# Patient Record
Sex: MALE | Race: WHITE | NOT HISPANIC OR LATINO | URBAN - METROPOLITAN AREA
[De-identification: names, ages, dates, MRNs, and addresses within clinical notes are randomized per-mention and may not be internally consistent; named-entity substitution may affect disease eponyms.]

---

## 2017-01-23 ENCOUNTER — APPOINTMENT (RX ONLY)
Dept: URBAN - METROPOLITAN AREA CLINIC 349 | Facility: CLINIC | Age: 67
Setting detail: DERMATOLOGY
End: 2017-01-23

## 2017-01-23 DIAGNOSIS — L30.4 ERYTHEMA INTERTRIGO: ICD-10-CM

## 2017-01-23 DIAGNOSIS — L30.9 DERMATITIS, UNSPECIFIED: ICD-10-CM | Status: IMPROVED

## 2017-01-23 PROCEDURE — ? COUNSELING

## 2017-01-23 PROCEDURE — 99213 OFFICE O/P EST LOW 20 MIN: CPT

## 2017-01-23 PROCEDURE — ? PRESCRIPTION

## 2017-01-23 RX ORDER — NYSTATIN 100000 [USP'U]/G
CREAM TOPICAL
Qty: 1 | Refills: 3 | Status: ERX

## 2017-01-23 RX ORDER — NITROGLYCERIN 20 MG/G
OINTMENT TOPICAL
Qty: 1 | Refills: 3 | Status: ERX

## 2017-01-23 RX ORDER — NYSTATIN 100000 [USP'U]/G
POWDER TOPICAL
Qty: 1 | Refills: 3 | Status: ERX

## 2017-01-23 ASSESSMENT — LOCATION SIMPLE DESCRIPTION DERM
LOCATION SIMPLE: RIGHT BUTTOCK
LOCATION SIMPLE: LEFT BUTTOCK
LOCATION SIMPLE: GROIN
LOCATION SIMPLE: LEFT THIGH
LOCATION SIMPLE: LEFT BUTTOCK

## 2017-01-23 ASSESSMENT — LOCATION DETAILED DESCRIPTION DERM
LOCATION DETAILED: SUPRAPUBIC SKIN
LOCATION DETAILED: LEFT ANTERIOR PROXIMAL THIGH
LOCATION DETAILED: RIGHT BUTTOCK
LOCATION DETAILED: LEFT BUTTOCK
LOCATION DETAILED: LEFT BUTTOCK

## 2017-01-23 ASSESSMENT — LOCATION ZONE DERM
LOCATION ZONE: TRUNK
LOCATION ZONE: LEG
LOCATION ZONE: TRUNK

## 2017-03-23 ENCOUNTER — HOSPITAL ENCOUNTER (OUTPATIENT)
Dept: PHYSICAL THERAPY | Age: 67
Discharge: HOME | End: 2017-03-23
Payer: MEDICARE

## 2017-03-23 PROCEDURE — G8978 MOBILITY CURRENT STATUS: HCPCS

## 2017-03-23 PROCEDURE — 97162 PT EVAL MOD COMPLEX 30 MIN: CPT

## 2017-03-23 PROCEDURE — G8979 MOBILITY GOAL STATUS: HCPCS

## 2017-03-23 NOTE — PROGRESS NOTES
Lakhwinder Toribio  : 1950 [facility],  [place].  [phone]   [phone]         OUTPATIENT PHYSICAL THERAPY:Initial Assessment 3/23/2017    ICD-10: Treatment Diagnosis: primary osteoarthiritix of R knee  (M11.117)  Precautions/Allergies:   Bactrim [sulfamethoxazole-trimethoprim]   Fall Risk Score: 2  MD Orders: knee exercises MEDICAL/REFERRING DIAGNOSIS:  Knee R knee pain  DATE OF ONSET: Oct 2016  REFERRING PHYSICIAN: Lord Dolan., *  RETURN PHYSICIAN APPOINTMENT: Surgery scheduled      INITIAL ASSESSMENT:  Mr. Heriberto Spencer presents with R knee pain and is readying for TKR in July. He is here for pre op ex program. He has multiple medical problems and is unable to tolerate several positions which will make his program more challenging. He will benefit from skilled PT to prepare for his upcoming surgery. PROBLEM LIST (Impacting functional limitations):  1. Decreased Strength  2. Increased Pain  3. Decreased Activity Tolerance  4. Decreased [place] with Home Exercise Program INTERVENTIONS PLANNED:  1. Home Exercise Program (HEP)  2. Therapeutic Exercise/Strengthening   TREATMENT PLAN:  Effective Dates: 3/23/17 TO 17. Frequency/Duration: 2 times a week for 4 weeks  GOALS: (Goals have been discussed and agreed upon with patient.)    Discharge Goals: Time Frame: 4 weeks  1. Pt independent with HEP to address deficits. 2.   Rehabilitation Potential For Stated Goals: Good  Regarding Marylu Pinzon therapy, I certify that the treatment plan above will be carried out by a therapist or under their direction. Thank you for this referral,  John Fernández, PT     Referring Physician Signature: Lord Dolan., *              Date                    The information in this section was collected on 3/23/17 (except where otherwise noted).   HISTORY:   History of Present Injury/Illness (Reason for Referral): Pt presents with R knee pain getting prepared for total knee replacement. He has multiple medical issues that impact his ability to perform exercises independently. He has chronic back problems. He sleeps in a chair and has for 5 years. He has side pain that prohibits him from lying supine. He has postural changes that impact his alignment and ability to perform ex. He has been evaluated for a surgery that would address his back but he cannot have it until he is at his ideal weight per pt. His activity limitations is making his weight loss program difficult. He asked me about a nutritionist and I suggested he get a referral from his MD and I gave him the number for [facility] out pt Nutrition counseling. Past Medical History/Comorbidities:   Mr. Klarissa Brody has medical history of asthma, COPD, obesity, hypertension, increased cholesterol, atrial fibrillation, type 2 diabetes, cervical stenosis, low back degenerative disc disease. He has chr  Social History/Living Environment:     Pt lives in home with wife with stairs. Pt sleeps in a recliner and has for the past 5 years due to R side pain which has not been definitively diagnosed. Prior Level of Function/Work/Activity:  Work full time in sales  He participates in an ex program at the [employer] where he walks a mile in the pool. Current Medications:  Advair, eliquis,aspirin,atorvastatin,chlorthalidone,benadryl,dapagliflozin,cardura,Jublia,metoprolol,montelukast,nystati      Date Last Reviewed:  3/23/2017   Number of Personal Factors/Comorbidities that affect the Plan of Care: 3+: HIGH COMPLEXITY   EXAMINATION:   Observation/Orthostatic Postural Assessment:          Pain at rest 2/10  4/10 worst  Pt states he avoids lots of activities that cause him pain in his knee. Palpation:          Not tested  ROM:          R knee-  8-115   L 5-120  Pt has tightness in his lateral hip. When he bends his knee freely in supine it is abducted significantly. Strength:        R quads- 4/5 with pain      R hamstrings - 4/5           Body Structures Involved:  1. Bones  2. Joints  3. Muscles Body Functions Affected:  1. Neuromusculoskeletal  2. Movement Related Activities and Participation Affected:  1. Mobility   Number of elements (examined above) that affect the Plan of Care: 3: MODERATE COMPLEXITY   CLINICAL PRESENTATION:   Presentation: Evolving clinical presentation with changing clinical characteristics: MODERATE COMPLEXITY   CLINICAL DECISION MAKING:   Outcome Measure: Tool Used: Lower Extremity Functional Scale (LEFS)  Score:  Initial: 40/80 Most Recent: X/80 (Date: -- )   Interpretation of Score: 20 questions each scored on a 5 point scale with 0 representing \"extreme difficulty or unable to perform\" and 4 representing \"no difficulty\". The lower the score, the greater the functional disability. 80/80 represents no disability. Minimal detectable change is 9 points. Score 80 79-63 62-48 47-32 31-16 15-1 0   Modifier CH CI CJ CK CL CM CN     ? Mobility - Walking and Moving Around:     - CURRENT STATUS: CK - 40%-59% impaired, limited or restricted    - GOAL STATUS: CI - 1%-19% impaired, limited or restricted    - D/C STATUS:  ---------------To be determined---------------    Medical Necessity:   · Patient is expected to demonstrate progress in knowledge of pre op ex program to ready him for upcoming total knee replacement. .  Reason for Services/Other Comments:  · Questionable prediction of progress as patient has difficulty positioning himself for ex and performing ex without assistance. Use of outcome tool(s) and clinical judgement create a POC that gives a: Questionable prediction of patient's progress: MODERATE COMPLEXITY            TREATMENT:   (In addition to Assessment/Re-Assessment sessions the following treatments were rendered)  Pre-treatment Symptoms/Complaints:  Pt reports he gets a clicking at times and it is painful.   He states it usually happens when he lifts his leg to prop it up. Pain: Initial:     2/10 at rest Post Session:  2/10     THERAPEUTIC EXERCISE: (15 minutes):  Exercises per grid below to improve mobility and strength. Required moderate verbal, manual and tactile cues to promote proper body alignment, promote proper body posture and promote proper body mechanics. Progressed repetitions as indicated. Pt required assistance and was positioned with head of table elevated and a wedge. Encouraged him to begin trying to decrease amount of elevation at home. Date:  3/23 Date:   Date:     Activity/Exercise Parameters Parameters Parameters   Heel slide 10     SLR 10     Quad set 10     Hip abd 5                           Treatment/Session Assessment:    · Response to Treatment:  Pt tolerated ex but had to sit up frequently to get out of painful position. He cannot tolerate sidelying for long as his shoulder hurts. He is unable to lie supine due to side/back pain. These limitations make doing the exercises challenging for him. He was able to do program with mod assistance. · Compliance with Program/Exercises: Will assess as treatment progresses. · Recommendations/Intent for next treatment session: \"Next visit will focus on reviewing HEP and modifying positions as needed to accomodate other joint issues. \".   Total Treatment Duration:       Ricky Eid, PT

## 2017-03-24 NOTE — PROGRESS NOTES
Ambulatory/Rehab Services H2 Model Falls Risk Assessment    Risk Factor Pts. ·   Confusion/Disorientation/Impulsivity  []    4 ·   Symptomatic Depression  []   2 ·   Altered Elimination  []   1 ·   Dizziness/Vertigo  []   1 ·   Gender (Male)  [x]   1 ·   Any administered antiepileptics (anticonvulsants):  []   2 ·   Any administered benzodiazepines:  []   1 ·   Visual Impairment (specify):  []   1 ·   Portable Oxygen Use  []   1 ·   Orthostatic ? BP  []   1 ·   History of Recent Falls (within 3 mos.)  []   5     Ability to Rise from Chair (choose one) Pts. ·   Ability to rise in a single movement  []   0 ·   Pushes up, successful in one attempt  [x]   1 ·   Multiple attempts, but successful  []   3 ·   Unable to rise without assistance  []   4   Total: (5 or greater = High Risk) 2     Falls Prevention Plan:   []                Physical Limitations to Exercise (specify):   []                Mobility Assistance Device (type):   []                Exercise/Equipment Adaptation (specify):    ©2010 [facility] [address] Patent #[id number].  Federal Law prohibits the replication, distribution or use without written permission from [facility] [employer]

## 2017-03-28 ENCOUNTER — HOSPITAL ENCOUNTER (OUTPATIENT)
Dept: PHYSICAL THERAPY | Age: 67
Discharge: HOME | End: 2017-03-28
Payer: MEDICARE

## 2017-03-28 ENCOUNTER — APPOINTMENT (OUTPATIENT)
Dept: PHYSICAL THERAPY | Age: 67
End: 2017-03-28

## 2017-03-28 PROCEDURE — 97162 PT EVAL MOD COMPLEX 30 MIN: CPT

## 2017-03-28 PROCEDURE — G8979 MOBILITY GOAL STATUS: HCPCS

## 2017-03-28 PROCEDURE — G8978 MOBILITY CURRENT STATUS: HCPCS

## 2017-03-28 NOTE — PROGRESS NOTES
Joby Vasquez  : 1950 [facility] at [facility] LUCIANA GAGE  [address] [place] [address],  Diana Mckenzie, [address] [place] [address]  [phone]   [phone]         OUTPATIENT PHYSICAL THERAPY:Initial Assessment 3/28/2017    ICD-10: Treatment Diagnosis: low back pain (M54.5)  Precautions/Allergies:   Bactrim [sulfamethoxazole-trimethoprim]   Fall Risk Score: 2 (? 5 = High Risk)  MD Orders: eval and treat no passive manipulation. Emphasis on conditioning and HEP MEDICAL/REFERRING DIAGNOSIS:  Low back pain [M54.5]   DATE OF ONSET: at least 5 years ago  REFERRING PHYSICIAN: Nickolas, Not On File, MD  RETURN PHYSICIAN APPOINTMENT: uncertain     INITIAL ASSESSMENT:  Mr. Tasia Membreno presents with R mid to lower thoracic pain. He has had this pain for 5-6 years. It interferes walking and with sleep as he cannot lie down. It is also interferrng with ability to exercise and ready himself for TKR. He would benefit from PT for problems listed below. PROBLEM LIST (Impacting functional limitations):  1. Decreased ADL/Functional Activities  2. Decreased Transfer Abilities  3. Decreased Ambulation Ability/Technique  4. Increased Pain  5. Decreased Activity Tolerance  6. Decreased Flexibility/Joint Mobility INTERVENTIONS PLANNED:  1. Bed Mobility  2. Home Exercise Program (HEP)  3. Therapeutic Exercise/Strengthening  4. Transfer Training   TREATMENT PLAN:  Effective Dates: 3/28/17 TO 17. Frequency/Duration: 2 times a week for 8 weeks  GOALS: (Goals have been discussed and agreed upon with patient.)  Short-Term Functional Goals: Time Frame: 4 weeks  1. Pt independent with HEP to address deficits. 2. PT to report ability to decrease his pain either with position or through exercises. 3. Pt able to tolerated supine elevated to 20 degrees. Discharge Goals: Time Frame: 8 weeks  1. Pt to sleep in bed at home with wedges for some portion of night. 2. Pt independent with HEP to maintain goals made in PT  3.  Improved Oswestry by 10 points to indicate improved function. 4. Pt to transfer supine to sit with 3/10 pain or less. 5. Pt to walk 10 minutes without increased pain. Rehabilitation Potential For Stated Goals: Good   Regarding Alona Carrera therapy, I certify that the treatment plan above will be carried out by a therapist or under their direction. Thank you for this referral,  Mili Kelley PT     Referring Physician Signature: Bsi, Not On File, MD              Date                    The information in this section was collected on 3/28/17 (except where otherwise noted). HISTORY:   History of Present Injury/Illness (Reason for Referral):  Pt has pain in his R side mid to lower thoracic area. Pt states it moves around somewhat. Pain radiates around his side but rarely to the front. He has had this pain 5 or 6 years. He reports it occurred after a coughing fit. Pain is awakening the pt so he has been sleeping in a recliner for the past 5 years. He cannot lie supine. He can tolerate supine if he is elevated 45 degrees or so. Past Medical History/Comorbidities:   Mr. Brandi Yin has a history of asthma, COPD, arthritis, obesity, hypertension, diabetes type 2, atrial fibrillation,cervical stenosis,   Social History/Living Environment:    lives with wife in home with stairs  Prior Level of Function/Work/Activity:  Works full time in sales  Previous Treatment Approaches:          Pt has been seen for back pain several times. He has had aquatic therapy, manual therapy, chiropractic. He continues with aquatic therapy and walks in the pool several times per week. He has been evaluated for surgery and is working to lose weight to become a candidate for back surgery. He is also scheduled to have his R knee replaced in July. Current Medications:  Advair, eliquis aspirin, atorvastatin, chlorthalidone, benadryl, dapagliflozin,cardura, jublia, metoprolol, montelukast, nystatin.       Date Last Reviewed:  3/28/2017 Number of Personal Factors/Comorbidities that affect the Plan of Care: 3+: HIGH COMPLEXITY   EXAMINATION:   Observation/Orthostatic Postural Assessment:  Pt presents with kyphotic posture, side bent to R. Pt sits with slouched posture and had some improvement with sitting with lumbar roll. Palpation:          Tender along the L rib cage some palpable muscle spasm  ROM:          Trunk flex- able to touch toes                 Ext- major limitation- cannot extend to neutral  Pain in rib cage   Repeated trunk ext decreased pain. Performed 3 sets of 5 in standing. Strength:          UE strength WNL  Functional Mobility:         Transfers:  Pain and difficulty transferring from lying down to sitting or rolling over from supine to side. He cannot tolerate supine unless elevated head of bed 45 degrees. He could tolerate prone tilted to the L but had to get up very quickly- could not tolerate the pain. Bed Mobility:  See above   Body Structures Involved:  1. Nerves  2. Thoracic Cage  3. Bones  4. Joints  5. Muscles Body Functions Affected:  1. Sensory/Pain  2. Neuromusculoskeletal  3. Movement Related Activities and Participation Affected:  1. Mobility  2. Self Care   Number of elements (examined above) that affect the Plan of Care: 3: MODERATE COMPLEXITY   CLINICAL PRESENTATION:   Presentation: Evolving clinical presentation with changing clinical characteristics: MODERATE COMPLEXITY   CLINICAL DECISION MAKING:   Outcome Measure: Tool Used: Modified Oswestry Low Back Pain Questionnaire  Score:  Initial: 19/50  Most Recent: X/50 (Date: -- )   Interpretation of Score: Each section is scored on a 0-5 scale, 5 representing the greatest disability. The scores of each section are added together for a total score of 50. Score 0 1-10 11-20 21-30 31-40 41-49 50   Modifier CH CI CJ CK CL CM CN     ?  Mobility - Walking and Moving Around:     - CURRENT STATUS: CJ - 20%-39% impaired, limited or restricted    - GOAL STATUS: CI - 1%-19% impaired, limited or restricted    - D/C STATUS:  ---------------To be determined---------------    Medical Necessity:   · Patient is expected to demonstrate progress in range of motion and posutre and postional tolerance to allow pt to sleep in a bed and allow him to be in posture to perform ex for TKR. .  Reason for Services/Other Comments:  · Pt has a difficult time doing his LE ex for knee replacement because he cannot lie supine. Use of outcome tool(s) and clinical judgement create a POC that gives a: Questionable prediction of patient's progress: MODERATE COMPLEXITY            TREATMENT:   (In addition to Assessment/Re-Assessment sessions the following treatments were rendered)  Pre-treatment Symptoms/Complaints:  Pt has intermittent R rib cage pain and cannot tolerate lying flat. He has been sleeping in a chair for 5 years. Pain: Initial:     2/10 Post Session:  2/10  Zero at times during session. THERAPEUTIC EXERCISE: (15 minutes):  Exercises per grid below to improve mobility. Required moderate visual, verbal and tactile cues to promote proper body alignment, promote proper body posture, promote proper body mechanics and promote proper body breathing techniques. Progressed range and repetitions as indicated. Date:  3/28 Date:   Date:     Activity/Exercise Parameters Parameters Parameters   Sitting posture with roll  5 min     Standing forward flex 10x     Standing trunk ext 3 x 5                                 Treatment/Session Assessment:    · Response to Treatment:  Pt had slightly decreased pain with extensions and range improved as he did repetitions. We will continue with posture ex and extension. .  · Compliance with Program/Exercises: compliant most of the time. · Recommendations/Intent for next treatment session: \"Next visit will focus on contiuing with trunk ext and conditioning ex. \". Perform 6 min walk test next visit.   Total Treatment Duration: 60 min       Latha Vaughn, PT

## 2017-03-30 NOTE — PROGRESS NOTES
Ambulatory/Rehab Services H2 Model Falls Risk Assessment    Risk Factor Pts. ·   Confusion/Disorientation/Impulsivity  []    4 ·   Symptomatic Depression  []   2 ·   Altered Elimination  []   1 ·   Dizziness/Vertigo  []   1 ·   Gender (Male)  [x]   1 ·   Any administered antiepileptics (anticonvulsants):  []   2 ·   Any administered benzodiazepines:  []   1 ·   Visual Impairment (specify):  []   1 ·   Portable Oxygen Use  []   1 ·   Orthostatic ? BP  []   1 ·   History of Recent Falls (within 3 mos.)  []   5     Ability to Rise from Chair (choose one) Pts. ·   Ability to rise in a single movement  []   0 ·   Pushes up, successful in one attempt  [x]   1 ·   Multiple attempts, but successful  []   3 ·   Unable to rise without assistance  []   4   Total: (5 or greater = High Risk) 2     Falls Prevention Plan:   []                Physical Limitations to Exercise (specify):   []                Mobility Assistance Device (type):   []                Exercise/Equipment Adaptation (specify):    ©2010 [facility] [address] Patent #[id number].  Federal Law prohibits the replication, distribution or use without written permission from [facility] [employer]

## 2017-04-06 ENCOUNTER — HOSPITAL ENCOUNTER (OUTPATIENT)
Dept: PHYSICAL THERAPY | Age: 67
Discharge: HOME | End: 2017-04-06
Payer: MEDICARE

## 2017-04-06 PROCEDURE — 97116 GAIT TRAINING THERAPY: CPT

## 2017-04-06 PROCEDURE — 97110 THERAPEUTIC EXERCISES: CPT

## 2017-04-06 NOTE — PROGRESS NOTES
Nick Tabares  : 1950 [facility] L[place] GAGE  [address] [facility], [address] [place] [address],[address], [address] [facility]  [phone]   [phone]         OUTPATIENT PHYSICAL THERAPY:Daily Note 2017    ICD-10: Treatment Diagnosis: primary osteoarthiritix of R knee  (M11.117)  Precautions/Allergies:   Bactrim [sulfamethoxazole-trimethoprim]   Fall Risk Score: 2  MD Orders: knee exercises MEDICAL/REFERRING DIAGNOSIS:  Low back pain [M54.5] R knee pain  DATE OF ONSET: Oct 2016  REFERRING PHYSICIAN: Rula Domingo., *  RETURN PHYSICIAN APPOINTMENT: Surgery scheduled      INITIAL ASSESSMENT:  Mr. Josepha Sever presents with R knee pain and is readying for TKR in July. He is here for pre op ex program. He has multiple medical problems and is unable to tolerate several positions which will make his program more challenging. He will benefit from skilled PT to prepare for his upcoming surgery. PROBLEM LIST (Impacting functional limitations):  1. Decreased Strength  2. Increased Pain  3. Decreased Activity Tolerance  4. Decreased [place] with Home Exercise Program INTERVENTIONS PLANNED:  1. Home Exercise Program (HEP)  2. Therapeutic Exercise/Strengthening   TREATMENT PLAN:  Effective Dates: 3/23/17 TO 17. Frequency/Duration: 2 times a week for 4 weeks  GOALS: (Goals have been discussed and agreed upon with patient.)    Discharge Goals: Time Frame: 4 weeks  1. Pt independent with HEP to address deficits. 2.   Rehabilitation Potential For Stated Goals: Good  Regarding Jaleesa Mercado therapy, I certify that the treatment plan above will be carried out by a therapist or under their direction. Thank you for this referral,  Carley Kemp PT     Referring Physician Signature: Rula Domingo., *              Date                    The information in this section was collected on 3/23/17 (except where otherwise noted).   HISTORY:   History of Present Injury/Illness (Reason for Referral): Pt presents with R knee pain getting prepared for total knee replacement. He has multiple medical issues that impact his ability to perform exercises independently. He has chronic back problems. He sleeps in a chair and has for 5 years. He has side pain that prohibits him from lying supine. He has postural changes that impact his alignment and ability to perform ex. He has been evaluated for a surgery that would address his back but he cannot have it until he is at his ideal weight per pt. His activity limitations is making his weight loss program difficult. He asked me about a nutritionist and I suggested he get a referral from his MD and I gave him the number for [facility] out pt Nutrition counseling. Past Medical History/Comorbidities:   Mr. Ashish Darby has medical history of asthma, COPD, obesity, hypertension, increased cholesterol, atrial fibrillation, type 2 diabetes, cervical stenosis, low back degenerative disc disease. He has chr  Social History/Living Environment:     Pt lives in home with wife with stairs. Pt sleeps in a recliner and has for the past 5 years due to R side pain which has not been definitively diagnosed. Prior Level of Function/Work/Activity:  Work full time in sales  He participates in an ex program at the [facility] where he walks a mile in the pool. Current Medications:  Advair, eliquis,aspirin,atorvastatin,chlorthalidone,benadryl,dapagliflozin,cardura,Jublia,metoprolol,montelukast,nystati      Date Last Reviewed:  4/6/2017   Number of Personal Factors/Comorbidities that affect the Plan of Care: 3+: HIGH COMPLEXITY   EXAMINATION:   Observation/Orthostatic Postural Assessment:          Pain at rest 2/10  4/10 worst  Pt states he avoids lots of activities that cause him pain in his knee. Palpation:          Not tested  ROM:          R knee-  8-115   L 5-120  Pt has tightness in his lateral hip.   When he bends his knee freely in supine it is abducted significantly. Strength:        R quads- 4/5 with pain      R hamstrings - 4/5           Body Structures Involved:  1. Bones  2. Joints  3. Muscles Body Functions Affected:  1. Neuromusculoskeletal  2. Movement Related Activities and Participation Affected:  1. Mobility   Number of elements (examined above) that affect the Plan of Care: 3: MODERATE COMPLEXITY   CLINICAL PRESENTATION:   Presentation: Evolving clinical presentation with changing clinical characteristics: MODERATE COMPLEXITY   CLINICAL DECISION MAKING:   Outcome Measure: Tool Used: Lower Extremity Functional Scale (LEFS)  Score:  Initial: 40/80 Most Recent: X/80 (Date: -- )   Interpretation of Score: 20 questions each scored on a 5 point scale with 0 representing \"extreme difficulty or unable to perform\" and 4 representing \"no difficulty\". The lower the score, the greater the functional disability. 80/80 represents no disability. Minimal detectable change is 9 points. Score 80 79-63 62-48 47-32 31-16 15-1 0   Modifier CH CI CJ CK CL CM CN     ? Mobility - Walking and Moving Around:     - CURRENT STATUS: CK - 40%-59% impaired, limited or restricted    - GOAL STATUS: CI - 1%-19% impaired, limited or restricted    - D/C STATUS:  ---------------To be determined---------------    Medical Necessity:   · Patient is expected to demonstrate progress in knowledge of pre op ex program to ready him for upcoming total knee replacement. .  Reason for Services/Other Comments:  · Questionable prediction of progress as patient has difficulty positioning himself for ex and performing ex without assistance.    Use of outcome tool(s) and clinical judgement create a POC that gives a: Questionable prediction of patient's progress: MODERATE COMPLEXITY            TREATMENT:   (In addition to Assessment/Re-Assessment sessions the following treatments were rendered)  Pre-treatment Symptoms/Complaints:  Pt reports he can do some of the exercises but he is limited by his inability to recline due to rib area pain. His knee is about the same. Pain: Initial:     2/10 at rest Post Session:  2/10     THERAPEUTIC EXERCISE: (45 minutes):  Exercises per grid below to improve mobility and strength. Required moderate verbal, manual and tactile cues to promote proper body alignment, promote proper body posture and promote proper body mechanics. Progressed range and repetitions as indicated. Pt positioned at about 60 degrees of elevation to accommodate thoracic/rib pain. Date:  3/23 Date:  4/6 Date:     Activity/Exercise Parameters Parameters Parameters   Heel slide 10 2 x 10    SLR 10 2 x 10    Quad set 10 2 x 10    Hip abd 5 2 x 5    Piriformis stretch   Figure 4 4 x 20 seconds  With assist    IT band stretch  4 x 20   Seconds required assist    Prone knee flex  Could not tolerate prone for long        Treatment/Session Assessment:    · Response to Treatment:  Pt continues to have problem with positioning. He can only tolerate positions for few minutes and then has to readjust.  This makes it difficult for him to do exercises independently. Much of time today spent trying different positions to see if he can perform ex at home effectively. · Compliance with Program/Exercises: Will assess as treatment progresses. · Recommendations/Intent for next treatment session: \"Next visit will focus on reviewing HEP and modifying positions as needed to accomodate other joint issues. \".   Total Treatment Duration:  50 min  PT Patient Time In/Time Out  Time In: 1100  Time Out: 4050 Keith Nice, PT

## 2017-04-11 ENCOUNTER — HOSPITAL ENCOUNTER (OUTPATIENT)
Dept: PHYSICAL THERAPY | Age: 67
Discharge: HOME | End: 2017-04-11
Payer: MEDICARE

## 2017-04-11 PROCEDURE — 97110 THERAPEUTIC EXERCISES: CPT

## 2017-04-12 NOTE — PROGRESS NOTES
Nick Rayshawn  : 1950 [facility] [place]AGE  [address] [facility], [address] [place] [address],[address], [place] [address].  [phone]   [phone]         OUTPATIENT PHYSICAL THERAPY:Daily Note 2017    ICD-10: Treatment Diagnosis: low back pain (M54.5)  Precautions/Allergies:   Bactrim [sulfamethoxazole-trimethoprim]   Fall Risk Score: 2 (? 5 = High Risk)  MD Orders: eval and treat no passive manipulation. Emphasis on conditioning and HEP MEDICAL/REFERRING DIAGNOSIS:  Low back pain [M54.5]   DATE OF ONSET: at least 5 years ago  REFERRING PHYSICIAN: Nickolas. Not on file, MD  RETURN PHYSICIAN APPOINTMENT: uncertain     INITIAL ASSESSMENT:  Mr. Josepha Sever presents with R mid to lower thoracic pain. He has had this pain for 5-6 years. It interferes walking and with sleep as he cannot lie down. It is also interferrng with ability to exercise and ready himself for TKR. He would benefit from PT for problems listed below. PROBLEM LIST (Impacting functional limitations):  1. Decreased ADL/Functional Activities  2. Decreased Transfer Abilities  3. Decreased Ambulation Ability/Technique  4. Increased Pain  5. Decreased Activity Tolerance  6. Decreased Flexibility/Joint Mobility INTERVENTIONS PLANNED:  1. Bed Mobility  2. Home Exercise Program (HEP)  3. Therapeutic Exercise/Strengthening  4. Transfer Training   TREATMENT PLAN:  Effective Dates: 3/28/17 TO 17. Frequency/Duration: 2 times a week for 8 weeks  GOALS: (Goals have been discussed and agreed upon with patient.)  Short-Term Functional Goals: Time Frame: 4 weeks  1. Pt independent with HEP to address deficits. 2. PT to report ability to decrease his pain either with position or through exercises. 3. Pt able to tolerated supine elevated to 20 degrees. Discharge Goals: Time Frame: 8 weeks  1. Pt to sleep in bed at home with wedges for some portion of night. 2. Pt independent with HEP to maintain goals made in PT  3.  Improved Oswestry by 10 points to indicate improved function. 4. Pt to transfer supine to sit with 3/10 pain or less. 5. Pt to walk 10 minutes without increased pain. Rehabilitation Potential For Stated Goals: Good   Regarding Liat Lozano therapy, I certify that the treatment plan above will be carried out by a therapist or under their direction. Thank you for this referral,  Berlin Landon, PT     Referring Physician Signature: Rangel Burton., *              Date                    The information in this section was collected on 3/28/17 (except where otherwise noted). HISTORY:   History of Present Injury/Illness (Reason for Referral):  Pt has pain in his R side mid to lower thoracic area. Pt states it moves around somewhat. Pain radiates around his side but rarely to the front. He has had this pain 5 or 6 years. He reports it occurred after a coughing fit. Pain is awakening the pt so he has been sleeping in a recliner for the past 5 years. He cannot lie supine. He can tolerate supine if he is elevated 45 degrees or so. Past Medical History/Comorbidities:   Mr. Maile Nunn has a history of asthma, COPD, arthritis, obesity, hypertension, diabetes type 2, atrial fibrillation,cervical stenosis,   Social History/Living Environment:    lives with wife in home with stairs  Prior Level of Function/Work/Activity:  Works full time in sales  Previous Treatment Approaches:          Pt has been seen for back pain several times. He has had aquatic therapy, manual therapy, chiropractic. He continues with aquatic therapy and walks in the pool several times per week. He has been evaluated for surgery and is working to lose weight to become a candidate for back surgery. He is also scheduled to have his R knee replaced in July. Current Medications:  Advair, eliquis aspirin, atorvastatin, chlorthalidone, benadryl, dapagliflozin,cardura, jublia, metoprolol, montelukast, nystatin.       Date Last Reviewed:  4/12/2017   Number of Personal Factors/Comorbidities that affect the Plan of Care: 3+: HIGH COMPLEXITY   EXAMINATION:   Observation/Orthostatic Postural Assessment:  Pt presents with kyphotic posture, side bent to R. Pt sits with slouched posture and had some improvement with sitting with lumbar roll. Palpation:          Tender along the L rib cage some palpable muscle spasm  ROM:          Trunk flex- able to touch toes                 Ext- major limitation- cannot extend to neutral  Pain in rib cage   Repeated trunk ext decreased pain. Performed 3 sets of 5 in standing. Strength:          UE strength WNL  Functional Mobility:         Transfers:  Pain and difficulty transferring from lying down to sitting or rolling over from supine to side. He cannot tolerate supine unless elevated head of bed 45 degrees. He could tolerate prone tilted to the L but had to get up very quickly- could not tolerate the pain. Bed Mobility:  See above   Body Structures Involved:  1. Nerves  2. Thoracic Cage  3. Bones  4. Joints  5. Muscles Body Functions Affected:  1. Sensory/Pain  2. Neuromusculoskeletal  3. Movement Related Activities and Participation Affected:  1. Mobility  2. Self Care   Number of elements (examined above) that affect the Plan of Care: 3: MODERATE COMPLEXITY   CLINICAL PRESENTATION:   Presentation: Evolving clinical presentation with changing clinical characteristics: MODERATE COMPLEXITY   CLINICAL DECISION MAKING:   Outcome Measure: Tool Used: Modified Oswestry Low Back Pain Questionnaire  Score:  Initial: 19/50  Most Recent: X/50 (Date: -- )   Interpretation of Score: Each section is scored on a 0-5 scale, 5 representing the greatest disability. The scores of each section are added together for a total score of 50. Score 0 1-10 11-20 21-30 31-40 41-49 50   Modifier CH CI CJ CK CL CM CN     ?  Mobility - Walking and Moving Around:     - CURRENT STATUS: CJ - 20%-39% impaired, limited or restricted    - GOAL STATUS: CI - 1%-19% impaired, limited or restricted    - D/C STATUS:  ---------------To be determined---------------    Medical Necessity:   · Patient is expected to demonstrate progress in range of motion and posutre and postional tolerance to allow pt to sleep in a bed and allow him to be in posture to perform ex for TKR. .  Reason for Services/Other Comments:  · Pt has a difficult time doing his LE ex for knee replacement because he cannot lie supine. Use of outcome tool(s) and clinical judgement create a POC that gives a: Questionable prediction of patient's progress: MODERATE COMPLEXITY            TREATMENT:   (In addition to Assessment/Re-Assessment sessions the following treatments were rendered)  Pre-treatment Symptoms/Complaints:  Pt states his rib pain has been worse - hurting more frequently since he has been doing more activity. Pain: Initial: PT does not hurt at rest unless he has done a lot during the day    2/10 Post Session:  0/10 at rest     THERAPEUTIC EXERCISE: (15 minutes):  Exercises per grid below to improve mobility. Required moderate visual, verbal and tactile cues to promote proper body alignment, promote proper body posture, promote proper body mechanics and promote proper body breathing techniques. Progressed range and repetitions as indicated. Date:  3/28 Date:  4/11 Date:     Activity/Exercise Parameters Parameters Parameters   Sitting posture with roll  5 min     Standing forward flex 10x     Standing trunk ext 3 x 5 2 x 10    Seated pelvic tilt  10    Standing pelvic tilt  10    Wall slides shoulder with lift off  10    Thoracic extension over chair  2 x 5    Cat / camel in quadraped  10                                Treatment/Session Assessment:    · Response to Treatment:  Pt was able to tolerate all exercise. They were challenging as was even getting into position for ex. He required rest breaks and was sweating.   He was surprised how much he was sweating and how difficult the activities were. He continues to work in the pool independently. We will meet in the pool next visit to work on new exercises for him in the pool as he currently is just walking in the water. Mr Gatica had to cut his session short today as he had an appt. · Compliance with Program/Exercises: compliant most of the time. · Recommendations/Intent for next treatment session: \"Next visit will focus on contiuing with trunk ext and conditioning ex. \". Perform 6 min walk test next visit.   Total Treatment Duration: 35 min  PT Patient Time In/Time Out  Time In: 1110  Time Out: 9797 Wilmer Partida, PT

## 2017-04-17 ENCOUNTER — HOSPITAL ENCOUNTER (OUTPATIENT)
Dept: PHYSICAL THERAPY | Age: 67
Discharge: HOME | End: 2017-04-17
Payer: MEDICARE

## 2017-04-17 PROCEDURE — 97113 AQUATIC THERAPY/EXERCISES: CPT

## 2017-04-18 ENCOUNTER — APPOINTMENT (OUTPATIENT)
Dept: PHYSICAL THERAPY | Age: 67
End: 2017-04-18
Payer: MEDICARE

## 2017-04-20 ENCOUNTER — APPOINTMENT (OUTPATIENT)
Dept: PHYSICAL THERAPY | Age: 67
End: 2017-04-20
Payer: MEDICARE

## 2017-04-24 ENCOUNTER — RX ONLY (OUTPATIENT)
Age: 67
Setting detail: RX ONLY
End: 2017-04-24

## 2017-04-24 ENCOUNTER — APPOINTMENT (RX ONLY)
Dept: URBAN - METROPOLITAN AREA CLINIC 349 | Facility: CLINIC | Age: 67
Setting detail: DERMATOLOGY
End: 2017-04-24

## 2017-04-24 DIAGNOSIS — L30.4 ERYTHEMA INTERTRIGO: ICD-10-CM | Status: INADEQUATELY CONTROLLED

## 2017-04-24 PROCEDURE — ? TREATMENT REGIMEN

## 2017-04-24 PROCEDURE — ? COUNSELING

## 2017-04-24 PROCEDURE — 99213 OFFICE O/P EST LOW 20 MIN: CPT

## 2017-04-24 PROCEDURE — ? PRESCRIPTION

## 2017-04-24 RX ORDER — NYSTATIN 100000 [USP'U]/G
POWDER TOPICAL
Qty: 1 | Refills: 3 | Status: CANCELLED
Stop reason: CLARIF

## 2017-04-24 RX ORDER — IODOQUINOL, HYDROCORTISONE ACETATE AND ALOE VERA LEAF 10; 20; 10 MG/G; MG/G; MG/G
GEL TOPICAL
Qty: 1 | Refills: 1 | Status: ERX | COMMUNITY
Start: 2017-04-24

## 2017-04-24 RX ORDER — NYSTATIN 100000 [USP'U]/G
CREAM TOPICAL
Qty: 1 | Refills: 3 | Status: ERX

## 2017-04-24 RX ORDER — MICONAZOLE NITRATE 20.6 MG/G
POWDER TOPICAL
Qty: 1 | Refills: 11 | Status: ERX | COMMUNITY
Start: 2017-04-24

## 2017-04-24 RX ADMIN — IODOQUINOL, HYDROCORTISONE ACETATE AND ALOE VERA LEAF: 10; 20; 10 GEL TOPICAL at 00:00

## 2017-04-24 ASSESSMENT — LOCATION SIMPLE DESCRIPTION DERM
LOCATION SIMPLE: ABDOMEN
LOCATION SIMPLE: GROIN

## 2017-04-24 ASSESSMENT — LOCATION ZONE DERM: LOCATION ZONE: TRUNK

## 2017-04-24 ASSESSMENT — LOCATION DETAILED DESCRIPTION DERM
LOCATION DETAILED: PERIUMBILICAL SKIN
LOCATION DETAILED: RIGHT INGUINAL CREASE

## 2017-04-25 ENCOUNTER — HOSPITAL ENCOUNTER (OUTPATIENT)
Dept: PHYSICAL THERAPY | Age: 67
Discharge: HOME | End: 2017-04-25
Payer: MEDICARE

## 2017-04-25 ENCOUNTER — APPOINTMENT (OUTPATIENT)
Dept: PHYSICAL THERAPY | Age: 67
End: 2017-04-25
Payer: MEDICARE

## 2017-04-25 PROCEDURE — 97110 THERAPEUTIC EXERCISES: CPT

## 2017-04-25 NOTE — PROGRESS NOTES
Opal Hoffman  : 1950 [facility] LUCIANA GAGE  [address] [place] [address],  Chet Vázquez.  [phone]   [phone]         OUTPATIENT PHYSICAL THERAPY:Daily Note 2017    ICD-10: Treatment Diagnosis: low back pain (M54.5)  Precautions/Allergies:   Bactrim [sulfamethoxazole-trimethoprim]   Fall Risk Score: 2 (? 5 = High Risk)  MD Orders: eval and treat no passive manipulation. Emphasis on conditioning and HEP MEDICAL/REFERRING DIAGNOSIS:  low back pain   DATE OF ONSET: at least 5 years ago  REFERRING PHYSICIAN: Nickolas. Not on file, MD  RETURN PHYSICIAN APPOINTMENT: uncertain     INITIAL ASSESSMENT:  Mr. Archie Casas presents with R mid to lower thoracic pain. He has had this pain for 5-6 years. It interferes walking and with sleep as he cannot lie down. It is also interferrng with ability to exercise and ready himself for TKR. He would benefit from PT for problems listed below. PROBLEM LIST (Impacting functional limitations):  1. Decreased ADL/Functional Activities  2. Decreased Transfer Abilities  3. Decreased Ambulation Ability/Technique  4. Increased Pain  5. Decreased Activity Tolerance  6. Decreased Flexibility/Joint Mobility INTERVENTIONS PLANNED:  1. Bed Mobility  2. Home Exercise Program (HEP)  3. Therapeutic Exercise/Strengthening  4. Transfer Training   TREATMENT PLAN:  Effective Dates: 3/28/17 TO 17. Frequency/Duration: 2 times a week for 8 weeks  GOALS: (Goals have been discussed and agreed upon with patient.)  Short-Term Functional Goals: Time Frame: 4 weeks  1. Pt independent with HEP to address deficits. 2. PT to report ability to decrease his pain either with position or through exercises. 3. Pt able to tolerated supine elevated to 20 degrees. Discharge Goals: Time Frame: 8 weeks  1. Pt to sleep in bed at home with wedges for some portion of night. 2. Pt independent with HEP to maintain goals made in PT  3.  Improved Oswestry by 10 points to indicate improved function. 4. Pt to transfer supine to sit with 3/10 pain or less. 5. Pt to walk 10 minutes without increased pain. Rehabilitation Potential For Stated Goals: Good   Regarding Mike Marcos therapy, I certify that the treatment plan above will be carried out by a therapist or under their direction. Thank you for this referral,  Carlos Dumont PT     Referring Physician Signature: Sandra Pratt MD              Date                    The information in this section was collected on 3/28/17 (except where otherwise noted). HISTORY:   History of Present Injury/Illness (Reason for Referral):  Pt has pain in his R side mid to lower thoracic area. Pt states it moves around somewhat. Pain radiates around his side but rarely to the front. He has had this pain 5 or 6 years. He reports it occurred after a coughing fit. Pain is awakening the pt so he has been sleeping in a recliner for the past 5 years. He cannot lie supine. He can tolerate supine if he is elevated 45 degrees or so. Past Medical History/Comorbidities:   Mr. West Cross has a history of asthma, COPD, arthritis, obesity, hypertension, diabetes type 2, atrial fibrillation,cervical stenosis,   Social History/Living Environment:    lives with wife in home with stairs  Prior Level of Function/Work/Activity:  Works full time in sales  Previous Treatment Approaches:          Pt has been seen for back pain several times. He has had aquatic therapy, manual therapy, chiropractic. He continues with aquatic therapy and walks in the pool several times per week. He has been evaluated for surgery and is working to lose weight to become a candidate for back surgery. He is also scheduled to have his R knee replaced in July. Current Medications:  Advair, eliquis aspirin, atorvastatin, chlorthalidone, benadryl, dapagliflozin,cardura, jublia, metoprolol, montelukast, nystatin.       Date Last Reviewed:  4/25/2017   Number of Personal Factors/Comorbidities that affect the Plan of Care: 3+: HIGH COMPLEXITY   EXAMINATION:   Observation/Orthostatic Postural Assessment:  Pt presents with kyphotic posture, side bent to R. Pt sits with slouched posture and had some improvement with sitting with lumbar roll. Palpation:          Tender along the L rib cage some palpable muscle spasm  ROM:          Trunk flex- able to touch toes                 Ext- major limitation- cannot extend to neutral  Pain in rib cage   Repeated trunk ext decreased pain. Performed 3 sets of 5 in standing. Strength:          UE strength WNL  Functional Mobility:         Transfers:  Pain and difficulty transferring from lying down to sitting or rolling over from supine to side. He cannot tolerate supine unless elevated head of bed 45 degrees. He could tolerate prone tilted to the L but had to get up very quickly- could not tolerate the pain. Bed Mobility:  See above   Body Structures Involved:  1. Nerves  2. Thoracic Cage  3. Bones  4. Joints  5. Muscles Body Functions Affected:  1. Sensory/Pain  2. Neuromusculoskeletal  3. Movement Related Activities and Participation Affected:  1. Mobility  2. Self Care   Number of elements (examined above) that affect the Plan of Care: 3: MODERATE COMPLEXITY   CLINICAL PRESENTATION:   Presentation: Evolving clinical presentation with changing clinical characteristics: MODERATE COMPLEXITY   CLINICAL DECISION MAKING:   Outcome Measure: Tool Used: Modified Oswestry Low Back Pain Questionnaire  Score:  Initial: 19/50  Most Recent: X/50 (Date: -- )   Interpretation of Score: Each section is scored on a 0-5 scale, 5 representing the greatest disability. The scores of each section are added together for a total score of 50. Score 0 1-10 11-20 21-30 31-40 41-49 50   Modifier CH CI CJ CK CL CM CN     ?  Mobility - Walking and Moving Around:     - CURRENT STATUS: CJ - 20%-39% impaired, limited or restricted    - GOAL STATUS: CI - 1%-19% impaired, limited or restricted    - D/C STATUS:  ---------------To be determined---------------    Medical Necessity:   · Patient is expected to demonstrate progress in range of motion and posutre and postional tolerance to allow pt to sleep in a bed and allow him to be in posture to perform ex for TKR. .  Reason for Services/Other Comments:  · Pt has a difficult time doing his LE ex for knee replacement because he cannot lie supine. Use of outcome tool(s) and clinical judgement create a POC that gives a: Questionable prediction of patient's progress: MODERATE COMPLEXITY            TREATMENT:   (In addition to Assessment/Re-Assessment sessions the following treatments were rendered)  Pre-treatment Symptoms/Complaints:  Pt states he has not had a lot of time to do his ex. He states he cannot tell if he is any better. Pain: Initial: 0/10 unless he moves into a position that causes his pain then it is 9/10     Post Session:  0/10 at rest     THERAPEUTIC EXERCISE: (15 minutes):  Exercises per grid below to improve mobility. Required moderate visual, verbal and tactile cues to promote proper body alignment, promote proper body posture, promote proper body mechanics and promote proper body breathing techniques. Progressed range and repetitions as indicated. Date:  3/28 Date:  4/11 Date:  4/25   Activity/Exercise Parameters Parameters Parameters   Sitting posture with roll  5 min     Standing forward flex 10x     Standing trunk ext 3 x 5 2 x 10    Seated pelvic tilt  10    Standing pelvic tilt  10    Wall slides shoulder with lift off  10    Thoracic extension over chair  2 x 5 2 x 10   Cat / camel in quadraped  10    Standing trunk ext   2 x 10   Lying prone over pillows   Pt unable to stay in position over 60 sec. Treatment/Session Assessment:    · Response to Treatment:  Pt had improved ROM with standing trunk ext with repetitions.   He did not have increased pain after performing ex so he will do 10 every 2-3 hours at home. He could not stay in prone position for long at all. He continues to sleep in a recliner and cannot recline any further toward supine. · Recommendations/Intent for next treatment session: \"Next visit will focus on contiuing with trunk ext and conditioning ex. \". Perform 6 min walk test next visit.   Total Treatment Duration: 30 min  PT Patient Time In/Time Out  Time In: 1115  Time Out: 1333 [facility],

## 2017-04-25 NOTE — PROGRESS NOTES
Barby Jackson  : 1950 [facility] LUCIANA GAGE  [address] [place] [address],  Chet Vázquez.  [phone]   [phone]         OUTPATIENT PHYSICAL THERAPY:Daily Note 2017    ICD-10: Treatment Diagnosis: low back pain (M54.5)  Precautions/Allergies:   Bactrim [sulfamethoxazole-trimethoprim]   Fall Risk Score: 2 (? 5 = High Risk)  MD Orders: eval and treat no passive manipulation. Emphasis on conditioning and HEP MEDICAL/REFERRING DIAGNOSIS:  low back pain   DATE OF ONSET: at least 5 years ago  REFERRING PHYSICIAN: Nickolas. Not on file, MD  RETURN PHYSICIAN APPOINTMENT: uncertain     INITIAL ASSESSMENT:  Mr. Jane Frazier presents with R mid to lower thoracic pain. He has had this pain for 5-6 years. It interferes walking and with sleep as he cannot lie down. It is also interferrng with ability to exercise and ready himself for TKR. He would benefit from PT for problems listed below. PROBLEM LIST (Impacting functional limitations):  1. Decreased ADL/Functional Activities  2. Decreased Transfer Abilities  3. Decreased Ambulation Ability/Technique  4. Increased Pain  5. Decreased Activity Tolerance  6. Decreased Flexibility/Joint Mobility INTERVENTIONS PLANNED:  1. Bed Mobility  2. Home Exercise Program (HEP)  3. Therapeutic Exercise/Strengthening  4. Transfer Training   TREATMENT PLAN:  Effective Dates: 3/28/17 TO 17. Frequency/Duration: 2 times a week for 8 weeks  GOALS: (Goals have been discussed and agreed upon with patient.)  Short-Term Functional Goals: Time Frame: 4 weeks  1. Pt independent with HEP to address deficits. 2. PT to report ability to decrease his pain either with position or through exercises. 3. Pt able to tolerated supine elevated to 20 degrees. Discharge Goals: Time Frame: 8 weeks  1. Pt to sleep in bed at home with wedges for some portion of night. 2. Pt independent with HEP to maintain goals made in PT  3.  Improved Oswestry by 10 points to indicate improved function. 4. Pt to transfer supine to sit with 3/10 pain or less. 5. Pt to walk 10 minutes without increased pain. Rehabilitation Potential For Stated Goals: Good   Regarding Macey Points therapy, I certify that the treatment plan above will be carried out by a therapist or under their direction. Thank you for this referral,  Kim Head, PT     Referring Physician Signature: Litzy Martin MD              Date                    The information in this section was collected on 3/28/17 (except where otherwise noted). HISTORY:   History of Present Injury/Illness (Reason for Referral):  Pt has pain in his R side mid to lower thoracic area. Pt states it moves around somewhat. Pain radiates around his side but rarely to the front. He has had this pain 5 or 6 years. He reports it occurred after a coughing fit. Pain is awakening the pt so he has been sleeping in a recliner for the past 5 years. He cannot lie supine. He can tolerate supine if he is elevated 45 degrees or so. Past Medical History/Comorbidities:   Mr. Veronica Varela has a history of asthma, COPD, arthritis, obesity, hypertension, diabetes type 2, atrial fibrillation,cervical stenosis,   Social History/Living Environment:    lives with wife in home with stairs  Prior Level of Function/Work/Activity:  Works full time in sales  Previous Treatment Approaches:          Pt has been seen for back pain several times. He has had aquatic therapy, manual therapy, chiropractic. He continues with aquatic therapy and walks in the pool several times per week. He has been evaluated for surgery and is working to lose weight to become a candidate for back surgery. He is also scheduled to have his R knee replaced in July. Current Medications:  Advair, eliquis aspirin, atorvastatin, chlorthalidone, benadryl, dapagliflozin,cardura, jublia, metoprolol, montelukast, nystatin.       Date Last Reviewed:  4/25/2017   Number of Personal Factors/Comorbidities that affect the Plan of Care: 3+: HIGH COMPLEXITY   EXAMINATION:   Observation/Orthostatic Postural Assessment:  Pt presents with kyphotic posture, side bent to R. Pt sits with slouched posture and had some improvement with sitting with lumbar roll. Palpation:          Tender along the L rib cage some palpable muscle spasm  ROM:          Trunk flex- able to touch toes                 Ext- major limitation- cannot extend to neutral  Pain in rib cage   Repeated trunk ext decreased pain. Performed 3 sets of 5 in standing. Strength:          UE strength WNL  Functional Mobility:         Transfers:  Pain and difficulty transferring from lying down to sitting or rolling over from supine to side. He cannot tolerate supine unless elevated head of bed 45 degrees. He could tolerate prone tilted to the L but had to get up very quickly- could not tolerate the pain. Bed Mobility:  See above   Body Structures Involved:  1. Nerves  2. Thoracic Cage  3. Bones  4. Joints  5. Muscles Body Functions Affected:  1. Sensory/Pain  2. Neuromusculoskeletal  3. Movement Related Activities and Participation Affected:  1. Mobility  2. Self Care   Number of elements (examined above) that affect the Plan of Care: 3: MODERATE COMPLEXITY   CLINICAL PRESENTATION:   Presentation: Evolving clinical presentation with changing clinical characteristics: MODERATE COMPLEXITY   CLINICAL DECISION MAKING:   Outcome Measure: Tool Used: Modified Oswestry Low Back Pain Questionnaire  Score:  Initial: 19/50  Most Recent: X/50 (Date: -- )   Interpretation of Score: Each section is scored on a 0-5 scale, 5 representing the greatest disability. The scores of each section are added together for a total score of 50. Score 0 1-10 11-20 21-30 31-40 41-49 50   Modifier CH CI CJ CK CL CM CN     ?  Mobility - Walking and Moving Around:     - CURRENT STATUS: CJ - 20%-39% impaired, limited or restricted    - GOAL STATUS: CI - 1%-19% impaired, limited or restricted    - D/C STATUS:  ---------------To be determined---------------    Medical Necessity:   · Patient is expected to demonstrate progress in range of motion and posutre and postional tolerance to allow pt to sleep in a bed and allow him to be in posture to perform ex for TKR. .  Reason for Services/Other Comments:  · Pt has a difficult time doing his LE ex for knee replacement because he cannot lie supine. Use of outcome tool(s) and clinical judgement create a POC that gives a: Questionable prediction of patient's progress: MODERATE COMPLEXITY            TREATMENT:   (In addition to Assessment/Re-Assessment sessions the following treatments were rendered)  Pre-treatment Symptoms/Complaints:  Pt states he can move a bit more but his pain is still the same. Pain: Initial: PT does not hurt at rest unless he has done a lot during the day    2/10 Post Session:  0/10 at rest   None today  THERAPEUTIC EXERCISE: (0  minutes):  Exercises per grid below to improve mobility. Required moderate visual, verbal and tactile cues to promote proper body alignment, promote proper body posture, promote proper body mechanics and promote proper body breathing techniques. Progressed range and repetitions as indicated. Date:  3/28 Date:  4/11 Date:     Activity/Exercise Parameters Parameters Parameters   Sitting posture with roll  5 min     Standing forward flex 10x     Standing trunk ext 3 x 5 2 x 10    Seated pelvic tilt  10    Standing pelvic tilt  10    Wall slides shoulder with lift off  10    Thoracic extension over chair  2 x 5    Cat / camel in quadraped  10                            Aquatic Therapy (45 minutes): Aquatic treatment performed per flow grid for Decompression, Ease of movement and Low impact and reduced weight bearing activity. Cues provided for posture and ex. Assistance by therapist provided for floating supine.        Aquatic Exercise Log       Date  4/25 Date   Date   Date   Date     Activity/ Exercise Parameters Parameters Parameters Parameters Parameters   Walking forward 6       Walking backward 6       Walking sideways 6         Marching          Goose Step          Tip toes          Heels          Lunges        Side step squats        LE Exercises          Hip Flex/Ext 15         Hip Abd/Add 15         Hip IR/ER          Calf raises          Knee Flex          Squats          Leg Circles          Step Ups        UE Exercises          Squeeze In          Push Down          Pull Down          Bicep/Tricep        Rows/Press outs         Chi Positions          Trunk Rotation        Deep H2O/ Noodles 7' with noodle         Stabilization Supine floating UE motions working on trunk position         Prone kicking 2 laps           Legs only        Cross   [place] 3 min         Scissors 3 min         jog 3 min       Lower abdominal   work  Knee to chest  2 min         Cardio          Jogging        Lap   Swimming          Stretches          Hamstrings          Heelcords          Piriformis          Neck            Treatment/Session Assessment:    · Response to Treatment:  Pt did well in the water. We focused on extension types of positions in the water. He was unable to swim prone with legs elevated. He did well with the other activities. · Compliance with Program/Exercises: compliant most of the time. · Recommendations/Intent for next treatment session: \"Next visit will focus on contiuing with trunk ext and conditioning ex. \". Perform 6 min walk test next visit.   Total Treatment Duration: 45 min       Aftab Baltazar PT

## 2017-04-27 ENCOUNTER — APPOINTMENT (OUTPATIENT)
Dept: PHYSICAL THERAPY | Age: 67
End: 2017-04-27
Payer: MEDICARE

## 2017-05-10 ENCOUNTER — HOSPITAL ENCOUNTER (OUTPATIENT)
Dept: PHYSICAL THERAPY | Age: 67
Discharge: HOME | End: 2017-05-10
Payer: MEDICARE

## 2017-05-10 PROCEDURE — 97110 THERAPEUTIC EXERCISES: CPT

## 2017-05-15 PROBLEM — M17.9 OA (OSTEOARTHRITIS) OF KNEE: Status: ACTIVE | Noted: 2017-05-15

## 2017-05-16 ENCOUNTER — HOSPITAL ENCOUNTER (OUTPATIENT)
Dept: PHYSICAL THERAPY | Age: 67
Discharge: HOME | End: 2017-05-16
Payer: MEDICARE

## 2017-05-16 NOTE — PROGRESS NOTES
Janice Rocha  : 1950 [facility] LUCIANA GAGE  [address] [place] [address],  Chet Vázquez.  [phone]   [phone]         OUTPATIENT PHYSICAL THERAPY:Daily Note 2017    ICD-10: Treatment Diagnosis: low back pain (M54.5)  Precautions/Allergies:   Bactrim [sulfamethoxazole-trimethoprim]   Fall Risk Score: 2 (? 5 = High Risk)  MD Orders: eval and treat no passive manipulation. Emphasis on conditioning and HEP MEDICAL/REFERRING DIAGNOSIS:  Low back pain [M54.5]   DATE OF ONSET: at least 5 years ago  REFERRING PHYSICIAN: Nickolas. Not on file, MD  RETURN PHYSICIAN APPOINTMENT: uncertain     INITIAL ASSESSMENT:  Mr. Brandi Yin presents with R mid to lower thoracic pain. He has had this pain for 5-6 years. It interferes walking and with sleep as he cannot lie down. It is also interferrng with ability to exercise and ready himself for TKR. He would benefit from PT for problems listed below. PROBLEM LIST (Impacting functional limitations):  1. Decreased ADL/Functional Activities  2. Decreased Transfer Abilities  3. Decreased Ambulation Ability/Technique  4. Increased Pain  5. Decreased Activity Tolerance  6. Decreased Flexibility/Joint Mobility INTERVENTIONS PLANNED:  1. Bed Mobility  2. Home Exercise Program (HEP)  3. Therapeutic Exercise/Strengthening  4. Transfer Training   TREATMENT PLAN:  Effective Dates: 3/28/17 TO 17. Frequency/Duration: 2 times a week for 8 weeks  GOALS: (Goals have been discussed and agreed upon with patient.)  Short-Term Functional Goals: Time Frame: 4 weeks  1. Pt independent with HEP to address deficits. 2. PT to report ability to decrease his pain either with position or through exercises. 3. Pt able to tolerated supine elevated to 20 degrees. Discharge Goals: Time Frame: 8 weeks  1. Pt to sleep in bed at home with wedges for some portion of night. 2. Pt independent with HEP to maintain goals made in PT  3.  Improved Oswestry by 10 points to indicate improved function. 4. Pt to transfer supine to sit with 3/10 pain or less. 5. Pt to walk 10 minutes without increased pain. Rehabilitation Potential For Stated Goals: Good   Regarding Сергей Magaña therapy, I certify that the treatment plan above will be carried out by a therapist or under their direction. Thank you for this referral,  Ángel Brewster, PT     Referring Physician Signature: Alexsander Carrasco MD              Date                    The information in this section was collected on 3/28/17 (except where otherwise noted). HISTORY:   History of Present Injury/Illness (Reason for Referral):  Pt has pain in his R side mid to lower thoracic area. Pt states it moves around somewhat. Pain radiates around his side but rarely to the front. He has had this pain 5 or 6 years. He reports it occurred after a coughing fit. Pain is awakening the pt so he has been sleeping in a recliner for the past 5 years. He cannot lie supine. He can tolerate supine if he is elevated 45 degrees or so. Past Medical History/Comorbidities:   Mr. Kye Hoffman has a history of asthma, COPD, arthritis, obesity, hypertension, diabetes type 2, atrial fibrillation,cervical stenosis,   Social History/Living Environment:    lives with wife in home with stairs  Prior Level of Function/Work/Activity:  Works full time in sales  Previous Treatment Approaches:          Pt has been seen for back pain several times. He has had aquatic therapy, manual therapy, chiropractic. He continues with aquatic therapy and walks in the pool several times per week. He has been evaluated for surgery and is working to lose weight to become a candidate for back surgery. He is also scheduled to have his R knee replaced in July. Current Medications:  Advair, eliquis aspirin, atorvastatin, chlorthalidone, benadryl, dapagliflozin,cardura, jublia, metoprolol, montelukast, nystatin.       Date Last Reviewed:  5/16/2017   Number of Personal Factors/Comorbidities that affect the Plan of Care: 3+: HIGH COMPLEXITY   EXAMINATION:   Observation/Orthostatic Postural Assessment:  Pt presents with kyphotic posture, side bent to R. Pt sits with slouched posture and had some improvement with sitting with lumbar roll. Palpation:          Tender along the L rib cage some palpable muscle spasm  ROM:          Trunk flex- able to touch toes                 Ext- major limitation- cannot extend to neutral  Pain in rib cage   Repeated trunk ext decreased pain. Performed 3 sets of 5 in standing. Strength:          UE strength WNL  Functional Mobility:         Transfers:  Pain and difficulty transferring from lying down to sitting or rolling over from supine to side. He cannot tolerate supine unless elevated head of bed 45 degrees. He could tolerate prone tilted to the L but had to get up very quickly- could not tolerate the pain. Bed Mobility:  See above   Body Structures Involved:  1. Nerves  2. Thoracic Cage  3. Bones  4. Joints  5. Muscles Body Functions Affected:  1. Sensory/Pain  2. Neuromusculoskeletal  3. Movement Related Activities and Participation Affected:  1. Mobility  2. Self Care   Number of elements (examined above) that affect the Plan of Care: 3: MODERATE COMPLEXITY   CLINICAL PRESENTATION:   Presentation: Evolving clinical presentation with changing clinical characteristics: MODERATE COMPLEXITY   CLINICAL DECISION MAKING:   Outcome Measure: Tool Used: Modified Oswestry Low Back Pain Questionnaire  Score:  Initial: 19/50  Most Recent: X/50 (Date: -- )   Interpretation of Score: Each section is scored on a 0-5 scale, 5 representing the greatest disability. The scores of each section are added together for a total score of 50. Score 0 1-10 11-20 21-30 31-40 41-49 50   Modifier CH CI CJ CK CL CM CN     ?  Mobility - Walking and Moving Around:     - CURRENT STATUS: CJ - 20%-39% impaired, limited or restricted    - GOAL STATUS: CI - 1%-19% impaired, limited or restricted    - D/C STATUS:  ---------------To be determined---------------    Medical Necessity:   · Patient is expected to demonstrate progress in range of motion and posutre and postional tolerance to allow pt to sleep in a bed and allow him to be in posture to perform ex for TKR. .  Reason for Services/Other Comments:  · Pt has a difficult time doing his LE ex for knee replacement because he cannot lie supine. Use of outcome tool(s) and clinical judgement create a POC that gives a: Questionable prediction of patient's progress: MODERATE COMPLEXITY            TREATMENT:   (In addition to Assessment/Re-Assessment sessions the following treatments were rendered)  Pre-treatment Symptoms/Complaints:  Pt states his pain is the same. He has requested HEP for strengthening and then a recheck in a few weeks. Pain: Initial: 0/10 unless he moves into a position that causes his pain then it is 9/10     Post Session:  0/10 at rest     THERAPEUTIC EXERCISE: (45 minutes):  Exercises per grid below to improve mobility. Required moderate visual, verbal and tactile cues to promote proper body alignment, promote proper body posture, promote proper body mechanics and promote proper body breathing techniques. Progressed range and repetitions as indicated. Date:  5/17 Date:   Date:     Activity/Exercise Parameters Parameters Parameters   Standing flex 10 each LE     Standing ext 10 each LE     Standing abd 10 each LE     Standing add 10 each LE     rows 10     Trunk rotation 10 each direction     Hip abd in sitting 10x     Treadmill 1.1 mph x 4 minutes       Treatment/Session Assessment:    · Response to Treatment:  Pt did well with all ex. They were challenging to him and he required verbal reminder to look up and maintain posture. · Recommendations/Intent for next treatment session: \"Next visit will focus on contiuing with trunk ext and conditioning ex. \".   He will return in 2 weeks for a recheck.   Total Treatment Duration: 45 min       Cornelia Saunders, PT

## 2017-05-16 NOTE — PROGRESS NOTES
Gabriella Armijo  : 1950 [facility] LUCIANA GAGE  [address] [place] [address],  Chet Vázquez.  [phone]   [phone]         OUTPATIENT PHYSICAL THERAPY:Daily Note 2017    ICD-10: Treatment Diagnosis: low back pain (M54.5)  Precautions/Allergies:   Bactrim [sulfamethoxazole-trimethoprim]   Fall Risk Score: 2 (? 5 = High Risk)  MD Orders: eval and treat no passive manipulation. Emphasis on conditioning and HEP MEDICAL/REFERRING DIAGNOSIS:  Low back pain [M54.5]   DATE OF ONSET: at least 5 years ago  REFERRING PHYSICIAN: Nickolas. Not on file, MD  RETURN PHYSICIAN APPOINTMENT: uncertain     INITIAL ASSESSMENT:  Mr. Dakota Shaffer presents with R mid to lower thoracic pain. He has had this pain for 5-6 years. It interferes walking and with sleep as he cannot lie down. It is also interferrng with ability to exercise and ready himself for TKR. He would benefit from PT for problems listed below. PROBLEM LIST (Impacting functional limitations):  1. Decreased ADL/Functional Activities  2. Decreased Transfer Abilities  3. Decreased Ambulation Ability/Technique  4. Increased Pain  5. Decreased Activity Tolerance  6. Decreased Flexibility/Joint Mobility INTERVENTIONS PLANNED:  1. Bed Mobility  2. Home Exercise Program (HEP)  3. Therapeutic Exercise/Strengthening  4. Transfer Training   TREATMENT PLAN:  Effective Dates: 3/28/17 TO 17. Frequency/Duration: 2 times a week for 8 weeks  GOALS: (Goals have been discussed and agreed upon with patient.)  Short-Term Functional Goals: Time Frame: 4 weeks  1. Pt independent with HEP to address deficits. 2. PT to report ability to decrease his pain either with position or through exercises. 3. Pt able to tolerated supine elevated to 20 degrees. Discharge Goals: Time Frame: 8 weeks  1. Pt to sleep in bed at home with wedges for some portion of night. 2. Pt independent with HEP to maintain goals made in PT  3.  Improved Oswestry by 10 points to indicate improved function. 4. Pt to transfer supine to sit with 3/10 pain or less. 5. Pt to walk 10 minutes without increased pain. Rehabilitation Potential For Stated Goals: Good   Regarding Liat Lozano therapy, I certify that the treatment plan above will be carried out by a therapist or under their direction. Thank you for this referral,  Berlin Landon, PT     Referring Physician Signature: Chandler Castro MD              Date                    The information in this section was collected on 3/28/17 (except where otherwise noted). HISTORY:   History of Present Injury/Illness (Reason for Referral):  Pt has pain in his R side mid to lower thoracic area. Pt states it moves around somewhat. Pain radiates around his side but rarely to the front. He has had this pain 5 or 6 years. He reports it occurred after a coughing fit. Pain is awakening the pt so he has been sleeping in a recliner for the past 5 years. He cannot lie supine. He can tolerate supine if he is elevated 45 degrees or so. Past Medical History/Comorbidities:   Mr. Maile Nunn has a history of asthma, COPD, arthritis, obesity, hypertension, diabetes type 2, atrial fibrillation,cervical stenosis,   Social History/Living Environment:    lives with wife in home with stairs  Prior Level of Function/Work/Activity:  Works full time in sales  Previous Treatment Approaches:          Pt has been seen for back pain several times. He has had aquatic therapy, manual therapy, chiropractic. He continues with aquatic therapy and walks in the pool several times per week. He has been evaluated for surgery and is working to lose weight to become a candidate for back surgery. He is also scheduled to have his R knee replaced in July. Current Medications:  Advair, eliquis aspirin, atorvastatin, chlorthalidone, benadryl, dapagliflozin,cardura, jublia, metoprolol, montelukast, nystatin.       Date Last Reviewed:  5/16/2017   Number of Personal Factors/Comorbidities that affect the Plan of Care: 3+: HIGH COMPLEXITY   EXAMINATION:   Observation/Orthostatic Postural Assessment:  Pt presents with kyphotic posture, side bent to R. Pt sits with slouched posture and had some improvement with sitting with lumbar roll. Palpation:          Tender along the L rib cage some palpable muscle spasm  ROM:          Trunk flex- able to touch toes                 Ext- major limitation- cannot extend to neutral  Pain in rib cage   Repeated trunk ext decreased pain. Performed 3 sets of 5 in standing. Strength:          UE strength WNL  Functional Mobility:         Transfers:  Pain and difficulty transferring from lying down to sitting or rolling over from supine to side. He cannot tolerate supine unless elevated head of bed 45 degrees. He could tolerate prone tilted to the L but had to get up very quickly- could not tolerate the pain. Bed Mobility:  See above   Body Structures Involved:  1. Nerves  2. Thoracic Cage  3. Bones  4. Joints  5. Muscles Body Functions Affected:  1. Sensory/Pain  2. Neuromusculoskeletal  3. Movement Related Activities and Participation Affected:  1. Mobility  2. Self Care   Number of elements (examined above) that affect the Plan of Care: 3: MODERATE COMPLEXITY   CLINICAL PRESENTATION:   Presentation: Evolving clinical presentation with changing clinical characteristics: MODERATE COMPLEXITY   CLINICAL DECISION MAKING:   Outcome Measure: Tool Used: Modified Oswestry Low Back Pain Questionnaire  Score:  Initial: 19/50  Most Recent: X/50 (Date: -- )   Interpretation of Score: Each section is scored on a 0-5 scale, 5 representing the greatest disability. The scores of each section are added together for a total score of 50. Score 0 1-10 11-20 21-30 31-40 41-49 50   Modifier CH CI CJ CK CL CM CN     ?  Mobility - Walking and Moving Around:     - CURRENT STATUS: CJ - 20%-39% impaired, limited or restricted    - GOAL STATUS: CI - 1%-19% impaired, limited or restricted    - D/C STATUS:  ---------------To be determined---------------    Medical Necessity:   · Patient is expected to demonstrate progress in range of motion and posutre and postional tolerance to allow pt to sleep in a bed and allow him to be in posture to perform ex for TKR. .  Reason for Services/Other Comments:  · Pt has a difficult time doing his LE ex for knee replacement because he cannot lie supine. Use of outcome tool(s) and clinical judgement create a POC that gives a: Questionable prediction of patient's progress: MODERATE COMPLEXITY            TREATMENT:   (In addition to Assessment/Re-Assessment sessions the following treatments were rendered)  Pre-treatment Symptoms/Complaints:  Pt states he is having more pain since he began doing the exercises more often. He is interested in getting some strengthening exercises to do on his own and have recheck in a few weeks. Pain: Initial: 0/10 unless he moves into a position that causes his pain then it is 9/10     Post Session:  0/10 at rest     THERAPEUTIC EXERCISE: (15 minutes):  Exercises per grid below to improve mobility. Required moderate visual, verbal and tactile cues to promote proper body alignment, promote proper body posture, promote proper body mechanics and promote proper body breathing techniques. Progressed range and repetitions as indicated. Date:  3/28 Date:  4/11 Date:  4/25 Date  5/10   Activity/Exercise Parameters Parameters Parameters    Sitting posture with roll  5 min      Standing forward flex 10x      Standing trunk ext 3 x 5 2 x 10     Seated pelvic tilt  10  10   Standing pelvic tilt  10  10   Wall slides shoulder with lift off  10  10   Thoracic extension over chair  2 x 5 2 x 10    Cat / camel in quadraped  10     Standing trunk ext   2 x 10    Lying prone over pillows   Pt unable to stay in position over 60 sec.  Attempted prone using table that elevates but was unable to tolerate position. Attempted reclining position    Tried to decrease recline to get to supine but was unable to tolerate it. Treatment/Session Assessment:    · Response to Treatment:  Assessed side glide with pt but did not have any change in his symptoms. He continues with symptoms. He asks that we give him strengthening ex to do at home and recheck his ex in a few weeks. Took extra time to attempt different positions. · Recommendations/Intent for next treatment session: \"Next visit will focus on contiuing with trunk ext and conditioning ex. \".    Total Treatment Duration: 45 min  PT Patient Time In/Time Out  Time In: 0300  Time Out: 0345    Cornelia Saunders, PT

## 2017-06-27 ENCOUNTER — HOSPITAL ENCOUNTER (OUTPATIENT)
Dept: SURGERY | Age: 67
Discharge: HOME | End: 2017-06-27
Payer: MEDICARE

## 2017-06-27 ENCOUNTER — HOSPITAL ENCOUNTER (OUTPATIENT)
Dept: PHYSICAL THERAPY | Age: 67
Discharge: HOME | End: 2017-06-27

## 2017-06-27 VITALS
HEIGHT: 78 IN | HEART RATE: 77 BPM | SYSTOLIC BLOOD PRESSURE: 143 MMHG | OXYGEN SATURATION: 93 % | BODY MASS INDEX: 36.45 KG/M2 | DIASTOLIC BLOOD PRESSURE: 69 MMHG | TEMPERATURE: 96.4 F | WEIGHT: 315 LBS

## 2017-06-27 LAB
ANION GAP BLD CALC-SCNC: 10 MMOL/L (ref 7–16)
APPEARANCE UR: CLEAR
APTT PPP: 24.7 SEC (ref 23.5–31.7)
BACTERIA SPEC CULT: NORMAL
BASOPHILS # BLD AUTO: 0.1 K/UL (ref 0–0.2)
BASOPHILS # BLD: 1 % (ref 0–2)
BILIRUB UR QL: NEGATIVE
BUN SERPL-MCNC: 30 MG/DL (ref 8–23)
CALCIUM SERPL-MCNC: 8.9 MG/DL (ref 8.3–10.4)
CHLORIDE SERPL-SCNC: 105 MMOL/L (ref 98–107)
CO2 SERPL-SCNC: 28 MMOL/L (ref 21–32)
COLOR UR: YELLOW
CREAT SERPL-MCNC: 1.31 MG/DL (ref 0.8–1.5)
DIFFERENTIAL METHOD BLD: ABNORMAL
EOSINOPHIL # BLD: 0.4 K/UL (ref 0–0.8)
EOSINOPHIL NFR BLD: 6 % (ref 0.5–7.8)
ERYTHROCYTE [DISTWIDTH] IN BLOOD BY AUTOMATED COUNT: 15.5 % (ref 11.9–14.6)
GLUCOSE SERPL-MCNC: 311 MG/DL (ref 65–100)
GLUCOSE UR STRIP.AUTO-MCNC: >1000 MG/DL
HCT VFR BLD AUTO: 43.9 % (ref 41.1–50.3)
HGB BLD-MCNC: 14.5 G/DL (ref 13.6–17.2)
HGB UR QL STRIP: NEGATIVE
IMM GRANULOCYTES # BLD: 0 K/UL (ref 0–0.5)
IMM GRANULOCYTES NFR BLD AUTO: 0.1 % (ref 0–5)
INR PPP: 1.1 (ref 0.9–1.2)
KETONES UR QL STRIP.AUTO: NEGATIVE MG/DL
LEUKOCYTE ESTERASE UR QL STRIP.AUTO: NEGATIVE
LYMPHOCYTES # BLD AUTO: 26 % (ref 13–44)
LYMPHOCYTES # BLD: 1.9 K/UL (ref 0.5–4.6)
MCH RBC QN AUTO: 30.2 PG (ref 26.1–32.9)
MCHC RBC AUTO-ENTMCNC: 33 G/DL (ref 31.4–35)
MCV RBC AUTO: 91.5 FL (ref 79.6–97.8)
MONOCYTES # BLD: 0.6 K/UL (ref 0.1–1.3)
MONOCYTES NFR BLD AUTO: 8 % (ref 4–12)
NEUTS SEG # BLD: 4.3 K/UL (ref 1.7–8.2)
NEUTS SEG NFR BLD AUTO: 59 % (ref 43–78)
NITRITE UR QL STRIP.AUTO: NEGATIVE
PH UR STRIP: 5.5 [PH] (ref 5–9)
PLATELET # BLD AUTO: 178 K/UL (ref 150–450)
PMV BLD AUTO: 10.9 FL (ref 10.8–14.1)
POTASSIUM SERPL-SCNC: 4.1 MMOL/L (ref 3.5–5.1)
PROT UR STRIP-MCNC: NEGATIVE MG/DL
PROTHROMBIN TIME: 11.4 SEC (ref 9.6–12)
RBC # BLD AUTO: 4.8 M/UL (ref 4.23–5.67)
SERVICE CMNT-IMP: NORMAL
SODIUM SERPL-SCNC: 143 MMOL/L (ref 136–145)
SP GR UR REFRACTOMETRY: 1.03 (ref 1–1.02)
UROBILINOGEN UR QL STRIP.AUTO: 1 EU/DL (ref 0.2–1)
WBC # BLD AUTO: 7.3 K/UL (ref 4.3–11.1)

## 2017-06-27 PROCEDURE — 85610 PROTHROMBIN TIME: CPT | Performed by: PHYSICIAN ASSISTANT

## 2017-06-27 PROCEDURE — 80048 BASIC METABOLIC PNL TOTAL CA: CPT | Performed by: PHYSICIAN ASSISTANT

## 2017-06-27 PROCEDURE — 36415 COLL VENOUS BLD VENIPUNCTURE: CPT | Performed by: PHYSICIAN ASSISTANT

## 2017-06-27 PROCEDURE — 85730 THROMBOPLASTIN TIME PARTIAL: CPT | Performed by: PHYSICIAN ASSISTANT

## 2017-06-27 PROCEDURE — 87641 MR-STAPH DNA AMP PROBE: CPT | Performed by: PHYSICIAN ASSISTANT

## 2017-06-27 PROCEDURE — 85025 COMPLETE CBC W/AUTO DIFF WBC: CPT | Performed by: PHYSICIAN ASSISTANT

## 2017-06-27 PROCEDURE — 81003 URINALYSIS AUTO W/O SCOPE: CPT | Performed by: PHYSICIAN ASSISTANT

## 2017-06-27 PROCEDURE — 77030027138 HC INCENT SPIROMETER -A

## 2017-06-27 RX ORDER — ASPIRIN 81 MG/1
162 TABLET ORAL
COMMUNITY
End: 2020-07-01

## 2017-06-27 NOTE — PERIOP NOTES
Patient verified name, , and surgery as listed in [facility]. Type 3 surgery, walk in assessment complete. Labs per surgeon: CBC, BMP, U/A, PT/PTT; results within [facility] protocols except Glucose of 311. MRSA nasal swab pending. Labs per anesthesia protocol: included in surgeons orders. EKG: last done 17; [facility] to review. Glucose of 311 called to Dr. Nelli Davis with anesthesia. Did not request to see patient today. Instructed patient on taking meds as prescribed, balanced diet, and if glucose is greater that 300 day of surgery that surgery most likely would be canceled. Patient verbalized understanding. Patient has appointment with [facility] on 17 for clearance. Printed from EMR: ekg's dated 17 & 10/11/16, cath report dated 12, echo dated 9/15/16, [facility] cardiology note dated 17, stress test dated 7/10/12, sleep center office notes dated 17 & 17. All records on chart for reference. Hibiclens and instructions given per hospital policy. Nasal Swab collected per MD order and instructions for Mupirocin nasal ointment if required. Patient provided with handouts including Guide to Surgery, Pain Management, Hand Hygiene, Blood Transfusion Education, and [facility] Brochure. Patient answered medical/surgical history questions at their best of ability. All prior to admission medications documented in [facility]. Original medication prescription bottles not visualized during patient appointment. Patient instructed to hold all vitamins 7 days prior to surgery and NSAIDS 5 days prior to surgery. Medications to be held prior to surgery: vitamins, eliquis, decrease aspirin to one 81 mg tab 5 days prior to surgery.      Patient instructed to continue previous medications as prescribed prior to surgery and to take the following medications the day of surgery according to anesthesia guidelines with a small sip of water: albuterol inhaler/neb if needed, metoprolol, pantoprazole, doxazosin. Patient instructed to bring inhalers, c-pap, metformin er, and [employer]. Patient taught back and verbalized understanding.

## 2017-06-27 NOTE — PERIOP NOTES
CARDIAC CLEARANCE      Surgical, Procedural, or Medication Clearance    Physician or Practice Requesting Clearance: Dr. Jess Reese   : Reagan Farley RN  Contact Phone Number: [phone]  Contact Fax Number: [phone]  Date of Surgery/Procedure: 7/17/17  Type of Surgery or Procedure: knee replacement   Type of Anesthesia: spinal  Medication to hold: eliquis  Requested # of days to hold: 3 full days prior to surgery      Please do not respond via e-mail to this message.

## 2017-06-27 NOTE — ADVANCED PRACTICE NURSE
Total Joint Surgery Preoperative Chart Review      Patient ID:  Kinjal Bustillo  [id number]  76 y.o.  1950  Surgeon: Dr. Ariadne Fernandez  Date of Surgery: 7/17/2017  Procedure: Total Right Knee Arthroplasty  Primary Care Physician: Deborah Luther [phone]  Specialty Physician(s):      Subjective:   Kinjal Bustillo is a 73 y.o. WHITE OR  male who presents for preoperative evaluation for Total Right Knee arthroplasty. This is a preoperative chart review note based on data collected by the nurse at the surgical Pre-Assessment visit. Past Medical History:   Diagnosis Date    Asthma dx childhood    last attack during high school; advair at hs; albuterol prn; neb prn     Atrial fibrillation ([address]     2 epiosode of A-fib last about 6-8 months ago (noted on 6/27/17); on Eliquis     Cervical spondylosis 6/20/2013    Chronic back pain 2013    RIGHT SIDE WITH UNCLEAR ETIOLOGY    Chronic pain     Colon polyps     COPD (chronic obstructive pulmonary disease) ([address] 8/17/2016    Coronary atherosclerosis of native coronary vessel 8/17/2016 08/2012: Mild non-obstructive CAD    Diabetes ([address] dx 5/14    type 2; oral meds; no bs checks at home    Diverticulosis     Edema 8/17/2016    Former smoker     GERD (gastroesophageal reflux disease)      controlled with med    High cholesterol     History of kidney stones     Hypertension     controlled with med    Obesity (BMI 30-39. 9)     BMI 39    Unspecified sleep apnea     wears cpap at hs      Past Surgical History:   Procedure Laterality Date    CYSTOSCOPY,INSERT URETERAL STENT      kidney stone removed cystoscopy     HX CATARACT REMOVAL      bilateral with lens implants; x3    HX COLECTOMY  2015    HX COLONOSCOPY  10/6/14    HX COLONOSCOPY  11/20/14    HX HEART CATHETERIZATION  2012    no stents     HX LUMBAR LAMINECTOMY  2009    no hardware     HX OTHER SURGICAL  20 yrs ago    colo-rectal sx for fissure    HX RETINAL DETACHMENT REPAIR Bilateral      Family History   Problem Relation Age of Onset    Heart Disease Father      BOWEL OBSTRUCTION    Heart Attack Father 80     MI    Heart Disease Brother 72     STENT HEART    Suicide Brother     Parkinsonism Mother     Inflammatory Bowel Dz Son     Malignant Hyperthermia Neg Hx     Pseudocholinesterase Deficiency Neg Hx     Delayed Awakening Neg Hx     Post-op Nausea/Vomiting Neg Hx     Emergence Delirium Neg Hx     Post-op Cognitive Dysfunction Neg Hx       Social History   Substance Use Topics    Smoking status: Former Smoker     Packs/day: 1.50     Years: 25.00     Types: Cigarettes     Quit date: 4/2/2012    Smokeless tobacco: Never Used      Comment: QUIT 2011    Alcohol use 1.8 oz/week     3 Cans of beer per week       Prior to Admission medications    Medication Sig Start Date End Date Taking? Authorizing Provider   aspirin delayed-release 81 mg tablet Take 162 mg by mouth nightly. Decrease to one 81 mg tab on 7/12/17   Yes Historical Provider   chlorthalidone (HYGROTEN) 25 mg tablet TAKE 1 TABLET BY MOUTH DAILY 6/13/17  Yes Fanta Alvarez MD   metoprolol tartrate (LOPRESSOR) 25 mg tablet TAKE 2 TABLETS BY MOUTH TWICE A DAY 5/25/17  Yes Alexia Burch MD   atorvastatin (LIPITOR) 40 mg tablet TAKE 1 TABLET BY MOUTH EVERY DAY 5/15/17  Yes Fanta Alvarez MD   Omega-3 Fatty Acids 300 mg cap Take 1 Cap by mouth daily. Yes Historical Provider   montelukast (SINGULAIR) 10 mg tablet TAKE 1 TABLET BY MOUTH DAILY 5/8/17  Yes Fanta Alvarez MD   ANTI-FUNGAL 2 % topical powder APPLY TO ABDOMEN 2 TIMES PER DAY 4/24/17  Yes Historical Provider   celecoxib (CELEBREX) 200 mg capsule Take 200 mg by mouth daily.    Yes Historical Provider   pantoprazole (PROTONIX) 40 mg tablet TAKE 1 TABLET BY MOUTH EVERY DAY 3/1/17  Yes Fanta Alvarez MD   Blood-Glucose Meter monitoring kit Check fs every day; DM2, E11/9, Contour glucometer 1/26/17  Yes Tyler Parekh MD   glucose blood VI test strips (ASCENSIA CONTOUR) strip Check fs every day, DM2, E11.9 1/26/17  Yes Isabella John MD   lancets 28 gauge misc Check fs every day, DM2, E11.9 1/26/17  Yes Isabella John MD   ezetimibe (ZETIA) 10 mg tablet Take 1 Tab by mouth daily. Patient taking differently: Take 10 mg by mouth nightly. 1/5/17  Yes Shayla Montanez MD   metFORMIN ER (GLUCOPHAGE XR) 500 mg tablet TAKE 2 TABLETS BY MOUTH EVERY DAY 10/31/16  Yes Shayla Montanez MD   ADVAIR DISKUS 500-50 mcg/dose diskus inhaler INHALE 1 PUFF BY INHALATION EVERY TWELVE HOURS. 10/24/16  Yes Shayla Montanez MD   apixaban (ELIQUIS) 5 mg tablet Take 1 Tab by mouth two (2) times a day. 8/19/16  Yes Rajani Shaffer MD   lisinopril (PRINIVIL, ZESTRIL) 10 mg tablet TAKE 1 TABLET BY MOUTH DAILY 8/1/16  Yes Shayla Montanez MD   dapagliflozin (FARXIGA) 10 mg tab Take 1 Tab by mouth daily. 8/1/16  Yes Shayla Montanez MD   albuterol (PROVENTIL VENTOLIN) 2.5 mg /3 mL (0.083 %) nebulizer solution 3 mL by Nebulization route once for 1 dose. Patient taking differently: 2.5 mg by Nebulization route daily as needed. 8/1/16  Yes Shayla Montanez MD   doxazosin (CARDURA) 2 mg tablet TAKE 1 TABLET BY MOUTH 2 TIMES A DAY  Patient taking differently: Take 2 mg by mouth daily. TAKE 1 TABLET BY MOUTH 2 TIMES A DAY 2/17/16  Yes Shayla Montanez MD   albuterol (PROAIR HFA) 90 mcg/actuation inhaler Take 2 Puffs by inhalation every four (4) hours as needed for Wheezing. Take / use AM day of surgery  per anesthesia protocols if needed. Yes Historical Provider   cholecalciferol, vitamin D3, (VITAMIN D3) 2,000 unit tab Take 1 tablet by mouth daily. Yes Historical Provider   diphenhydrAMINE (BENADRYL ALLERGY) 25 mg tablet Take 25 mg by mouth nightly as needed for Sleep. Yes Historical Provider   b complex vitamins (B COMPLEX 1) tablet Take 1 tablet by mouth every morning. Stop seven days prior to surgery per anesthesia protocol.    Yes Historical Provider   green tea leaf extract (GREEN TEA) Cap Take 630 mg by mouth every morning. Stop seven days prior to surgery per anesthesia protocol. Yes Historical Provider   vitamin E (AQUA GEMS) 400 unit capsule Take 400 Units by mouth every morning. Stop seven days prior to surgery per anesthesia protocol. Yes Historical Provider   FOLIC ACID PO Take 212 mcg by mouth every morning. Stop seven days prior to surgery per anesthesia protocol. Yes Historical Provider   DOCUSATE CALCIUM (STOOL SOFTENER PO) Take 1 capsule by mouth every morning. Yes Historical Provider   mometasone (NASONEX) 50 mcg/Actuation nasal spray 2 Sprays by Both Nostrils route nightly as needed. Yes Phys MD Verna     Allergies   Allergen Reactions    Bactrim [Sulfamethoxazole-Trimethoprim] Itching          Objective:     Physical Exam:   Patient Vitals for the past 24 hrs:   Temp Pulse BP SpO2   06/27/17 0900 96.4 °F (35.8 °C) 77 143/69 93 %       ECG:    EKG Results     None          Data Review:   Labs:   Recent Results (from the past 24 hour(s))   CBC WITH AUTOMATED DIFF    Collection Time: 06/27/17  7:50 AM   Result Value Ref Range    WBC 7.3 4.3 - 11.1 K/uL    RBC 4.80 4.23 - 5.67 M/uL    HGB 14.5 13.6 - 17.2 g/dL    HCT 43.9 41.1 - 50.3 %    MCV 91.5 79.6 - 97.8 FL    MCH 30.2 26.1 - 32.9 PG    MCHC 33.0 31.4 - 35.0 g/dL    RDW 15.5 (H) 11.9 - 14.6 %    PLATELET 487 302 - 882 K/uL    MPV 10.9 10.8 - 14.1 FL    DF AUTOMATED      NEUTROPHILS 59 43 - 78 %    LYMPHOCYTES 26 13 - 44 %    MONOCYTES 8 4.0 - 12.0 %    EOSINOPHILS 6 0.5 - 7.8 %    BASOPHILS 1 0.0 - 2.0 %    IMMATURE GRANULOCYTES 0.1 0.0 - 5.0 %    ABS. NEUTROPHILS 4.3 1.7 - 8.2 K/UL    ABS. LYMPHOCYTES 1.9 0.5 - 4.6 K/UL    ABS. MONOCYTES 0.6 0.1 - 1.3 K/UL    ABS. EOSINOPHILS 0.4 0.0 - 0.8 K/UL    ABS. BASOPHILS 0.1 0.0 - 0.2 K/UL    ABS. IMM.  GRANS. 0.0 0.0 - 0.5 K/UL   PROTHROMBIN TIME + INR    Collection Time: 06/27/17  7:50 AM   Result Value Ref Range    Prothrombin time 11.4 9.6 - 12.0 sec    INR 1.1 0.9 - 1.2     PTT    Collection Time: 06/27/17  7:50 AM   Result Value Ref Range    aPTT 24.7 23.5 - 23.7 SEC   METABOLIC PANEL, BASIC    Collection Time: 06/27/17  7:50 AM   Result Value Ref Range    Sodium 143 136 - 145 mmol/L    Potassium 4.1 3.5 - 5.1 mmol/L    Chloride 105 98 - 107 mmol/L    CO2 28 21 - 32 mmol/L    Anion gap 10 7 - 16 mmol/L    Glucose 311 (H) 65 - 100 mg/dL    BUN 30 (H) 8 - 23 MG/DL    Creatinine 1.31 0.8 - 1.5 MG/DL    GFR est AA >60 >60 ml/min/1.73m2    GFR est non-AA 58 (L) >60 ml/min/1.73m2    Calcium 8.9 8.3 - 10.4 MG/DL   URINALYSIS W/ RFLX MICROSCOPIC    Collection Time: 06/27/17  7:50 AM   Result Value Ref Range    Color YELLOW      Appearance CLEAR      Specific gravity 1.033 (H) 1.001 - 1.023      pH (UA) 5.5 5.0 - 9.0      Protein NEGATIVE  NEG mg/dL    Glucose >1000 mg/dL    Ketone NEGATIVE  NEG mg/dL    Bilirubin NEGATIVE  NEG      Blood NEGATIVE  NEG      Urobilinogen 1.0 0.2 - 1.0 EU/dL    Nitrites NEGATIVE  NEG      Leukocyte Esterase NEGATIVE  NEG           Problem List:  )  Patient Active Problem List   Diagnosis Code    Hyperlipemia E78.5    Obstructive sleep apnea (adult) (pediatric) G47.33    Essential hypertension I10    Extrinsic asthma J45.909    Colon polyps K63.5    Diverticulosis K57.90    Cervical spondylosis M47.812    BPH (benign prostatic hypertrophy) N40.0    Chronic back pain M54.9, G89.29    Esophageal reflux K21.9    Diabetes mellitus type 2 in obese ([facility]) E11.9, E66.9    CKD (chronic kidney disease), stage II N18.2    Andropause N50.89    Diverticular stricture ([facility]) K56.60    Coronary atherosclerosis of native coronary vessel I25.10    COPD (chronic obstructive pulmonary disease) ([facility]) J44.9    Edema R60.9    Paroxysmal atrial fibrillation ([facility]) I48.0    OA (osteoarthritis) of knee M17.10       Total Joint Surgery Pre-Assessment Recommendations: The patient is to wear home CPAP during hospitalization.      Renal protocol initiated. The patient's chart will be flagged renal risk. Renal RisK Alerts:  1. Caution with use of muscle relaxants and sedatives with reduced renal function  2. Caution with total amount of narcotics used   3. Avoid morphine if patient has reduced renal function due to accumulations of the highly active metabolite, morphine-6-glucuronide, which can lead to sedation and respiratory depression  4. Avoid nephrotoxic drugs such as ANGULO inhibitors  5. Consider volume status monitoring in addition to [place]  6.  Ensure hand-off to hospitalist for appropriate perioperative IV fluid management        Signed By: Orlando Nunez NP-C    June 27, 2017

## 2017-06-27 NOTE — PERIOP NOTES
Daquan Nelson MD    Your patient recently had labs drawn during a hospital appointment due to an upcoming surgery. The results are attached. If you have any questions or concerns please reach out to your patient for a follow-up in your office. Please do not respond to this message as my mailbox is not monitored. You may call [phone] with questions or concerns. Recent Results (from the past 12 hour(s))   CBC WITH AUTOMATED DIFF    Collection Time: 06/27/17  7:50 AM   Result Value Ref Range    WBC 7.3 4.3 - 11.1 K/uL    RBC 4.80 4.23 - 5.67 M/uL    HGB 14.5 13.6 - 17.2 g/dL    HCT 43.9 41.1 - 50.3 %    MCV 91.5 79.6 - 97.8 FL    MCH 30.2 26.1 - 32.9 PG    MCHC 33.0 31.4 - 35.0 g/dL    RDW 15.5 (H) 11.9 - 14.6 %    PLATELET 745 400 - 443 K/uL    MPV 10.9 10.8 - 14.1 FL    DF AUTOMATED      NEUTROPHILS 59 43 - 78 %    LYMPHOCYTES 26 13 - 44 %    MONOCYTES 8 4.0 - 12.0 %    EOSINOPHILS 6 0.5 - 7.8 %    BASOPHILS 1 0.0 - 2.0 %    IMMATURE GRANULOCYTES 0.1 0.0 - 5.0 %    ABS. NEUTROPHILS 4.3 1.7 - 8.2 K/UL    ABS. LYMPHOCYTES 1.9 0.5 - 4.6 K/UL    ABS. MONOCYTES 0.6 0.1 - 1.3 K/UL    ABS. EOSINOPHILS 0.4 0.0 - 0.8 K/UL    ABS. BASOPHILS 0.1 0.0 - 0.2 K/UL    ABS. IMM.  GRANS. 0.0 0.0 - 0.5 K/UL   PROTHROMBIN TIME + INR    Collection Time: 06/27/17  7:50 AM   Result Value Ref Range    Prothrombin time 11.4 9.6 - 12.0 sec    INR 1.1 0.9 - 1.2     PTT    Collection Time: 06/27/17  7:50 AM   Result Value Ref Range    aPTT 24.7 23.5 - 08.8 SEC   METABOLIC PANEL, BASIC    Collection Time: 06/27/17  7:50 AM   Result Value Ref Range    Sodium 143 136 - 145 mmol/L    Potassium 4.1 3.5 - 5.1 mmol/L    Chloride 105 98 - 107 mmol/L    CO2 28 21 - 32 mmol/L    Anion gap 10 7 - 16 mmol/L    Glucose 311 (H) 65 - 100 mg/dL    BUN 30 (H) 8 - 23 MG/DL    Creatinine 1.31 0.8 - 1.5 MG/DL    GFR est AA >60 >60 ml/min/1.73m2    GFR est non-AA 58 (L) >60 ml/min/1.73m2    Calcium 8.9 8.3 - 10.4 MG/DL   URINALYSIS W/ RFLX MICROSCOPIC Collection Time: 06/27/17  7:50 AM   Result Value Ref Range    Color YELLOW      Appearance CLEAR      Specific gravity 1.033 (H) 1.001 - 1.023      pH (UA) 5.5 5.0 - 9.0      Protein NEGATIVE  NEG mg/dL    Glucose >1000 mg/dL    Ketone NEGATIVE  NEG mg/dL    Bilirubin NEGATIVE  NEG      Blood NEGATIVE  NEG      Urobilinogen 1.0 0.2 - 1.0 EU/dL    Nitrites NEGATIVE  NEG      Leukocyte Esterase NEGATIVE  NEG

## 2017-06-27 NOTE — PROGRESS NOTES
06/27/17 0730   Oxygen Therapy   O2 Sat (%) 94 %   Pulse via Oximetry 85 beats per minute   O2 Device Room air   Pre-Treatment   Breath Sounds Bilateral Clear;Diminished   Pre FEV1 (liters) 2.5 liters   % Predicted 55  (COPD/ASTHMA)   Incentive Spirometry Treatment   Actual Volume (ml) 3500 ml     Initial respiratory Assessment completed with pt. Pt was interviewed and evaluated in Joint camp prior to surgery. Patient ID:  Kassandra Nails  [id number]  54 y.o.  1950  Surgeon: Dr. Nay Rojas  Date of Surgery: 7/17/2017  Procedure: Total Right Knee Arthroplasty  Primary Care Physician: Shayla Montanez -[phone]  Specialists: Ming Johnson                                 Pt instructed in the use of Incentive Spirometry. Pt instructed to bring Incentive Spirometer back on date of surgery & to start using Is upon return to pt room. Pt taught proper cough technique      History of smoking:   FORMER 2 PPD FOR 35 YEARS     Quit date:    Secondhand smoke:      Past procedures with Oxygen desaturation:NONE    Past Medical History:   Diagnosis Date    Asthma dx childhood    last attack during high school; advair at hs; albuterol prn; neb prn     Atrial fibrillation (HCC)     2 epiosode of A-fib last about 6-8 months ago (noted on 6/27/17); on Eliquis     Cervical spondylosis 6/20/2013    Chronic back pain 2013    RIGHT SIDE WITH UNCLEAR ETIOLOGY    Chronic pain     Colon polyps     COPD (chronic obstructive pulmonary disease) ([address] 8/17/2016    Coronary atherosclerosis of native coronary vessel 8/17/2016 08/2012: Mild non-obstructive CAD    Diabetes ([address] dx 5/14    type 2; oral meds; no bs checks at home    Diverticulosis     Edema 8/17/2016    Former smoker     GERD (gastroesophageal reflux disease)      controlled with med    High cholesterol     History of kidney stones     Hypertension     controlled with med    Obesity (BMI 30-39. 9)     BMI 39    Unspecified sleep apnea     wears cpap at hs                                                                                                                                                      Respiratory history:COPD, ASTHMA, JO-ANN                                 SOB  ON EXERTION                                   Respiratory meds:  ADVAIR BID, ALB MDI PRN NEB PRN                                                       FAMILY PRESENT:                      NO                                        PAST SLEEP STUDY:        YES          HX OF JO-ANN:                        YES                                        JO-ANN assessment:  VERY   SEVERE OBSTRUCTIVE JO-ANN                                                                                         PHYSICAL EXAM   Body mass index is 39.02 kg/(m^2).    Visit Vitals    /69 (BP 1 Location: Right arm, BP Patient Position: At rest;Sitting)    Pulse 77    Temp 96.4 °F (35.8 °C)    Ht 6' 6.5\" (1.994 m)    Wt 155.1 kg (342 lb)    SpO2 93%    BMI 39.02 kg/m2     Neck circumference:  47.5    cm    Loud snoring:YES    Witnessed apnea or wakening gasping or choking: , APNEA    Awakens with headaches:DENIES    Morning or daytime tiredness/ sleepiness:   TIRED     Dry mouth or sore throat in morning:YES    Freidman stage:4    SACS score:80    STOP/BAN                              CPAP:HOME CPAP        ALBUTEROL NEB Q6 PRN          SPACER         Referrals:    Pt. Phone Number:

## 2017-06-27 NOTE — PROGRESS NOTES
Physical Therapy at [facility] [place] [facility], [address]  [phone]  Joint Camp Prehab Interview       ICD-10: Treatment Diagnosis:   · Pain in Right Knee (M25.561)  · Stiffness of Right Knee, Not elsewhere classified (M25.661)    SUBJECTIVE:  Subjective: \"I want to walk better and reduce pain\"      OBJECTIVE:  Patient attends [facility]. Patient has attended a conservative trial of Physical Therapy at [facility] OP clinic with Vandana Posada PT. Patient has a PT HEP that they are currently performing. We reviewed the Prehab Questionnaire:  Home Situation:    · Home Environment: Private residence  · # Steps to Enter: 0  · One/Two Story Residence: Two story, live on 1st floor  · # of Interior Steps: 15  · Interior Rails: Both  · Living Alone: No  · Support Systems: Spouse/Significant Other/Partner  · Patient Expects to be Discharged to[de-identified] Private residence  · Current DME Used/Available at Home: None  · Tub or Shower Type: Tub/Shower combination     Patient self reported Lower Extremity Functional Scale (LEFS) score for today as 38/80. Patient attends the Prehab Education class and all questions are answered. ASSESSMENT:  COMMENTS: He has a pain in his R side which is exacerbated when he lies flat. He is s\concernd about this in surgery. I instructed him to let the pre op and OR staff know this on day of surgery. He [place] going home at [place] with spouse's assist.    PLAN OF CARE:  right TKA is scheduled with Dr. Agnes Kelley on 07/17/2017.     Thank you for this referral,  Leydi Hubbard, PT

## 2017-06-27 NOTE — PERIOP NOTES
Dr. Heather Caballero:      Patient: Wilner Terrell, DOS 7/17/17    During a recent visit to the surgical preadmission testing center, the above mentioned patient was found to have a non-fasting blood glucose level of 311 mg/dL. This may indicate inadequate diabetic management and raises concerns that the patient is not medically optimized for surgery. It is our standard practice to postpone elective surgery for patients who present fasting blood glucose level >300 mg/dL on the day of their procedure. The patient has been advised of this policy and counseled on the importance of glucose control. We feel that this patient is at increased risk of cancellation; however, their blood glucose may be in acceptable range when they are NPO. Therefore, we will leave the decision to you whether to delay the surgery and refer the patient to their primary care provider or keep them as currently scheduled. Our goal is to prevent as many delays and cancellations as possible while ensuring patient safety.     Sincerely,    [facility] Anesthesia [facility]

## 2017-06-28 ENCOUNTER — HOSPITAL ENCOUNTER (EMERGENCY)
Age: 67
Discharge: HOME | End: 2017-06-28
Attending: EMERGENCY MEDICINE
Payer: MEDICARE

## 2017-06-28 ENCOUNTER — APPOINTMENT (OUTPATIENT)
Dept: CT IMAGING | Age: 67
End: 2017-06-28
Attending: EMERGENCY MEDICINE
Payer: MEDICARE

## 2017-06-28 VITALS
SYSTOLIC BLOOD PRESSURE: 122 MMHG | WEIGHT: 315 LBS | BODY MASS INDEX: 36.45 KG/M2 | OXYGEN SATURATION: 95 % | HEIGHT: 78 IN | DIASTOLIC BLOOD PRESSURE: 68 MMHG | HEART RATE: 68 BPM | TEMPERATURE: 98.9 F | RESPIRATION RATE: 16 BRPM

## 2017-06-28 DIAGNOSIS — R42 VERTIGO: Primary | ICD-10-CM

## 2017-06-28 DIAGNOSIS — K52.9 GASTROENTERITIS, ACUTE: ICD-10-CM

## 2017-06-28 LAB
ALBUMIN SERPL BCP-MCNC: 3.9 G/DL (ref 3.2–4.6)
ALBUMIN/GLOB SERPL: 1 {RATIO} (ref 1.2–3.5)
ALP SERPL-CCNC: 149 U/L (ref 50–136)
ALT SERPL-CCNC: 25 U/L (ref 12–65)
ANION GAP BLD CALC-SCNC: 13 MMOL/L (ref 7–16)
AST SERPL W P-5'-P-CCNC: 21 U/L (ref 15–37)
BASOPHILS # BLD AUTO: 0.1 K/UL (ref 0–0.2)
BASOPHILS # BLD: 1 % (ref 0–2)
BILIRUB SERPL-MCNC: 0.5 MG/DL (ref 0.2–1.1)
BUN SERPL-MCNC: 24 MG/DL (ref 8–23)
CALCIUM SERPL-MCNC: 9.5 MG/DL (ref 8.3–10.4)
CHLORIDE SERPL-SCNC: 103 MMOL/L (ref 98–107)
CO2 SERPL-SCNC: 26 MMOL/L (ref 21–32)
CREAT SERPL-MCNC: 1.04 MG/DL (ref 0.8–1.5)
DIFFERENTIAL METHOD BLD: ABNORMAL
EOSINOPHIL # BLD: 0.5 K/UL (ref 0–0.8)
EOSINOPHIL NFR BLD: 6 % (ref 0.5–7.8)
ERYTHROCYTE [DISTWIDTH] IN BLOOD BY AUTOMATED COUNT: 15.2 % (ref 11.9–14.6)
GLOBULIN SER CALC-MCNC: 4 G/DL (ref 2.3–3.5)
GLUCOSE SERPL-MCNC: 181 MG/DL (ref 65–100)
HCT VFR BLD AUTO: 44.7 % (ref 41.1–50.3)
HGB BLD-MCNC: 14.9 G/DL (ref 13.6–17.2)
IMM GRANULOCYTES # BLD: 0 K/UL (ref 0–0.5)
IMM GRANULOCYTES NFR BLD AUTO: 0.3 % (ref 0–5)
LIPASE SERPL-CCNC: 168 U/L (ref 73–393)
LYMPHOCYTES # BLD AUTO: 32 % (ref 13–44)
LYMPHOCYTES # BLD: 2.6 K/UL (ref 0.5–4.6)
MCH RBC QN AUTO: 30 PG (ref 26.1–32.9)
MCHC RBC AUTO-ENTMCNC: 33.3 G/DL (ref 31.4–35)
MCV RBC AUTO: 90.1 FL (ref 79.6–97.8)
MONOCYTES # BLD: 0.6 K/UL (ref 0.1–1.3)
MONOCYTES NFR BLD AUTO: 7 % (ref 4–12)
NEUTS SEG # BLD: 4.3 K/UL (ref 1.7–8.2)
NEUTS SEG NFR BLD AUTO: 54 % (ref 43–78)
PLATELET # BLD AUTO: 169 K/UL (ref 150–450)
PMV BLD AUTO: 10.7 FL (ref 10.8–14.1)
POTASSIUM SERPL-SCNC: 3.9 MMOL/L (ref 3.5–5.1)
PROT SERPL-MCNC: 7.9 G/DL (ref 6.3–8.2)
RBC # BLD AUTO: 4.96 M/UL (ref 4.23–5.67)
SODIUM SERPL-SCNC: 142 MMOL/L (ref 136–145)
TROPONIN I BLD-MCNC: 0.01 NG/ML (ref 0–0.08)
WBC # BLD AUTO: 8 K/UL (ref 4.3–11.1)

## 2017-06-28 PROCEDURE — 99284 EMERGENCY DEPT VISIT MOD MDM: CPT | Performed by: EMERGENCY MEDICINE

## 2017-06-28 PROCEDURE — 84484 ASSAY OF TROPONIN QUANT: CPT

## 2017-06-28 PROCEDURE — 80053 COMPREHEN METABOLIC PANEL: CPT | Performed by: STUDENT IN AN ORGANIZED HEALTH CARE EDUCATION/TRAINING PROGRAM

## 2017-06-28 PROCEDURE — 83690 ASSAY OF LIPASE: CPT | Performed by: EMERGENCY MEDICINE

## 2017-06-28 PROCEDURE — 70450 CT HEAD/BRAIN W/O DYE: CPT

## 2017-06-28 PROCEDURE — 96361 HYDRATE IV INFUSION ADD-ON: CPT | Performed by: EMERGENCY MEDICINE

## 2017-06-28 PROCEDURE — 74011250636 HC RX REV CODE- 250/636: Performed by: EMERGENCY MEDICINE

## 2017-06-28 PROCEDURE — 93005 ELECTROCARDIOGRAM TRACING: CPT | Performed by: STUDENT IN AN ORGANIZED HEALTH CARE EDUCATION/TRAINING PROGRAM

## 2017-06-28 PROCEDURE — 96374 THER/PROPH/DIAG INJ IV PUSH: CPT | Performed by: EMERGENCY MEDICINE

## 2017-06-28 PROCEDURE — 85025 COMPLETE CBC W/AUTO DIFF WBC: CPT | Performed by: STUDENT IN AN ORGANIZED HEALTH CARE EDUCATION/TRAINING PROGRAM

## 2017-06-28 RX ORDER — MECLIZINE HYDROCHLORIDE 25 MG/1
25 TABLET ORAL
Qty: 19 TAB | Refills: 0 | Status: SHIPPED | OUTPATIENT
Start: 2017-06-28 | End: 2017-09-27

## 2017-06-28 RX ORDER — ONDANSETRON 8 MG/1
8 TABLET, ORALLY DISINTEGRATING ORAL
Qty: 20 TAB | Refills: 0 | Status: SHIPPED | OUTPATIENT
Start: 2017-06-28 | End: 2017-06-29

## 2017-06-28 RX ORDER — ONDANSETRON 2 MG/ML
8 INJECTION INTRAMUSCULAR; INTRAVENOUS
Status: COMPLETED | OUTPATIENT
Start: 2017-06-28 | End: 2017-06-28

## 2017-06-28 RX ORDER — DIAZEPAM 5 MG/1
5 TABLET ORAL
Qty: 20 TAB | Refills: 0 | Status: SHIPPED | OUTPATIENT
Start: 2017-06-28 | End: 2017-09-27

## 2017-06-28 RX ADMIN — ONDANSETRON 8 MG: 2 INJECTION INTRAMUSCULAR; INTRAVENOUS at 16:32

## 2017-06-28 RX ADMIN — SODIUM CHLORIDE 1000 ML: 900 INJECTION, SOLUTION INTRAVENOUS at 16:32

## 2017-06-28 NOTE — DISCHARGE INSTRUCTIONS
Vertigo: Care Instructions  Your Care Instructions  Vertigo is the feeling that you or your surroundings are moving when there is no actual movement. It is often described as a feeling of spinning, whirling, falling, or tilting. Vertigo may make you vomit or feel nauseated. You may have trouble standing or walking and may lose your balance. Vertigo is often related to an inner ear problem, but it can have other more serious causes. If vertigo continues, you may need more tests to find its cause. Follow-up care is a key part of your treatment and safety. Be sure to make and go to all appointments, and call your doctor if you are having problems. Its also a good idea to know your test results and keep a list of the medicines you take. How can you care for yourself at home? · Do not lie flat on your back. Prop yourself up slightly. This may reduce the spinning feeling. Keep your eyes open. · Move slowly so that you do not fall. · If your doctor recommends medicine, take it exactly as directed. · Do not drive while you are having vertigo. Certain exercises, called Edge-Daroff exercises, can help decrease vertigo. To do Edge-Daroff exercises:  · Sit on the edge of a bed or sofa and quickly lie down on the side that causes the worst vertigo. Lie on your side with your ear down. · Stay in this position for at least 30 seconds or until the vertigo goes away. · Sit up. If this causes vertigo, wait for it to stop. · Repeat the procedure on the other side. · Repeat this 10 times. Do these exercises 2 times a day until the vertigo is gone. When should you call for help? Call 911 anytime you think you may need emergency care. For example, call if:  · You passed out (lost consciousness). · You have symptoms of a stroke. These may include:  ¨ Sudden numbness, tingling, weakness, or loss of movement in your face, arm, or leg, especially on only one side of your body. ¨ Sudden vision changes.   ¨ Sudden trouble speaking. ¨ Sudden confusion or trouble understanding simple statements. ¨ Sudden problems with walking or balance. ¨ A sudden, severe headache that is different from past headaches. Call your doctor now or seek immediate medical care if:  · Vertigo occurs with a fever, a headache, or ringing in your ears. · You have new or increased nausea and vomiting. Watch closely for changes in your health, and be sure to contact your doctor if:  · Vertigo gets worse or happens more often. · Vertigo has not gotten better after 2 weeks. Where can you learn more? Go to http://chiquiConnectedruthy.info/. Enter R694 in the search box to learn more about \"Vertigo: Care Instructions. \"  Current as of: July 29, 2016  Content Version: 11.3  © 4951-5960 [employer]. Care instructions adapted under license by [employer] (which disclaims liability or warranty for this information). If you have questions about a medical condition or this instruction, always ask your healthcare professional. [place] [address] any warranty or liability for your use of this information. Gastroenteritis: Care Instructions  Your Care Instructions  Gastroenteritis is an illness that may cause nausea, vomiting, and diarrhea. It is sometimes called \"stomach flu. \" It can be caused by bacteria or a virus. You will probably begin to feel better in 1 to 2 days. In the meantime, get plenty of rest and make sure you do not become dehydrated. Dehydration occurs when your body loses too much fluid. Follow-up care is a key part of your treatment and safety. Be sure to make and go to all appointments, and call your doctor if you are having problems. Its also a good idea to know your test results and keep a list of the medicines you take. How can you care for yourself at home? · If your doctor prescribed antibiotics, take them as directed. Do not stop taking them just because you feel better. You need to take the full course of antibiotics. · Drink plenty of fluids to prevent dehydration, enough so that your urine is light yellow or clear like water. Choose water and other caffeine-free clear liquids until you feel better. If you have kidney, heart, or liver disease and have to limit fluids, talk with your doctor before you increase your fluid intake. · Drink fluids slowly, in frequent, small amounts, because drinking too much too fast can cause vomiting. · Begin eating mild foods, such as dry toast, yogurt, applesauce, bananas, and rice. Avoid spicy, hot, or high-fat foods, and do not drink alcohol or caffeine for a day or two. Do not drink milk or eat ice cream until you are feeling better. How to prevent gastroenteritis  · Keep hot foods hot and cold foods cold. · Do not eat meats, dressings, salads, or other foods that have been kept at room temperature for more than 2 hours. · Use a thermometer to check your refrigerator. It should be between 34°F and 40°F.  · Defrost meats in the refrigerator or microwave, not on the kitchen counter. · Keep your hands and your kitchen clean. Wash your hands, cutting boards, and countertops with hot soapy water frequently. · Cook meat until it is well done. · Do not eat raw eggs or uncooked sauces made with raw eggs. · Do not take chances. If food looks or tastes spoiled, throw it out. When should you call for help? Call 911 anytime you think you may need emergency care. For example, call if:  · You vomit blood or what looks like coffee grounds. · You passed out (lost consciousness). · You pass maroon or very bloody stools. Call your doctor now or seek immediate medical care if:  · You have severe belly pain. · You have signs of needing more fluids. You have sunken eyes, a dry mouth, and pass only a little dark urine. · You feel like you are going to faint. · You have increased belly pain that does not go away in 1 to 2 days.   · You have new or increased nausea, or you are vomiting. · You have a new or higher fever. · Your stools are black and tarlike or have streaks of blood. Watch closely for changes in your health, and be sure to contact your doctor if:  · You are dizzy or lightheaded. · You urinate less than usual, or your urine is dark yellow or brown. · You do not feel better with each day that goes by. Where can you learn more? Go to http://chiqui-ruthy.info/. Enter N142 in the search box to learn more about \"Gastroenteritis: Care Instructions. \"  Current as of: March 3, 2017  Content Version: 11.3  © 8391-6864 [email or web address]. Care instructions adapted under license by [employer] (which disclaims liability or warranty for this information). If you have questions about a medical condition or this instruction, always ask your healthcare professional. [address] any warranty or liability for your use of this information.

## 2017-06-28 NOTE — PERIOP NOTES
6/27/2017  1:00 PM - Matt, [facility]   Component Results   Component Value Flag Ref Range Units Status   Special Requests: NASAL     Final   Culture result:      Final   SA target not detected.                                 A MRSA NEGATIVE, SA NEGATIVE test result does not preclude MRSA or SA nasal colonization.

## 2017-06-28 NOTE — ED PROVIDER NOTES
Patient is a 73 y.o. male presenting with dizziness. The history is provided by the patient and the spouse. Dizziness   This is a new problem. The current episode started 12 to 24 hours ago. The problem has been gradually improving. There was no focality noted. Primary symptoms include loss of balance. Pertinent negatives include no focal weakness, no slurred speech, no speech difficulty, no memory loss, no visual change, no mental status change and no disorientation. There has been no fever. Associated symptoms include vomiting and nausea. Pertinent negatives include no shortness of breath, no chest pain, no confusion and no headaches. There were no medications administered prior to arrival. Associated medical issues do not include trauma or dementia. Past Medical History:   Diagnosis Date    Asthma dx childhood    last attack during high school; advair at hs; albuterol prn; neb prn     Atrial fibrillation ([address]     2 epiosode of A-fib last about 6-8 months ago (noted on 6/27/17); on Eliquis     Cervical spondylosis 6/20/2013    Chronic back pain 2013    RIGHT SIDE WITH UNCLEAR ETIOLOGY    Chronic pain     Colon polyps     COPD (chronic obstructive pulmonary disease) ([address] 8/17/2016    Coronary atherosclerosis of native coronary vessel 8/17/2016 08/2012: Mild non-obstructive CAD    Diabetes ([address] dx 5/14    type 2; oral meds; no bs checks at home    Diverticulosis     Edema 8/17/2016    Former smoker     GERD (gastroesophageal reflux disease)      controlled with med    High cholesterol     History of kidney stones     Hypertension     controlled with med    Obesity (BMI 30-39. 9)     BMI 39    Unspecified sleep apnea     wears cpap at hs       Past Surgical History:   Procedure Laterality Date    CYSTOSCOPY,INSERT URETERAL STENT      kidney stone removed cystoscopy     HX CATARACT REMOVAL      bilateral with lens implants; x3    HX COLECTOMY  2015    HX COLONOSCOPY  10/6/14    HX COLONOSCOPY  11/20/14    HX HEART CATHETERIZATION  2012    no stents     HX LUMBAR LAMINECTOMY  2009    no hardware     HX OTHER SURGICAL  20 yrs ago    colo-rectal sx for fissure    HX RETINAL DETACHMENT REPAIR Bilateral          Family History:   Problem Relation Age of Onset    Heart Disease Father      BOWEL OBSTRUCTION    Heart Attack Father 80     MI    Heart Disease Brother 72     STENT HEART    Suicide Brother     Parkinsonism Mother     Inflammatory Bowel Dz Son     Malignant Hyperthermia Neg Hx     Pseudocholinesterase Deficiency Neg Hx     Delayed Awakening Neg Hx     Post-op Nausea/Vomiting Neg Hx     Emergence Delirium Neg Hx     Post-op Cognitive Dysfunction Neg Hx        Social History     Social History    Marital status:      Spouse name: N/A    Number of children: N/A    Years of education: N/A     Occupational History    Not on file. Social History Main Topics    Smoking status: Former Smoker     Packs/day: 1.50     Years: 25.00     Types: Cigarettes     Quit date: 4/2/2012    Smokeless tobacco: Never Used      Comment: QUIT 2011    Alcohol use 1.8 oz/week     3 Cans of beer per week    Drug use: No    Sexual activity: Not on file     Other Topics Concern    Seat Belt Yes     Social History Narrative    1/28/15:  PATIENT IS  TO [facility]. THEY HAVE TWO GROWN CHILDREN. 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 ALLERGIES: Bactrim [sulfamethoxazole-trimethoprim]    Review of Systems   Constitutional: Negative for activity change, chills, diaphoresis and fever. HENT: Negative for dental problem, hearing loss, nosebleeds, rhinorrhea and sore throat. Eyes: Negative for pain, discharge, redness and visual disturbance. Respiratory: Negative for cough, chest tightness and shortness of breath. Cardiovascular: Negative for chest pain, palpitations and leg swelling.    Gastrointestinal: Positive for diarrhea, nausea and vomiting. Negative for abdominal pain and constipation. Endocrine: Negative for cold intolerance, heat intolerance, polydipsia and polyuria. Genitourinary: Negative for dysuria and flank pain. Musculoskeletal: Negative for arthralgias, back pain, joint swelling, myalgias and neck pain. Skin: Negative for pallor and rash. Allergic/Immunologic: Negative for environmental allergies and food allergies. Neurological: Positive for dizziness and loss of balance. Negative for tremors, focal weakness, speech difficulty, light-headedness, numbness and headaches. Hematological: Negative for adenopathy. Does not bruise/bleed easily. Psychiatric/Behavioral: Negative for confusion, dysphoric mood and memory loss. The patient is not nervous/anxious and is not hyperactive. All other systems reviewed and are negative. Vitals:    06/28/17 1424   BP: 174/74   Pulse: 67   Resp: 16   Temp: 98 °F (36.7 °C)   SpO2: 95%   Weight: 149.7 kg (330 lb)   Height: 6' 7\" (2.007 m)            Physical Exam   Constitutional: He is oriented to person, place, and time. He appears well-developed and well-nourished. He appears distressed. HENT:   Head: Normocephalic and atraumatic. Mouth/Throat: Oropharynx is clear and moist.   Eyes: Conjunctivae and EOM are normal. Pupils are equal, round, and reactive to light. Right eye exhibits no discharge. Left eye exhibits no discharge. No scleral icterus. Neck: Normal range of motion. Neck supple. No JVD present. Cardiovascular: Normal rate, regular rhythm, normal heart sounds and intact distal pulses. Exam reveals no gallop and no friction rub. No murmur heard. Pulmonary/Chest: Effort normal and breath sounds normal. No respiratory distress. He has no wheezes. Abdominal: Soft. Bowel sounds are normal. He exhibits no distension. There is no hepatosplenomegaly. There is no tenderness. There is no rebound and no guarding.    Musculoskeletal: Normal range of motion. He exhibits no edema or tenderness. Lymphadenopathy:     He has no cervical adenopathy. Neurological: He is alert and oriented to person, place, and time. He has normal strength. No cranial nerve deficit or sensory deficit. He exhibits normal muscle tone. GCS eye subscore is 4. GCS verbal subscore is 5. GCS motor subscore is 6. Skin: Skin is warm and dry. No rash noted. He is not diaphoretic. No erythema. Psychiatric: He has a normal mood and affect. His speech is normal and behavior is normal. Judgment and thought content normal. Cognition and memory are normal.   Nursing note and vitals reviewed. MDM  Number of Diagnoses or Management Options  Gastroenteritis, acute: new and requires workup  Vertigo: new and requires workup  Diagnosis management comments: EKG reviewed  Sinus rhythm; rate, normal interval, left axis, no ectopy, no acute ischemic changes    CAT scan of the head is unremarkable. Likely episode of vertigo. He should also had ill contacts with some of his grandchildren likely a viral gastroenteritis pattern    We'll go ahead and treat the patient symptomatically. Have encouraged him to follow-up with his primary care doctor         Amount and/or Complexity of Data Reviewed  Clinical lab tests: ordered and reviewed  Tests in the radiology section of CPT®: ordered and reviewed  Tests in the medicine section of CPT®: ordered and reviewed  Review and summarize past medical records: yes    Risk of Complications, Morbidity, and/or Mortality  Presenting problems: high  Diagnostic procedures: high  Management options: moderate  General comments: Elements of this note have been dictated via voice recognition software. Text and phrases may be limited by the accuracy of the software. The chart has been reviewed, but errors may still be present.       Patient Progress  Patient progress: improved    ED Course       Procedures

## 2017-06-28 NOTE — ED NOTES
Pt c/o room spinning and feeling dizzy, pt does not have any other complaints of chest pain, short of breath or vomiting. States has had intermittent nausea.

## 2017-06-28 NOTE — ED TRIAGE NOTES
Pt states he has felt very light headed after stretching neck this afternoon. Pt denies chest pain, headache, or SOB since feeling this way. Pt states BGL yesterday was over 300. Pt was dizzy when getting onto stretcher and lost balance but RNs were able to assist him with sitting on the stretcher.

## 2017-06-29 LAB
ATRIAL RATE: 62 BPM
CALCULATED P AXIS, ECG09: 91 DEGREES
CALCULATED R AXIS, ECG10: -44 DEGREES
CALCULATED T AXIS, ECG11: 50 DEGREES
DIAGNOSIS, 93000: NORMAL
P-R INTERVAL, ECG05: 214 MS
Q-T INTERVAL, ECG07: 412 MS
QRS DURATION, ECG06: 112 MS
QTC CALCULATION (BEZET), ECG08: 418 MS
VENTRICULAR RATE, ECG03: 62 BPM

## 2017-06-30 NOTE — PERIOP NOTES
EKG done (6/28/17) and results within anesthesia guidelines- result in EMR for anesthesia reference. Pt had cardiac clearance appt with [facility] (6/29/17). Office note states:    Paroxysmal atrial fibrillation ([facility] [address] - This has been recurrent and on metoprolol and Eliquis. Metoprolol was increased and no recurrence. He was seen by Dr Clark Collazo and is comfortable with medical therapy. Preop - Patient is low risk to proceed with surgery. Hold Eliquis x 3 days prior. He is to stay on metoprolol. Chart filed.

## 2017-07-10 ENCOUNTER — APPOINTMENT (RX ONLY)
Dept: URBAN - METROPOLITAN AREA CLINIC 349 | Facility: CLINIC | Age: 67
Setting detail: DERMATOLOGY
End: 2017-07-10

## 2017-07-10 ENCOUNTER — RX ONLY (OUTPATIENT)
Age: 67
Setting detail: RX ONLY
End: 2017-07-10

## 2017-07-10 DIAGNOSIS — L82.0 INFLAMED SEBORRHEIC KERATOSIS: ICD-10-CM

## 2017-07-10 DIAGNOSIS — L82.1 OTHER SEBORRHEIC KERATOSIS: ICD-10-CM

## 2017-07-10 DIAGNOSIS — L30.4 ERYTHEMA INTERTRIGO: ICD-10-CM | Status: STABLE

## 2017-07-10 PROCEDURE — 17110 DESTRUCTION B9 LES UP TO 14: CPT

## 2017-07-10 PROCEDURE — ? COUNSELING

## 2017-07-10 PROCEDURE — ? TREATMENT REGIMEN

## 2017-07-10 PROCEDURE — 99213 OFFICE O/P EST LOW 20 MIN: CPT | Mod: 25

## 2017-07-10 PROCEDURE — ? PRESCRIPTION

## 2017-07-10 PROCEDURE — ? LIQUID NITROGEN

## 2017-07-10 RX ORDER — NITROGLYCERIN 20 MG/G
OINTMENT TOPICAL
Qty: 1 | Refills: 3 | Status: ERX

## 2017-07-10 RX ORDER — NYSTATIN 100000 [USP'U]/G
CREAM TOPICAL
Qty: 1 | Refills: 3 | Status: CANCELLED
Stop reason: CLARIF

## 2017-07-10 RX ORDER — IODOQUINOL, HYDROCORTISONE ACETATE AND ALOE VERA LEAF 10; 20; 10 MG/G; MG/G; MG/G
GEL TOPICAL
Qty: 1 | Refills: 5 | Status: ERX

## 2017-07-10 ASSESSMENT — LOCATION SIMPLE DESCRIPTION DERM
LOCATION SIMPLE: CHEST
LOCATION SIMPLE: RIGHT ANTERIOR NECK
LOCATION SIMPLE: GROIN
LOCATION SIMPLE: LEFT KNEE
LOCATION SIMPLE: NECK
LOCATION SIMPLE: ABDOMEN
LOCATION SIMPLE: LEFT SHOULDER
LOCATION SIMPLE: LEFT ANTERIOR NECK
LOCATION SIMPLE: RIGHT CLAVICULAR SKIN
LOCATION SIMPLE: UPPER BACK
LOCATION SIMPLE: LEFT CLAVICULAR SKIN

## 2017-07-10 ASSESSMENT — LOCATION DETAILED DESCRIPTION DERM
LOCATION DETAILED: LEFT KNEE
LOCATION DETAILED: LEFT INFERIOR ANTERIOR NECK
LOCATION DETAILED: RIGHT CLAVICULAR NECK
LOCATION DETAILED: RIGHT INGUINAL CREASE
LOCATION DETAILED: LEFT CLAVICULAR NECK
LOCATION DETAILED: STERNUM
LOCATION DETAILED: PERIUMBILICAL SKIN
LOCATION DETAILED: LEFT POSTERIOR SHOULDER
LOCATION DETAILED: LEFT CLAVICULAR SKIN
LOCATION DETAILED: LEFT MEDIAL INFERIOR CHEST
LOCATION DETAILED: INFERIOR THORACIC SPINE
LOCATION DETAILED: RIGHT CLAVICULAR SKIN
LOCATION DETAILED: LEFT SUPERIOR LATERAL NECK
LOCATION DETAILED: LEFT CENTRAL POSTERIOR NECK

## 2017-07-10 ASSESSMENT — LOCATION ZONE DERM
LOCATION ZONE: TRUNK
LOCATION ZONE: ARM
LOCATION ZONE: LEG
LOCATION ZONE: NECK

## 2017-07-10 NOTE — PROCEDURE: LIQUID NITROGEN
Number Of Freeze-Thaw Cycles: 1 freeze-thaw cycle
Consent: The patient's consent was obtained including but not limited to risks of crusting, scabbing, blistering, scarring, darker or lighter pigmentary change, recurrence, incomplete removal and infection.
Add 52 Modifier (Optional): no
Medical Necessity Clause: This procedure was medically necessary because the lesions that were treated were:
Medical Necessity Information: It is in your best interest to select a reason for this procedure from the list below. All of these items fulfill various CMS LCD requirements except the new and changing color options.
Detail Level: Detailed
Post-Care Instructions: I reviewed with the patient in detail post-care instructions. Patient is to wear sunprotection, and avoid picking at any of the treated lesions. Pt may apply Vaseline to crusted or scabbing areas.
Include Z78.9 (Other Specified Conditions Influencing Health Status) As An Associated Diagnosis?: Yes

## 2017-07-13 NOTE — H&P
[id number] [facility]  Pre Operative History and Physical Exam    Patient ID:  Jayne Cruz  [id number]  38 y.o.  1950    Today: July 13, 2017       Assessment:   1. Arthritis of the right knee  2. Cleared by Cardiologist   3. Uncontrolled DM: PCP retired. Will check A1C and ask hospitalist to adress        Plan:    1. Proceed with scheduled Procedure(s) (LRB):  RIGHT KNEE ARTHROPLASTY TOTAL / FNB / JENELLE (Right)            CC:  Right knee pain    HPI:   The patient has end stage arthritis of the right knee. The patient was evaluated and examined during a consultation prior to this office visit. There have been no changes to the patient's orthopedic condition since the initial consultation. The patient has failed previous conservative treatment for this condition including antiinflammatories , and lifestyle modifications. The necessity for joint replacement is present. The patient will be admitted the day of surgery for Procedure(s) (LRB):  RIGHT KNEE ARTHROPLASTY TOTAL / FNB / JENELLE (Right)      Past Medical/Surgical History:  Past Medical History:   Diagnosis Date    Asthma dx childhood    last attack during high school; advair at hs; albuterol prn; neb prn     Atrial fibrillation ([address]     2 epiosode of A-fib last about 6-8 months ago (noted on 6/27/17); on Eliquis     Cervical spondylosis 6/20/2013    Chronic back pain 2013    RIGHT SIDE WITH UNCLEAR ETIOLOGY    Chronic pain     Colon polyps     COPD (chronic obstructive pulmonary disease) ([address] 8/17/2016    Coronary atherosclerosis of native coronary vessel 8/17/2016 08/2012: Mild non-obstructive CAD    Diabetes ([address] dx 5/14    type 2; oral meds; no bs checks at home    Diverticulosis     Edema 8/17/2016    Former smoker     GERD (gastroesophageal reflux disease)      controlled with med    High cholesterol     History of kidney stones     Hypertension     controlled with med    Obesity (BMI 30-39. 9)     BMI 39    Unspecified sleep apnea     wears cpap at hs     Past Surgical History:   Procedure Laterality Date    CYSTOSCOPY,INSERT URETERAL STENT      kidney stone removed cystoscopy     HX CATARACT REMOVAL      bilateral with lens implants; x3    HX COLECTOMY  2015    HX COLONOSCOPY  10/6/14    HX COLONOSCOPY  11/20/14    HX HEART CATHETERIZATION  2012    no stents     HX LUMBAR LAMINECTOMY  2009    no hardware     HX OTHER SURGICAL  20 yrs ago    colo-rectal sx for fissure    HX RETINAL DETACHMENT REPAIR Bilateral         Allergies: Allergies   Allergen Reactions    Bactrim [Sulfamethoxazole-Trimethoprim] Itching        Objective:                    HEENT: NC/AT                   Lungs:  Unlabored respirations, clear breath sounds                    Heart:   RRR                    Abdomen: soft                   Extremities:  Pain with ROM of the right knee    Meds:   No current facility-administered medications for this encounter. Current Outpatient Prescriptions   Medication Sig    meclizine (ANTIVERT) 25 mg tablet Take 1 Tab by mouth three (3) times daily as needed for Dizziness.  diazePAM (VALIUM) 5 mg tablet Take 1 Tab by mouth every eight (8) hours as needed for Anxiety (severe vertigo spells). Max Daily Amount: 15 mg.    aspirin delayed-release 81 mg tablet Take 162 mg by mouth nightly. Decrease to one 81 mg tab on 7/12/17    chlorthalidone (HYGROTEN) 25 mg tablet TAKE 1 TABLET BY MOUTH DAILY    metoprolol tartrate (LOPRESSOR) 25 mg tablet TAKE 2 TABLETS BY MOUTH TWICE A DAY    atorvastatin (LIPITOR) 40 mg tablet TAKE 1 TABLET BY MOUTH EVERY DAY    Omega-3 Fatty Acids 300 mg cap Take 1 Cap by mouth daily.  montelukast (SINGULAIR) 10 mg tablet TAKE 1 TABLET BY MOUTH DAILY    ANTI-FUNGAL 2 % topical powder APPLY TO ABDOMEN 2 TIMES PER DAY    celecoxib (CELEBREX) 200 mg capsule Take 200 mg by mouth daily.     pantoprazole (PROTONIX) 40 mg tablet TAKE 1 TABLET BY MOUTH EVERY DAY    Blood-Glucose Meter monitoring kit Check fs every day; DM2, E11/9, Contour glucometer    glucose blood VI test strips (ASCENSIA CONTOUR) strip Check fs every day, DM2, E11.9    lancets 28 gauge misc Check fs every day, DM2, E11.9    ezetimibe (ZETIA) 10 mg tablet Take 1 Tab by mouth daily. (Patient taking differently: Take 10 mg by mouth nightly.)    metFORMIN ER (GLUCOPHAGE XR) 500 mg tablet TAKE 2 TABLETS BY MOUTH EVERY DAY    ADVAIR DISKUS 500-50 mcg/dose diskus inhaler INHALE 1 PUFF BY INHALATION EVERY TWELVE HOURS.  apixaban (ELIQUIS) 5 mg tablet Take 1 Tab by mouth two (2) times a day.  lisinopril (PRINIVIL, ZESTRIL) 10 mg tablet TAKE 1 TABLET BY MOUTH DAILY    dapagliflozin (FARXIGA) 10 mg tab Take 1 Tab by mouth daily.  albuterol (PROVENTIL VENTOLIN) 2.5 mg /3 mL (0.083 %) nebulizer solution 3 mL by Nebulization route once for 1 dose. (Patient taking differently: 2.5 mg by Nebulization route daily as needed.)    doxazosin (CARDURA) 2 mg tablet TAKE 1 TABLET BY MOUTH 2 TIMES A DAY (Patient taking differently: Take 2 mg by mouth daily. TAKE 1 TABLET BY MOUTH 2 TIMES A DAY)    albuterol (PROAIR HFA) 90 mcg/actuation inhaler Take 2 Puffs by inhalation every four (4) hours as needed for Wheezing. Take / use AM day of surgery  per anesthesia protocols if needed.  cholecalciferol, vitamin D3, (VITAMIN D3) 2,000 unit tab Take 1 tablet by mouth daily.  diphenhydrAMINE (BENADRYL ALLERGY) 25 mg tablet Take 25 mg by mouth nightly as needed for Sleep.  b complex vitamins (B COMPLEX 1) tablet Take 1 tablet by mouth every morning. Stop seven days prior to surgery per anesthesia protocol.  green tea leaf extract (GREEN TEA) Cap Take 630 mg by mouth every morning. Stop seven days prior to surgery per anesthesia protocol.  vitamin E (AQUA GEMS) 400 unit capsule Take 400 Units by mouth every morning. Stop seven days prior to surgery per anesthesia protocol.     FOLIC ACID PO Take 558 mcg by mouth every morning. Stop seven days prior to surgery per anesthesia protocol.  DOCUSATE CALCIUM (STOOL SOFTENER PO) Take 1 capsule by mouth every morning.  mometasone (NASONEX) 50 mcg/Actuation nasal spray 2 Sprays by Both Nostrils route nightly as needed. Labs:  Admission on 06/28/2017, Discharged on 06/28/2017   Component Date Value Ref Range Status    Ventricular Rate 06/28/2017 62  BPM Final    Atrial Rate 06/28/2017 62  BPM Final    P-R Interval 06/28/2017 214  ms Final    QRS Duration 06/28/2017 112  ms Final    Q-T Interval 06/28/2017 412  ms Final    QTC Calculation (Bezet) 06/28/2017 418  ms Final    Calculated P Axis 06/28/2017 91  degrees Final    Calculated R Axis 06/28/2017 -44  degrees Final    Calculated T Axis 06/28/2017 50  degrees Final    Diagnosis 06/28/2017    Final                    Value:Sinus rhythm with 1st degree A-V block  Left anterior fascicular block  Pulmonary disease pattern  Abnormal ECG  When compared with ECG of 02-SEP-2016 00:30,  Sinus rhythm has replaced Atrial fibrillation  Vent.  rate has decreased BY  69 BPM  ST no longer depressed in Inferior leads  Confirmed by EMILY CROW (), NOEMÍ CANTU ([id number]) on 6/29/2017 12:42:01 PM      WBC 06/28/2017 8.0  4.3 - 11.1 K/uL Final    RBC 06/28/2017 4.96  4.23 - 5.67 M/uL Final    HGB 06/28/2017 14.9  13.6 - 17.2 g/dL Final    HCT 06/28/2017 44.7  41.1 - 50.3 % Final    MCV 06/28/2017 90.1  79.6 - 97.8 FL Final    MCH 06/28/2017 30.0  26.1 - 32.9 PG Final    MCHC 06/28/2017 33.3  31.4 - 35.0 g/dL Final    RDW 06/28/2017 15.2* 11.9 - 14.6 % Final    PLATELET 21/31/7516 507  150 - 450 K/uL Final    MPV 06/28/2017 10.7* 10.8 - 14.1 FL Final    DF 06/28/2017 AUTOMATED    Final    NEUTROPHILS 06/28/2017 54  43 - 78 % Final    LYMPHOCYTES 06/28/2017 32  13 - 44 % Final    MONOCYTES 06/28/2017 7  4.0 - 12.0 % Final    EOSINOPHILS 06/28/2017 6  0.5 - 7.8 % Final    BASOPHILS 06/28/2017 1  0.0 - 2.0 % Final    IMMATURE GRANULOCYTES 06/28/2017 0.3  0.0 - 5.0 % Final    ABS. NEUTROPHILS 06/28/2017 4.3  1.7 - 8.2 K/UL Final    ABS. LYMPHOCYTES 06/28/2017 2.6  0.5 - 4.6 K/UL Final    ABS. MONOCYTES 06/28/2017 0.6  0.1 - 1.3 K/UL Final    ABS. EOSINOPHILS 06/28/2017 0.5  0.0 - 0.8 K/UL Final    ABS. BASOPHILS 06/28/2017 0.1  0.0 - 0.2 K/UL Final    ABS. IMM. GRANS. 06/28/2017 0.0  0.0 - 0.5 K/UL Final    Sodium 06/28/2017 142  136 - 145 mmol/L Final    Potassium 06/28/2017 3.9  3.5 - 5.1 mmol/L Final    Chloride 06/28/2017 103  98 - 107 mmol/L Final    CO2 06/28/2017 26  21 - 32 mmol/L Final    Anion gap 06/28/2017 13  7 - 16 mmol/L Final    Glucose 06/28/2017 181* 65 - 100 mg/dL Final    Comment: 47 - 60 mg/dl Consistent with, but not fully diagnostic of hypoglycemia. 101 - 125 mg/dl Impaired fasting glucose/consistent with pre-diabetes mellitus  > 126 mg/dl Fasting glucose consistent with overt diabetes mellitus      BUN 06/28/2017 24* 8 - 23 MG/DL Final    Creatinine 06/28/2017 1.04  0.8 - 1.5 MG/DL Final    GFR est AA 06/28/2017 >60  >60 ml/min/1.73m2 Final    GFR est non-AA 06/28/2017 >60  >60 ml/min/1.73m2 Final    Calcium 06/28/2017 9.5  8.3 - 10.4 MG/DL Final    Bilirubin, total 06/28/2017 0.5  0.2 - 1.1 MG/DL Final    ALT (SGPT) 06/28/2017 25  12 - 65 U/L Final    AST (SGOT) 06/28/2017 21  15 - 37 U/L Final    Alk. phosphatase 06/28/2017 149* 50 - 136 U/L Final    Protein, total 06/28/2017 7.9  6.3 - 8.2 g/dL Final    Albumin 06/28/2017 3.9  3.2 - 4.6 g/dL Final    Globulin 06/28/2017 4.0* 2.3 - 3.5 g/dL Final    A-G Ratio 06/28/2017 1.0* 1.2 - 3.5   Final    Troponin-I (POC) 06/28/2017 0.01  0.0 - 0.08 ng/ml Final    Comment: (NOTE)  Values ranging from 0.00 to 0.08 ng/ml represent a 99 percentile   ranking of individuals from a test population that demonstrates a   healthy clinical picture.            The ACC recommends that the term myocardial infarction   should be used when there is evidence of myocardial necrosis   in a clinical setting consistent with myocardial ischemia. Under these conditions, any of the following meet the   diagnosis for myocardial infarction:         Detection of rise and/or fall of cardiac biomarkers   (preferably Troponin-I) with at least one value above the   99th percentile of the upper reference limit (URL) together   with evidence of myocardial ischemia with one of the   following: symptoms of ischemia, ECG changes indicative of   new ischemia, development of pathological Q waves, imaging   evidence of new loss of viable myocardium or new regional   wall motion abnormality. Sequential testing is recommended. Cardiac Troponin-I has a   relatively long half-life and may b                           e present well after the CK MB has   returned to baseline.  Lipase 06/28/2017 168  73 - 393 U/L Final   Hospital Outpatient Visit on 06/27/2017   Component Date Value Ref Range Status    WBC 06/27/2017 7.3  4.3 - 11.1 K/uL Final    RBC 06/27/2017 4.80  4.23 - 5.67 M/uL Final    HGB 06/27/2017 14.5  13.6 - 17.2 g/dL Final    HCT 06/27/2017 43.9  41.1 - 50.3 % Final    MCV 06/27/2017 91.5  79.6 - 97.8 FL Final    MCH 06/27/2017 30.2  26.1 - 32.9 PG Final    MCHC 06/27/2017 33.0  31.4 - 35.0 g/dL Final    RDW 06/27/2017 15.5* 11.9 - 14.6 % Final    PLATELET 07/92/9207 209  150 - 450 K/uL Final    MPV 06/27/2017 10.9  10.8 - 14.1 FL Final    DF 06/27/2017 AUTOMATED    Final    NEUTROPHILS 06/27/2017 59  43 - 78 % Final    LYMPHOCYTES 06/27/2017 26  13 - 44 % Final    MONOCYTES 06/27/2017 8  4.0 - 12.0 % Final    EOSINOPHILS 06/27/2017 6  0.5 - 7.8 % Final    BASOPHILS 06/27/2017 1  0.0 - 2.0 % Final    IMMATURE GRANULOCYTES 06/27/2017 0.1  0.0 - 5.0 % Final    ABS. NEUTROPHILS 06/27/2017 4.3  1.7 - 8.2 K/UL Final    ABS. LYMPHOCYTES 06/27/2017 1.9  0.5 - 4.6 K/UL Final    ABS. MONOCYTES 06/27/2017 0.6  0.1 - 1.3 K/UL Final    ABS.  EOSINOPHILS 06/27/2017 0.4  0.0 - 0.8 K/UL Final    ABS. BASOPHILS 06/27/2017 0.1  0.0 - 0.2 K/UL Final    ABS. IMM. GRANS. 06/27/2017 0.0  0.0 - 0.5 K/UL Final    Prothrombin time 06/27/2017 11.4  9.6 - 12.0 sec Final    INR 06/27/2017 1.1  0.9 - 1.2   Final    Comment: Suggested therapeutic INR range:  Venous thrombosis and embolus  2.0-3.0  Prosthetic heart valve         2.5-3.5      aPTT 06/27/2017 24.7  23.5 - 31.7 SEC Final    Heparin Therapeutic Range = 56.6-81.7 secs    Sodium 06/27/2017 143  136 - 145 mmol/L Final    Potassium 06/27/2017 4.1  3.5 - 5.1 mmol/L Final    Chloride 06/27/2017 105  98 - 107 mmol/L Final    CO2 06/27/2017 28  21 - 32 mmol/L Final    Anion gap 06/27/2017 10  7 - 16 mmol/L Final    Glucose 06/27/2017 311* 65 - 100 mg/dL Final    Comment: 47 - 60 mg/dl Consistent with, but not fully diagnostic of hypoglycemia. 101 - 125 mg/dl Impaired fasting glucose/consistent with pre-diabetes mellitus  > 126 mg/dl Fasting glucose consistent with overt diabetes mellitus      BUN 06/27/2017 30* 8 - 23 MG/DL Final    Creatinine 06/27/2017 1.31  0.8 - 1.5 MG/DL Final    GFR est AA 06/27/2017 >60  >60 ml/min/1.73m2 Final    GFR est non-AA 06/27/2017 58* >60 ml/min/1.73m2 Final    Comment: (NOTE)  Estimated GFR is calculated using the Modification of Diet in Renal   Disease (MDRD) Study equation, reported for both  Americans   (GFRAA) and non- Americans (GFRNA), and normalized to 1.73m2   body surface area. The physician must decide which value applies to   the patient. The MDRD study equation should only be used in   individuals age 25 or older. It has not been validated for the   following: pregnant women, patients with serious comorbid conditions,   or on certain medications, or persons with extremes of body size,   muscle mass, or nutritional status.       Calcium 06/27/2017 8.9  8.3 - 10.4 MG/DL Final    Color 06/27/2017 YELLOW    Final    Appearance 06/27/2017 CLEAR    Final  Specific gravity 06/27/2017 1.033* 1.001 - 1.023   Final    pH (UA) 06/27/2017 5.5  5.0 - 9.0   Final    Protein 06/27/2017 NEGATIVE   NEG mg/dL Final    Glucose 06/27/2017 >1000  mg/dL Final    Ketone 06/27/2017 NEGATIVE   NEG mg/dL Final    Bilirubin 06/27/2017 NEGATIVE   NEG   Final    Blood 06/27/2017 NEGATIVE   NEG   Final    Urobilinogen 06/27/2017 1.0  0.2 - 1.0 EU/dL Final    Nitrites 06/27/2017 NEGATIVE   NEG   Final    Leukocyte Esterase 06/27/2017 NEGATIVE   NEG   Final    Special Requests: 06/27/2017 NASAL    Final    Culture result: 06/27/2017 SA target not detected. A MRSA NEGATIVE, SA NEGATIVE test result does not preclude MRSA or SA nasal colonization.     Final                 Patient Active Problem List   Diagnosis Code    Hyperlipemia E78.5    Obstructive sleep apnea (adult) (pediatric) G47.33    Essential hypertension I10    Extrinsic asthma J45.909    Colon polyps K63.5    Diverticulosis K57.90    Cervical spondylosis M47.812    BPH (benign prostatic hypertrophy) N40.0    Chronic back pain M54.9, G89.29    Esophageal reflux K21.9    Diabetes mellitus type 2 in obese (HCC) E11.9, E66.9    CKD (chronic kidney disease), stage II N18.2    Andropause N50.89    Diverticular stricture ([facility] [address] K56.60    Coronary atherosclerosis of native coronary vessel I25.10    COPD (chronic obstructive pulmonary disease) ([facility] [address] J44.9    Edema R60.9    Paroxysmal atrial fibrillation ([facility]) I48.0    OA (osteoarthritis) of knee M17.10         Signed By: LYNDON Liz  July 13, 2017

## 2017-07-16 ENCOUNTER — ANESTHESIA EVENT (OUTPATIENT)
Dept: SURGERY | Age: 67
DRG: 470 | End: 2017-07-16
Payer: MEDICARE

## 2017-07-17 ENCOUNTER — HOSPITAL ENCOUNTER (INPATIENT)
Age: 67
LOS: 3 days | Discharge: NURSING, REHAB OR LONG-TERM CARE | DRG: 470 | End: 2017-07-20
Attending: ORTHOPAEDIC SURGERY | Admitting: ORTHOPAEDIC SURGERY
Payer: MEDICARE

## 2017-07-17 ENCOUNTER — ANESTHESIA (OUTPATIENT)
Dept: SURGERY | Age: 67
DRG: 470 | End: 2017-07-17
Payer: MEDICARE

## 2017-07-17 PROBLEM — Z96.651 STATUS POST TOTAL RIGHT KNEE REPLACEMENT: Status: ACTIVE | Noted: 2017-07-17

## 2017-07-17 LAB
ABO + RH BLD: NORMAL
BLOOD GROUP ANTIBODIES SERPL: NORMAL
EST. AVERAGE GLUCOSE BLD GHB EST-MCNC: 192 MG/DL
GLUCOSE BLD STRIP.AUTO-MCNC: 218 MG/DL (ref 65–100)
GLUCOSE BLD STRIP.AUTO-MCNC: 239 MG/DL (ref 65–100)
GLUCOSE BLD STRIP.AUTO-MCNC: 253 MG/DL (ref 65–100)
GLUCOSE BLD STRIP.AUTO-MCNC: 269 MG/DL (ref 65–100)
GLUCOSE BLD STRIP.AUTO-MCNC: 304 MG/DL (ref 65–100)
GLUCOSE BLD STRIP.AUTO-MCNC: 315 MG/DL (ref 65–100)
HBA1C MFR BLD: 8.3 % (ref 4.8–6)
HGB BLD-MCNC: 12.4 G/DL (ref 13.6–17.2)
SPECIMEN EXP DATE BLD: NORMAL

## 2017-07-17 PROCEDURE — 74011636637 HC RX REV CODE- 636/637: Performed by: INTERNAL MEDICINE

## 2017-07-17 PROCEDURE — 77030012890

## 2017-07-17 PROCEDURE — 74011000302 HC RX REV CODE- 302: Performed by: ORTHOPAEDIC SURGERY

## 2017-07-17 PROCEDURE — 74011000250 HC RX REV CODE- 250

## 2017-07-17 PROCEDURE — 77030034849: Performed by: ORTHOPAEDIC SURGERY

## 2017-07-17 PROCEDURE — 82962 GLUCOSE BLOOD TEST: CPT

## 2017-07-17 PROCEDURE — 97161 PT EVAL LOW COMPLEX 20 MIN: CPT

## 2017-07-17 PROCEDURE — 74011250636 HC RX REV CODE- 250/636: Performed by: ANESTHESIOLOGY

## 2017-07-17 PROCEDURE — 77030002912 HC SUT ETHBND J&J -A: Performed by: ORTHOPAEDIC SURGERY

## 2017-07-17 PROCEDURE — 77030018836 HC SOL IRR NACL ICUM -A: Performed by: ORTHOPAEDIC SURGERY

## 2017-07-17 PROCEDURE — 85018 HEMOGLOBIN: CPT | Performed by: PHYSICIAN ASSISTANT

## 2017-07-17 PROCEDURE — 74011000258 HC RX REV CODE- 258: Performed by: ORTHOPAEDIC SURGERY

## 2017-07-17 PROCEDURE — 74011250636 HC RX REV CODE- 250/636: Performed by: ORTHOPAEDIC SURGERY

## 2017-07-17 PROCEDURE — 77030011208: Performed by: ORTHOPAEDIC SURGERY

## 2017-07-17 PROCEDURE — 74011636637 HC RX REV CODE- 636/637: Performed by: ANESTHESIOLOGY

## 2017-07-17 PROCEDURE — 77030012935 HC DRSG AQUACEL BMS -B: Performed by: ORTHOPAEDIC SURGERY

## 2017-07-17 PROCEDURE — 94760 N-INVAS EAR/PLS OXIMETRY 1: CPT

## 2017-07-17 PROCEDURE — 77010033678 HC OXYGEN DAILY

## 2017-07-17 PROCEDURE — 83036 HEMOGLOBIN GLYCOSYLATED A1C: CPT | Performed by: PHYSICIAN ASSISTANT

## 2017-07-17 PROCEDURE — 77030032490 HC SLV COMPR SCD KNE COVD -B

## 2017-07-17 PROCEDURE — 77030035236 HC SUT PDS STRATFX BARB J&J -B: Performed by: ORTHOPAEDIC SURGERY

## 2017-07-17 PROCEDURE — 77030037364 HC TIB INST CR  DISP STRY -C: Performed by: ORTHOPAEDIC SURGERY

## 2017-07-17 PROCEDURE — 74011250636 HC RX REV CODE- 250/636

## 2017-07-17 PROCEDURE — 77030003602 HC NDL NRV BLK BBMI -B: Performed by: ANESTHESIOLOGY

## 2017-07-17 PROCEDURE — 76060000035 HC ANESTHESIA 2 TO 2.5 HR: Performed by: ORTHOPAEDIC SURGERY

## 2017-07-17 PROCEDURE — 77030013727 HC IRR FAN PULSVC ZIMM -B: Performed by: ORTHOPAEDIC SURGERY

## 2017-07-17 PROCEDURE — 77030007880 HC KT SPN EPDRL BBMI -B: Performed by: ANESTHESIOLOGY

## 2017-07-17 PROCEDURE — 77030006720 HC BLD PAT RMR ZIMM -B: Performed by: ORTHOPAEDIC SURGERY

## 2017-07-17 PROCEDURE — 74011250636 HC RX REV CODE- 250/636: Performed by: PHYSICIAN ASSISTANT

## 2017-07-17 PROCEDURE — 77030008703 HC TU ET UNCUF COVD -A: Performed by: ANESTHESIOLOGY

## 2017-07-17 PROCEDURE — 65270000029 HC RM PRIVATE

## 2017-07-17 PROCEDURE — 86580 TB INTRADERMAL TEST: CPT | Performed by: ORTHOPAEDIC SURGERY

## 2017-07-17 PROCEDURE — 97165 OT EVAL LOW COMPLEX 30 MIN: CPT

## 2017-07-17 PROCEDURE — 77030025452 HC KT TIB SZR TRTH DSP STRY -B: Performed by: ORTHOPAEDIC SURGERY

## 2017-07-17 PROCEDURE — 74011250637 HC RX REV CODE- 250/637: Performed by: PHYSICIAN ASSISTANT

## 2017-07-17 PROCEDURE — 86850 RBC ANTIBODY SCREEN: CPT | Performed by: PHYSICIAN ASSISTANT

## 2017-07-17 PROCEDURE — 0SRC0JZ REPLACEMENT OF RIGHT KNEE JOINT WITH SYNTHETIC SUBSTITUTE, OPEN APPROACH: ICD-10-PCS | Performed by: ORTHOPAEDIC SURGERY

## 2017-07-17 PROCEDURE — 77030037363 HC FEM INST CR  DISP STRY -C: Performed by: ORTHOPAEDIC SURGERY

## 2017-07-17 PROCEDURE — 77030019557 HC ELECTRD VES SEAL MEDT -F: Performed by: ORTHOPAEDIC SURGERY

## 2017-07-17 PROCEDURE — 77030031139 HC SUT VCRL2 J&J -A: Performed by: ORTHOPAEDIC SURGERY

## 2017-07-17 PROCEDURE — 77030006789 HC BLD SAW OSC STRY -C: Performed by: ORTHOPAEDIC SURGERY

## 2017-07-17 PROCEDURE — 77030003665 HC NDL SPN BBMI -A: Performed by: ANESTHESIOLOGY

## 2017-07-17 PROCEDURE — C1776 JOINT DEVICE (IMPLANTABLE): HCPCS | Performed by: ORTHOPAEDIC SURGERY

## 2017-07-17 PROCEDURE — 76010010054 HC POST OP PAIN BLOCK: Performed by: ORTHOPAEDIC SURGERY

## 2017-07-17 PROCEDURE — 76942 ECHO GUIDE FOR BIOPSY: CPT | Performed by: ORTHOPAEDIC SURGERY

## 2017-07-17 PROCEDURE — 74011250637 HC RX REV CODE- 250/637: Performed by: ANESTHESIOLOGY

## 2017-07-17 PROCEDURE — 76010000162 HC OR TIME 1.5 TO 2 HR INTENSV-TIER 1: Performed by: ORTHOPAEDIC SURGERY

## 2017-07-17 PROCEDURE — 74011000250 HC RX REV CODE- 250: Performed by: ANESTHESIOLOGY

## 2017-07-17 PROCEDURE — 74011000250 HC RX REV CODE- 250: Performed by: ORTHOPAEDIC SURGERY

## 2017-07-17 PROCEDURE — 77030011640 HC PAD GRND REM COVD -A: Performed by: ORTHOPAEDIC SURGERY

## 2017-07-17 PROCEDURE — 77030036688 HC BLNKT CLD THER S2SG -B

## 2017-07-17 PROCEDURE — 77030019940 HC BLNKT HYPOTHRM STRY -B: Performed by: ANESTHESIOLOGY

## 2017-07-17 PROCEDURE — 77030008467 HC STPLR SKN COVD -B: Performed by: ORTHOPAEDIC SURGERY

## 2017-07-17 PROCEDURE — 76210000016 HC OR PH I REC 1 TO 1.5 HR: Performed by: ORTHOPAEDIC SURGERY

## 2017-07-17 PROCEDURE — 77030002966 HC SUT PDS J&J -A: Performed by: ORTHOPAEDIC SURGERY

## 2017-07-17 PROCEDURE — 77030008477 HC STYL SATN SLP COVD -A: Performed by: ANESTHESIOLOGY

## 2017-07-17 DEVICE — INSERT TIB SZ 8 THK13MM UNIV POLYETH KNEE POST STBL PRI: Type: IMPLANTABLE DEVICE | Site: KNEE | Status: FUNCTIONAL

## 2017-07-17 DEVICE — COMPONENT PAT SZ A40 W44MM DIA40MM THK11MM MED LAT KNEE: Type: IMPLANTABLE DEVICE | Site: KNEE | Status: FUNCTIONAL

## 2017-07-17 DEVICE — BASEPLATE TIB SZ 8 AP60MM ML85MM KEEL W58MM D28MM PEG L12MM: Type: IMPLANTABLE DEVICE | Site: KNEE | Status: FUNCTIONAL

## 2017-07-17 DEVICE — IMPLANTABLE DEVICE: Type: IMPLANTABLE DEVICE | Site: KNEE | Status: FUNCTIONAL

## 2017-07-17 RX ORDER — ALBUTEROL SULFATE 90 UG/1
2 AEROSOL, METERED RESPIRATORY (INHALATION)
Status: DISCONTINUED | OUTPATIENT
Start: 2017-07-17 | End: 2017-07-20 | Stop reason: HOSPADM

## 2017-07-17 RX ORDER — DIAZEPAM 5 MG/1
5 TABLET ORAL
Status: DISCONTINUED | OUTPATIENT
Start: 2017-07-17 | End: 2017-07-20 | Stop reason: HOSPADM

## 2017-07-17 RX ORDER — CEFAZOLIN SODIUM IN 0.9 % NACL 2 G/50 ML
2 INTRAVENOUS SOLUTION, PIGGYBACK (ML) INTRAVENOUS EVERY 8 HOURS
Status: COMPLETED | OUTPATIENT
Start: 2017-07-17 | End: 2017-07-18

## 2017-07-17 RX ORDER — DEXAMETHASONE SODIUM PHOSPHATE 4 MG/ML
INJECTION, SOLUTION INTRA-ARTICULAR; INTRALESIONAL; INTRAMUSCULAR; INTRAVENOUS; SOFT TISSUE AS NEEDED
Status: DISCONTINUED | OUTPATIENT
Start: 2017-07-17 | End: 2017-07-17 | Stop reason: HOSPADM

## 2017-07-17 RX ORDER — VECURONIUM BROMIDE FOR INJECTION 1 MG/ML
INJECTION, POWDER, LYOPHILIZED, FOR SOLUTION INTRAVENOUS AS NEEDED
Status: DISCONTINUED | OUTPATIENT
Start: 2017-07-17 | End: 2017-07-17 | Stop reason: HOSPADM

## 2017-07-17 RX ORDER — AMOXICILLIN 250 MG
2 CAPSULE ORAL DAILY
Status: DISCONTINUED | OUTPATIENT
Start: 2017-07-18 | End: 2017-07-20 | Stop reason: HOSPADM

## 2017-07-17 RX ORDER — TRANEXAMIC ACID 100 MG/ML
INJECTION, SOLUTION INTRAVENOUS AS NEEDED
Status: DISCONTINUED | OUTPATIENT
Start: 2017-07-17 | End: 2017-07-17 | Stop reason: HOSPADM

## 2017-07-17 RX ORDER — ACETAMINOPHEN 500 MG
1000 TABLET ORAL ONCE
Status: DISCONTINUED | OUTPATIENT
Start: 2017-07-18 | End: 2017-07-17 | Stop reason: CLARIF

## 2017-07-17 RX ORDER — HYDROMORPHONE HYDROCHLORIDE 2 MG/1
2 TABLET ORAL
Status: DISCONTINUED | OUTPATIENT
Start: 2017-07-17 | End: 2017-07-20 | Stop reason: HOSPADM

## 2017-07-17 RX ORDER — SUCCINYLCHOLINE CHLORIDE 20 MG/ML
INJECTION INTRAMUSCULAR; INTRAVENOUS AS NEEDED
Status: DISCONTINUED | OUTPATIENT
Start: 2017-07-17 | End: 2017-07-17 | Stop reason: HOSPADM

## 2017-07-17 RX ORDER — LIDOCAINE HYDROCHLORIDE 10 MG/ML
0.1 INJECTION INFILTRATION; PERINEURAL AS NEEDED
Status: DISCONTINUED | OUTPATIENT
Start: 2017-07-17 | End: 2017-07-17 | Stop reason: HOSPADM

## 2017-07-17 RX ORDER — METFORMIN HYDROCHLORIDE 500 MG/1
500 TABLET ORAL 2 TIMES DAILY
Status: DISCONTINUED | OUTPATIENT
Start: 2017-07-17 | End: 2017-07-18

## 2017-07-17 RX ORDER — KETOROLAC TROMETHAMINE 30 MG/ML
INJECTION, SOLUTION INTRAMUSCULAR; INTRAVENOUS AS NEEDED
Status: DISCONTINUED | OUTPATIENT
Start: 2017-07-17 | End: 2017-07-17 | Stop reason: HOSPADM

## 2017-07-17 RX ORDER — MIDAZOLAM HYDROCHLORIDE 1 MG/ML
2 INJECTION, SOLUTION INTRAMUSCULAR; INTRAVENOUS
Status: COMPLETED | OUTPATIENT
Start: 2017-07-17 | End: 2017-07-17

## 2017-07-17 RX ORDER — MECLIZINE HYDROCHLORIDE 25 MG/1
25 TABLET ORAL
Status: DISCONTINUED | OUTPATIENT
Start: 2017-07-17 | End: 2017-07-20 | Stop reason: HOSPADM

## 2017-07-17 RX ORDER — SODIUM CHLORIDE, SODIUM LACTATE, POTASSIUM CHLORIDE, CALCIUM CHLORIDE 600; 310; 30; 20 MG/100ML; MG/100ML; MG/100ML; MG/100ML
1000 INJECTION, SOLUTION INTRAVENOUS CONTINUOUS
Status: DISCONTINUED | OUTPATIENT
Start: 2017-07-17 | End: 2017-07-17 | Stop reason: HOSPADM

## 2017-07-17 RX ORDER — MORPHINE SULFATE 10 MG/ML
INJECTION, SOLUTION INTRAMUSCULAR; INTRAVENOUS AS NEEDED
Status: DISCONTINUED | OUTPATIENT
Start: 2017-07-17 | End: 2017-07-17 | Stop reason: HOSPADM

## 2017-07-17 RX ORDER — DOXAZOSIN 1 MG/1
2 TABLET ORAL DAILY
Status: DISCONTINUED | OUTPATIENT
Start: 2017-07-18 | End: 2017-07-20 | Stop reason: HOSPADM

## 2017-07-17 RX ORDER — SODIUM CHLORIDE 0.9 % (FLUSH) 0.9 %
5-10 SYRINGE (ML) INJECTION EVERY 8 HOURS
Status: DISCONTINUED | OUTPATIENT
Start: 2017-07-17 | End: 2017-07-17 | Stop reason: HOSPADM

## 2017-07-17 RX ORDER — ALBUTEROL SULFATE 0.83 MG/ML
2.5 SOLUTION RESPIRATORY (INHALATION)
Status: DISCONTINUED | OUTPATIENT
Start: 2017-07-17 | End: 2017-07-20 | Stop reason: HOSPADM

## 2017-07-17 RX ORDER — LIDOCAINE HYDROCHLORIDE 20 MG/ML
INJECTION, SOLUTION EPIDURAL; INFILTRATION; INTRACAUDAL; PERINEURAL AS NEEDED
Status: DISCONTINUED | OUTPATIENT
Start: 2017-07-17 | End: 2017-07-17 | Stop reason: HOSPADM

## 2017-07-17 RX ORDER — METOPROLOL TARTRATE 25 MG/1
25 TABLET, FILM COATED ORAL 2 TIMES DAILY
Status: DISCONTINUED | OUTPATIENT
Start: 2017-07-17 | End: 2017-07-20 | Stop reason: HOSPADM

## 2017-07-17 RX ORDER — SODIUM CHLORIDE 0.9 % (FLUSH) 0.9 %
5-10 SYRINGE (ML) INJECTION AS NEEDED
Status: DISCONTINUED | OUTPATIENT
Start: 2017-07-17 | End: 2017-07-17 | Stop reason: HOSPADM

## 2017-07-17 RX ORDER — BUDESONIDE 0.5 MG/2ML
2 INHALANT ORAL
Status: DISCONTINUED | OUTPATIENT
Start: 2017-07-17 | End: 2017-07-18

## 2017-07-17 RX ORDER — PANTOPRAZOLE SODIUM 40 MG/1
40 TABLET, DELAYED RELEASE ORAL
Status: DISCONTINUED | OUTPATIENT
Start: 2017-07-18 | End: 2017-07-20 | Stop reason: HOSPADM

## 2017-07-17 RX ORDER — PROPOFOL 10 MG/ML
INJECTION, EMULSION INTRAVENOUS AS NEEDED
Status: DISCONTINUED | OUTPATIENT
Start: 2017-07-17 | End: 2017-07-17 | Stop reason: HOSPADM

## 2017-07-17 RX ORDER — FENTANYL CITRATE 50 UG/ML
INJECTION, SOLUTION INTRAMUSCULAR; INTRAVENOUS AS NEEDED
Status: DISCONTINUED | OUTPATIENT
Start: 2017-07-17 | End: 2017-07-17 | Stop reason: HOSPADM

## 2017-07-17 RX ORDER — DIPHENHYDRAMINE HCL 25 MG
25 CAPSULE ORAL
Status: DISCONTINUED | OUTPATIENT
Start: 2017-07-17 | End: 2017-07-20 | Stop reason: HOSPADM

## 2017-07-17 RX ORDER — CELECOXIB 200 MG/1
200 CAPSULE ORAL EVERY 12 HOURS
Status: DISCONTINUED | OUTPATIENT
Start: 2017-07-17 | End: 2017-07-20 | Stop reason: HOSPADM

## 2017-07-17 RX ORDER — ONDANSETRON 2 MG/ML
4 INJECTION INTRAMUSCULAR; INTRAVENOUS
Status: DISCONTINUED | OUTPATIENT
Start: 2017-07-17 | End: 2017-07-17 | Stop reason: HOSPADM

## 2017-07-17 RX ORDER — SODIUM CHLORIDE 9 MG/ML
100 INJECTION, SOLUTION INTRAVENOUS CONTINUOUS
Status: DISPENSED | OUTPATIENT
Start: 2017-07-17 | End: 2017-07-19

## 2017-07-17 RX ORDER — HYDROMORPHONE HYDROCHLORIDE 2 MG/ML
0.5 INJECTION, SOLUTION INTRAMUSCULAR; INTRAVENOUS; SUBCUTANEOUS
Status: DISCONTINUED | OUTPATIENT
Start: 2017-07-17 | End: 2017-07-17 | Stop reason: HOSPADM

## 2017-07-17 RX ORDER — ONDANSETRON 2 MG/ML
INJECTION INTRAMUSCULAR; INTRAVENOUS AS NEEDED
Status: DISCONTINUED | OUTPATIENT
Start: 2017-07-17 | End: 2017-07-17 | Stop reason: HOSPADM

## 2017-07-17 RX ORDER — MONTELUKAST SODIUM 10 MG/1
10 TABLET ORAL
Status: DISCONTINUED | OUTPATIENT
Start: 2017-07-17 | End: 2017-07-20 | Stop reason: HOSPADM

## 2017-07-17 RX ORDER — ALBUTEROL SULFATE 0.83 MG/ML
2.5 SOLUTION RESPIRATORY (INHALATION)
Status: DISCONTINUED | OUTPATIENT
Start: 2017-07-17 | End: 2017-07-18

## 2017-07-17 RX ORDER — ACETAMINOPHEN 500 MG
1000 TABLET ORAL ONCE
Status: COMPLETED | OUTPATIENT
Start: 2017-07-17 | End: 2017-07-17

## 2017-07-17 RX ORDER — SODIUM CHLORIDE 0.9 % (FLUSH) 0.9 %
5-10 SYRINGE (ML) INJECTION AS NEEDED
Status: DISCONTINUED | OUTPATIENT
Start: 2017-07-17 | End: 2017-07-20 | Stop reason: HOSPADM

## 2017-07-17 RX ORDER — SODIUM CHLORIDE 0.9 % (FLUSH) 0.9 %
5-10 SYRINGE (ML) INJECTION EVERY 8 HOURS
Status: DISCONTINUED | OUTPATIENT
Start: 2017-07-17 | End: 2017-07-20 | Stop reason: HOSPADM

## 2017-07-17 RX ORDER — PROPOFOL 10 MG/ML
INJECTION, EMULSION INTRAVENOUS
Status: DISCONTINUED | OUTPATIENT
Start: 2017-07-17 | End: 2017-07-17

## 2017-07-17 RX ORDER — ASPIRIN 325 MG
325 TABLET, DELAYED RELEASE (ENTERIC COATED) ORAL 2 TIMES DAILY
Status: COMPLETED | OUTPATIENT
Start: 2017-07-17 | End: 2017-07-18

## 2017-07-17 RX ORDER — ACETAMINOPHEN 10 MG/ML
1000 INJECTION, SOLUTION INTRAVENOUS ONCE
Status: COMPLETED | OUTPATIENT
Start: 2017-07-17 | End: 2017-07-17

## 2017-07-17 RX ORDER — NALOXONE HYDROCHLORIDE 0.4 MG/ML
.2-.4 INJECTION, SOLUTION INTRAMUSCULAR; INTRAVENOUS; SUBCUTANEOUS
Status: DISCONTINUED | OUTPATIENT
Start: 2017-07-17 | End: 2017-07-20 | Stop reason: HOSPADM

## 2017-07-17 RX ORDER — DEXAMETHASONE SODIUM PHOSPHATE 100 MG/10ML
10 INJECTION INTRAMUSCULAR; INTRAVENOUS ONCE
Status: ACTIVE | OUTPATIENT
Start: 2017-07-18 | End: 2017-07-19

## 2017-07-17 RX ORDER — INSULIN LISPRO 100 [IU]/ML
INJECTION, SOLUTION INTRAVENOUS; SUBCUTANEOUS
Status: DISCONTINUED | OUTPATIENT
Start: 2017-07-17 | End: 2017-07-20 | Stop reason: HOSPADM

## 2017-07-17 RX ORDER — ALBUTEROL SULFATE 0.83 MG/ML
2.5 SOLUTION RESPIRATORY (INHALATION) AS NEEDED
Status: DISCONTINUED | OUTPATIENT
Start: 2017-07-17 | End: 2017-07-17 | Stop reason: HOSPADM

## 2017-07-17 RX ORDER — HYDROMORPHONE HYDROCHLORIDE 1 MG/ML
1 INJECTION, SOLUTION INTRAMUSCULAR; INTRAVENOUS; SUBCUTANEOUS
Status: DISCONTINUED | OUTPATIENT
Start: 2017-07-17 | End: 2017-07-20 | Stop reason: HOSPADM

## 2017-07-17 RX ORDER — ONDANSETRON 2 MG/ML
4 INJECTION INTRAMUSCULAR; INTRAVENOUS
Status: DISCONTINUED | OUTPATIENT
Start: 2017-07-17 | End: 2017-07-20 | Stop reason: HOSPADM

## 2017-07-17 RX ORDER — ACETAMINOPHEN 500 MG
1000 TABLET ORAL EVERY 6 HOURS
Status: DISCONTINUED | OUTPATIENT
Start: 2017-07-18 | End: 2017-07-20 | Stop reason: HOSPADM

## 2017-07-17 RX ORDER — ROPIVACAINE HYDROCHLORIDE 2 MG/ML
INJECTION, SOLUTION EPIDURAL; INFILTRATION; PERINEURAL AS NEEDED
Status: DISCONTINUED | OUTPATIENT
Start: 2017-07-17 | End: 2017-07-17 | Stop reason: HOSPADM

## 2017-07-17 RX ORDER — MIDAZOLAM HYDROCHLORIDE 1 MG/ML
INJECTION, SOLUTION INTRAMUSCULAR; INTRAVENOUS AS NEEDED
Status: DISCONTINUED | OUTPATIENT
Start: 2017-07-17 | End: 2017-07-17 | Stop reason: HOSPADM

## 2017-07-17 RX ORDER — LISINOPRIL 5 MG/1
10 TABLET ORAL DAILY
Status: DISCONTINUED | OUTPATIENT
Start: 2017-07-18 | End: 2017-07-20 | Stop reason: HOSPADM

## 2017-07-17 RX ORDER — CHLORTHALIDONE 25 MG/1
25 TABLET ORAL DAILY
Status: DISCONTINUED | OUTPATIENT
Start: 2017-07-18 | End: 2017-07-20 | Stop reason: HOSPADM

## 2017-07-17 RX ORDER — FOLIC ACID 1 MG/1
1 TABLET ORAL DAILY
Status: DISCONTINUED | OUTPATIENT
Start: 2017-07-18 | End: 2017-07-20 | Stop reason: HOSPADM

## 2017-07-17 RX ADMIN — TRANEXAMIC ACID 1000 MG: 100 INJECTION, SOLUTION INTRAVENOUS at 09:17

## 2017-07-17 RX ADMIN — INSULIN LISPRO 8 UNITS: 100 INJECTION, SOLUTION INTRAVENOUS; SUBCUTANEOUS at 17:46

## 2017-07-17 RX ADMIN — METOPROLOL TARTRATE 25 MG: 25 TABLET, FILM COATED ORAL at 17:48

## 2017-07-17 RX ADMIN — CELECOXIB 200 MG: 200 CAPSULE ORAL at 21:48

## 2017-07-17 RX ADMIN — LIDOCAINE HYDROCHLORIDE 0.1 ML: 10 INJECTION, SOLUTION INFILTRATION; PERINEURAL at 07:08

## 2017-07-17 RX ADMIN — VECURONIUM BROMIDE FOR INJECTION 2 MG: 1 INJECTION, POWDER, LYOPHILIZED, FOR SOLUTION INTRAVENOUS at 09:05

## 2017-07-17 RX ADMIN — FENTANYL CITRATE 100 MCG: 50 INJECTION, SOLUTION INTRAMUSCULAR; INTRAVENOUS at 09:33

## 2017-07-17 RX ADMIN — FENTANYL CITRATE 50 MCG: 50 INJECTION, SOLUTION INTRAMUSCULAR; INTRAVENOUS at 09:03

## 2017-07-17 RX ADMIN — ONDANSETRON 4 MG: 2 INJECTION INTRAMUSCULAR; INTRAVENOUS at 09:31

## 2017-07-17 RX ADMIN — HYDROMORPHONE HYDROCHLORIDE 2 MG: 2 TABLET ORAL at 22:06

## 2017-07-17 RX ADMIN — SODIUM CHLORIDE 100 ML/HR: 900 INJECTION, SOLUTION INTRAVENOUS at 16:14

## 2017-07-17 RX ADMIN — SODIUM CHLORIDE 100 ML/HR: 900 INJECTION, SOLUTION INTRAVENOUS at 23:02

## 2017-07-17 RX ADMIN — ASPIRIN 325 MG: 325 TABLET, DELAYED RELEASE ORAL at 21:49

## 2017-07-17 RX ADMIN — ACETAMINOPHEN 1000 MG: 10 INJECTION, SOLUTION INTRAVENOUS at 17:46

## 2017-07-17 RX ADMIN — CEFAZOLIN 2 G: 1 INJECTION, POWDER, FOR SOLUTION INTRAMUSCULAR; INTRAVENOUS; PARENTERAL at 17:46

## 2017-07-17 RX ADMIN — SODIUM CHLORIDE, SODIUM LACTATE, POTASSIUM CHLORIDE, AND CALCIUM CHLORIDE: 600; 310; 30; 20 INJECTION, SOLUTION INTRAVENOUS at 08:43

## 2017-07-17 RX ADMIN — SODIUM CHLORIDE, SODIUM LACTATE, POTASSIUM CHLORIDE, AND CALCIUM CHLORIDE 1000 ML: 600; 310; 30; 20 INJECTION, SOLUTION INTRAVENOUS at 07:07

## 2017-07-17 RX ADMIN — INSULIN LISPRO 8 UNITS: 100 INJECTION, SOLUTION INTRAVENOUS; SUBCUTANEOUS at 22:08

## 2017-07-17 RX ADMIN — INSULIN DETEMIR 10 UNITS: 100 INJECTION, SOLUTION SUBCUTANEOUS at 22:19

## 2017-07-17 RX ADMIN — HUMAN INSULIN 5 UNITS: 100 INJECTION, SOLUTION SUBCUTANEOUS at 09:25

## 2017-07-17 RX ADMIN — LIDOCAINE HYDROCHLORIDE 80 MG: 20 INJECTION, SOLUTION EPIDURAL; INFILTRATION; INTRACAUDAL; PERINEURAL at 09:05

## 2017-07-17 RX ADMIN — ACETAMINOPHEN 1000 MG: 500 TABLET, FILM COATED ORAL at 07:06

## 2017-07-17 RX ADMIN — TUBERCULIN PURIFIED PROTEIN DERIVATIVE 5 UNITS: 5 INJECTION, SOLUTION INTRADERMAL at 07:08

## 2017-07-17 RX ADMIN — DEXAMETHASONE SODIUM PHOSPHATE 10 MG: 4 INJECTION, SOLUTION INTRA-ARTICULAR; INTRALESIONAL; INTRAMUSCULAR; INTRAVENOUS; SOFT TISSUE at 09:31

## 2017-07-17 RX ADMIN — FENTANYL CITRATE 50 MCG: 50 INJECTION, SOLUTION INTRAMUSCULAR; INTRAVENOUS at 08:50

## 2017-07-17 RX ADMIN — PROPOFOL 260 MG: 10 INJECTION, EMULSION INTRAVENOUS at 09:05

## 2017-07-17 RX ADMIN — METFORMIN HYDROCHLORIDE 500 MG: 500 TABLET, FILM COATED ORAL at 17:48

## 2017-07-17 RX ADMIN — SODIUM CHLORIDE, SODIUM LACTATE, POTASSIUM CHLORIDE, AND CALCIUM CHLORIDE: 600; 310; 30; 20 INJECTION, SOLUTION INTRAVENOUS at 09:31

## 2017-07-17 RX ADMIN — SUCCINYLCHOLINE CHLORIDE 150 MG: 20 INJECTION INTRAMUSCULAR; INTRAVENOUS at 09:05

## 2017-07-17 RX ADMIN — MIDAZOLAM HYDROCHLORIDE 2 MG: 1 INJECTION, SOLUTION INTRAMUSCULAR; INTRAVENOUS at 08:34

## 2017-07-17 RX ADMIN — CEFAZOLIN 3 G: 1 INJECTION, POWDER, FOR SOLUTION INTRAMUSCULAR; INTRAVENOUS; PARENTERAL at 09:00

## 2017-07-17 RX ADMIN — MIDAZOLAM HYDROCHLORIDE 2 MG: 1 INJECTION, SOLUTION INTRAMUSCULAR; INTRAVENOUS at 08:47

## 2017-07-17 RX ADMIN — INSULIN HUMAN 10 UNITS: 100 INJECTION, SOLUTION PARENTERAL at 10:56

## 2017-07-17 RX ADMIN — HYDROMORPHONE HYDROCHLORIDE 2 MG: 2 TABLET ORAL at 18:04

## 2017-07-17 RX ADMIN — MONTELUKAST SODIUM 10 MG: 10 TABLET, FILM COATED ORAL at 21:48

## 2017-07-17 NOTE — PERIOP NOTES
TRANSFER - OUT REPORT:    Verbal report given to Timmy Elizabeth RN(name) on Mert Frost  being transferred to [facility](orthopedic unit) for routine post - op       Report consisted of patients Situation, Background, Assessment and   Recommendations(SBAR). Information from the following report(s) SBAR, OR Summary, Procedure Summary, Intake/Output and MAR was reviewed with the receiving nurse. Opportunity for questions and clarification was provided.       Patient transported with:   O2 @ 3 liters

## 2017-07-17 NOTE — OP NOTES
[facility]  Total Knee Arthroplasty  Patient:David Shanon Cooks   : 1950  Medical Record Number:[id number]  Pre-operative Diagnosis:  Primary osteoarthritis of right knee [M17.11]  Post-operative Diagnosis: Status post total right knee replacement  Location: [facility]  Surgeon: Jess Reese MD  Assistant: Elise Nuno PA-C    Anesthesia: General and FNB    Procedure: Procedure(s) (LRB):  RIGHT KNEE ARTHROPLASTY TOTAL  (Right)    The complexity of the total joint surgery requires the use of a first assistant for positioning, retraction and expertise in closure. Tourniquet Time: 0 minutes  EBL: 250cc  Findings: severe degenerative arthritis, patellar osteophytes, posterior femoral osteophytes   BMI: Body mass index is 39.02 kg/(m^2). Mert Frost was brought to the operating room and positioned on the operating table. He was anethestized with anesthesia. A meier catheter was placed preoperatively and IV antibiotics was administered. Prior to the incision being made a timeout was called identifying the patient, procedure ,operative side and surgeon. .The operative leg was prepped and draped in the usual sterile manner. An anterior longitudinal incision was accomplished just medial to the tibial tubercle and extending approximal 6 centimeters proximal to the superior pole of the patella. A medial parapatellar capsular incision was performed. The medial capsular flap was elevated around to the insertion of the semimembranous tendon. The patella was everted and the knee flexed and externally rotated. The medial and external menisci were excised. The lateral half of the fat pad excised and the patella femoral ligament was released. The anterior cruciate ligament was resected and the posterior cruciate ligament was substituted. Using extramedullary instrumentation, the tibial cut was accomplished with appropriate posterior slope.   Approxiamately 9mm of bone was removed from the high side of the tibia. The distal femur was next addressed. A drill hole was made above the intracondolar notch. Using appropriate intramedullary instrumentation,a five degree valgus distal cut was accomplished. The femur was sized. The anterior and posterior cuts were then made about the distal femur. The osteophytes were removed from the tibial and femoral surfaces. The flexion and extension gaps were assessed with the appropriate spacer blocks. Additional surgical procedures included: none. The flexion and extension gaps were deemed appropriately balanced. The appropriate cutting blocks were then utilized to perform the anterior chamfer, posterior chamfer and notch cuts, with appropriate lateral tranlation accomplished for the patellofemoral groove. The tibia baseplate was sized. The tibial base plate was pinned into place with the appropriate external rotation and stem site prepared. A preliminary range of motion was accomplished with  trial components. The patient was able to obtain full extension as well as appropriate flexion. The patient's ligaments were stable in flexion and extension to medial and lateral stressing and the alignment was through the appropriate mechanical axis. The patella was then everted. The bone was resected to accomodate the three peg  patella button. A trial reduction revealed appropriate tracking through the patellofemoral groove with no lateral retinacular release being accomplished. All trial components were removed. The knee was irrigated. There were no femoral deficiencies. There were no tibial deficiencies. No augmentation was utilized. The permanent Tibial and Femoral components were impacted into place. The patella component was then pressed in place. Davey Many knee was placed through range of motion and noted to be stable as mentioned above with the trail components.   The wound was dry, therefore no drain was used. The operative knee was injected with 60cc of Naropin, 10 cc's of morphine and 1 cc of 30mg of Toradol. The knee was then soaked with a diluted betadine solution for approximately 3 min. This was then thoroughly irrigated. The capsular layer was closed using a #1 PDS suture. The knee joint was then injected with transexamic acid. The subcutaneous layers were closed using 2-0 Stratafix suture. Finally the skin was closed using 3-0 Vicryl and skin staples, which were applied in occlusive fashion and sterile bandage applied. An Iceman cryo pad was applied on the operative leg. Sponge count and needle counts were correct. Graciela Stein left the operating room     Implants:   Implant Name Type Inv.  Item Serial No.  Lot No. LRB No. Used   COMPNT FEM PS TRIATHLN 8 R PA --  - [id number]  COMPNT FEM PS TRIATHLN 8 R PA --  [id number] [employer] HOW [id number] Right 1   BASEPLT TIB PC TRITNM SZ 8 -- TRIATHLON - [id number]  BASEPLT TIB PC TRITNM SZ 8 -- TRIATHLON [id number] [employer] HOW [id number] Right 1   PAT ASYM MTL-BK 11MM SZ A40 -- TRIATHLON - [id number]  PAT ASYM MTL-BK 11MM SZ A40 -- TRIATHLON AYPY [employer] HOW AYPY Right 1   INSERT TIB PS TRIATHLN 8 13MM --  - [id number]   INSERT TIB PS TRIATHLN 8 13MM --  [id number] [employer] HOW [id number] Right 1           Signed By: Rose Grijalva MD  7/17/2017,  10:58 AM

## 2017-07-17 NOTE — PROGRESS NOTES
TRANSFER - IN REPORT:    Verbal report received from Leia Vasquez(name) on Nalini Erwin  being received from [facility]) for routine progression of care      Report consisted of patients Situation, Background, Assessment and   Recommendations(SBAR). Information from the following report(s) SBAR, Kardex and Recent Results was reviewed with the receiving nurse. Opportunity for questions and clarification was provided. Assessment completed upon patients arrival to unit and care assumed.

## 2017-07-17 NOTE — CONSULTS
History and Physical    Subjective:     Jayne Cruz is a 73 y. o.white male, with a pmh of type 2 diabetes and atrial fibrillation, who is seen post operatively from a right TKA. He takes metformin and farxiga at home. Never has been on insulin. Has never checked blood sugars consistently at home. HgA1C is 8.3% indicating poor control. Denies nausea, uncontrolled pain, dysuria, polyuria. Wife and daughter at bedside. Past Medical History:   Diagnosis Date    Asthma dx childhood    last attack during high school; advair at hs; albuterol prn; neb prn     Atrial fibrillation ([address]     2 epiosode of A-fib last about 6-8 months ago (noted on 6/27/17); on Eliquis     Cervical spondylosis 6/20/2013    Chronic back pain 2013    RIGHT SIDE WITH UNCLEAR ETIOLOGY    Chronic pain     Colon polyps     COPD (chronic obstructive pulmonary disease) ([address] 8/17/2016    Coronary atherosclerosis of native coronary vessel 8/17/2016 08/2012: Mild non-obstructive CAD    Diabetes ([address] dx 5/14    type 2; oral meds; no bs checks at home    Diverticulosis     Edema 8/17/2016    Former smoker     GERD (gastroesophageal reflux disease)      controlled with med    High cholesterol     History of kidney stones     Hypertension     controlled with med    Obesity (BMI 30-39. 9)     BMI 39    Status post total right knee replacement 7/17/2017    Unspecified sleep apnea     wears cpap at hs      Past Surgical History:   Procedure Laterality Date    CYSTOSCOPY,INSERT URETERAL STENT      kidney stone removed cystoscopy     HX CATARACT REMOVAL      bilateral with lens implants; x3    HX COLECTOMY  2015    HX COLONOSCOPY  10/6/14    HX COLONOSCOPY  11/20/14    HX HEART CATHETERIZATION  2012    no stents     HX LUMBAR LAMINECTOMY  2009    no hardware     HX OTHER SURGICAL  20 yrs ago    colo-rectal sx for fissure    HX RETINAL DETACHMENT REPAIR Bilateral      Family History   Problem Relation Age of Onset    Heart Disease Father      BOWEL OBSTRUCTION    Heart Attack Father 80     MI    Heart Disease Brother 72     STENT HEART    Suicide Brother     Parkinsonism Mother     Inflammatory Bowel Dz Son     Malignant Hyperthermia Neg Hx     Pseudocholinesterase Deficiency Neg Hx     Delayed Awakening Neg Hx     Post-op Nausea/Vomiting Neg Hx     Emergence Delirium Neg Hx     Post-op Cognitive Dysfunction Neg Hx       Social History   Substance Use Topics    Smoking status: Former Smoker     Packs/day: 1.50     Years: 25.00     Types: Cigarettes     Quit date: 4/2/2012    Smokeless tobacco: Never Used      Comment: QUIT 2011    Alcohol use 1.8 oz/week     3 Cans of beer per week       Prior to Admission medications    Medication Sig Start Date End Date Taking? Authorizing Provider   aspirin delayed-release 81 mg tablet Take 162 mg by mouth nightly. Decrease to one 81 mg tab on 7/12/17   Yes Historical Provider   chlorthalidone (HYGROTEN) 25 mg tablet TAKE 1 TABLET BY MOUTH DAILY 6/13/17  Yes Antonio Hernandez MD   metoprolol tartrate (LOPRESSOR) 25 mg tablet TAKE 2 TABLETS BY MOUTH TWICE A DAY 5/25/17  Yes Rehana Sommers MD   celecoxib (CELEBREX) 200 mg capsule Take 200 mg by mouth daily. Yes Historical Provider   pantoprazole (PROTONIX) 40 mg tablet TAKE 1 TABLET BY MOUTH EVERY DAY 3/1/17  Yes Antonio Hernandez MD   dapagliflozin (FARXIGA) 10 mg tab Take 1 Tab by mouth daily. 8/1/16  Yes Antonio Hernandez MD   doxazosin (CARDURA) 2 mg tablet TAKE 1 TABLET BY MOUTH 2 TIMES A DAY  Patient taking differently: Take 2 mg by mouth daily. TAKE 1 TABLET BY MOUTH 2 TIMES A DAY 2/17/16  Yes Antonio Hernandez MD   meclizine (ANTIVERT) 25 mg tablet Take 1 Tab by mouth three (3) times daily as needed for Dizziness. 6/28/17   Long Johnson MD   diazePAM (VALIUM) 5 mg tablet Take 1 Tab by mouth every eight (8) hours as needed for Anxiety (severe vertigo spells).  Max Daily Amount: 15 mg. 6/28/17   Long Johnson MD atorvastatin (LIPITOR) 40 mg tablet TAKE 1 TABLET BY MOUTH EVERY DAY 5/15/17   Rogelio Banda MD   Omega-3 Fatty Acids 300 mg cap Take 1 Cap by mouth daily. Historical Provider   montelukast (SINGULAIR) 10 mg tablet TAKE 1 TABLET BY MOUTH DAILY 5/8/17   Rogelio Banda MD   ANTI-FUNGAL 2 % topical powder APPLY TO ABDOMEN 2 TIMES PER DAY 4/24/17   Historical Provider   Blood-Glucose Meter monitoring kit Check fs every day; DM2, E11/9, Contour glucometer 1/26/17   Jose Shelton MD   glucose blood VI test strips (ASCENSIA CONTOUR) strip Check fs every day, DM2, E11.9 1/26/17   Jose Shelton MD   lancets 28 gauge misc Check fs every day, DM2, E11.9 1/26/17   Jose Shelton MD   ezetimibe (ZETIA) 10 mg tablet Take 1 Tab by mouth daily. Patient taking differently: Take 10 mg by mouth nightly. 1/5/17   Rogelio Banda MD   metFORMIN ER (GLUCOPHAGE XR) 500 mg tablet TAKE 2 TABLETS BY MOUTH EVERY DAY 10/31/16   Rogelio Banda MD   ADVAIR DISKUS 500-50 mcg/dose diskus inhaler INHALE 1 PUFF BY INHALATION EVERY TWELVE HOURS. 10/24/16   Rogelio Banda MD   apixaban (ELIQUIS) 5 mg tablet Take 1 Tab by mouth two (2) times a day. 8/19/16   Don Washington MD   lisinopril (PRINIVIL, ZESTRIL) 10 mg tablet TAKE 1 TABLET BY MOUTH DAILY 8/1/16   Rogelio Banda MD   albuterol (PROVENTIL VENTOLIN) 2.5 mg /3 mL (0.083 %) nebulizer solution 3 mL by Nebulization route once for 1 dose. Patient taking differently: 2.5 mg by Nebulization route daily as needed. 8/1/16   Rogelio Banda MD   albuterol (PROAIR HFA) 90 mcg/actuation inhaler Take 2 Puffs by inhalation every four (4) hours as needed for Wheezing. Take / use AM day of surgery  per anesthesia protocols if needed. Historical Provider   cholecalciferol, vitamin D3, (VITAMIN D3) 2,000 unit tab Take 1 tablet by mouth daily. Historical Provider   diphenhydrAMINE (BENADRYL ALLERGY) 25 mg tablet Take 25 mg by mouth nightly as needed for Sleep.     Historical Provider   b complex vitamins (B COMPLEX 1) tablet Take 1 tablet by mouth every morning. Stop seven days prior to surgery per anesthesia protocol. Historical Provider   green tea leaf extract (GREEN TEA) Cap Take 630 mg by mouth every morning. Stop seven days prior to surgery per anesthesia protocol. Historical Provider   vitamin E (AQUA GEMS) 400 unit capsule Take 400 Units by mouth every morning. Stop seven days prior to surgery per anesthesia protocol. Historical Provider   FOLIC ACID PO Take 140 mcg by mouth every morning. Stop seven days prior to surgery per anesthesia protocol. Historical Provider   DOCUSATE CALCIUM (STOOL SOFTENER PO) Take 1 capsule by mouth every morning. Historical Provider   mometasone (NASONEX) 50 mcg/Actuation nasal spray 2 Sprays by Both Nostrils route nightly as needed. Phys Other, MD     Allergies   Allergen Reactions    Bactrim [Sulfamethoxazole-Trimethoprim] Itching        Review of Systems:  A comprehensive review of systems was negative except for that written in the History of Present Illness. Objective: Intake and Output:    07/17 0701 - 07/17 1900  In: 1900 [P.O.:100;  I.V.:1800]  Out: 500 [Urine:400]       Physical Exam:   General: awake, alert, oriented, overweight, no acute distress  Eyes; non icteric, EOMI  Neck; supple  CV: IRIR, normal rate  Pulm; non labored, normal effort, CTAB  Abd; soft, non tender, active BS    Data Review:   Recent Results (from the past 24 hour(s))   TYPE & SCREEN    Collection Time: 07/17/17  6:40 AM   Result Value Ref Range    Crossmatch Expiration 07/20/2017     ABO/Rh(D) Jennifer Rad POSITIVE     Antibody screen NEG    HEMOGLOBIN A1C WITH EAG    Collection Time: 07/17/17  6:40 AM   Result Value Ref Range    Hemoglobin A1c 8.3 (H) 4.8 - 6.0 %    Est. average glucose 192 mg/dL   GLUCOSE, POC    Collection Time: 07/17/17  7:16 AM   Result Value Ref Range    Glucose (POC) 218 (H) 65 - 100 mg/dL   GLUCOSE, POC    Collection Time: 07/17/17  7:19 AM   Result Value Ref Range    Glucose (POC) 239 (H) 65 - 100 mg/dL   GLUCOSE, POC    Collection Time: 07/17/17 10:47 AM   Result Value Ref Range    Glucose (POC) 253 (H) 65 - 100 mg/dL   GLUCOSE, POC    Collection Time: 07/17/17 11:41 AM   Result Value Ref Range    Glucose (POC) 269 (H) 65 - 100 mg/dL           Assessment:     Principal Problem:    Status post total right knee replacement (7/17/2017)        Plan:     Type 2 diabetes, uncontrolled:  Is likely going to require long acting insulin as A1C is 8.3%. Consult diabetic education for teaching on how to administer insulin and check blood sugars. Start SSI as sugars likely to spike with steroids. Start lantus 10 units qhs. Will probably discharge patient on lantus and his oral agents.      Signed By: Vignesh Warren MD     July 17, 2017

## 2017-07-17 NOTE — ANESTHESIA PREPROCEDURE EVALUATION
Anesthetic History   No history of anesthetic complications            Review of Systems / Medical History  Patient summary reviewed    Pulmonary    COPD    Sleep apnea: CPAP    Asthma (Mild)        Neuro/Psych   Within defined limits           Cardiovascular    Hypertension: well controlled          CAD (Non-obstructive)    Exercise tolerance: >4 METS  Comments: Heart cath from 2012 showed non obstructive CAD and EF of 50-55%. GI/Hepatic/Renal     GERD: well controlled           Endo/Other    Diabetes: well controlled, type 2    Obesity, morbid obesity and arthritis     Other Findings   Comments: Last took elequis Friday the 14th  Around 9am (72 hours prior). Physical Exam    Airway  Mallampati: II  TM Distance: 4 - 6 cm  Neck ROM: normal range of motion   Mouth opening: Normal     Cardiovascular    Rhythm: regular  Rate: normal         Dental  No notable dental hx       Pulmonary  Breath sounds clear to auscultation               Abdominal  GI exam deferred       Other Findings            Anesthetic Plan    ASA: 3  Anesthesia type: total IV anesthesia and spinal      Post-op pain plan if not by surgeon: peripheral nerve block single    Induction: Intravenous  Anesthetic plan and risks discussed with: Patient          Anesthetic History   No history of anesthetic complications           Review of Systems / Medical History  Patient summary reviewed and pertinent labs reviewed    Pulmonary        Sleep apnea: CPAP  Asthma (Mild)       Neuro/Psych   Within defined limits           Cardiovascular    Hypertension: well controlled        CAD (Non-obstructive)    Exercise tolerance: >4 METS  Comments: Heart cath from 2012 showed non obstructive CAD and EF of 50-55%.    GI/Hepatic/Renal     GERD: well controlled             Endo/Other    Diabetes: well controlled, type 2    Obesity and arthritis     Other Findings            Physical Exam    Airway  Mallampati: II  TM Distance: 4 - 6 cm  Neck ROM: normal range of motion   Mouth opening: Normal     Cardiovascular    Rhythm: regular  Rate: normal         Dental         Pulmonary  Breath sounds clear to auscultation            Pertinent negatives: No wheezes and rales   Abdominal  GI exam deferred       Other Findings            Anesthetic Plan    ASA: 3  Anesthesia type: general      Post-op pain plan if not by surgeon: indwelling epidural catheter    Induction: Intravenous  Anesthetic plan and risks discussed with: Patient and Spouse      Patient seen by myself at request of Dr. Jarrett Bellamy. Patient is obese and recently had a URI not treated with Abx (told it was viral with a clear CXR) but has been on steroids for two weeks with appropriate taper. I do not believe he needs stress-dosing. Dr. Jarrett Bellamy states that this will be an OPEN procedure with only the mobilization of the splenic flexure being laproscopic; also, he states the incision will extend above the umbilicus. I would strongly consider an epidural for post-op pain control. Patient has had an epidural in the past and is amenable.

## 2017-07-17 NOTE — PROGRESS NOTES
Problem: Mobility Impaired (Adult and Pediatric)  Goal: *Acute Goals and Plan of Care (Insert Text)  GOALS (1-4 days):  (1.)Mr. Brit Velasquez will move from supine to sit and sit to supine in bed with SUPERVISION. (2.)Mr. Brit Velasquez will transfer from bed to chair and chair to bed with STAND BY ASSIST using the least restrictive device. (3.)Mr. Brit Velasquez will ambulate with STAND BY ASSIST for 200 feet with the least restrictive device. (4.)Mr. Brit Velasquez will ambulate up/down 1 steps with No railing with CONTACT GUARD ASSIST with walker. (5.)Mr. Brit Velasquez will increase right knee ROM to 5°-80°.  ________________________________________________________________________________________________      PHYSICAL THERAPY JOINT CAMP TKA: INITIAL ASSESSMENT, TREATMENT DAY: DAY OF ASSESSMENT, PM 7/17/2017  INPATIENT: Hospital Day: 1  [place] / Product Type: Medicare /      NAME/AGE/GENDER: Mert Frost is a 73 y.o. male       PRIMARY DIAGNOSIS:  Primary osteoarthritis of right knee [M17.11]              Procedure(s) and Anesthesia Type:     * RIGHT KNEE ARTHROPLASTY TOTAL  - General (Right)  ICD-10: Treatment Diagnosis:        · Pain in Right Knee (M25.561)  · Stiffness of Right Knee, Not elsewhere classified (M25.661)  · Difficulty in walking, Not elsewhere classified (R26.2)       ASSESSMENT:      Mr. Brit Velasquez presents with impaired strength & mobility s/p right TKA. Pt also had decreased stability during out of bed activity. Pt will benefit from follow up therapy to help restore safe function prior to returning home with caregiver. This section established at most recent assessment   PROBLEM LIST (Impairments causing functional limitations):  1. Decreased Strength  2. Decreased ADL/Functional Activities  3. Decreased Transfer Abilities  4. Decreased Ambulation Ability/Technique  5. Decreased Balance  6. Increased Pain  7. Decreased Activity Tolerance  8.  Decreased Flexibility/Joint Mobility  9. Decreased [place] with Home Exercise Program    INTERVENTIONS PLANNED: (Benefits and precautions of physical therapy have been discussed with the patient.)  1. Bed Mobility  2. Gait Training  3. Home Exercise Program (HEP)  4. Therapeutic Exercise/Strengthening  5. Transfer Training  6. Range of Motion: active/assisted/passive  7. Therapeutic Activities  8. Group Therapy      TREATMENT PLAN: Frequency/Duration: Follow patient BID   to address above goals. Rehabilitation Potential For Stated Goals: GOOD      RECOMMENDED REHABILITATION/EQUIPMENT: (at time of discharge pending progress): Continue Skilled Therapy and Home Health: Physical Therapy. ( pt could return home with HHPT & 24/7 assist from spouse for safety )                   HISTORY:   History of Present Injury/Illness (Reason for Referral): The patient has end stage arthritis of the right knee. The patient was evaluated and examined during a consultation prior to this office visit. There have been no changes to the patient's orthopedic condition since the initial consultation. The patient has failed previous conservative treatment for this condition including antiinflammatories , and lifestyle modifications. The necessity for joint replacement is present. The patient will be admitted the day of surgery for Procedure(s) (LRB):  RIGHT KNEE ARTHROPLASTY TOTAL / FNB / JENELLE (Right)       Past Medical History/Comorbidities:   Mr. Devang Loyd  has a past medical history of Asthma (dx childhood); Atrial fibrillation ([address] Cervical spondylosis (6/20/2013); Chronic back pain (2013); Chronic pain; Colon polyps; COPD (chronic obstructive pulmonary disease) ([address] (8/17/2016); Coronary atherosclerosis of native coronary vessel (8/17/2016); Diabetes ([address] (dx 5/14); Diverticulosis; Edema (8/17/2016); Former smoker; GERD (gastroesophageal reflux disease); High cholesterol; History of kidney stones; Hypertension; Obesity (BMI 30-39.9);  Status post total right knee replacement (7/17/2017); and Unspecified sleep apnea. He also has no past medical history of Adverse effect of anesthesia; Aneurysm ([facility] Autoimmune disease ([facility] Cancer ([facility] Chronic kidney disease; Coagulation defects; Coagulation disorder ([facility] DEMENTIA; Difficult intubation; Endocarditis; Heart failure ([facility] Ill-defined condition; Infectious disease; Liver disease; Malignant hyperthermia due to anesthesia; Nausea & vomiting; Neurological disorder; Nicotine vapor product user; Non-nicotine vapor product user; Other ill-defined conditions; Pseudocholinesterase deficiency; Psychiatric disorder; PUD (peptic ulcer disease); Rheumatic fever; Seizures ([facility] Stroke [facility]); Thromboembolus ([facility] Thyroid disease; or Unspecified adverse effect of anesthesia. Mr. Deloris Allison  has a past surgical history that includes cystoscopy,insert ureteral stent; colonoscopy (10/6/14); colonoscopy (11/20/14); lumbar laminectomy (2009); retinal detachment repair (Bilateral); cataract removal; other surgical (20 yrs ago); colectomy (2015); and heart catheterization (2012).   Social History/Living Environment:   Home Environment: Private residence  # Steps to Enter: 0  One/Two Story Residence: Two story, live on 1st floor  # of Interior Steps: 15  Interior Rails: Both  Living Alone: No  Support Systems: Spouse/Significant Other/Partner  Patient Expects to be Discharged to[de-identified] Private residence  Current DME Used/Available at Home: None  Tub or Shower Type: Tub/Shower combination  Prior Level of Function/Work/Activity:  Pt was independent without an assistive device prior to this admission   Number of Personal Factors/Comorbidities that affect the Plan of Care: 3+: HIGH COMPLEXITY   EXAMINATION:   Most Recent Physical Functioning:   Gross Assessment: Yes             RLE PROM  R Knee Flexion: 50 (~post op)  R Knee Extension: -10 (~post op)             Bed Mobility  Supine to Sit: Minimum assistance  Sit to Supine: Minimum assistance  Scooting: Contact guard assistance     Transfers  Sit to Stand: Moderate assistance;Assist x2  Stand to Sit: Moderate assistance;Assist x2  Bed to Chair:  (NT)     Balance  Sitting: Intact; Without support  Standing: Impaired; With support (walker)                Weight Bearing Status  Right Side Weight Bearing: As tolerated  Distance (ft): 4 Feet (ft)  Ambulation - Level of Assistance: Moderate assistance;Assist x2  Assistive Device: Walker, rolling  Speed/Charlette: Shuffled; Slow  Step Length: Left shortened;Right shortened  Stance: Right decreased  Gait Abnormalities: Antalgic;Decreased step clearance;Trunk sway increased         Braces/Orthotics: none     Right Knee Cold  Type: Cryocuff       Body Structures Involved:  1. Joints  2. Muscles Body Functions Affected:  1. Sensory/Pain  2. Movement Related Activities and Participation Affected:  1. General Tasks and Demands  2. Mobility   Number of elements that affect the Plan of Care: 4+: HIGH COMPLEXITY   CLINICAL PRESENTATION:   Presentation: Stable and uncomplicated: LOW COMPLEXITY   CLINICAL DECISION MAKIN [facility] [id number] AM-PAC 6 Clicks   Basic Mobility Inpatient Short Form  How much difficulty does the patient currently have. .. Unable A Lot A Little None   1. Turning over in bed (including adjusting bedclothes, sheets and blankets)? [ ] 1   [ ] 2   [X] 3   [ ] 4   2. Sitting down on and standing up from a chair with arms ( e.g., wheelchair, bedside commode, etc.)   [ ] 1   [x ] 2   [] 3   [ ] 4   3. Moving from lying on back to sitting on the side of the bed? [ ] 1   [ ] 2   [X] 3   [ ] 4   How much help from another person does the patient currently need. .. Total A Lot A Little None   4. Moving to and from a bed to a chair (including a wheelchair)? [ ] 1   [x ] 2   [] 3   [ ] 4   5. Need to walk in hospital room? [ ] 1   [ x] 2   [] 3   [ ] 4   6. Climbing 3-5 steps with a railing?    [X] 1   [ ] 2   [ ] 3   [ ] 4   © 2007, Trustees of [address] [place] [address] [address] [id number], under license to [employer]. All rights reserved       Score:  Initial: 13 Most Recent: X (Date: -- )     Interpretation of Tool:  Represents activities that are increasingly more difficult (i.e. Bed mobility, Transfers, Gait). Score 24 23 22-20 19-15 14-10 9-7 6       Modifier CH CI CJ CK CL CM CN         · Mobility - Walking and Moving Around:               - CURRENT STATUS:    CL - 60%-79% impaired, limited or restricted               - GOAL STATUS:           CK - 40%-59% impaired, limited or restricted               - D/C STATUS:                       ---------------To be determined---------------  [place] / Product Type: Medicare /       Medical Necessity:     · Patient is expected to demonstrate progress in strength, range of motion, balance, coordination and functional technique to decrease assistance required with bed mobility, transfers & gait. Reason for Services/Other Comments:  · Patient continues to require skilled intervention due to pt not independent with functional mobility.    Use of outcome tool(s) and clinical judgement create a POC that gives a: Clear prediction of patient's progress: LOW COMPLEXITY                 TREATMENT:   (In addition to Assessment/Re-Assessment sessions the following treatments were rendered)      Pre-treatment Symptoms/Complaints:  none  Pain: Initial: visual scale  Pain Intensity 1: 0  Post Session:  0/10      Assessment/Reassessment only, no treatment provided today        Date:    Date:    Date:      ACTIVITY/EXERCISE AM PM AM PM AM PM   GROUP THERAPY  [ ]  [ ]  [ ]  [ ]  [ ]  [ ]   Ankle Pumps               Quad Sets               Gluteal Sets               Hip ABd/ADduction               Straight Leg Raises               Knee Slides               Short Arc Quads               Long Arc Quads               Chair Slides                               B = bilateral; AA = active assistive; A = active; P = passive       Treatment/Session Assessment:         Response to Treatment:  Tolerated well. Education:  [ ] Home Exercises  [X] Fall Precautions  [ ] Hip Precautions [ ] D/C Instruction Review  [ ] Knee/Hip Prosthesis Review  [X] Walker Management/Safety [ ] Adaptive Equipment as Needed         Interdisciplinary Collaboration:   · Occupational Therapist  · Registered Nurse     After treatment position/precautions:   · Supine in bed  · Bed/Chair-wheels locked  · Bed in low position  · Caregiver at bedside  · Call light within reach  · RN notified  · Family at bedside     Compliance with Program/Exercises: Will assess as treatment progresses. Recommendations/Intent for next treatment session:  Treatment next visit will focus on increasing Mr. Srinivasa Armando independence with bed mobility, transfers, gait training, strength/ROM exercises, modalities for pain, and patient education.        Total Treatment Duration:  PT Patient Time In/Time Out  Time In: 1300  Time Out: Gloria Sanchez 99 Tamela Ayala, PT

## 2017-07-17 NOTE — H&P
The patient has end stage arthritis of the right knee. The patient was see and examined and there are no changes to the patient's orthopedic condition. They have tried conservative treatment for this condition; including antiinflammatories and lifestyle modifications and have failed. The necessity for the joint replacement is still present, and the H&P from the office is still current.  The patient will be admitted today for Procedure(s) (LRB):  RIGHT KNEE ARTHROPLASTY TOTAL / SUSIE / Marcie Vasquez (Right)

## 2017-07-17 NOTE — PROGRESS NOTES
Problem: Patient Education: Go to Patient Education Activity  Goal: Patient/Family Education  Nutrition:  Acknowledge consult for Diabetic Nutrition Education per Dr. Ariadne Fernandez. Pt s/p TKA today; plans for diabetic diet education on 7/18.   April Lopez, [address], [address] [place] [address], MPH  [phone]

## 2017-07-17 NOTE — PERIOP NOTES
Betadine lavage: 17.5cc of betadine lot # [id number] , exp. Date [id number]  ,  in 1cc of . 9NS Lot #[id number]  , exp.  Date : [id number]

## 2017-07-17 NOTE — DIABETES MGMT
Pt s/p right TKA earlier today is seen by RN DANIELLAE for diabetes education. Pt is alert and oriented with wife at bedside. Type 2 diabetic taking Metformin and Farxiga at home. A1C 8.3. EaG = 192. Reviewed with pt current orders to continue metformin and Farxiga (starting tomorrow) with Levemir 10 units q hs to start tonight as well as Humalog per sliding scale for correction. Educated pt and his wife re: current basal/bolus regimen of Lantus and Humalog including type of insulin, timing with meals, onset, duration of effect, and peak of insulin dose. Pt and his wife verbalize understanding. Pt received intraoperative steroid today with blood glucose ranging 239-269 so far today. Pt says he lost over 100 pounds on the Atkins diet several years ago. Says he has h/o retinal problems, which limited his activity and he has been overeating. Pt also did not realize that fruit contains carbohydrate, and he has been eating much fruit. Nutrition consult has been ordered for further diet education. Says he was in the ED a few weeks ago with dehydration after \"diarrhea from a virus\". Discussed with pt and his wife possible side effects of Farxiga include dehydration and UTI. Pt given educational material, \"Diabetes Self-Management: A Patient Teaching Guide\", which was reviewed only briefly with pt and his wife at this time due to pt fatigue. Described the effects of poor glycemic control on the development of long-term complications such as renal, eye, nerve, and cardiovascular disease. Educated re: effects of carbohydrates on blood glucose and carbohydrate containing foods. Also explained the relationship between hyperglycemia and infection, steroids and hyperglycemia, and importance of good glycemic control for wound healing. Discussed target goals for blood glucose and A1C. Pt and his wife verbalize understanding of teaching.   Pt checks his blood glucose at home fasting, and says \"it usually runs around 180, but has been higher lately\". Would recommend checking blood glucose qid ac and hs and recording in log book to take to PCP appointments. Pt says he needs a new PCP, as his has retired. Plan is to start insulin instruction tomorrow. Pt would likely benefit from continued outpatient diabetes education. [facility] was discussed with pt and his wife, who are agreeable to attending diabetes education classes after recovery from surgery. Case management working on plans for rehab after discharge. Pt and his wife have no further questions. Nursing will continue to monitor blood glucose.

## 2017-07-17 NOTE — PROGRESS NOTES
07/17/17 1728   Oxygen Therapy   O2 Sat (%) 96 %   Pulse via Oximetry 71 beats per minute   O2 Device Nasal cannula   O2 Flow Rate (L/min) 2 l/min   FIO2 (%) 28 %   Pt instructed in the use of Incentive Spirometry. Joint camp notes reviewed; Home cpap at bedside. Patient also states that he has his Advair inhaler with him as well.  No distress noted

## 2017-07-17 NOTE — PROGRESS NOTES
Care Management Interventions  Mode of Transport at Discharge: Self  Transition of Care Consult (CM Consult): SNF  Partner SNF: Yes  Physical Therapy Consult: Yes  Occupational Therapy Consult: Yes  Current Support Network: Lives with Spouse  Confirm Follow Up Transport: Family  Plan discussed with Pt/Family/Caregiver: Yes  Freedom of Choice Offered: Yes  Discharge Location  Discharge Placement: Skilled nursing facility    Patient is a 73y.o. year old male admitted for Right TKA . Patient lives with his wife in [place]. Patient reports he has made arrangements to go to [facility] for rehab due to his size and now that insulin has been added, he appreciates the rehab plan even more. Referral sent to [facility] and approval obtained for admission on Thursday after his three night Inpt stay. Will follow until discharge.   [facility]

## 2017-07-17 NOTE — PERIOP NOTES
Dr. Stacey Angela notified of SC=711, 239 on re-check- no orders obtained- stated he would \" deal with BS downstairs\"  Patient and wife states they would like to speak with dietition concerning diet related to diabetes

## 2017-07-17 NOTE — PERIOP NOTES
Teach back method used with patient concerning hibiclens wash, TB screening , incentive spirometer and pain management goals

## 2017-07-17 NOTE — PERIOP NOTES
TRANSFER - OUT REPORT:    Verbal report given to Talita(name) on Jovani Leak  being transferred to pacu(unit) for routine progression of care       Report consisted of patients Situation, Background, Assessment and   Recommendations(SBAR). Information from the following report(s) SBAR and MAR was reviewed with the receiving nurse. Lines:   Peripheral IV 59/50/55 Left Cephalic (Active)   Site Assessment Clean, dry, & intact 7/17/2017  6:54 AM   Phlebitis Assessment 0 7/17/2017  6:54 AM   Infiltration Assessment 0 7/17/2017  6:54 AM   Dressing Status Clean, dry, & intact 7/17/2017  6:54 AM   Hub Color/Line Status Green; Infusing 7/17/2017  6:54 AM        Opportunity for questions and clarification was provided.       Patient transported with:   Tech   YK=[id number]  Recheck-EP=831 Dr. Ruthie Howard notified and no orders received

## 2017-07-17 NOTE — PROGRESS NOTES
Assessment completed. Pt alert and oriented X4. Moving BLE slightly. Pedal pulses are palpable. No signs of distress noted. Oriented pt to room, unit, dining on call, IS, and pain management. Pt on 3 liter oxygen via nasal cannula. Lungs clear to auscultation. Moody to drainage with no loops in tubing, and stat lock in place. Yellow/straw color urine noted. IV intact with no redness, or swelling noted infusing. Incision to right knee is covered with Aquacel dressing and is clean, dry, and intact. Ice applied and plexi-pulses on. Pt denies pain. Call light within reach and bed in low position and locked. Pt informed to call out for needs verbalizes understanding. Family at bedside. Ordering lunch at this time.

## 2017-07-17 NOTE — PERIOP NOTES
TRANSFER - IN REPORT:    Verbal report received from IBIS HERRERA on [facility]  being received from ortho for routine progression of care      Report consisted of patients Situation, Background, Assessment and   Recommendations(SBAR). Information from the following report(s) SBAR was reviewed with the receiving nurse. Opportunity for questions and clarification was provided. Assessment completed upon patients arrival to unit and care assumed.

## 2017-07-17 NOTE — ANESTHESIA POSTPROCEDURE EVALUATION
Post-Anesthesia Evaluation and Assessment    Patient: Nalini Erwin MRN: [id number]  SSN: [id number]    YOB: 1950  Age: 73 y.o. Sex: male       Cardiovascular Function/Vital Signs  Visit Vitals    /56    Pulse 81    Temp 36.9 °C (98.5 °F)    Resp 16    Ht 6' 6.5\" (1.994 m)    Wt 155.1 kg (342 lb)    SpO2 92%    BMI 39.02 kg/m2       Patient is status post general anesthesia for Procedure(s):  RIGHT KNEE ARTHROPLASTY TOTAL . Nausea/Vomiting: None    Postoperative hydration reviewed and adequate. Pain:  Pain Scale 1: Visual (07/17/17 4899)  Pain Intensity 1: 0 (07/17/17 2956)   Managed    Neurological Status:   Neuro (WDL): Within Defined Limits (07/17/17 0650)   Minimal weakness in legs, discussed with PACU RN post of monitoring for weakness. Mental Status and Level of Consciousness: Arousable    Pulmonary Status:   O2 Device: Nasal cannula (07/17/17 1045)   Adequate oxygenation and airway patent    Complications related to anesthesia: None    Post-anesthesia assessment completed.  No concerns    Signed By: Magdy Ramirez MD     July 17, 2017

## 2017-07-17 NOTE — PROGRESS NOTES
*Acute Goals and Plan of Care (Insert Text)  GOALS (time frame is 3 days):  1. Patient will perform lower body dressing with min assist with DME as needed. 2. Patient will perform toilet transfer with contact guard assist with adaptive equipment. 3. Patient will perform shower transfer with contact guard assist with adaptive equipment. OCCUPATIONAL THERAPY: Initial Assessment 7/17/2017  INPATIENT: Hospital Day: 1  [place] / Product Type: Medicare /      NAME/AGE/GENDER: Lauryn Ruiz is a 73 y.o. male   PRIMARY DIAGNOSIS:  Primary osteoarthritis of right knee [M17.11] Status post total right knee replacement Status post total right knee replacement  Procedure(s) (LRB):  RIGHT KNEE ARTHROPLASTY TOTAL  (Right)  Day of Surgery  ICD-10: Treatment Diagnosis:    · Pain in Right Knee (M25.561)  · Stiffness of Right Knee, Not elsewhere classified (M25.661)   Precautions/Allergies:     Bactrim [sulfamethoxazole-trimethoprim]      ASSESSMENT:      Mr. Rubalcava Led is s/p right TKA and presents with decreased weight bearing on right LE and decreased independence with functional mobility and activities of daily living. Patient would benefit from skilled Occupational Therapy to maximize independence and safety with self-care task and functional mobility. Pt would also benefit from education on lower body adaptive equipment. Patient plans for further rehab at [facility] although patient may be able to go home with needing rehab. Patient stated that is \"what the boss (wife) wants. \"  OT reviewed therapy schedule with patient for tomorrow to include showering and dressing with verbal understanding from patient      This section established at most recent assessment   PROBLEM LIST (Impairments causing functional limitations):  1. Decreased Strength  2. Decreased ADL/Functional Activities  3. Decreased Transfer Abilities  4. Increased Pain  5. Decreased Activity Tolerance  6.  Decreased Skin Integrity/Hygeine  7. Decreased [place] with Home Exercise Program   INTERVENTIONS PLANNED: (Benefits and precautions of occupational therapy have been discussed with the patient.)  1. Activities of daily living training  2. Adaptive equipment training  3. Balance training  4. Clothing management  5. Hygiene training  6. Therapeutic activity  7. Therapeutic exercise     TREATMENT PLAN: Frequency/Duration: Follow patient length of hospital stay to address above goals. Rehabilitation Potential For Stated Goals: Good     RECOMMENDED REHABILITATION/EQUIPMENT: (at time of discharge pending progress): Continue Skilled Therapy and Rehab. OCCUPATIONAL PROFILE AND HISTORY:   History of Present Injury/Illness (Reason for Referral):  S/P right TKA  Past Medical History/Comorbidities:   Mr. Marv Richter  has a past medical history of Asthma (dx childhood); Atrial fibrillation ([address] Cervical spondylosis (6/20/2013); Chronic back pain (2013); Chronic pain; Colon polyps; COPD (chronic obstructive pulmonary disease) ([address] (8/17/2016); Coronary atherosclerosis of native coronary vessel (8/17/2016); Diabetes ([address] (dx 5/14); Diverticulosis; Edema (8/17/2016); Former smoker; GERD (gastroesophageal reflux disease); High cholesterol; History of kidney stones; Hypertension; Obesity (BMI 30-39.9); Status post total right knee replacement (7/17/2017); and Unspecified sleep apnea. He also has no past medical history of Adverse effect of anesthesia; Aneurysm ([address] Autoimmune disease ([address] Cancer ([address] Chronic kidney disease; Coagulation defects; Coagulation disorder ([address] DEMENTIA; Difficult intubation; Endocarditis; Heart failure ([address] Ill-defined condition; Infectious disease; Liver disease; Malignant hyperthermia due to anesthesia; Nausea & vomiting; Neurological disorder; Nicotine vapor product user; Non-nicotine vapor product user;  Other ill-defined conditions; Pseudocholinesterase deficiency; Psychiatric disorder; PUD (peptic ulcer disease); Rheumatic fever; Seizures ([facility] Stroke [facility]); Thromboembolus ([facility] [address] Thyroid disease; or Unspecified adverse effect of anesthesia. Mr. Khadar Hickman  has a past surgical history that includes cystoscopy,insert ureteral stent; colonoscopy (10/6/14); colonoscopy (11/20/14); lumbar laminectomy (2009); retinal detachment repair (Bilateral); cataract removal; other surgical (20 yrs ago); colectomy (2015); and heart catheterization (2012). Social History/Living Environment:   Home Environment: Private residence  # Steps to Enter: 0  One/Two Story Residence: Two story, live on 1st floor  Support Systems: Spouse/Significant Other/Partner  Patient Expects to be Discharged to[de-identified] Unknown (home or [facility]-rehab)  Prior Level of Function/Work/Activity:  Independent with no use of DME     Number of Personal Factors/Comorbidities that affect the Plan of Care: Brief history (0):  LOW COMPLEXITY   ASSESSMENT OF OCCUPATIONAL PERFORMANCE[de-identified]   Activities of Daily Living:           Basic ADLs (From Assessment) Complex ADLs (From Assessment)   Basic ADL  Feeding: Setup  Oral Facial Hygiene/Grooming: Minimum assistance  Bathing: Moderate assistance  Upper Body Dressing: Setup  Lower Body Dressing: Maximum assistance  Toileting: Maximum assistance Instrumental ADL  Meal Preparation: Total assistance  Homemaking: Total assistance  Medication Management: Supervision  Financial Management: Supervision   Grooming/Bathing/Dressing Activities of Daily Living     Cognitive Retraining  Safety/Judgement: Awareness of environment                 Functional Transfers  Toilet Transfer : Moderate assistance;Assist x2  Shower Transfer:  Moderate assistance;Assist x2             Most Recent Physical Functioning:   Gross Assessment:                  Posture:     Balance:    Bed Mobility:     Wheelchair Mobility:     Transfers:               Patient Vitals for the past 6 hrs:   BP BP Patient Position SpO2 O2 Flow Rate (L/min) Pulse 17 1045 116/56 Head of bed elevated (Comment degrees) 92 % 4 l/min 81   17 1050 117/66 - 92 % 8 l/min 80   17 1055 124/57 - 94 % 8 l/min 78   17 1100 125/58 - 93 % 3 l/min 68   17 1115 123/56 - 95 % 3 l/min 70   17 1130 125/58 - 95 % 3 l/min 67   17 1145 125/59 - 95 % 3 l/min 67   17 1230 131/67 At rest 95 % - 62       Mental Status  Neurologic State: Alert  Orientation Level: Appropriate for age  Cognition: Appropriate decision making  Perception: Appears intact  Perseveration: No perseveration noted  Safety/Judgement: Awareness of environment                          Physical Skills Involved:  1. Range of Motion  2. Balance  3. Strength  4. Activity Tolerance  5. Pain (acute) Cognitive Skills Affected (resulting in the inability to perform in a timely and safe manner): 1. none Psychosocial Skills Affected:  1. none   Number of elements that affect the Plan of Care: 1-3:  LOW COMPLEXITY   CLINICAL DECISION MAKIN[address] [place] [id number] AM-PAC 6 Clicks   Daily Activity Inpatient Short Form  How much help from another person does the patient currently need. .. Total A Lot A Little None   1. Putting on and taking off regular lower body clothing? [] 1   [x] 2   [] 3   [] 4   2. Bathing (including washing, rinsing, drying)? [] 1   [x] 2   [] 3   [] 4   3. Toileting, which includes using toilet, bedpan or urinal?   [] 1   [x] 2   [] 3   [] 4   4. Putting on and taking off regular upper body clothing? [] 1   [] 2   [x] 3   [] 4   5. Taking care of personal grooming such as brushing teeth? [] 1   [] 2   [x] 3   [] 4   6. Eating meals? [] 1   [] 2   [] 3   [x] 4   © , Trustees of [address] [place] [address] [address] [address], under license to [employer]. All rights reserved      Score:  Initial: 16 Most Recent: X (Date: -- )    Interpretation of Tool:  Represents activities that are increasingly more difficult (i.e. Bed mobility, Transfers, Gait).    Score 24 23 22-20 19-15 14-10 9-7 6     Modifier CH CI CJ CK CL CM CN      ? Self Care:     - CURRENT STATUS: CK - 40%-59% impaired, limited or restricted    - GOAL STATUS: CI - 1%-19% impaired, limited or restricted    - D/C STATUS:  ---------------To be determined---------------  [place] / Product Type: Medicare /      Medical Necessity:     · Patient demonstrates good rehab potential due to higher previous functional level. Reason for Services/Other Comments:  · Patient continues to require skilled intervention due to decreased independence with self cares and functional mobility. .   Use of outcome tool(s) and clinical judgement create a POC that gives a: LOW COMPLEXITY         TREATMENT:   (In addition to Assessment/Re-Assessment sessions the following treatments were rendered)     Pre-treatment Symptoms/Complaints:    Pain: Initial:     2/10 Post Session:  3/10     Assessment/Reassessment only, no treatment provided today    Braces/Orthotics/Lines/Etc:   · IV  · meier catheter  · O2 Device: Nasal cannula  Treatment/Session Assessment:    · Response to Treatment:  Tolerated well  · Interdisciplinary Collaboration:   o Physical Therapist  o Occupational Therapist  o Registered Nurse  o Rehabilitation Attendant  · After treatment position/precautions:   o Supine in bed  o Bed alarm/tab alert on  o Bed/Chair-wheels locked  o Call light within reach  o RN notified  o Side rails x 3   · Compliance with Program/Exercises: Will assess as treatment progresses. · Recommendations/Intent for next treatment session: \"Next visit will focus on advancements to more challenging activities and reduction in assistance provided\".   Total Treatment Duration:  OT Patient Time In/Time Out  Time In: 1310  Time Out: [address] [place], OTR/L

## 2017-07-17 NOTE — ANESTHESIA PROCEDURE NOTES
Spinal Block    Start time: 7/17/2017 8:45 AM  End time: 7/17/2017 8:50 AM  Performed by: Johan Copeland  Authorized by: Johan Copeland     Pre-procedure: Indications: primary anesthetic  Preanesthetic Checklist: patient identified, risks and benefits discussed, anesthesia consent, patient being monitored and timeout performed    Timeout Time: 08:45          Spinal Block:   Patient Position:  Seated  Prep Region:  Lumbar  Prep: chlorhexidine and patient draped      Location:  L3-4  Technique:  Single shot  Local:  Lidocaine 1%  Local Dose (mL):  3    Needle:   Needle Type:  Quincke  Needle Gauge:  22 G  Attempts:  1      Events: CSF confirmed, no blood with aspiration and no paresthesia        Assessment:  Insertion:  Uncomplicated  Patient tolerance:  Patient tolerated the procedure well with no immediate complications  Patient was appraised of risks and benefits, Given his pulmonary history and habitus a spinal was the preferred anesthetic despite previous attempt at failed epidural.  Plan was to attempt spinal but given his history of back surgery in that area and h/o of elequis (last dose ~73 hours prior to spinal placement) we agreed that we would not attempt multiple times or sites to avoid a general.    CSF was obtained on the 3rd pass with a 22g quincke needle and clear csf was aspirated and 2cc of bupiv was injected. Patient failed to have an adequate block after 8 minutes and patient was induced and proceeded under general anesthesia. Handoff performed to PACU RN included instruction on how to monitor for neurologic weakness, and that he may or may not have some residual blockade. In the next 3-4 hours, but that any weakness after that time should be followed closely and to contact me if that should occur.

## 2017-07-17 NOTE — CONSULTS
MD Sandi   Medical Director  [address] [place] [address], [address] [facility]  Tel: [phone]     Physical Medicine & Rehabilitation Note-consult    Patient: Kandace Kumar MRN: [id number]  SSN: [id number]    YOB: 1950  Age: 73 y.o. Sex: male      Admit Date: 7/17/2017  Admitting Physician: Jeremy Garcia MD    Medical Decision Making/Plan/Recommend: Gait impairment and functional deficits s/p right total knee arthroplasty. Discussed rehab options and goals with patient. PT progressing fairly well. He has no major functional barriers. Recommend sub acute rehab at [facility]. Continue PT, OT for active/assisted/passive right TKA ROM, strengthening, mobility, transfers, gait training. Will follow progress. Chief Complaint : Gait dysfunction secondary to below. Admit Diagnosis: Primary osteoarthritis of right knee [M17.11]  right total knee arthroplasty 7/7/2017  Pain  DVT risk  Post op hemorrhagic anemia  Obesity (BMI 30-39. 9)  Atrial fibrillation  Acute Rehab Dx:  Gait impairment  Mobility and ambulation deficits  Self Care/ADL deficits    Medical Dx:  Past Medical History:   Diagnosis Date    Asthma dx childhood    last attack during high school; advair at hs; albuterol prn; neb prn     Atrial fibrillation (HCC)     2 epiosode of A-fib last about 6-8 months ago (noted on 6/27/17); on Eliquis     Cervical spondylosis 6/20/2013    Chronic back pain 2013    RIGHT SIDE WITH UNCLEAR ETIOLOGY    Chronic pain     Colon polyps     COPD (chronic obstructive pulmonary disease) ([facility] [address] 8/17/2016    Coronary atherosclerosis of native coronary vessel 8/17/2016 08/2012: Mild non-obstructive CAD    Diabetes ([facility] [address] dx 5/14    type 2; oral meds; no bs checks at home    Diverticulosis     Edema 8/17/2016    Former smoker     GERD (gastroesophageal reflux disease)      controlled with med    High cholesterol     History of kidney stones     Hypertension     controlled with med    Obesity (BMI 30-39. 9)     BMI 39    Status post total right knee replacement 7/17/2017    Unspecified sleep apnea     wears cpap at hs     Subjective:     Date of Evaluation:  July 18, 2017    HPI: Jovani Hernandez is a 73 y.o. male patient at [address] [place] [address] who was admitted on 7/17/2017  by Carmelina Blanca MD with below mentioned medical history, is being seen for Physical Medicine and Rehabilitation consult. Jovani Hernandez had right knee pain and discomfort with walking and activity due to end stage DJD. The symptoms were not well controlled with conservative management and has limited the patient's function. He underwent a right total knee arthroplasty per Dr. Carmelina Blanca MD on 7/17/2017. We are consulted to assist with rehab needs and placement. Patient's weight bearing status is to be WBAT RLE. Patient is starting to stand, taking steps with acute PT and OT. No major barriers are noted. Pt ambulating short distance with minimum assist. Patient still shows significant functional deficits due to right knee pain, decreased ROM and strength. Jovani Hernandez is seen and examined today. Medical Records reviewed. Pt denies any other pre morbid functional deficits. He has been independent with ambulation, prior to admission, limited by right knee pain. Previous Functional Level-   Independent with all activities at baseline     Current Level of Function:  bed mobility - min A, transfers - min A, decreased balance , ambulation -  3 < 10' with RW and min A.        Family History   Problem Relation Age of Onset    Heart Disease Father      BOWEL OBSTRUCTION    Heart Attack Father 80     MI    Heart Disease Brother 72     STENT HEART    Suicide Brother     Parkinsonism Mother     Inflammatory Bowel Dz Son     Malignant Hyperthermia Neg Hx     Pseudocholinesterase Deficiency Neg Hx     Delayed Awakening Neg Hx     Post-op Nausea/Vomiting Neg Hx     Emergence Delirium Neg Hx     Post-op Cognitive Dysfunction Neg Hx       Social History   Substance Use Topics    Smoking status: Former Smoker     Packs/day: 1.50     Years: 25.00     Types: Cigarettes     Quit date: 4/2/2012    Smokeless tobacco: Never Used      Comment: QUIT 2011    Alcohol use 1.8 oz/week     3 Cans of beer per week     Past Surgical History:   Procedure Laterality Date    CYSTOSCOPY,INSERT URETERAL STENT      kidney stone removed cystoscopy     HX CATARACT REMOVAL      bilateral with lens implants; x3    HX COLECTOMY  2015    HX COLONOSCOPY  10/6/14    HX COLONOSCOPY  11/20/14    HX HEART CATHETERIZATION  2012    no stents     HX LUMBAR LAMINECTOMY  2009    no hardware     HX OTHER SURGICAL  20 yrs ago    colo-rectal sx for fissure    HX RETINAL DETACHMENT REPAIR Bilateral       Prior to Admission medications    Medication Sig Start Date End Date Taking? Authorizing Provider   aspirin delayed-release 81 mg tablet Take 162 mg by mouth nightly. Decrease to one 81 mg tab on 7/12/17   Yes Historical Provider   chlorthalidone (HYGROTEN) 25 mg tablet TAKE 1 TABLET BY MOUTH DAILY 6/13/17  Yes Rakel Contreras MD   metoprolol tartrate (LOPRESSOR) 25 mg tablet TAKE 2 TABLETS BY MOUTH TWICE A DAY 5/25/17  Yes Elliott Conde MD   celecoxib (CELEBREX) 200 mg capsule Take 200 mg by mouth daily. Yes Historical Provider   pantoprazole (PROTONIX) 40 mg tablet TAKE 1 TABLET BY MOUTH EVERY DAY 3/1/17  Yes Rakel Contreras MD   dapagliflozin (FARXIGA) 10 mg tab Take 1 Tab by mouth daily. 8/1/16  Yes Rakel Contreras MD   doxazosin (CARDURA) 2 mg tablet TAKE 1 TABLET BY MOUTH 2 TIMES A DAY  Patient taking differently: Take 2 mg by mouth daily. TAKE 1 TABLET BY MOUTH 2 TIMES A DAY 2/17/16  Yes Rakel Contreras MD   meclizine (ANTIVERT) 25 mg tablet Take 1 Tab by mouth three (3) times daily as needed for Dizziness.  6/28/17   Jeff Leach MD   diazePAM (VALIUM) 5 mg tablet Take 1 Tab by mouth every eight (8) hours as needed for Anxiety (severe vertigo spells). Max Daily Amount: 15 mg. 6/28/17   Danilo Padilla MD   atorvastatin (LIPITOR) 40 mg tablet TAKE 1 TABLET BY MOUTH EVERY DAY 5/15/17   Nicole Corea MD   Omega-3 Fatty Acids 300 mg cap Take 1 Cap by mouth daily. Historical Provider   montelukast (SINGULAIR) 10 mg tablet TAKE 1 TABLET BY MOUTH DAILY 5/8/17   Nicole Corea MD   ANTI-FUNGAL 2 % topical powder APPLY TO ABDOMEN 2 TIMES PER DAY 4/24/17   Historical Provider   Blood-Glucose Meter monitoring kit Check fs every day; DM2, E11/9, Contour glucometer 1/26/17   Will Calhoun MD   glucose blood VI test strips (ASCENSIA CONTOUR) strip Check fs every day, DM2, E11.9 1/26/17   Will Calhoun MD   lancets 28 gauge misc Check fs every day, DM2, E11.9 1/26/17   Will Calhoun MD   ezetimibe (ZETIA) 10 mg tablet Take 1 Tab by mouth daily. Patient taking differently: Take 10 mg by mouth nightly. 1/5/17   Nicole Corea MD   metFORMIN ER (GLUCOPHAGE XR) 500 mg tablet TAKE 2 TABLETS BY MOUTH EVERY DAY 10/31/16   Nicole Corea MD   ADVAIR DISKUS 500-50 mcg/dose diskus inhaler INHALE 1 PUFF BY INHALATION EVERY TWELVE HOURS. 10/24/16   Nicole Corea MD   apixaban (ELIQUIS) 5 mg tablet Take 1 Tab by mouth two (2) times a day. 8/19/16   Jeni Alan MD   lisinopril (PRINIVIL, ZESTRIL) 10 mg tablet TAKE 1 TABLET BY MOUTH DAILY 8/1/16   Nicole Corea MD   albuterol (PROVENTIL VENTOLIN) 2.5 mg /3 mL (0.083 %) nebulizer solution 3 mL by Nebulization route once for 1 dose. Patient taking differently: 2.5 mg by Nebulization route daily as needed. 8/1/16   Nicole Corea MD   albuterol (PROAIR HFA) 90 mcg/actuation inhaler Take 2 Puffs by inhalation every four (4) hours as needed for Wheezing. Take / use AM day of surgery  per anesthesia protocols if needed. Historical Provider   cholecalciferol, vitamin D3, (VITAMIN D3) 2,000 unit tab Take 1 tablet by mouth daily. Historical Provider   diphenhydrAMINE (BENADRYL ALLERGY) 25 mg tablet Take 25 mg by mouth nightly as needed for Sleep. Historical Provider   b complex vitamins (B COMPLEX 1) tablet Take 1 tablet by mouth every morning. Stop seven days prior to surgery per anesthesia protocol. Historical Provider   green tea leaf extract (GREEN TEA) Cap Take 630 mg by mouth every morning. Stop seven days prior to surgery per anesthesia protocol. Historical Provider   vitamin E (AQUA GEMS) 400 unit capsule Take 400 Units by mouth every morning. Stop seven days prior to surgery per anesthesia protocol. Historical Provider   FOLIC ACID PO Take 126 mcg by mouth every morning. Stop seven days prior to surgery per anesthesia protocol. Historical Provider   DOCUSATE CALCIUM (STOOL SOFTENER PO) Take 1 capsule by mouth every morning. Historical Provider   mometasone (NASONEX) 50 mcg/Actuation nasal spray 2 Sprays by Both Nostrils route nightly as needed. Phys Other, MD     Allergies   Allergen Reactions    Bactrim [Sulfamethoxazole-Trimethoprim] Itching        Review of Systems: +right knee pain, +antalgic gait. Denies chest pain, shortness of breath, cough, headache, visual problems, abdominal pain, dysurea, calf pain. Pertinent positives are as noted in the medical records and unremarkable otherwise. Objective:     Vitals:  Blood pressure 98/52, pulse 64, temperature 98.7 °F (37.1 °C), resp. rate 18, height 6' 6.5\" (1.994 m), weight 342 lb (155.1 kg), SpO2 92 %. Temp (24hrs), Av.8 °F (36.6 °C), Min:96.2 °F (35.7 °C), Max:98.7 °F (37.1 °C)    BMI (calculated): 39 (17 0645)   Intake and Output:   1901 -  0700  In: 6918 [P.O.:1060; I.V.:3479]  Out: 3685 [Urine:3125]    Physical Exam:   General: Alert and age appropriately oriented. No acute cardio respiratory distress.    HEENT: Normocephalic, no conjunctival pallor, no scleral icterus  Oral mucosa moist without cyanosis, no JVD   Lungs: Clear to auscultation bilaterally. Respiration even and unlabored   Heart: Regular rate and rhythm, S1, S2   No  murmurs, clicks, rub or gallops   Abdomen: Soft, non-tender, non-distended. Genitourinary: defered   Neuromuscular:      Grossly no focal motor deficits. Right knee extension strong  Right ankle dorsiflexion 5/5  Right ankle plantarflexion 5/5  No sensory deficits distally BLE to soft touch. Skin/extremity: No calf tenderness BLE. No rashes, no marginal erythema.                                                                                          Labs/Studies:  Recent Results (from the past 72 hour(s))   TYPE & SCREEN    Collection Time: 07/17/17  6:40 AM   Result Value Ref Range    Crossmatch Expiration 07/20/2017     ABO/Rh(D) Issa Arlen POSITIVE     Antibody screen NEG    HEMOGLOBIN A1C WITH EAG    Collection Time: 07/17/17  6:40 AM   Result Value Ref Range    Hemoglobin A1c 8.3 (H) 4.8 - 6.0 %    Est. average glucose 192 mg/dL   GLUCOSE, POC    Collection Time: 07/17/17  7:16 AM   Result Value Ref Range    Glucose (POC) 218 (H) 65 - 100 mg/dL   GLUCOSE, POC    Collection Time: 07/17/17  7:19 AM   Result Value Ref Range    Glucose (POC) 239 (H) 65 - 100 mg/dL   GLUCOSE, POC    Collection Time: 07/17/17 10:47 AM   Result Value Ref Range    Glucose (POC) 253 (H) 65 - 100 mg/dL   GLUCOSE, POC    Collection Time: 07/17/17 11:41 AM   Result Value Ref Range    Glucose (POC) 269 (H) 65 - 100 mg/dL   GLUCOSE, POC    Collection Time: 07/17/17  5:24 PM   Result Value Ref Range    Glucose (POC) 304 (H) 65 - 100 mg/dL   HEMOGLOBIN    Collection Time: 07/17/17  8:29 PM   Result Value Ref Range    HGB 12.4 (L) 13.6 - 17.2 g/dL   GLUCOSE, POC    Collection Time: 07/17/17  8:53 PM   Result Value Ref Range    Glucose (POC) 315 (H) 65 - 100 mg/dL   HEMOGLOBIN    Collection Time: 07/18/17  4:20 AM   Result Value Ref Range    HGB 12.1 (L) 13.6 - 63.9 g/dL   METABOLIC PANEL, BASIC    Collection Time: 07/18/17  4:20 AM   Result Value Ref Range    Sodium 139 136 - 145 mmol/L    Potassium 4.1 3.5 - 5.1 mmol/L    Chloride 103 98 - 107 mmol/L    CO2 27 21 - 32 mmol/L    Anion gap 9 7 - 16 mmol/L    Glucose 205 (H) 65 - 100 mg/dL    BUN 23 8 - 23 MG/DL    Creatinine 0.97 0.8 - 1.5 MG/DL    GFR est AA >60 >60 ml/min/1.73m2    GFR est non-AA >60 >60 ml/min/1.73m2    Calcium 8.4 8.3 - 10.4 MG/DL   PLEASE READ & DOCUMENT PPD TEST IN 24 HRS    Collection Time: 07/18/17  8:00 AM   Result Value Ref Range    PPD  Negative    mm Induration 0 mm   GLUCOSE, POC    Collection Time: 07/18/17 11:46 AM   Result Value Ref Range    Glucose (POC) 192 (H) 65 - 100 mg/dL       Functional Assessment:  Reviewed participation and progress in therapies      Bed Mobility  Supine to Sit: Contact guard assistance (07/18/17 0900)  Sit to Supine:  (left up in chair) (07/18/17 0900)  Scooting: Contact guard assistance (07/18/17 0900)   Balance  Sitting: Intact; Without support (07/17/17 1500)  Standing: Impaired; With support (walker) (07/17/17 1500)   Grooming  Grooming Assistance: Supervision/set up (07/18/17 1040)  Washing Face: Supervision/set-up (07/18/17 1040)  Washing Hands: Supervision/set-up (07/18/17 1040)  Brushing/Combing Hair: Supervision/set-up (07/18/17 1040)           Bed/Mat Mobility  Supine to Sit: Contact guard assistance (07/18/17 0900)  Sit to Supine:  (left up in chair) (07/18/17 0900)  Sit to Stand: Minimum assistance (07/18/17 0900)  Bed to Chair:  (NT) (07/17/17 1500)  Scooting: Contact guard assistance (07/18/17 0900)     Ambulation:  Gait  Speed/Charlette: Slow (07/18/17 0900)  Step Length: Left shortened;Right shortened (07/18/17 0900)  Stance: Right decreased (07/18/17 0900)  Gait Abnormalities: Antalgic (07/18/17 0900)  Ambulation - Level of Assistance: Minimal assistance (07/18/17 0900)  Distance (ft): 16 Feet (ft) (07/18/17 0900)  Assistive Device: Walker, rolling (07/18/17 0900)  Duration: 15 Minutes (07/18/17 0900)    Impression/Plan: Principal Problem:    Status post total right knee replacement (7/17/2017)        Current Facility-Administered Medications   Medication Dose Route Frequency Provider Last Rate Last Dose    metFORMIN (GLUCOPHAGE) tablet 1,000 mg  1,000 mg Oral BID Filippo Driscoll MD        albuterol (PROVENTIL HFA, VENTOLIN HFA, PROAIR HFA) inhaler 2 Puff  2 Puff Inhalation Q4H PRN Haven Spillers, PA        budesonide (PULMICORT) 500 mcg/2 ml nebulizer suspension  2 mL Inhalation BID RT Haven Spillers, PA        albuterol (PROVENTIL VENTOLIN) nebulizer solution 2.5 mg  2.5 mg Nebulization DAILY PRN Haven Spillers, PA        chlorthalidone (HYGROTEN) tablet 25 mg  25 mg Oral DAILY Haven Spillers, PA   25 mg at 07/18/17 0801    diazePAM (VALIUM) tablet 5 mg  5 mg Oral Q8H PRN Haven Spillers, [place]        dapagliflozin tab 10 mg  10 mg Oral DAILY Haven Spillers, PA   10 mg at 07/18/17 0900    doxazosin (CARDURA) tablet 2 mg  2 mg Oral DAILY Haven Spillers, PA   2 mg at 70/23/53 3311    folic acid (FOLVITE) tablet 1 mg  1 mg Oral DAILY Haven Spillers, PA   1 mg at 07/18/17 0801    lisinopril (PRINIVIL, ZESTRIL) tablet 10 mg  10 mg Oral DAILY Haven Spillers, PA   10 mg at 07/18/17 0801    meclizine (ANTIVERT) tablet 25 mg  25 mg Oral TID PRN Haven Spillers, PA        metoprolol tartrate (LOPRESSOR) tablet 25 mg  25 mg Oral BID Haven Spillers, PA   25 mg at 07/18/17 0800    montelukast (SINGULAIR) tablet 10 mg  10 mg Oral QHS Haven Spillers, PA   10 mg at 07/17/17 2148    pantoprazole (PROTONIX) tablet 40 mg  40 mg Oral ACB Haven Spillers, [place]   40 mg at 07/18/17 0604    0.9% sodium chloride infusion  100 mL/hr IntraVENous CONTINUOUS Haven Spillers,  mL/hr at 07/17/17 2302 100 mL/hr at 07/17/17 2302    sodium chloride (NS) flush 5-10 mL  5-10 mL IntraVENous Q8H Halle López        sodium chloride (NS) flush 5-10 mL  5-10 mL IntraVENous PRN LYNDON López        acetaminophen (TYLENOL) tablet 1,000 mg  1,000 mg Oral Q6H Sharon Martino, PA   1,000 mg at 07/18/17 1246    celecoxib (CELEBREX) capsule 200 mg  200 mg Oral Q12H LYNDON Niño   200 mg at 07/18/17 0801    HYDROmorphone (DILAUDID) tablet 2 mg  2 mg Oral Q4H PRN LYNDON Niño   2 mg at 07/18/17 0956    HYDROmorphone (PF) (DILAUDID) injection 1 mg  1 mg IntraVENous Q3H PRN LYNDON Niño        naloxone [facility]) injection 0.2-0.4 mg  0.2-0.4 mg IntraVENous Q10MIN PRN LYNDON Niño        dexamethasone (DECADRON) injection 10 mg  10 mg IntraVENous ONCE Sharon Martino, [address] [facility] [facility] [address]        ondansetron [facility]) injection 4 mg  4 mg IntraVENous Q4H PRN LYNDON Niño        diphenhydrAMINE (BENADRYL) capsule 25 mg  25 mg Oral Q4H PRN LYNDON Niño        senna-docusate (PERICOLACE) 8.6-50 mg per tablet 2 Tab  2 Tab Oral DAILY LYNDON Niño   2 Tab at 07/18/17 0801    apixaban (ELIQUIS) tablet 5 mg  5 mg Oral BID LYNDON Niño   5 mg at 07/18/17 0801    albuterol (PROVENTIL VENTOLIN) nebulizer solution 2.5 mg  2.5 mg Nebulization Q6H RT Yareli Steiner MD        insulin lispro (HUMALOG) injection   SubCUTAneous AC&HS Dexter Chung MD   8 Units at 07/18/17 1246    insulin detemir (LEVEMIR) injection 10 Units  10 Units SubCUTAneous QHS Dexter Chung MD   10 Units at 07/17/17 2219    Check PPD Skin Test Reminder Note x 3  Days  1 Each Other Q24H Yareli Steiner MD   1 Each at 07/18/17 0800        Recommendations: Recommend sub acute rehab at [facility]. Plan for home discharge with [facility] PT. Continue Acute Rehab Program.  Coordination of rehab/medical care. Counseling of Physical Medicine & Rehab care issues management. Monitoring and management of rehab conditions per the plan of care/orders. Will follow along with you for PM&R issues and provide you follow up.   Will follow with SW/ /Admissions Coordinators regarding insurance approvals and acceptance. Rehabilitation Management/ Medical Management: 1. Devices:Walkers, Type: Rolling Walker  2. Consult:Rehab team including PT, OT,  and . 3. Disposition Rehab-discussed with patient. 4. Thigh-high or knee-high MICHAEL's when out of bed. 5. DVT Prophylaxis - aspirin 325mg bid x 30days. 6. Incentive spirometer Q1H while awake  7. Post op hemorrhagic anemia-  monitor. 8. Activity: WBAT RLE  9. Planned Labs: CBC,BMP  10. Pain Control: Adequately controlled with scheduled tylenol, celebrex and  PRN meds. Continue current management. 11. Wound Care: Keep right TKA wound clean and dry and reinforce dressing PRN. May remove Aquacel 1 week post op ad replace with new one. Remove staples 12-14 post surgery, when incision appears appropriately closed and apply benzoin and 1/2\" steristrips. Follow up with Dr Lucian Luna  2 weeks after discharge from rehab. Follow up with [facility]THO per instructions. Thank you for the opportunity to participate in the care of this patient.     Signed By: Manny Fox MD     July 18, 2017

## 2017-07-18 LAB
ANION GAP BLD CALC-SCNC: 9 MMOL/L (ref 7–16)
BUN SERPL-MCNC: 23 MG/DL (ref 8–23)
CALCIUM SERPL-MCNC: 8.4 MG/DL (ref 8.3–10.4)
CHLORIDE SERPL-SCNC: 103 MMOL/L (ref 98–107)
CO2 SERPL-SCNC: 27 MMOL/L (ref 21–32)
CREAT SERPL-MCNC: 0.97 MG/DL (ref 0.8–1.5)
GLUCOSE BLD STRIP.AUTO-MCNC: 192 MG/DL (ref 65–100)
GLUCOSE BLD STRIP.AUTO-MCNC: 214 MG/DL (ref 65–100)
GLUCOSE BLD STRIP.AUTO-MCNC: 215 MG/DL (ref 65–100)
GLUCOSE SERPL-MCNC: 205 MG/DL (ref 65–100)
HGB BLD-MCNC: 12.1 G/DL (ref 13.6–17.2)
MM INDURATION POC: 0 MM (ref 0–5)
POTASSIUM SERPL-SCNC: 4.1 MMOL/L (ref 3.5–5.1)
PPD POC: NORMAL NEGATIVE
SODIUM SERPL-SCNC: 139 MMOL/L (ref 136–145)

## 2017-07-18 PROCEDURE — 97110 THERAPEUTIC EXERCISES: CPT

## 2017-07-18 PROCEDURE — 65270000029 HC RM PRIVATE

## 2017-07-18 PROCEDURE — 74011250637 HC RX REV CODE- 250/637: Performed by: PHYSICIAN ASSISTANT

## 2017-07-18 PROCEDURE — 97150 GROUP THERAPEUTIC PROCEDURES: CPT

## 2017-07-18 PROCEDURE — 97535 SELF CARE MNGMENT TRAINING: CPT

## 2017-07-18 PROCEDURE — 36415 COLL VENOUS BLD VENIPUNCTURE: CPT | Performed by: PHYSICIAN ASSISTANT

## 2017-07-18 PROCEDURE — 97116 GAIT TRAINING THERAPY: CPT

## 2017-07-18 PROCEDURE — 80048 BASIC METABOLIC PNL TOTAL CA: CPT | Performed by: PHYSICIAN ASSISTANT

## 2017-07-18 PROCEDURE — 74011250637 HC RX REV CODE- 250/637: Performed by: INTERNAL MEDICINE

## 2017-07-18 PROCEDURE — 82962 GLUCOSE BLOOD TEST: CPT

## 2017-07-18 PROCEDURE — 94760 N-INVAS EAR/PLS OXIMETRY 1: CPT

## 2017-07-18 PROCEDURE — 74011636637 HC RX REV CODE- 636/637: Performed by: INTERNAL MEDICINE

## 2017-07-18 PROCEDURE — 74011250636 HC RX REV CODE- 250/636: Performed by: PHYSICIAN ASSISTANT

## 2017-07-18 PROCEDURE — 85018 HEMOGLOBIN: CPT | Performed by: PHYSICIAN ASSISTANT

## 2017-07-18 RX ORDER — METFORMIN HYDROCHLORIDE 500 MG/1
1000 TABLET ORAL 2 TIMES DAILY
Status: DISCONTINUED | OUTPATIENT
Start: 2017-07-18 | End: 2017-07-20 | Stop reason: HOSPADM

## 2017-07-18 RX ADMIN — INSULIN LISPRO 4 UNITS: 100 INJECTION, SOLUTION INTRAVENOUS; SUBCUTANEOUS at 21:40

## 2017-07-18 RX ADMIN — FOLIC ACID 1 MG: 1 TABLET ORAL at 08:01

## 2017-07-18 RX ADMIN — CEFAZOLIN 2 G: 1 INJECTION, POWDER, FOR SOLUTION INTRAMUSCULAR; INTRAVENOUS; PARENTERAL at 00:31

## 2017-07-18 RX ADMIN — ACETAMINOPHEN 1000 MG: 500 TABLET, FILM COATED ORAL at 00:31

## 2017-07-18 RX ADMIN — CELECOXIB 200 MG: 200 CAPSULE ORAL at 08:01

## 2017-07-18 RX ADMIN — HYDROMORPHONE HYDROCHLORIDE 2 MG: 2 TABLET ORAL at 17:44

## 2017-07-18 RX ADMIN — PANTOPRAZOLE SODIUM 40 MG: 40 TABLET, DELAYED RELEASE ORAL at 06:04

## 2017-07-18 RX ADMIN — HYDROMORPHONE HYDROCHLORIDE 2 MG: 2 TABLET ORAL at 09:56

## 2017-07-18 RX ADMIN — ACETAMINOPHEN 1000 MG: 500 TABLET, FILM COATED ORAL at 06:03

## 2017-07-18 RX ADMIN — METOPROLOL TARTRATE 25 MG: 25 TABLET, FILM COATED ORAL at 22:31

## 2017-07-18 RX ADMIN — DOXAZOSIN MESYLATE 2 MG: 1 TABLET ORAL at 08:01

## 2017-07-18 RX ADMIN — HYDROMORPHONE HYDROCHLORIDE 2 MG: 2 TABLET ORAL at 21:35

## 2017-07-18 RX ADMIN — METFORMIN HYDROCHLORIDE 1000 MG: 500 TABLET, FILM COATED ORAL at 17:14

## 2017-07-18 RX ADMIN — Medication 10 ML: at 21:40

## 2017-07-18 RX ADMIN — ASPIRIN 325 MG: 325 TABLET, DELAYED RELEASE ORAL at 08:01

## 2017-07-18 RX ADMIN — CHLORTHALIDONE 25 MG: 25 TABLET ORAL at 08:01

## 2017-07-18 RX ADMIN — INSULIN LISPRO 8 UNITS: 100 INJECTION, SOLUTION INTRAVENOUS; SUBCUTANEOUS at 12:46

## 2017-07-18 RX ADMIN — METFORMIN HYDROCHLORIDE 500 MG: 500 TABLET, FILM COATED ORAL at 08:01

## 2017-07-18 RX ADMIN — LISINOPRIL 10 MG: 5 TABLET ORAL at 08:01

## 2017-07-18 RX ADMIN — HYDROMORPHONE HYDROCHLORIDE 2 MG: 2 TABLET ORAL at 02:00

## 2017-07-18 RX ADMIN — APIXABAN 5 MG: 5 TABLET, FILM COATED ORAL at 08:01

## 2017-07-18 RX ADMIN — APIXABAN 5 MG: 5 TABLET, FILM COATED ORAL at 21:35

## 2017-07-18 RX ADMIN — HYDROMORPHONE HYDROCHLORIDE 2 MG: 2 TABLET ORAL at 14:04

## 2017-07-18 RX ADMIN — CELECOXIB 200 MG: 200 CAPSULE ORAL at 21:35

## 2017-07-18 RX ADMIN — ACETAMINOPHEN 1000 MG: 500 TABLET, FILM COATED ORAL at 17:16

## 2017-07-18 RX ADMIN — ACETAMINOPHEN 1000 MG: 500 TABLET, FILM COATED ORAL at 12:46

## 2017-07-18 RX ADMIN — INSULIN DETEMIR 10 UNITS: 100 INJECTION, SOLUTION SUBCUTANEOUS at 21:39

## 2017-07-18 RX ADMIN — INSULIN LISPRO 4 UNITS: 100 INJECTION, SOLUTION INTRAVENOUS; SUBCUTANEOUS at 17:19

## 2017-07-18 RX ADMIN — MONTELUKAST SODIUM 10 MG: 10 TABLET, FILM COATED ORAL at 21:35

## 2017-07-18 RX ADMIN — SENNOSIDES AND DOCUSATE SODIUM 2 TABLET: 8.6; 5 TABLET ORAL at 08:01

## 2017-07-18 RX ADMIN — INSULIN LISPRO 4 UNITS: 100 INJECTION, SOLUTION INTRAVENOUS; SUBCUTANEOUS at 07:52

## 2017-07-18 RX ADMIN — INSULIN DETEMIR 5 UNITS: 100 INJECTION, SOLUTION SUBCUTANEOUS at 09:00

## 2017-07-18 RX ADMIN — HYDROMORPHONE HYDROCHLORIDE 2 MG: 2 TABLET ORAL at 06:05

## 2017-07-18 RX ADMIN — Medication 5 ML: at 14:00

## 2017-07-18 RX ADMIN — METOPROLOL TARTRATE 25 MG: 25 TABLET, FILM COATED ORAL at 08:00

## 2017-07-18 NOTE — PROGRESS NOTES
Patient resting quietly, alert and oriented, no distress noted. Right knee dressing c/d/i, meier with clear, yellow urine draining. Patient encouraged to use incentive spirometer. IV patent infusing fluids as ordered. Fresh ice placed in iceman. Neurovascular and peripheral vascular checks WNL. Bed low and locked position. Call light within reach. Patient instructed to call for assistance, verbalizes understanding. Nursing assessment complete.

## 2017-07-18 NOTE — DIABETES MGMT
Pt s/p right TKA earlier yesterday is seen by RN DANIELLAE for diabetes education. Pt is alert and oriented with wife at bedside. Type 2 diabetic taking Metformin and Farxiga at home. A1C 8.3. EaG = 192. Reviewed with pt current orders for metformin and Farxiga (starting tomorrow) with Levemir 10 units q hs with additional 5 units given this morning and Humalog per sliding scale for correction. Educated pt and his wife re: current basal/bolus regimen of Levemir and Humalog including type of insulin, timing with meals, onset, duration of effect, and peak of insulin dose. Pt and his wife verbalize understanding. Pt received intraoperative steroid yesterday with blood glucose ranging 239-315 yesterday with total of 41 units insulin given and 205-192 so far today. Pt says he lost over 100 pounds on the Atkins diet several years ago. Says he has h/o retinal problems, which limited his activity and he has been overeating. Pt was seen by dietitian earlier today for diet education and menu plan. Says he was in the ED a few weeks ago with dehydration after \"diarrhea from a virus\". Discussed with pt and his wife possible side effects of Farxiga include dehydration and UTI. Pt given educational material, \"Diabetes Self-Management: A Patient Teaching Guide\", which was reviewed  with pt and his wife at this time due to pt fatigue. Described the effects of poor glycemic control on the development of long-term complications such as renal, eye, nerve, and cardiovascular disease. Educated re: effects of carbohydrates on blood glucose and carbohydrate containing foods. Also explained the relationship between hyperglycemia and infection, steroids and hyperglycemia, and importance of good glycemic control for wound healing. Discussed target goals for blood glucose and A1C. Pt and his wife verbalize understanding of teaching.   Pt checks his blood glucose at home fasting, and says \"it usually runs around 180, but has been higher lately\". Would recommend checking blood glucose qid ac and hs and recording in log book to take to PCP appointments. Pt says he needs a new PCP, as his has retired.  has given pt a list of PCP practices. Pt practiced insulin self-instruction today and did well using insulin syringe. Pt was also instructed in proper use of insulin pens today. Pt and his wife were both able to return demonstrate proper use of insulin pen using injection model. Pt would likely benefit from continued outpatient diabetes education. Referral has been initiated to [facility], and  Pt is agreeable to attending diabetes education classes after recovery from surgery. Case management working on plans for rehab after discharge. Pt and his wife have no further questions.  Nursing will continue to monitor blood glucose.

## 2017-07-18 NOTE — PROGRESS NOTES
Problem: Mobility Impaired (Adult and Pediatric)  Goal: *Acute Goals and Plan of Care (Insert Text)  GOALS (1-4 days):  (1.)Mr. Kinsey will move from supine to sit and sit to supine in bed with SUPERVISION. (2.)Mr. Kinsey will transfer from bed to chair and chair to bed with STAND BY ASSIST using the least restrictive device. (3.)Mr. Kinsey will ambulate with STAND BY ASSIST for 200 feet with the least restrictive device. (4.)Mr. Kinsey will ambulate up/down 1 steps with No railing with CONTACT GUARD ASSIST with walker. (5.)Mr. Kinsey will increase right knee ROM to 5°-80°.  ________________________________________________________________________________________________      PHYSICAL THERAPY JOINT CAMP TKA: Daily Note and AM 7/18/2017  INPATIENT: Hospital Day: 2  [place] / Product Type: Medicare /      NAME/AGE/GENDER: Brianna Elmore is a 73 y.o. male       PRIMARY DIAGNOSIS:  Primary osteoarthritis of right knee [M17.11]              Procedure(s) and Anesthesia Type:     * RIGHT KNEE ARTHROPLASTY TOTAL  - General (Right)  ICD-10: Treatment Diagnosis:        · Pain in Right Knee (M25.561)  · Stiffness of Right Knee, Not elsewhere classified (M25.661)  · Difficulty in walking, Not elsewhere classified (R26.2)       ASSESSMENT:      Mr. Kinsey presents with impaired strength & mobility s/p right TKA. Pt also had decreased stability during out of bed activity. Pt will benefit from follow up therapy to help restore safe function prior to returning home with caregiver. He is sitting in the bed upon arrival.  He performs exercises in the chair without any problems. He ambulates 16 ft using RW with Min A. He will sit up for awhile and then come go group this afternoon. This section established at most recent assessment   PROBLEM LIST (Impairments causing functional limitations):  1. Decreased Strength  2. Decreased ADL/Functional Activities  3.  Decreased Transfer Abilities  4. Decreased Ambulation Ability/Technique  5. Decreased Balance  6. Increased Pain  7. Decreased Activity Tolerance  8. Decreased Flexibility/Joint Mobility  9. Decreased Frio with Home Exercise Program    INTERVENTIONS PLANNED: (Benefits and precautions of physical therapy have been discussed with the patient.)  1. Bed Mobility  2. Gait Training  3. Home Exercise Program (HEP)  4. Therapeutic Exercise/Strengthening  5. Transfer Training  6. Range of Motion: active/assisted/passive  7. Therapeutic Activities  8. Group Therapy      TREATMENT PLAN: Frequency/Duration: Follow patient BID   to address above goals. Rehabilitation Potential For Stated Goals: GOOD      RECOMMENDED REHABILITATION/EQUIPMENT: (at time of discharge pending progress): Home Health: Physical Therapy. ( pt could return home with HHPT & 24/7 assist from spouse for safety )                   HISTORY:   History of Present Injury/Illness (Reason for Referral): The patient has end stage arthritis of the right knee. The patient was evaluated and examined during a consultation prior to this office visit. There have been no changes to the patient's orthopedic condition since the initial consultation. The patient has failed previous conservative treatment for this condition including antiinflammatories , and lifestyle modifications. The necessity for joint replacement is present. The patient will be admitted the day of surgery for Procedure(s) (LRB):  RIGHT KNEE ARTHROPLASTY TOTAL / FNB / JENELLE (Right)       Past Medical History/Comorbidities:   Mr. Deloris Allison  has a past medical history of Asthma (dx childhood); Atrial fibrillation ([address] Cervical spondylosis (6/20/2013); Chronic back pain (2013); Chronic pain; Colon polyps; COPD (chronic obstructive pulmonary disease) ([address] (8/17/2016); Coronary atherosclerosis of native coronary vessel (8/17/2016); Diabetes ([address] (dx 5/14); Diverticulosis; Edema (8/17/2016);  Former smoker; GERD (gastroesophageal reflux disease); High cholesterol; History of kidney stones; Hypertension; Obesity (BMI 30-39.9); Status post total right knee replacement (7/17/2017); and Unspecified sleep apnea. He also has no past medical history of Adverse effect of anesthesia; Aneurysm ([facility] Autoimmune disease ([facility] Cancer ([facility] Chronic kidney disease; Coagulation defects; Coagulation disorder ([facility] DEMENTIA; Difficult intubation; Endocarditis; Heart failure ([facility] Ill-defined condition; Infectious disease; Liver disease; Malignant hyperthermia due to anesthesia; Nausea & vomiting; Neurological disorder; Nicotine vapor product user; Non-nicotine vapor product user; Other ill-defined conditions; Pseudocholinesterase deficiency; Psychiatric disorder; PUD (peptic ulcer disease); Rheumatic fever; Seizures ([facility] Stroke [facility]); Thromboembolus ([facility] Thyroid disease; or Unspecified adverse effect of anesthesia. Mr. Piyush Unger  has a past surgical history that includes cystoscopy,insert ureteral stent; colonoscopy (10/6/14); colonoscopy (11/20/14); lumbar laminectomy (2009); retinal detachment repair (Bilateral); cataract removal; other surgical (20 yrs ago); colectomy (2015); and heart catheterization (2012).   Social History/Living Environment:   Home Environment: Private residence  # Steps to Enter: 0  One/Two Story Residence: Two story, live on 1st floor  # of Interior Steps: 15  Interior Rails: Both  Living Alone: No  Support Systems: Spouse/Significant Other/Partner  Patient Expects to be Discharged to[de-identified] Private residence  Current DME Used/Available at Home: None  Tub or Shower Type: Tub/Shower combination  Prior Level of Function/Work/Activity:  Pt was independent without an assistive device prior to this admission   Number of Personal Factors/Comorbidities that affect the Plan of Care: 3+: HIGH COMPLEXITY   EXAMINATION:   Most Recent Physical Functioning:                               Bed Mobility  Supine to Sit: Contact guard assistance  Sit to Supine:  (left up in chair)  Scooting: Contact guard assistance     Transfers  Sit to Stand: Minimum assistance  Stand to Sit: Minimum assistance                      Weight Bearing Status  Right Side Weight Bearing: As tolerated  Distance (ft): 16 Feet (ft)  Ambulation - Level of Assistance: Minimal assistance  Assistive Device: Walker, rolling  Speed/Charlette: Slow  Step Length: Left shortened;Right shortened  Stance: Right decreased  Gait Abnormalities: Antalgic         Braces/Orthotics: none     Right Knee Cold  Type: Cryocuff       Body Structures Involved:  1. Joints  2. Muscles Body Functions Affected:  1. Sensory/Pain  2. Movement Related Activities and Participation Affected:  1. General Tasks and Demands  2. Mobility   Number of elements that affect the Plan of Care: 4+: HIGH COMPLEXITY   CLINICAL PRESENTATION:   Presentation: Stable and uncomplicated: LOW COMPLEXITY   CLINICAL DECISION MAKIN[address] [place] [id number] AM-PAC 6 Clicks   Basic Mobility Inpatient Short Form  How much difficulty does the patient currently have. .. Unable A Lot A Little None   1. Turning over in bed (including adjusting bedclothes, sheets and blankets)? [ ] 1   [ ] 2   [X] 3   [ ] 4   2. Sitting down on and standing up from a chair with arms ( e.g., wheelchair, bedside commode, etc.)   [ ] 1   [x ] 2   [] 3   [ ] 4   3. Moving from lying on back to sitting on the side of the bed? [ ] 1   [ ] 2   [X] 3   [ ] 4   How much help from another person does the patient currently need. .. Total A Lot A Little None   4. Moving to and from a bed to a chair (including a wheelchair)? [ ] 1   [x ] 2   [] 3   [ ] 4   5. Need to walk in hospital room? [ ] 1   [ x] 2   [] 3   [ ] 4   6. Climbing 3-5 steps with a railing? [X] 1   [ ] 2   [ ] 3   [ ] 4   © , Trustees of [address] [place] [address], under license to [email or web address].  All rights reserved       Score:  Initial: 13 Most Recent: X (Date: -- ) Interpretation of Tool:  Represents activities that are increasingly more difficult (i.e. Bed mobility, Transfers, Gait). Score 24 23 22-20 19-15 14-10 9-7 6       Modifier CH CI CJ CK CL CM CN         · Mobility - Walking and Moving Around:               - CURRENT STATUS:    CL - 60%-79% impaired, limited or restricted               - GOAL STATUS:           CK - 40%-59% impaired, limited or restricted               - D/C STATUS:                       ---------------To be determined---------------  [place] / Product Type: Medicare /       Medical Necessity:     · Patient is expected to demonstrate progress in strength, range of motion, balance, coordination and functional technique to decrease assistance required with bed mobility, transfers & gait. Reason for Services/Other Comments:  · Patient continues to require skilled intervention due to pt not independent with functional mobility. Use of outcome tool(s) and clinical judgement create a POC that gives a: Clear prediction of patient's progress: LOW COMPLEXITY                 TREATMENT:   (In addition to Assessment/Re-Assessment sessions the following treatments were rendered)      Pre-treatment Symptoms/Complaints:  none  Pain: Initial: visual scale  Pain Intensity 1: 0  Post Session:  0/10      Gait Training (15 Minutes):  Gait training to improve and/or restore physical functioning as related to mobility. Ambulated 16 Feet (ft) with Minimal assistance using a Walker, rolling and minimal   related to their knee position and motion to promote proper body alignment. Therapeutic Exercise: (15 Minutes):  Exercises per grid below to improve strength. Required minimal verbal cues to promote proper body alignment. Progressed range as indicated.                 Date:  7/18    Date:    Date:      ACTIVITY/EXERCISE AM PM AM PM AM PM   GROUP THERAPY  [ ]  [ ]  [ ]  [ ]  [ ]  [ ]   Ankle Pumps 15 Quad Sets  15             Gluteal Sets  15             Hip ABd/ADduction  15             Straight Leg Raises  15             Knee Slides  15             Short Arc Quads  15             Long Arc Quads               Chair Slides                               B = bilateral; AA = active assistive; A = active; P = passive       Treatment/Session Assessment:         Response to Treatment:  Tolerated exercises and gait well. Education:  [ ] Home Exercises  [X] Fall Precautions  [ ] Hip Precautions [ ] D/C Instruction Review  [ ] Knee/Hip Prosthesis Review  [X] Walker Management/Safety [ ] Adaptive Equipment as Needed         Interdisciplinary Collaboration:   · Registered Nurse     After treatment position/precautions:   · Up in chair, Bed/Chair-wheels locked, Bed in low position, Caregiver at bedside, Call light within reach, RN notified and Family at bedside     Compliance with Program/Exercises: Will assess as treatment progresses. Recommendations/Intent for next treatment session:  Treatment next visit will focus on increasing Mr. Ankush Santana independence with bed mobility, transfers, gait training, strength/ROM exercises, modalities for pain, and patient education.        Total Treatment Duration:  PT Patient Time In/Time Out  Time In: 0845  Time Out: 2403     Amelie Jo, PTA

## 2017-07-18 NOTE — PROGRESS NOTES
2017         Post Op day: 1 Day Post-OpProcedure(s) (LRB):  RIGHT KNEE ARTHROPLASTY TOTAL  (Right)      Admit Date: 2017  Admit Diagnosis: Primary osteoarthritis of right knee [M17.11]    LAB:    Recent Results (from the past 24 hour(s))   GLUCOSE, POC    Collection Time: 17 10:47 AM   Result Value Ref Range    Glucose (POC) 253 (H) 65 - 100 mg/dL   GLUCOSE, POC    Collection Time: 17 11:41 AM   Result Value Ref Range    Glucose (POC) 269 (H) 65 - 100 mg/dL   GLUCOSE, POC    Collection Time: 17  5:24 PM   Result Value Ref Range    Glucose (POC) 304 (H) 65 - 100 mg/dL   HEMOGLOBIN    Collection Time: 17  8:29 PM   Result Value Ref Range    HGB 12.4 (L) 13.6 - 17.2 g/dL   GLUCOSE, POC    Collection Time: 17  8:53 PM   Result Value Ref Range    Glucose (POC) 315 (H) 65 - 100 mg/dL   HEMOGLOBIN    Collection Time: 17  4:20 AM   Result Value Ref Range    HGB 12.1 (L) 13.6 - 98.1 g/dL   METABOLIC PANEL, BASIC    Collection Time: 17  4:20 AM   Result Value Ref Range    Sodium 139 136 - 145 mmol/L    Potassium 4.1 3.5 - 5.1 mmol/L    Chloride 103 98 - 107 mmol/L    CO2 27 21 - 32 mmol/L    Anion gap 9 7 - 16 mmol/L    Glucose 205 (H) 65 - 100 mg/dL    BUN 23 8 - 23 MG/DL    Creatinine 0.97 0.8 - 1.5 MG/DL    GFR est AA >60 >60 ml/min/1.73m2    GFR est non-AA >60 >60 ml/min/1.73m2    Calcium 8.4 8.3 - 10.4 MG/DL     Vital Signs:    Patient Vitals for the past 8 hrs:   BP Temp Pulse Resp SpO2   17 0351 113/63 97.4 °F (36.3 °C) 64 18 94 %     Temp (24hrs), Av.7 °F (36.5 °C), Min:96.2 °F (35.7 °C), Max:98.7 °F (37.1 °C)    Body mass index is 39.02 kg/(m^2).   Pain Control:   Pain Assessment  Pain Scale 1: Numeric (0 - 10)  Pain Intensity 1: 0  Pain Onset 1: sev yrs  Pain Location 1: Knee  Pain Orientation 1: Right  Pain Description 1: Aching  Pain Intervention(s) 1: Medication (see MAR)    Subjective: Doing well, No complaints, No SOB, No Chest Pain, No Nausea or Vomitting     Objective: Vital Signs are Stable, No Acute Distress, Alert and Oriented, Dressing is Dry,  Neurovascular exam is normal.       PT/OT:            Assistive Device: Walker (comment)     RLE PROM  R Knee Flexion: 50 (~post op)  R Knee Extension: -10 (~post op)          Weight Bearing Status: WBAT    Meds:  [unfilled]  [unfilled]  [unfilled]    Assessment:   Patient Active Problem List   Diagnosis Code    Hyperlipemia E78.5    Obstructive sleep apnea (adult) (pediatric) G47.33    Essential hypertension I10    Extrinsic asthma J45.909    Colon polyps K63.5    Diverticulosis K57.90    Cervical spondylosis M47.812    BPH (benign prostatic hypertrophy) N40.0    Chronic back pain M54.9, G89.29    Esophageal reflux K21.9    Diabetes mellitus type 2 in obese (HCC) E11.9, E66.9    CKD (chronic kidney disease), stage II N18.2    Andropause N50.89    Diverticular stricture ([facility]) K56.60    Coronary atherosclerosis of native coronary vessel I25.10    COPD (chronic obstructive pulmonary disease) ([facility]) J44.9    Edema R60.9    Paroxysmal atrial fibrillation ([facility]) I48.0    OA (osteoarthritis) of knee M17.10    Status post total right knee replacement Z96.651             Plan: Continue Physical Therapy, Monitor  Hbg, home tomorrow.         Signed By: Yareli Steiner MD

## 2017-07-18 NOTE — PROGRESS NOTES
Problem: Patient Education: Go to Patient Education Activity  Goal: Patient/Family Education  Nutrition  Received MD consult for Diabetic Diet Education per Dr. Anastasia Little  Assessment:   Diet order(s): Consistent CHO  Food and Nutrition History:  Pt s/p  Day 1 TKA. Pt known diabetic; has been taking Metformin and Farxiga at home. A1C 8.3; EaG 192 mg/dl. Pt states he lost 100# following an [employer] but since has gained all the weight back. Typical po intake per patient:  Cereal at breakfast, lunch out and evening meal at home with meat, and vegetables. Pt states he has been trying to cut back on CHO and has been eating lots of watermelon; wife states his portion sizes are too large; request double entrees and snacks between meals-per patient \"when watching what I eats, will choose a meat/cheese roll up for snack); enjoys sweets on [employer] and during holidays. Pt has done water exercises in the past; states walking on land is hard on his back. Anthropometrics: Height: 6' 6.5\" (199.4 cm),  Weight: 155.1 kg (342 lb), Weight source not specified, Body mass index is 39.02 kg/(m^2). BMI class of Obesity. Pt currently ~ 157% IBW. Macronutrient Needs:  · EER:  7599-8846 kcal /day (12-15 kcal/kg BW)  · EPR:   grams protein/day (1-1.2 grams/kg IBW)  · CHO needs:  233-291 grams CHO/meal (50 % of kcal)  Intake/Comparative Standards: 1 recorded intake at 100%. Based on limited data, patient potentially meets 95% estimated kcal and ~ 80% estimated protein needs. Nutrition Diagnosis: Food and nutrition related knowledge deficit r/t lack of prior exposure to information as evidenced by no prior education provided on how to apply food and nutrition related information. Intervention:  Meals and snacks: Continue current diet; add bedtime snack of 1 CHO and 2 oz protein.   Education: Provided patient and wife with written and verbal instruction on a Consistent CHO diet with 4 CHO servings at breakfast and 5 CHO servings at lunch and supper; bedtime snack of 1 CHO serving + protein. Discussed sources of CHO, portion size and timing of meals; suggested using lemon juice or vinegar for salad dressings; pt currently using high fat salad dressings. Once cleared by MD, encouraged patient to start back on a water exercise regime. Pt interested in attending outpatient education-referral has been sent to [facility]. Pt verbalizes fair/good understanding of diet. Expect good compliance.    Liss Marley, 67 [address], [address] [place] [address], MPH  [phone]

## 2017-07-18 NOTE — PROGRESS NOTES
Problem: Mobility Impaired (Adult and Pediatric)  Goal: *Acute Goals and Plan of Care (Insert Text)  GOALS (1-4 days):  (1.)Mr. Rona Jha will move from supine to sit and sit to supine in bed with SUPERVISION. (2.)Mr. Rona Jha will transfer from bed to chair and chair to bed with STAND BY ASSIST using the least restrictive device. (3.)Mr. Rona Jha will ambulate with STAND BY ASSIST for 200 feet with the least restrictive device. (4.)Mr. Rona Jha will ambulate up/down 1 steps with No railing with CONTACT GUARD ASSIST with walker. (5.)Mr. Rona Jha will increase right knee ROM to 5°-80°. Flex 7/18  ________________________________________________________________________________________________      PHYSICAL THERAPY JOINT CAMP TKA: Daily Note and PM 7/18/2017  INPATIENT: Hospital Day: 2  [place] / Product Type: Medicare /      NAME/AGE/GENDER: Nathalie Tariq is a 73 y.o. male       PRIMARY DIAGNOSIS:  Primary osteoarthritis of right knee [M17.11]              Procedure(s) and Anesthesia Type:     * RIGHT KNEE ARTHROPLASTY TOTAL  - General (Right)  ICD-10: Treatment Diagnosis:        · Pain in Right Knee (M25.561)  · Stiffness of Right Knee, Not elsewhere classified (M25.661)  · Difficulty in walking, Not elsewhere classified (R26.2)       ASSESSMENT:      Mr. Rona Jha presents with impaired strength & mobility s/p right TKA. Pt also had decreased stability during out of bed activity. Pt will benefit from follow up therapy to help restore safe function prior to returning home with caregiver. He is sitting in the chair upon arrival.   He walks 100 ft toward the gym and performs exercises without increase in pain. He remains in the chair with call light near. This section established at most recent assessment   PROBLEM LIST (Impairments causing functional limitations):  1. Decreased Strength  2. Decreased ADL/Functional Activities  3. Decreased Transfer Abilities  4.  Decreased Ambulation Ability/Technique  5. Decreased Balance  6. Increased Pain  7. Decreased Activity Tolerance  8. Decreased Flexibility/Joint Mobility  9. Decreased [place] with Home Exercise Program    INTERVENTIONS PLANNED: (Benefits and precautions of physical therapy have been discussed with the patient.)  1. Bed Mobility  2. Gait Training  3. Home Exercise Program (HEP)  4. Therapeutic Exercise/Strengthening  5. Transfer Training  6. Range of Motion: active/assisted/passive  7. Therapeutic Activities  8. Group Therapy      TREATMENT PLAN: Frequency/Duration: Follow patient BID   to address above goals. Rehabilitation Potential For Stated Goals: GOOD      RECOMMENDED REHABILITATION/EQUIPMENT: (at time of discharge pending progress): Home Health: Physical Therapy. ( pt could return home with HHPT & 24/7 assist from spouse for safety )                   HISTORY:   History of Present Injury/Illness (Reason for Referral): The patient has end stage arthritis of the right knee. The patient was evaluated and examined during a consultation prior to this office visit. There have been no changes to the patient's orthopedic condition since the initial consultation. The patient has failed previous conservative treatment for this condition including antiinflammatories , and lifestyle modifications. The necessity for joint replacement is present. The patient will be admitted the day of surgery for Procedure(s) (LRB):  RIGHT KNEE ARTHROPLASTY TOTAL / FNB / JENELLE (Right)       Past Medical History/Comorbidities:   Mr. Anthony De Oliveira  has a past medical history of Asthma (dx childhood); Atrial fibrillation ([address] Cervical spondylosis (6/20/2013); Chronic back pain (2013); Chronic pain; Colon polyps; COPD (chronic obstructive pulmonary disease) ([address] (8/17/2016); Coronary atherosclerosis of native coronary vessel (8/17/2016); Diabetes ([address] (dx 5/14); Diverticulosis; Edema (8/17/2016);  Former smoker; GERD (gastroesophageal reflux disease); High cholesterol; History of kidney stones; Hypertension; Obesity (BMI 30-39.9); Status post total right knee replacement (7/17/2017); and Unspecified sleep apnea. He also has no past medical history of Adverse effect of anesthesia; Aneurysm ([facility] Autoimmune disease ([facility] Cancer ([facility] Chronic kidney disease; Coagulation defects; Coagulation disorder ([facility] DEMENTIA; Difficult intubation; Endocarditis; Heart failure ([facility] Ill-defined condition; Infectious disease; Liver disease; Malignant hyperthermia due to anesthesia; Nausea & vomiting; Neurological disorder; Nicotine vapor product user; Non-nicotine vapor product user; Other ill-defined conditions; Pseudocholinesterase deficiency; Psychiatric disorder; PUD (peptic ulcer disease); Rheumatic fever; Seizures ([facility] Stroke [facility]); Thromboembolus ([facility] [address] [facility] Thyroid disease; or Unspecified adverse effect of anesthesia. Mr. Rona Jha  has a past surgical history that includes cystoscopy,insert ureteral stent; colonoscopy (10/6/14); colonoscopy (11/20/14); lumbar laminectomy (2009); retinal detachment repair (Bilateral); cataract removal; other surgical (20 yrs ago); colectomy (2015); and heart catheterization (2012).   Social History/Living Environment:   Home Environment: Private residence  # Steps to Enter: 0  One/Two Story Residence: Two story, live on 1st floor  # of Interior Steps: 15  Interior Rails: Both  Living Alone: No  Support Systems: Spouse/Significant Other/Partner  Patient Expects to be Discharged to[de-identified] Private residence  Current DME Used/Available at Home: None  Tub or Shower Type: Tub/Shower combination  Prior Level of Function/Work/Activity:  Pt was independent without an assistive device prior to this admission   Number of Personal Factors/Comorbidities that affect the Plan of Care: 3+: HIGH COMPLEXITY   EXAMINATION:   Most Recent Physical Functioning:                 RLE PROM  R Knee Flexion: 86  R Knee Extension: 15             Bed Mobility  Supine to Sit: Contact guard assistance  Sit to Supine:  (left up in chair)  Scooting: Contact guard assistance     Transfers  Sit to Stand: Contact guard assistance  Stand to Sit: Contact guard assistance                      Weight Bearing Status  Right Side Weight Bearing: As tolerated  Distance (ft): 100 Feet (ft)  Ambulation - Level of Assistance: Contact guard assistance  Assistive Device: Walker, rolling  Speed/Charlette: Slow  Step Length: Left shortened;Right shortened  Stance: Right decreased  Gait Abnormalities: Antalgic         Braces/Orthotics: none     Right Knee Cold  Type: Cryocuff       Body Structures Involved:  1. Joints  2. Muscles Body Functions Affected:  1. Sensory/Pain  2. Movement Related Activities and Participation Affected:  1. General Tasks and Demands  2. Mobility   Number of elements that affect the Plan of Care: 4+: HIGH COMPLEXITY   CLINICAL PRESENTATION:   Presentation: Stable and uncomplicated: LOW COMPLEXITY   CLINICAL DECISION MAKIN[address] [place] [address] AM-PAC 6 Clicks   Basic Mobility Inpatient Short Form  How much difficulty does the patient currently have. .. Unable A Lot A Little None   1. Turning over in bed (including adjusting bedclothes, sheets and blankets)? [ ] 1   [ ] 2   [X] 3   [ ] 4   2. Sitting down on and standing up from a chair with arms ( e.g., wheelchair, bedside commode, etc.)   [ ] 1   [x ] 2   [] 3   [ ] 4   3. Moving from lying on back to sitting on the side of the bed? [ ] 1   [ ] 2   [X] 3   [ ] 4   How much help from another person does the patient currently need. .. Total A Lot A Little None   4. Moving to and from a bed to a chair (including a wheelchair)? [ ] 1   [x ] 2   [] 3   [ ] 4   5. Need to walk in hospital room? [ ] 1   [ x] 2   [] 3   [ ] 4   6. Climbing 3-5 steps with a railing? [X] 1   [ ] 2   [ ] 3   [ ] 4   © , Trustees of [address] [place], [place] [address], under license to [employer].  All rights reserved       Score: Initial: 13 Most Recent: X (Date: -- )     Interpretation of Tool:  Represents activities that are increasingly more difficult (i.e. Bed mobility, Transfers, Gait). Score 24 23 22-20 19-15 14-10 9-7 6       Modifier CH CI CJ CK CL CM CN         · Mobility - Walking and Moving Around:               - CURRENT STATUS:    CL - 60%-79% impaired, limited or restricted               - GOAL STATUS:           CK - 40%-59% impaired, limited or restricted               - D/C STATUS:                       ---------------To be determined---------------  [place] / Product Type: Medicare /       Medical Necessity:     · Patient is expected to demonstrate progress in strength, range of motion, balance, coordination and functional technique to decrease assistance required with bed mobility, transfers & gait. Reason for Services/Other Comments:  · Patient continues to require skilled intervention due to pt not independent with functional mobility. Use of outcome tool(s) and clinical judgement create a POC that gives a: Clear prediction of patient's progress: LOW COMPLEXITY                 TREATMENT:   (In addition to Assessment/Re-Assessment sessions the following treatments were rendered)      Pre-treatment Symptoms/Complaints:  none  Pain: Initial: visual scale  Pain Intensity 1: 0 (3/10 after therapy)  Post Session:        Gait Training (15 Minutes):  Gait training to improve and/or restore physical functioning as related to mobility. Ambulated 100 Feet (ft) with Contact guard assistance using a Walker, rolling and minimal   related to their knee position and motion to promote proper body alignment. Therapeutic Exercise: (45 Minutes (group)):  Exercises per grid below to improve strength. Required minimal verbal cues to promote proper body alignment. Progressed range as indicated.                 Date:  7/18    Date:    Date:      ACTIVITY/EXERCISE AM PM AM PM AM PM   GROUP THERAPY  [ ]  [x ]  [ ]  [ ]  [ ]  [ ]   Ankle Pumps 15   15           Quad Sets  15  15           Gluteal Sets  15  15           Hip ABd/ADduction  15  15           Straight Leg Raises  15  15           Knee Slides  15  15           Short Arc Quads  15  15           Long Arc Quads               Chair Slides    15                           B = bilateral; AA = active assistive; A = active; P = passive       Treatment/Session Assessment:         Response to Treatment:  Tolerated exercises and gait well. Education:  [ ] Home Exercises  [X] Fall Precautions  [ ] Hip Precautions [ ] D/C Instruction Review  [ ] Knee/Hip Prosthesis Review  [X] Walker Management/Safety [ ] Adaptive Equipment as Needed         Interdisciplinary Collaboration:   · Registered Nurse     After treatment position/precautions:   · Up in chair, Bed/Chair-wheels locked, Bed in low position, Caregiver at bedside, Call light within reach, RN notified and Family at bedside     Compliance with Program/Exercises: Will assess as treatment progresses. Recommendations/Intent for next treatment session:  Treatment next visit will focus on increasing Mr. Jj Hawkins independence with bed mobility, transfers, gait training, strength/ROM exercises, modalities for pain, and patient education.        Total Treatment Duration:  PT Patient Time In/Time Out  Time In: 1300  Time Out: 59[address] [facility]

## 2017-07-18 NOTE — PROGRESS NOTES
*Acute Goals and Plan of Care (Insert Text)  GOALS (time frame is 3 days):   1. Patient will perform lower body dressing with min assist with DME as needed. progressing  2. Patient will perform toilet transfer with contact guard assist with adaptive equipment. progressing  3. Patient will perform shower transfer with contact guard assist with adaptive equipment. progressing      OCCUPATIONAL THERAPY: Daily Note and AM 7/18/2017  INPATIENT: Hospital Day: 2  [place] / Product Type: Medicare /      NAME/AGE/GENDER: Carli Duckworth is a 73 y.o. male   PRIMARY DIAGNOSIS:  Primary osteoarthritis of right knee [M17.11] Status post total right knee replacement Status post total right knee replacement  Procedure(s) (LRB):  RIGHT KNEE ARTHROPLASTY TOTAL  (Right)  1 Day Post-Op  ICD-10: Treatment Diagnosis:    · Pain in Right Knee (M25.561)  · Stiffness of Right Knee, Not elsewhere classified (M25.661)   Precautions/Allergies:     Bactrim [sulfamethoxazole-trimethoprim]      ASSESSMENT:       Mr. Micah Larson is s/p right TKA and presents with decreased weight bearing on right LE and decreased independence with functional mobility and activities of daily living. Patient completed shower and dressing as charter below in ADL grid and is ambulating with rolling walker and minimal assist.  Patient has not met his goals but is making progress towards them. He plans to return home with good family support. Family able to provide patient with appropriate level of assistance at this time. OT reviewed safety precautions throughout session and therapy schedule for the remainder of today. Patient instructed to call for assistance when needing to get up from recliner and all needs in reach. Patient verbalized understanding of call light. This section established at most recent assessment   PROBLEM LIST (Impairments causing functional limitations):  1. Decreased Strength  2.  Decreased ADL/Functional Activities  3. Decreased Transfer Abilities  4. Increased Pain  5. Decreased Activity Tolerance  6. Decreased Skin Integrity/Hygeine  7. Decreased [place] with Home Exercise Program   INTERVENTIONS PLANNED: (Benefits and precautions of occupational therapy have been discussed with the patient.)  1. Activities of daily living training  2. Adaptive equipment training  3. Balance training  4. Clothing management  5. Hygiene training  6. Therapeutic activity  7. Therapeutic exercise     TREATMENT PLAN: Frequency/Duration: Follow patient length of hospital stay to address above goals. Rehabilitation Potential For Stated Goals: Good     RECOMMENDED REHABILITATION/EQUIPMENT: (at time of discharge pending progress): Continue Skilled Therapy and Rehab. OCCUPATIONAL PROFILE AND HISTORY:   History of Present Injury/Illness (Reason for Referral):  S/P right TKA  Past Medical History/Comorbidities:   Mr. Devang Loyd  has a past medical history of Asthma (dx childhood); Atrial fibrillation ([address] Cervical spondylosis (6/20/2013); Chronic back pain (2013); Chronic pain; Colon polyps; COPD (chronic obstructive pulmonary disease) ([address] (8/17/2016); Coronary atherosclerosis of native coronary vessel (8/17/2016); Diabetes ([address] (dx 5/14); Diverticulosis; Edema (8/17/2016); Former smoker; GERD (gastroesophageal reflux disease); High cholesterol; History of kidney stones; Hypertension; Obesity (BMI 30-39.9); Status post total right knee replacement (7/17/2017); and Unspecified sleep apnea. He also has no past medical history of Adverse effect of anesthesia; Aneurysm ([address] Autoimmune disease ([address] Cancer ([address] Chronic kidney disease; Coagulation defects; Coagulation disorder ([address] DEMENTIA; Difficult intubation; Endocarditis; Heart failure ([address] Ill-defined condition; Infectious disease; Liver disease; Malignant hyperthermia due to anesthesia;  Nausea & vomiting; Neurological disorder; Nicotine vapor product user; Non-nicotine vapor product user; Other ill-defined conditions; Pseudocholinesterase deficiency; Psychiatric disorder; PUD (peptic ulcer disease); Rheumatic fever; Seizures ([facility] Stroke [facility]); Thromboembolus ([facility] Thyroid disease; or Unspecified adverse effect of anesthesia. Mr. Kinsey  has a past surgical history that includes cystoscopy,insert ureteral stent; colonoscopy (10/6/14); colonoscopy (11/20/14); lumbar laminectomy (2009); retinal detachment repair (Bilateral); cataract removal; other surgical (20 yrs ago); colectomy (2015); and heart catheterization (2012). Social History/Living Environment:   Home Environment: Private residence  # Steps to Enter: 0  One/Two Story Residence: Two story, live on 1st floor  Support Systems: Spouse/Significant Other/Partner  Patient Expects to be Discharged to[de-identified] Unknown (home or [facility]-rehab)  Prior Level of Function/Work/Activity:  Independent with no use of DME     Number of Personal Factors/Comorbidities that affect the Plan of Care: Brief history (0):  LOW COMPLEXITY   ASSESSMENT OF OCCUPATIONAL PERFORMANCE[de-identified]   Activities of Daily Living:           Basic ADLs (From Assessment) Complex ADLs (From Assessment)   Basic ADL  Feeding: Setup  Oral Facial Hygiene/Grooming: Minimum assistance  Bathing: Moderate assistance  Upper Body Dressing: Setup  Lower Body Dressing: Maximum assistance  Toileting: Maximum assistance Instrumental ADL  Meal Preparation: Total assistance  Homemaking: Total assistance  Medication Management: Supervision  Financial Management: Supervision   Grooming/Bathing/Dressing Activities of Daily Living   Grooming  Grooming Assistance: Supervision/set up  Washing Face: Supervision/set-up  Washing Hands: Supervision/set-up  Brushing/Combing Hair: Supervision/set-up Cognitive Retraining  Safety/Judgement: Awareness of environment; Fall prevention   Upper Body Bathing  Bathing Assistance: Supervision/set-up  Position Performed: Seated in chair  Adaptive Equipment: Grab bar; Shower chair     Lower Body Bathing  Bathing Assistance: Supervision/set-up  Perineal  : Supervision/set-up  Position Performed: Seated in chair  Lower Body : Stand-by assistance  Position Performed: Seated in chair     Upper Body Dressing Assistance  Dressing Assistance: Minimum assistance  Pullover Shirt: Minimum assistance Functional Transfers  Toilet Transfer : Minimum assistance  Shower Transfer: Minimum assistance  Adaptive Equipment: Bedside commode;Grab bars; Shower chair with back   Lower Body Dressing Assistance  Dressing Assistance: Minimum assistance  Underpants: Contact guard assistance;Minimum assistance  Pants With Elastic Waist: Contact guard assistance;Minimum assistance  Socks: Moderate assistance  Antiembolitic Stockings: Maximum assistance Bed/Mat Mobility  Supine to Sit: Contact guard assistance  Sit to Supine:  (left up in chair)  Sit to Stand: Minimum assistance  Scooting: Contact guard assistance       Most Recent Physical Functioning:   Gross Assessment:                  Posture:     Balance:    Bed Mobility:  Supine to Sit: Contact guard assistance  Sit to Supine:  (left up in chair)  Scooting: Contact guard assistance  Wheelchair Mobility:     Transfers:  Sit to Stand: Minimum assistance  Stand to Sit: Minimum assistance            Patient Vitals for the past 6 hrs:   BP BP Patient Position SpO2 Pulse   07/18/17 0743 116/69 At rest 93 % 68   07/18/17 0933 - - 96 % -   07/18/17 1140 98/52 Sitting 92 % 64       Mental Status  Neurologic State: Alert, Appropriate for age  Orientation Level: Appropriate for age  Cognition: Appropriate decision making, Appropriate for age attention/concentration, Appropriate safety awareness, Follows commands  Perception: Appears intact  Perseveration: No perseveration noted  Safety/Judgement: Awareness of environment, Fall prevention                          Physical Skills Involved:  1.  Range of Motion  2. Balance  3. Strength  4. Activity Tolerance  5. Pain (acute) Cognitive Skills Affected (resulting in the inability to perform in a timely and safe manner): 1. none Psychosocial Skills Affected:  1. none   Number of elements that affect the Plan of Care: 1-3:  LOW COMPLEXITY   CLINICAL DECISION MAKIN[address] [place] [address] AM-PAC 6 Clicks   Daily Activity Inpatient Short Form  How much help from another person does the patient currently need. .. Total A Lot A Little None   1. Putting on and taking off regular lower body clothing? [] 1   [x] 2   [] 3   [] 4   2. Bathing (including washing, rinsing, drying)? [] 1   [x] 2   [] 3   [] 4   3. Toileting, which includes using toilet, bedpan or urinal?   [] 1   [x] 2   [] 3   [] 4   4. Putting on and taking off regular upper body clothing? [] 1   [] 2   [x] 3   [] 4   5. Taking care of personal grooming such as brushing teeth? [] 1   [] 2   [x] 3   [] 4   6. Eating meals? [] 1   [] 2   [] 3   [x] 4   © , Trustees of [address] [place] [address], under license to [employer]. All rights reserved      Score:  Initial: 16 Most Recent: X (Date: -- )    Interpretation of Tool:  Represents activities that are increasingly more difficult (i.e. Bed mobility, Transfers, Gait). Score 24 23 22-20 19-15 14-10 9-7 6     Modifier CH CI CJ CK CL CM CN      ? Self Care:     - CURRENT STATUS: CK - 40%-59% impaired, limited or restricted    - GOAL STATUS: CI - 1%-19% impaired, limited or restricted    - D/C STATUS:  ---------------To be determined---------------  [place] / Product Type: Medicare /      Medical Necessity:     · Patient demonstrates good rehab potential due to higher previous functional level. Reason for Services/Other Comments:  · Patient continues to require skilled intervention due to decreased independence with self cares and functional mobility. .   Use of outcome tool(s) and clinical judgement create a POC that gives a: LOW COMPLEXITY         TREATMENT:   (In addition to Assessment/Re-Assessment sessions the following treatments were rendered)     Pre-treatment Symptoms/Complaints:    Pain: Initial:   Pain Intensity 1: 3  Pain Location 1: Knee  Pain Orientation 1: Right  Pain Intervention(s) 1: Shower 2/10 Post Session:  3/10 iceman in place     Self Care: (48): Procedure(s) (per grid) utilized to improve and/or restore self-care/home management as related to dressing, bathing, toileting and grooming. Required moderate visual, verbal and tactile cueing to facilitate activities of daily living skills and compensatory activities. ·    Treatment/Session Assessment:    · Response to Treatment:  Tolerated well  · Interdisciplinary Collaboration:   o Occupational Therapist  o Registered Nurse  o Certified Nursing Assistant/Patient Care Technician  · After treatment position/precautions:   o Up in chair  o Bed/Chair-wheels locked  o Bed in low position  o Caregiver at bedside  o Call light within reach  o RN notified  o Family at bedside   · Compliance with Program/Exercises: compliant all the time  · Recommendations/Intent for next treatment session: \"Next visit will focus on advancements to more challenging activities and reduction in assistance provided\".   Total Treatment Duration:48 minutes  OT Patient Time In/Time Out  Time In: 7451  Time Out: 9[facility], OTR/L

## 2017-07-18 NOTE — PROGRESS NOTES
Neurovascular and peripheral vascular checks are WDL to BLE. Instructed not to ambulate without assistance from staff. Pt verbalized understanding. Call light in reach. Teaching performed regarding administration of insulin injection. Pt instructed on picking site, cleanse skin, and inject insulin. Pt performed without difficulty.

## 2017-07-18 NOTE — PROGRESS NOTES
Interdisciplinary team rounds were held 7/18/2017 with the following team members:Care Management, Nursing and Physical Therapy and the patient. Plan of Care options were discussed with the team and the patient. The patient would benefit from a screening and/or visit from Care Management.     [facility] [place] [facility]

## 2017-07-18 NOTE — PROGRESS NOTES
Hospitalist Progress Note    Subjective:   Daily Progress Note: 7/18/2017 10:14 AM    POD 1  Hg stable  Blood sugars elevated: 315/304/269/253/239  Patient received decadron yesterday  No sob, chest pain, polyuria.    22 unit SSI given since yesterday afternoon    Current Facility-Administered Medications   Medication Dose Route Frequency    metFORMIN (GLUCOPHAGE) tablet 1,000 mg  1,000 mg Oral BID    insulin detemir (LEVEMIR) injection 5 Units  5 Units SubCUTAneous ONCE    albuterol (PROVENTIL HFA, VENTOLIN HFA, PROAIR HFA) inhaler 2 Puff  2 Puff Inhalation Q4H PRN    budesonide (PULMICORT) 500 mcg/2 ml nebulizer suspension  2 mL Inhalation BID RT    albuterol (PROVENTIL VENTOLIN) nebulizer solution 2.5 mg  2.5 mg Nebulization DAILY PRN    chlorthalidone (HYGROTEN) tablet 25 mg  25 mg Oral DAILY    diazePAM (VALIUM) tablet 5 mg  5 mg Oral Q8H PRN    dapagliflozin tab 10 mg  10 mg Oral DAILY    doxazosin (CARDURA) tablet 2 mg  2 mg Oral DAILY    folic acid (FOLVITE) tablet 1 mg  1 mg Oral DAILY    lisinopril (PRINIVIL, ZESTRIL) tablet 10 mg  10 mg Oral DAILY    meclizine (ANTIVERT) tablet 25 mg  25 mg Oral TID PRN    metoprolol tartrate (LOPRESSOR) tablet 25 mg  25 mg Oral BID    montelukast (SINGULAIR) tablet 10 mg  10 mg Oral QHS    pantoprazole (PROTONIX) tablet 40 mg  40 mg Oral ACB    0.9% sodium chloride infusion  100 mL/hr IntraVENous CONTINUOUS    sodium chloride (NS) flush 5-10 mL  5-10 mL IntraVENous Q8H    sodium chloride (NS) flush 5-10 mL  5-10 mL IntraVENous PRN    acetaminophen (TYLENOL) tablet 1,000 mg  1,000 mg Oral Q6H    celecoxib (CELEBREX) capsule 200 mg  200 mg Oral Q12H    HYDROmorphone (DILAUDID) tablet 2 mg  2 mg Oral Q4H PRN    HYDROmorphone (PF) (DILAUDID) injection 1 mg  1 mg IntraVENous Q3H PRN    naloxone (NARCAN) injection 0.2-0.4 mg  0.2-0.4 mg IntraVENous Q10MIN PRN    dexamethasone (DECADRON) injection 10 mg  10 mg IntraVENous ONCE    ondansetron (ZOFRAN) injection 4 mg  4 mg IntraVENous Q4H PRN    diphenhydrAMINE (BENADRYL) capsule 25 mg  25 mg Oral Q4H PRN    senna-docusate (PERICOLACE) 8.6-50 mg per tablet 2 Tab  2 Tab Oral DAILY    apixaban (ELIQUIS) tablet 5 mg  5 mg Oral BID    albuterol (PROVENTIL VENTOLIN) nebulizer solution 2.5 mg  2.5 mg Nebulization Q6H RT    insulin lispro (HUMALOG) injection   SubCUTAneous AC&HS    insulin detemir (LEVEMIR) injection 10 Units  10 Units SubCUTAneous QHS    Check PPD Skin Test Reminder Note x 3  Days  1 Each Other Q24H        Review of Systems  A comprehensive review of systems was negative except for that written in the HPI.     Objective:     Visit Vitals    /69 (BP 1 Location: Left arm, BP Patient Position: At rest)    Pulse 68    Temp 97.8 °F (36.6 °C)    Resp 18    Ht 6' 6.5\" (1.994 m)    Wt 155.1 kg (342 lb)    SpO2 96%    BMI 39.02 kg/m2    O2 Flow Rate (L/min): 2 l/min (Decreased to room air ) O2 Device: Room air    Temp (24hrs), Av.7 °F (36.5 °C), Min:96.2 °F (35.7 °C), Max:98.7 °F (37.1 °C)          1901 -  0700  In: 4539 [P.O.:1060; I.V.:3479]  Out: 3225 [Urine:3125]    General: awake, alert, oriented, no acute distress, overweight  Eyes; non icteric, EOMI  Neck; supple  CV; RRR  Abd; soft, non tender    Additional comments:I reviewed the patient's new clinical lab test results. j    Data Review    Recent Results (from the past 24 hour(s))   GLUCOSE, POC    Collection Time: 17 10:47 AM   Result Value Ref Range    Glucose (POC) 253 (H) 65 - 100 mg/dL   GLUCOSE, POC    Collection Time: 17 11:41 AM   Result Value Ref Range    Glucose (POC) 269 (H) 65 - 100 mg/dL   GLUCOSE, POC    Collection Time: 17  5:24 PM   Result Value Ref Range    Glucose (POC) 304 (H) 65 - 100 mg/dL   HEMOGLOBIN    Collection Time: 17  8:29 PM   Result Value Ref Range    HGB 12.4 (L) 13.6 - 17.2 g/dL   GLUCOSE, POC    Collection Time: 17  8:53 PM   Result Value Ref Range Glucose (POC) 315 (H) 65 - 100 mg/dL   HEMOGLOBIN    Collection Time: 07/18/17  4:20 AM   Result Value Ref Range    HGB 12.1 (L) 13.6 - 79.9 g/dL   METABOLIC PANEL, BASIC    Collection Time: 07/18/17  4:20 AM   Result Value Ref Range    Sodium 139 136 - 145 mmol/L    Potassium 4.1 3.5 - 5.1 mmol/L    Chloride 103 98 - 107 mmol/L    CO2 27 21 - 32 mmol/L    Anion gap 9 7 - 16 mmol/L    Glucose 205 (H) 65 - 100 mg/dL    BUN 23 8 - 23 MG/DL    Creatinine 0.97 0.8 - 1.5 MG/DL    GFR est AA >60 >60 ml/min/1.73m2    GFR est non-AA >60 >60 ml/min/1.73m2    Calcium 8.4 8.3 - 10.4 MG/DL         Assessment/Plan:     Principal Problem:    Status post total right knee replacement (7/17/2017)    uncontrolled type 2 diabetes:  Needs insulin. Visited by diabetic educator who will instruct patient on proper insulin administratio. Will increase metformin from 500 bid to 1000 mg BID. Continue lantus and farxiga.      Care Plan discussed with: Patient/Family and Nurse      Signed By: Kathie Johnson MD     July 18, 2017

## 2017-07-18 NOTE — PROGRESS NOTES
07/18/17 0933   Oxygen Therapy   O2 Sat (%) 96 %   Pulse via Oximetry 72 beats per minute   O2 Device Room air   Good npc. Pt working on IS. Pt encouraged to do 10 breaths per hour while awake on IS. B/S clear. No respiratory distress noted at this time.   Pt wore home CPAP last night

## 2017-07-19 LAB
GLUCOSE BLD STRIP.AUTO-MCNC: 179 MG/DL (ref 65–100)
GLUCOSE BLD STRIP.AUTO-MCNC: 182 MG/DL (ref 65–100)
GLUCOSE BLD STRIP.AUTO-MCNC: 182 MG/DL (ref 65–100)
GLUCOSE BLD STRIP.AUTO-MCNC: 212 MG/DL (ref 65–100)
HGB BLD-MCNC: 12.2 G/DL (ref 13.6–17.2)
MM INDURATION POC: 0 MM (ref 0–5)
PPD POC: NORMAL NEGATIVE

## 2017-07-19 PROCEDURE — 94760 N-INVAS EAR/PLS OXIMETRY 1: CPT

## 2017-07-19 PROCEDURE — 65270000029 HC RM PRIVATE

## 2017-07-19 PROCEDURE — 74011250637 HC RX REV CODE- 250/637: Performed by: PHYSICIAN ASSISTANT

## 2017-07-19 PROCEDURE — 74011250636 HC RX REV CODE- 250/636: Performed by: PHYSICIAN ASSISTANT

## 2017-07-19 PROCEDURE — 97150 GROUP THERAPEUTIC PROCEDURES: CPT

## 2017-07-19 PROCEDURE — 97116 GAIT TRAINING THERAPY: CPT

## 2017-07-19 PROCEDURE — 85018 HEMOGLOBIN: CPT | Performed by: PHYSICIAN ASSISTANT

## 2017-07-19 PROCEDURE — 97110 THERAPEUTIC EXERCISES: CPT

## 2017-07-19 PROCEDURE — 36415 COLL VENOUS BLD VENIPUNCTURE: CPT | Performed by: PHYSICIAN ASSISTANT

## 2017-07-19 PROCEDURE — 82962 GLUCOSE BLOOD TEST: CPT

## 2017-07-19 PROCEDURE — 74011250637 HC RX REV CODE- 250/637: Performed by: INTERNAL MEDICINE

## 2017-07-19 PROCEDURE — 74011636637 HC RX REV CODE- 636/637: Performed by: INTERNAL MEDICINE

## 2017-07-19 RX ORDER — FACIAL-BODY WIPES
10 EACH TOPICAL DAILY PRN
Status: DISCONTINUED | OUTPATIENT
Start: 2017-07-19 | End: 2017-07-20 | Stop reason: HOSPADM

## 2017-07-19 RX ORDER — POLYETHYLENE GLYCOL 3350 17 G/17G
17 POWDER, FOR SOLUTION ORAL DAILY
Status: DISCONTINUED | OUTPATIENT
Start: 2017-07-19 | End: 2017-07-20 | Stop reason: HOSPADM

## 2017-07-19 RX ADMIN — ACETAMINOPHEN 1000 MG: 500 TABLET, FILM COATED ORAL at 17:56

## 2017-07-19 RX ADMIN — CELECOXIB 200 MG: 200 CAPSULE ORAL at 22:04

## 2017-07-19 RX ADMIN — INSULIN LISPRO 4 UNITS: 100 INJECTION, SOLUTION INTRAVENOUS; SUBCUTANEOUS at 17:57

## 2017-07-19 RX ADMIN — ACETAMINOPHEN 1000 MG: 500 TABLET, FILM COATED ORAL at 06:33

## 2017-07-19 RX ADMIN — METOPROLOL TARTRATE 25 MG: 25 TABLET, FILM COATED ORAL at 17:56

## 2017-07-19 RX ADMIN — LISINOPRIL 10 MG: 5 TABLET ORAL at 09:18

## 2017-07-19 RX ADMIN — METFORMIN HYDROCHLORIDE 1000 MG: 500 TABLET, FILM COATED ORAL at 17:56

## 2017-07-19 RX ADMIN — Medication 10 ML: at 22:00

## 2017-07-19 RX ADMIN — Medication 10 ML: at 22:05

## 2017-07-19 RX ADMIN — HYDROMORPHONE HYDROCHLORIDE 2 MG: 2 TABLET ORAL at 06:33

## 2017-07-19 RX ADMIN — SENNOSIDES AND DOCUSATE SODIUM 2 TABLET: 8.6; 5 TABLET ORAL at 09:17

## 2017-07-19 RX ADMIN — HYDROMORPHONE HYDROCHLORIDE 2 MG: 2 TABLET ORAL at 13:53

## 2017-07-19 RX ADMIN — INSULIN LISPRO 2 UNITS: 100 INJECTION, SOLUTION INTRAVENOUS; SUBCUTANEOUS at 07:35

## 2017-07-19 RX ADMIN — HYDROMORPHONE HYDROCHLORIDE 2 MG: 2 TABLET ORAL at 10:07

## 2017-07-19 RX ADMIN — HYDROMORPHONE HYDROCHLORIDE 1 MG: 1 INJECTION, SOLUTION INTRAMUSCULAR; INTRAVENOUS; SUBCUTANEOUS at 00:39

## 2017-07-19 RX ADMIN — METFORMIN HYDROCHLORIDE 1000 MG: 500 TABLET, FILM COATED ORAL at 09:18

## 2017-07-19 RX ADMIN — HYDROMORPHONE HYDROCHLORIDE 1 MG: 1 INJECTION, SOLUTION INTRAMUSCULAR; INTRAVENOUS; SUBCUTANEOUS at 22:04

## 2017-07-19 RX ADMIN — ONDANSETRON 4 MG: 2 INJECTION INTRAMUSCULAR; INTRAVENOUS at 00:38

## 2017-07-19 RX ADMIN — INSULIN LISPRO 2 UNITS: 100 INJECTION, SOLUTION INTRAVENOUS; SUBCUTANEOUS at 22:02

## 2017-07-19 RX ADMIN — INSULIN HUMAN 5 UNITS: 100 INJECTION, SUSPENSION SUBCUTANEOUS at 09:16

## 2017-07-19 RX ADMIN — ACETAMINOPHEN 1000 MG: 500 TABLET, FILM COATED ORAL at 00:39

## 2017-07-19 RX ADMIN — MONTELUKAST SODIUM 10 MG: 10 TABLET, FILM COATED ORAL at 22:04

## 2017-07-19 RX ADMIN — INSULIN LISPRO 2 UNITS: 100 INJECTION, SOLUTION INTRAVENOUS; SUBCUTANEOUS at 11:45

## 2017-07-19 RX ADMIN — FOLIC ACID 1 MG: 1 TABLET ORAL at 09:20

## 2017-07-19 RX ADMIN — Medication 5 ML: at 14:00

## 2017-07-19 RX ADMIN — METOPROLOL TARTRATE 25 MG: 25 TABLET, FILM COATED ORAL at 09:18

## 2017-07-19 RX ADMIN — DOXAZOSIN MESYLATE 2 MG: 1 TABLET ORAL at 09:19

## 2017-07-19 RX ADMIN — CHLORTHALIDONE 25 MG: 25 TABLET ORAL at 09:20

## 2017-07-19 RX ADMIN — HYDROMORPHONE HYDROCHLORIDE 2 MG: 2 TABLET ORAL at 17:56

## 2017-07-19 RX ADMIN — CELECOXIB 200 MG: 200 CAPSULE ORAL at 09:18

## 2017-07-19 RX ADMIN — PANTOPRAZOLE SODIUM 40 MG: 40 TABLET, DELAYED RELEASE ORAL at 06:34

## 2017-07-19 RX ADMIN — APIXABAN 5 MG: 5 TABLET, FILM COATED ORAL at 09:18

## 2017-07-19 RX ADMIN — INSULIN DETEMIR 15 UNITS: 100 INJECTION, SOLUTION SUBCUTANEOUS at 22:01

## 2017-07-19 NOTE — PROGRESS NOTES
Dilaudid 1 mg iv for c/o pain at 7. Very upset because he still does not feel comfortable in big bed and he wants to sleep. Wife anxious also. Talked to them awhile. He was asleep shortly after pain med. Wife said she was going to sleep too. Dressing dry and intact except with few spots on rt. Knee dressing. neuro vas. cks good to both feet. Moves slowly when up.

## 2017-07-19 NOTE — PROGRESS NOTES
Problem: Mobility Impaired (Adult and Pediatric)  Goal: *Acute Goals and Plan of Care (Insert Text)  GOALS (1-4 days):  (1.)Mr. Ryann Zarate will move from supine to sit and sit to supine in bed with SUPERVISION. (2.)Mr. Ryann Zarate will transfer from bed to chair and chair to bed with STAND BY ASSIST using the least restrictive device. (3.)Mr. Ryann Zarate will ambulate with STAND BY ASSIST for 200 feet with the least restrictive device. (4.)Mr. Ryann Zarate will ambulate up/down 1 steps with No railing with CONTACT GUARD ASSIST with walker. D/C - going to rehab and to be addressed there  (5.)Mr. Ryann Zarate will increase right knee ROM to 5°-80°. Flex 7/18  ________________________________________________________________________________________________      PHYSICAL THERAPY JOINT CAMP TKA: Daily Note and PM 7/19/2017  INPATIENT: Hospital Day: 3  [place] / Product Type: Medicare /      NAME/AGE/GENDER: Graciela Stein is a 73 y.o. male       PRIMARY DIAGNOSIS:  Primary osteoarthritis of right knee [M17.11]              Procedure(s) and Anesthesia Type:     * RIGHT KNEE ARTHROPLASTY TOTAL  - General (Right)  ICD-10: Treatment Diagnosis:        · Pain in Right Knee (M25.561)  · Stiffness of Right Knee, Not elsewhere classified (M25.661)  · Difficulty in walking, Not elsewhere classified (R26.2)       ASSESSMENT:      Mr. Ryann Zarate presents with impaired strength & mobility s/p right TKA. Pt also had decreased stability during out of bed activity. Pt will benefit from follow up therapy to help restore safe function prior to returning home with caregiver. Patient tolerated afternoon therapy session fairly well. Patient had just had a shower and tired. Patient needing lots of encouragement to increase ambulation distance this afternoon. Using a step to pattern and continuously looking down at his feet.   Cued patient several times to decrease his step length on the R to avoid step to pattern and to look straight ahead - unable to sustain either. Reminded patient of therapy plan for tomorrow several times - 9:00 therapy and then discharge to rehab. This section established at most recent assessment   PROBLEM LIST (Impairments causing functional limitations):  1. Decreased Strength  2. Decreased ADL/Functional Activities  3. Decreased Transfer Abilities  4. Decreased Ambulation Ability/Technique  5. Decreased Balance  6. Increased Pain  7. Decreased Activity Tolerance  8. Decreased Flexibility/Joint Mobility  9. Decreased [place] with Home Exercise Program    INTERVENTIONS PLANNED: (Benefits and precautions of physical therapy have been discussed with the patient.)  1. Bed Mobility  2. Gait Training  3. Home Exercise Program (HEP)  4. Therapeutic Exercise/Strengthening  5. Transfer Training  6. Range of Motion: active/assisted/passive  7. Therapeutic Activities  8. Group Therapy      TREATMENT PLAN: Frequency/Duration: Follow patient BID   to address above goals. Rehabilitation Potential For Stated Goals: GOOD      RECOMMENDED REHABILITATION/EQUIPMENT: (at time of discharge pending progress): Home Health: Physical Therapy. ( pt could return home with HHPT & 24/7 assist from spouse for safety )                   HISTORY:   History of Present Injury/Illness (Reason for Referral): The patient has end stage arthritis of the right knee. The patient was evaluated and examined during a consultation prior to this office visit. There have been no changes to the patient's orthopedic condition since the initial consultation. The patient has failed previous conservative treatment for this condition including antiinflammatories , and lifestyle modifications. The necessity for joint replacement is present.  The patient will be admitted the day of surgery for Procedure(s) (LRB):  RIGHT KNEE ARTHROPLASTY TOTAL / FNB / JENELLE (Right)       Past Medical History/Comorbidities:   Gamaliel Deann Knapp  has a past medical history of Asthma (dx childhood); Atrial fibrillation ([address] Cervical spondylosis (6/20/2013); Chronic back pain (2013); Chronic pain; Colon polyps; COPD (chronic obstructive pulmonary disease) ([address] (8/17/2016); Coronary atherosclerosis of native coronary vessel (8/17/2016); Diabetes ([address] (dx 5/14); Diverticulosis; Edema (8/17/2016); Former smoker; GERD (gastroesophageal reflux disease); High cholesterol; History of kidney stones; Hypertension; Obesity (BMI 30-39.9); Status post total right knee replacement (7/17/2017); and Unspecified sleep apnea. He also has no past medical history of Adverse effect of anesthesia; Aneurysm ([address] Autoimmune disease ([facility] Cancer ([facility] [facility] Chronic kidney disease; Coagulation defects; Coagulation disorder ([address] DEMENTIA; Difficult intubation; Endocarditis; Heart failure ([address] Ill-defined condition; Infectious disease; Liver disease; Malignant hyperthermia due to anesthesia; Nausea & vomiting; Neurological disorder; Nicotine vapor product user; Non-nicotine vapor product user; Other ill-defined conditions; Pseudocholinesterase deficiency; Psychiatric disorder; PUD (peptic ulcer disease); Rheumatic fever; Seizures ([facility] [address] Stroke [facility]); Thromboembolus ([address] Thyroid disease; or Unspecified adverse effect of anesthesia. Mr. Deann Knapp  has a past surgical history that includes cystoscopy,insert ureteral stent; colonoscopy (10/6/14); colonoscopy (11/20/14); lumbar laminectomy (2009); retinal detachment repair (Bilateral); cataract removal; other surgical (20 yrs ago); colectomy (2015); and heart catheterization (2012).   Social History/Living Environment:   Home Environment: Private residence  # Steps to Enter: 0  One/Two Story Residence: Two story, live on 1st floor  # of Interior Steps: 15  Interior Rails: Both  Living Alone: No  Support Systems: Spouse/Significant Other/Partner  Patient Expects to be Discharged to[de-identified] Private residence  Current DME Used/Available at Home: None  Tub or Shower Type: Tub/Shower combination  Prior Level of Function/Work/Activity:  Pt was independent without an assistive device prior to this admission   Number of Personal Factors/Comorbidities that affect the Plan of Care: 3+: HIGH COMPLEXITY   EXAMINATION:   Most Recent Physical Functioning:   Gross Assessment: Yes  Gross Assessment  AROM: Generally decreased, functional (R TKA)  Strength: Generally decreased, functional (R TKA)  Coordination: Generally decreased, functional         RLE AROM  R Knee Flexion: 87  R Knee Extension: 10        RLE Strength  R Hip Flexion: 3-  R Knee Extension: 3-           Transfers  Sit to Stand: Stand-by asssistance  Stand to Sit: Stand-by asssistance     Balance  Sitting: Intact  Standing: Pull to stand; With support                Weight Bearing Status  Right Side Weight Bearing: As tolerated  Distance (ft): 132 Feet (ft)  Ambulation - Level of Assistance: Contact guard assistance  Assistive Device: Walker, rolling  Speed/Charlette: Pace decreased (<100 feet/min)  Step Length: Left shortened  Stance: Right decreased  Gait Abnormalities: Antalgic; Step to gait  Interventions: Safety awareness training;Verbal cues; Visual/Demos      Braces/Orthotics: none     Right Knee Cold  Type: Cryocuff       Body Structures Involved:  1. Joints  2. Muscles Body Functions Affected:  1. Sensory/Pain  2. Movement Related Activities and Participation Affected:  1. General Tasks and Demands  2. Mobility   Number of elements that affect the Plan of Care: 4+: HIGH COMPLEXITY   CLINICAL PRESENTATION:   Presentation: Stable and uncomplicated: LOW COMPLEXITY   CLINICAL DECISION MAKIN [facility] [address] AM-PAC 6 Clicks   Basic Mobility Inpatient Short Form  How much difficulty does the patient currently have. .. Unable A Lot A Little None   1. Turning over in bed (including adjusting bedclothes, sheets and blankets)? [ ] 1   [ ] 2   [X] 3   [ ] 4   2.   Sitting down on and standing up from a chair with arms ( e.g., wheelchair, bedside commode, etc.)   [ ] 1   [x ] 2   [] 3   [ ] 4   3. Moving from lying on back to sitting on the side of the bed? [ ] 1   [ ] 2   [X] 3   [ ] 4   How much help from another person does the patient currently need. .. Total A Lot A Little None   4. Moving to and from a bed to a chair (including a wheelchair)? [ ] 1   [x ] 2   [] 3   [ ] 4   5. Need to walk in hospital room? [ ] 1   [ x] 2   [] 3   [ ] 4   6. Climbing 3-5 steps with a railing? [X] 1   [ ] 2   [ ] 3   [ ] 4   © 2007, Trustees of [address] [place] [address] [address] [id number], under license to [employer]. All rights reserved       Score:  Initial: 13 Most Recent: X (Date: -- )     Interpretation of Tool:  Represents activities that are increasingly more difficult (i.e. Bed mobility, Transfers, Gait). Score 24 23 22-20 19-15 14-10 9-7 6       Modifier CH CI CJ CK CL CM CN         · Mobility - Walking and Moving Around:               - CURRENT STATUS:    CL - 60%-79% impaired, limited or restricted               - GOAL STATUS:           CK - 40%-59% impaired, limited or restricted               - D/C STATUS:                       ---------------To be determined---------------  [place] / Product Type: Medicare /       Medical Necessity:     · Patient is expected to demonstrate progress in strength, range of motion, balance, coordination and functional technique to decrease assistance required with bed mobility, transfers & gait. Reason for Services/Other Comments:  · Patient continues to require skilled intervention due to pt not independent with functional mobility.    Use of outcome tool(s) and clinical judgement create a POC that gives a: Clear prediction of patient's progress: LOW COMPLEXITY                 TREATMENT:   (In addition to Assessment/Re-Assessment sessions the following treatments were rendered)      Pre-treatment Symptoms/Complaints:  Patient just finished getting a shower. Pain: Initial:   Pain Intensity 1: 2  Pain Location 1: Knee  Pain Orientation 1: Right  Pain Intervention(s) 1: Ambulation/Increased Activity, Cold pack, Exercise  Post Session:  2/10      Gait Training (15 Minutes):  Gait training to improve and/or restore physical functioning as related to mobility, balance and coordination. Ambulated 132 Feet (ft) with Contact guard assistance using a Walker, rolling and minimal Safety awareness training;Verbal cues; Visual/Demos related to their knee position and motion to promote proper body alignment. Therapeutic Exercise: (14 Minutes):  Exercises per grid below to improve mobility, strength and coordination. Required minimal verbal cues to promote proper body alignment. Progressed range and repetitions as indicated. Date:  7/18    Date:   7/19/17 Date:      ACTIVITY/EXERCISE AM PM AM PM AM PM   GROUP THERAPY  [ ]  [x ]  [X]  [ ]  [ ]  [ ]   Ankle Pumps 15   15 20   20       Quad Sets  15  15  20  20       Gluteal Sets  15  15  20  20       Hip ABd/ADduction  15  15  20   20       Straight Leg Raises  15  15  20 AA  20 AA       Knee Slides  15  15  20 AA  20        Short Arc Quads  15  15  20 AA  20 AA       Long Arc Quads               Chair Slides    15  20  20                       B = bilateral; AA = active assistive; A = active; P = passive       Treatment/Session Assessment:         Response to Treatment:  Patient tolerated fairly well. Needing to be pushed to increase ambulation distance. Using a step to pattern with limited weight bearing through the R LE and increased weight bearing through his UEs. Unable to sustain gait with increased upright posture and increased step through. Will continue to address.      Education:  [ ] Home Exercises  [X] Fall Precautions  [ ] Hip Precautions [X] D/C Instruction Review  [ ] Knee/Hip Prosthesis Review  [X] Walker Management/Safety [ ] Adaptive Equipment as Needed         Interdisciplinary Collaboration:   · Physical Therapist and Registered Nurse     After treatment position/precautions:   · Up in chair, Bed/Chair-wheels locked, Caregiver at bedside and Call light within reach     Compliance with Program/Exercises:  Compliant all the time. Recommendations/Intent for next treatment session:  Treatment next visit will focus on increasing Mr. Kasie Fowler independence with bed mobility, transfers, gait training, strength/ROM exercises, modalities for pain, and patient education.        Total Treatment Duration:  PT Patient Time In/Time Out  Time In: 6534  Time Out: 27 [facility]

## 2017-07-19 NOTE — PROGRESS NOTES
Shift assessment completed. Pt is alert & oriented x4. Able to verbalize needs. Resting quietly in recliner with no distress noted. Dressing to right knee is clean, dry and intact. Iceman on knee. Neurovascular and peripheral vascular checks WNL. Patient voiding clear yellow urine without difficulty. Bed locked and lowered. Call light within reach. Patient instructed to call for assistance. Pt verbalizes understanding. Will monitor.

## 2017-07-19 NOTE — PROGRESS NOTES
Pt requests [place] bed DT his height. States he is too tall for regular hospital bed. Bed exchanged for [place] bed.

## 2017-07-19 NOTE — PROGRESS NOTES
Problem: Mobility Impaired (Adult and Pediatric)  Goal: *Acute Goals and Plan of Care (Insert Text)  GOALS (1-4 days):  (1.)Mr. Tree Corey will move from supine to sit and sit to supine in bed with SUPERVISION. (2.)Mr. Tree Corey will transfer from bed to chair and chair to bed with STAND BY ASSIST using the least restrictive device. (3.)Mr. Tree Corey will ambulate with STAND BY ASSIST for 200 feet with the least restrictive device. (4.)Mr. Tree Corey will ambulate up/down 1 steps with No railing with CONTACT GUARD ASSIST with walker. D/C - going to rehab and to be addressed there  (5.)Mr. Tree Corey will increase right knee ROM to 5°-80°. Flex 7/18  ________________________________________________________________________________________________      PHYSICAL THERAPY JOINT CAMP TKA: Daily Note and AM 7/19/2017  INPATIENT: Hospital Day: 3  [place] / Product Type: Medicare /      NAME/AGE/GENDER: Cristian Murray is a 73 y.o. male       PRIMARY DIAGNOSIS:  Primary osteoarthritis of right knee [M17.11]              Procedure(s) and Anesthesia Type:     * RIGHT KNEE ARTHROPLASTY TOTAL  - General (Right)  ICD-10: Treatment Diagnosis:        · Pain in Right Knee (M25.561)  · Stiffness of Right Knee, Not elsewhere classified (M25.661)  · Difficulty in walking, Not elsewhere classified (R26.2)       ASSESSMENT:      Mr. Tree Corey presents with impaired strength & mobility s/p right TKA. Pt also had decreased stability during out of bed activity. Pt will benefit from follow up therapy to help restore safe function prior to returning home with caregiver. Patient tolerated group session well. Needing some assistance with short arc quad and straight leg raise. Patient did not ambulate as far this morning as yesterday - will encourage patient to progress this afternoon. This section established at most recent assessment   PROBLEM LIST (Impairments causing functional limitations):  1.  Decreased Strength  2. Decreased ADL/Functional Activities  3. Decreased Transfer Abilities  4. Decreased Ambulation Ability/Technique  5. Decreased Balance  6. Increased Pain  7. Decreased Activity Tolerance  8. Decreased Flexibility/Joint Mobility  9. Decreased [place] with Home Exercise Program    INTERVENTIONS PLANNED: (Benefits and precautions of physical therapy have been discussed with the patient.)  1. Bed Mobility  2. Gait Training  3. Home Exercise Program (HEP)  4. Therapeutic Exercise/Strengthening  5. Transfer Training  6. Range of Motion: active/assisted/passive  7. Therapeutic Activities  8. Group Therapy      TREATMENT PLAN: Frequency/Duration: Follow patient BID   to address above goals. Rehabilitation Potential For Stated Goals: GOOD      RECOMMENDED REHABILITATION/EQUIPMENT: (at time of discharge pending progress): Home Health: Physical Therapy. ( pt could return home with HHPT & 24/7 assist from spouse for safety )                   HISTORY:   History of Present Injury/Illness (Reason for Referral): The patient has end stage arthritis of the right knee. The patient was evaluated and examined during a consultation prior to this office visit. There have been no changes to the patient's orthopedic condition since the initial consultation. The patient has failed previous conservative treatment for this condition including antiinflammatories , and lifestyle modifications. The necessity for joint replacement is present. The patient will be admitted the day of surgery for Procedure(s) (LRB):  RIGHT KNEE ARTHROPLASTY TOTAL / FNB / JENELLE (Right)       Past Medical History/Comorbidities:   Mr. Tree Corey  has a past medical history of Asthma (dx childhood); Atrial fibrillation ([facility] [address] Cervical spondylosis (6/20/2013); Chronic back pain (2013); Chronic pain; Colon polyps; COPD (chronic obstructive pulmonary disease) ([facility] [address] (8/17/2016); Coronary atherosclerosis of native coronary vessel (8/17/2016);  Diabetes ([facility] [address] (dx 5/14); Diverticulosis; Edema (8/17/2016); Former smoker; GERD (gastroesophageal reflux disease); High cholesterol; History of kidney stones; Hypertension; Obesity (BMI 30-39.9); Status post total right knee replacement (7/17/2017); and Unspecified sleep apnea. He also has no past medical history of Adverse effect of anesthesia; Aneurysm ([facility] [address] Autoimmune disease ([facility] [address] Cancer ([facility] [address] Chronic kidney disease; Coagulation defects; Coagulation disorder ([facility] [address] DEMENTIA; Difficult intubation; Endocarditis; Heart failure ([facility] [address] Ill-defined condition; Infectious disease; Liver disease; Malignant hyperthermia due to anesthesia; Nausea & vomiting; Neurological disorder; Nicotine vapor product user; Non-nicotine vapor product user; Other ill-defined conditions; Pseudocholinesterase deficiency; Psychiatric disorder; PUD (peptic ulcer disease); Rheumatic fever; Seizures ([facility] [address] Stroke [facility]); Thromboembolus ([facility] [address] Thyroid disease; or Unspecified adverse effect of anesthesia. Mr. Keila Mancilla  has a past surgical history that includes cystoscopy,insert ureteral stent; colonoscopy (10/6/14); colonoscopy (11/20/14); lumbar laminectomy (2009); retinal detachment repair (Bilateral); cataract removal; other surgical (20 yrs ago); colectomy (2015); and heart catheterization (2012).   Social History/Living Environment:   Home Environment: Private residence  # Steps to Enter: 0  One/Two Story Residence: Two story, live on 1st floor  # of Interior Steps: 13  Interior Rails: Both  Living Alone: No  Support Systems: Spouse/Significant Other/Partner  Patient Expects to be Discharged to[de-identified] Private residence  Current DME Used/Available at Home: None  Tub or Shower Type: Tub/Shower combination  Prior Level of Function/Work/Activity:  Pt was independent without an assistive device prior to this admission   Number of Personal Factors/Comorbidities that affect the Plan of Care: 3+: HIGH COMPLEXITY   EXAMINATION:   Most Recent Physical Functioning: Gross Assessment: Yes  Gross Assessment  AROM: Generally decreased, functional (R TKA)  Strength: Generally decreased, functional (R TKA)  Coordination: Generally decreased, functional         RLE AROM  R Knee Flexion: 87  R Knee Extension: 10        RLE Strength  R Hip Flexion: 3-  R Knee Extension: 3-           Transfers  Sit to Stand: Stand-by asssistance  Stand to Sit: Stand-by asssistance     Balance  Sitting: Intact  Standing: Pull to stand; With support                Weight Bearing Status  Right Side Weight Bearing: As tolerated  Distance (ft): 68 Feet (ft)  Ambulation - Level of Assistance: Stand-by asssistance  Assistive Device: Walker, rolling  Speed/Charlette: Slow  Step Length: Left shortened;Right shortened  Stance: Right decreased  Gait Abnormalities: Antalgic  Interventions: Safety awareness training;Verbal cues      Braces/Orthotics: none     Right Knee Cold  Type: Cryocuff       Body Structures Involved:  1. Joints  2. Muscles Body Functions Affected:  1. Sensory/Pain  2. Movement Related Activities and Participation Affected:  1. General Tasks and Demands  2. Mobility   Number of elements that affect the Plan of Care: 4+: HIGH COMPLEXITY   CLINICAL PRESENTATION:   Presentation: Stable and uncomplicated: LOW COMPLEXITY   CLINICAL DECISION MAKIN [place] [id number] AM-PAC 6 Clicks   Basic Mobility Inpatient Short Form  How much difficulty does the patient currently have. .. Unable A Lot A Little None   1. Turning over in bed (including adjusting bedclothes, sheets and blankets)? [ ] 1   [ ] 2   [X] 3   [ ] 4   2. Sitting down on and standing up from a chair with arms ( e.g., wheelchair, bedside commode, etc.)   [ ] 1   [x ] 2   [] 3   [ ] 4   3. Moving from lying on back to sitting on the side of the bed? [ ] 1   [ ] 2   [X] 3   [ ] 4   How much help from another person does the patient currently need. .. Total A Lot A Little None   4.   Moving to and from a bed to a chair (including a wheelchair)? [ ] 1   [x ] 2   [] 3   [ ] 4   5. Need to walk in hospital room? [ ] 1   [ x] 2   [] 3   [ ] 4   6. Climbing 3-5 steps with a railing? [X] 1   [ ] 2   [ ] 3   [ ] 4   © 2007, Trustees of [address] [place] [address] [address] [id number], under license to [employer]. All rights reserved       Score:  Initial: 13 Most Recent: X (Date: -- )     Interpretation of Tool:  Represents activities that are increasingly more difficult (i.e. Bed mobility, Transfers, Gait). Score 24 23 22-20 19-15 14-10 9-7 6       Modifier CH CI CJ CK CL CM CN         · Mobility - Walking and Moving Around:               - CURRENT STATUS:    CL - 60%-79% impaired, limited or restricted               - GOAL STATUS:           CK - 40%-59% impaired, limited or restricted               - D/C STATUS:                       ---------------To be determined---------------  [place] / Product Type: Medicare /       Medical Necessity:     · Patient is expected to demonstrate progress in strength, range of motion, balance, coordination and functional technique to decrease assistance required with bed mobility, transfers & gait. Reason for Services/Other Comments:  · Patient continues to require skilled intervention due to pt not independent with functional mobility. Use of outcome tool(s) and clinical judgement create a POC that gives a: Clear prediction of patient's progress: LOW COMPLEXITY                 TREATMENT:   (In addition to Assessment/Re-Assessment sessions the following treatments were rendered)      Pre-treatment Symptoms/Complaints:  Patient with no new complaints. Pain: Initial: visual scale  Pain Intensity 1: 2  Pain Location 1: Knee  Pain Orientation 1: Right  Post Session:  2/10      Gait Training (15 Minutes):  Gait training to improve and/or restore physical functioning as related to mobility, balance and coordination.   Ambulated 76 Feet (ft) with Stand-by asssistance using a Walker, rolling and minimal Safety awareness training;Verbal cues related to their knee position and motion to promote proper body alignment. Therapeutic Exercise: (30 Minutes (group)):  Exercises per grid below to improve mobility, strength and coordination. Required minimal verbal cues to promote proper body alignment. Progressed range and repetitions as indicated. Date:  7/18    Date:   7/19/17 Date:      ACTIVITY/EXERCISE AM PM AM PM AM PM   GROUP THERAPY  [ ]  [x ]  [X]  [ ]  [ ]  [ ]   Ankle Pumps 15   15 20          Quad Sets  15  15  20         Gluteal Sets  15  15  20         Hip ABd/ADduction  15  15  20          Straight Leg Raises  15  15  20 AA         Knee Slides  15  15  20 AA         Short Arc Quads  15  15  20 AA         Long Arc Quads               Chair Slides    15  20                         B = bilateral; AA = active assistive; A = active; P = passive       Treatment/Session Assessment:         Response to Treatment:  Patient tolerated well. Need encouragement to increase gait distance. Education:  [ ] Home Exercises  [X] Fall Precautions  [ ] Hip Precautions [X] D/C Instruction Review  [ ] Knee/Hip Prosthesis Review  [X] Walker Management/Safety [ ] Adaptive Equipment as Needed         Interdisciplinary Collaboration:   · Physical Therapist, Registered Nurse and Rehabilitation Attendant     After treatment position/precautions:   · Up in chair, Bed/Chair-wheels locked, Caregiver at bedside and Call light within reach     Compliance with Program/Exercises:  Compliant all the time. Recommendations/Intent for next treatment session:  Treatment next visit will focus on increasing Mr. Mike Wilhelm independence with bed mobility, transfers, gait training, strength/ROM exercises, modalities for pain, and patient education.        Total Treatment Duration:  PT Patient Time In/Time Out  Time In: 1030  Time Out: Shraddha Galloway 66. LASHELL Warren

## 2017-07-19 NOTE — DISCHARGE INSTRUCTIONS
[facility]   Patient Discharge Instructions    Elena Linda / [id number] : 1950    Admitted 2017 Discharged: 2017     IF YOU HAVE ANY PROBLEMS ONCE YOU ARE AT HOME CALL THE FOLLOWING NUMBERS:   Main office number: [phone]    Discharge Instructions if going to rehab     · It is important that you take the medication exactly as they are prescribed. · Keep your medication in the bottles provided by the pharmacist and keep a list of the medication names, dosages, and times to be taken in your wallet. · Do not take other medications without consulting your doctor. · Resume your prehospital diet. · While at rehab, the staples are to be taken out 10 days post op and steri strips placed. · A follow up appointment has already been arranged. If you do not know when it is call the office number above. Do NOT smoke as this will greatly increase your risk of infection! .    Information obtained by :  I understand that if any problems occur once I am at home I am to contact my physician. I understand and acknowledge receipt of the instructions indicated above.                                                                                                                                            Physician's or R.N.'s Signature                                                                  Date/Time                                                                                                                                              Patient or Representative Signature                                                          Date/Time

## 2017-07-19 NOTE — PROGRESS NOTES
Dilaudid 2 mg po for c/o pain at 2. Says he slept best he has in several nights. No neuro vas changes.

## 2017-07-19 NOTE — PROGRESS NOTES
Interdisciplinary team rounds were held 7/19/2017 with the following team members:Care Management, Nursing, PT,  and the patient. Plan of Care options were discussed with the team and the patient. The patient would benefit from a screening and/or visit from Care Management.

## 2017-07-19 NOTE — PROGRESS NOTES
2017         Post Op day: 2 Days Post-OpProcedure(s) (LRB):  RIGHT KNEE ARTHROPLASTY TOTAL  (Right)      Admit Date: 2017  Admit Diagnosis: Primary osteoarthritis of right knee [M17.11]    LAB:    Recent Results (from the past 24 hour(s))   PLEASE READ & DOCUMENT PPD TEST IN 24 HRS    Collection Time: 17  8:00 AM   Result Value Ref Range    PPD  Negative    mm Induration 0 mm   GLUCOSE, POC    Collection Time: 17 11:46 AM   Result Value Ref Range    Glucose (POC) 192 (H) 65 - 100 mg/dL   GLUCOSE, POC    Collection Time: 17  4:32 PM   Result Value Ref Range    Glucose (POC) 215 (H) 65 - 100 mg/dL   GLUCOSE, POC    Collection Time: 17  8:06 PM   Result Value Ref Range    Glucose (POC) 214 (H) 65 - 100 mg/dL   HEMOGLOBIN    Collection Time: 17  4:40 AM   Result Value Ref Range    HGB 12.2 (L) 13.6 - 17.2 g/dL   GLUCOSE, POC    Collection Time: 17  6:32 AM   Result Value Ref Range    Glucose (POC) 182 (H) 65 - 100 mg/dL     Vital Signs:    No data found. Temp (24hrs), Av.4 °F (36.9 °C), Min:97.8 °F (36.6 °C), Max:98.7 °F (37.1 °C)    Body mass index is 39.02 kg/(m^2).   Pain Control:   Pain Assessment  Pain Scale 1: Numeric (0 - 10)  Pain Intensity 1: 5  Pain Onset 1: at rest  Pain Location 1: Knee  Pain Orientation 1: Right  Pain Description 1: Aching  Pain Intervention(s) 1: Medication (see MAR)    Subjective: Doing well, No complaints, No SOB, No Chest Pain, No Nausea or Vomitting     Objective: Vital Signs are Stable, No Acute Distress, Alert and Oriented, Dressing is Dry,  Neurovascular exam is normal.       PT/OT:            Assistive Device: Walker (comment)     RLE PROM  R Knee Flexion: 86  R Knee Extension: 15          Weight Bearing Status: WBAT    Meds:  [unfilled]  [unfilled]  [unfilled]    Assessment:   Patient Active Problem List   Diagnosis Code    Hyperlipemia E78.5    Obstructive sleep apnea (adult) (pediatric) G47.33    Essential hypertension I10    Extrinsic asthma J45.909    Colon polyps K63.5    Diverticulosis K57.90    Cervical spondylosis M47.812    BPH (benign prostatic hypertrophy) N40.0    Chronic back pain M54.9, G89.29    Esophageal reflux K21.9    Diabetes mellitus type 2 in obese ([facility]) E11.9, E66.9    CKD (chronic kidney disease), stage II N18.2    Andropause N50.89    Diverticular stricture ([facility]) K56.60    Coronary atherosclerosis of native coronary vessel I25.10    COPD (chronic obstructive pulmonary disease) ([facility]) J44.9    Edema R60.9    Paroxysmal atrial fibrillation ([facility]) I48.0    OA (osteoarthritis) of knee M17.10    Status post total right knee replacement Z96.651             Plan: Continue Physical Therapy, Monitor  Hbg, rehab tomorrow.          Signed By: Judith Sanchez MD

## 2017-07-19 NOTE — PROGRESS NOTES
Hospitalist Progress Note    Subjective:   Daily Progress Note: 7/19/2017 9:09 AM    POD 2  Blood sugars 182/214/285/192  No sob, chest pain, polyuria. Is having difficulty with controlling stream of urine. No dysuria. Is constipated. 20 unit SSI given since yesterday afternoon  Wife at bedside, all questions answered.      Current Facility-Administered Medications   Medication Dose Route Frequency    insulin detemir (LEVEMIR) injection 15 Units  15 Units SubCUTAneous QHS    insulin NPH (NOVOLIN N, HUMULIN N) injection 5 Units  5 Units SubCUTAneous ONCE    polyethylene glycol (MIRALAX) packet 17 g  17 g Oral DAILY    bisacodyl (DULCOLAX) suppository 10 mg  10 mg Rectal DAILY PRN    metFORMIN (GLUCOPHAGE) tablet 1,000 mg  1,000 mg Oral BID    albuterol (PROVENTIL HFA, VENTOLIN HFA, PROAIR HFA) inhaler 2 Puff  2 Puff Inhalation Q4H PRN    albuterol (PROVENTIL VENTOLIN) nebulizer solution 2.5 mg  2.5 mg Nebulization DAILY PRN    chlorthalidone (HYGROTEN) tablet 25 mg  25 mg Oral DAILY    diazePAM (VALIUM) tablet 5 mg  5 mg Oral Q8H PRN    dapagliflozin tab 10 mg  10 mg Oral DAILY    doxazosin (CARDURA) tablet 2 mg  2 mg Oral DAILY    folic acid (FOLVITE) tablet 1 mg  1 mg Oral DAILY    lisinopril (PRINIVIL, ZESTRIL) tablet 10 mg  10 mg Oral DAILY    meclizine (ANTIVERT) tablet 25 mg  25 mg Oral TID PRN    metoprolol tartrate (LOPRESSOR) tablet 25 mg  25 mg Oral BID    montelukast (SINGULAIR) tablet 10 mg  10 mg Oral QHS    pantoprazole (PROTONIX) tablet 40 mg  40 mg Oral ACB    sodium chloride (NS) flush 5-10 mL  5-10 mL IntraVENous Q8H    sodium chloride (NS) flush 5-10 mL  5-10 mL IntraVENous PRN    acetaminophen (TYLENOL) tablet 1,000 mg  1,000 mg Oral Q6H    celecoxib (CELEBREX) capsule 200 mg  200 mg Oral Q12H    HYDROmorphone (DILAUDID) tablet 2 mg  2 mg Oral Q4H PRN    HYDROmorphone (PF) (DILAUDID) injection 1 mg  1 mg IntraVENous Q3H PRN    naloxone (NARCAN) injection 0.2-0.4 mg 0.2-0.4 mg IntraVENous Q10MIN PRN    ondansetron (ZOFRAN) injection 4 mg  4 mg IntraVENous Q4H PRN    diphenhydrAMINE (BENADRYL) capsule 25 mg  25 mg Oral Q4H PRN    senna-docusate (PERICOLACE) 8.6-50 mg per tablet 2 Tab  2 Tab Oral DAILY    apixaban (ELIQUIS) tablet 5 mg  5 mg Oral BID    insulin lispro (HUMALOG) injection   SubCUTAneous AC&HS    Check PPD Skin Test Reminder Note x 3  Days  1 Each Other Q24H        Review of Systems  A comprehensive review of systems was negative except for that written in the HPI.     Objective:     Visit Vitals    /66 (BP 1 Location: Left arm, BP Patient Position: Sitting)    Pulse 79    Temp 97.3 °F (36.3 °C)    Resp 18    Ht 6' 6.5\" (1.994 m)    Wt 155.1 kg (342 lb)    SpO2 93%    BMI 39.02 kg/m2    O2 Flow Rate (L/min): 2 l/min (Decreased to room air ) O2 Device: Room air    Temp (24hrs), Av °F (36.7 °C), Min:96.8 °F (36 °C), Max:98.7 °F (37.1 °C)          1901 -  0700  In: 3437 [P.O.:2760; I.V.:1679]  Out: 4575 [Urine:4575]    General: awake, alert, oriented, no acute distress  Eyes; non icteric, EOMI  Neck; supple  Abd; soft, non tender  Pulm; non labored, no accessory muscle use    Additional comments:I reviewed the patient's new clinical lab test results. j    Data Review    Recent Results (from the past 24 hour(s))   GLUCOSE, POC    Collection Time: 17 11:46 AM   Result Value Ref Range    Glucose (POC) 192 (H) 65 - 100 mg/dL   GLUCOSE, POC    Collection Time: 17  4:32 PM   Result Value Ref Range    Glucose (POC) 215 (H) 65 - 100 mg/dL   GLUCOSE, POC    Collection Time: 17  8:06 PM   Result Value Ref Range    Glucose (POC) 214 (H) 65 - 100 mg/dL   HEMOGLOBIN    Collection Time: 17  4:40 AM   Result Value Ref Range    HGB 12.2 (L) 13.6 - 17.2 g/dL   GLUCOSE, POC    Collection Time: 17  6:32 AM   Result Value Ref Range    Glucose (POC) 182 (H) 65 - 100 mg/dL   PLEASE READ & DOCUMENT PPD TEST IN 48 HRS Collection Time: 07/19/17  8:00 AM   Result Value Ref Range    PPD  Negative    mm Induration 0 mm         Assessment/Plan:     Principal Problem:    Status post total right knee replacement (7/17/2017)      Uncontrolled type 2 diabetes: increase levemir to 15 units qhs, nph 5 units x one now. Continue metformin, farxiga. Being seen by diabetic educator. Continue pericolace. Add daily miralax and prn suppository. If no BM, will need enema.      Care Plan discussed with: Patient/Family    Signed By: Vignesh Warren MD     July 19, 2017

## 2017-07-19 NOTE — PROGRESS NOTES
Neurovascular and peripheral vascular checks are WDL to BLE. Instructed not to ambulate without assistance from staff. Pt verbalized understanding. Call light in reach. Bed lowered and locked.

## 2017-07-20 VITALS
HEART RATE: 82 BPM | OXYGEN SATURATION: 94 % | TEMPERATURE: 97.2 F | WEIGHT: 315 LBS | SYSTOLIC BLOOD PRESSURE: 109 MMHG | HEIGHT: 78 IN | BODY MASS INDEX: 36.45 KG/M2 | RESPIRATION RATE: 16 BRPM | DIASTOLIC BLOOD PRESSURE: 57 MMHG

## 2017-07-20 LAB
HGB BLD-MCNC: 12 G/DL (ref 13.6–17.2)
MM INDURATION POC: NORMAL MM (ref 0–5)
PPD POC: NORMAL NEGATIVE

## 2017-07-20 PROCEDURE — 77030012935 HC DRSG AQUACEL BMS -B

## 2017-07-20 PROCEDURE — 74011250637 HC RX REV CODE- 250/637: Performed by: INTERNAL MEDICINE

## 2017-07-20 PROCEDURE — 85018 HEMOGLOBIN: CPT | Performed by: PHYSICIAN ASSISTANT

## 2017-07-20 PROCEDURE — 97150 GROUP THERAPEUTIC PROCEDURES: CPT

## 2017-07-20 PROCEDURE — 36415 COLL VENOUS BLD VENIPUNCTURE: CPT | Performed by: PHYSICIAN ASSISTANT

## 2017-07-20 PROCEDURE — 74011250637 HC RX REV CODE- 250/637: Performed by: PHYSICIAN ASSISTANT

## 2017-07-20 PROCEDURE — 97116 GAIT TRAINING THERAPY: CPT

## 2017-07-20 PROCEDURE — 74011636637 HC RX REV CODE- 636/637: Performed by: INTERNAL MEDICINE

## 2017-07-20 RX ADMIN — ACETAMINOPHEN 1000 MG: 500 TABLET, FILM COATED ORAL at 00:27

## 2017-07-20 RX ADMIN — HYDROMORPHONE HYDROCHLORIDE 2 MG: 2 TABLET ORAL at 02:18

## 2017-07-20 RX ADMIN — APIXABAN 5 MG: 5 TABLET, FILM COATED ORAL at 10:21

## 2017-07-20 RX ADMIN — METFORMIN HYDROCHLORIDE 1000 MG: 500 TABLET, FILM COATED ORAL at 10:22

## 2017-07-20 RX ADMIN — LISINOPRIL 10 MG: 5 TABLET ORAL at 10:21

## 2017-07-20 RX ADMIN — CHLORTHALIDONE 25 MG: 25 TABLET ORAL at 10:21

## 2017-07-20 RX ADMIN — FOLIC ACID 1 MG: 1 TABLET ORAL at 10:22

## 2017-07-20 RX ADMIN — DOXAZOSIN MESYLATE 2 MG: 1 TABLET ORAL at 10:21

## 2017-07-20 RX ADMIN — ACETAMINOPHEN 1000 MG: 500 TABLET, FILM COATED ORAL at 06:00

## 2017-07-20 RX ADMIN — POLYETHYLENE GLYCOL 3350 17 G: 17 POWDER, FOR SOLUTION ORAL at 10:21

## 2017-07-20 RX ADMIN — METOPROLOL TARTRATE 25 MG: 25 TABLET, FILM COATED ORAL at 10:21

## 2017-07-20 RX ADMIN — HYDROMORPHONE HYDROCHLORIDE 2 MG: 2 TABLET ORAL at 10:20

## 2017-07-20 RX ADMIN — CELECOXIB 200 MG: 200 CAPSULE ORAL at 10:21

## 2017-07-20 RX ADMIN — PANTOPRAZOLE SODIUM 40 MG: 40 TABLET, DELAYED RELEASE ORAL at 06:00

## 2017-07-20 RX ADMIN — SENNOSIDES AND DOCUSATE SODIUM 2 TABLET: 8.6; 5 TABLET ORAL at 10:21

## 2017-07-20 RX ADMIN — HYDROMORPHONE HYDROCHLORIDE 2 MG: 2 TABLET ORAL at 06:01

## 2017-07-20 RX ADMIN — INSULIN HUMAN 5 UNITS: 100 INJECTION, SUSPENSION SUBCUTANEOUS at 10:27

## 2017-07-20 NOTE — PROGRESS NOTES
Problem: Mobility Impaired (Adult and Pediatric)  Goal: *Acute Goals and Plan of Care (Insert Text)  GOALS (1-4 days):  (1.)Mr. Edward Sheppard will move from supine to sit and sit to supine in bed with SUPERVISION. (2.)Mr. Edward Sheppard will transfer from bed to chair and chair to bed with STAND BY ASSIST using the least restrictive device. (3.)Mr. Edward Sheppard will ambulate with STAND BY ASSIST for 200 feet with the least restrictive device. (4.)Mr. Edward Sheppard will ambulate up/down 1 steps with No railing with CONTACT GUARD ASSIST with walker. D/C - going to rehab and to be addressed there  (5.)Mr. Edward Sheppard will increase right knee ROM to 5°-80°. Flex 7/18  ________________________________________________________________________________________________      PHYSICAL THERAPY JOINT CAMP TKA: Daily Note and PM 7/20/2017  INPATIENT: Hospital Day: 4  [place] / Product Type: Medicare /      NAME/AGE/GENDER: Kinjal Bustillo is a 73 y.o. male       PRIMARY DIAGNOSIS:  Primary osteoarthritis of right knee [M17.11]              Procedure(s) and Anesthesia Type:     * RIGHT KNEE ARTHROPLASTY TOTAL  - General (Right)  ICD-10: Treatment Diagnosis:        · Pain in Right Knee (M25.561)  · Stiffness of Right Knee, Not elsewhere classified (M25.661)  · Difficulty in walking, Not elsewhere classified (R26.2)       ASSESSMENT:      Mr. Edward Sheppard presents with impaired strength & mobility s/p right TKA. Pt also had decreased stability during out of bed activity. Pt will benefit from follow up therapy to help restore safe function prior to returning home with caregiver. Patient tolerated morning therapy session well. He walk to group using RW with CGA. He performs exercises in the gym without any problems. He will go to rehab today. This section established at most recent assessment   PROBLEM LIST (Impairments causing functional limitations):  1. Decreased Strength  2.  Decreased ADL/Functional Activities  3. Decreased Transfer Abilities  4. Decreased Ambulation Ability/Technique  5. Decreased Balance  6. Increased Pain  7. Decreased Activity Tolerance  8. Decreased Flexibility/Joint Mobility  9. Decreased [place] with Home Exercise Program    INTERVENTIONS PLANNED: (Benefits and precautions of physical therapy have been discussed with the patient.)  1. Bed Mobility  2. Gait Training  3. Home Exercise Program (HEP)  4. Therapeutic Exercise/Strengthening  5. Transfer Training  6. Range of Motion: active/assisted/passive  7. Therapeutic Activities  8. Group Therapy      TREATMENT PLAN: Frequency/Duration: Follow patient BID   to address above goals. Rehabilitation Potential For Stated Goals: GOOD      RECOMMENDED REHABILITATION/EQUIPMENT: (at time of discharge pending progress): Rehab. HISTORY:   History of Present Injury/Illness (Reason for Referral): The patient has end stage arthritis of the right knee. The patient was evaluated and examined during a consultation prior to this office visit. There have been no changes to the patient's orthopedic condition since the initial consultation. The patient has failed previous conservative treatment for this condition including antiinflammatories , and lifestyle modifications. The necessity for joint replacement is present. The patient will be admitted the day of surgery for Procedure(s) (LRB):  RIGHT KNEE ARTHROPLASTY TOTAL / FNB / JENELLE (Right)       Past Medical History/Comorbidities:   Mr. Justine Juarez  has a past medical history of Asthma (dx childhood); Atrial fibrillation ([address] Cervical spondylosis (6/20/2013); Chronic back pain (2013); Chronic pain; Colon polyps; COPD (chronic obstructive pulmonary disease) ([address] (8/17/2016); Coronary atherosclerosis of native coronary vessel (8/17/2016); Diabetes ([address] (dx 5/14); Diverticulosis; Edema (8/17/2016); Former smoker; GERD (gastroesophageal reflux disease); High cholesterol;  History of kidney stones; Hypertension; Obesity (BMI 30-39.9); Status post total right knee replacement (7/17/2017); and Unspecified sleep apnea. He also has no past medical history of Adverse effect of anesthesia; Aneurysm ([facility] Autoimmune disease ([facility] Cancer ([facility] [address] [facility] Chronic kidney disease; Coagulation defects; Coagulation disorder ([facility] DEMENTIA; Difficult intubation; Endocarditis; Heart failure ([facility] [address] Ill-defined condition; Infectious disease; Liver disease; Malignant hyperthermia due to anesthesia; Nausea & vomiting; Neurological disorder; Nicotine vapor product user; Non-nicotine vapor product user; Other ill-defined conditions; Pseudocholinesterase deficiency; Psychiatric disorder; PUD (peptic ulcer disease); Rheumatic fever; Seizures ([facility] [address] [facility] Stroke [facility]); Thromboembolus ([facility] [facility] Thyroid disease; or Unspecified adverse effect of anesthesia. Mr. Trinity Garcia  has a past surgical history that includes cystoscopy,insert ureteral stent; colonoscopy (10/6/14); colonoscopy (11/20/14); lumbar laminectomy (2009); retinal detachment repair (Bilateral); cataract removal; other surgical (20 yrs ago); colectomy (2015); and heart catheterization (2012).   Social History/Living Environment:   Home Environment: Private residence  # Steps to Enter: 0  One/Two Story Residence: Two story, live on 1st floor  # of Interior Steps: 13  Interior Rails: Both  Living Alone: No  Support Systems: Spouse/Significant Other/Partner  Patient Expects to be Discharged to[de-identified] Private residence  Current DME Used/Available at Home: None  Tub or Shower Type: Tub/Shower combination  Prior Level of Function/Work/Activity:  Pt was independent without an assistive device prior to this admission   Number of Personal Factors/Comorbidities that affect the Plan of Care: 3+: HIGH COMPLEXITY   EXAMINATION:   Most Recent Physical Functioning:                RLE AROM  R Knee Flexion: 95  R Knee Extension: 8                    Transfers  Sit to Stand: Stand-by asssistance  Stand to Sit: Stand-by asssistance                      Weight Bearing Status  Right Side Weight Bearing: As tolerated  Distance (ft): 136 Feet (ft) (x 2)  Ambulation - Level of Assistance: Contact guard assistance  Assistive Device: Walker, rolling  Speed/Charlette: Pace decreased (<100 feet/min)  Step Length: Left shortened  Stance: Right decreased  Gait Abnormalities: Antalgic  Interventions: Safety awareness training      Braces/Orthotics: none     Right Knee Cold  Type: Cryocuff       Body Structures Involved:  1. Joints  2. Muscles Body Functions Affected:  1. Sensory/Pain  2. Movement Related Activities and Participation Affected:  1. General Tasks and Demands  2. Mobility   Number of elements that affect the Plan of Care: 4+: HIGH COMPLEXITY   CLINICAL PRESENTATION:   Presentation: Stable and uncomplicated: LOW COMPLEXITY   CLINICAL DECISION MAKIN[address] [place] [id number] AM-PAC 6 Clicks   Basic Mobility Inpatient Short Form  How much difficulty does the patient currently have. .. Unable A Lot A Little None   1. Turning over in bed (including adjusting bedclothes, sheets and blankets)? [ ] 1   [ ] 2   [X] 3   [ ] 4   2. Sitting down on and standing up from a chair with arms ( e.g., wheelchair, bedside commode, etc.)   [ ] 1   [x ] 2   [] 3   [ ] 4   3. Moving from lying on back to sitting on the side of the bed? [ ] 1   [ ] 2   [X] 3   [ ] 4   How much help from another person does the patient currently need. .. Total A Lot A Little None   4. Moving to and from a bed to a chair (including a wheelchair)? [ ] 1   [x ] 2   [] 3   [ ] 4   5. Need to walk in hospital room? [ ] 1   [ x] 2   [] 3   [ ] 4   6. Climbing 3-5 steps with a railing? [X] 1   [ ] 2   [ ] 3   [ ] 4   © , Trustees of [address] [place] [id number], under license to [employer].  All rights reserved       Score:  Initial: 13 Most Recent: X (Date: -- )     Interpretation of Tool:  Represents activities that are increasingly more difficult (i.e. Bed mobility, Transfers, Gait). Score 24 23 22-20 19-15 14-10 9-7 6       Modifier CH CI CJ CK CL CM CN         · Mobility - Walking and Moving Around:               - CURRENT STATUS:    CL - 60%-79% impaired, limited or restricted               - GOAL STATUS:           CK - 40%-59% impaired, limited or restricted               - D/C STATUS:                       ---------------To be determined---------------  [place] / Product Type: Medicare /       Medical Necessity:     · Patient is expected to demonstrate progress in strength, range of motion, balance, coordination and functional technique to decrease assistance required with bed mobility, transfers & gait. Reason for Services/Other Comments:  · Patient continues to require skilled intervention due to pt not independent with functional mobility. Use of outcome tool(s) and clinical judgement create a POC that gives a: Clear prediction of patient's progress: LOW COMPLEXITY                 TREATMENT:   (In addition to Assessment/Re-Assessment sessions the following treatments were rendered)      Pre-treatment Symptoms/Complaints:  Patient just finished getting a shower. Pain: Initial:   Pain Intensity 1: 0 (0/10 after therapy)  Post Session:        Gait Training (15 Minutes):  Gait training to improve and/or restore physical functioning as related to mobility, balance and coordination. Ambulated 136 Feet (ft) (x 2) with Contact guard assistance using a Walker, rolling and minimal Safety awareness training related to their knee position and motion to promote proper body alignment. Therapeutic Exercise: (45 Minutes (group)):  Exercises per grid below to improve mobility, strength and coordination. Required minimal verbal cues to promote proper body alignment. Progressed range and repetitions as indicated.            Date:  7/18    Date:   7/19/17 Date:  7/20 ACTIVITY/EXERCISE AM PM AM PM AM PM   GROUP THERAPY  [ ]  [x ]  [X]  [ ]  [x ]  [ ]   Ankle Pumps 15   15 20   20  25     Quad Sets  15  15  20  20  25     Gluteal Sets  15  15  20  20  25     Hip ABd/ADduction  15  15  20   20  25     Straight Leg Raises  15  15  20 AA  20 AA  25 aa     Knee Slides  15  15  20 AA  20   25      Short Arc Quads  15  15  20 AA  20 AA  25 aa     Long Arc Quads               Chair Slides    15  20  20 25                      B = bilateral; AA = active assistive; A = active; P = passive       Treatment/Session Assessment:         He tolerated group therapy this morning well. He is going to rehab today     Education:  [ ] Home Exercises  [X] Fall Precautions  [ ] Hip Precautions [X] D/C Instruction Review  [ ] Knee/Hip Prosthesis Review  [X] Walker Management/Safety [ ] Adaptive Equipment as Needed         Interdisciplinary Collaboration:   · Registered Nurse     After treatment position/precautions:   · Up in chair, Bed/Chair-wheels locked, Caregiver at bedside and Call light within reach     Compliance with Program/Exercises:  Compliant all the time. Recommendations/Intent for next treatment session:  Treatment next visit will focus on increasing Mr. Mari Mckeon independence with bed mobility, transfers, gait training, strength/ROM exercises, modalities for pain, and patient education.        Total Treatment Duration:  PT Patient Time In/Time Out  Time In: 0900  Time Out: 240 [address]

## 2017-07-20 NOTE — PROGRESS NOTES
Hospitalist Progress Note    Subjective:   Daily Progress Note: 7/20/2017 9:06 AM    POD 3  Blood sugars 179/212/182/182  A1C 8.3%  No sob, chest pain, polyuria.  still without bowel movement  Wife at bedside, all questions answered.      Current Facility-Administered Medications   Medication Dose Route Frequency    insulin detemir (LEVEMIR) injection 20 Units  20 Units SubCUTAneous QHS    insulin NPH (NOVOLIN N, HUMULIN N) injection 5 Units  5 Units SubCUTAneous ONCE    polyethylene glycol (MIRALAX) packet 17 g  17 g Oral DAILY    bisacodyl (DULCOLAX) suppository 10 mg  10 mg Rectal DAILY PRN    metFORMIN (GLUCOPHAGE) tablet 1,000 mg  1,000 mg Oral BID    albuterol (PROVENTIL HFA, VENTOLIN HFA, PROAIR HFA) inhaler 2 Puff  2 Puff Inhalation Q4H PRN    albuterol (PROVENTIL VENTOLIN) nebulizer solution 2.5 mg  2.5 mg Nebulization DAILY PRN    chlorthalidone (HYGROTEN) tablet 25 mg  25 mg Oral DAILY    diazePAM (VALIUM) tablet 5 mg  5 mg Oral Q8H PRN    dapagliflozin tab 10 mg  10 mg Oral DAILY    doxazosin (CARDURA) tablet 2 mg  2 mg Oral DAILY    folic acid (FOLVITE) tablet 1 mg  1 mg Oral DAILY    lisinopril (PRINIVIL, ZESTRIL) tablet 10 mg  10 mg Oral DAILY    meclizine (ANTIVERT) tablet 25 mg  25 mg Oral TID PRN    metoprolol tartrate (LOPRESSOR) tablet 25 mg  25 mg Oral BID    montelukast (SINGULAIR) tablet 10 mg  10 mg Oral QHS    pantoprazole (PROTONIX) tablet 40 mg  40 mg Oral ACB    sodium chloride (NS) flush 5-10 mL  5-10 mL IntraVENous Q8H    sodium chloride (NS) flush 5-10 mL  5-10 mL IntraVENous PRN    acetaminophen (TYLENOL) tablet 1,000 mg  1,000 mg Oral Q6H    celecoxib (CELEBREX) capsule 200 mg  200 mg Oral Q12H    HYDROmorphone (DILAUDID) tablet 2 mg  2 mg Oral Q4H PRN    HYDROmorphone (PF) (DILAUDID) injection 1 mg  1 mg IntraVENous Q3H PRN    naloxone (NARCAN) injection 0.2-0.4 mg  0.2-0.4 mg IntraVENous Q10MIN PRN    ondansetron (ZOFRAN) injection 4 mg  4 mg IntraVENous Q4H PRN    diphenhydrAMINE (BENADRYL) capsule 25 mg  25 mg Oral Q4H PRN    senna-docusate (PERICOLACE) 8.6-50 mg per tablet 2 Tab  2 Tab Oral DAILY    apixaban (ELIQUIS) tablet 5 mg  5 mg Oral BID    insulin lispro (HUMALOG) injection   SubCUTAneous AC&HS    Check PPD Skin Test Reminder Note x 3  Days  1 Each Other Q24H        Review of Systems  A comprehensive review of systems was negative except for that written in the HPI. Objective:     Visit Vitals    /57 (BP 1 Location: Left arm, BP Patient Position: At rest)    Pulse 82    Temp 97.2 °F (36.2 °C)    Resp 16    Ht 6' 6.5\" (1.994 m)    Wt 155.1 kg (342 lb)    SpO2 94%    BMI 39.02 kg/m2    O2 Flow Rate (L/min): 0 l/min O2 Device: Room air (cpap at bedside)    Temp (24hrs), Av.2 °F (36.8 °C), Min:96.1 °F (35.6 °C), Max:99.5 °F (37.5 °C)          1901 -  0700  In: 2160 [P.O.:2160]  Out: 1500 [Urine:1500]    General: awake, alert, oriented, no acute distress  Eyes; non icteric, EOMI  Neck; supple  Pulm; non labored, normal effort    Additional comments:I reviewed the patient's new clinical lab test results. j    Data Review    Recent Results (from the past 24 hour(s))   GLUCOSE, POC    Collection Time: 17 10:57 AM   Result Value Ref Range    Glucose (POC) 182 (H) 65 - 100 mg/dL   GLUCOSE, POC    Collection Time: 17  4:30 PM   Result Value Ref Range    Glucose (POC) 212 (H) 65 - 100 mg/dL   GLUCOSE, POC    Collection Time: 17  9:04 PM   Result Value Ref Range    Glucose (POC) 179 (H) 65 - 100 mg/dL   HEMOGLOBIN    Collection Time: 17  4:42 AM   Result Value Ref Range    HGB 12.0 (L) 13.6 - 17.2 g/dL         Assessment/Plan:     Principal Problem:    Status post total right knee replacement (2017)    tap water enema x one now. Continue daily pericolace and miralax. Increase levemir to 20 units qhs.  Give nph 5 units x one now.      Should be discharged to [facility] on:  levemir 20 units qhs, home doses of metformin/farxiga. Please call with any questions or concerns.      Care Plan discussed with: Patient/Family      Signed By: Rede Wagner MD     July 20, 2017

## 2017-07-20 NOTE — PROGRESS NOTES
Shift assessment completed. Pt is alert & oriented x4. Able to verbalize needs. Family in room. Resting quietly in recliner with no distress noted. Dressing to right knee is clean, dry and intact. Iceman on knee. Neurovascular and peripheral vascular checks WNL. Patient voiding clear yellow urine without difficulty. Bed locked and lowered. Call light within reach. Patient instructed to call for assistance. Pt verbalizes understanding. Will monitor.

## 2017-07-20 NOTE — PROGRESS NOTES
Pt report called to [facility] and spoke with Domonique Linda. Pt assessment unchanged. VSS. Pt voids well. Pain to knee controlled. Pt left hospital via w/c with staff member and wife.

## 2017-07-20 NOTE — DISCHARGE SUMMARY
Yolande Cervantes MD  Medical Director  [address] [place] [address], [address] [facility]  Tel: [phone]       REHABILITATION DISCHARGE SUMMARY     Patient: Brianna Elmore MRN: [id number]  SSN: [id number]    YOB: 1950  Age: 73 y.o. Sex: male      Date: 7/20/2017  Admit Date: 7/17/2017  Discharge Date: 7/20/2017    Admitting Physician: López Prieto MD   Primary Care Physician: Daquan Nelson MD     Admission Condition: good    Chief Complaint : Gait dysfunction secondary to below. Admit Diagnosis: Primary osteoarthritis of right knee [M17.11]  right total knee arthroplasty 7/7/2017  Pain  DVT risk  Post op hemorrhagic anemia  Obesity (BMI 30-39. 9)  Atrial fibrillation  Acute Rehab Dx:  Gait impairment  Mobility and ambulation deficits  Self Care/ADL deficits    HPI: Brianna Elmore is a 73 y.o. male patient at [address] [place] [address] who was admitted on 7/17/2017  by López Prieto MD with below mentioned medical history, is being seen for Physical Medicine and Rehabilitation. Brianna Elmore had right knee pain and discomfort with walking and activity due to end stage DJD. The symptoms were not well controlled with conservative management and has limited the patient's function. He underwent a right total knee arthroplasty per Dr. López Prieto MD on 7/17/2017. Patient's weight bearing status is to be WBAT RLE. Patient is starting to stand, taking steps with acute PT and OT. No major barriers are noted. Pt ambulating short distance with minimum assist. Patient still shows significant functional deficits due to right knee pain, decreased ROM and strength. Brianna Elmore is seen and examined today. Medical Records reviewed. Pt denies any other pre morbid functional deficits.   He has been independent with ambulation, prior to admission, limited by right knee pain.       Rehabiitation Course:   Functional  Level On Admission: Walk: Moderate [place]  Functional Level At Discharge: Walk: Contact Guard Assist  Home Architecture: Home Situation  Home Environment: Private residence (07/18/17 1400)  # Steps to Enter: 0 (07/18/17 1400)  One/Two Story Residence: Two story, live on 1st floor (07/18/17 1400)  # of Interior Steps: 15 (07/17/17 1500)  Interior Rails: Both (07/17/17 1500)  Living Alone: No (07/18/17 1400)  Support Systems: Spouse/Significant Other/Partner (07/18/17 1400)  Patient Expects to be Discharged to[de-identified] Private residence (07/18/17 1400)  Current DME Used/Available at Home: None (07/18/17 1400)  Tub or Shower Type: Tub/Shower combination (07/17/17 1500)     Past Medical History:   Diagnosis Date    Asthma dx childhood    last attack during high school; advair at hs; albuterol prn; neb prn     Atrial fibrillation (HCC)     2 epiosode of A-fib last about 6-8 months ago (noted on 6/27/17); on Eliquis     Cervical spondylosis 6/20/2013    Chronic back pain 2013    RIGHT SIDE WITH UNCLEAR ETIOLOGY    Chronic pain     Colon polyps     COPD (chronic obstructive pulmonary disease) ([facility] 8/17/2016    Coronary atherosclerosis of native coronary vessel 8/17/2016 08/2012: Mild non-obstructive CAD    Diabetes ([facility] dx 5/14    type 2; oral meds; no bs checks at home    Diverticulosis     Edema 8/17/2016    Former smoker     GERD (gastroesophageal reflux disease)      controlled with med    High cholesterol     History of kidney stones     Hypertension     controlled with med    Obesity (BMI 30-39. 9)     BMI 39    Status post total right knee replacement 7/17/2017    Unspecified sleep apnea     wears cpap at hs      Past Surgical History:   Procedure Laterality Date    CYSTOSCOPY,INSERT URETERAL STENT      kidney stone removed cystoscopy     HX CATARACT REMOVAL      bilateral with lens implants; x3    HX COLECTOMY  2015    HX COLONOSCOPY  10/6/14    HX COLONOSCOPY  11/20/14    HX HEART CATHETERIZATION  2012    no stents     HX LUMBAR LAMINECTOMY  2009    no hardware     HX OTHER SURGICAL  20 yrs ago    colo-rectal sx for fissure    HX RETINAL DETACHMENT REPAIR Bilateral       Family History   Problem Relation Age of Onset    Heart Disease Father      BOWEL OBSTRUCTION    Heart Attack Father 80     MI    Heart Disease Brother 72     STENT HEART    Suicide Brother     Parkinsonism Mother     Inflammatory Bowel Dz Son     Malignant Hyperthermia Neg Hx     Pseudocholinesterase Deficiency Neg Hx     Delayed Awakening Neg Hx     Post-op Nausea/Vomiting Neg Hx     Emergence Delirium Neg Hx     Post-op Cognitive Dysfunction Neg Hx       Social History   Substance Use Topics    Smoking status: Former Smoker     Packs/day: 1.50     Years: 25.00     Types: Cigarettes     Quit date: 4/2/2012    Smokeless tobacco: Never Used      Comment: QUIT 2011    Alcohol use 1.8 oz/week     3 Cans of beer per week       Allergies   Allergen Reactions    Bactrim [Sulfamethoxazole-Trimethoprim] Itching       Prior to Admission medications    Medication Sig Start Date End Date Taking? Authorizing Provider   aspirin delayed-release 81 mg tablet Take 162 mg by mouth nightly. Decrease to one 81 mg tab on 7/12/17   Yes Historical Provider   chlorthalidone (HYGROTEN) 25 mg tablet TAKE 1 TABLET BY MOUTH DAILY 6/13/17  Yes Rossana Rubio MD   metoprolol tartrate (LOPRESSOR) 25 mg tablet TAKE 2 TABLETS BY MOUTH TWICE A DAY 5/25/17  Yes Mario Workman MD   celecoxib (CELEBREX) 200 mg capsule Take 200 mg by mouth daily. Yes Historical Provider   pantoprazole (PROTONIX) 40 mg tablet TAKE 1 TABLET BY MOUTH EVERY DAY 3/1/17  Yes Rossana Rubio MD   dapagliflozin (FARXIGA) 10 mg tab Take 1 Tab by mouth daily. 8/1/16  Yes Rossana Rubio MD   doxazosin (CARDURA) 2 mg tablet TAKE 1 TABLET BY MOUTH 2 TIMES A DAY  Patient taking differently: Take 2 mg by mouth daily.  TAKE 1 TABLET BY MOUTH 2 TIMES A DAY 2/17/16  Yes Rossana Rubio MD   meclizine (ANTIVERT) 25 mg tablet Take 1 Tab by mouth three (3) times daily as needed for Dizziness. 6/28/17   Alvarez Domingo MD   diazePAM (VALIUM) 5 mg tablet Take 1 Tab by mouth every eight (8) hours as needed for Anxiety (severe vertigo spells). Max Daily Amount: 15 mg. 6/28/17   Alvarez Domingo MD   atorvastatin (LIPITOR) 40 mg tablet TAKE 1 TABLET BY MOUTH EVERY DAY 5/15/17   Rogelio Banda MD   Omega-3 Fatty Acids 300 mg cap Take 1 Cap by mouth daily. Historical Provider   montelukast (SINGULAIR) 10 mg tablet TAKE 1 TABLET BY MOUTH DAILY 5/8/17   Rogelio Banda MD   ANTI-FUNGAL 2 % topical powder APPLY TO ABDOMEN 2 TIMES PER DAY 4/24/17   Historical Provider   Blood-Glucose Meter monitoring kit Check fs every day; DM2, E11/9, Contour glucometer 1/26/17   Jose Shelton MD   glucose blood VI test strips (ASCENSIA CONTOUR) strip Check fs every day, DM2, E11.9 1/26/17   Jose Shelton MD   lancets 28 gauge misc Check fs every day, DM2, E11.9 1/26/17   Jose Shelton MD   ezetimibe (ZETIA) 10 mg tablet Take 1 Tab by mouth daily. Patient taking differently: Take 10 mg by mouth nightly. 1/5/17   Rogelio Banda MD   metFORMIN ER (GLUCOPHAGE XR) 500 mg tablet TAKE 2 TABLETS BY MOUTH EVERY DAY 10/31/16   Rogelio Banda MD   ADVAIR DISKUS 500-50 mcg/dose diskus inhaler INHALE 1 PUFF BY INHALATION EVERY TWELVE HOURS. 10/24/16   Rogelio Banda MD   apixaban (ELIQUIS) 5 mg tablet Take 1 Tab by mouth two (2) times a day. 8/19/16   Don Washington MD   lisinopril (PRINIVIL, ZESTRIL) 10 mg tablet TAKE 1 TABLET BY MOUTH DAILY 8/1/16   oRgelio Banda MD   albuterol (PROVENTIL VENTOLIN) 2.5 mg /3 mL (0.083 %) nebulizer solution 3 mL by Nebulization route once for 1 dose. Patient taking differently: 2.5 mg by Nebulization route daily as needed. 8/1/16   Rogelio Banda MD   albuterol (PROAIR HFA) 90 mcg/actuation inhaler Take 2 Puffs by inhalation every four (4) hours as needed for Wheezing.  Take / use AM day of surgery  per anesthesia protocols if needed. Historical Provider   cholecalciferol, vitamin D3, (VITAMIN D3) 2,000 unit tab Take 1 tablet by mouth daily. Historical Provider   diphenhydrAMINE (BENADRYL ALLERGY) 25 mg tablet Take 25 mg by mouth nightly as needed for Sleep. Historical Provider   b complex vitamins (B COMPLEX 1) tablet Take 1 tablet by mouth every morning. Stop seven days prior to surgery per anesthesia protocol. Historical Provider   green tea leaf extract (GREEN TEA) Cap Take 630 mg by mouth every morning. Stop seven days prior to surgery per anesthesia protocol. Historical Provider   vitamin E (AQUA GEMS) 400 unit capsule Take 400 Units by mouth every morning. Stop seven days prior to surgery per anesthesia protocol. Historical Provider   FOLIC ACID PO Take 799 mcg by mouth every morning. Stop seven days prior to surgery per anesthesia protocol. Historical Provider   DOCUSATE CALCIUM (STOOL SOFTENER PO) Take 1 capsule by mouth every morning. Historical Provider   mometasone (NASONEX) 50 mcg/Actuation nasal spray 2 Sprays by Both Nostrils route nightly as needed.     Bakari Gillespie MD       Current Medications:  Current Facility-Administered Medications   Medication Dose Route Frequency Provider Last Rate Last Dose    insulin detemir (LEVEMIR) injection 20 Units  20 Units SubCUTAneous QHS Kan Garibay MD        polyethylene glycol [facility]) packet 17 g  17 g Oral DAILY Kan Garibay MD   17 g at 07/20/17 1021    bisacodyl (DULCOLAX) suppository 10 mg  10 mg Rectal DAILY PRN Kan Garibay MD        metFORMIN (GLUCOPHAGE) tablet 1,000 mg  1,000 mg Oral BID Kan Garibay MD   1,000 mg at 07/20/17 1022    albuterol (PROVENTIL HFA, VENTOLIN HFA, PROAIR HFA) inhaler 2 Puff  2 Puff Inhalation Q4H PRN LYNDON iSngh        albuterol (PROVENTIL VENTOLIN) nebulizer solution 2.5 mg  2.5 mg Nebulization DAILY PRN LYNDON Singh chlorthalidone (HYGROTEN) tablet 25 mg  25 mg Oral DAILY Alexus Mall, PA   25 mg at 07/20/17 1021    diazePAM (VALIUM) tablet 5 mg  5 mg Oral Q8H PRN [facility] [place]        dapagliflozin tab 10 mg  10 mg Oral DAILY [facility], [place]   10 mg at 07/20/17 0900    doxazosin (CARDURA) tablet 2 mg  2 mg Oral DAILY Alexus Mall, PA   2 mg at 26/42/15 8752    folic acid (FOLVITE) tablet 1 mg  1 mg Oral DAILY Alexus Mall, PA   1 mg at 07/20/17 1022    lisinopril (PRINIVIL, ZESTRIL) tablet 10 mg  10 mg Oral DAILY Alexus Mall, PA   10 mg at 07/20/17 1021    meclizine (ANTIVERT) tablet 25 mg  25 mg Oral TID PRN Alexus Mall, PA        metoprolol tartrate (LOPRESSOR) tablet 25 mg  25 mg Oral BID Alexus Mall, PA   25 mg at 07/20/17 1021    montelukast (SINGULAIR) tablet 10 mg  10 mg Oral QHS Alexus Mall, PA   10 mg at 07/19/17 2204    pantoprazole (PROTONIX) tablet 40 mg  40 mg Oral ACB Alexus Mall, PA   40 mg at 07/20/17 0600    sodium chloride (NS) flush 5-10 mL  5-10 mL IntraVENous Q8H Alexus Mall, PA   10 mL at 07/19/17 2200    sodium chloride (NS) flush 5-10 mL  5-10 mL IntraVENous PRN Alexus Mall, PA   10 mL at 07/19/17 2205    acetaminophen (TYLENOL) tablet 1,000 mg  1,000 mg Oral Q6H Alexus Mall, PA   1,000 mg at 07/20/17 0600    celecoxib (CELEBREX) capsule 200 mg  200 mg Oral Q12H Alexus Mall, PA   200 mg at 07/20/17 1021    HYDROmorphone (DILAUDID) tablet 2 mg  2 mg Oral Q4H PRN Alexus Mall, PA   2 mg at 07/20/17 1020    HYDROmorphone (PF) (DILAUDID) injection 1 mg  1 mg IntraVENous Q3H PRN Alexus Mall, PA   1 mg at 07/19/17 2204    naloxone (NARCAN) injection 0.2-0.4 mg  0.2-0.4 mg IntraVENous Q10MIN PRN LYNDON Posada        ondansetron [facility]) injection 4 mg  4 mg IntraVENous Q4H PRN LYNDON Posada   4 mg at 07/19/17 0038    diphenhydrAMINE (BENADRYL) capsule 25 mg  25 mg Oral Q4H PRN LYNDON Posada        senna-docusate (PERICOLACE) 8.6-50 mg per tablet 2 Tab  2 Tab Oral DAILY [place] Slain, PA   2 Tab at 07/20/17 1021    apixaban (ELIQUIS) tablet 5 mg  5 mg Oral BID [place] Slain, PA   5 mg at 07/20/17 1021    insulin lispro (HUMALOG) injection   SubCUTAneous AC&HS Sherry Pereira MD   2 Units at 07/19/17 2202        Review of Systems: Denies chest pain, shortness of breath, cough, headache, visual problems, abdominal pain, dysurea, calf pain. Pertinent positives are as noted in the medical records and unremarkable otherwise. Vital Signs:   Patient Vitals for the past 8 hrs:   BP Temp Pulse Resp SpO2   07/20/17 0705 109/57 97.2 °F (36.2 °C) 82 16 94 %   07/20/17 0420 91/52 96.1 °F (35.6 °C) 78 16 93 %        Physical Exam:   General: Alert and age appropriately oriented. No acute cardio respiratory distress. HEENT: Normocephalic. Oral mucosa moist without cyanosis. Lungs: Clear to auscultation  bilaterally. Respiration even and unlabored   Heart: Regular rate and rhythm, S1, S2   No  murmurs, clicks, rub or gallops   Abdomen: Soft, non-tender, nondistended. Bowel sounds present   Genitourinary: defered   Neuromuscular:      Grossly no focal neurological deficits noted. L Ankle dorsiflexion 5/5   L Ankle plantarflexion 5/5  R Ankle dorsiflexion 5/5   R Ankle plantarflexion 5/5  No sensory deficits. Skin/extremity: No rashes, no erythema. Calves soft. Wound covered.      Skin Incision(s)/Wound(s):        Lab Review:   Recent Results (from the past 72 hour(s))   GLUCOSE, POC    Collection Time: 07/17/17 11:41 AM   Result Value Ref Range    Glucose (POC) 269 (H) 65 - 100 mg/dL   GLUCOSE, POC    Collection Time: 07/17/17  5:24 PM   Result Value Ref Range    Glucose (POC) 304 (H) 65 - 100 mg/dL   HEMOGLOBIN    Collection Time: 07/17/17  8:29 PM   Result Value Ref Range    HGB 12.4 (L) 13.6 - 17.2 g/dL   GLUCOSE, POC    Collection Time: 07/17/17  8:53 PM   Result Value Ref Range    Glucose (POC) 315 (H) 65 - 100 mg/dL   HEMOGLOBIN    Collection Time: 07/18/17  4:20 AM   Result Value Ref Range    HGB 12.1 (L) 13.6 - 83.1 g/dL   METABOLIC PANEL, BASIC    Collection Time: 07/18/17  4:20 AM   Result Value Ref Range    Sodium 139 136 - 145 mmol/L    Potassium 4.1 3.5 - 5.1 mmol/L    Chloride 103 98 - 107 mmol/L    CO2 27 21 - 32 mmol/L    Anion gap 9 7 - 16 mmol/L    Glucose 205 (H) 65 - 100 mg/dL    BUN 23 8 - 23 MG/DL    Creatinine 0.97 0.8 - 1.5 MG/DL    GFR est AA >60 >60 ml/min/1.73m2    GFR est non-AA >60 >60 ml/min/1.73m2    Calcium 8.4 8.3 - 10.4 MG/DL   PLEASE READ & DOCUMENT PPD TEST IN 24 HRS    Collection Time: 07/18/17  8:00 AM   Result Value Ref Range    PPD  Negative    mm Induration 0 mm   GLUCOSE, POC    Collection Time: 07/18/17 11:46 AM   Result Value Ref Range    Glucose (POC) 192 (H) 65 - 100 mg/dL   GLUCOSE, POC    Collection Time: 07/18/17  4:32 PM   Result Value Ref Range    Glucose (POC) 215 (H) 65 - 100 mg/dL   GLUCOSE, POC    Collection Time: 07/18/17  8:06 PM   Result Value Ref Range    Glucose (POC) 214 (H) 65 - 100 mg/dL   HEMOGLOBIN    Collection Time: 07/19/17  4:40 AM   Result Value Ref Range    HGB 12.2 (L) 13.6 - 17.2 g/dL   GLUCOSE, POC    Collection Time: 07/19/17  6:32 AM   Result Value Ref Range    Glucose (POC) 182 (H) 65 - 100 mg/dL   PLEASE READ & DOCUMENT PPD TEST IN 48 HRS    Collection Time: 07/19/17  8:00 AM   Result Value Ref Range    PPD  Negative    mm Induration 0 mm   GLUCOSE, POC    Collection Time: 07/19/17 10:57 AM   Result Value Ref Range    Glucose (POC) 182 (H) 65 - 100 mg/dL   GLUCOSE, POC    Collection Time: 07/19/17  4:30 PM   Result Value Ref Range    Glucose (POC) 212 (H) 65 - 100 mg/dL   GLUCOSE, POC    Collection Time: 07/19/17  9:04 PM   Result Value Ref Range    Glucose (POC) 179 (H) 65 - 100 mg/dL   HEMOGLOBIN    Collection Time: 07/20/17  4:42 AM   Result Value Ref Range    HGB 12.0 (L) 13.6 - 17.2 g/dL   PLEASE READ & DOCUMENT PPD TEST IN 72 HRS    Collection Time: 07/20/17  9:00 AM   Result Value Ref Range    PPD  Negative    mm Induration  mm       PT Initial  PT Most Recent   AROM: Generally decreased, functional (R TKA) (07/19/17 1100) Generally decreased, functional (R TKA) (07/19/17 1100)         Strength: Generally decreased, functional (R TKA) (07/19/17 1100) Generally decreased, functional (R TKA) (07/19/17 1100)   Coordination: Generally decreased, functional (07/19/17 1100) Generally decreased, functional (07/19/17 1100)                     Distance (ft): 4 Feet (ft) (07/17/17 1500) 136 Feet (ft) (x 2) (07/20/17 1000)   Assistive Device: Walker, rolling (07/17/17 1500) Walker, rolling (07/20/17 1000)       OT Initial OT Most Recent                                               ST Initial ST Most Recent                        Principal Problem:    Status post total right knee replacement (7/17/2017)          Condition on discharge from [facility] to SNF:  good  Rehabilitation  potential : Good   Goals in Rehab : Patient to reach maximal rehabilitation potential in functional mobility,ambulation and ADL/ self care skills ; to improve strength and endurance  Plan / Disposition :STR-Rehab  as discussed with patient/ family and the Case management/ Social service team  Expected Length Of Stay : Depending on pace of progress in mobility and self care in PT and OT ,therapies    Discharge Instructions:  Rehabilitation Management/ Medical Management: 1. Devices:Walkers, Type: Rolling Walker  2. Consult:Rehab team including PT, OT,  and . 3. Disposition Rehab-discussed with patient. 4. Thigh-high or knee-high MICHAEL's when out of bed. 5. DVT Prophylaxis - resume home Eliquis. . Please notify surgeon at [facility] if DVT diagnosed. 6. Incentive spirometer Q1H while awake  7. Post op hemorrhagic anemia- monitor. 8. Activity: WBAT RLE  9. Planned Labs: CBC,BMP  10. Pain Control: fair control. Continue scheduled tylenol, celebrex and  PRN meds. Continue current management. 11. Wound Care: May remove Aquacel 1 week post op and replace with Tegaderm and sterile gauze dressing. Keep wound clean and dry and reinforce dressing PRN. Remove staples 12-14 post surgery, when incision appears appropriately closed and apply benzoin and 1/2\" steristrips. 12. In case of complications: Please notify surgeon at [address] if suspect infection, cellulitis, wound dehiscence or instrument failure, and if considering starting antibiotic. Follow up with ORTHO per instructions. [address] [place] [address] [phone]  Follow up with Dr Álvaro López  2 weeks after discharge from rehab. [phone]. Transfer Medications:            HYDROmorphone (DILAUDID) tablet 2 mg 1 Tab  Ordered Dose: 2mg Route: Oral Frequency: EVERY 3 HOURS  as needed for pain      acetaminophen (TYLENOL) tablet 1,000 mg Ordered Dose: 1,000 mg Route: Oral Frequency: EVERY 8 HOURS x 1 week then change to prn.                senna-docusate (PERICOLACE) 8.6-50 mg per tablet 2 Tab Ordered Dose: 2 Tab Route: Oral Frequency: DAILY       Current Discharge Medication List      CONTINUE these medications which have NOT CHANGED    Details   aspirin delayed-release 81 mg tablet Take 162 mg by mouth nightly. chlorthalidone (HYGROTEN) 25 mg tablet TAKE 1 TABLET BY MOUTH DAILY         metoprolol tartrate (LOPRESSOR) 25 mg tablet TAKE 2 TABLETS BY MOUTH TWICE A DAY         celecoxib (CELEBREX) 200 mg capsule Take 200 mg by mouth daily. pantoprazole (PROTONIX) 40 mg tablet TAKE 1 TABLET BY MOUTH EVERY DAY  Qty: 90 Tab, Refills: 3      dapagliflozin (FARXIGA) 10 mg tab Take 1 Tab by mouth daily.        Associated Diagnoses: Diabetes mellitus type 2 in obese (HCC)      doxazosin (CARDURA) 2 mg tablet TAKE 1 TABLET BY MOUTH 2 TIMES A DAY       Associated Diagnoses: Nocturia associated with benign prostatic hypertrophy      meclizine (ANTIVERT) 25 mg tablet Take 1 Tab by mouth three (3) times daily as needed for Dizziness. Qty: 19 Tab, Refills: 0      diazePAM (VALIUM) 5 mg tablet Take 1 Tab by mouth every eight (8) hours as needed for Anxiety (severe vertigo spells). Max Daily Amount: 15 mg.         atorvastatin (LIPITOR) 40 mg tablet TAKE 1 TABLET BY MOUTH EVERY DAY. HOLD. Omega-3 Fatty Acids 300 mg cap Take 1 Cap by mouth daily. HOLD.       montelukast (SINGULAIR) 10 mg tablet TAKE 1 TABLET BY MOUTH DAILY         ANTI-FUNGAL 2 % topical powder APPLY TO ABDOMEN 2 TIMES PER DAY  Refills: 11           Associated Diagnoses: Diabetes mellitus type 2 in obese (HCC)           Associated Diagnoses: Diabetes mellitus type 2 in obese [facility])           Associated Diagnoses: Diabetes mellitus type 2 in obese ([facility])      ezetimibe (ZETIA) 10 mg tablet Take 1 Tab by mouth daily. metFORMIN ER (GLUCOPHAGE XR) 500 mg tablet TAKE 2 TABLETS BY MOUTH EVERY DAY         ADVAIR DISKUS 500-50 mcg/dose diskus inhaler INHALE 1 PUFF BY INHALATION EVERY TWELVE HOURS. Associated Diagnoses: Mild intermittent asthma without complication; Mild persistent asthma without complication      apixaban (ELIQUIS) 5 mg tablet Take 1 Tab by mouth two (2) times a day. lisinopril (PRINIVIL, ZESTRIL) 10 mg tablet TAKE 1 TABLET BY MOUTH DAILY         albuterol (PROVENTIL VENTOLIN) 2.5 mg /3 mL (0.083 %) nebulizer solution 3 mL by Nebulization route once for 1 dose. Qty: 24 Each, Refills: 11    Associated Diagnoses: Extrinsic asthma, mild intermittent, with acute exacerbation      albuterol (PROAIR HFA) 90 mcg/actuation inhaler Take 2 Puffs by inhalation every four (4) hours as needed for Wheezing. cholecalciferol, vitamin D3, (VITAMIN D3) 2,000 unit tab Take 1 tablet by mouth daily. diphenhydrAMINE (BENADRYL ALLERGY) 25 mg tablet Take 25 mg by mouth nightly as needed for Sleep. b complex vitamins (B COMPLEX 1) tablet Take 1 tablet by mouth every morning.        green tea leaf extract (GREEN TEA) Cap Take 630 mg by mouth every morning. vitamin E (AQUA GEMS) 400 unit capsule Take 400 Units by mouth every morning. FOLIC ACID PO Take 571 mcg by mouth every morning. DOCUSATE CALCIUM (STOOL SOFTENER PO) Take 1 capsule by mouth every morning. mometasone (NASONEX) 50 mcg/Actuation nasal spray 2 Sprays by Both Nostrils route nightly as needed. O.K. Admit to sub acute rehab today. Discharge time: 35 minutes.     Signed By: Kaitlynn Burk MD     July 20, 2017

## 2017-07-20 NOTE — PROGRESS NOTES
2017         Post Op day: 3 Days Post-Op Procedure(s) (LRB):  RIGHT KNEE ARTHROPLASTY TOTAL  (Right)      Admit Date: 2017  Admit Diagnosis: Primary osteoarthritis of right knee [M17.11]       Principle Problem: Status post total right knee replacement. Subjective: Doing well, No complaints, No SOB, No Chest Pain, No Nausea or Vomiting     Objective:   Vital Signs are Stable, No Acute Distress, Alert and Oriented, Dressing is Dry,  Neurovascular exam is normal.     Assessment / Plan :  Patient Active Problem List   Diagnosis Code    Hyperlipemia E78.5    Obstructive sleep apnea (adult) (pediatric) G47.33    Essential hypertension I10    Extrinsic asthma J45.909    Colon polyps K63.5    Diverticulosis K57.90    Cervical spondylosis M47.812    BPH (benign prostatic hypertrophy) N40.0    Chronic back pain M54.9, G89.29    Esophageal reflux K21.9    Diabetes mellitus type 2 in obese ([facility]) E11.9, E66.9    CKD (chronic kidney disease), stage II N18.2    Andropause N50.89    Diverticular stricture ([facility]) K56.60    Coronary atherosclerosis of native coronary vessel I25.10    COPD (chronic obstructive pulmonary disease) ([facility]) J44.9    Edema R60.9    Paroxysmal atrial fibrillation ([facility]) I48.0    OA (osteoarthritis) of knee M17.10    Status post total right knee replacement Z96.651    Patient Vitals for the past 8 hrs:   BP Temp Pulse Resp SpO2   17 0705 109/57 97.2 °F (36.2 °C) 82 16 94 %   17 0420 91/52 96.1 °F (35.6 °C) 78 16 93 %   17 0043 112/54 98.4 °F (36.9 °C) 63 18 94 %    Temp (24hrs), Av.2 °F (36.8 °C), Min:96.1 °F (35.6 °C), Max:99.5 °F (37.5 °C)    Body mass index is 39.02 kg/(m^2).     Lab Results   Component Value Date/Time    HGB 12.0 2017 04:42 AM      Pt seen by and discussed with Lidia Partida placement     Signed By: LYNDON Eastman  2017,  7:36 AM

## 2017-07-21 ENCOUNTER — PATIENT OUTREACH (OUTPATIENT)
Dept: CASE MANAGEMENT | Age: 67
End: 2017-07-21

## 2017-07-21 NOTE — Clinical Note
[facility]). Patient discharged to a SNF [facility] facility. TY!

## 2017-07-21 NOTE — PROGRESS NOTES
Patient discharged to a [facility] facility. Patient will be included in weekly care coordination calls. Notified [facility] RN Care Manager Anthony Ocampo of ED visit. This note will not be viewable in [address]

## 2017-08-02 NOTE — ANESTHESIA PROCEDURE NOTES
Peripheral Block    Start time: 7/17/2017 8:20 AM  End time: 7/17/2017 8:25 AM  Performed by: Cydney Murphy  Authorized by: Cydney Murphy       Pre-procedure: Indications: at surgeon's request and post-op pain management    Preanesthetic Checklist: patient identified, risks and benefits discussed, site marked, timeout performed, anesthesia consent given and patient being monitored    Timeout Time: 08:20          Block Type:   Block Type:   Adductor canal  Laterality:  Right  Monitoring:  Continuous pulse ox, frequent vital sign checks, heart rate, oxygen and responsive to questions  Injection Technique:  Single shot  Procedures: ultrasound guided    Patient Position: supine  Prep: chlorhexidine    Location:  Mid thigh  Needle Gauge:  20 G  Needle Localization:  Ultrasound guidance  Medication Injected:  0.2%  ropivacaine  Volume (mL):  20    Assessment:  Number of attempts:  1  Injection Assessment:  Incremental injection every 5 mL, local visualized surrounding nerve on ultrasound, negative aspiration for blood, no intravascular symptoms, no paresthesia and ultrasound image on chart  Patient tolerance:  Patient tolerated the procedure well with no immediate complications

## 2017-08-02 NOTE — ADDENDUM NOTE
Addendum  created 08/02/17 1100 by Lyric Oswald MD    Anesthesia Intra Blocks edited, Child order released for a procedure order, Sign clinical note

## 2017-08-03 ENCOUNTER — HOSPITAL ENCOUNTER (OUTPATIENT)
Dept: PHYSICAL THERAPY | Age: 67
Discharge: HOME | End: 2017-08-03
Payer: MEDICARE

## 2017-08-03 PROCEDURE — G8978 MOBILITY CURRENT STATUS: HCPCS

## 2017-08-03 PROCEDURE — 97110 THERAPEUTIC EXERCISES: CPT

## 2017-08-03 PROCEDURE — G8979 MOBILITY GOAL STATUS: HCPCS

## 2017-08-03 PROCEDURE — 97161 PT EVAL LOW COMPLEX 20 MIN: CPT

## 2017-08-03 NOTE — PROGRESS NOTES
[facility]   [address], [address]  [phone]   [phone]    Kinjal Elders  : 1950       OUTPATIENT PHYSICAL THERAPY:Initial Assessment 8/3/2017    ICD-10: Treatment Diagnosis:   R26.2 Difficulty in walking, not elsewhere classified      M25.661 Stiffness of right knee, not elsewhere classified      M25.561 Pain in right knee     Precautions/Allergies:   Bactrim [sulfamethoxazole-trimethoprim]   Fall Risk Score: 2 (? 5 = High Risk)  MD Orders: Eval and treat MEDICAL/REFERRING DIAGNOSIS:  Presence of right artificial knee joint [Z96.651]   DATE OF ONSET:   REFERRING PHYSICIAN: Nila Toure., *  RETURN PHYSICIAN APPOINTMENT: Aug 31, 2017     INITIAL ASSESSMENT:  Mr. Edward Sheppard presents with decreased ROM and strength of R knee and LE post R TKA on 17. PT with fair gait with rolling walker. Work to increase ROM and strength to progress ambulation to cane and then no assistive device. Progress strength to enable return to prior activity level. PROBLEM LIST (Impacting functional limitations):  1. Decreased Strength  2. Decreased ADL/Functional Activities  3. Decreased Ambulation Ability/Technique  4. Increased Pain  5. Decreased Flexibility/Joint Mobility  6. Edema/Girth INTERVENTIONS PLANNED:  1. Home Exercise Program (HEP)  2. Manual Therapy  3. Range of Motion (ROM)  4. Therapeutic Activites  5. Therapeutic Exercise/Strengthening   TREATMENT PLAN:  Effective Dates: 8/3/17 TO 10/31/17. Frequency/Duration: 2 times a week for 12 weeks and upon reassessment,  will adjust frequency and duration as progress indicates. GOALS: (Goals have been discussed and agreed upon with patient.)  Short-Term Functional Goals: Time Frame: 4 weeks  1. Establish independent HEP with no cueing to increase ROM and strength   2. Increase R knee ROM to 0-120 to increase ease of sitting and ambulation.   3. Independent gait with straight cane in home and community    4. Increase LE strength so able to initiate reciprocal pattern on stairs. Discharge Goals: Time Frame: 12 weeks   1. Improve score on LEFS by 9 points to enable prolonged sitting, standing and ambulation  2. Increase R knee ROM to 0-125 to  Normalize gait. 3. Independent gait without assistive device in home and community  4. Increase LE strength so able to perform reciprocal pattern on stairs with railing. 5. 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 Thank you for this referral,  Héctor Hernandez, PT     Referring Physician Signature: Michelle Arango, *              Date                    The information in this section was collected on 8/3/17 (except where otherwise noted). HISTORY:   History of Present Injury/Illness (Reason for Referral):  Pt is post R TKA on 7/17/17. He was at [facility] until 8/2 for rehab of R knee. He is now referred to outpatient PT for aggressive ROM and progressive strengthening of R knee and LE's. PT to PT with rolling walker. Progress to cane and then no assistive device. Pt does report that he has a chronic back problem and is unable to lie down. Past Medical History/Comorbidities:   Mr. Don Jacobs  has a past medical history of Asthma (dx childhood); Atrial fibrillation ([address] Cervical spondylosis (6/20/2013); Chronic back pain (2013); Chronic pain; Colon polyps; COPD (chronic obstructive pulmonary disease) ([address] (8/17/2016); Coronary atherosclerosis of native coronary vessel (8/17/2016); Diabetes ([address] (dx 5/14); Diverticulosis; Edema (8/17/2016); Former smoker; GERD (gastroesophageal reflux disease); High cholesterol; History of kidney stones; Hypertension; Obesity (BMI 30-39.9);  Status post total right knee replacement (7/17/2017); and Unspecified sleep apnea. He also has no past medical history of Adverse effect of anesthesia; Aneurysm ([facility] Autoimmune disease ([facility] Cancer ([facility] Chronic kidney disease; Coagulation defects; Coagulation disorder ([facility] DEMENTIA; Difficult intubation; Endocarditis; Heart failure ([facility] Ill-defined condition; Infectious disease; Liver disease; Malignant hyperthermia due to anesthesia; Nausea & vomiting; Neurological disorder; Nicotine vapor product user; Non-nicotine vapor product user; Other ill-defined conditions; Pseudocholinesterase deficiency; Psychiatric disorder; PUD (peptic ulcer disease); Rheumatic fever; Seizures ([facility] Stroke [facility]); Thromboembolus ([facility] Thyroid disease; or Unspecified adverse effect of anesthesia. Mr. Trinity Garcia  has a past surgical history that includes cystoscopy,insert ureteral stent; colonoscopy (10/6/14); colonoscopy (11/20/14); lumbar laminectomy (2009); retinal detachment repair (Bilateral); cataract removal; other surgical (20 yrs ago); colectomy (2015); and heart catheterization (2012). Social History/Living Environment:     Lives in 2 story home with spouse  Prior Level of Function/Work/Activity:  Retired from [employer]. Now sales and business owner  Dominant Side:         RIGHT    Current Medications:       Current Outpatient Prescriptions:     meclizine (ANTIVERT) 25 mg tablet, Take 1 Tab by mouth three (3) times daily as needed for Dizziness. , Disp: 19 Tab, Rfl: 0    diazePAM (VALIUM) 5 mg tablet, Take 1 Tab by mouth every eight (8) hours as needed for Anxiety (severe vertigo spells). Max Daily Amount: 15 mg., Disp: 20 Tab, Rfl: 0    aspirin delayed-release 81 mg tablet, Take 162 mg by mouth nightly.  Decrease to one 81 mg tab on 7/12/17, Disp: , Rfl:     chlorthalidone (HYGROTEN) 25 mg tablet, TAKE 1 TABLET BY MOUTH DAILY, Disp: 90 Tab, Rfl: 2    metoprolol tartrate (LOPRESSOR) 25 mg tablet, TAKE 2 TABLETS BY MOUTH TWICE A DAY, Disp: 120 Tab, Rfl: 5   atorvastatin (LIPITOR) 40 mg tablet, TAKE 1 TABLET BY MOUTH EVERY DAY, Disp: 90 Tab, Rfl: 3    Omega-3 Fatty Acids 300 mg cap, Take 1 Cap by mouth daily. , Disp: , Rfl:     montelukast (SINGULAIR) 10 mg tablet, TAKE 1 TABLET BY MOUTH DAILY, Disp: 30 Tab, Rfl: 5    ANTI-FUNGAL 2 % topical powder, APPLY TO ABDOMEN 2 TIMES PER DAY, Disp: , Rfl: 11    celecoxib (CELEBREX) 200 mg capsule, Take 200 mg by mouth daily. , Disp: , Rfl:     pantoprazole (PROTONIX) 40 mg tablet, TAKE 1 TABLET BY MOUTH EVERY DAY, Disp: 90 Tab, Rfl: 3    Blood-Glucose Meter monitoring kit, Check fs every day; DM2, E11/9, Contour glucometer, Disp: 1 Kit, Rfl: 0    glucose blood VI test strips (ASCENSIA CONTOUR) strip, Check fs every day, DM2, E11.9, Disp: 100 Strip, Rfl: 1    lancets 28 gauge misc, Check fs every day, DM2, E11.9, Disp: 100 Lancet, Rfl: 1    ezetimibe (ZETIA) 10 mg tablet, Take 1 Tab by mouth daily. (Patient taking differently: Take 10 mg by mouth nightly.), Disp: 200 Tab, Rfl: 1    metFORMIN ER (GLUCOPHAGE XR) 500 mg tablet, TAKE 2 TABLETS BY MOUTH EVERY DAY, Disp: 60 Tab, Rfl: 11    ADVAIR DISKUS 500-50 mcg/dose diskus inhaler, INHALE 1 PUFF BY INHALATION EVERY TWELVE HOURS., Disp: 1 Inhaler, Rfl: 11    apixaban (ELIQUIS) 5 mg tablet, Take 1 Tab by mouth two (2) times a day., Disp: 180 Tab, Rfl: 3    lisinopril (PRINIVIL, ZESTRIL) 10 mg tablet, TAKE 1 TABLET BY MOUTH DAILY, Disp: 90 Tab, Rfl: 3    dapagliflozin (FARXIGA) 10 mg tab, Take 1 Tab by mouth daily. , Disp: 90 Tab, Rfl: 3    albuterol (PROVENTIL VENTOLIN) 2.5 mg /3 mL (0.083 %) nebulizer solution, 3 mL by Nebulization route once for 1 dose. (Patient taking differently: 2.5 mg by Nebulization route daily as needed.), Disp: 24 Each, Rfl: 11    doxazosin (CARDURA) 2 mg tablet, TAKE 1 TABLET BY MOUTH 2 TIMES A DAY (Patient taking differently: Take 2 mg by mouth daily.  TAKE 1 TABLET BY MOUTH 2 TIMES A DAY), Disp: 180 Tab, Rfl: 3    albuterol (PROAIR HFA) 90 mcg/actuation inhaler, Take 2 Puffs by inhalation every four (4) hours as needed for Wheezing. Take / use AM day of surgery  per anesthesia protocols if needed. , Disp: , Rfl:     cholecalciferol, vitamin D3, (VITAMIN D3) 2,000 unit tab, Take 1 tablet by mouth daily. , Disp: , Rfl:     diphenhydrAMINE (BENADRYL ALLERGY) 25 mg tablet, Take 25 mg by mouth nightly as needed for Sleep., Disp: , Rfl:     b complex vitamins (B COMPLEX 1) tablet, Take 1 tablet by mouth every morning. Stop seven days prior to surgery per anesthesia protocol., Disp: , Rfl:     green tea leaf extract (GREEN TEA) Cap, Take 630 mg by mouth every morning. Stop seven days prior to surgery per anesthesia protocol., Disp: , Rfl:     vitamin E (AQUA GEMS) 400 unit capsule, Take 400 Units by mouth every morning. Stop seven days prior to surgery per anesthesia protocol., Disp: , Rfl:     FOLIC ACID PO, Take 555 mcg by mouth every morning. Stop seven days prior to surgery per anesthesia protocol., Disp: , Rfl:     DOCUSATE CALCIUM (STOOL SOFTENER PO), Take 1 capsule by mouth every morning., Disp: , Rfl:     mometasone (NASONEX) 50 mcg/Actuation nasal spray, 2 Sprays by Both Nostrils route nightly as needed. , Disp: , Rfl:    Date Last Reviewed:  8/3/2017     Number of Personal Factors/Comorbidities that affect the Plan of Care: 1-2: MODERATE COMPLEXITY   EXAMINATION:   Observation/Orthostatic Postural Assessment: To PT with rolling walker. Flat foot gait with increased knee flexion at stance. Decreased heel strike and stance. Pt with flexed posture due to chronic back problems. He reports sleeping in a recliner. Reports he has had many different kinds of treatment but with little success. Tight hip flexors due to flexed posture. Tight HS and HC. Difficult to assess HS tightness due to inability to ie flat however with stretching in long sitting, he is unable to sit to upright.    Palpation:          Pain medial knee and back of knee Functional Mobility:         Gait/Ambulation:  Independent with rolling walker        Stairs:  Difficulty with stairs         ROM:     R knee -7 to 110   L knee -5 to 130                                    Strength:     Knee ext  R 3+/5,  L 4/5  Knee flexion  R 4-/5,  L 4/5         Hip flexion R 4-/5,  L 4/5     Hip abd   R 3+/5,  L 4-/5              Neurological Screen: Intact to light touch  Balance:  Fair,  Good with walker   Body Structures Involved:  1. Bones  2. Joints  3. Muscles  4. Ligaments Body Functions Affected:  1. Sensory/Pain  2. Neuromusculoskeletal  3. Movement Related Activities and Participation Affected:  1. Learning and Applying Knowledge  2. General Tasks and Demands  3. Mobility  4. Self Care  5. Domestic Life  6. Community, Social and [place]   Number of elements (examined above) that affect the Plan of Care: 3: MODERATE COMPLEXITY   CLINICAL PRESENTATION:   Presentation: Evolving clinical presentation with changing clinical characteristics: MODERATE COMPLEXITY   CLINICAL DECISION MAKING:   Outcome Measure: Tool Used: Lower Extremity Functional Scale (LEFS)  Score:  Initial: 15/80 Most Recent: X/80 (Date: -- )   Interpretation of Score: 20 questions each scored on a 5 point scale with 0 representing \"extreme difficulty or unable to perform\" and 4 representing \"no difficulty\". The lower the score, the greater the functional disability. 80/80 represents no disability. Minimal detectable change is 9 points. Score 80 79-63 62-48 47-32 31-16 15-1 0   Modifier CH CI CJ CK CL CM CN     ?  Mobility - Walking and Moving Around:     - CURRENT STATUS: CM - 80%-99% impaired, limited or restricted    - GOAL STATUS: CK - 40%-59% impaired, limited or restricted    - D/C STATUS:  ---------------To be determined---------------      Medical Necessity:   · Patient is expected to demonstrate progress in strength, range of motion and gait to increase independence with home and community activities. Reason for Services/Other Comments:  · Patient continues to require skilled intervention due to decreased ROM and strength and fair gait . Use of outcome tool(s) and clinical judgement create a POC that gives a: Clear prediction of patient's progress: LOW COMPLEXITY            TREATMENT:   (In addition to Assessment/Re-Assessment sessions the following treatments were rendered)  Pre-treatment Symptoms/Complaints:  Pain and stiffness of R knee. Weakness of LE's. Fair gait   Pain: Initial: Pain Intensity 1: 8  Post Session:  6     THERAPEUTIC EXERCISE: (15 minutes):  Exercises per grid below to improve mobility, strength and gait. Required moderate verbal and manual cues to promote proper body alignment, promote proper body posture and promote proper body mechanics. Progressed resistance, range and repetitions as indicated. Pt is unable to lie down due to back issues. Worked on knee in supine in sitting with back to wall. Instructed in exercise program of QS (quadriceps sets) in sitting supine and standing, SAQ's (short arc quadriceps), LAQ's (long arc quadriceps), and standing hip flexion, abd and ext. Also instructed in 6 in step up and rolling ball in sitting for PROM. Instructed in HS and HC stretch. Worked on giat with rolling walker to increase knee ext in heel strike and stance. Worked to increased toe off. Worked to increase quad contraction and stability in stance. Progressed to use of straight cane with mod assist to min assist to CGA. Told he may try cane at home when someone is home. Evaluation (  xx   ):    Manual Therapy (      ): Patella and patella tendon mobs. Soft tissue work to [employer] and HS. PROM for knee flex and extn. Therapeutic Modalities:    HEP: As above; handouts given to patient for all exercises. Treatment/Session Assessment:  Pt is post R TKA on 7/17/17. He has been at [facility] for [facility]hab until 8/2.   Presents with decreased ROM and strength of R knee and LE. Work to increase strength and ROM to improve gait. Work to increase strength to enable return to prior activity level. Pt to PT with rolling walker. Work to progress to cane and then to no assistive device. Back issues may slow progress but very motivated. · Response to Treatment:  Understood exercises. Increased knee flexion . · Compliance with Program/Exercises: Will assess as treatment progresses. · Recommendations/Intent for next treatment session: \"Next visit will focus on aggressive ROM and strength of R knee and LE. Gait training\".   Total Treatment Duration:  PT Patient Time In/Time Out  Time In: 0300  Time Out: 0400  Treatment number  1  Zeny Chu, PT

## 2017-08-03 NOTE — PROGRESS NOTES
Ambulatory/Rehab Services H2 Model Falls Risk Assessment    Risk Factor Pts. ·   Confusion/Disorientation/Impulsivity  []    4 ·   Symptomatic Depression  []   2 ·   Altered Elimination  []   1 ·   Dizziness/Vertigo  []   1 ·   Gender (Male)  [x]   1 ·   Any administered antiepileptics (anticonvulsants):  []   2 ·   Any administered benzodiazepines:  []   1 ·   Visual Impairment (specify):  []   1 ·   Portable Oxygen Use  []   1 ·   Orthostatic ? BP  []   1 ·   History of Recent Falls (within 3 mos.)  []   5     Ability to Rise from Chair (choose one) Pts. ·   Ability to rise in a single movement  []   0 ·   Pushes up, successful in one attempt  [x]   1 ·   Multiple attempts, but successful  []   3 ·   Unable to rise without assistance  []   4   Total: (5 or greater = High Risk) 2     Falls Prevention Plan:   []                Physical Limitations to Exercise (specify):   []                Mobility Assistance Device (type):   []                Exercise/Equipment Adaptation (specify):    ©2010 [facility] [address] Patent #[id number].  Federal Law prohibits the replication, distribution or use without written permission from [facility] [employer]

## 2017-08-07 ENCOUNTER — HOSPITAL ENCOUNTER (OUTPATIENT)
Dept: PHYSICAL THERAPY | Age: 67
Discharge: HOME | End: 2017-08-07
Payer: MEDICARE

## 2017-08-07 PROBLEM — Z00.00 MEDICARE ANNUAL WELLNESS VISIT, SUBSEQUENT: Status: ACTIVE | Noted: 2017-08-07

## 2017-08-07 PROCEDURE — 97140 MANUAL THERAPY 1/> REGIONS: CPT

## 2017-08-07 PROCEDURE — 97110 THERAPEUTIC EXERCISES: CPT

## 2017-08-07 NOTE — PROGRESS NOTES
[facility]   [address], [address]  [phone]   [phone]    Irma Castro  : 1950       OUTPATIENT PHYSICAL THERAPY:Daily Note 2017    ICD-10: Treatment Diagnosis:   R26.2 Difficulty in walking, not elsewhere classified      M25.661 Stiffness of right knee, not elsewhere classified      M25.561 Pain in right knee     Precautions/Allergies:   Bactrim [sulfamethoxazole-trimethoprim]   Fall Risk Score: 2 (? 5 = High Risk)  MD Orders: Eval and treat MEDICAL/REFERRING DIAGNOSIS:  Presence of right artificial knee joint [Z96.651]   DATE OF ONSET:   REFERRING PHYSICIAN: Marcelo Butt., *  RETURN PHYSICIAN APPOINTMENT: Aug 31, 2017     INITIAL ASSESSMENT:  Mr. Devang Loyd presents with decreased ROM and strength of R knee and LE post R TKA on 17. PT with fair gait with rolling walker. Work to increase ROM and strength to progress ambulation to cane and then no assistive device. Progress strength to enable return to prior activity level. PROBLEM LIST (Impacting functional limitations):  1. Decreased Strength  2. Decreased ADL/Functional Activities  3. Decreased Ambulation Ability/Technique  4. Increased Pain  5. Decreased Flexibility/Joint Mobility  6. Edema/Girth INTERVENTIONS PLANNED:  1. Home Exercise Program (HEP)  2. Manual Therapy  3. Range of Motion (ROM)  4. Therapeutic Activites  5. Therapeutic Exercise/Strengthening   TREATMENT PLAN:  Effective Dates: 8/3/17 TO 10/31/17. Frequency/Duration: 2 times a week for 12 weeks and upon reassessment,  will adjust frequency and duration as progress indicates. GOALS: (Goals have been discussed and agreed upon with patient.)  Short-Term Functional Goals: Time Frame: 4 weeks  1. Establish independent HEP with no cueing to increase ROM and strength   2. Increase R knee ROM to 0-120 to increase ease of sitting and ambulation.   3. Independent gait with straight cane in home and community    4. Increase LE strength so able to initiate reciprocal pattern on stairs. Discharge Goals: Time Frame: 12 weeks   1. Improve score on LEFS by 9 points to enable prolonged sitting, standing and ambulation  2. Increase R knee ROM to 0-125 to  Normalize gait. 3. Independent gait without assistive device in home and community  4. Increase LE strength so able to perform reciprocal pattern on stairs with railing. 5. Increase LE strength so able to return to work in his business   Rehabilitation Potential For Stated Goals: [facility], I certify that the treatment plan above will be carried out by a therapist or under their direction. Thank you for this referral,  Bonifacio Randall, PT     Referring Physician Signature: Mimi Perry., *              Date                    The information in this section was collected on 8/3/17 (except where otherwise noted). HISTORY:   History of Present Injury/Illness (Reason for Referral):  Pt is post R TKA on 7/17/17. He was at [facility] until 8/2 for rehab of R knee. He is now referred to outpatient PT for aggressive ROM and progressive strengthening of R knee and LE's. PT to PT with rolling walker. Progress to cane and then no assistive device. Pt does report that he has a chronic back problem and is unable to lie down. Past Medical History/Comorbidities:   Mr. Noelle Walsh  has a past medical history of Asthma (dx childhood); Atrial fibrillation ([address] Cervical spondylosis (6/20/2013); Chronic back pain (2013); Chronic pain; Colon polyps; COPD (chronic obstructive pulmonary disease) ([address] (8/17/2016); Coronary atherosclerosis of native coronary vessel (8/17/2016); Diabetes ([address] (dx 5/14); Diverticulosis; Edema (8/17/2016); Former smoker; GERD (gastroesophageal reflux disease); High cholesterol; History of kidney stones; Hypertension; Obesity (BMI 30-39.9);  Status post total right knee replacement (7/17/2017); and Unspecified sleep apnea. He also has no past medical history of Adverse effect of anesthesia; Aneurysm ([facility] Autoimmune disease ([facility] Cancer ([facility] Chronic kidney disease; Coagulation defects; Coagulation disorder ([facility] DEMENTIA; Difficult intubation; Endocarditis; Heart failure ([facility] Ill-defined condition; Infectious disease; Liver disease; Malignant hyperthermia due to anesthesia; Nausea & vomiting; Neurological disorder; Nicotine vapor product user; Non-nicotine vapor product user; Other ill-defined conditions; Pseudocholinesterase deficiency; Psychiatric disorder; PUD (peptic ulcer disease); Rheumatic fever; Seizures ([facility] Stroke [facility]); Thromboembolus ([facility] Thyroid disease; or Unspecified adverse effect of anesthesia. Mr. Anthony De Oliveira  has a past surgical history that includes cystoscopy,insert ureteral stent; colonoscopy (10/6/14); colonoscopy (11/20/14); lumbar laminectomy (2009); retinal detachment repair (Bilateral); cataract removal; other surgical (20 yrs ago); colectomy (2015); and heart catheterization (2012). Social History/Living Environment:     Lives in 2 story home with spouse  Prior Level of Function/Work/Activity:  Retired from [employer]. Now sales and business owner  Dominant Side:         RIGHT    Current Medications:       Current Outpatient Prescriptions:     meclizine (ANTIVERT) 25 mg tablet, Take 1 Tab by mouth three (3) times daily as needed for Dizziness. , Disp: 19 Tab, Rfl: 0    diazePAM (VALIUM) 5 mg tablet, Take 1 Tab by mouth every eight (8) hours as needed for Anxiety (severe vertigo spells). Max Daily Amount: 15 mg., Disp: 20 Tab, Rfl: 0    aspirin delayed-release 81 mg tablet, Take 162 mg by mouth nightly.  Decrease to one 81 mg tab on 7/12/17, Disp: , Rfl:     chlorthalidone (HYGROTEN) 25 mg tablet, TAKE 1 TABLET BY MOUTH DAILY, Disp: 90 Tab, Rfl: 2    metoprolol tartrate (LOPRESSOR) 25 mg tablet, TAKE 2 TABLETS BY MOUTH TWICE A DAY, Disp: 120 Tab, Rfl: 5   atorvastatin (LIPITOR) 40 mg tablet, TAKE 1 TABLET BY MOUTH EVERY DAY, Disp: 90 Tab, Rfl: 3    Omega-3 Fatty Acids 300 mg cap, Take 1 Cap by mouth daily. , Disp: , Rfl:     montelukast (SINGULAIR) 10 mg tablet, TAKE 1 TABLET BY MOUTH DAILY, Disp: 30 Tab, Rfl: 5    ANTI-FUNGAL 2 % topical powder, APPLY TO ABDOMEN 2 TIMES PER DAY, Disp: , Rfl: 11    celecoxib (CELEBREX) 200 mg capsule, Take 200 mg by mouth daily. , Disp: , Rfl:     pantoprazole (PROTONIX) 40 mg tablet, TAKE 1 TABLET BY MOUTH EVERY DAY, Disp: 90 Tab, Rfl: 3    Blood-Glucose Meter monitoring kit, Check fs every day; DM2, E11/9, Contour glucometer, Disp: 1 Kit, Rfl: 0    glucose blood VI test strips (ASCENSIA CONTOUR) strip, Check fs every day, DM2, E11.9, Disp: 100 Strip, Rfl: 1    lancets 28 gauge misc, Check fs every day, DM2, E11.9, Disp: 100 Lancet, Rfl: 1    ezetimibe (ZETIA) 10 mg tablet, Take 1 Tab by mouth daily. (Patient taking differently: Take 10 mg by mouth nightly.), Disp: 200 Tab, Rfl: 1    metFORMIN ER (GLUCOPHAGE XR) 500 mg tablet, TAKE 2 TABLETS BY MOUTH EVERY DAY, Disp: 60 Tab, Rfl: 11    ADVAIR DISKUS 500-50 mcg/dose diskus inhaler, INHALE 1 PUFF BY INHALATION EVERY TWELVE HOURS., Disp: 1 Inhaler, Rfl: 11    apixaban (ELIQUIS) 5 mg tablet, Take 1 Tab by mouth two (2) times a day., Disp: 180 Tab, Rfl: 3    lisinopril (PRINIVIL, ZESTRIL) 10 mg tablet, TAKE 1 TABLET BY MOUTH DAILY, Disp: 90 Tab, Rfl: 3    dapagliflozin (FARXIGA) 10 mg tab, Take 1 Tab by mouth daily. , Disp: 90 Tab, Rfl: 3    albuterol (PROVENTIL VENTOLIN) 2.5 mg /3 mL (0.083 %) nebulizer solution, 3 mL by Nebulization route once for 1 dose. (Patient taking differently: 2.5 mg by Nebulization route daily as needed.), Disp: 24 Each, Rfl: 11    doxazosin (CARDURA) 2 mg tablet, TAKE 1 TABLET BY MOUTH 2 TIMES A DAY (Patient taking differently: Take 2 mg by mouth daily.  TAKE 1 TABLET BY MOUTH 2 TIMES A DAY), Disp: 180 Tab, Rfl: 3    albuterol (PROAIR HFA) 90 mcg/actuation inhaler, Take 2 Puffs by inhalation every four (4) hours as needed for Wheezing. Take / use AM day of surgery  per anesthesia protocols if needed. , Disp: , Rfl:     cholecalciferol, vitamin D3, (VITAMIN D3) 2,000 unit tab, Take 1 tablet by mouth daily. , Disp: , Rfl:     diphenhydrAMINE (BENADRYL ALLERGY) 25 mg tablet, Take 25 mg by mouth nightly as needed for Sleep., Disp: , Rfl:     b complex vitamins (B COMPLEX 1) tablet, Take 1 tablet by mouth every morning. Stop seven days prior to surgery per anesthesia protocol., Disp: , Rfl:     green tea leaf extract (GREEN TEA) Cap, Take 630 mg by mouth every morning. Stop seven days prior to surgery per anesthesia protocol., Disp: , Rfl:     vitamin E (AQUA GEMS) 400 unit capsule, Take 400 Units by mouth every morning. Stop seven days prior to surgery per anesthesia protocol., Disp: , Rfl:     FOLIC ACID PO, Take 438 mcg by mouth every morning. Stop seven days prior to surgery per anesthesia protocol., Disp: , Rfl:     DOCUSATE CALCIUM (STOOL SOFTENER PO), Take 1 capsule by mouth every morning., Disp: , Rfl:     mometasone (NASONEX) 50 mcg/Actuation nasal spray, 2 Sprays by Both Nostrils route nightly as needed. , Disp: , Rfl:    Date Last Reviewed:  8/7/2017     Number of Personal Factors/Comorbidities that affect the Plan of Care: 1-2: MODERATE COMPLEXITY   EXAMINATION:   Observation/Orthostatic Postural Assessment: To PT with rolling walker. Flat foot gait with increased knee flexion at stance. Decreased heel strike and stance. Pt with flexed posture due to chronic back problems. He reports sleeping in a recliner. Reports he has had many different kinds of treatment but with little success. Tight hip flexors due to flexed posture. Tight HS and HC. Difficult to assess HS tightness due to inability to ie flat however with stretching in long sitting, he is unable to sit to upright.    Palpation:          Pain medial knee and back of knee Functional Mobility:         Gait/Ambulation:  Independent with rolling walker        Stairs:  Difficulty with stairs         ROM:     R knee -7 to 110   L knee -5 to 130                                    Strength:     Knee ext  R 3+/5,  L 4/5  Knee flexion  R 4-/5,  L 4/5         Hip flexion R 4-/5,  L 4/5     Hip abd   R 3+/5,  L 4-/5              Neurological Screen: Intact to light touch  Balance:  Fair,  Good with walker   Body Structures Involved:  1. Bones  2. Joints  3. Muscles  4. Ligaments Body Functions Affected:  1. Sensory/Pain  2. Neuromusculoskeletal  3. Movement Related Activities and Participation Affected:  1. Learning and Applying Knowledge  2. General Tasks and Demands  3. Mobility  4. Self Care  5. Domestic Life  6. Community, Social and [place]   Number of elements (examined above) that affect the Plan of Care: 3: MODERATE COMPLEXITY   CLINICAL PRESENTATION:   Presentation: Evolving clinical presentation with changing clinical characteristics: MODERATE COMPLEXITY   CLINICAL DECISION MAKING:   Outcome Measure: Tool Used: Lower Extremity Functional Scale (LEFS)  Score:  Initial: 15/80 Most Recent: X/80 (Date: -- )   Interpretation of Score: 20 questions each scored on a 5 point scale with 0 representing \"extreme difficulty or unable to perform\" and 4 representing \"no difficulty\". The lower the score, the greater the functional disability. 80/80 represents no disability. Minimal detectable change is 9 points. Score 80 79-63 62-48 47-32 31-16 15-1 0   Modifier CH CI CJ CK CL CM CN     ?  Mobility - Walking and Moving Around:     - CURRENT STATUS: CM - 80%-99% impaired, limited or restricted    - GOAL STATUS: CK - 40%-59% impaired, limited or restricted    - D/C STATUS:  ---------------To be determined---------------      Medical Necessity:   · Patient is expected to demonstrate progress in strength, range of motion and gait to increase independence with home and community activities. Reason for Services/Other Comments:  · Patient continues to require skilled intervention due to decreased ROM and strength and fair gait . Use of outcome tool(s) and clinical judgement create a POC that gives a: Clear prediction of patient's progress: LOW COMPLEXITY            TREATMENT:   (In addition to Assessment/Re-Assessment sessions the following treatments were rendered)  Pre-treatment Symptoms/Complaints:  Pain and stiffness of R knee. Couldn't find a cane that was my height. Did exercises. Sore but did all right athome. Pain: Initial: Pain Intensity 1: 6  Post Session:  6      THERAPEUTIC EXERCISE: (30 minutes):  Exercises per grid below to improve mobility, strength and gait. Required moderate verbal and manual cues to promote proper body alignment, promote proper body posture and promote proper body mechanics. Progressed resistance, range and repetitions as indicated. Pt is unable to lie down due to back issues. Worked on knee in supine in sitting with back to wall. Reviewed exercise program of QS (quadriceps sets) in sitting supine and standing, SAQ's (short arc quadriceps), LAQ's (long arc quadriceps), and standing hip flexion, abd and ext. Performed 6 in step up x 30. Rolling ball in sitting for PROM. I HS and HC stretch. Sitting [facility] at 20# x 4 sets of 10. Worked on standing cable for hip flex and abd at 17# x 2 sets of 15. Worked on gait with straight cane. Also worked without assistive device but had increased limp. Worked to increased toe off. Worked to increase quad contraction and stability in stance. Use of cane with CGA progressing to SBA. Told to use cane at home when he got one. NuStep at level 5 x 10 min . Manual Therapy ( 30 min  ): Patella and patella tendon mobs. Soft tissue work to [employer] and HS. PROM for knee flex and extn. Sitting knee flex to 118. Passive knee ext to -4.           Therapeutic Modalities:    HEP: As above; handouts given to patient for all exercises. Treatment/Session Assessment:  Pt is post R TKA on 7/17/17. He had been at [facility] for rehab until 8/2. Presents with decreased ROM and strength of R knee and LE. Good increase in ROM. Improved gait. Able to use straight cane when he gets one for his height. Working to increase strength and ROM to improve gait. Work to increase strength to increase ease of gait. Work to increase strength to enable return to prior activity level. Pt to PT with rolling walker but able to progress to cane when he gets one. .  Work to progress to cane and then to no assistive device. Back issues may slow progress but very motivated. · Response to Treatment:  Understood exercises. Increased knee flexion . · Compliance with Program/Exercises: Will assess as treatment progresses. · Recommendations/Intent for next treatment session: \"Next visit will focus on aggressive ROM and strength of R knee and LE. Gait training\".   Total Treatment Duration:  PT Patient Time In/Time Out  Time In: 0300  Time Out: 0400  Treatment number  2  Cathie Ed Castleman, PT

## 2017-08-08 PROBLEM — Z00.00 MEDICARE ANNUAL WELLNESS VISIT, SUBSEQUENT: Chronic | Status: ACTIVE | Noted: 2017-08-07

## 2017-08-08 PROBLEM — Z87.891 HISTORY OF SMOKING 30 OR MORE PACK YEARS: Status: ACTIVE | Noted: 2017-08-08

## 2017-08-09 ENCOUNTER — HOSPITAL ENCOUNTER (OUTPATIENT)
Dept: PHYSICAL THERAPY | Age: 67
Discharge: HOME | End: 2017-08-09
Payer: MEDICARE

## 2017-08-09 PROCEDURE — 97110 THERAPEUTIC EXERCISES: CPT

## 2017-08-09 PROCEDURE — 97140 MANUAL THERAPY 1/> REGIONS: CPT

## 2017-08-09 NOTE — PROGRESS NOTES
[facility]   [address], [address]  [phone]   [phone]    Noe Vasquez  : 1950       OUTPATIENT PHYSICAL THERAPY:Daily Note 2017    ICD-10: Treatment Diagnosis:   R26.2 Difficulty in walking, not elsewhere classified      M25.661 Stiffness of right knee, not elsewhere classified      M25.561 Pain in right knee     Precautions/Allergies:   Bactrim [sulfamethoxazole-trimethoprim]   Fall Risk Score: 2 (? 5 = High Risk)  MD Orders: Eval and treat MEDICAL/REFERRING DIAGNOSIS:  Presence of right artificial knee joint [Z96.651]   DATE OF ONSET:   REFERRING PHYSICIAN: Marisel Soler., *  RETURN PHYSICIAN APPOINTMENT: Aug 31, 2017     INITIAL ASSESSMENT:  Mr. Nikita Fraga presents with decreased ROM and strength of R knee and LE post R TKA on 17. PT with fair gait with rolling walker. Work to increase ROM and strength to progress ambulation to cane and then no assistive device. Progress strength to enable return to prior activity level. PROBLEM LIST (Impacting functional limitations):  1. Decreased Strength  2. Decreased ADL/Functional Activities  3. Decreased Ambulation Ability/Technique  4. Increased Pain  5. Decreased Flexibility/Joint Mobility  6. Edema/Girth INTERVENTIONS PLANNED:  1. Home Exercise Program (HEP)  2. Manual Therapy  3. Range of Motion (ROM)  4. Therapeutic Activites  5. Therapeutic Exercise/Strengthening   TREATMENT PLAN:  Effective Dates: 8/3/17 TO 10/31/17. Frequency/Duration: 2 times a week for 12 weeks and upon reassessment,  will adjust frequency and duration as progress indicates. GOALS: (Goals have been discussed and agreed upon with patient.)  Short-Term Functional Goals: Time Frame: 4 weeks  1. Establish independent HEP with no cueing to increase ROM and strength   2. Increase R knee ROM to 0-120 to increase ease of sitting and ambulation.   3. Independent gait with straight cane in home and community    4. Increase LE strength so able to initiate reciprocal pattern on stairs. Discharge Goals: Time Frame: 12 weeks   1. Improve score on LEFS by 9 points to enable prolonged sitting, standing and ambulation  2. Increase R knee ROM to 0-125 to  Normalize gait. 3. Independent gait without assistive device in home and community  4. Increase LE strength so able to perform reciprocal pattern on stairs with railing. 5. Increase LE strength so able to return to work in his business   Rehabilitation Potential For Stated Goals: [facility], I certify that the treatment plan above will be carried out by a therapist or under their direction. Thank you for this referral,  Neha Schroeder, PT     Referring Physician Signature: Clark Guerrero., *              Date                    The information in this section was collected on 8/3/17 (except where otherwise noted). HISTORY:   History of Present Injury/Illness (Reason for Referral):  Pt is post R TKA on 7/17/17. He was at [facility] until 8/2 for rehab of R knee. He is now referred to outpatient PT for aggressive ROM and progressive strengthening of R knee and LE's. PT to PT with rolling walker. Progress to cane and then no assistive device. Pt does report that he has a chronic back problem and is unable to lie down. Past Medical History/Comorbidities:   Mr. Hugo Dowling  has a past medical history of Asthma (dx childhood); Atrial fibrillation ([address] Cervical spondylosis (6/20/2013); Chronic back pain (2013); Chronic pain; Colon polyps; COPD (chronic obstructive pulmonary disease) ([address] (8/17/2016); Coronary atherosclerosis of native coronary vessel (8/17/2016); Diabetes ([address] (dx 5/14); Diverticulosis; Edema (8/17/2016); Former smoker; GERD (gastroesophageal reflux disease); High cholesterol; History of kidney stones; Hypertension; Obesity (BMI 30-39.9);  Status post total right knee replacement (7/17/2017); and Unspecified sleep apnea. He also has no past medical history of Adverse effect of anesthesia; Aneurysm ([facility] Autoimmune disease ([facility] Cancer ([facility] Chronic kidney disease; Coagulation defects; Coagulation disorder ([facility] DEMENTIA; Difficult intubation; Endocarditis; Heart failure ([facility] Ill-defined condition; Infectious disease; Liver disease; Malignant hyperthermia due to anesthesia; Nausea & vomiting; Neurological disorder; Nicotine vapor product user; Non-nicotine vapor product user; Other ill-defined conditions; Pseudocholinesterase deficiency; Psychiatric disorder; PUD (peptic ulcer disease); Rheumatic fever; Seizures ([facility] Stroke [facility]); Thromboembolus ([facility] Thyroid disease; or Unspecified adverse effect of anesthesia. Mr. Deann Knapp  has a past surgical history that includes cystoscopy,insert ureteral stent; colonoscopy (10/6/14); colonoscopy (11/20/14); lumbar laminectomy (2009); retinal detachment repair (Bilateral); cataract removal; other surgical (20 yrs ago); colectomy (2015); and heart catheterization (2012). Social History/Living Environment:     Lives in 2 story home with spouse  Prior Level of Function/Work/Activity:  Retired from [employer]. Now sales and business owner  Dominant Side:         RIGHT    Current Medications:       Current Outpatient Prescriptions:     chlorthalidone (HYGROTEN) 25 mg tablet, Take 1 Tab by mouth daily. , Disp: 90 Tab, Rfl: 0    metoprolol tartrate (LOPRESSOR) 25 mg tablet, Take 2 tablets by mouth twice daily, Disp: 180 Tab, Rfl: 0    atorvastatin (LIPITOR) 40 mg tablet, Take 0.5 Tabs by mouth daily. , Disp: 90 Tab, Rfl: 0    montelukast (SINGULAIR) 10 mg tablet, Take 1 Tab by mouth daily. , Disp: 90 Tab, Rfl: 0    pantoprazole (PROTONIX) 40 mg tablet, Take 1 Tab by mouth daily. , Disp: 90 Tab, Rfl: 0    ezetimibe (ZETIA) 10 mg tablet, Take 1 Tab by mouth nightly., Disp: 90 Tab, Rfl: 0    metFORMIN ER (GLUCOPHAGE XR) 500 mg tablet, Take 2 tablets every morning, Disp: 180 Tab, Rfl: 0    fluticasone-salmeterol (ADVAIR DISKUS) 500-50 mcg/dose diskus inhaler, Take 1 Puff by inhalation two (2) times a day., Disp: 3 Inhaler, Rfl: 0    apixaban (ELIQUIS) 5 mg tablet, Take 1 Tab by mouth two (2) times a day., Disp: 180 Tab, Rfl: 0    lisinopril (PRINIVIL, ZESTRIL) 10 mg tablet, Take 1 Tab by mouth daily. , Disp: 90 Tab, Rfl: 0    dapagliflozin (FARXIGA) 10 mg tab, Take 1 Tab by mouth daily. , Disp: 90 Tab, Rfl: 0    doxazosin (CARDURA) 4 mg tablet, Take 1 Tab by mouth daily. , Disp: 90 Tab, Rfl: 0    celecoxib (CELEBREX) 200 mg capsule, Take 1 Cap by mouth daily. , Disp: 90 Cap, Rfl: 0    dulaglutide (TRULICITY) 1.5 UU/9.0 mL sub-q pen, 0.5 mL by SubCUTAneous route every seven (7) days. , Disp: 12 Pen, Rfl: 0    meclizine (ANTIVERT) 25 mg tablet, Take 1 Tab by mouth three (3) times daily as needed for Dizziness. , Disp: 19 Tab, Rfl: 0    diazePAM (VALIUM) 5 mg tablet, Take 1 Tab by mouth every eight (8) hours as needed for Anxiety (severe vertigo spells). Max Daily Amount: 15 mg., Disp: 20 Tab, Rfl: 0    aspirin delayed-release 81 mg tablet, Take 162 mg by mouth nightly. Decrease to one 81 mg tab on 7/12/17, Disp: , Rfl:     Omega-3 Fatty Acids 300 mg cap, Take 1 Cap by mouth daily. , Disp: , Rfl:     ANTI-FUNGAL 2 % topical powder, APPLY TO ABDOMEN 2 TIMES PER DAY, Disp: , Rfl: 11    Blood-Glucose Meter monitoring kit, Check fs every day; DM2, E11/9, Contour glucometer, Disp: 1 Kit, Rfl: 0    glucose blood VI test strips (ASCENSIA CONTOUR) strip, Check fs every day, DM2, E11.9, Disp: 100 Strip, Rfl: 1    lancets 28 gauge misc, Check fs every day, DM2, E11.9, Disp: 100 Lancet, Rfl: 1    albuterol (PROVENTIL VENTOLIN) 2.5 mg /3 mL (0.083 %) nebulizer solution, 3 mL by Nebulization route once for 1 dose.  (Patient taking differently: 2.5 mg by Nebulization route daily as needed.), Disp: 24 Each, Rfl: 11    albuterol (PROAIR HFA) 90 mcg/actuation inhaler, Take 2 Puffs by inhalation every four (4) hours as needed for Wheezing. Take / use AM day of surgery  per anesthesia protocols if needed. , Disp: , Rfl:     cholecalciferol, vitamin D3, (VITAMIN D3) 2,000 unit tab, Take 1 tablet by mouth daily. , Disp: , Rfl:     diphenhydrAMINE (BENADRYL ALLERGY) 25 mg tablet, Take 25 mg by mouth nightly as needed for Sleep., Disp: , Rfl:     b complex vitamins (B COMPLEX 1) tablet, Take 1 tablet by mouth every morning. Stop seven days prior to surgery per anesthesia protocol., Disp: , Rfl:     green tea leaf extract (GREEN TEA) Cap, Take 630 mg by mouth every morning. Stop seven days prior to surgery per anesthesia protocol., Disp: , Rfl:     vitamin E (AQUA GEMS) 400 unit capsule, Take 400 Units by mouth every morning. Stop seven days prior to surgery per anesthesia protocol., Disp: , Rfl:     FOLIC ACID PO, Take 821 mcg by mouth every morning. Stop seven days prior to surgery per anesthesia protocol., Disp: , Rfl:     DOCUSATE CALCIUM (STOOL SOFTENER PO), Take 1 capsule by mouth every morning., Disp: , Rfl:     mometasone (NASONEX) 50 mcg/Actuation nasal spray, 2 Sprays by Both Nostrils route nightly as needed. , Disp: , Rfl:    Date Last Reviewed:  8/9/2017     Number of Personal Factors/Comorbidities that affect the Plan of Care: 1-2: MODERATE COMPLEXITY   EXAMINATION:   Observation/Orthostatic Postural Assessment: To PT with rolling walker. Flat foot gait with increased knee flexion at stance. Decreased heel strike and stance. Pt with flexed posture due to chronic back problems. He reports sleeping in a recliner. Reports he has had many different kinds of treatment but with little success. Tight hip flexors due to flexed posture. Tight HS and HC. Difficult to assess HS tightness due to inability to ie flat however with stretching in long sitting, he is unable to sit to upright.    Palpation:          Pain medial knee and back of knee   Functional Mobility: Gait/Ambulation:  Independent with rolling walker        Stairs:  Difficulty with stairs         ROM:     R knee -7 to 110   L knee -5 to 130                                    Strength:     Knee ext  R 3+/5,  L 4/5  Knee flexion  R 4-/5,  L 4/5         Hip flexion R 4-/5,  L 4/5     Hip abd   R 3+/5,  L 4-/5              Neurological Screen: Intact to light touch  Balance:  Fair,  Good with walker   Body Structures Involved:  1. Bones  2. Joints  3. Muscles  4. Ligaments Body Functions Affected:  1. Sensory/Pain  2. Neuromusculoskeletal  3. Movement Related Activities and Participation Affected:  1. Learning and Applying Knowledge  2. General Tasks and Demands  3. Mobility  4. Self Care  5. Domestic Life  6. Community, Social and [place]   Number of elements (examined above) that affect the Plan of Care: 3: MODERATE COMPLEXITY   CLINICAL PRESENTATION:   Presentation: Evolving clinical presentation with changing clinical characteristics: MODERATE COMPLEXITY   CLINICAL DECISION MAKING:   Outcome Measure: Tool Used: Lower Extremity Functional Scale (LEFS)  Score:  Initial: 15/80 Most Recent: X/80 (Date: -- )   Interpretation of Score: 20 questions each scored on a 5 point scale with 0 representing \"extreme difficulty or unable to perform\" and 4 representing \"no difficulty\". The lower the score, the greater the functional disability. 80/80 represents no disability. Minimal detectable change is 9 points. Score 80 79-63 62-48 47-32 31-16 15-1 0   Modifier CH CI CJ CK CL CM CN     ? Mobility - Walking and Moving Around:     - CURRENT STATUS: CM - 80%-99% impaired, limited or restricted    - GOAL STATUS: CK - 40%-59% impaired, limited or restricted    - D/C STATUS:  ---------------To be determined---------------      Medical Necessity:   · Patient is expected to demonstrate progress in strength, range of motion and gait to increase independence with home and community activities.   Reason for Services/Other Comments:  · Patient continues to require skilled intervention due to decreased ROM and strength and fair gait . Use of outcome tool(s) and clinical judgement create a POC that gives a: Clear prediction of patient's progress: LOW COMPLEXITY            TREATMENT:   (In addition to Assessment/Re-Assessment sessions the following treatments were rendered)  Pre-treatment Symptoms/Complaints:  Pt to PT with straight cane. It is as stable as my walker. Pt did have better gait. Reports some soreness with exercises but knee feels looser. Pain: Initial: Pain Intensity 1: 5  Post Session:  4      THERAPEUTIC EXERCISE: (30 minutes):  Exercises per grid below to improve mobility, strength and gait. Required moderate verbal and manual cues to promote proper body alignment, promote proper body posture and promote proper body mechanics. Progressed resistance, range and repetitions as indicated. Pt is unable to lie down due to back issues. Worked on knee in supine in sitting with back to wall. Performed  exercise program of QS (quadriceps sets)  SAQ's (short arc quadriceps), and  LAQ's (long arc quadriceps). .  Performed 6 in step up x 10 then step up and over x 15. .  Rolling ball in sitting for PROM. I HS and HC stretch. Sitting [address] at 20# x 4 sets of 10. Worked on standing cable for hip flex and abd at 17# and hip ext at 25#  x 2 sets of 15. Worked on gait with straight cane. Also worked without assistive device but had increased limp. Worked to increased toe off. Worked to increase quad contraction and stability in stance. .  NuStep at level 5 x 10 min . Manual Therapy ( 30 min  ): Patella and patella tendon mobs. Soft tissue work to [employer] and HS. PROM for knee flex and extn. Sitting knee flex to 122. Passive knee ext to -3        Therapeutic Modalities:    HEP: As above; handouts given to patient for all exercises. Treatment/Session Assessment:  Pt is post R TKA on 7/17/17.   He had been at [facility] for [facility] until 8/2. Presents with decreased ROM and strength of R knee and LE. Good increase in ROM. Improved gait and now independent with straight cane. Able to walk short distance in home and PT without assistive device. Continue working to increase strength and ROM to improve gait and to enable pt to increase activity level. . Work to increase strength to increase ease of gait. Work to increase strength to enable return to prior activity level. to cane when he gets one. .  Work to progress to no assistive device. Back issues may slow progress but very motivated. · Response to Treatment:  Understood exercises. Increased knee flexion . · Compliance with Program/Exercises: Will assess as treatment progresses. · Recommendations/Intent for next treatment session: \"Next visit will focus on aggressive ROM and strength of R knee and LE. Gait training\".   Total Treatment Duration:  PT Patient Time In/Time Out  Time In: 0300  Time Out: 0400  Treatment number  3  Zeny Murrieta, PT

## 2017-08-11 ENCOUNTER — HOSPITAL ENCOUNTER (OUTPATIENT)
Dept: PHYSICAL THERAPY | Age: 67
Discharge: HOME | End: 2017-08-11
Payer: MEDICARE

## 2017-08-11 PROCEDURE — 97140 MANUAL THERAPY 1/> REGIONS: CPT

## 2017-08-11 PROCEDURE — 97110 THERAPEUTIC EXERCISES: CPT

## 2017-08-11 NOTE — PROGRESS NOTES
[facility]rapy [facility] at [facility]   [address], [address]  [phone]   [phone]    Johana Galan  : 1950       OUTPATIENT PHYSICAL THERAPY:Daily Note 2017    ICD-10: Treatment Diagnosis:   R26.2 Difficulty in walking, not elsewhere classified      M25.661 Stiffness of right knee, not elsewhere classified      M25.561 Pain in right knee     Precautions/Allergies:   Bactrim [sulfamethoxazole-trimethoprim]   Fall Risk Score: 2 (? 5 = High Risk)  MD Orders: Eval and treat MEDICAL/REFERRING DIAGNOSIS:  Presence of right artificial knee joint [Z96.651]   DATE OF ONSET:   REFERRING PHYSICIAN: Mimi Perry., *  RETURN PHYSICIAN APPOINTMENT: Aug 31, 2017     INITIAL ASSESSMENT:  Mr. Noelle Walsh presents with decreased ROM and strength of R knee and LE post R TKA on 17. PT with fair gait with rolling walker. Work to increase ROM and strength to progress ambulation to cane and then no assistive device. Progress strength to enable return to prior activity level. PROBLEM LIST (Impacting functional limitations):  1. Decreased Strength  2. Decreased ADL/Functional Activities  3. Decreased Ambulation Ability/Technique  4. Increased Pain  5. Decreased Flexibility/Joint Mobility  6. Edema/Girth INTERVENTIONS PLANNED:  1. Home Exercise Program (HEP)  2. Manual Therapy  3. Range of Motion (ROM)  4. Therapeutic Activites  5. Therapeutic Exercise/Strengthening   TREATMENT PLAN:  Effective Dates: 8/3/17 TO 10/31/17. Frequency/Duration: 2 times a week for 12 weeks and upon reassessment,  will adjust frequency and duration as progress indicates. GOALS: (Goals have been discussed and agreed upon with patient.)  Short-Term Functional Goals: Time Frame: 4 weeks  1. Establish independent HEP with no cueing to increase ROM and strength   2. Increase R knee ROM to 0-120 to increase ease of sitting and ambulation.   3. Independent gait with straight cane in home and community    4. Increase LE strength so able to initiate reciprocal pattern on stairs. Discharge Goals: Time Frame: 12 weeks   1. Improve score on LEFS by 9 points to enable prolonged sitting, standing and ambulation  2. Increase R knee ROM to 0-125 to  Normalize gait. 3. Independent gait without assistive device in home and community  4. Increase LE strength so able to perform reciprocal pattern on stairs with railing. 5. Increase LE strength so able to return to work in his business   Rehabilitation Potential For Stated Goals: [facility], I certify that the treatment plan above will be carried out by a therapist or under their direction. Thank you for this referral,  Albaro Chino, PT     Referring Physician Signature: Fox Sutherland, *              Date                    The information in this section was collected on 8/3/17 (except where otherwise noted). HISTORY:   History of Present Injury/Illness (Reason for Referral):  Pt is post R TKA on 7/17/17. He was at [facility] until 8/2 for rehab of R knee. He is now referred to outpatient PT for aggressive ROM and progressive strengthening of R knee and LE's. PT to PT with rolling walker. Progress to cane and then no assistive device. Pt does report that he has a chronic back problem and is unable to lie down. Past Medical History/Comorbidities:   Mr. Deann Knapp  has a past medical history of Asthma (dx childhood); Atrial fibrillation ([address] Cervical spondylosis (6/20/2013); Chronic back pain (2013); Chronic pain; Colon polyps; COPD (chronic obstructive pulmonary disease) ([address] (8/17/2016); Coronary atherosclerosis of native coronary vessel (8/17/2016); Diabetes ([address] (dx 5/14); Diverticulosis; Edema (8/17/2016); Former smoker; GERD (gastroesophageal reflux disease); High cholesterol; History of kidney stones; Hypertension; Obesity (BMI 30-39.9);  Status post total right knee replacement (7/17/2017); and Unspecified sleep apnea. He also has no past medical history of Adverse effect of anesthesia; Aneurysm ([facility] Autoimmune disease ([facility] Cancer ([facility] Chronic kidney disease; Coagulation defects; Coagulation disorder ([facility] DEMENTIA; Difficult intubation; Endocarditis; Heart failure ([facility] Ill-defined condition; Infectious disease; Liver disease; Malignant hyperthermia due to anesthesia; Nausea & vomiting; Neurological disorder; Nicotine vapor product user; Non-nicotine vapor product user; Other ill-defined conditions; Pseudocholinesterase deficiency; Psychiatric disorder; PUD (peptic ulcer disease); Rheumatic fever; Seizures ([facility] Stroke [facility]); Thromboembolus ([facility] Thyroid disease; or Unspecified adverse effect of anesthesia. Mr. Hugo Dowling  has a past surgical history that includes cystoscopy,insert ureteral stent; colonoscopy (10/6/14); colonoscopy (11/20/14); lumbar laminectomy (2009); retinal detachment repair (Bilateral); cataract removal; other surgical (20 yrs ago); colectomy (2015); and heart catheterization (2012). Social History/Living Environment:     Lives in 2 story home with spouse  Prior Level of Function/Work/Activity:  Retired from [employer]. Now sales and business owner  Dominant Side:         RIGHT    Current Medications:       Current Outpatient Prescriptions:     amoxicillin (AMOXIL) 875 mg tablet, Take 1 Tab by mouth two (2) times a day., Disp: 10 Tab, Rfl: 0    chlorthalidone (HYGROTEN) 25 mg tablet, Take 1 Tab by mouth daily. , Disp: 90 Tab, Rfl: 0    metoprolol tartrate (LOPRESSOR) 25 mg tablet, Take 2 tablets by mouth twice daily, Disp: 180 Tab, Rfl: 0    atorvastatin (LIPITOR) 40 mg tablet, Take 0.5 Tabs by mouth daily. , Disp: 90 Tab, Rfl: 0    montelukast (SINGULAIR) 10 mg tablet, Take 1 Tab by mouth daily. , Disp: 90 Tab, Rfl: 0    pantoprazole (PROTONIX) 40 mg tablet, Take 1 Tab by mouth daily. , Disp: 90 Tab, Rfl: 0    ezetimibe (ZETIA) 10 mg tablet, Take 1 Tab by mouth nightly., Disp: 90 Tab, Rfl: 0    metFORMIN ER (GLUCOPHAGE XR) 500 mg tablet, Take 2 tablets every morning, Disp: 180 Tab, Rfl: 0    fluticasone-salmeterol (ADVAIR DISKUS) 500-50 mcg/dose diskus inhaler, Take 1 Puff by inhalation two (2) times a day., Disp: 3 Inhaler, Rfl: 0    apixaban (ELIQUIS) 5 mg tablet, Take 1 Tab by mouth two (2) times a day., Disp: 180 Tab, Rfl: 0    lisinopril (PRINIVIL, ZESTRIL) 10 mg tablet, Take 1 Tab by mouth daily. , Disp: 90 Tab, Rfl: 0    dapagliflozin (FARXIGA) 10 mg tab, Take 1 Tab by mouth daily. , Disp: 90 Tab, Rfl: 0    doxazosin (CARDURA) 4 mg tablet, Take 1 Tab by mouth daily. , Disp: 90 Tab, Rfl: 0    celecoxib (CELEBREX) 200 mg capsule, Take 1 Cap by mouth daily. , Disp: 90 Cap, Rfl: 0    dulaglutide (TRULICITY) 1.5 BU/2.8 mL sub-q pen, 0.5 mL by SubCUTAneous route every seven (7) days. , Disp: 12 Pen, Rfl: 0    meclizine (ANTIVERT) 25 mg tablet, Take 1 Tab by mouth three (3) times daily as needed for Dizziness. , Disp: 19 Tab, Rfl: 0    diazePAM (VALIUM) 5 mg tablet, Take 1 Tab by mouth every eight (8) hours as needed for Anxiety (severe vertigo spells). Max Daily Amount: 15 mg., Disp: 20 Tab, Rfl: 0    aspirin delayed-release 81 mg tablet, Take 162 mg by mouth nightly. Decrease to one 81 mg tab on 7/12/17, Disp: , Rfl:     Omega-3 Fatty Acids 300 mg cap, Take 1 Cap by mouth daily. , Disp: , Rfl:     ANTI-FUNGAL 2 % topical powder, APPLY TO ABDOMEN 2 TIMES PER DAY, Disp: , Rfl: 11    Blood-Glucose Meter monitoring kit, Check fs every day; DM2, E11/9, Contour glucometer, Disp: 1 Kit, Rfl: 0    glucose blood VI test strips (ASCENSIA CONTOUR) strip, Check fs every day, DM2, E11.9, Disp: 100 Strip, Rfl: 1    lancets 28 gauge misc, Check fs every day, DM2, E11.9, Disp: 100 Lancet, Rfl: 1    albuterol (PROVENTIL VENTOLIN) 2.5 mg /3 mL (0.083 %) nebulizer solution, 3 mL by Nebulization route once for 1 dose.  (Patient taking differently: 2.5 mg by Nebulization route daily as needed.), Disp: 24 Each, Rfl: 11    albuterol (PROAIR HFA) 90 mcg/actuation inhaler, Take 2 Puffs by inhalation every four (4) hours as needed for Wheezing. Take / use AM day of surgery  per anesthesia protocols if needed. , Disp: , Rfl:     cholecalciferol, vitamin D3, (VITAMIN D3) 2,000 unit tab, Take 1 tablet by mouth daily. , Disp: , Rfl:     diphenhydrAMINE (BENADRYL ALLERGY) 25 mg tablet, Take 25 mg by mouth nightly as needed for Sleep., Disp: , Rfl:     b complex vitamins (B COMPLEX 1) tablet, Take 1 tablet by mouth every morning. Stop seven days prior to surgery per anesthesia protocol., Disp: , Rfl:     green tea leaf extract (GREEN TEA) Cap, Take 630 mg by mouth every morning. Stop seven days prior to surgery per anesthesia protocol., Disp: , Rfl:     vitamin E (AQUA GEMS) 400 unit capsule, Take 400 Units by mouth every morning. Stop seven days prior to surgery per anesthesia protocol., Disp: , Rfl:     FOLIC ACID PO, Take 944 mcg by mouth every morning. Stop seven days prior to surgery per anesthesia protocol., Disp: , Rfl:     DOCUSATE CALCIUM (STOOL SOFTENER PO), Take 1 capsule by mouth every morning., Disp: , Rfl:     mometasone (NASONEX) 50 mcg/Actuation nasal spray, 2 Sprays by Both Nostrils route nightly as needed. , Disp: , Rfl:    Date Last Reviewed:  8/11/2017     Number of Personal Factors/Comorbidities that affect the Plan of Care: 1-2: MODERATE COMPLEXITY   EXAMINATION:   Observation/Orthostatic Postural Assessment: To PT with rolling walker. Flat foot gait with increased knee flexion at stance. Decreased heel strike and stance. Pt with flexed posture due to chronic back problems. He reports sleeping in a recliner. Reports he has had many different kinds of treatment but with little success. Tight hip flexors due to flexed posture. Tight HS and HC.   Difficult to assess HS tightness due to inability to ie flat however with stretching in long sitting, he is unable to sit to upright. Palpation:          Pain medial knee and back of knee   Functional Mobility:         Gait/Ambulation:  Independent with rolling walker        Stairs:  Difficulty with stairs         ROM:     R knee -7 to 110   L knee -5 to 130                                    Strength:     Knee ext  R 3+/5,  L 4/5  Knee flexion  R 4-/5,  L 4/5         Hip flexion R 4-/5,  L 4/5     Hip abd   R 3+/5,  L 4-/5              Neurological Screen: Intact to light touch  Balance:  Fair,  Good with walker   Body Structures Involved:  1. Bones  2. Joints  3. Muscles  4. Ligaments Body Functions Affected:  1. Sensory/Pain  2. Neuromusculoskeletal  3. Movement Related Activities and Participation Affected:  1. Learning and Applying Knowledge  2. General Tasks and Demands  3. Mobility  4. Self Care  5. Domestic Life  6. Community, Social and [place]   Number of elements (examined above) that affect the Plan of Care: 3: MODERATE COMPLEXITY   CLINICAL PRESENTATION:   Presentation: Evolving clinical presentation with changing clinical characteristics: MODERATE COMPLEXITY   CLINICAL DECISION MAKING:   Outcome Measure: Tool Used: Lower Extremity Functional Scale (LEFS)  Score:  Initial: 15/80 Most Recent: X/80 (Date: -- )   Interpretation of Score: 20 questions each scored on a 5 point scale with 0 representing \"extreme difficulty or unable to perform\" and 4 representing \"no difficulty\". The lower the score, the greater the functional disability. 80/80 represents no disability. Minimal detectable change is 9 points. Score 80 79-63 62-48 47-32 31-16 15-1 0   Modifier CH CI CJ CK CL CM CN     ?  Mobility - Walking and Moving Around:     - CURRENT STATUS: CM - 80%-99% impaired, limited or restricted    - GOAL STATUS: CK - 40%-59% impaired, limited or restricted    - D/C STATUS:  ---------------To be determined---------------      Medical Necessity:   · Patient is expected to demonstrate progress in strength, range of motion and gait to increase independence with home and community activities. Reason for Services/Other Comments:  · Patient continues to require skilled intervention due to decreased ROM and strength and fair gait . Use of outcome tool(s) and clinical judgement create a POC that gives a: Clear prediction of patient's progress: LOW COMPLEXITY            TREATMENT:   (In addition to Assessment/Re-Assessment sessions the following treatments were rendered)  Pre-treatment Symptoms/Complaints:  Pt to PT with straight cane. Posture more upright. Pt does have better gait. Reports some soreness with exercises but knee feels looser. Pain: Initial: Pain Intensity 1: 5  Post Session:  4      THERAPEUTIC EXERCISE: (30 minutes):  Exercises per grid below to improve mobility, strength and gait. Required moderate verbal and manual cues to promote proper body alignment, promote proper body posture and promote proper body mechanics. Progressed resistance, range and repetitions as indicated. Pt is unable to lie down due to back issues. Worked on knee in supine in sitting with back to wall. Performed  exercise program of QS (quadriceps sets)  SAQ's (short arc quadriceps), and  LAQ's (long arc quadriceps). .  Performed 6 in step up x 10 then step up and over x 15. .  Rolling ball in sitting for PROM. I HS and HC stretch. Sitting [address] at 20# x 4 sets of 10. Worked on standing cable for hip flex and abd at 17# and hip ext at 25#  x 2 sets of 15. Worked on gait with straight cane. Also worked without assistive device but had increased limp. Worked to increased toe off. Worked to increase quad contraction and stability in stance. .  NuStep at level 5 x 10 min . Manual Therapy ( 30 min  ): Patella and patella tendon mobs. Soft tissue work to [employer] and HS. PROM for knee flex and extn. Sitting knee flex to 123.   Passive knee ext to -3        Therapeutic Modalities:    HEP: As above; handouts given to patient for all exercises. Treatment/Session Assessment:  Pt is post R TKA on 7/17/17. He had been at [facility] for rehab until 8/2. Presents with decreased ROM and strength of R knee and LE. Good increase in ROM. Improved gait and now independent with straight cane. Able to walk short distance in home and PT without assistive device. Needs to work to increase LE strength to improve balance and endurance. .  Continue working to increase strength and ROM to improve gait and to enable pt to increase activity level and to enable return to work in his business. . . Work to increase strength to increase ease of gait and ambulation. Work to increase strength to enable return to prior activity level. Work to progress to no assistive device. Back issues may slow progress but very motivated. · Response to Treatment:  Understood exercises. Increased knee flexion . · Compliance with Program/Exercises: Will assess as treatment progresses. · Recommendations/Intent for next treatment session: \"Next visit will focus on aggressive ROM and strength of R knee and LE. Gait training\".   Total Treatment Duration:  PT Patient Time In/Time Out  Time In: 0330  Time Out: 0430  Treatment number  [address], PT

## 2017-08-15 ENCOUNTER — HOSPITAL ENCOUNTER (OUTPATIENT)
Dept: PHYSICAL THERAPY | Age: 67
Discharge: HOME | End: 2017-08-15
Payer: MEDICARE

## 2017-08-15 PROCEDURE — 97110 THERAPEUTIC EXERCISES: CPT

## 2017-08-15 PROCEDURE — 97140 MANUAL THERAPY 1/> REGIONS: CPT

## 2017-08-15 NOTE — PROGRESS NOTES
[facility]   [address], [address]  [phone]   [phone]    Lamin Current  : 1950       OUTPATIENT PHYSICAL THERAPY:Daily Note 8/15/2017    ICD-10: Treatment Diagnosis:   R26.2 Difficulty in walking, not elsewhere classified      M25.661 Stiffness of right knee, not elsewhere classified      M25.561 Pain in right knee     Precautions/Allergies:   Bactrim [sulfamethoxazole-trimethoprim]   Fall Risk Score: 2 (? 5 = High Risk)  MD Orders: Eval and treat MEDICAL/REFERRING DIAGNOSIS:  Presence of right artificial knee joint [Z96.651]   DATE OF ONSET:   REFERRING PHYSICIAN: Tyra Boles., *  RETURN PHYSICIAN APPOINTMENT: Aug 31, 2017     INITIAL ASSESSMENT:  Mr. Anthony De Oliveira presents with decreased ROM and strength of R knee and LE post R TKA on 17. PT with fair gait with rolling walker. Work to increase ROM and strength to progress ambulation to cane and then no assistive device. Progress strength to enable return to prior activity level. PROBLEM LIST (Impacting functional limitations):  1. Decreased Strength  2. Decreased ADL/Functional Activities  3. Decreased Ambulation Ability/Technique  4. Increased Pain  5. Decreased Flexibility/Joint Mobility  6. Edema/Girth INTERVENTIONS PLANNED:  1. Home Exercise Program (HEP)  2. Manual Therapy  3. Range of Motion (ROM)  4. Therapeutic Activites  5. Therapeutic Exercise/Strengthening   TREATMENT PLAN:  Effective Dates: 8/3/17 TO 10/31/17. Frequency/Duration: 2 times a week for 12 weeks and upon reassessment,  will adjust frequency and duration as progress indicates. GOALS: (Goals have been discussed and agreed upon with patient.)  Short-Term Functional Goals: Time Frame: 4 weeks  1. Establish independent HEP with no cueing to increase ROM and strength   2. Increase R knee ROM to 0-120 to increase ease of sitting and ambulation.   3. Independent gait with straight cane in home and community    4. Increase LE strength so able to initiate reciprocal pattern on stairs. Discharge Goals: Time Frame: 12 weeks   1. Improve score on LEFS by 9 points to enable prolonged sitting, standing and ambulation  2. Increase R knee ROM to 0-125 to  Normalize gait. 3. Independent gait without assistive device in home and community  4. Increase LE strength so able to perform reciprocal pattern on stairs with railing. 5. Increase LE strength so able to return to work in his business   Rehabilitation Potential For Stated Goals: [facility], I certify that the treatment plan above will be carried out by a therapist or under their direction. Thank you for this referral,  Luz Jimenez, PT     Referring Physician Signature: Marisel Soler., *              Date                    The information in this section was collected on 8/3/17 (except where otherwise noted). HISTORY:   History of Present Injury/Illness (Reason for Referral):  Pt is post R TKA on 7/17/17. He was at [facility] until 8/2 for rehab of R knee. He is now referred to outpatient PT for aggressive ROM and progressive strengthening of R knee and LE's. PT to PT with rolling walker. Progress to cane and then no assistive device. Pt does report that he has a chronic back problem and is unable to lie down. Past Medical History/Comorbidities:   Mr. Nikita Fraga  has a past medical history of Asthma (dx childhood); Atrial fibrillation ([address] Cervical spondylosis (6/20/2013); Chronic back pain (2013); Chronic pain; Colon polyps; COPD (chronic obstructive pulmonary disease) ([address] (8/17/2016); Coronary atherosclerosis of native coronary vessel (8/17/2016); Diabetes ([address] (dx 5/14); Diverticulosis; Edema (8/17/2016); Former smoker; GERD (gastroesophageal reflux disease); High cholesterol; History of kidney stones; Hypertension; Obesity (BMI 30-39.9);  Status post total right knee replacement (7/17/2017); and Unspecified sleep apnea. He also has no past medical history of Adverse effect of anesthesia; Aneurysm ([facility] Autoimmune disease ([facility] Cancer ([facility] Chronic kidney disease; Coagulation defects; Coagulation disorder ([facility] DEMENTIA; Difficult intubation; Endocarditis; Heart failure ([facility] Ill-defined condition; Infectious disease; Liver disease; Malignant hyperthermia due to anesthesia; Nausea & vomiting; Neurological disorder; Nicotine vapor product user; Non-nicotine vapor product user; Other ill-defined conditions; Pseudocholinesterase deficiency; Psychiatric disorder; PUD (peptic ulcer disease); Rheumatic fever; Seizures ([facility] Stroke [facility]); Thromboembolus ([facility] Thyroid disease; or Unspecified adverse effect of anesthesia. Mr. Ulysses Richards  has a past surgical history that includes cystoscopy,insert ureteral stent; colonoscopy (10/6/14); colonoscopy (11/20/14); lumbar laminectomy (2009); retinal detachment repair (Bilateral); cataract removal; other surgical (20 yrs ago); colectomy (2015); and heart catheterization (2012). Social History/Living Environment:     Lives in 2 story home with spouse  Prior Level of Function/Work/Activity:  Retired from [employer]. Now sales and business owner  Dominant Side:         RIGHT    Current Medications:       Current Outpatient Prescriptions:     amoxicillin (AMOXIL) 875 mg tablet, Take 1 Tab by mouth two (2) times a day., Disp: 10 Tab, Rfl: 0    chlorthalidone (HYGROTEN) 25 mg tablet, Take 1 Tab by mouth daily. , Disp: 90 Tab, Rfl: 0    metoprolol tartrate (LOPRESSOR) 25 mg tablet, Take 2 tablets by mouth twice daily, Disp: 180 Tab, Rfl: 0    atorvastatin (LIPITOR) 40 mg tablet, Take 0.5 Tabs by mouth daily. , Disp: 90 Tab, Rfl: 0    montelukast (SINGULAIR) 10 mg tablet, Take 1 Tab by mouth daily. , Disp: 90 Tab, Rfl: 0    pantoprazole (PROTONIX) 40 mg tablet, Take 1 Tab by mouth daily. , Disp: 90 Tab, Rfl: 0    ezetimibe (ZETIA) 10 mg tablet, Take 1 Tab by mouth nightly., Disp: 90 Tab, Rfl: 0    metFORMIN ER (GLUCOPHAGE XR) 500 mg tablet, Take 2 tablets every morning, Disp: 180 Tab, Rfl: 0    fluticasone-salmeterol (ADVAIR DISKUS) 500-50 mcg/dose diskus inhaler, Take 1 Puff by inhalation two (2) times a day., Disp: 3 Inhaler, Rfl: 0    apixaban (ELIQUIS) 5 mg tablet, Take 1 Tab by mouth two (2) times a day., Disp: 180 Tab, Rfl: 0    lisinopril (PRINIVIL, ZESTRIL) 10 mg tablet, Take 1 Tab by mouth daily. , Disp: 90 Tab, Rfl: 0    dapagliflozin (FARXIGA) 10 mg tab, Take 1 Tab by mouth daily. , Disp: 90 Tab, Rfl: 0    doxazosin (CARDURA) 4 mg tablet, Take 1 Tab by mouth daily. , Disp: 90 Tab, Rfl: 0    celecoxib (CELEBREX) 200 mg capsule, Take 1 Cap by mouth daily. , Disp: 90 Cap, Rfl: 0    dulaglutide (TRULICITY) 1.5 IY/8.8 mL sub-q pen, 0.5 mL by SubCUTAneous route every seven (7) days. , Disp: 12 Pen, Rfl: 0    meclizine (ANTIVERT) 25 mg tablet, Take 1 Tab by mouth three (3) times daily as needed for Dizziness. , Disp: 19 Tab, Rfl: 0    diazePAM (VALIUM) 5 mg tablet, Take 1 Tab by mouth every eight (8) hours as needed for Anxiety (severe vertigo spells). Max Daily Amount: 15 mg., Disp: 20 Tab, Rfl: 0    aspirin delayed-release 81 mg tablet, Take 162 mg by mouth nightly. Decrease to one 81 mg tab on 7/12/17, Disp: , Rfl:     Omega-3 Fatty Acids 300 mg cap, Take 1 Cap by mouth daily. , Disp: , Rfl:     ANTI-FUNGAL 2 % topical powder, APPLY TO ABDOMEN 2 TIMES PER DAY, Disp: , Rfl: 11    Blood-Glucose Meter monitoring kit, Check fs every day; DM2, E11/9, Contour glucometer, Disp: 1 Kit, Rfl: 0    glucose blood VI test strips (ASCENSIA CONTOUR) strip, Check fs every day, DM2, E11.9, Disp: 100 Strip, Rfl: 1    lancets 28 gauge misc, Check fs every day, DM2, E11.9, Disp: 100 Lancet, Rfl: 1    albuterol (PROVENTIL VENTOLIN) 2.5 mg /3 mL (0.083 %) nebulizer solution, 3 mL by Nebulization route once for 1 dose.  (Patient taking differently: 2.5 mg by Nebulization route daily as needed.), Disp: 24 Each, Rfl: 11    albuterol (PROAIR HFA) 90 mcg/actuation inhaler, Take 2 Puffs by inhalation every four (4) hours as needed for Wheezing. Take / use AM day of surgery  per anesthesia protocols if needed. , Disp: , Rfl:     cholecalciferol, vitamin D3, (VITAMIN D3) 2,000 unit tab, Take 1 tablet by mouth daily. , Disp: , Rfl:     diphenhydrAMINE (BENADRYL ALLERGY) 25 mg tablet, Take 25 mg by mouth nightly as needed for Sleep., Disp: , Rfl:     b complex vitamins (B COMPLEX 1) tablet, Take 1 tablet by mouth every morning. Stop seven days prior to surgery per anesthesia protocol., Disp: , Rfl:     green tea leaf extract (GREEN TEA) Cap, Take 630 mg by mouth every morning. Stop seven days prior to surgery per anesthesia protocol., Disp: , Rfl:     vitamin E (AQUA GEMS) 400 unit capsule, Take 400 Units by mouth every morning. Stop seven days prior to surgery per anesthesia protocol., Disp: , Rfl:     FOLIC ACID PO, Take 068 mcg by mouth every morning. Stop seven days prior to surgery per anesthesia protocol., Disp: , Rfl:     DOCUSATE CALCIUM (STOOL SOFTENER PO), Take 1 capsule by mouth every morning., Disp: , Rfl:     mometasone (NASONEX) 50 mcg/Actuation nasal spray, 2 Sprays by Both Nostrils route nightly as needed. , Disp: , Rfl:    Date Last Reviewed:  8/15/2017     Number of Personal Factors/Comorbidities that affect the Plan of Care: 1-2: MODERATE COMPLEXITY   EXAMINATION:   Observation/Orthostatic Postural Assessment: To PT with rolling walker. Flat foot gait with increased knee flexion at stance. Decreased heel strike and stance. Pt with flexed posture due to chronic back problems. He reports sleeping in a recliner. Reports he has had many different kinds of treatment but with little success. Tight hip flexors due to flexed posture. Tight HS and HC.   Difficult to assess HS tightness due to inability to ie flat however with stretching in long sitting, he is unable to sit to upright. Palpation:          Pain medial knee and back of knee   Functional Mobility:         Gait/Ambulation:  Independent with rolling walker        Stairs:  Difficulty with stairs         ROM:     R knee -7 to 110   L knee -5 to 130                                    Strength:     Knee ext  R 3+/5,  L 4/5  Knee flexion  R 4-/5,  L 4/5         Hip flexion R 4-/5,  L 4/5     Hip abd   R 3+/5,  L 4-/5              Neurological Screen: Intact to light touch  Balance:  Fair,  Good with walker   Body Structures Involved:  1. Bones  2. Joints  3. Muscles  4. Ligaments Body Functions Affected:  1. Sensory/Pain  2. Neuromusculoskeletal  3. Movement Related Activities and Participation Affected:  1. Learning and Applying Knowledge  2. General Tasks and Demands  3. Mobility  4. Self Care  5. Domestic Life  6. Community, Social and [place]   Number of elements (examined above) that affect the Plan of Care: 3: MODERATE COMPLEXITY   CLINICAL PRESENTATION:   Presentation: Evolving clinical presentation with changing clinical characteristics: MODERATE COMPLEXITY   CLINICAL DECISION MAKING:   Outcome Measure: Tool Used: Lower Extremity Functional Scale (LEFS)  Score:  Initial: 15/80 Most Recent: X/80 (Date: -- )   Interpretation of Score: 20 questions each scored on a 5 point scale with 0 representing \"extreme difficulty or unable to perform\" and 4 representing \"no difficulty\". The lower the score, the greater the functional disability. 80/80 represents no disability. Minimal detectable change is 9 points. Score 80 79-63 62-48 47-32 31-16 15-1 0   Modifier CH CI CJ CK CL CM CN     ?  Mobility - Walking and Moving Around:     - CURRENT STATUS: CM - 80%-99% impaired, limited or restricted    - GOAL STATUS: CK - 40%-59% impaired, limited or restricted    - D/C STATUS:  ---------------To be determined---------------      Medical Necessity:   · Patient is expected to demonstrate progress in strength, range of motion and gait to increase independence with home and community activities. Reason for Services/Other Comments:  · Patient continues to require skilled intervention due to decreased ROM and strength and fair gait . Use of outcome tool(s) and clinical judgement create a POC that gives a: Clear prediction of patient's progress: LOW COMPLEXITY            TREATMENT:   (In addition to Assessment/Re-Assessment sessions the following treatments were rendered)  Pre-treatment Symptoms/Complaints:  Pt to PT without assistive device. Reports some tenderness at R ankle-not sure why. . Posture more upright. Pt does have better gait however he does limp with fatigue. Told to continue to use cane in crowds. . Doing more at home. Pain: Initial: Pain Intensity 1: 4  Post Session:  3      THERAPEUTIC EXERCISE: (30 minutes):  Exercises per grid below to improve mobility, strength and gait. Required moderate verbal and manual cues to promote proper body alignment, promote proper body posture and promote proper body mechanics. Progressed resistance, range and repetitions as indicated. Pt is unable to lie down due to back issues. Worked on knee in supine in sitting with back to wall. Performed  exercise program of QS (quadriceps sets)  SAQ's (short arc quadriceps), and  LAQ's (long arc quadriceps). .  Performed 6 in step up x 10 then step up and over x 15. .  Rolling ball in sitting for PROM. I HS and HC stretch. Sitting [address] at 25# x 4 sets of 10. Worked on standing cable for hip flex and abd at 25# and hip ext at 42#  x 2 sets of 20. Worked on gait without assistive device. Worked to increase quad contraction and quad control at heel strike and stance. Increased limp with fatigue when  Not using cane. Worked to increased toe off. .  NuStep at level 5 x 12 min . Manual Therapy ( 30 min  ): Patella and patella tendon mobs. Soft tissue work to [employer] and HS. PROM for knee flex and extn.  Sitting knee flex to 130. Passive knee ext to neutral        Therapeutic Modalities:    HEP: As above; handouts given to patient for all exercises. Treatment/Session Assessment:  Pt is post R TKA on 7/17/17. He had been at [facility] for rehab until 8/2. Presents with decreased ROM and strength of R knee and LE. Good increase in ROM. Improved gait and now independent with straight cane. Independent without cane in home and short community distances. Good increase in ROM with excellent ROM for this stage. Needs to work to increase LE strength to improve balance and endurance. Posture is improving as LE strength increases. Fatigues easily. Continue working to increase strength and ROM to improve gait and to enable pt to increase activity level and to enable return to work in his business. . . Work to increase strength to increase ease of gait and ambulation. Work to progress to no assistive device in all environments. . Back issues may slow progress but very motivated. · Response to Treatment:  Understood exercises. Increased knee flexion . · Compliance with Program/Exercises: Doing exercises at home   · Recommendations/Intent for next treatment session: \"Next visit will focus on aggressive ROM and strength of R knee and LE. Gait training\".   Total Treatment Duration:  PT Patient Time In/Time Out  Time In: 0400  Time Out: 0500  Treatment number  [address], PT

## 2017-08-17 ENCOUNTER — HOSPITAL ENCOUNTER (OUTPATIENT)
Dept: PHYSICAL THERAPY | Age: 67
Discharge: HOME | End: 2017-08-17
Payer: MEDICARE

## 2017-08-17 PROCEDURE — 97140 MANUAL THERAPY 1/> REGIONS: CPT

## 2017-08-17 PROCEDURE — 97110 THERAPEUTIC EXERCISES: CPT

## 2017-08-17 NOTE — PROGRESS NOTES
[facility]   [address], [address]  [phone]   [phone]    Martina Martell  : 1950       OUTPATIENT PHYSICAL THERAPY:Daily Note 2017    ICD-10: Treatment Diagnosis:   R26.2 Difficulty in walking, not elsewhere classified      M25.661 Stiffness of right knee, not elsewhere classified      M25.561 Pain in right knee     Precautions/Allergies:   Bactrim [sulfamethoxazole-trimethoprim]   Fall Risk Score: 2 (? 5 = High Risk)  MD Orders: Eval and treat MEDICAL/REFERRING DIAGNOSIS:  Presence of right artificial knee joint [Z96.651]   DATE OF ONSET:   REFERRING PHYSICIAN: Funmi Arroyo., *  RETURN PHYSICIAN APPOINTMENT: Aug 31, 2017     INITIAL ASSESSMENT:  Mr. Marv Richter presents with decreased ROM and strength of R knee and LE post R TKA on 17. PT with fair gait with rolling walker. Work to increase ROM and strength to progress ambulation to cane and then no assistive device. Progress strength to enable return to prior activity level. PROBLEM LIST (Impacting functional limitations):  1. Decreased Strength  2. Decreased ADL/Functional Activities  3. Decreased Ambulation Ability/Technique  4. Increased Pain  5. Decreased Flexibility/Joint Mobility  6. Edema/Girth INTERVENTIONS PLANNED:  1. Home Exercise Program (HEP)  2. Manual Therapy  3. Range of Motion (ROM)  4. Therapeutic Activites  5. Therapeutic Exercise/Strengthening   TREATMENT PLAN:  Effective Dates: 8/3/17 TO 10/31/17. Frequency/Duration: 2 times a week for 12 weeks and upon reassessment,  will adjust frequency and duration as progress indicates. GOALS: (Goals have been discussed and agreed upon with patient.)  Short-Term Functional Goals: Time Frame: 4 weeks  1. Establish independent HEP with no cueing to increase ROM and strength   2. Increase R knee ROM to 0-120 to increase ease of sitting and ambulation.   3. Independent gait with straight cane in home and community    4. Increase LE strength so able to initiate reciprocal pattern on stairs. Discharge Goals: Time Frame: 12 weeks   1. Improve score on LEFS by 9 points to enable prolonged sitting, standing and ambulation  2. Increase R knee ROM to 0-125 to  Normalize gait. 3. Independent gait without assistive device in home and community  4. Increase LE strength so able to perform reciprocal pattern on stairs with railing. 5. Increase LE strength so able to return to work in his business   Rehabilitation Potential For Stated Goals: [facility], I certify that the treatment plan above will be carried out by a therapist or under their direction. Thank you for this referral,  Victor Hugo Ramos, PT     Referring Physician Signature: Festus Fuentes, *              Date                    The information in this section was collected on 8/3/17 (except where otherwise noted). HISTORY:   History of Present Injury/Illness (Reason for Referral):  Pt is post R TKA on 7/17/17. He was at [facility] until 8/2 for rehab of R knee. He is now referred to outpatient PT for aggressive ROM and progressive strengthening of R knee and LE's. PT to PT with rolling walker. Progress to cane and then no assistive device. Pt does report that he has a chronic back problem and is unable to lie down. Past Medical History/Comorbidities:   Mr. Ryann Zarate  has a past medical history of Asthma (dx childhood); Atrial fibrillation ([address] Cervical spondylosis (6/20/2013); Chronic back pain (2013); Chronic pain; Colon polyps; COPD (chronic obstructive pulmonary disease) ([address] (8/17/2016); Coronary atherosclerosis of native coronary vessel (8/17/2016); Diabetes ([address] (dx 5/14); Diverticulosis; Edema (8/17/2016); Former smoker; GERD (gastroesophageal reflux disease); High cholesterol; History of kidney stones; Hypertension; Obesity (BMI 30-39.9);  Status post total right knee replacement (7/17/2017); and Unspecified sleep apnea. He also has no past medical history of Adverse effect of anesthesia; Aneurysm ([facility] Autoimmune disease ([facility] Cancer ([facility] Chronic kidney disease; Coagulation defects; Coagulation disorder ([facility] DEMENTIA; Difficult intubation; Endocarditis; Heart failure ([facility] Ill-defined condition; Infectious disease; Liver disease; Malignant hyperthermia due to anesthesia; Nausea & vomiting; Neurological disorder; Nicotine vapor product user; Non-nicotine vapor product user; Other ill-defined conditions; Pseudocholinesterase deficiency; Psychiatric disorder; PUD (peptic ulcer disease); Rheumatic fever; Seizures ([facility] Stroke [facility]); Thromboembolus ([facility] Thyroid disease; or Unspecified adverse effect of anesthesia. Mr. Edward Sheppard  has a past surgical history that includes cystoscopy,insert ureteral stent; colonoscopy (10/6/14); colonoscopy (11/20/14); lumbar laminectomy (2009); retinal detachment repair (Bilateral); cataract removal; other surgical (20 yrs ago); colectomy (2015); and heart catheterization (2012). Social History/Living Environment:     Lives in 2 story home with spouse  Prior Level of Function/Work/Activity:  Retired from [employer]. Now sales and business owner  Dominant Side:         RIGHT    Current Medications:       Current Outpatient Prescriptions:     amoxicillin (AMOXIL) 875 mg tablet, Take 1 Tab by mouth two (2) times a day., Disp: 10 Tab, Rfl: 0    chlorthalidone (HYGROTEN) 25 mg tablet, Take 1 Tab by mouth daily. , Disp: 90 Tab, Rfl: 0    metoprolol tartrate (LOPRESSOR) 25 mg tablet, Take 2 tablets by mouth twice daily, Disp: 180 Tab, Rfl: 0    atorvastatin (LIPITOR) 40 mg tablet, Take 0.5 Tabs by mouth daily. , Disp: 90 Tab, Rfl: 0    montelukast (SINGULAIR) 10 mg tablet, Take 1 Tab by mouth daily. , Disp: 90 Tab, Rfl: 0    pantoprazole (PROTONIX) 40 mg tablet, Take 1 Tab by mouth daily. , Disp: 90 Tab, Rfl: 0    ezetimibe (ZETIA) 10 mg tablet, Take 1 Tab by mouth nightly., Disp: 90 Tab, Rfl: 0    metFORMIN ER (GLUCOPHAGE XR) 500 mg tablet, Take 2 tablets every morning, Disp: 180 Tab, Rfl: 0    fluticasone-salmeterol (ADVAIR DISKUS) 500-50 mcg/dose diskus inhaler, Take 1 Puff by inhalation two (2) times a day., Disp: 3 Inhaler, Rfl: 0    apixaban (ELIQUIS) 5 mg tablet, Take 1 Tab by mouth two (2) times a day., Disp: 180 Tab, Rfl: 0    lisinopril (PRINIVIL, ZESTRIL) 10 mg tablet, Take 1 Tab by mouth daily. , Disp: 90 Tab, Rfl: 0    dapagliflozin (FARXIGA) 10 mg tab, Take 1 Tab by mouth daily. , Disp: 90 Tab, Rfl: 0    doxazosin (CARDURA) 4 mg tablet, Take 1 Tab by mouth daily. , Disp: 90 Tab, Rfl: 0    celecoxib (CELEBREX) 200 mg capsule, Take 1 Cap by mouth daily. , Disp: 90 Cap, Rfl: 0    dulaglutide (TRULICITY) 1.5 PP/0.2 mL sub-q pen, 0.5 mL by SubCUTAneous route every seven (7) days. , Disp: 12 Pen, Rfl: 0    meclizine (ANTIVERT) 25 mg tablet, Take 1 Tab by mouth three (3) times daily as needed for Dizziness. , Disp: 19 Tab, Rfl: 0    diazePAM (VALIUM) 5 mg tablet, Take 1 Tab by mouth every eight (8) hours as needed for Anxiety (severe vertigo spells). Max Daily Amount: 15 mg., Disp: 20 Tab, Rfl: 0    aspirin delayed-release 81 mg tablet, Take 162 mg by mouth nightly. Decrease to one 81 mg tab on 7/12/17, Disp: , Rfl:     Omega-3 Fatty Acids 300 mg cap, Take 1 Cap by mouth daily. , Disp: , Rfl:     ANTI-FUNGAL 2 % topical powder, APPLY TO ABDOMEN 2 TIMES PER DAY, Disp: , Rfl: 11    Blood-Glucose Meter monitoring kit, Check fs every day; DM2, E11/9, Contour glucometer, Disp: 1 Kit, Rfl: 0    glucose blood VI test strips (ASCENSIA CONTOUR) strip, Check fs every day, DM2, E11.9, Disp: 100 Strip, Rfl: 1    lancets 28 gauge misc, Check fs every day, DM2, E11.9, Disp: 100 Lancet, Rfl: 1    albuterol (PROVENTIL VENTOLIN) 2.5 mg /3 mL (0.083 %) nebulizer solution, 3 mL by Nebulization route once for 1 dose.  (Patient taking differently: 2.5 mg by Nebulization route daily as needed.), Disp: 24 Each, Rfl: 11    albuterol (PROAIR HFA) 90 mcg/actuation inhaler, Take 2 Puffs by inhalation every four (4) hours as needed for Wheezing. Take / use AM day of surgery  per anesthesia protocols if needed. , Disp: , Rfl:     cholecalciferol, vitamin D3, (VITAMIN D3) 2,000 unit tab, Take 1 tablet by mouth daily. , Disp: , Rfl:     diphenhydrAMINE (BENADRYL ALLERGY) 25 mg tablet, Take 25 mg by mouth nightly as needed for Sleep., Disp: , Rfl:     b complex vitamins (B COMPLEX 1) tablet, Take 1 tablet by mouth every morning. Stop seven days prior to surgery per anesthesia protocol., Disp: , Rfl:     green tea leaf extract (GREEN TEA) Cap, Take 630 mg by mouth every morning. Stop seven days prior to surgery per anesthesia protocol., Disp: , Rfl:     vitamin E (AQUA GEMS) 400 unit capsule, Take 400 Units by mouth every morning. Stop seven days prior to surgery per anesthesia protocol., Disp: , Rfl:     FOLIC ACID PO, Take 107 mcg by mouth every morning. Stop seven days prior to surgery per anesthesia protocol., Disp: , Rfl:     DOCUSATE CALCIUM (STOOL SOFTENER PO), Take 1 capsule by mouth every morning., Disp: , Rfl:     mometasone (NASONEX) 50 mcg/Actuation nasal spray, 2 Sprays by Both Nostrils route nightly as needed. , Disp: , Rfl:    Date Last Reviewed:  8/17/2017     Number of Personal Factors/Comorbidities that affect the Plan of Care: 1-2: MODERATE COMPLEXITY   EXAMINATION:   Observation/Orthostatic Postural Assessment: To PT with rolling walker. Flat foot gait with increased knee flexion at stance. Decreased heel strike and stance. Pt with flexed posture due to chronic back problems. He reports sleeping in a recliner. Reports he has had many different kinds of treatment but with little success. Tight hip flexors due to flexed posture. Tight HS and HC.   Difficult to assess HS tightness due to inability to ie flat however with stretching in long sitting, he is unable to sit to upright. Palpation:          Pain medial knee and back of knee   Functional Mobility:         Gait/Ambulation:  Independent with rolling walker        Stairs:  Difficulty with stairs         ROM:     R knee -7 to 110   L knee -5 to 130                                    Strength:     Knee ext  R 3+/5,  L 4/5  Knee flexion  R 4-/5,  L 4/5         Hip flexion R 4-/5,  L 4/5     Hip abd   R 3+/5,  L 4-/5              Neurological Screen: Intact to light touch  Balance:  Fair,  Good with walker   Body Structures Involved:  1. Bones  2. Joints  3. Muscles  4. Ligaments Body Functions Affected:  1. Sensory/Pain  2. Neuromusculoskeletal  3. Movement Related Activities and Participation Affected:  1. Learning and Applying Knowledge  2. General Tasks and Demands  3. Mobility  4. Self Care  5. Domestic Life  6. Community, Social and [place]   Number of elements (examined above) that affect the Plan of Care: 3: MODERATE COMPLEXITY   CLINICAL PRESENTATION:   Presentation: Evolving clinical presentation with changing clinical characteristics: MODERATE COMPLEXITY   CLINICAL DECISION MAKING:   Outcome Measure: Tool Used: Lower Extremity Functional Scale (LEFS)  Score:  Initial: 15/80 Most Recent: X/80 (Date: -- )   Interpretation of Score: 20 questions each scored on a 5 point scale with 0 representing \"extreme difficulty or unable to perform\" and 4 representing \"no difficulty\". The lower the score, the greater the functional disability. 80/80 represents no disability. Minimal detectable change is 9 points. Score 80 79-63 62-48 47-32 31-16 15-1 0   Modifier CH CI CJ CK CL CM CN     ?  Mobility - Walking and Moving Around:     - CURRENT STATUS: CM - 80%-99% impaired, limited or restricted    - GOAL STATUS: CK - 40%-59% impaired, limited or restricted    - D/C STATUS:  ---------------To be determined---------------      Medical Necessity:   · Patient is expected to demonstrate progress in strength, range of motion and gait to increase independence with home and community activities. Reason for Services/Other Comments:  · Patient continues to require skilled intervention due to decreased ROM and strength and fair gait . Use of outcome tool(s) and clinical judgement create a POC that gives a: Clear prediction of patient's progress: LOW COMPLEXITY            TREATMENT:   (In addition to Assessment/Re-Assessment sessions the following treatments were rendered)  Pre-treatment Symptoms/Complaints:  Pt to PT without assistive device. Reports that his ankles have been sore. Posture more upright. Pt does have better gait however he does limp with fatigue. Tires easily. Told to continue to use cane in crowds. .    Pain: Initial: Pain Intensity 1: 4  Post Session:  3      THERAPEUTIC EXERCISE: (30 minutes):  Exercises per grid below to improve mobility, strength and gait. Required moderate verbal and manual cues to promote proper body alignment, promote proper body posture and promote proper body mechanics. Progressed resistance, range and repetitions as indicated. Pt is unable to lie down due to back issues. Worked on knee in supine in sitting with back to wall. Performed  exercise program of QS (quadriceps sets)  SAQ's (short arc quadriceps), and  LAQ's (long arc quadriceps). .  Performed 6 in step up x 10 then step up and over x 15. .  Rolling ball in sitting for PROM. I HS and HC stretch. Will defer HC stretch as this seems to be increasing ankle pain. Sitting [address] at 30# x 4 sets of 10. Worked on standing cable for hip flex and abd at 25# and hip ext at 42#  x 2 sets of 20. Worked on gait without assistive device. Work on 6 in step up and over x 30. Worked to increase quad contraction and quad control at heel strike and stance. Increased limp with fatigue when    Worked to increased toe off. .  NuStep at level 5 x 12 min . Manual Therapy ( 30 min  ): Patella and patella tendon mobs.   Soft tissue work to [employer] and [employer]. PROM for knee flex and extn. Sitting knee flex to 130. Passive knee ext to neutral        Therapeutic Modalities:    HEP: As above; handouts given to patient for all exercises. Treatment/Session Assessment:  Pt is post R TKA on 7/17/17. He had been at [facility] for rehab until 8/2. Presents with decreased ROM and strength of R knee and LE. Good increase in ROM. Improved gait and now independent with straight cane. Independent without cane in home and short community distances. Good increase in ROM with excellent ROM for this stage. Needs to work to increase LE strength to improve balance and endurance. Needs to increase LE strength to compensate for chronic back pain. Posture is improving as LE strength increases. Fatigues easily. Continue working to increase strength and ROM to improve gait and to enable pt to increase activity level and to enable return to work in his business. Work to progress to no assistive device in all environments as LE strength increases. . Back issues may slow progress but overall progressing well. Very motivated. · Response to Treatment:  Understood exercises. Increased knee flexion . · Compliance with Program/Exercises: Doing exercises at home   · Recommendations/Intent for next treatment session: \"Next visit will focus on aggressive ROM and strength of R knee and LE. Gait training\".   Total Treatment Duration:  PT Patient Time In/Time Out  Time In: 3912  Time Out: 0515  Treatment number  [id number]

## 2017-08-22 ENCOUNTER — HOSPITAL ENCOUNTER (OUTPATIENT)
Dept: PHYSICAL THERAPY | Age: 67
Discharge: HOME | End: 2017-08-22
Payer: MEDICARE

## 2017-08-22 PROCEDURE — 97110 THERAPEUTIC EXERCISES: CPT

## 2017-08-22 PROCEDURE — 97140 MANUAL THERAPY 1/> REGIONS: CPT

## 2017-08-22 NOTE — PROGRESS NOTES
[facility] at [facility]   [address], [address]  [phone]   [phone]    Noe Vasquez  : 1950       OUTPATIENT PHYSICAL THERAPY:Daily Note 2017    ICD-10: Treatment Diagnosis:   R26.2 Difficulty in walking, not elsewhere classified      M25.661 Stiffness of right knee, not elsewhere classified      M25.561 Pain in right knee     Precautions/Allergies:   Bactrim [sulfamethoxazole-trimethoprim]   Fall Risk Score: 2 (? 5 = High Risk)  MD Orders: Eval and treat MEDICAL/REFERRING DIAGNOSIS:  Presence of right artificial knee joint [Z96.651]   DATE OF ONSET:   REFERRING PHYSICIAN: Marisel Soler., *  RETURN PHYSICIAN APPOINTMENT: Aug 31, 2017     INITIAL ASSESSMENT:  Mr. Nikita Fraga presents with decreased ROM and strength of R knee and LE post R TKA on 17. PT with fair gait with rolling walker. Work to increase ROM and strength to progress ambulation to cane and then no assistive device. Progress strength to enable return to prior activity level. PROBLEM LIST (Impacting functional limitations):  1. Decreased Strength  2. Decreased ADL/Functional Activities  3. Decreased Ambulation Ability/Technique  4. Increased Pain  5. Decreased Flexibility/Joint Mobility  6. Edema/Girth INTERVENTIONS PLANNED:  1. Home Exercise Program (HEP)  2. Manual Therapy  3. Range of Motion (ROM)  4. Therapeutic Activites  5. Therapeutic Exercise/Strengthening   TREATMENT PLAN:  Effective Dates: 8/3/17 TO 10/31/17. Frequency/Duration: 2 times a week for 12 weeks and upon reassessment,  will adjust frequency and duration as progress indicates. GOALS: (Goals have been discussed and agreed upon with patient.)  Short-Term Functional Goals: Time Frame: 4 weeks  1. Establish independent HEP with no cueing to increase ROM and strength   2. Increase R knee ROM to 0-120 to increase ease of sitting and ambulation.   3. Independent gait with straight cane in home and community    4. Increase LE strength so able to initiate reciprocal pattern on stairs. Discharge Goals: Time Frame: 12 weeks   1. Improve score on LEFS by 9 points to enable prolonged sitting, standing and ambulation  2. Increase R knee ROM to 0-125 to  Normalize gait. 3. Independent gait without assistive device in home and community  4. Increase LE strength so able to perform reciprocal pattern on stairs with railing. 5. Increase LE strength so able to return to work in his business   Rehabilitation Potential For Stated Goals: [facility] therapy, I certify that the treatment plan above will be carried out by a therapist or under their direction. Thank you for this referral,  Iain rGeenwood, PT     Referring Physician Signature: Evelin Rasheed, *              Date                    The information in this section was collected on 8/3/17 (except where otherwise noted). HISTORY:   History of Present Injury/Illness (Reason for Referral):  Pt is post R TKA on 7/17/17. He was at [facility] until 8/2 for rehab of R knee. He is now referred to outpatient PT for aggressive ROM and progressive strengthening of R knee and LE's. PT to PT with rolling walker. Progress to cane and then no assistive device. Pt does report that he has a chronic back problem and is unable to lie down. Past Medical History/Comorbidities:   Mr. Trinity Garcia  has a past medical history of Asthma (dx childhood); Atrial fibrillation ([address] Cervical spondylosis (6/20/2013); Chronic back pain (2013); Chronic pain; Colon polyps; COPD (chronic obstructive pulmonary disease) ([address] (8/17/2016); Coronary atherosclerosis of native coronary vessel (8/17/2016); Diabetes ([address] (dx 5/14); Diverticulosis; Edema (8/17/2016); Former smoker; GERD (gastroesophageal reflux disease); High cholesterol; History of kidney stones; Hypertension; Obesity (BMI 30-39.9);  Status post total right knee replacement (7/17/2017); and Unspecified sleep apnea. He also has no past medical history of Adverse effect of anesthesia; Aneurysm ([facility] Autoimmune disease ([facility] Cancer ([facility] Chronic kidney disease; Coagulation defects; Coagulation disorder ([facility] DEMENTIA; Difficult intubation; Endocarditis; Heart failure ([facility] Ill-defined condition; Infectious disease; Liver disease; Malignant hyperthermia due to anesthesia; Nausea & vomiting; Neurological disorder; Nicotine vapor product user; Non-nicotine vapor product user; Other ill-defined conditions; Pseudocholinesterase deficiency; Psychiatric disorder; PUD (peptic ulcer disease); Rheumatic fever; Seizures ([facility] Stroke [facility]); Thromboembolus ([facility] Thyroid disease; or Unspecified adverse effect of anesthesia. Mr. Keila Mancilla  has a past surgical history that includes cystoscopy,insert ureteral stent; colonoscopy (10/6/14); colonoscopy (11/20/14); lumbar laminectomy (2009); retinal detachment repair (Bilateral); cataract removal; other surgical (20 yrs ago); colectomy (2015); and heart catheterization (2012). Social History/Living Environment:     Lives in 2 story home with spouse  Prior Level of Function/Work/Activity:  Retired from [employer]. Now sales and business owner  Dominant Side:         RIGHT    Current Medications:       Current Outpatient Prescriptions:     amoxicillin (AMOXIL) 875 mg tablet, Take 1 Tab by mouth two (2) times a day., Disp: 10 Tab, Rfl: 0    chlorthalidone (HYGROTEN) 25 mg tablet, Take 1 Tab by mouth daily. , Disp: 90 Tab, Rfl: 0    metoprolol tartrate (LOPRESSOR) 25 mg tablet, Take 2 tablets by mouth twice daily, Disp: 180 Tab, Rfl: 0    atorvastatin (LIPITOR) 40 mg tablet, Take 0.5 Tabs by mouth daily. , Disp: 90 Tab, Rfl: 0    montelukast (SINGULAIR) 10 mg tablet, Take 1 Tab by mouth daily. , Disp: 90 Tab, Rfl: 0    pantoprazole (PROTONIX) 40 mg tablet, Take 1 Tab by mouth daily. , Disp: 90 Tab, Rfl: 0    ezetimibe (ZETIA) 10 mg tablet, Take 1 Tab by mouth nightly., Disp: 90 Tab, Rfl: 0    metFORMIN ER (GLUCOPHAGE XR) 500 mg tablet, Take 2 tablets every morning, Disp: 180 Tab, Rfl: 0    fluticasone-salmeterol (ADVAIR DISKUS) 500-50 mcg/dose diskus inhaler, Take 1 Puff by inhalation two (2) times a day., Disp: 3 Inhaler, Rfl: 0    apixaban (ELIQUIS) 5 mg tablet, Take 1 Tab by mouth two (2) times a day., Disp: 180 Tab, Rfl: 0    lisinopril (PRINIVIL, ZESTRIL) 10 mg tablet, Take 1 Tab by mouth daily. , Disp: 90 Tab, Rfl: 0    dapagliflozin (FARXIGA) 10 mg tab, Take 1 Tab by mouth daily. , Disp: 90 Tab, Rfl: 0    doxazosin (CARDURA) 4 mg tablet, Take 1 Tab by mouth daily. , Disp: 90 Tab, Rfl: 0    celecoxib (CELEBREX) 200 mg capsule, Take 1 Cap by mouth daily. , Disp: 90 Cap, Rfl: 0    dulaglutide (TRULICITY) 1.5 EE/2.9 mL sub-q pen, 0.5 mL by SubCUTAneous route every seven (7) days. , Disp: 12 Pen, Rfl: 0    meclizine (ANTIVERT) 25 mg tablet, Take 1 Tab by mouth three (3) times daily as needed for Dizziness. , Disp: 19 Tab, Rfl: 0    diazePAM (VALIUM) 5 mg tablet, Take 1 Tab by mouth every eight (8) hours as needed for Anxiety (severe vertigo spells). Max Daily Amount: 15 mg., Disp: 20 Tab, Rfl: 0    aspirin delayed-release 81 mg tablet, Take 162 mg by mouth nightly. Decrease to one 81 mg tab on 7/12/17, Disp: , Rfl:     Omega-3 Fatty Acids 300 mg cap, Take 1 Cap by mouth daily. , Disp: , Rfl:     ANTI-FUNGAL 2 % topical powder, APPLY TO ABDOMEN 2 TIMES PER DAY, Disp: , Rfl: 11    Blood-Glucose Meter monitoring kit, Check fs every day; DM2, E11/9, Contour glucometer, Disp: 1 Kit, Rfl: 0    glucose blood VI test strips (ASCENSIA CONTOUR) strip, Check fs every day, DM2, E11.9, Disp: 100 Strip, Rfl: 1    lancets 28 gauge misc, Check fs every day, DM2, E11.9, Disp: 100 Lancet, Rfl: 1    albuterol (PROVENTIL VENTOLIN) 2.5 mg /3 mL (0.083 %) nebulizer solution, 3 mL by Nebulization route once for 1 dose.  (Patient taking differently: 2.5 mg by Nebulization route daily as needed.), Disp: 24 Each, Rfl: 11    albuterol (PROAIR HFA) 90 mcg/actuation inhaler, Take 2 Puffs by inhalation every four (4) hours as needed for Wheezing. Take / use AM day of surgery  per anesthesia protocols if needed. , Disp: , Rfl:     cholecalciferol, vitamin D3, (VITAMIN D3) 2,000 unit tab, Take 1 tablet by mouth daily. , Disp: , Rfl:     diphenhydrAMINE (BENADRYL ALLERGY) 25 mg tablet, Take 25 mg by mouth nightly as needed for Sleep., Disp: , Rfl:     b complex vitamins (B COMPLEX 1) tablet, Take 1 tablet by mouth every morning. Stop seven days prior to surgery per anesthesia protocol., Disp: , Rfl:     green tea leaf extract (GREEN TEA) Cap, Take 630 mg by mouth every morning. Stop seven days prior to surgery per anesthesia protocol., Disp: , Rfl:     vitamin E (AQUA GEMS) 400 unit capsule, Take 400 Units by mouth every morning. Stop seven days prior to surgery per anesthesia protocol., Disp: , Rfl:     FOLIC ACID PO, Take 724 mcg by mouth every morning. Stop seven days prior to surgery per anesthesia protocol., Disp: , Rfl:     DOCUSATE CALCIUM (STOOL SOFTENER PO), Take 1 capsule by mouth every morning., Disp: , Rfl:     mometasone (NASONEX) 50 mcg/Actuation nasal spray, 2 Sprays by Both Nostrils route nightly as needed. , Disp: , Rfl:    Date Last Reviewed:  8/22/2017     Number of Personal Factors/Comorbidities that affect the Plan of Care: 1-2: MODERATE COMPLEXITY   EXAMINATION:   Observation/Orthostatic Postural Assessment: To PT with rolling walker. Flat foot gait with increased knee flexion at stance. Decreased heel strike and stance. Pt with flexed posture due to chronic back problems. He reports sleeping in a recliner. Reports he has had many different kinds of treatment but with little success. Tight hip flexors due to flexed posture. Tight HS and HC.   Difficult to assess HS tightness due to inability to ie flat however with stretching in long sitting, he is unable to sit to upright. Palpation:          Pain medial knee and back of knee   Functional Mobility:         Gait/Ambulation:  Independent with rolling walker        Stairs:  Difficulty with stairs         ROM:     R knee -7 to 110   L knee -5 to 130                                    Strength:     Knee ext  R 3+/5,  L 4/5  Knee flexion  R 4-/5,  L 4/5         Hip flexion R 4-/5,  L 4/5     Hip abd   R 3+/5,  L 4-/5              Neurological Screen: Intact to light touch  Balance:  Fair,  Good with walker   Body Structures Involved:  1. Bones  2. Joints  3. Muscles  4. Ligaments Body Functions Affected:  1. Sensory/Pain  2. Neuromusculoskeletal  3. Movement Related Activities and Participation Affected:  1. Learning and Applying Knowledge  2. General Tasks and Demands  3. Mobility  4. Self Care  5. Domestic Life  6. Community, Social and [place]   Number of elements (examined above) that affect the Plan of Care: 3: MODERATE COMPLEXITY   CLINICAL PRESENTATION:   Presentation: Evolving clinical presentation with changing clinical characteristics: MODERATE COMPLEXITY   CLINICAL DECISION MAKING:   Outcome Measure: Tool Used: Lower Extremity Functional Scale (LEFS)  Score:  Initial: 15/80 Most Recent: X/80 (Date: -- )   Interpretation of Score: 20 questions each scored on a 5 point scale with 0 representing \"extreme difficulty or unable to perform\" and 4 representing \"no difficulty\". The lower the score, the greater the functional disability. 80/80 represents no disability. Minimal detectable change is 9 points. Score 80 79-63 62-48 47-32 31-16 15-1 0   Modifier CH CI CJ CK CL CM CN     ?  Mobility - Walking and Moving Around:     - CURRENT STATUS: CM - 80%-99% impaired, limited or restricted    - GOAL STATUS: CK - 40%-59% impaired, limited or restricted    - D/C STATUS:  ---------------To be determined---------------      Medical Necessity:   · Patient is expected to demonstrate progress in strength, range of motion and gait to increase independence with home and community activities. Reason for Services/Other Comments:  · Patient continues to require skilled intervention due to decreased ROM and strength and fair gait . Use of outcome tool(s) and clinical judgement create a POC that gives a: Clear prediction of patient's progress: LOW COMPLEXITY            TREATMENT:   (In addition to Assessment/Re-Assessment sessions the following treatments were rendered)  Pre-treatment Symptoms/Complaints:  Pt to PT without assistive device. Reports that his ankles have been sore and have deferred HC stretch. Posture more upright. Pt does have better gait however he does limp with fatigue. Tires easily. Told to continue to use cane in crowds. .    Pain: Initial: Pain Intensity 1: 3  Post Session:  3      THERAPEUTIC EXERCISE: (30 minutes):  Exercises per grid below to improve mobility, strength and gait. Required moderate verbal and manual cues to promote proper body alignment, promote proper body posture and promote proper body mechanics. Progressed resistance, range and repetitions as indicated. Pt is unable to lie down due to back issues. Worked on knee in supine in sitting with back to wall. Performed  exercise program of QS (quadriceps sets)  SAQ's (short arc quadriceps), and  LAQ's (long arc quadriceps). .  Performed 6 in step up x 10 then step up and over x 15. .  Rolling ball in sitting for PROM. I HS and HC stretch. Will defer HC stretch as this seems to be increasing ankle pain. Sitting [address] at 30# x 4 sets of 10. Worked on standing cable for hip flex and abd at 25# and hip ext at 42#  x 2 sets of 20. Worked on gait without assistive device. Work on 6 in step up and over x 30. Worked to increase quad contraction and quad control at heel strike and stance. Increased limp with fatigue when    Worked to increased toe off. .  NuStep at level 5 x 12 min .       Manual Therapy ( 30 min  ): Patella and patella tendon mobs. Soft tissue work to [employer] and HS. PROM for knee flex and extn. Sitting knee flex to 130. Passive knee ext to neutral        Therapeutic Modalities:    HEP: As above; handouts given to patient for all exercises. Treatment/Session Assessment:  Pt is post R TKA on 7/17/17. He had been at [facility] for rehab until 8/2. Presents with decreased ROM and strength of R knee and LE. Good increase in ROM. Improved gait and now independent with straight cane. Independent without cane in home and short community distances. Good increase in ROM with excellent ROM for this stage. Needs to work to increase LE strength to improve balance and endurance. Needs to increase LE strength to compensate for chronic back pain. Posture is improving as LE strength increases. Fatigues easily. Continue working to increase strength and ROM to improve gait and to enable pt to increase activity level and to enable return to work in his business. Has progressed to no assistive device in all environments as LE strength increases. . Back issues may slow progress but overall progressing well. Very motivated. · Response to Treatment:  Understood exercises. Increased knee flexion . · Compliance with Program/Exercises: Doing exercises at home   · Recommendations/Intent for next treatment session: \"Next visit will focus on aggressive ROM and strength of R knee and LE. Gait training\".   Total Treatment Duration:  PT Patient Time In/Time Out  Time In: 0300  Time Out: 0400  Treatment number  7  Zeny Mcdonnell, PT

## 2017-08-24 ENCOUNTER — HOSPITAL ENCOUNTER (OUTPATIENT)
Dept: PHYSICAL THERAPY | Age: 67
Discharge: HOME | End: 2017-08-24
Payer: MEDICARE

## 2017-08-24 PROCEDURE — 97140 MANUAL THERAPY 1/> REGIONS: CPT

## 2017-08-24 PROCEDURE — 97110 THERAPEUTIC EXERCISES: CPT

## 2017-08-24 PROCEDURE — G8978 MOBILITY CURRENT STATUS: HCPCS

## 2017-08-24 PROCEDURE — G8979 MOBILITY GOAL STATUS: HCPCS

## 2017-08-24 NOTE — PROGRESS NOTES
[facility]   [address], [address]  [phone]   [phone]    Jovani Hernandez  : 1950       OUTPATIENT PHYSICAL THERAPY:Daily Note and Progress Report 2017    ICD-10: Treatment Diagnosis:   R26.2 Difficulty in walking, not elsewhere classified      M25.661 Stiffness of right knee, not elsewhere classified      M25.561 Pain in right knee     Precautions/Allergies:   Bactrim [sulfamethoxazole-trimethoprim]   Fall Risk Score: 2 (? 5 = High Risk)  MD Orders: Eval and treat MEDICAL/REFERRING DIAGNOSIS:  Presence of right artificial knee joint [Z96.651]   DATE OF ONSET:   REFERRING PHYSICIAN: Dudley Brown., *  RETURN PHYSICIAN APPOINTMENT: Aug 31, 2017     INITIAL ASSESSMENT:  Mr. Laura Santos presents with decreased ROM and strength of R knee and LE post R TKA on 17. PT with fair gait with rolling walker. Work to increase ROM and strength to progress ambulation to cane and then no assistive device. Progress strength to enable return to prior activity level. PROBLEM LIST (Impacting functional limitations):  1. Decreased Strength  2. Decreased ADL/Functional Activities  3. Decreased Ambulation Ability/Technique  4. Increased Pain  5. Decreased Flexibility/Joint Mobility  6. Edema/Girth INTERVENTIONS PLANNED:  1. Home Exercise Program (HEP)  2. Manual Therapy  3. Range of Motion (ROM)  4. Therapeutic Activites  5. Therapeutic Exercise/Strengthening   TREATMENT PLAN:  Effective Dates: 8/3/17 TO 10/31/17. Frequency/Duration: 2 times a week for 12 weeks and upon reassessment,  will adjust frequency and duration as progress indicates. GOALS: (Goals have been discussed and agreed upon with patient.)  Short-Term Functional Goals: Time Frame: 4 weeks  1. Establish independent HEP with no cueing to increase ROM and strength  MET  2. Increase R knee ROM to 0-120 to increase ease of sitting and ambulation. MET  3. Independent gait with straight cane in home and community  MET  4. Increase LE strength so able to initiate reciprocal pattern on stairs. MET  Discharge Goals: Time Frame: 12 weeks   1. Improve score on LEFS by 9 points to enable prolonged sitting, standing and ambulation  MET  2. Increase R knee ROM to 0-125 to  Normalize gait. MET  3. Independent gait without assistive device in home and community  PARTIAL, MET IN HOME  4. Increase LE strength so able to perform reciprocal pattern on stairs with railing. ONGOING  5. 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 Thank you for this referral,  Tara Yoder, PT     Referring Physician Signature: Fredy Monte., *              Date                    The information in this section was collected on 8/3/17 (except where otherwise noted). HISTORY:   History of Present Injury/Illness (Reason for Referral):  Pt is post R TKA on 7/17/17. He was at [facility] until 8/2 for rehab of R knee. He is now referred to outpatient PT for aggressive ROM and progressive strengthening of R knee and LE's. PT to PT with rolling walker. Progress to cane and then no assistive device. Pt does report that he has a chronic back problem and is unable to lie down. Past Medical History/Comorbidities:   Mr. Keila Mancilla  has a past medical history of Asthma (dx childhood); Atrial fibrillation ([address] Cervical spondylosis (6/20/2013); Chronic back pain (2013); Chronic pain; Colon polyps; COPD (chronic obstructive pulmonary disease) ([address] (8/17/2016); Coronary atherosclerosis of native coronary vessel (8/17/2016); Diabetes ([address] (dx 5/14); Diverticulosis; Edema (8/17/2016); Former smoker; GERD (gastroesophageal reflux disease); High cholesterol;  History of kidney stones; Hypertension; Obesity (BMI 30-39.9); Status post total right knee replacement (7/17/2017); and Unspecified sleep apnea. He also has no past medical history of Adverse effect of anesthesia; Aneurysm ([facility] Autoimmune disease ([facility] Cancer ([facility] Chronic kidney disease; Coagulation defects; Coagulation disorder ([facility] DEMENTIA; Difficult intubation; Endocarditis; Heart failure ([address] Ill-defined condition; Infectious disease; Liver disease; Malignant hyperthermia due to anesthesia; Nausea & vomiting; Neurological disorder; Nicotine vapor product user; Non-nicotine vapor product user; Other ill-defined conditions; Pseudocholinesterase deficiency; Psychiatric disorder; PUD (peptic ulcer disease); Rheumatic fever; Seizures ([facility] Stroke [facility]); Thromboembolus ([facility] Thyroid disease; or Unspecified adverse effect of anesthesia. Mr. Micah Larson  has a past surgical history that includes cystoscopy,insert ureteral stent; colonoscopy (10/6/14); colonoscopy (11/20/14); lumbar laminectomy (2009); retinal detachment repair (Bilateral); cataract removal; other surgical (20 yrs ago); colectomy (2015); and heart catheterization (2012). Social History/Living Environment:     Lives in 2 story home with spouse  Prior Level of Function/Work/Activity:  Retired from [employer]. Now sales and business owner  Dominant Side:         RIGHT    Current Medications:       Current Outpatient Prescriptions:     amoxicillin (AMOXIL) 875 mg tablet, Take 1 Tab by mouth two (2) times a day., Disp: 10 Tab, Rfl: 0    chlorthalidone (HYGROTEN) 25 mg tablet, Take 1 Tab by mouth daily. , Disp: 90 Tab, Rfl: 0    metoprolol tartrate (LOPRESSOR) 25 mg tablet, Take 2 tablets by mouth twice daily, Disp: 180 Tab, Rfl: 0    atorvastatin (LIPITOR) 40 mg tablet, Take 0.5 Tabs by mouth daily. , Disp: 90 Tab, Rfl: 0    montelukast (SINGULAIR) 10 mg tablet, Take 1 Tab by mouth daily. , Disp: 90 Tab, Rfl: 0    pantoprazole (PROTONIX) 40 mg tablet, Take 1 Tab by mouth daily., Disp: 90 Tab, Rfl: 0    ezetimibe (ZETIA) 10 mg tablet, Take 1 Tab by mouth nightly., Disp: 90 Tab, Rfl: 0    metFORMIN ER (GLUCOPHAGE XR) 500 mg tablet, Take 2 tablets every morning, Disp: 180 Tab, Rfl: 0    fluticasone-salmeterol (ADVAIR DISKUS) 500-50 mcg/dose diskus inhaler, Take 1 Puff by inhalation two (2) times a day., Disp: 3 Inhaler, Rfl: 0    apixaban (ELIQUIS) 5 mg tablet, Take 1 Tab by mouth two (2) times a day., Disp: 180 Tab, Rfl: 0    lisinopril (PRINIVIL, ZESTRIL) 10 mg tablet, Take 1 Tab by mouth daily. , Disp: 90 Tab, Rfl: 0    dapagliflozin (FARXIGA) 10 mg tab, Take 1 Tab by mouth daily. , Disp: 90 Tab, Rfl: 0    doxazosin (CARDURA) 4 mg tablet, Take 1 Tab by mouth daily. , Disp: 90 Tab, Rfl: 0    celecoxib (CELEBREX) 200 mg capsule, Take 1 Cap by mouth daily. , Disp: 90 Cap, Rfl: 0    dulaglutide (TRULICITY) 1.5 CA/2.2 mL sub-q pen, 0.5 mL by SubCUTAneous route every seven (7) days. , Disp: 12 Pen, Rfl: 0    meclizine (ANTIVERT) 25 mg tablet, Take 1 Tab by mouth three (3) times daily as needed for Dizziness. , Disp: 19 Tab, Rfl: 0    diazePAM (VALIUM) 5 mg tablet, Take 1 Tab by mouth every eight (8) hours as needed for Anxiety (severe vertigo spells). Max Daily Amount: 15 mg., Disp: 20 Tab, Rfl: 0    aspirin delayed-release 81 mg tablet, Take 162 mg by mouth nightly. Decrease to one 81 mg tab on 7/12/17, Disp: , Rfl:     Omega-3 Fatty Acids 300 mg cap, Take 1 Cap by mouth daily. , Disp: , Rfl:     ANTI-FUNGAL 2 % topical powder, APPLY TO ABDOMEN 2 TIMES PER DAY, Disp: , Rfl: 11    Blood-Glucose Meter monitoring kit, Check fs every day; DM2, E11/9, Contour glucometer, Disp: 1 Kit, Rfl: 0    glucose blood VI test strips (ASCZipZapIA CONTOUR) strip, Check fs every day, DM2, E11.9, Disp: 100 Strip, Rfl: 1    lancets 28 gauge misc, Check fs every day, DM2, E11.9, Disp: 100 Lancet, Rfl: 1    albuterol (PROVENTIL VENTOLIN) 2.5 mg /3 mL (0.083 %) nebulizer solution, 3 mL by Nebulization route once for 1 dose. (Patient taking differently: 2.5 mg by Nebulization route daily as needed.), Disp: 24 Each, Rfl: 11    albuterol (PROAIR HFA) 90 mcg/actuation inhaler, Take 2 Puffs by inhalation every four (4) hours as needed for Wheezing. Take / use AM day of surgery  per anesthesia protocols if needed. , Disp: , Rfl:     cholecalciferol, vitamin D3, (VITAMIN D3) 2,000 unit tab, Take 1 tablet by mouth daily. , Disp: , Rfl:     diphenhydrAMINE (BENADRYL ALLERGY) 25 mg tablet, Take 25 mg by mouth nightly as needed for Sleep., Disp: , Rfl:     b complex vitamins (B COMPLEX 1) tablet, Take 1 tablet by mouth every morning. Stop seven days prior to surgery per anesthesia protocol., Disp: , Rfl:     green tea leaf extract (GREEN TEA) Cap, Take 630 mg by mouth every morning. Stop seven days prior to surgery per anesthesia protocol., Disp: , Rfl:     vitamin E (AQUA GEMS) 400 unit capsule, Take 400 Units by mouth every morning. Stop seven days prior to surgery per anesthesia protocol., Disp: , Rfl:     FOLIC ACID PO, Take 806 mcg by mouth every morning. Stop seven days prior to surgery per anesthesia protocol., Disp: , Rfl:     DOCUSATE CALCIUM (STOOL SOFTENER PO), Take 1 capsule by mouth every morning., Disp: , Rfl:     mometasone (NASONEX) 50 mcg/Actuation nasal spray, 2 Sprays by Both Nostrils route nightly as needed. , Disp: , Rfl:    Date Last Reviewed:  8/24/2017     Number of Personal Factors/Comorbidities that affect the Plan of Care: 1-2: MODERATE COMPLEXITY   EXAMINATION:   Observation/Orthostatic Postural Assessment: To PT with straight cane. Does not use cane in PT. Good heel toe gait. Increased step length and gait speed. . Pt with flexed posture due to chronic back problems. He reports sleeping in a recliner. Reports he has had many different kinds of treatment for back but with little success. Tight hip flexors due to flexed posture. Tight HS and HC.   Difficult to assess HS tightness due to inability to ie flat however with stretching in long sitting, he is unable to sit to upright. Palpation:          Pain medial knee  Functional Mobility:         Gait/Ambulation:  Independent with cane        Stairs:  Stairs with railing        ROM:     R knee 0 to 130   L knee -5 to 130                                    Strength:     Knee ext  R 4/5,  L 4+/5  Knee flexion  R 4/5,  L 4+/5         Hip flexion R 4/5,  L 4+/5     Hip abd   R 4-/5,  L 4/5              Neurological Screen: Intact to light touch  Balance:  Fair,  Good with walker   Body Structures Involved:  1. Bones  2. Joints  3. Muscles  4. Ligaments Body Functions Affected:  1. Sensory/Pain  2. Neuromusculoskeletal  3. Movement Related Activities and Participation Affected:  1. Learning and Applying Knowledge  2. General Tasks and Demands  3. Mobility  4. Self Care  5. Domestic Life  6. Community, Social and [place]   Number of elements (examined above) that affect the Plan of Care: 3: MODERATE COMPLEXITY   CLINICAL PRESENTATION:   Presentation: Evolving clinical presentation with changing clinical characteristics: MODERATE COMPLEXITY   CLINICAL DECISION MAKING:   Outcome Measure: Tool Used: Lower Extremity Functional Scale (LEFS)  Score:  Initial: 15/80 Most Recent: 44/80 (Date: 8/24/17 )   Interpretation of Score: 20 questions each scored on a 5 point scale with 0 representing \"extreme difficulty or unable to perform\" and 4 representing \"no difficulty\". The lower the score, the greater the functional disability. 80/80 represents no disability. Minimal detectable change is 9 points. Score 80 79-63 62-48 47-32 31-16 15-1 0   Modifier CH CI CJ CK CL CM CN     ?  Mobility - Walking and Moving Around:     - CURRENT STATUS: CK - 40%-59% impaired, limited or restricted    - GOAL STATUS: CK - 40%-59% impaired, limited or restricted    - D/C STATUS:  ---------------To be determined---------------      Medical Necessity:   · Patient is expected to demonstrate progress in strength, range of motion and gait to increase independence with home and community activities. Reason for Services/Other Comments:  · Patient continues to require skilled intervention due to decreased ROM and strength and fair gait . Use of outcome tool(s) and clinical judgement create a POC that gives a: Clear prediction of patient's progress: LOW COMPLEXITY            TREATMENT:   (In addition to Assessment/Re-Assessment sessions the following treatments were rendered)  Pre-treatment Symptoms/Complaints:  Pt to PT without assistive device. Reports that his ankles have been sore. Knee pain less. Posture more upright. Pt does have better gait however he does limp with fatigue. Tires easily. Told to continue to use cane in crowds. .    Pain: Initial: Pain Intensity 1: 3  Post Session:  3      THERAPEUTIC EXERCISE: (30 minutes):  Exercises per grid below to improve mobility, strength and gait. Required moderate verbal and manual cues to promote proper body alignment, promote proper body posture and promote proper body mechanics. Progressed resistance, range and repetitions as indicated. Pt is unable to lie down due to back issues. Worked on knee in supine in sitting with back to wall. Performed  exercise program of QS (quadriceps sets)  SAQ's (short arc quadriceps), and  LAQ's (long arc quadriceps) . Robert Saunas Will defer HC stretch as this seems to be increasing ankle pain. Sitting [address] at 30# x 4 sets of 10. Worked on standing cable for hip flex and abd at 25# and hip ext at 42#  x 2 sets of 20. Worked on gait without assistive device. Work on 6 in step up and over x 2 then 8 in x 10. 0. Worked to increase quad contraction and quad control at heel strike and stance. Increased limp with fatigued. Worked to increased toe off. .  NuStep at level 5 x 12 min . Manual Therapy ( 30 min  ): Patella and patella tendon mobs.   Soft tissue work to quads and HS. PROM for knee flex and extn. Sitting knee flex to 130. Passive knee ext to neutral        Therapeutic Modalities:    HEP: As above; handouts given to patient for all exercises. Treatment/Session Assessment:  Pt is post R TKA on 7/17/17. He had been at [facility] for rehab until 8/2. Presents with decreased ROM and strength of R knee and LE. Good increase in ROM. Improved gait and now independent with straight cane. Independent without cane in home and short community distances. Good increase in ROM with excellent ROM for this stage. Needs to work to increase LE strength to improve balance and endurance. Needs to increase LE strength to compensate for chronic back pain. Posture is improving as LE strength increases. Fatigues easily. Continue working to increase strength and ROM to improve gait and to enable pt to increase activity level and to enable return to work in his business. Has progressed to no assistive device in all environments as LE strength increases. . Back issues may slow progress but overall progressing well. Very motivated. · Response to Treatment:  Understood exercises. Increased knee flexion . · Compliance with Program/Exercises: Doing exercises at home   · Recommendations/Intent for next treatment session: \"Next visit will focus on aggressive ROM and strength of R knee and LE. Gait training\".   Total Treatment Duration:  PT Patient Time In/Time Out  Time In: 1100  Time Out: 1200  Treatment number  [address], PT

## 2017-08-29 ENCOUNTER — HOSPITAL ENCOUNTER (OUTPATIENT)
Dept: PHYSICAL THERAPY | Age: 67
Discharge: HOME | End: 2017-08-29
Payer: MEDICARE

## 2017-08-29 PROCEDURE — 97110 THERAPEUTIC EXERCISES: CPT

## 2017-08-29 PROCEDURE — 97140 MANUAL THERAPY 1/> REGIONS: CPT

## 2017-08-30 NOTE — PROGRESS NOTES
[facility]   [address], [address]  [phone]   [phone]    Nathalie Tariq  : 1950       OUTPATIENT PHYSICAL THERAPY:Daily Note 2017    ICD-10: Treatment Diagnosis:   R26.2 Difficulty in walking, not elsewhere classified      M25.661 Stiffness of right knee, not elsewhere classified      M25.561 Pain in right knee     Precautions/Allergies:   Bactrim [sulfamethoxazole-trimethoprim]   Fall Risk Score: 2 (? 5 = High Risk)  MD Orders: Eval and treat MEDICAL/REFERRING DIAGNOSIS:  Presence of right artificial knee joint [Z96.651]   DATE OF ONSET:   REFERRING PHYSICIAN: Padmini Barillas., *  RETURN PHYSICIAN APPOINTMENT: Aug 31, 2017     INITIAL ASSESSMENT:  Mr. Rona Jha presents with decreased ROM and strength of R knee and LE post R TKA on 17. PT with fair gait with rolling walker. Work to increase ROM and strength to progress ambulation to cane and then no assistive device. Progress strength to enable return to prior activity level. PROBLEM LIST (Impacting functional limitations):  1. Decreased Strength  2. Decreased ADL/Functional Activities  3. Decreased Ambulation Ability/Technique  4. Increased Pain  5. Decreased Flexibility/Joint Mobility  6. Edema/Girth INTERVENTIONS PLANNED:  1. Home Exercise Program (HEP)  2. Manual Therapy  3. Range of Motion (ROM)  4. Therapeutic Activites  5. Therapeutic Exercise/Strengthening   TREATMENT PLAN:  Effective Dates: 8/3/17 TO 10/31/17. Frequency/Duration: 2 times a week for 12 weeks and upon reassessment,  will adjust frequency and duration as progress indicates. GOALS: (Goals have been discussed and agreed upon with patient.)  Short-Term Functional Goals: Time Frame: 4 weeks  1. Establish independent HEP with no cueing to increase ROM and strength  MET  2. Increase R knee ROM to 0-120 to increase ease of sitting and ambulation. MET  3.  Independent gait with straight cane in home and community  MET  4. Increase LE strength so able to initiate reciprocal pattern on stairs. MET   Discharge Goals: Time Frame: 12 weeks   1. Improve score on LEFS by 9 points to enable prolonged sitting, standing and ambulation  MET  2. Increase R knee ROM to 0-125 to normalize gait. MET  3. Independent gait without assistive device in home and community  PARTIAL, MET IN HOME  4. Increase LE strength so able to perform reciprocal pattern on stairs with railing. ONGOING  5. 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 Thank you for this referral,  Yasmine Negron, PT     Referring Physician Signature: Dudley Brown., *              Date                    The information in this section was collected on 8/3/17 (except where otherwise noted). HISTORY:   History of Present Injury/Illness (Reason for Referral):  Pt is post R TKA on 7/17/17. He was at [facility] until 8/2 for rehab of R knee. He is now referred to outpatient PT for aggressive ROM and progressive strengthening of R knee and LE's. PT to PT with rolling walker. Progress to cane and then no assistive device. Pt does report that he has a chronic back problem and is unable to lie down. Past Medical History/Comorbidities:   Mr. Laura Santos  has a past medical history of Asthma (dx childhood); Atrial fibrillation ([address] Cervical spondylosis (6/20/2013); Chronic back pain (2013); Chronic pain; Colon polyps; COPD (chronic obstructive pulmonary disease) ([address] (8/17/2016); Coronary atherosclerosis of native coronary vessel (8/17/2016); Diabetes ([address] (dx 5/14); Diverticulosis; Edema (8/17/2016); Former smoker; GERD (gastroesophageal reflux disease); High cholesterol; History of kidney stones;  Hypertension; Obesity (BMI 30-39.9); Status post total right knee replacement (7/17/2017); and Unspecified sleep apnea. He also has no past medical history of Adverse effect of anesthesia; Aneurysm ([facility] Autoimmune disease ([facility] Cancer ([address] Chronic kidney disease; Coagulation defects; Coagulation disorder ([address] DEMENTIA; Difficult intubation; Endocarditis; Heart failure ([address] Ill-defined condition; Infectious disease; Liver disease; Malignant hyperthermia due to anesthesia; Nausea & vomiting; Neurological disorder; Nicotine vapor product user; Non-nicotine vapor product user; Other ill-defined conditions; Pseudocholinesterase deficiency; Psychiatric disorder; PUD (peptic ulcer disease); Rheumatic fever; Seizures ([facility] Stroke [facility]); Thromboembolus ([facility] Thyroid disease; or Unspecified adverse effect of anesthesia. Mr. Kinsey  has a past surgical history that includes cystoscopy,insert ureteral stent; colonoscopy (10/6/14); colonoscopy (11/20/14); lumbar laminectomy (2009); retinal detachment repair (Bilateral); cataract removal; other surgical (20 yrs ago); colectomy (2015); and heart catheterization (2012). Social History/Living Environment:     Lives in 2 story home with spouse  Prior Level of Function/Work/Activity:  Retired from [employer]. Now sales and business owner  Dominant Side:         RIGHT    Current Medications:       Current Outpatient Prescriptions:     amoxicillin (AMOXIL) 875 mg tablet, Take 1 Tab by mouth two (2) times a day., Disp: 10 Tab, Rfl: 0    chlorthalidone (HYGROTEN) 25 mg tablet, Take 1 Tab by mouth daily. , Disp: 90 Tab, Rfl: 0    metoprolol tartrate (LOPRESSOR) 25 mg tablet, Take 2 tablets by mouth twice daily, Disp: 180 Tab, Rfl: 0    atorvastatin (LIPITOR) 40 mg tablet, Take 0.5 Tabs by mouth daily. , Disp: 90 Tab, Rfl: 0    montelukast (SINGULAIR) 10 mg tablet, Take 1 Tab by mouth daily. , Disp: 90 Tab, Rfl: 0    pantoprazole (PROTONIX) 40 mg tablet, Take 1 Tab by mouth daily. , Disp: 90 Tab, Rfl: 0    ezetimibe (ZETIA) 10 mg tablet, Take 1 Tab by mouth nightly., Disp: 90 Tab, Rfl: 0    metFORMIN ER (GLUCOPHAGE XR) 500 mg tablet, Take 2 tablets every morning, Disp: 180 Tab, Rfl: 0    fluticasone-salmeterol (ADVAIR DISKUS) 500-50 mcg/dose diskus inhaler, Take 1 Puff by inhalation two (2) times a day., Disp: 3 Inhaler, Rfl: 0    apixaban (ELIQUIS) 5 mg tablet, Take 1 Tab by mouth two (2) times a day., Disp: 180 Tab, Rfl: 0    lisinopril (PRINIVIL, ZESTRIL) 10 mg tablet, Take 1 Tab by mouth daily. , Disp: 90 Tab, Rfl: 0    dapagliflozin (FARXIGA) 10 mg tab, Take 1 Tab by mouth daily. , Disp: 90 Tab, Rfl: 0    doxazosin (CARDURA) 4 mg tablet, Take 1 Tab by mouth daily. , Disp: 90 Tab, Rfl: 0    celecoxib (CELEBREX) 200 mg capsule, Take 1 Cap by mouth daily. , Disp: 90 Cap, Rfl: 0    dulaglutide (TRULICITY) 1.5 FV/7.5 mL sub-q pen, 0.5 mL by SubCUTAneous route every seven (7) days. , Disp: 12 Pen, Rfl: 0    meclizine (ANTIVERT) 25 mg tablet, Take 1 Tab by mouth three (3) times daily as needed for Dizziness. , Disp: 19 Tab, Rfl: 0    diazePAM (VALIUM) 5 mg tablet, Take 1 Tab by mouth every eight (8) hours as needed for Anxiety (severe vertigo spells). Max Daily Amount: 15 mg., Disp: 20 Tab, Rfl: 0    aspirin delayed-release 81 mg tablet, Take 162 mg by mouth nightly. Decrease to one 81 mg tab on 7/12/17, Disp: , Rfl:     Omega-3 Fatty Acids 300 mg cap, Take 1 Cap by mouth daily. , Disp: , Rfl:     ANTI-FUNGAL 2 % topical powder, APPLY TO ABDOMEN 2 TIMES PER DAY, Disp: , Rfl: 11    Blood-Glucose Meter monitoring kit, Check fs every day; DM2, E11/9, Contour glucometer, Disp: 1 Kit, Rfl: 0    glucose blood VI test strips (ASCENSIA CONTOUR) strip, Check fs every day, DM2, E11.9, Disp: 100 Strip, Rfl: 1    lancets 28 gauge misc, Check fs every day, DM2, E11.9, Disp: 100 Lancet, Rfl: 1    albuterol (PROVENTIL VENTOLIN) 2.5 mg /3 mL (0.083 %) nebulizer solution, 3 mL by Nebulization route once for 1 dose. (Patient taking differently: 2.5 mg by Nebulization route daily as needed.), Disp: 24 Each, Rfl: 11    albuterol (PROAIR HFA) 90 mcg/actuation inhaler, Take 2 Puffs by inhalation every four (4) hours as needed for Wheezing. Take / use AM day of surgery  per anesthesia protocols if needed. , Disp: , Rfl:     cholecalciferol, vitamin D3, (VITAMIN D3) 2,000 unit tab, Take 1 tablet by mouth daily. , Disp: , Rfl:     diphenhydrAMINE (BENADRYL ALLERGY) 25 mg tablet, Take 25 mg by mouth nightly as needed for Sleep., Disp: , Rfl:     b complex vitamins (B COMPLEX 1) tablet, Take 1 tablet by mouth every morning. Stop seven days prior to surgery per anesthesia protocol., Disp: , Rfl:     green tea leaf extract (GREEN TEA) Cap, Take 630 mg by mouth every morning. Stop seven days prior to surgery per anesthesia protocol., Disp: , Rfl:     vitamin E (AQUA GEMS) 400 unit capsule, Take 400 Units by mouth every morning. Stop seven days prior to surgery per anesthesia protocol., Disp: , Rfl:     FOLIC ACID PO, Take 762 mcg by mouth every morning. Stop seven days prior to surgery per anesthesia protocol., Disp: , Rfl:     DOCUSATE CALCIUM (STOOL SOFTENER PO), Take 1 capsule by mouth every morning., Disp: , Rfl:     mometasone (NASONEX) 50 mcg/Actuation nasal spray, 2 Sprays by Both Nostrils route nightly as needed. , Disp: , Rfl:    Date Last Reviewed:  8/29/2017     Number of Personal Factors/Comorbidities that affect the Plan of Care: 1-2: MODERATE COMPLEXITY   EXAMINATION:   Observation/Orthostatic Postural Assessment: To PT with straight cane. Does not use cane in PT. Good heel toe gait. Increased step length and gait speed. . Pt with flexed posture due to chronic back problems. He reports sleeping in a recliner. Reports he has had many different kinds of treatment for back but with little success. Tight hip flexors due to flexed posture. Tight HS and HC.   Difficult to assess HS tightness due to inability to ie flat however with stretching in long sitting, he is unable to sit to upright. .   Palpation:          Pain medial knee   Functional Mobility:         Gait/Ambulation:  Independent with rolling walker        Stairs: WITH RAILING        ROM:     R knee 0    L knee -5 to 130                                    Strength:     Knee ext  R 4/5,  L 4+/5  Knee flexion  R 4/5,  L 4+/5         Hip flexion R 4/5,  L 4+/5     Hip abd   R 4-/5,  L 4-/5              Neurological Screen: Intact to light touch  Balance:  Fair,  Good with walker   Body Structures Involved:  1. Bones  2. Joints  3. Muscles  4. Ligaments Body Functions Affected:  1. Sensory/Pain  2. Neuromusculoskeletal  3. Movement Related Activities and Participation Affected:  1. Learning and Applying Knowledge  2. General Tasks and Demands  3. Mobility  4. Self Care  5. Domestic Life  6. Community, Social and [place]   Number of elements (examined above) that affect the Plan of Care: 3: MODERATE COMPLEXITY   CLINICAL PRESENTATION:   Presentation: Evolving clinical presentation with changing clinical characteristics: MODERATE COMPLEXITY   CLINICAL DECISION MAKING:   Outcome Measure: Tool Used: Lower Extremity Functional Scale (LEFS)  Score:  Initial: 15/80 Most Recent: 44/80 (Date: 8/24/17 )   Interpretation of Score: 20 questions each scored on a 5 point scale with 0 representing \"extreme difficulty or unable to perform\" and 4 representing \"no difficulty\". The lower the score, the greater the functional disability. 80/80 represents no disability. Minimal detectable change is 9 points. Score 80 79-63 62-48 47-32 31-16 15-1 0   Modifier CH CI CJ CK CL CM CN     ?  Mobility - Walking and Moving Around:     - CURRENT STATUS: CK - 40%-59% impaired, limited or restricted    - GOAL STATUS: CK - 40%-59% impaired, limited or restricted    - D/C STATUS:  ---------------To be determined---------------      Medical Necessity:   · Patient is expected to demonstrate progress in strength, range of motion and gait to increase independence with home and community activities. Reason for Services/Other Comments:  · Patient continues to require skilled intervention due to decreased ROM and strength and fair gait . Use of outcome tool(s) and clinical judgement create a POC that gives a: Clear prediction of patient's progress: LOW COMPLEXITY            TREATMENT:   (In addition to Assessment/Re-Assessment sessions the following treatments were rendered)  Pre-treatment Symptoms/Complaints:  Pt to PT without assistive device. Reports that his ankles have been sore and have deferred HC stretch. Posture more upright. Pt does have better gait however he does limp with fatigue. Tires easily. Told to continue to use cane in crowds. .    Pain: Initial: Pain Intensity 1: 2  Post Session:  3      THERAPEUTIC EXERCISE: (30 minutes):  Exercises per grid below to improve mobility, strength and gait. Required moderate verbal and manual cues to promote proper body alignment, promote proper body posture and promote proper body mechanics. Progressed resistance, range and repetitions as indicated. Pt is unable to lie down due to back issues. Worked on knee in supine in sitting with back to wall. ). Performed  exercise program of QS (quadriceps sets)  SAQ's (short arc quadriceps), and  LAQ's (long arc quadriceps) . Sonja Silva Will defer HC stretch as this seems to be increasing ankle pain. Sitting [address] at 30# x 4 sets of 10. Worked on standing cable for hip flex and abd at 25# and hip ext at 42#  x 2 sets of 20. Worked on gait without assistive device. Work on 6 in step up and over x 2 then 8 in x 10. 0. Worked to increase quad contraction and quad control at heel strike and stance. Increased limp with fatigued. Worked to increased toe off. .  NuStep at level 5 x 12 min . Manual Therapy ( 30 min  ): Patella and patella tendon mobs. Soft tissue work to [employer] and HS. PROM for knee flex and extn. Sitting knee flex to 130. Passive knee ext to neutral        Therapeutic Modalities:    HEP: As above; handouts given to patient for all exercises. Treatment/Session Assessment:  Pt is post R TKA on 7/17/17. He had been at [facility] for rehab until 8/2. Presents with decreased ROM and strength of R knee and LE. Good increase in ROM. Improved gait and now independent with straight cane. Independent without cane in home and short community distances. Good increase in ROM with excellent ROM for this stage. Needs to work to increase LE strength to improve balance and endurance. Needs to increase LE strength to compensate for chronic back pain. Posture is improving as LE strength increases. Fatigues easily. Continue working to increase strength and ROM to improve gait and to enable pt to increase activity level and to enable return to work in his business. Has progressed to no assistive device in all environments as LE strength increases. . Back issues may slow progress but overall progressing well. Very motivated. · Response to Treatment:  Understood exercises. Increased knee flexion . · Compliance with Program/Exercises: Doing exercises at home   · Recommendations/Intent for next treatment session: \"Next visit will focus on aggressive ROM and strength of R knee and LE. Gait training\".   Total Treatment Duration:  PT Patient Time In/Time Out  Time In: 0300  Time Out: 0400  Treatment number  8  Zeny Ryan, PT

## 2017-08-31 ENCOUNTER — HOSPITAL ENCOUNTER (OUTPATIENT)
Dept: PHYSICAL THERAPY | Age: 67
Discharge: HOME | End: 2017-08-31
Payer: MEDICARE

## 2017-08-31 PROCEDURE — 97110 THERAPEUTIC EXERCISES: CPT

## 2017-08-31 PROCEDURE — 97140 MANUAL THERAPY 1/> REGIONS: CPT

## 2017-08-31 NOTE — PROGRESS NOTES
Therapy Center at [facility]   [address], [address]  [phone]   [phone]    Brianna Elmore  : 1950       OUTPATIENT PHYSICAL THERAPY:Daily Note 2017    ICD-10: Treatment Diagnosis:   R26.2 Difficulty in walking, not elsewhere classified      M25.661 Stiffness of right knee, not elsewhere classified      M25.561 Pain in right knee     Precautions/Allergies:   Bactrim [sulfamethoxazole-trimethoprim]   Fall Risk Score: 2 (? 5 = High Risk)  MD Orders: Eval and treat MEDICAL/REFERRING DIAGNOSIS:  Presence of right artificial knee joint [Z96.651]   DATE OF ONSET:   REFERRING PHYSICIAN: Morris Alvarez, *  RETURN PHYSICIAN APPOINTMENT: Aug 31, 2017     INITIAL ASSESSMENT:  Mr. Kinsey presents with decreased ROM and strength of R knee and LE post R TKA on 17. PT with fair gait with rolling walker. Work to increase ROM and strength to progress ambulation to cane and then no assistive device. Progress strength to enable return to prior activity level. PROBLEM LIST (Impacting functional limitations):  1. Decreased Strength  2. Decreased ADL/Functional Activities  3. Decreased Ambulation Ability/Technique  4. Increased Pain  5. Decreased Flexibility/Joint Mobility  6. Edema/Girth INTERVENTIONS PLANNED:  1. Home Exercise Program (HEP)  2. Manual Therapy  3. Range of Motion (ROM)  4. Therapeutic Activites  5. Therapeutic Exercise/Strengthening   TREATMENT PLAN:  Effective Dates: 8/3/17 TO 10/31/17. Frequency/Duration: 2 times a week for 12 weeks and upon reassessment,  will adjust frequency and duration as progress indicates. GOALS: (Goals have been discussed and agreed upon with patient.)  Short-Term Functional Goals: Time Frame: 4 weeks  1. Establish independent HEP with no cueing to increase ROM and strength  MET  2. Increase R knee ROM to 0-120 to increase ease of sitting and ambulation. MET  3.  Independent gait with straight cane in home and community  MET  4. Increase LE strength so able to initiate reciprocal pattern on stairs. MET   Discharge Goals: Time Frame: 12 weeks   1. Improve score on LEFS by 9 points to enable prolonged sitting, standing and ambulation  MET  2. Increase R knee ROM to 0-125 to normalize gait. MET  3. Independent gait without assistive device in home and community  PARTIAL, MET IN HOME  4. Increase LE strength so able to perform reciprocal pattern on stairs with railing. ONGOING  5. 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 Thank you for this referral,  Alma Serrano, PT     Referring Physician Signature: Joseline Fernandez., *              Date                    The information in this section was collected on 8/3/17 (except where otherwise noted). HISTORY:   History of Present Injury/Illness (Reason for Referral):  Pt is post R TKA on 7/17/17. He was at [facility] until 8/2 for rehab of R knee. He is now referred to outpatient PT for aggressive ROM and progressive strengthening of R knee and LE's. PT to PT with rolling walker. Progress to cane and then no assistive device. Pt does report that he has a chronic back problem and is unable to lie down. Past Medical History/Comorbidities:   Mr. Ary Delgado  has a past medical history of Asthma (dx childhood); Atrial fibrillation ([address] Cervical spondylosis (6/20/2013); Chronic back pain (2013); Chronic pain; Colon polyps; COPD (chronic obstructive pulmonary disease) ([address] (8/17/2016); Coronary atherosclerosis of native coronary vessel (8/17/2016); Diabetes ([address] (dx 5/14); Diverticulosis; Edema (8/17/2016); Former smoker; GERD (gastroesophageal reflux disease); High cholesterol; History of kidney stones;  Hypertension; Obesity (BMI 30-39.9); Status post total right knee replacement (7/17/2017); and Unspecified sleep apnea. He also has no past medical history of Adverse effect of anesthesia; Aneurysm ([facility] Autoimmune disease ([facility] Cancer ([facility] Chronic kidney disease; Coagulation defects; Coagulation disorder ([facility] DEMENTIA; Difficult intubation; Endocarditis; Heart failure ([facility] Ill-defined condition; Infectious disease; Liver disease; Malignant hyperthermia due to anesthesia; Nausea & vomiting; Neurological disorder; Nicotine vapor product user; Non-nicotine vapor product user; Other ill-defined conditions; Pseudocholinesterase deficiency; Psychiatric disorder; PUD (peptic ulcer disease); Rheumatic fever; Seizures ([facility] Stroke [facility]); Thromboembolus ([facility] Thyroid disease; or Unspecified adverse effect of anesthesia. Mr. Keila Mancilla  has a past surgical history that includes cystoscopy,insert ureteral stent; colonoscopy (10/6/14); colonoscopy (11/20/14); lumbar laminectomy (2009); retinal detachment repair (Bilateral); cataract removal; other surgical (20 yrs ago); colectomy (2015); and heart catheterization (2012). Social History/Living Environment:     Lives in 2 story home with spouse  Prior Level of Function/Work/Activity:  Retired from [employer]. Now sales and business owner  Dominant Side:         RIGHT    Current Medications:       Current Outpatient Prescriptions:     amoxicillin (AMOXIL) 875 mg tablet, Take 1 Tab by mouth two (2) times a day., Disp: 10 Tab, Rfl: 0    chlorthalidone (HYGROTEN) 25 mg tablet, Take 1 Tab by mouth daily. , Disp: 90 Tab, Rfl: 0    metoprolol tartrate (LOPRESSOR) 25 mg tablet, Take 2 tablets by mouth twice daily, Disp: 180 Tab, Rfl: 0    atorvastatin (LIPITOR) 40 mg tablet, Take 0.5 Tabs by mouth daily. , Disp: 90 Tab, Rfl: 0    montelukast (SINGULAIR) 10 mg tablet, Take 1 Tab by mouth daily. , Disp: 90 Tab, Rfl: 0    pantoprazole (PROTONIX) 40 mg tablet, Take 1 Tab by mouth daily. , Disp: 90 Tab, Rfl: 0   ezetimibe (ZETIA) 10 mg tablet, Take 1 Tab by mouth nightly., Disp: 90 Tab, Rfl: 0    metFORMIN ER (GLUCOPHAGE XR) 500 mg tablet, Take 2 tablets every morning, Disp: 180 Tab, Rfl: 0    fluticasone-salmeterol (ADVAIR DISKUS) 500-50 mcg/dose diskus inhaler, Take 1 Puff by inhalation two (2) times a day., Disp: 3 Inhaler, Rfl: 0    apixaban (ELIQUIS) 5 mg tablet, Take 1 Tab by mouth two (2) times a day., Disp: 180 Tab, Rfl: 0    lisinopril (PRINIVIL, ZESTRIL) 10 mg tablet, Take 1 Tab by mouth daily. , Disp: 90 Tab, Rfl: 0    dapagliflozin (FARXIGA) 10 mg tab, Take 1 Tab by mouth daily. , Disp: 90 Tab, Rfl: 0    doxazosin (CARDURA) 4 mg tablet, Take 1 Tab by mouth daily. , Disp: 90 Tab, Rfl: 0    celecoxib (CELEBREX) 200 mg capsule, Take 1 Cap by mouth daily. , Disp: 90 Cap, Rfl: 0    dulaglutide (TRULICITY) 1.5 QC/7.0 mL sub-q pen, 0.5 mL by SubCUTAneous route every seven (7) days. , Disp: 12 Pen, Rfl: 0    meclizine (ANTIVERT) 25 mg tablet, Take 1 Tab by mouth three (3) times daily as needed for Dizziness. , Disp: 19 Tab, Rfl: 0    diazePAM (VALIUM) 5 mg tablet, Take 1 Tab by mouth every eight (8) hours as needed for Anxiety (severe vertigo spells). Max Daily Amount: 15 mg., Disp: 20 Tab, Rfl: 0    aspirin delayed-release 81 mg tablet, Take 162 mg by mouth nightly. Decrease to one 81 mg tab on 7/12/17, Disp: , Rfl:     Omega-3 Fatty Acids 300 mg cap, Take 1 Cap by mouth daily. , Disp: , Rfl:     ANTI-FUNGAL 2 % topical powder, APPLY TO ABDOMEN 2 TIMES PER DAY, Disp: , Rfl: 11    Blood-Glucose Meter monitoring kit, Check fs every day; DM2, E11/9, Contour glucometer, Disp: 1 Kit, Rfl: 0    glucose blood VI test strips (ASCENSIA CONTOUR) strip, Check fs every day, DM2, E11.9, Disp: 100 Strip, Rfl: 1    lancets 28 gauge misc, Check fs every day, DM2, E11.9, Disp: 100 Lancet, Rfl: 1    albuterol (PROVENTIL VENTOLIN) 2.5 mg /3 mL (0.083 %) nebulizer solution, 3 mL by Nebulization route once for 1 dose.  (Patient taking differently: 2.5 mg by Nebulization route daily as needed.), Disp: 24 Each, Rfl: 11    albuterol (PROAIR HFA) 90 mcg/actuation inhaler, Take 2 Puffs by inhalation every four (4) hours as needed for Wheezing. Take / use AM day of surgery  per anesthesia protocols if needed. , Disp: , Rfl:     cholecalciferol, vitamin D3, (VITAMIN D3) 2,000 unit tab, Take 1 tablet by mouth daily. , Disp: , Rfl:     diphenhydrAMINE (BENADRYL ALLERGY) 25 mg tablet, Take 25 mg by mouth nightly as needed for Sleep., Disp: , Rfl:     b complex vitamins (B COMPLEX 1) tablet, Take 1 tablet by mouth every morning. Stop seven days prior to surgery per anesthesia protocol., Disp: , Rfl:     green tea leaf extract (GREEN TEA) Cap, Take 630 mg by mouth every morning. Stop seven days prior to surgery per anesthesia protocol., Disp: , Rfl:     vitamin E (AQUA GEMS) 400 unit capsule, Take 400 Units by mouth every morning. Stop seven days prior to surgery per anesthesia protocol., Disp: , Rfl:     FOLIC ACID PO, Take 788 mcg by mouth every morning. Stop seven days prior to surgery per anesthesia protocol., Disp: , Rfl:     DOCUSATE CALCIUM (STOOL SOFTENER PO), Take 1 capsule by mouth every morning., Disp: , Rfl:     mometasone (NASONEX) 50 mcg/Actuation nasal spray, 2 Sprays by Both Nostrils route nightly as needed. , Disp: , Rfl:    Date Last Reviewed:  8/31/2017     Number of Personal Factors/Comorbidities that affect the Plan of Care: 1-2: MODERATE COMPLEXITY   EXAMINATION:   Observation/Orthostatic Postural Assessment: To PT with straight cane. Does not use cane in PT. Good heel toe gait. Increased step length and gait speed. . Pt with flexed posture due to chronic back problems. He reports sleeping in a recliner. Reports he has had many different kinds of treatment for back but with little success. Tight hip flexors due to flexed posture. Tight HS and HC.   Difficult to assess HS tightness due to inability to ie flat however with stretching in long sitting, he is unable to sit to upright. .   Palpation:          Pain medial knee   Functional Mobility:         Gait/Ambulation:  Independent with rolling walker        Stairs: WITH RAILING        ROM:     R knee 0    L knee -5 to 130                                    Strength:     Knee ext  R 4/5,  L 4+/5  Knee flexion  R 4/5,  L 4+/5         Hip flexion R 4/5,  L 4+/5     Hip abd   R 4-/5,  L 4-/5              Neurological Screen: Intact to light touch  Balance:  Fair,  Good with walker   Body Structures Involved:  1. Bones  2. Joints  3. Muscles  4. Ligaments Body Functions Affected:  1. Sensory/Pain  2. Neuromusculoskeletal  3. Movement Related Activities and Participation Affected:  1. Learning and Applying Knowledge  2. General Tasks and Demands  3. Mobility  4. Self Care  5. Domestic Life  6. Community, Social and Donie [place]   Number of elements (examined above) that affect the Plan of Care: 3: MODERATE COMPLEXITY   CLINICAL PRESENTATION:   Presentation: Evolving clinical presentation with changing clinical characteristics: MODERATE COMPLEXITY   CLINICAL DECISION MAKING:   Outcome Measure: Tool Used: Lower Extremity Functional Scale (LEFS)  Score:  Initial: 15/80 Most Recent: 44/80 (Date: 8/24/17 )   Interpretation of Score: 20 questions each scored on a 5 point scale with 0 representing \"extreme difficulty or unable to perform\" and 4 representing \"no difficulty\". The lower the score, the greater the functional disability. 80/80 represents no disability. Minimal detectable change is 9 points. Score 80 79-63 62-48 47-32 31-16 15-1 0   Modifier CH CI CJ CK CL CM CN     ?  Mobility - Walking and Moving Around:     - CURRENT STATUS: CK - 40%-59% impaired, limited or restricted    - GOAL STATUS: CK - 40%-59% impaired, limited or restricted    - D/C STATUS:  ---------------To be determined---------------      Medical Necessity:   · Patient is expected to demonstrate progress in strength, range of motion and gait to increase independence with home and community activities. Reason for Services/Other Comments:  · Patient continues to require skilled intervention due to decreased ROM and strength and fair gait . Use of outcome tool(s) and clinical judgement create a POC that gives a: Clear prediction of patient's progress: LOW COMPLEXITY            TREATMENT:   (In addition to Assessment/Re-Assessment sessions the following treatments were rendered)  Pre-treatment Symptoms/Complaints:  Patient reports knee feels pretty good today just stiff overall. Pain: Initial: Pain Intensity 1: 2  Post Session:  2      THERAPEUTIC EXERCISE: (30 minutes):  Exercises per grid below to improve mobility, strength and gait. Required moderate verbal and manual cues to promote proper body alignment, promote proper body posture and promote proper body mechanics. Progressed resistance, range and repetitions as indicated. Pt is unable to lie down due to back issues. Worked on knee in supine in sitting with back to wall. ). Performed  exercise program of QS (quadriceps sets)  SAQ's (short arc quadriceps), and  LAQ's (long arc quadriceps)   Sitting [address] at 30# x 4 sets of 10. Worked on standing cable for hip flex and abd at 25# and hip ext at 42#  x 2 sets of 20. Worked on gait without assistive device. Work on 6 in step up and over x 2 then 8 in x 10. Sit to stand from plinth 1 x 15 Worked to increase quad contraction and quad control at heel strike and stance. Increased limp with fatigued. NuStep at level 5 x 12 min      Manual Therapy ( 30 min  ): Patella and patella tendon mobs. Soft tissue work to [employer] and HS. PROM for knee flex and extn. Sitting knee flex to 130. Passive knee ext to neutral        Therapeutic Modalities:    HEP: As directed. Treatment/Session Assessment:  Pt is post R TKA on 7/17/17. He had been at [facility] for rehab until 8/2.   Presents with decreased ROM and strength of R knee and LE. Good increase in ROM. Good increase in ROM with excellent ROM for this stage. Needs to work to increase LE strength to improve balance and endurance. Needs to increase LE strength to compensate for chronic back pain. Posture is improving as LE strength increases. Fatigues easily. Very motivated. Instructed patient to continue home exercises and stretches as directed. · Response to Treatment:  Understood exercises. Increased knee flexion . · Compliance with Program/Exercises: Doing exercises at home   · Recommendations/Intent for next treatment session: \"Next visit will focus on aggressive ROM and strength of R knee and LE. Gait training\".   Total Treatment Duration:     Treatment number [id number] [place], [place]

## 2017-08-31 NOTE — PROGRESS NOTES
Melani Cruz  : 1950 Therapy Center at [facility] LUCIANA [facility]AGE  [address] [facility], [address] [place] [address],[address], [facility] 91.  [phone]   [phone]         OUTPATIENT PHYSICAL THERAPY:Discontinuation Summary 2017    ICD-10: Treatment Diagnosis: low back pain (M54.5)  Precautions/Allergies:   Bactrim [sulfamethoxazole-trimethoprim]   Fall Risk Score: 2 (? 5 = High Risk)  MD Orders: eval and treat no passive manipulation. Emphasis on conditioning and HEP MEDICAL/REFERRING DIAGNOSIS:  low back pain   DATE OF ONSET: at least 5 years ago  REFERRING PHYSICIAN: Nickolas. Not on file, MD  RETURN PHYSICIAN APPOINTMENT: uncertain     INITIAL ASSESSMENT:  Mr. Aiyana Benoit presents with R mid to lower thoracic pain. He has had this pain for 5-6 years. It interferes walking and with sleep as he cannot lie down. It is also interferrng with ability to exercise and ready himself for TKR. He would benefit from PT for problems listed below. Pt attended 5 visits and then did not return. He will be discharged at this time. PROBLEM LIST (Impacting functional limitations):  1. Decreased ADL/Functional Activities  2. Decreased Transfer Abilities  3. Decreased Ambulation Ability/Technique  4. Increased Pain  5. Decreased Activity Tolerance  6. Decreased Flexibility/Joint Mobility INTERVENTIONS PLANNED:  1. Bed Mobility  2. Home Exercise Program (HEP)  3. Therapeutic Exercise/Strengthening  4. Transfer Training   TREATMENT PLAN:  Effective Dates: 3/28/17 TO 17. Frequency/Duration: 2 times a week for 8 weeks  GOALS: (Goals have been discussed and agreed upon with patient.)  Short-Term Functional Goals: Time Frame: 4 weeks  1. Pt independent with HEP to address deficits. 2. PT to report ability to decrease his pain either with position or through exercises. 3. Pt able to tolerated supine elevated to 20 degrees. Discharge Goals: Time Frame: 8 weeks  1.  Pt to sleep in bed at home with wedges for some portion of night.  2. Pt independent with HEP to maintain goals made in PT  3. Improved Oswestry by 10 points to indicate improved function. 4. Pt to transfer supine to sit with 3/10 pain or less. 5. Pt to walk 10 minutes without increased pain. Rehabilitation Potential For Stated Goals: Good   Regarding [facility] therapy, I certify that the treatment plan above will be carried out by a therapist or under their direction.   Thank you for this referral,  Rupal Hammer, PT

## 2017-08-31 NOTE — PROGRESS NOTES
Melani Cruz  : 1950 Therapy Center at [facility] [address]  [address] [facility], [address] [place] [address],[address], [facility] 91.  [phone]   [phone]         OUTPATIENT PHYSICAL THERAPY:Discontinuation Summary 2017    ICD-10: Treatment Diagnosis: low back pain (M54.5)  Precautions/Allergies:   Bactrim [sulfamethoxazole-trimethoprim]   Fall Risk Score: 2 (? 5 = High Risk)  MD Orders: eval and treat no passive manipulation. Emphasis on conditioning and HEP MEDICAL/REFERRING DIAGNOSIS:  Low back pain [M54.5]   DATE OF ONSET: at least 5 years ago  REFERRING PHYSICIAN: Nickolas. Not on file, MD  RETURN PHYSICIAN APPOINTMENT: uncertain     INITIAL ASSESSMENT:  Mr. Aiyana Benoit presents with R mid to lower thoracic pain. He has had this pain for 5-6 years. It interferes walking and with sleep as he cannot lie down. It is also interferrng with ability to exercise and ready himself for TKR. He would benefit from PT for problems listed below. Pt attended 7 visits of PT and had one cancellation. Pt did not reschedule after cancellation so was not reassessed. He will be discharged at this time. PROBLEM LIST (Impacting functional limitations):  1. Decreased ADL/Functional Activities  2. Decreased Transfer Abilities  3. Decreased Ambulation Ability/Technique  4. Increased Pain  5. Decreased Activity Tolerance  6. Decreased Flexibility/Joint Mobility INTERVENTIONS PLANNED:  1. Bed Mobility  2. Home Exercise Program (HEP)  3. Therapeutic Exercise/Strengthening  4. Transfer Training   TREATMENT PLAN:  Effective Dates: 3/28/17 TO 17. Frequency/Duration: 2 times a week for 8 weeks  GOALS: (Goals have been discussed and agreed upon with patient.)  Short-Term Functional Goals: Time Frame: 4 weeks  1. Pt independent with HEP to address deficits. 2. PT to report ability to decrease his pain either with position or through exercises. 3. Pt able to tolerated supine elevated to 20 degrees.   Discharge Goals: Time Frame: 8 weeks  1. Pt to sleep in bed at home with wedges for some portion of night. 2. Pt independent with HEP to maintain goals made in PT  3. Improved Oswestry by 10 points to indicate improved function. 4. Pt to transfer supine to sit with 3/10 pain or less. 5. Pt to walk 10 minutes without increased pain. Rehabilitation Potential For Stated Goals: Good   Regarding [facility] therapy, I certify that the treatment plan above will be carried out by a therapist or under their direction.   Thank you for this referral,  Shen Sanchez, PT

## 2017-09-01 ENCOUNTER — APPOINTMENT (OUTPATIENT)
Dept: PHYSICAL THERAPY | Age: 67
End: 2017-09-01
Payer: MEDICARE

## 2017-09-05 ENCOUNTER — HOSPITAL ENCOUNTER (OUTPATIENT)
Dept: PHYSICAL THERAPY | Age: 67
Discharge: HOME | End: 2017-09-05
Payer: MEDICARE

## 2017-09-05 PROCEDURE — 97110 THERAPEUTIC EXERCISES: CPT

## 2017-09-05 PROCEDURE — 97140 MANUAL THERAPY 1/> REGIONS: CPT

## 2017-09-05 NOTE — PROGRESS NOTES
[facility]   [address], [address]  [phone]   [phone]    Mert Frost  : 1950       OUTPATIENT PHYSICAL THERAPY:Daily Note 2017    ICD-10: Treatment Diagnosis:   R26.2 Difficulty in walking, not elsewhere classified      M25.661 Stiffness of right knee, not elsewhere classified      M25.561 Pain in right knee     Precautions/Allergies:   Bactrim [sulfamethoxazole-trimethoprim]   Fall Risk Score: 2 (? 5 = High Risk)  MD Orders: Eval and treat MEDICAL/REFERRING DIAGNOSIS:  Presence of right artificial knee joint [Z96.651]   DATE OF ONSET:   REFERRING PHYSICIAN: Aylin Diaz, *  RETURN PHYSICIAN APPOINTMENT: Aug 31, 2017     INITIAL ASSESSMENT:  Mr. Brit Velasquez presents with decreased ROM and strength of R knee and LE post R TKA on 17. PT with fair gait with rolling walker. Work to increase ROM and strength to progress ambulation to cane and then no assistive device. Progress strength to enable return to prior activity level. PROBLEM LIST (Impacting functional limitations):  1. Decreased Strength  2. Decreased ADL/Functional Activities  3. Decreased Ambulation Ability/Technique  4. Increased Pain  5. Decreased Flexibility/Joint Mobility  6. Edema/Girth INTERVENTIONS PLANNED:  1. Home Exercise Program (HEP)  2. Manual Therapy  3. Range of Motion (ROM)  4. Therapeutic Activites  5. Therapeutic Exercise/Strengthening   TREATMENT PLAN:  Effective Dates: 8/3/17 TO 10/31/17. Frequency/Duration: 2 times a week for 12 weeks and upon reassessment,  will adjust frequency and duration as progress indicates. GOALS: (Goals have been discussed and agreed upon with patient.)  Short-Term Functional Goals: Time Frame: 4 weeks  1. Establish independent HEP with no cueing to increase ROM and strength  MET  2. Increase R knee ROM to 0-120 to increase ease of sitting and ambulation. MET  3.  Independent gait with straight cane in home and community  MET  4. Increase LE strength so able to initiate reciprocal pattern on stairs. MET   Discharge Goals: Time Frame: 12 weeks   1. Improve score on LEFS by 9 points to enable prolonged sitting, standing and ambulation  MET  2. Increase R knee ROM to 0-125 to normalize gait. MET  3. Independent gait without assistive device in home and community  PARTIAL, MET IN HOME  4. Increase LE strength so able to perform reciprocal pattern on stairs with railing. ONGOING  5. Increase LE strength so able to return to work in his business  ONGOING  Rehabilitation Potential For Stated Goals: [address] therapy, I certify that the treatment plan above will be carried out by a therapist or under their direction. Thank you for this referral,  Rene Valdez, PT     Referring Physician Signature: Clark Guerrero., *              Date                    The information in this section was collected on 8/3/17 (except where otherwise noted). HISTORY:   History of Present Injury/Illness (Reason for Referral):  Pt is post R TKA on 7/17/17. He was at [facility] until 8/2 for rehab of R knee. He is now referred to outpatient PT for aggressive ROM and progressive strengthening of R knee and LE's. PT to PT with rolling walker. Progress to cane and then no assistive device. Pt does report that he has a chronic back problem and is unable to lie down. Past Medical History/Comorbidities:   Mr. Hugo Dowling  has a past medical history of Asthma (dx childhood); Atrial fibrillation ([address] Cervical spondylosis (6/20/2013); Chronic back pain (2013); Chronic pain; Colon polyps; COPD (chronic obstructive pulmonary disease) ([address] (8/17/2016); Coronary atherosclerosis of native coronary vessel (8/17/2016); Diabetes ([address] (dx 5/14); Diverticulosis; Edema (8/17/2016); Former smoker; GERD (gastroesophageal reflux disease); High cholesterol; History of kidney stones;  Hypertension; Obesity (BMI 30-39.9); Status post total right knee replacement (7/17/2017); and Unspecified sleep apnea. He also has no past medical history of Adverse effect of anesthesia; Aneurysm ([facility] Autoimmune disease ([facility] Cancer ([facility] Chronic kidney disease; Coagulation defects; Coagulation disorder ([facility] DEMENTIA; Difficult intubation; Endocarditis; Heart failure ([facility] Ill-defined condition; Infectious disease; Liver disease; Malignant hyperthermia due to anesthesia; Nausea & vomiting; Neurological disorder; Nicotine vapor product user; Non-nicotine vapor product user; Other ill-defined conditions; Pseudocholinesterase deficiency; Psychiatric disorder; PUD (peptic ulcer disease); Rheumatic fever; Seizures ([facility] Stroke [facility]); Thromboembolus ([facility] Thyroid disease; or Unspecified adverse effect of anesthesia. Mr. Marv Richter  has a past surgical history that includes cystoscopy,insert ureteral stent; colonoscopy (10/6/14); colonoscopy (11/20/14); lumbar laminectomy (2009); retinal detachment repair (Bilateral); cataract removal; other surgical (20 yrs ago); colectomy (2015); and heart catheterization (2012). Social History/Living Environment:     Lives in 2 story home with spouse  Prior Level of Function/Work/Activity:  Retired from [employer]. Now sales and business owner  Dominant Side:         RIGHT    Current Medications:       Current Outpatient Prescriptions:     amoxicillin (AMOXIL) 875 mg tablet, Take 1 Tab by mouth two (2) times a day., Disp: 10 Tab, Rfl: 0    chlorthalidone (HYGROTEN) 25 mg tablet, Take 1 Tab by mouth daily. , Disp: 90 Tab, Rfl: 0    metoprolol tartrate (LOPRESSOR) 25 mg tablet, Take 2 tablets by mouth twice daily, Disp: 180 Tab, Rfl: 0    atorvastatin (LIPITOR) 40 mg tablet, Take 0.5 Tabs by mouth daily. , Disp: 90 Tab, Rfl: 0    montelukast (SINGULAIR) 10 mg tablet, Take 1 Tab by mouth daily. , Disp: 90 Tab, Rfl: 0    pantoprazole (PROTONIX) 40 mg tablet, Take 1 Tab by mouth daily. , Disp: 90 Tab, Rfl: 0   ezetimibe (ZETIA) 10 mg tablet, Take 1 Tab by mouth nightly., Disp: 90 Tab, Rfl: 0    metFORMIN ER (GLUCOPHAGE XR) 500 mg tablet, Take 2 tablets every morning, Disp: 180 Tab, Rfl: 0    fluticasone-salmeterol (ADVAIR DISKUS) 500-50 mcg/dose diskus inhaler, Take 1 Puff by inhalation two (2) times a day., Disp: 3 Inhaler, Rfl: 0    apixaban (ELIQUIS) 5 mg tablet, Take 1 Tab by mouth two (2) times a day., Disp: 180 Tab, Rfl: 0    lisinopril (PRINIVIL, ZESTRIL) 10 mg tablet, Take 1 Tab by mouth daily. , Disp: 90 Tab, Rfl: 0    dapagliflozin (FARXIGA) 10 mg tab, Take 1 Tab by mouth daily. , Disp: 90 Tab, Rfl: 0    doxazosin (CARDURA) 4 mg tablet, Take 1 Tab by mouth daily. , Disp: 90 Tab, Rfl: 0    celecoxib (CELEBREX) 200 mg capsule, Take 1 Cap by mouth daily. , Disp: 90 Cap, Rfl: 0    dulaglutide (TRULICITY) 1.5 RC/6.3 mL sub-q pen, 0.5 mL by SubCUTAneous route every seven (7) days. , Disp: 12 Pen, Rfl: 0    meclizine (ANTIVERT) 25 mg tablet, Take 1 Tab by mouth three (3) times daily as needed for Dizziness. , Disp: 19 Tab, Rfl: 0    diazePAM (VALIUM) 5 mg tablet, Take 1 Tab by mouth every eight (8) hours as needed for Anxiety (severe vertigo spells). Max Daily Amount: 15 mg., Disp: 20 Tab, Rfl: 0    aspirin delayed-release 81 mg tablet, Take 162 mg by mouth nightly. Decrease to one 81 mg tab on 7/12/17, Disp: , Rfl:     Omega-3 Fatty Acids 300 mg cap, Take 1 Cap by mouth daily. , Disp: , Rfl:     ANTI-FUNGAL 2 % topical powder, APPLY TO ABDOMEN 2 TIMES PER DAY, Disp: , Rfl: 11    Blood-Glucose Meter monitoring kit, Check fs every day; DM2, E11/9, Contour glucometer, Disp: 1 Kit, Rfl: 0    glucose blood VI test strips (ASCENSIA CONTOUR) strip, Check fs every day, DM2, E11.9, Disp: 100 Strip, Rfl: 1    lancets 28 gauge misc, Check fs every day, DM2, E11.9, Disp: 100 Lancet, Rfl: 1    albuterol (PROVENTIL VENTOLIN) 2.5 mg /3 mL (0.083 %) nebulizer solution, 3 mL by Nebulization route once for 1 dose.  (Patient taking differently: 2.5 mg by Nebulization route daily as needed.), Disp: 24 Each, Rfl: 11    albuterol (PROAIR HFA) 90 mcg/actuation inhaler, Take 2 Puffs by inhalation every four (4) hours as needed for Wheezing. Take / use AM day of surgery  per anesthesia protocols if needed. , Disp: , Rfl:     cholecalciferol, vitamin D3, (VITAMIN D3) 2,000 unit tab, Take 1 tablet by mouth daily. , Disp: , Rfl:     diphenhydrAMINE (BENADRYL ALLERGY) 25 mg tablet, Take 25 mg by mouth nightly as needed for Sleep., Disp: , Rfl:     b complex vitamins (B COMPLEX 1) tablet, Take 1 tablet by mouth every morning. Stop seven days prior to surgery per anesthesia protocol., Disp: , Rfl:     green tea leaf extract (GREEN TEA) Cap, Take 630 mg by mouth every morning. Stop seven days prior to surgery per anesthesia protocol., Disp: , Rfl:     vitamin E (AQUA GEMS) 400 unit capsule, Take 400 Units by mouth every morning. Stop seven days prior to surgery per anesthesia protocol., Disp: , Rfl:     FOLIC ACID PO, Take 011 mcg by mouth every morning. Stop seven days prior to surgery per anesthesia protocol., Disp: , Rfl:     DOCUSATE CALCIUM (STOOL SOFTENER PO), Take 1 capsule by mouth every morning., Disp: , Rfl:     mometasone (NASONEX) 50 mcg/Actuation nasal spray, 2 Sprays by Both Nostrils route nightly as needed. , Disp: , Rfl:    Date Last Reviewed:  9/5/2017     Number of Personal Factors/Comorbidities that affect the Plan of Care: 1-2: MODERATE COMPLEXITY   EXAMINATION:   Observation/Orthostatic Postural Assessment: To PT with straight cane. Does not use cane in PT. Good heel toe gait. Increased step length and gait speed. . Pt with flexed posture due to chronic back problems. He reports sleeping in a recliner. Reports he has had many different kinds of treatment for back but with little success. Tight hip flexors due to flexed posture. Tight HS and HC.   Difficult to assess HS tightness due to inability to ie flat however with stretching in long sitting, he is unable to sit to upright. .   Palpation:          Pain medial knee   Functional Mobility:         Gait/Ambulation:  Independent with rolling walker        Stairs: WITH RAILING        ROM:     R knee 0    L knee -5 to 130                                    Strength:     Knee ext  R 4/5,  L 4+/5  Knee flexion  R 4/5,  L 4+/5         Hip flexion R 4/5,  L 4+/5     Hip abd   R 4-/5,  L 4-/5              Neurological Screen: Intact to light touch  Balance:  Fair,  Good with walker   Body Structures Involved:  1. Bones  2. Joints  3. Muscles  4. Ligaments Body Functions Affected:  1. Sensory/Pain  2. Neuromusculoskeletal  3. Movement Related Activities and Participation Affected:  1. Learning and Applying Knowledge  2. General Tasks and Demands  3. Mobility  4. Self Care  5. Domestic Life  6. Community, Social and [place]   Number of elements (examined above) that affect the Plan of Care: 3: MODERATE COMPLEXITY   CLINICAL PRESENTATION:   Presentation: Evolving clinical presentation with changing clinical characteristics: MODERATE COMPLEXITY   CLINICAL DECISION MAKING:   Outcome Measure: Tool Used: Lower Extremity Functional Scale (LEFS)  Score:  Initial: 15/80 Most Recent: 44/80 (Date: 8/24/17 )   Interpretation of Score: 20 questions each scored on a 5 point scale with 0 representing \"extreme difficulty or unable to perform\" and 4 representing \"no difficulty\". The lower the score, the greater the functional disability. 80/80 represents no disability. Minimal detectable change is 9 points. Score 80 79-63 62-48 47-32 31-16 15-1 0   Modifier CH CI CJ CK CL CM CN     ?  Mobility - Walking and Moving Around:     - CURRENT STATUS: CK - 40%-59% impaired, limited or restricted    - GOAL STATUS: CK - 40%-59% impaired, limited or restricted    - D/C STATUS:  ---------------To be determined---------------      Medical Necessity:   · Patient is expected to demonstrate progress in strength, range of motion and gait to increase independence with home and community activities. Reason for Services/Other Comments:  · Patient continues to require skilled intervention due to decreased ROM and strength and fair gait . Use of outcome tool(s) and clinical judgement create a POC that gives a: Clear prediction of patient's progress: LOW COMPLEXITY            TREATMENT:   (In addition to Assessment/Re-Assessment sessions the following treatments were rendered)  Pre-treatment Symptoms/Complaints:  Patient reports knee feels stiff, but not hurting. He states he is still having trouble sleeping. Pain: Initial: Pain Intensity 1: 2  Post Session: 2      THERAPEUTIC EXERCISE: (30 minutes):  Exercises per grid below to improve mobility, strength and gait. Required moderate verbal and manual cues to promote proper body alignment, promote proper body posture and promote proper body mechanics. Progressed resistance, range and repetitions as indicated. Pt is unable to lie down due to back issues. Worked on knee in supine in sitting with back to wall. ). Performed  exercise program of QS (quadriceps sets)  SAQ's (short arc quadriceps), and  LAQ's (long arc quadriceps)   Sitting [address] at 30# x 4 sets of 10. Worked on standing cable for hip flex and abd at 25# and hip ext at 42#  x 2 sets of 20. Work on 6 in step up and over x 2 then 8 in x 10. Sit to stand from plinth 1 x 15 Worked to increase quad contraction and quad control at heel strike and stance. NuStep at level 5 x 12 min      Manual Therapy ( 30 min  ): Patella and patella tendon mobs. Soft tissue work to [employer] and HS. PROM for knee flex and extn. Sitting knee flex to 130. Passive knee ext to neutral        Therapeutic Modalities:    HEP: As directed. Treatment/Session Assessment:  Pt is post R TKA on 7/17/17. He had been at [facility] for rehab until 8/2. Presents with decreased ROM and strength of R knee and LE. Good increase in ROM. Good increase in ROM with excellent ROM for this stage. Needs to work to increase LE strength to improve balance and endurance. Needs to increase LE strength to compensate for chronic back pain. Posture is improving as LE strength increases. Continues to require several rest breaks, but feels like endurance is slightly improving. Patient indicated he was able to go up and down stairs over the weekend with less pain and fatigue. Very motivated. Instructed patient to continue home exercises and stretches as directed. · Response to Treatment:  Understood exercises. Increased knee flexion . · Compliance with Program/Exercises: Doing exercises at home   · Recommendations/Intent for next treatment session: \"Next visit will focus on aggressive ROM and strength of R knee and LE. Gait training\".   Total Treatment Duration:  PT Patient Time In/Time Out  Time In: 0200  Time Out: 0300  Treatment number [place], [place]

## 2017-09-07 ENCOUNTER — HOSPITAL ENCOUNTER (OUTPATIENT)
Dept: PHYSICAL THERAPY | Age: 67
Discharge: HOME | End: 2017-09-07
Payer: MEDICARE

## 2017-09-07 PROCEDURE — 97110 THERAPEUTIC EXERCISES: CPT

## 2017-09-07 PROCEDURE — 97140 MANUAL THERAPY 1/> REGIONS: CPT

## 2017-09-07 NOTE — PROGRESS NOTES
[facility]rapy Center at [facility]   [address], [address]  [phone]   [phone]    Carli Duckworth  : 1950       OUTPATIENT PHYSICAL THERAPY:Daily Note 2017    ICD-10: Treatment Diagnosis:   R26.2 Difficulty in walking, not elsewhere classified      M25.661 Stiffness of right knee, not elsewhere classified      M25.561 Pain in right knee     Precautions/Allergies:   Bactrim [sulfamethoxazole-trimethoprim]   Fall Risk Score: 2 (? 5 = High Risk)  MD Orders: Eval and treat MEDICAL/REFERRING DIAGNOSIS:  Presence of right artificial knee joint [Z96.651]   DATE OF ONSET:   REFERRING PHYSICIAN: Jarad Monday., *  RETURN PHYSICIAN APPOINTMENT: Aug 31, 2017     INITIAL ASSESSMENT:  Mr. Micah Larson presents with decreased ROM and strength of R knee and LE post R TKA on 17. PT with fair gait with rolling walker. Work to increase ROM and strength to progress ambulation to cane and then no assistive device. Progress strength to enable return to prior activity level. PROBLEM LIST (Impacting functional limitations):  1. Decreased Strength  2. Decreased ADL/Functional Activities  3. Decreased Ambulation Ability/Technique  4. Increased Pain  5. Decreased Flexibility/Joint Mobility  6. Edema/Girth INTERVENTIONS PLANNED:  1. Home Exercise Program (HEP)  2. Manual Therapy  3. Range of Motion (ROM)  4. Therapeutic Activites  5. Therapeutic Exercise/Strengthening   TREATMENT PLAN:  Effective Dates: 8/3/17 TO 10/31/17. Frequency/Duration: 2 times a week for 12 weeks and upon reassessment,  will adjust frequency and duration as progress indicates. GOALS: (Goals have been discussed and agreed upon with patient.)  Short-Term Functional Goals: Time Frame: 4 weeks  1. Establish independent HEP with no cueing to increase ROM and strength  MET  2. Increase R knee ROM to 0-120 to increase ease of sitting and ambulation. MET  3.  Independent gait with straight cane in home and community  MET  4. Increase LE strength so able to initiate reciprocal pattern on stairs. MET   Discharge Goals: Time Frame: 12 weeks   1. Improve score on LEFS by 9 points to enable prolonged sitting, standing and ambulation  MET  2. Increase R knee ROM to 0-125 to normalize gait. MET  3. Independent gait without assistive device in home and community  PARTIAL, MET IN HOME  4. Increase LE strength so able to perform reciprocal pattern on stairs with railing. ONGOING  5. Increase LE strength so able to return to work in his business  ONGOING  Rehabilitation Potential For Stated Goals: [address] therapy, I certify that the treatment plan above will be carried out by a therapist or under their direction. Thank you for this referral,  Jd Garduno, PT     Referring Physician Signature: Shubham Garcia, *              Date                    The information in this section was collected on 8/3/17 (except where otherwise noted). HISTORY:   History of Present Injury/Illness (Reason for Referral):  Pt is post R TKA on 7/17/17. He was at [facility] until 8/2 for rehab of R knee. He is now referred to outpatient PT for aggressive ROM and progressive strengthening of R knee and LE's. PT to PT with rolling walker. Progress to cane and then no assistive device. Pt does report that he has a chronic back problem and is unable to lie down. Past Medical History/Comorbidities:   Mr. Freddy Joy  has a past medical history of Asthma (dx childhood); Atrial fibrillation ([address] Cervical spondylosis (6/20/2013); Chronic back pain (2013); Chronic pain; Colon polyps; COPD (chronic obstructive pulmonary disease) ([address] (8/17/2016); Coronary atherosclerosis of native coronary vessel (8/17/2016); Diabetes ([address] (dx 5/14); Diverticulosis; Edema (8/17/2016); Former smoker; GERD (gastroesophageal reflux disease); High cholesterol; History of kidney stones;  Hypertension; Obesity (BMI 30-39.9); Status post total right knee replacement (7/17/2017); and Unspecified sleep apnea. He also has no past medical history of Adverse effect of anesthesia; Aneurysm ([facility] Autoimmune disease ([facility] Cancer ([facility] [facility] Chronic kidney disease; Coagulation defects; Coagulation disorder ([facility] DEMENTIA; Difficult intubation; Endocarditis; Heart failure ([facility] Ill-defined condition; Infectious disease; Liver disease; Malignant hyperthermia due to anesthesia; Nausea & vomiting; Neurological disorder; Nicotine vapor product user; Non-nicotine vapor product user; Other ill-defined conditions; Pseudocholinesterase deficiency; Psychiatric disorder; PUD (peptic ulcer disease); Rheumatic fever; Seizures ([facility] [address] [facility] Stroke [facility]); Thromboembolus ([facility] Thyroid disease; or Unspecified adverse effect of anesthesia. Mr. Sabrina Goldstein  has a past surgical history that includes cystoscopy,insert ureteral stent; colonoscopy (10/6/14); colonoscopy (11/20/14); lumbar laminectomy (2009); retinal detachment repair (Bilateral); cataract removal; other surgical (20 yrs ago); colectomy (2015); and heart catheterization (2012). Social History/Living Environment:     Lives in 2 story home with spouse  Prior Level of Function/Work/Activity:  Retired from [employer]. Now sales and business owner  Dominant Side:         RIGHT    Current Medications:       Current Outpatient Prescriptions:     amoxicillin (AMOXIL) 875 mg tablet, Take 1 Tab by mouth two (2) times a day., Disp: 10 Tab, Rfl: 0    chlorthalidone (HYGROTEN) 25 mg tablet, Take 1 Tab by mouth daily. , Disp: 90 Tab, Rfl: 0    metoprolol tartrate (LOPRESSOR) 25 mg tablet, Take 2 tablets by mouth twice daily, Disp: 180 Tab, Rfl: 0    atorvastatin (LIPITOR) 40 mg tablet, Take 0.5 Tabs by mouth daily. , Disp: 90 Tab, Rfl: 0    montelukast (SINGULAIR) 10 mg tablet, Take 1 Tab by mouth daily. , Disp: 90 Tab, Rfl: 0    pantoprazole (PROTONIX) 40 mg tablet, Take 1 Tab by mouth daily. , Disp: 90 Tab, Rfl: 0   ezetimibe (ZETIA) 10 mg tablet, Take 1 Tab by mouth nightly., Disp: 90 Tab, Rfl: 0    metFORMIN ER (GLUCOPHAGE XR) 500 mg tablet, Take 2 tablets every morning, Disp: 180 Tab, Rfl: 0    fluticasone-salmeterol (ADVAIR DISKUS) 500-50 mcg/dose diskus inhaler, Take 1 Puff by inhalation two (2) times a day., Disp: 3 Inhaler, Rfl: 0    apixaban (ELIQUIS) 5 mg tablet, Take 1 Tab by mouth two (2) times a day., Disp: 180 Tab, Rfl: 0    lisinopril (PRINIVIL, ZESTRIL) 10 mg tablet, Take 1 Tab by mouth daily. , Disp: 90 Tab, Rfl: 0    dapagliflozin (FARXIGA) 10 mg tab, Take 1 Tab by mouth daily. , Disp: 90 Tab, Rfl: 0    doxazosin (CARDURA) 4 mg tablet, Take 1 Tab by mouth daily. , Disp: 90 Tab, Rfl: 0    celecoxib (CELEBREX) 200 mg capsule, Take 1 Cap by mouth daily. , Disp: 90 Cap, Rfl: 0    dulaglutide (TRULICITY) 1.5 QY/6.6 mL sub-q pen, 0.5 mL by SubCUTAneous route every seven (7) days. , Disp: 12 Pen, Rfl: 0    meclizine (ANTIVERT) 25 mg tablet, Take 1 Tab by mouth three (3) times daily as needed for Dizziness. , Disp: 19 Tab, Rfl: 0    diazePAM (VALIUM) 5 mg tablet, Take 1 Tab by mouth every eight (8) hours as needed for Anxiety (severe vertigo spells). Max Daily Amount: 15 mg., Disp: 20 Tab, Rfl: 0    aspirin delayed-release 81 mg tablet, Take 162 mg by mouth nightly. Decrease to one 81 mg tab on 7/12/17, Disp: , Rfl:     Omega-3 Fatty Acids 300 mg cap, Take 1 Cap by mouth daily. , Disp: , Rfl:     ANTI-FUNGAL 2 % topical powder, APPLY TO ABDOMEN 2 TIMES PER DAY, Disp: , Rfl: 11    Blood-Glucose Meter monitoring kit, Check fs every day; DM2, E11/9, Contour glucometer, Disp: 1 Kit, Rfl: 0    glucose blood VI test strips (ASCENSIA CONTOUR) strip, Check fs every day, DM2, E11.9, Disp: 100 Strip, Rfl: 1    lancets 28 gauge misc, Check fs every day, DM2, E11.9, Disp: 100 Lancet, Rfl: 1    albuterol (PROVENTIL VENTOLIN) 2.5 mg /3 mL (0.083 %) nebulizer solution, 3 mL by Nebulization route once for 1 dose.  (Patient taking differently: 2.5 mg by Nebulization route daily as needed.), Disp: 24 Each, Rfl: 11    albuterol (PROAIR HFA) 90 mcg/actuation inhaler, Take 2 Puffs by inhalation every four (4) hours as needed for Wheezing. Take / use AM day of surgery  per anesthesia protocols if needed. , Disp: , Rfl:     cholecalciferol, vitamin D3, (VITAMIN D3) 2,000 unit tab, Take 1 tablet by mouth daily. , Disp: , Rfl:     diphenhydrAMINE (BENADRYL ALLERGY) 25 mg tablet, Take 25 mg by mouth nightly as needed for Sleep., Disp: , Rfl:     b complex vitamins (B COMPLEX 1) tablet, Take 1 tablet by mouth every morning. Stop seven days prior to surgery per anesthesia protocol., Disp: , Rfl:     green tea leaf extract (GREEN TEA) Cap, Take 630 mg by mouth every morning. Stop seven days prior to surgery per anesthesia protocol., Disp: , Rfl:     vitamin E (AQUA GEMS) 400 unit capsule, Take 400 Units by mouth every morning. Stop seven days prior to surgery per anesthesia protocol., Disp: , Rfl:     FOLIC ACID PO, Take 084 mcg by mouth every morning. Stop seven days prior to surgery per anesthesia protocol., Disp: , Rfl:     DOCUSATE CALCIUM (STOOL SOFTENER PO), Take 1 capsule by mouth every morning., Disp: , Rfl:     mometasone (NASONEX) 50 mcg/Actuation nasal spray, 2 Sprays by Both Nostrils route nightly as needed. , Disp: , Rfl:    Date Last Reviewed:  9/7/2017     Number of Personal Factors/Comorbidities that affect the Plan of Care: 1-2: MODERATE COMPLEXITY   EXAMINATION:   Observation/Orthostatic Postural Assessment: To PT with straight cane. Does not use cane in PT. Good heel toe gait. Increased step length and gait speed. . Pt with flexed posture due to chronic back problems. He reports sleeping in a recliner. Reports he has had many different kinds of treatment for back but with little success. Tight hip flexors due to flexed posture. Tight HS and HC.   Difficult to assess HS tightness due to inability to ie flat however with stretching in long sitting, he is unable to sit to upright. .   Palpation:          Pain medial knee   Functional Mobility:         Gait/Ambulation:  Independent with rolling walker        Stairs: WITH RAILING        ROM:     R knee 0    L knee -5 to 130                                    Strength:     Knee ext  R 4/5,  L 4+/5  Knee flexion  R 4/5,  L 4+/5         Hip flexion R 4/5,  L 4+/5     Hip abd   R 4-/5,  L 4-/5              Neurological Screen: Intact to light touch  Balance:  Fair,  Good with walker   Body Structures Involved:  1. Bones  2. Joints  3. Muscles  4. Ligaments Body Functions Affected:  1. Sensory/Pain  2. Neuromusculoskeletal  3. Movement Related Activities and Participation Affected:  1. Learning and Applying Knowledge  2. General Tasks and Demands  3. Mobility  4. Self Care  5. Domestic Life  6. Community, Social and [place]   Number of elements (examined above) that affect the Plan of Care: 3: MODERATE COMPLEXITY   CLINICAL PRESENTATION:   Presentation: Evolving clinical presentation with changing clinical characteristics: MODERATE COMPLEXITY   CLINICAL DECISION MAKING:   Outcome Measure: Tool Used: Lower Extremity Functional Scale (LEFS)  Score:  Initial: 15/80 Most Recent: 44/80 (Date: 8/24/17 )   Interpretation of Score: 20 questions each scored on a 5 point scale with 0 representing \"extreme difficulty or unable to perform\" and 4 representing \"no difficulty\". The lower the score, the greater the functional disability. 80/80 represents no disability. Minimal detectable change is 9 points. Score 80 79-63 62-48 47-32 31-16 15-1 0   Modifier CH CI CJ CK CL CM CN     ?  Mobility - Walking and Moving Around:     - CURRENT STATUS: CK - 40%-59% impaired, limited or restricted    - GOAL STATUS: CK - 40%-59% impaired, limited or restricted    - D/C STATUS:  ---------------To be determined---------------      Medical Necessity:   · Patient is expected to demonstrate progress in strength, range of motion and gait to increase independence with home and community activities. Reason for Services/Other Comments:  · Patient continues to require skilled intervention due to decreased ROM and strength and fair gait . Use of outcome tool(s) and clinical judgement create a POC that gives a: Clear prediction of patient's progress: LOW COMPLEXITY            TREATMENT:   (In addition to Assessment/Re-Assessment sessions the following treatments were rendered)  Pre-treatment Symptoms/Complaints:  Patient reports knee feels stiff, but the tape helped the burning zinging type pain along the inside. Pain: Initial: Pain Intensity 1: 2  Post Session: 1      THERAPEUTIC EXERCISE: (30 minutes):  Exercises per grid below to improve mobility, strength and gait. Required moderate verbal and manual cues to promote proper body alignment, promote proper body posture and promote proper body mechanics. Progressed resistance, range and repetitions as indicated. Pt is unable to lie down due to back issues. Worked on knee in supine in sitting with back to wall. ). Performed  exercise program of QS (quadriceps sets)  SAQ's (short arc quadriceps), and  LAQ's (long arc quadriceps)   Sitting [address] at 30# x 4 sets of 10. Worked on standing cable for hip flex and abd at 25# and hip ext at 42#  x 2 sets of 20. Work on 6 in step up and over x 2 then 8 in x 10. Sit to stand from plinth 1 x 15 Worked to increase quad contraction and quad control at heel strike and stance. NuStep at level 5 x 12 min mini squats x 10 ( holding to rail)       Manual Therapy ( 30 min  ): Patella and patella tendon mobs. Soft tissue work to [employer] and HS. PROM for knee flex and extn. Sitting knee flex to 130. Passive knee ext to neutral Tape to knee along the medial aspect of his knee to help decrease burning and pain. Therapeutic Modalities:    HEP: As directed.    Treatment/Session Assessment:  Pt is post R TKA on 7/17/17. He had been at [facility] for rehab until 8/2. Presents with decreased ROM and strength of R knee and LE. Good increase in ROM. Good increase in ROM with excellent ROM for this stage. Needs to work to increase LE strength to improve balance and endurance. Needs to increase LE strength to compensate for chronic back pain. Posture is improving as LE strength increases. Continues to require several rest breaks, but feels like endurance is slightly improving. Patient was very pleased that the tape helped decrease the knee pain and wanted to try taping his back to see if it would decrease his back discomfort to allow him to sleep better back in his bed. Very motivated. Instructed patient to continue home exercises and stretches as directed. · Response to Treatment:  Understood exercises. Increased knee flexion . · Compliance with Program/Exercises: Doing exercises at home   · Recommendations/Intent for next treatment session: \"Next visit will focus on aggressive ROM and strength of R knee and LE. Gait training\".   Total Treatment Duration:  PT Patient Time In/Time Out  Time In: 0300  Time Out: 0400  Treatment number [place], [place]

## 2017-09-12 ENCOUNTER — HOSPITAL ENCOUNTER (OUTPATIENT)
Dept: PHYSICAL THERAPY | Age: 67
Discharge: HOME | End: 2017-09-12
Payer: MEDICARE

## 2017-09-12 PROCEDURE — 97110 THERAPEUTIC EXERCISES: CPT

## 2017-09-12 PROCEDURE — 97140 MANUAL THERAPY 1/> REGIONS: CPT

## 2017-09-12 NOTE — PROGRESS NOTES
[facility]   [address], [address]  [phone]   [phone]    Noe Vasquez  : 1950       OUTPATIENT PHYSICAL THERAPY:Daily Note 2017    ICD-10: Treatment Diagnosis:   R26.2 Difficulty in walking, not elsewhere classified      M25.661 Stiffness of right knee, not elsewhere classified      M25.561 Pain in right knee     Precautions/Allergies:   Bactrim [sulfamethoxazole-trimethoprim]   Fall Risk Score: 2 (? 5 = High Risk)  MD Orders: Eval and treat MEDICAL/REFERRING DIAGNOSIS:  Presence of right artificial knee joint [Z96.651]   DATE OF ONSET:   REFERRING PHYSICIAN: Marisel Soler., *  RETURN PHYSICIAN APPOINTMENT: Aug 31, 2017     INITIAL ASSESSMENT:  Mr. Nikita Fraga presents with decreased ROM and strength of R knee and LE post R TKA on 17. PT with fair gait with rolling walker. Work to increase ROM and strength to progress ambulation to cane and then no assistive device. Progress strength to enable return to prior activity level. PROBLEM LIST (Impacting functional limitations):  1. Decreased Strength  2. Decreased ADL/Functional Activities  3. Decreased Ambulation Ability/Technique  4. Increased Pain  5. Decreased Flexibility/Joint Mobility  6. Edema/Girth INTERVENTIONS PLANNED:  1. Home Exercise Program (HEP)  2. Manual Therapy  3. Range of Motion (ROM)  4. Therapeutic Activites  5. Therapeutic Exercise/Strengthening   TREATMENT PLAN:  Effective Dates: 8/3/17 TO 10/31/17. Frequency/Duration: 2 times a week for 12 weeks and upon reassessment,  will adjust frequency and duration as progress indicates. GOALS: (Goals have been discussed and agreed upon with patient.)  Short-Term Functional Goals: Time Frame: 4 weeks  1. Establish independent HEP with no cueing to increase ROM and strength  MET  2. Increase R knee ROM to 0-120 to increase ease of sitting and ambulation. MET  3.  Independent gait with straight cane in home and community  MET  4. Increase LE strength so able to initiate reciprocal pattern on stairs. MET   Discharge Goals: Time Frame: 12 weeks   1. Improve score on LEFS by 9 points to enable prolonged sitting, standing and ambulation  MET  2. Increase R knee ROM to 0-125 to normalize gait. MET  3. Independent gait without assistive device in home and community  PARTIAL, MET IN HOME  4. Increase LE strength so able to perform reciprocal pattern on stairs with railing. ONGOING  5. Increase LE strength so able to return to work in his business  ONGOING  Rehabilitation Potential For Stated Goals: [address] [employer] therapy, I certify that the treatment plan above will be carried out by a therapist or under their direction. Thank you for this referral,  Frederick Rodrigues, PT     Referring Physician Signature: Festus Fuentes, *              Date                    The information in this section was collected on 8/3/17 (except where otherwise noted). HISTORY:   History of Present Injury/Illness (Reason for Referral):  Pt is post R TKA on 7/17/17. He was at [facility] until 8/2 for rehab of R knee. He is now referred to outpatient PT for aggressive ROM and progressive strengthening of R knee and LE's. PT to PT with rolling walker. Progress to cane and then no assistive device. Pt does report that he has a chronic back problem and is unable to lie down. Past Medical History/Comorbidities:   Mr. Ryann Zarate  has a past medical history of Asthma (dx childhood); Atrial fibrillation ([address] Cervical spondylosis (6/20/2013); Chronic back pain (2013); Chronic pain; Colon polyps; COPD (chronic obstructive pulmonary disease) ([address] (8/17/2016); Coronary atherosclerosis of native coronary vessel (8/17/2016); Diabetes ([address] (dx 5/14); Diverticulosis; Edema (8/17/2016); Former smoker; GERD (gastroesophageal reflux disease); High cholesterol; History of kidney stones;  Hypertension; Obesity (BMI 30-39.9); Status post total right knee replacement (7/17/2017); and Unspecified sleep apnea. He also has no past medical history of Adverse effect of anesthesia; Aneurysm ([facility] Autoimmune disease ([facility] Cancer ([facility] Chronic kidney disease; Coagulation defects; Coagulation disorder ([facility] DEMENTIA; Difficult intubation; Endocarditis; Heart failure ([facility] Ill-defined condition; Infectious disease; Liver disease; Malignant hyperthermia due to anesthesia; Nausea & vomiting; Neurological disorder; Nicotine vapor product user; Non-nicotine vapor product user; Other ill-defined conditions; Pseudocholinesterase deficiency; Psychiatric disorder; PUD (peptic ulcer disease); Rheumatic fever; Seizures ([facility] Stroke [facility]); Thromboembolus ([facility] Thyroid disease; or Unspecified adverse effect of anesthesia. Mr. Freddy Joy  has a past surgical history that includes cystoscopy,insert ureteral stent; colonoscopy (10/6/14); colonoscopy (11/20/14); lumbar laminectomy (2009); retinal detachment repair (Bilateral); cataract removal; other surgical (20 yrs ago); colectomy (2015); and heart catheterization (2012). Social History/Living Environment:     Lives in 2 story home with spouse  Prior Level of Function/Work/Activity:  Retired from [employer]. Now sales and business owner  Dominant Side:         RIGHT    Current Medications:       Current Outpatient Prescriptions:     amoxicillin (AMOXIL) 875 mg tablet, Take 1 Tab by mouth two (2) times a day., Disp: 10 Tab, Rfl: 0    chlorthalidone (HYGROTEN) 25 mg tablet, Take 1 Tab by mouth daily. , Disp: 90 Tab, Rfl: 0    metoprolol tartrate (LOPRESSOR) 25 mg tablet, Take 2 tablets by mouth twice daily, Disp: 180 Tab, Rfl: 0    atorvastatin (LIPITOR) 40 mg tablet, Take 0.5 Tabs by mouth daily. , Disp: 90 Tab, Rfl: 0    montelukast (SINGULAIR) 10 mg tablet, Take 1 Tab by mouth daily. , Disp: 90 Tab, Rfl: 0    pantoprazole (PROTONIX) 40 mg tablet, Take 1 Tab by mouth daily. , Disp: 90 Tab, Rfl: 0   ezetimibe (ZETIA) 10 mg tablet, Take 1 Tab by mouth nightly., Disp: 90 Tab, Rfl: 0    metFORMIN ER (GLUCOPHAGE XR) 500 mg tablet, Take 2 tablets every morning, Disp: 180 Tab, Rfl: 0    fluticasone-salmeterol (ADVAIR DISKUS) 500-50 mcg/dose diskus inhaler, Take 1 Puff by inhalation two (2) times a day., Disp: 3 Inhaler, Rfl: 0    apixaban (ELIQUIS) 5 mg tablet, Take 1 Tab by mouth two (2) times a day., Disp: 180 Tab, Rfl: 0    lisinopril (PRINIVIL, ZESTRIL) 10 mg tablet, Take 1 Tab by mouth daily. , Disp: 90 Tab, Rfl: 0    dapagliflozin (FARXIGA) 10 mg tab, Take 1 Tab by mouth daily. , Disp: 90 Tab, Rfl: 0    doxazosin (CARDURA) 4 mg tablet, Take 1 Tab by mouth daily. , Disp: 90 Tab, Rfl: 0    celecoxib (CELEBREX) 200 mg capsule, Take 1 Cap by mouth daily. , Disp: 90 Cap, Rfl: 0    dulaglutide (TRULICITY) 1.5 KG/9.6 mL sub-q pen, 0.5 mL by SubCUTAneous route every seven (7) days. , Disp: 12 Pen, Rfl: 0    meclizine (ANTIVERT) 25 mg tablet, Take 1 Tab by mouth three (3) times daily as needed for Dizziness. , Disp: 19 Tab, Rfl: 0    diazePAM (VALIUM) 5 mg tablet, Take 1 Tab by mouth every eight (8) hours as needed for Anxiety (severe vertigo spells). Max Daily Amount: 15 mg., Disp: 20 Tab, Rfl: 0    aspirin delayed-release 81 mg tablet, Take 162 mg by mouth nightly. Decrease to one 81 mg tab on 7/12/17, Disp: , Rfl:     Omega-3 Fatty Acids 300 mg cap, Take 1 Cap by mouth daily. , Disp: , Rfl:     ANTI-FUNGAL 2 % topical powder, APPLY TO ABDOMEN 2 TIMES PER DAY, Disp: , Rfl: 11    Blood-Glucose Meter monitoring kit, Check fs every day; DM2, E11/9, Contour glucometer, Disp: 1 Kit, Rfl: 0    glucose blood VI test strips (ASCENSIA CONTOUR) strip, Check fs every day, DM2, E11.9, Disp: 100 Strip, Rfl: 1    lancets 28 gauge misc, Check fs every day, DM2, E11.9, Disp: 100 Lancet, Rfl: 1    albuterol (PROVENTIL VENTOLIN) 2.5 mg /3 mL (0.083 %) nebulizer solution, 3 mL by Nebulization route once for 1 dose.  (Patient taking differently: 2.5 mg by Nebulization route daily as needed.), Disp: 24 Each, Rfl: 11    albuterol (PROAIR HFA) 90 mcg/actuation inhaler, Take 2 Puffs by inhalation every four (4) hours as needed for Wheezing. Take / use AM day of surgery  per anesthesia protocols if needed. , Disp: , Rfl:     cholecalciferol, vitamin D3, (VITAMIN D3) 2,000 unit tab, Take 1 tablet by mouth daily. , Disp: , Rfl:     diphenhydrAMINE (BENADRYL ALLERGY) 25 mg tablet, Take 25 mg by mouth nightly as needed for Sleep., Disp: , Rfl:     b complex vitamins (B COMPLEX 1) tablet, Take 1 tablet by mouth every morning. Stop seven days prior to surgery per anesthesia protocol., Disp: , Rfl:     green tea leaf extract (GREEN TEA) Cap, Take 630 mg by mouth every morning. Stop seven days prior to surgery per anesthesia protocol., Disp: , Rfl:     vitamin E (AQUA GEMS) 400 unit capsule, Take 400 Units by mouth every morning. Stop seven days prior to surgery per anesthesia protocol., Disp: , Rfl:     FOLIC ACID PO, Take 378 mcg by mouth every morning. Stop seven days prior to surgery per anesthesia protocol., Disp: , Rfl:     DOCUSATE CALCIUM (STOOL SOFTENER PO), Take 1 capsule by mouth every morning., Disp: , Rfl:     mometasone (NASONEX) 50 mcg/Actuation nasal spray, 2 Sprays by Both Nostrils route nightly as needed. , Disp: , Rfl:    Date Last Reviewed:  9/12/2017     Number of Personal Factors/Comorbidities that affect the Plan of Care: 1-2: MODERATE COMPLEXITY   EXAMINATION:   Observation/Orthostatic Postural Assessment: To PT with straight cane. Does not use cane in PT. Good heel toe gait. Increased step length and gait speed. . Pt with flexed posture due to chronic back problems. He reports sleeping in a recliner. Reports he has had many different kinds of treatment for back but with little success. Tight hip flexors due to flexed posture. Tight HS and HC.   Difficult to assess HS tightness due to inability to ie flat however with stretching in long sitting, he is unable to sit to upright. .   Palpation:          Pain medial knee   Functional Mobility:         Gait/Ambulation:  Independent with rolling walker        Stairs: WITH RAILING        ROM:     R knee 0    L knee -5 to 130                                    Strength:     Knee ext  R 4/5,  L 4+/5  Knee flexion  R 4/5,  L 4+/5         Hip flexion R 4/5,  L 4+/5     Hip abd   R 4-/5,  L 4-/5              Neurological Screen: Intact to light touch  Balance:  Fair,  Good with walker   Body Structures Involved:  1. Bones  2. Joints  3. Muscles  4. Ligaments Body Functions Affected:  1. Sensory/Pain  2. Neuromusculoskeletal  3. Movement Related Activities and Participation Affected:  1. Learning and Applying Knowledge  2. General Tasks and Demands  3. Mobility  4. Self Care  5. Domestic Life  6. Community, Social and [place]   Number of elements (examined above) that affect the Plan of Care: 3: MODERATE COMPLEXITY   CLINICAL PRESENTATION:   Presentation: Evolving clinical presentation with changing clinical characteristics: MODERATE COMPLEXITY   CLINICAL DECISION MAKING:   Outcome Measure: Tool Used: Lower Extremity Functional Scale (LEFS)  Score:  Initial: 15/80 Most Recent: 44/80 (Date: 8/24/17 )   Interpretation of Score: 20 questions each scored on a 5 point scale with 0 representing \"extreme difficulty or unable to perform\" and 4 representing \"no difficulty\". The lower the score, the greater the functional disability. 80/80 represents no disability. Minimal detectable change is 9 points. Score 80 79-63 62-48 47-32 31-16 15-1 0   Modifier CH CI CJ CK CL CM CN     ?  Mobility - Walking and Moving Around:     - CURRENT STATUS: CK - 40%-59% impaired, limited or restricted    - GOAL STATUS: CK - 40%-59% impaired, limited or restricted    - D/C STATUS:  ---------------To be determined---------------      Medical Necessity:   · Patient is expected to demonstrate progress in strength, range of motion and gait to increase independence with home and community activities. Reason for Services/Other Comments:  · Patient continues to require skilled intervention due to decreased ROM and strength and fair gait . Use of outcome tool(s) and clinical judgement create a POC that gives a: Clear prediction of patient's progress: LOW COMPLEXITY            TREATMENT:   (In addition to Assessment/Re-Assessment sessions the following treatments were rendered)  Pre-treatment Symptoms/Complaints:  Patient reports knee is starting to feel a little better each day not as stiff. Pain: Initial: Pain Intensity 1: 2  Post Session: 1      THERAPEUTIC EXERCISE: (30 minutes):  Exercises per grid below to improve mobility, strength and gait. Required moderate verbal and manual cues to promote proper body alignment, promote proper body posture and promote proper body mechanics. Progressed resistance, range and repetitions as indicated. Pt is unable to lie down due to back issues. Worked on knee in supine in sitting with back to wall. ). Performed  exercise program of QS (quadriceps sets)  SAQ's (short arc quadriceps), and  LAQ's (long arc quadriceps)   Sitting [address] at 30# x 4 sets of 10. Worked on standing cable for hip flex and abd at 25# and hip ext at 42#  x 3 sets of 20. Work on 6 in step up and over x 2 then 8 in x 10. Sit to stand from plinth 1 x 20 Worked to increase quad contraction and quad control at heel strike and stance. NuStep at level 5 x 12 min mini squats x 10 ( holding to rail)       Manual Therapy ( 30 min  ): Patella and patella tendon mobs. Soft tissue work to [employer] and HS. PROM for knee flex and extn. Sitting knee flex to 130. Passive knee ext to neutral Tape to knee along the medial aspect of his knee to help decrease burning and pain. Therapeutic Modalities:    HEP: As directed. Treatment/Session Assessment:  Pt is post R TKA on 7/17/17.   He had been at [facility] for rehab until 8/2. Presents with decreased ROM and strength of R knee and LE. Good increase in ROM. Good increase in ROM with excellent ROM for this stage. Needs to work to increase LE strength to improve balance and endurance. Needs to increase LE strength to compensate for chronic back pain. Posture is improving as LE strength increases. Less rest breaks today and good improvement in strength. Patient indicates that the tape has really taking the sensitivity out of his knee and helped decrease the burning, but he could tell much difference in his back discomfort. Very motivated. Instructed patient to continue home exercises and stretches as directed. · Response to Treatment:  Understood exercises. Increased knee flexion. · Compliance with Program/Exercises: Doing exercises at home   · Recommendations/Intent for next treatment session: \"Next visit will focus on aggressive ROM and strength of R knee and LE. Gait training\".   Total Treatment Duration:  PT Patient Time In/Time Out  Time In: 0300  Time Out: 0400  Treatment number [place], [place]

## 2017-09-13 ENCOUNTER — HOSPITAL ENCOUNTER (OUTPATIENT)
Dept: DIABETES SERVICES | Age: 67
Discharge: HOME | End: 2017-09-13
Payer: MEDICARE

## 2017-09-13 VITALS — WEIGHT: 315 LBS | BODY MASS INDEX: 36.45 KG/M2 | HEIGHT: 78 IN

## 2017-09-13 PROCEDURE — G0108 DIAB MANAGE TRN  PER INDIV: HCPCS

## 2017-09-13 RX ORDER — CHOLECALCIFEROL (VITAMIN D3) 125 MCG
5 CAPSULE ORAL
COMMUNITY
End: 2018-02-07

## 2017-09-13 NOTE — PROGRESS NOTES
Came for diabetes educational assessment today. Provided basic information on carbohydrates, proteins and fats. Educational need/plan: Will attend 2 nutrition/2 diabetes group sessions to address the following: diabetes disease process, nutritional management, physical activity, using medications, preventing complications, pychosocial adjustment, goal setting, problem solving, monitoring, behavior change strategies.

## 2017-09-14 ENCOUNTER — HOSPITAL ENCOUNTER (OUTPATIENT)
Dept: PHYSICAL THERAPY | Age: 67
Discharge: HOME | End: 2017-09-14
Payer: MEDICARE

## 2017-09-14 PROCEDURE — 97110 THERAPEUTIC EXERCISES: CPT

## 2017-09-14 PROCEDURE — 97140 MANUAL THERAPY 1/> REGIONS: CPT

## 2017-09-14 NOTE — PROGRESS NOTES
[facility]   [address], [address]  [phone]   [phone]    Nathalie Tariq  : 1950       OUTPATIENT PHYSICAL THERAPY:Daily Note 2017    ICD-10: Treatment Diagnosis:   R26.2 Difficulty in walking, not elsewhere classified      M25.661 Stiffness of right knee, not elsewhere classified      M25.561 Pain in right knee     Precautions/Allergies:   Bactrim [sulfamethoxazole-trimethoprim]   Fall Risk Score: 2 (? 5 = High Risk)  MD Orders: Eval and treat MEDICAL/REFERRING DIAGNOSIS:  Presence of right artificial knee joint [Z96.651]   DATE OF ONSET:   REFERRING PHYSICIAN: Padmini Barillas., *  RETURN PHYSICIAN APPOINTMENT: Aug 31, 2017     INITIAL ASSESSMENT:  Mr. Rona Jha presents with decreased ROM and strength of R knee and LE post R TKA on 17. PT with fair gait with rolling walker. Work to increase ROM and strength to progress ambulation to cane and then no assistive device. Progress strength to enable return to prior activity level. PROBLEM LIST (Impacting functional limitations):  1. Decreased Strength  2. Decreased ADL/Functional Activities  3. Decreased Ambulation Ability/Technique  4. Increased Pain  5. Decreased Flexibility/Joint Mobility  6. Edema/Girth INTERVENTIONS PLANNED:  1. Home Exercise Program (HEP)  2. Manual Therapy  3. Range of Motion (ROM)  4. Therapeutic Activites  5. Therapeutic Exercise/Strengthening   TREATMENT PLAN:  Effective Dates: 8/3/17 TO 10/31/17. Frequency/Duration: 2 times a week for 12 weeks and upon reassessment,  will adjust frequency and duration as progress indicates. GOALS: (Goals have been discussed and agreed upon with patient.)  Short-Term Functional Goals: Time Frame: 4 weeks  1. Establish independent HEP with no cueing to increase ROM and strength  MET  2. Increase R knee ROM to 0-120 to increase ease of sitting and ambulation. MET  3.  Independent gait with straight cane in home and community  MET  4. Increase LE strength so able to initiate reciprocal pattern on stairs. MET   Discharge Goals: Time Frame: 12 weeks   1. Improve score on LEFS by 9 points to enable prolonged sitting, standing and ambulation  MET  2. Increase R knee ROM to 0-125 to normalize gait. MET  3. Independent gait without assistive device in home and community  PARTIAL, MET IN HOME  4. Increase LE strength so able to perform reciprocal pattern on stairs with railing. ONGOING  5. Increase LE strength so able to return to work in his business  ONGOING  Rehabilitation Potential For Stated Goals: [address] [employer] therapy, I certify that the treatment plan above will be carried out by a therapist or under their direction. Thank you for this referral,  Killian Xiao, PT     Referring Physician Signature: Morris Castro., *              Date                    The information in this section was collected on 8/3/17 (except where otherwise noted). HISTORY:   History of Present Injury/Illness (Reason for Referral):  Pt is post R TKA on 7/17/17. He was at [facility] until 8/2 for rehab of R knee. He is now referred to outpatient PT for aggressive ROM and progressive strengthening of R knee and LE's. PT to PT with rolling walker. Progress to cane and then no assistive device. Pt does report that he has a chronic back problem and is unable to lie down. Past Medical History/Comorbidities:   Mr. Kinsey  has a past medical history of Asthma (dx childhood); Atrial fibrillation ([address] Cervical spondylosis (6/20/2013); Chronic back pain (2013); Chronic pain; Colon polyps; COPD (chronic obstructive pulmonary disease) ([address] (8/17/2016); Coronary atherosclerosis of native coronary vessel (8/17/2016); Diabetes ([address] (dx 5/14); Diverticulosis; Edema (8/17/2016); Former smoker; GERD (gastroesophageal reflux disease); High cholesterol; History of kidney stones;  Hypertension; Obesity (BMI 30-39.9); Status post total right knee replacement (7/17/2017); and Unspecified sleep apnea. He also has no past medical history of Adverse effect of anesthesia; Aneurysm ([facility] Autoimmune disease ([facility] Cancer ([facility] Chronic kidney disease; Coagulation defects; Coagulation disorder ([facility] DEMENTIA; Difficult intubation; Endocarditis; Heart failure ([facility] Ill-defined condition; Infectious disease; Liver disease; Malignant hyperthermia due to anesthesia; Nausea & vomiting; Neurological disorder; Nicotine vapor product user; Non-nicotine vapor product user; Other ill-defined conditions; Pseudocholinesterase deficiency; Psychiatric disorder; PUD (peptic ulcer disease); Rheumatic fever; Seizures ([facility] Stroke [facility]); Thromboembolus ([facility] Thyroid disease; or Unspecified adverse effect of anesthesia. Mr. Jina Singh  has a past surgical history that includes cystoscopy,insert ureteral stent; colonoscopy (10/6/14); colonoscopy (11/20/14); lumbar laminectomy (2009); retinal detachment repair (Bilateral); cataract removal; other surgical (20 yrs ago); colectomy (2015); and heart catheterization (2012). Social History/Living Environment:     Lives in 2 story home with spouse  Prior Level of Function/Work/Activity:  Retired from [employer]. Now sales and business owner  Dominant Side:         RIGHT    Current Medications:       Current Outpatient Prescriptions:     melatonin tab tablet, Take 5 mg by mouth nightly. Indications: help with sleep, Disp: , Rfl:     amoxicillin (AMOXIL) 875 mg tablet, Take 1 Tab by mouth two (2) times a day., Disp: 10 Tab, Rfl: 0    chlorthalidone (HYGROTEN) 25 mg tablet, Take 1 Tab by mouth daily. , Disp: 90 Tab, Rfl: 0    metoprolol tartrate (LOPRESSOR) 25 mg tablet, Take 2 tablets by mouth twice daily, Disp: 180 Tab, Rfl: 0    atorvastatin (LIPITOR) 40 mg tablet, Take 0.5 Tabs by mouth daily. , Disp: 90 Tab, Rfl: 0    montelukast (SINGULAIR) 10 mg tablet, Take 1 Tab by mouth daily. , Disp: 90 Tab, Rfl: 0    pantoprazole (PROTONIX) 40 mg tablet, Take 1 Tab by mouth daily. , Disp: 90 Tab, Rfl: 0    ezetimibe (ZETIA) 10 mg tablet, Take 1 Tab by mouth nightly., Disp: 90 Tab, Rfl: 0    metFORMIN ER (GLUCOPHAGE XR) 500 mg tablet, Take 2 tablets every morning, Disp: 180 Tab, Rfl: 0    fluticasone-salmeterol (ADVAIR DISKUS) 500-50 mcg/dose diskus inhaler, Take 1 Puff by inhalation two (2) times a day., Disp: 3 Inhaler, Rfl: 0    apixaban (ELIQUIS) 5 mg tablet, Take 1 Tab by mouth two (2) times a day., Disp: 180 Tab, Rfl: 0    lisinopril (PRINIVIL, ZESTRIL) 10 mg tablet, Take 1 Tab by mouth daily. , Disp: 90 Tab, Rfl: 0    dapagliflozin (FARXIGA) 10 mg tab, Take 1 Tab by mouth daily. , Disp: 90 Tab, Rfl: 0    doxazosin (CARDURA) 4 mg tablet, Take 1 Tab by mouth daily. , Disp: 90 Tab, Rfl: 0    celecoxib (CELEBREX) 200 mg capsule, Take 1 Cap by mouth daily. , Disp: 90 Cap, Rfl: 0    dulaglutide (TRULICITY) 1.5 QD/8.8 mL sub-q pen, 0.5 mL by SubCUTAneous route every seven (7) days. , Disp: 12 Pen, Rfl: 0    meclizine (ANTIVERT) 25 mg tablet, Take 1 Tab by mouth three (3) times daily as needed for Dizziness. , Disp: 19 Tab, Rfl: 0    diazePAM (VALIUM) 5 mg tablet, Take 1 Tab by mouth every eight (8) hours as needed for Anxiety (severe vertigo spells). Max Daily Amount: 15 mg., Disp: 20 Tab, Rfl: 0    aspirin delayed-release 81 mg tablet, Take 162 mg by mouth nightly. Decrease to one 81 mg tab on 7/12/17, Disp: , Rfl:     Omega-3 Fatty Acids 300 mg cap, Take 1 Cap by mouth daily. , Disp: , Rfl:     ANTI-FUNGAL 2 % topical powder, APPLY TO ABDOMEN 2 TIMES PER DAY, Disp: , Rfl: 11    Blood-Glucose Meter monitoring kit, Check fs every day; DM2, E11/9, Contour glucometer, Disp: 1 Kit, Rfl: 0    glucose blood VI test strips (ASCENSIA CONTOUR) strip, Check fs every day, DM2, E11.9, Disp: 100 Strip, Rfl: 1    lancets 28 gauge misc, Check fs every day, DM2, E11.9, Disp: 100 Lancet, Rfl: 1    albuterol (PROVENTIL VENTOLIN) 2.5 mg /3 mL (0.083 %) nebulizer solution, 3 mL by Nebulization route once for 1 dose. (Patient taking differently: 2.5 mg by Nebulization route daily as needed.), Disp: 24 Each, Rfl: 11    albuterol (PROAIR HFA) 90 mcg/actuation inhaler, Take 2 Puffs by inhalation every four (4) hours as needed for Wheezing. Take / use AM day of surgery  per anesthesia protocols if needed. , Disp: , Rfl:     cholecalciferol, vitamin D3, (VITAMIN D3) 2,000 unit tab, Take 1 tablet by mouth daily. , Disp: , Rfl:     diphenhydrAMINE (BENADRYL ALLERGY) 25 mg tablet, Take 25 mg by mouth nightly as needed for Sleep., Disp: , Rfl:     b complex vitamins (B COMPLEX 1) tablet, Take 1 tablet by mouth every morning. Stop seven days prior to surgery per anesthesia protocol., Disp: , Rfl:     green tea leaf extract (GREEN TEA) Cap, Take 630 mg by mouth every morning. Stop seven days prior to surgery per anesthesia protocol., Disp: , Rfl:     vitamin E (AQUA GEMS) 400 unit capsule, Take 400 Units by mouth every morning. Stop seven days prior to surgery per anesthesia protocol., Disp: , Rfl:     FOLIC ACID PO, Take 899 mcg by mouth every morning. Stop seven days prior to surgery per anesthesia protocol., Disp: , Rfl:     DOCUSATE CALCIUM (STOOL SOFTENER PO), Take 1 capsule by mouth every morning., Disp: , Rfl:     mometasone (NASONEX) 50 mcg/Actuation nasal spray, 2 Sprays by Both Nostrils route nightly as needed. , Disp: , Rfl:    Date Last Reviewed:  9/14/2017     Number of Personal Factors/Comorbidities that affect the Plan of Care: 1-2: MODERATE COMPLEXITY   EXAMINATION:   Observation/Orthostatic Postural Assessment: To PT with straight cane. Does not use cane in PT. Good heel toe gait. Increased step length and gait speed. . Pt with flexed posture due to chronic back problems. He reports sleeping in a recliner. Reports he has had many different kinds of treatment for back but with little success.   Tight hip flexors due to flexed posture. Tight HS and HC. Difficult to assess HS tightness due to inability to ie flat however with stretching in long sitting, he is unable to sit to upright. .   Palpation:          Pain medial knee   Functional Mobility:         Gait/Ambulation:  Independent with rolling walker        Stairs: WITH RAILING        ROM:     R knee 0    L knee -5 to 130                                    Strength:     Knee ext  R 4/5,  L 4+/5  Knee flexion  R 4/5,  L 4+/5         Hip flexion R 4/5,  L 4+/5     Hip abd   R 4-/5,  L 4-/5              Neurological Screen: Intact to light touch  Balance:  Fair,  Good with walker   Body Structures Involved:  1. Bones  2. Joints  3. Muscles  4. Ligaments Body Functions Affected:  1. Sensory/Pain  2. Neuromusculoskeletal  3. Movement Related Activities and Participation Affected:  1. Learning and Applying Knowledge  2. General Tasks and Demands  3. Mobility  4. Self Care  5. Domestic Life  6. Community, Social and [place]   Number of elements (examined above) that affect the Plan of Care: 3: MODERATE COMPLEXITY   CLINICAL PRESENTATION:   Presentation: Evolving clinical presentation with changing clinical characteristics: MODERATE COMPLEXITY   CLINICAL DECISION MAKING:   Outcome Measure: Tool Used: Lower Extremity Functional Scale (LEFS)  Score:  Initial: 15/80 Most Recent: 44/80 (Date: 8/24/17 )   Interpretation of Score: 20 questions each scored on a 5 point scale with 0 representing \"extreme difficulty or unable to perform\" and 4 representing \"no difficulty\". The lower the score, the greater the functional disability. 80/80 represents no disability. Minimal detectable change is 9 points. Score 80 79-63 62-48 47-32 31-16 15-1 0   Modifier CH CI CJ CK CL CM CN     ?  Mobility - Walking and Moving Around:     - CURRENT STATUS: CK - 40%-59% impaired, limited or restricted    - GOAL STATUS: CK - 40%-59% impaired, limited or restricted    - D/C STATUS:  ---------------To be determined---------------      Medical Necessity:   · Patient is expected to demonstrate progress in strength, range of motion and gait to increase independence with home and community activities. Reason for Services/Other Comments:  · Patient continues to require skilled intervention due to decreased ROM and strength and fair gait . Use of outcome tool(s) and clinical judgement create a POC that gives a: Clear prediction of patient's progress: LOW COMPLEXITY            TREATMENT:   (In addition to Assessment/Re-Assessment sessions the following treatments were rendered)  Pre-treatment Symptoms/Complaints:  Patient reports knee feels about the same today ans it did the last couple of days. Pain: Initial: Pain Intensity 1: 2  Post Session: 1      THERAPEUTIC EXERCISE: (30 minutes):  Exercises per grid below to improve mobility, strength and gait. Required moderate verbal and manual cues to promote proper body alignment, promote proper body posture and promote proper body mechanics. Progressed resistance, range and repetitions as indicated. Pt is unable to lie down due to back issues. Worked on knee in supine in sitting with back to wall. ). Performed  exercise program of QS (quadriceps sets)  SAQ's (short arc quadriceps), and  LAQ's (long arc quadriceps)   Sitting [address] at 30# x 4 sets of 10. Worked on standing cable for hip flex and abd at 37.5# and hip ext at 50#  x 3 sets of 20. Work on 6 in step up and over x 2 then 8 in x 10. Sit to stand from plinth 1 x 20 Worked to increase quad contraction and quad control at heel strike and stance. NuStep at level 5 x 12 min mini squats x 15 ( holding to rail)       Manual Therapy ( 30 min  ): Patella and patella tendon mobs. Soft tissue work to [employer] and HS. PROM for knee flex and extn. Sitting knee flex to 130. Passive knee ext to neutral Tape to knee along the medial aspect of his knee to help decrease burning and pain. Therapeutic Modalities:    HEP: As directed. Treatment/Session Assessment:  Pt is post R TKA on 7/17/17. He had been at [facility] for rehab until 8/2. Presents with decreased ROM and strength of R knee and LE. Good increase in ROM. Good increase in ROM with excellent ROM for this stage. Needs to work to increase LE strength to improve balance and endurance. Needs to increase LE strength to compensate for chronic back pain. Posture is improving as LE strength increases. Less rest breaks today and good improvement in strength. Patient continues to be pleased with his progress and hopes to start doing some traveling later this year. Very motivated. Instructed patient to continue home exercises and stretches as directed. · Response to Treatment:  Understood exercises. Increased knee flexion. · Compliance with Program/Exercises: Doing exercises at home   · Recommendations/Intent for next treatment session: \"Next visit will focus on aggressive ROM and strength of R knee and LE. Gait training\".   Total Treatment Duration:  PT Patient Time In/Time Out  Time In: 0300  Time Out: 0400  Treatment number [id number]

## 2017-09-19 ENCOUNTER — RX ONLY (OUTPATIENT)
Age: 67
Setting detail: RX ONLY
End: 2017-09-19

## 2017-09-19 ENCOUNTER — HOSPITAL ENCOUNTER (OUTPATIENT)
Dept: PHYSICAL THERAPY | Age: 67
Discharge: HOME | End: 2017-09-19
Payer: MEDICARE

## 2017-09-19 PROCEDURE — 97110 THERAPEUTIC EXERCISES: CPT

## 2017-09-19 PROCEDURE — 97140 MANUAL THERAPY 1/> REGIONS: CPT

## 2017-09-19 RX ORDER — NITROGLYCERIN 20 MG/G
OINTMENT TOPICAL
Qty: 1 | Refills: 3 | Status: ERX

## 2017-09-19 NOTE — PROGRESS NOTES
IP Acute Physical Therapy Initial Evaluation  Plan of Care Note    Diagnosis:  1. Pneumonia of both lower lobes due to infectious organism    2. Other acute pulmonary embolism without acute cor pulmonale    3. Pneumonia due to infectious organism, unspecified laterality, unspecified part of lung    4. Other pulmonary embolism without acute cor pulmonale, unspecified chronicity    5. SOB (shortness of breath)    6. Hypoxia        Co-morbidities:   Patient Active Problem List   Diagnosis   • Pneumonia   • Pulmonary embolism   • SOB (shortness of breath)   • Hypokalemia   • Anemia       Precautions:       Below is key subjective and objective information from the last 24 hours.  For further details and goals, please refer to the PT Assess/Treat/Goals flowsheet.    Subjective:  Subjective: I'm feeling better (03/01/17 0800)    Prior Living Situation:  Type of Home: Apartment (03/01/17 0751)  Lives With:  (sister and nephew) (03/01/17 0751)    Objective:  Bed Mobility:  Bed Mobility  Rolling to the Right: Independent (03/01/17 0800)  Rolling to the Left: Independent (03/01/17 0800)  Boosting: Independent (03/01/17 0800)  Supine to Sit: Independent (03/01/17 0800)  Sit to Supine: Independent (03/01/17 0800)    Transfers:  Transfers  Sit to Stand: Independent (03/01/17 0800)  Stand to Sit: Independent (03/01/17 0800)  Stand Pivot Transfers: Independent (03/01/17 0800)  Assistive Device/: 1 Person;Gait Belt (03/01/17 0800)      Gait:  Gait  Gait Assistance: Independent (03/01/17 0800)  Assistive Device/: 1 Person;Gait Belt (03/01/17 0800)  Ambulation Distance (Feet): 200 Feet (03/01/17 0800)  Pattern: Decreased stance time R;Decreased stance time L (03/01/17 0800)  Gait Comments 1: reports having tight calves as he has not been on feet a lot this past 3 wks. (03/01/17 0800)       Stairs:  Stairs Mobility  Number of Stairs: 13 (03/01/17 0800)  Stair Management Assistance: Independent (03/01/17  [facility]   [address], [address]  [phone]   [phone]    Carli Duckworth  : 1950       OUTPATIENT PHYSICAL THERAPY:Daily Note 2017    ICD-10: Treatment Diagnosis:   R26.2 Difficulty in walking, not elsewhere classified      M25.661 Stiffness of right knee, not elsewhere classified      M25.561 Pain in right knee     Precautions/Allergies:   Bactrim [sulfamethoxazole-trimethoprim]   Fall Risk Score: 2 (? 5 = High Risk)  MD Orders: Eval and treat MEDICAL/REFERRING DIAGNOSIS:  Presence of right artificial knee joint [Z96.651]   DATE OF ONSET:   REFERRING PHYSICIAN: Jarad Monday., *  RETURN PHYSICIAN APPOINTMENT: Aug 31, 2017     INITIAL ASSESSMENT:  Mr. Micah Larson presents with decreased ROM and strength of R knee and LE post R TKA on 17. PT with fair gait with rolling walker. Work to increase ROM and strength to progress ambulation to cane and then no assistive device. Progress strength to enable return to prior activity level. PROBLEM LIST (Impacting functional limitations):  1. Decreased Strength  2. Decreased ADL/Functional Activities  3. Decreased Ambulation Ability/Technique  4. Increased Pain  5. Decreased Flexibility/Joint Mobility  6. Edema/Girth INTERVENTIONS PLANNED:  1. Home Exercise Program (HEP)  2. Manual Therapy  3. Range of Motion (ROM)  4. Therapeutic Activites  5. Therapeutic Exercise/Strengthening   TREATMENT PLAN:  Effective Dates: 8/3/17 TO 10/31/17. Frequency/Duration: 2 times a week for 12 weeks and upon reassessment,  will adjust frequency and duration as progress indicates. GOALS: (Goals have been discussed and agreed upon with patient.)  Short-Term Functional Goals: Time Frame: 4 weeks  1. Establish independent HEP with no cueing to increase ROM and strength  MET  2. Increase R knee ROM to 0-120 to increase ease of sitting and ambulation. MET  3.  Independent 0800)  Stair Management Technique: One rail L;Alternating pattern;Step to pattern;Forwards;With gait belt (03/01/17 0800)    Education:   On this date, the patient was educated on energy conservation tech., compensatory strategies for safe mobility.    The response to education was: Verbalizes understanding and Demonstrates understanding.    Clinical Presentation: evolving clinical presentation with changing characteristics     Assessment:   Patient presents at baseline which was independent with mobility  and ADLs . Emphasis of session included initial PT evaluation and presents at baseline for mobility.  Noted drop in O2 sats to 85% RA during activity but back up to 91% on 2 L. RN updated. Will continue with nursing but will DC from PT at this time. Encourage short frequent walks with nursing staff.    PT Identified Barriers to Discharge: none  Recommendation for Discharge: PT: Home    Goals:  Goals Comments: mobility goals met (03/01/17 0800)    Plan:  Treatment Plan for Next Session: DC to nursing care and supervision      Equipment:  PT/OT Mobility Equipment for Discharge: no needs (03/01/17 0800)       Treatment Time:  PT Time Spent: 25 minutes (03/01/17 0800)   gait with straight cane in home and community  MET  4. Increase LE strength so able to initiate reciprocal pattern on stairs. MET   Discharge Goals: Time Frame: 12 weeks   1. Improve score on LEFS by 9 points to enable prolonged sitting, standing and ambulation  MET  2. Increase R knee ROM to 0-125 to normalize gait. MET  3. Independent gait without assistive device in home and community  PARTIAL, MET IN HOME  4. Increase LE strength so able to perform reciprocal pattern on stairs with railing. ONGOING  5. Increase LE strength so able to return to work in his business  ONGOING  Rehabilitation Potential For Stated Goals: [address] [facility], I certify that the treatment plan above will be carried out by a therapist or under their direction. Thank you for this referral,  Annmarie Samuel, PT     Referring Physician Signature: Jenifer Unger., *              Date                    The information in this section was collected on 8/3/17 (except where otherwise noted). HISTORY:   History of Present Injury/Illness (Reason for Referral):  Pt is post R TKA on 7/17/17. He was at [facility] until 8/2 for rehab of R knee. He is now referred to outpatient PT for aggressive ROM and progressive strengthening of R knee and LE's. PT to PT with rolling walker. Progress to cane and then no assistive device. Pt does report that he has a chronic back problem and is unable to lie down. Past Medical History/Comorbidities:   Mr. Tree Corey  has a past medical history of Asthma (dx childhood); Atrial fibrillation ([address] Cervical spondylosis (6/20/2013); Chronic back pain (2013); Chronic pain; Colon polyps; COPD (chronic obstructive pulmonary disease) ([address] (8/17/2016); Coronary atherosclerosis of native coronary vessel (8/17/2016); Diabetes ([address] (dx 5/14); Diverticulosis; Edema (8/17/2016); Former smoker; GERD (gastroesophageal reflux disease); High cholesterol; History of kidney stones;  Hypertension; Obesity (BMI 30-39.9); Status post total right knee replacement (7/17/2017); and Unspecified sleep apnea. He also has no past medical history of Adverse effect of anesthesia; Aneurysm ([facility] Autoimmune disease ([facility] Cancer ([facility] Chronic kidney disease; Coagulation defects; Coagulation disorder ([facility] DEMENTIA; Difficult intubation; Endocarditis; Heart failure ([facility] Ill-defined condition; Infectious disease; Liver disease; Malignant hyperthermia due to anesthesia; Nausea & vomiting; Neurological disorder; Nicotine vapor product user; Non-nicotine vapor product user; Other ill-defined conditions; Pseudocholinesterase deficiency; Psychiatric disorder; PUD (peptic ulcer disease); Rheumatic fever; Seizures ([facility] Stroke [facility]); Thromboembolus ([facility] Thyroid disease; or Unspecified adverse effect of anesthesia. Mr. Nikita Fraga  has a past surgical history that includes cystoscopy,insert ureteral stent; colonoscopy (10/6/14); colonoscopy (11/20/14); lumbar laminectomy (2009); retinal detachment repair (Bilateral); cataract removal; other surgical (20 yrs ago); colectomy (2015); and heart catheterization (2012). Social History/Living Environment:     Lives in 2 story home with spouse  Prior Level of Function/Work/Activity:  Retired from [employer]. Now sales and business owner  Dominant Side:         RIGHT    Current Medications:       Current Outpatient Prescriptions:     melatonin tab tablet, Take 5 mg by mouth nightly. Indications: help with sleep, Disp: , Rfl:     amoxicillin (AMOXIL) 875 mg tablet, Take 1 Tab by mouth two (2) times a day., Disp: 10 Tab, Rfl: 0    chlorthalidone (HYGROTEN) 25 mg tablet, Take 1 Tab by mouth daily. , Disp: 90 Tab, Rfl: 0    metoprolol tartrate (LOPRESSOR) 25 mg tablet, Take 2 tablets by mouth twice daily, Disp: 180 Tab, Rfl: 0    atorvastatin (LIPITOR) 40 mg tablet, Take 0.5 Tabs by mouth daily. , Disp: 90 Tab, Rfl: 0    montelukast (SINGULAIR) 10 mg tablet, Take 1 Tab by mouth daily. , Disp: 90 Tab, Rfl: 0    pantoprazole (PROTONIX) 40 mg tablet, Take 1 Tab by mouth daily. , Disp: 90 Tab, Rfl: 0    ezetimibe (ZETIA) 10 mg tablet, Take 1 Tab by mouth nightly., Disp: 90 Tab, Rfl: 0    metFORMIN ER (GLUCOPHAGE XR) 500 mg tablet, Take 2 tablets every morning, Disp: 180 Tab, Rfl: 0    fluticasone-salmeterol (ADVAIR DISKUS) 500-50 mcg/dose diskus inhaler, Take 1 Puff by inhalation two (2) times a day., Disp: 3 Inhaler, Rfl: 0    apixaban (ELIQUIS) 5 mg tablet, Take 1 Tab by mouth two (2) times a day., Disp: 180 Tab, Rfl: 0    lisinopril (PRINIVIL, ZESTRIL) 10 mg tablet, Take 1 Tab by mouth daily. , Disp: 90 Tab, Rfl: 0    dapagliflozin (FARXIGA) 10 mg tab, Take 1 Tab by mouth daily. , Disp: 90 Tab, Rfl: 0    doxazosin (CARDURA) 4 mg tablet, Take 1 Tab by mouth daily. , Disp: 90 Tab, Rfl: 0    celecoxib (CELEBREX) 200 mg capsule, Take 1 Cap by mouth daily. , Disp: 90 Cap, Rfl: 0    dulaglutide (TRULICITY) 1.5 GR/7.3 mL sub-q pen, 0.5 mL by SubCUTAneous route every seven (7) days. , Disp: 12 Pen, Rfl: 0    meclizine (ANTIVERT) 25 mg tablet, Take 1 Tab by mouth three (3) times daily as needed for Dizziness. , Disp: 19 Tab, Rfl: 0    diazePAM (VALIUM) 5 mg tablet, Take 1 Tab by mouth every eight (8) hours as needed for Anxiety (severe vertigo spells). Max Daily Amount: 15 mg., Disp: 20 Tab, Rfl: 0    aspirin delayed-release 81 mg tablet, Take 162 mg by mouth nightly. Decrease to one 81 mg tab on 7/12/17, Disp: , Rfl:     Omega-3 Fatty Acids 300 mg cap, Take 1 Cap by mouth daily. , Disp: , Rfl:     ANTI-FUNGAL 2 % topical powder, APPLY TO ABDOMEN 2 TIMES PER DAY, Disp: , Rfl: 11    Blood-Glucose Meter monitoring kit, Check fs every day; DM2, E11/9, Contour glucometer, Disp: 1 Kit, Rfl: 0    glucose blood VI test strips (ASCENSIA CONTOUR) strip, Check fs every day, DM2, E11.9, Disp: 100 Strip, Rfl: 1    lancets 28 gauge misc, Check fs every day, DM2, E11.9, Disp: 100 Lancet, Rfl: 1    albuterol (PROVENTIL VENTOLIN) 2.5 mg /3 mL (0.083 %) nebulizer solution, 3 mL by Nebulization route once for 1 dose. (Patient taking differently: 2.5 mg by Nebulization route daily as needed.), Disp: 24 Each, Rfl: 11    albuterol (PROAIR HFA) 90 mcg/actuation inhaler, Take 2 Puffs by inhalation every four (4) hours as needed for Wheezing. Take / use AM day of surgery  per anesthesia protocols if needed. , Disp: , Rfl:     cholecalciferol, vitamin D3, (VITAMIN D3) 2,000 unit tab, Take 1 tablet by mouth daily. , Disp: , Rfl:     diphenhydrAMINE (BENADRYL ALLERGY) 25 mg tablet, Take 25 mg by mouth nightly as needed for Sleep., Disp: , Rfl:     b complex vitamins (B COMPLEX 1) tablet, Take 1 tablet by mouth every morning. Stop seven days prior to surgery per anesthesia protocol., Disp: , Rfl:     green tea leaf extract (GREEN TEA) Cap, Take 630 mg by mouth every morning. Stop seven days prior to surgery per anesthesia protocol., Disp: , Rfl:     vitamin E (AQUA GEMS) 400 unit capsule, Take 400 Units by mouth every morning. Stop seven days prior to surgery per anesthesia protocol., Disp: , Rfl:     FOLIC ACID PO, Take 548 mcg by mouth every morning. Stop seven days prior to surgery per anesthesia protocol., Disp: , Rfl:     DOCUSATE CALCIUM (STOOL SOFTENER PO), Take 1 capsule by mouth every morning., Disp: , Rfl:     mometasone (NASONEX) 50 mcg/Actuation nasal spray, 2 Sprays by Both Nostrils route nightly as needed. , Disp: , Rfl:    Date Last Reviewed:  9/19/2017     Number of Personal Factors/Comorbidities that affect the Plan of Care: 1-2: MODERATE COMPLEXITY   EXAMINATION:   Observation/Orthostatic Postural Assessment: To PT with straight cane. Does not use cane in PT. Good heel toe gait. Increased step length and gait speed. . Pt with flexed posture due to chronic back problems. He reports sleeping in a recliner. Reports he has had many different kinds of treatment for back but with little success.   Tight hip flexors due to flexed posture. Tight HS and HC. Difficult to assess HS tightness due to inability to ie flat however with stretching in long sitting, he is unable to sit to upright. .   Palpation:          Pain medial knee   Functional Mobility:         Gait/Ambulation:  Independent with rolling walker        Stairs: WITH RAILING        ROM:     R knee 0    L knee -5 to 130                                    Strength:     Knee ext  R 4/5,  L 4+/5  Knee flexion  R 4/5,  L 4+/5         Hip flexion R 4/5,  L 4+/5     Hip abd   R 4-/5,  L 4-/5              Neurological Screen: Intact to light touch  Balance:  Fair,  Good with walker   Body Structures Involved:  1. Bones  2. Joints  3. Muscles  4. Ligaments Body Functions Affected:  1. Sensory/Pain  2. Neuromusculoskeletal  3. Movement Related Activities and Participation Affected:  1. Learning and Applying Knowledge  2. General Tasks and Demands  3. Mobility  4. Self Care  5. Domestic Life  6. Community, Social and [place]   Number of elements (examined above) that affect the Plan of Care: 3: MODERATE COMPLEXITY   CLINICAL PRESENTATION:   Presentation: Evolving clinical presentation with changing clinical characteristics: MODERATE COMPLEXITY   CLINICAL DECISION MAKING:   Outcome Measure: Tool Used: Lower Extremity Functional Scale (LEFS)  Score:  Initial: 15/80 Most Recent: 44/80 (Date: 8/24/17 )   Interpretation of Score: 20 questions each scored on a 5 point scale with 0 representing \"extreme difficulty or unable to perform\" and 4 representing \"no difficulty\". The lower the score, the greater the functional disability. 80/80 represents no disability. Minimal detectable change is 9 points. Score 80 79-63 62-48 47-32 31-16 15-1 0   Modifier CH CI CJ CK CL CM CN     ?  Mobility - Walking and Moving Around:     - CURRENT STATUS: CK - 40%-59% impaired, limited or restricted    - GOAL STATUS: CK - 40%-59% impaired, limited or restricted    - D/C STATUS:  ---------------To be determined---------------      Medical Necessity:   · Patient is expected to demonstrate progress in strength, range of motion and gait to increase independence with home and community activities. Reason for Services/Other Comments:  · Patient continues to require skilled intervention due to decreased ROM and strength and fair gait . Use of outcome tool(s) and clinical judgement create a POC that gives a: Clear prediction of patient's progress: LOW COMPLEXITY            TREATMENT:   (In addition to Assessment/Re-Assessment sessions the following treatments were rendered)  Pre-treatment Symptoms/Complaints:  Patient reports overall knee painis better. Back pain is limiting factor for increased activity. Got in pool today without difficulty. Pain: Initial:    Post Session: 1      THERAPEUTIC EXERCISE: (30 minutes):  Exercises per grid below to improve mobility, strength and gait. Required moderate verbal and manual cues to promote proper body alignment, promote proper body posture and promote proper body mechanics. Progressed resistance, range and repetitions as indicated. Pt is unable to lie down due to back issues. Worked on knee in supine in sitting with back to wall. ). Performed  exercise program of QS (quadriceps sets)  SAQ's (short arc quadriceps), and  LAQ's (long arc quadriceps)   Sitting [address] at 30# x 4 sets of 10. Worked on standing cable for hip flex and abd at 37.5# and hip ext at 50#  x 3 sets of 20. Work on double 6 in step up and over x 30. . Sit to stand from plinth 1 x 20 Worked to increase quad contraction and quad control at heel strike and stance. NuStep at level 5 x 12. Manual Therapy ( 30 min  ): Patella and patella tendon mobs. Soft tissue work to [employer] and HS. PROM for knee flex and extn. Sitting knee flex to 130. Passive knee ext to neutral Tape to knee along the medial aspect of his knee to help decrease burning and pain.          Therapeutic Modalities:    HEP: As directed. Treatment/Session Assessment:  Pt is post R TKA on 7/17/17. He had been at [facility] for rehab until 8/2. Presented with decreased ROM and strength of R knee and LE. Good increase in ROM. Good increase in ROM inflexion and extension. Needs to work to increase LE strength to improve balance and endurance. Steady increase in strength with improved gait and balance. Needs to increase LE strength to compensate for chronic back pain. Posture is improving as LE strength increases. Back pain is limiting factor. Returning to water exercises. . Patient continues to be pleased with his progress and hopes to start doing some traveling later this year. Very motivated. Instructed patient to continue home exercises and stretches as directed. · Response to Treatment:  Understood exercises. Increased knee flexion. · Compliance with Program/Exercises: Doing exercises at home   · Recommendations/Intent for next treatment session: \"Next visit will focus on aggressive ROM and strength of R knee and LE. Gait training\".   Total Treatment Duration:     Treatment number [id number] [place] [place], PT

## 2017-09-20 ENCOUNTER — HOSPITAL ENCOUNTER (OUTPATIENT)
Dept: DIABETES SERVICES | Age: 67
Discharge: HOME | End: 2017-09-20
Payer: MEDICARE

## 2017-09-20 PROCEDURE — G0109 DIAB MANAGE TRN IND/GROUP: HCPCS

## 2017-09-20 NOTE — PROGRESS NOTES
Attended nutrition diabetes #1 group session today. Topics included: disease process and treatment; carbohydrate choices (emphasizing high fiber carbohydrates); proteins (emphasizing heart healthy choices) and fat food choices (emphasizing unsaturated fats); free foods; combination food choices; nutrition tips for persons with diabetes; snack ideas; resources for diabetes management. Two methods of meal planning were reviewed: carbohydrate choices and carbohydrate counting. Basics of exercise discussed. Voiced /demonstrated partial understanding of material covered. Goal for next session is: add a protein to meals and snacks. Anticipated adherence is good. Problems/barriers may be: none identified at this time.

## 2017-09-21 ENCOUNTER — HOSPITAL ENCOUNTER (OUTPATIENT)
Dept: PHYSICAL THERAPY | Age: 67
Discharge: HOME | End: 2017-09-21
Payer: MEDICARE

## 2017-09-21 PROCEDURE — 97140 MANUAL THERAPY 1/> REGIONS: CPT

## 2017-09-21 PROCEDURE — 97110 THERAPEUTIC EXERCISES: CPT

## 2017-09-21 NOTE — PROGRESS NOTES
[facility]   [address], [address]  [phone]   [phone]    Kassandra Nails  : 1950       OUTPATIENT PHYSICAL THERAPY:Daily Note 2017    ICD-10: Treatment Diagnosis:   R26.2 Difficulty in walking, not elsewhere classified      M25.661 Stiffness of right knee, not elsewhere classified      M25.561 Pain in right knee     Precautions/Allergies:   Bactrim [sulfamethoxazole-trimethoprim]   Fall Risk Score: 2 (? 5 = High Risk)  MD Orders: Eval and treat MEDICAL/REFERRING DIAGNOSIS:  Presence of right artificial knee joint [Z96.651]   DATE OF ONSET:   REFERRING PHYSICIAN: Mell Gu., *  RETURN PHYSICIAN APPOINTMENT: Aug 31, 2017     INITIAL ASSESSMENT:  Mr. Pattie Davenport presents with decreased ROM and strength of R knee and LE post R TKA on 17. PT with fair gait with rolling walker. Work to increase ROM and strength to progress ambulation to cane and then no assistive device. Progress strength to enable return to prior activity level. PROBLEM LIST (Impacting functional limitations):  1. Decreased Strength  2. Decreased ADL/Functional Activities  3. Decreased Ambulation Ability/Technique  4. Increased Pain  5. Decreased Flexibility/Joint Mobility  6. Edema/Girth INTERVENTIONS PLANNED:  1. Home Exercise Program (HEP)  2. Manual Therapy  3. Range of Motion (ROM)  4. Therapeutic Activites  5. Therapeutic Exercise/Strengthening   TREATMENT PLAN:  Effective Dates: 8/3/17 TO 10/31/17. Frequency/Duration: 2 times a week for 12 weeks and upon reassessment,  will adjust frequency and duration as progress indicates. GOALS: (Goals have been discussed and agreed upon with patient.)  Short-Term Functional Goals: Time Frame: 4 weeks  1. Establish independent HEP with no cueing to increase ROM and strength  MET  2. Increase R knee ROM to 0-120 to increase ease of sitting and ambulation. MET  3.  Independent gait with straight cane in home and community  MET  4. Increase LE strength so able to initiate reciprocal pattern on stairs. MET   Discharge Goals: Time Frame: 12 weeks   1. Improve score on LEFS by 9 points to enable prolonged sitting, standing and ambulation  MET  2. Increase R knee ROM to 0-125 to normalize gait. MET  3. Independent gait without assistive device in home and community  PARTIAL, MET IN HOME  4. Increase LE strength so able to perform reciprocal pattern on stairs with railing. ONGOING  5. Increase LE strength so able to return to work in his business  ONGOING  Rehabilitation Potential For Stated Goals: [address] [employer] therapy, I certify that the treatment plan above will be carried out by a therapist or under their direction. Thank you for this referral,  Josiane Lisa, PT     Referring Physician Signature: Alicia Diaz., *              Date                    The information in this section was collected on 8/3/17 (except where otherwise noted). HISTORY:   History of Present Injury/Illness (Reason for Referral):  Pt is post R TKA on 7/17/17. He was at [facility] until 8/2 for rehab of R knee. He is now referred to outpatient PT for aggressive ROM and progressive strengthening of R knee and LE's. PT to PT with rolling walker. Progress to cane and then no assistive device. Pt does report that he has a chronic back problem and is unable to lie down. Past Medical History/Comorbidities:   Mr. Jina Singh  has a past medical history of Asthma (dx childhood); Atrial fibrillation ([address] Cervical spondylosis (6/20/2013); Chronic back pain (2013); Chronic pain; Colon polyps; COPD (chronic obstructive pulmonary disease) ([address] (8/17/2016); Coronary atherosclerosis of native coronary vessel (8/17/2016); Diabetes ([address] (dx 5/14); Diverticulosis; Edema (8/17/2016); Former smoker; GERD (gastroesophageal reflux disease); High cholesterol; History of kidney stones;  Hypertension; Obesity (BMI 30-39.9); Status post total right knee replacement (7/17/2017); and Unspecified sleep apnea. He also has no past medical history of Adverse effect of anesthesia; Aneurysm ([facility] Autoimmune disease ([facility] Cancer ([facility] Chronic kidney disease; Coagulation defects; Coagulation disorder ([facility] DEMENTIA; Difficult intubation; Endocarditis; Heart failure ([facility] Ill-defined condition; Infectious disease; Liver disease; Malignant hyperthermia due to anesthesia; Nausea & vomiting; Neurological disorder; Nicotine vapor product user; Non-nicotine vapor product user; Other ill-defined conditions; Pseudocholinesterase deficiency; Psychiatric disorder; PUD (peptic ulcer disease); Rheumatic fever; Seizures ([facility] Stroke [facility]); Thromboembolus ([facility] Thyroid disease; or Unspecified adverse effect of anesthesia. Mr. Deann Knapp  has a past surgical history that includes cystoscopy,insert ureteral stent; colonoscopy (10/6/14); colonoscopy (11/20/14); lumbar laminectomy (2009); retinal detachment repair (Bilateral); cataract removal; other surgical (20 yrs ago); colectomy (2015); and heart catheterization (2012). Social History/Living Environment:     Lives in 2 story home with spouse  Prior Level of Function/Work/Activity:  Retired from [employer]. Now sales and business owner  Dominant Side:         RIGHT    Current Medications:       Current Outpatient Prescriptions:     melatonin tab tablet, Take 5 mg by mouth nightly. Indications: help with sleep, Disp: , Rfl:     amoxicillin (AMOXIL) 875 mg tablet, Take 1 Tab by mouth two (2) times a day., Disp: 10 Tab, Rfl: 0    chlorthalidone (HYGROTEN) 25 mg tablet, Take 1 Tab by mouth daily. , Disp: 90 Tab, Rfl: 0    metoprolol tartrate (LOPRESSOR) 25 mg tablet, Take 2 tablets by mouth twice daily, Disp: 180 Tab, Rfl: 0    atorvastatin (LIPITOR) 40 mg tablet, Take 0.5 Tabs by mouth daily. , Disp: 90 Tab, Rfl: 0    montelukast (SINGULAIR) 10 mg tablet, Take 1 Tab by mouth daily. , Disp: 90 Tab, Rfl: 0    pantoprazole (PROTONIX) 40 mg tablet, Take 1 Tab by mouth daily. , Disp: 90 Tab, Rfl: 0    ezetimibe (ZETIA) 10 mg tablet, Take 1 Tab by mouth nightly., Disp: 90 Tab, Rfl: 0    metFORMIN ER (GLUCOPHAGE XR) 500 mg tablet, Take 2 tablets every morning, Disp: 180 Tab, Rfl: 0    fluticasone-salmeterol (ADVAIR DISKUS) 500-50 mcg/dose diskus inhaler, Take 1 Puff by inhalation two (2) times a day., Disp: 3 Inhaler, Rfl: 0    apixaban (ELIQUIS) 5 mg tablet, Take 1 Tab by mouth two (2) times a day., Disp: 180 Tab, Rfl: 0    lisinopril (PRINIVIL, ZESTRIL) 10 mg tablet, Take 1 Tab by mouth daily. , Disp: 90 Tab, Rfl: 0    dapagliflozin (FARXIGA) 10 mg tab, Take 1 Tab by mouth daily. , Disp: 90 Tab, Rfl: 0    doxazosin (CARDURA) 4 mg tablet, Take 1 Tab by mouth daily. , Disp: 90 Tab, Rfl: 0    celecoxib (CELEBREX) 200 mg capsule, Take 1 Cap by mouth daily. , Disp: 90 Cap, Rfl: 0    dulaglutide (TRULICITY) 1.5 XG/8.7 mL sub-q pen, 0.5 mL by SubCUTAneous route every seven (7) days. , Disp: 12 Pen, Rfl: 0    meclizine (ANTIVERT) 25 mg tablet, Take 1 Tab by mouth three (3) times daily as needed for Dizziness. , Disp: 19 Tab, Rfl: 0    diazePAM (VALIUM) 5 mg tablet, Take 1 Tab by mouth every eight (8) hours as needed for Anxiety (severe vertigo spells). Max Daily Amount: 15 mg., Disp: 20 Tab, Rfl: 0    aspirin delayed-release 81 mg tablet, Take 162 mg by mouth nightly. Decrease to one 81 mg tab on 7/12/17, Disp: , Rfl:     Omega-3 Fatty Acids 300 mg cap, Take 1 Cap by mouth daily. , Disp: , Rfl:     ANTI-FUNGAL 2 % topical powder, APPLY TO ABDOMEN 2 TIMES PER DAY, Disp: , Rfl: 11    Blood-Glucose Meter monitoring kit, Check fs every day; DM2, E11/9, Contour glucometer, Disp: 1 Kit, Rfl: 0    glucose blood VI test strips (ASCENSIA CONTOUR) strip, Check fs every day, DM2, E11.9, Disp: 100 Strip, Rfl: 1    lancets 28 gauge misc, Check fs every day, DM2, E11.9, Disp: 100 Lancet, Rfl: 1    albuterol (PROVENTIL VENTOLIN) 2.5 mg /3 mL (0.083 %) nebulizer solution, 3 mL by Nebulization route once for 1 dose. (Patient taking differently: 2.5 mg by Nebulization route daily as needed.), Disp: 24 Each, Rfl: 11    albuterol (PROAIR HFA) 90 mcg/actuation inhaler, Take 2 Puffs by inhalation every four (4) hours as needed for Wheezing. Take / use AM day of surgery  per anesthesia protocols if needed. , Disp: , Rfl:     cholecalciferol, vitamin D3, (VITAMIN D3) 2,000 unit tab, Take 1 tablet by mouth daily. , Disp: , Rfl:     diphenhydrAMINE (BENADRYL ALLERGY) 25 mg tablet, Take 25 mg by mouth nightly as needed for Sleep., Disp: , Rfl:     b complex vitamins (B COMPLEX 1) tablet, Take 1 tablet by mouth every morning. Stop seven days prior to surgery per anesthesia protocol., Disp: , Rfl:     green tea leaf extract (GREEN TEA) Cap, Take 630 mg by mouth every morning. Stop seven days prior to surgery per anesthesia protocol., Disp: , Rfl:     vitamin E (AQUA GEMS) 400 unit capsule, Take 400 Units by mouth every morning. Stop seven days prior to surgery per anesthesia protocol., Disp: , Rfl:     FOLIC ACID PO, Take 214 mcg by mouth every morning. Stop seven days prior to surgery per anesthesia protocol., Disp: , Rfl:     DOCUSATE CALCIUM (STOOL SOFTENER PO), Take 1 capsule by mouth every morning., Disp: , Rfl:     mometasone (NASONEX) 50 mcg/Actuation nasal spray, 2 Sprays by Both Nostrils route nightly as needed. , Disp: , Rfl:    Date Last Reviewed:  9/21/2017     Number of Personal Factors/Comorbidities that affect the Plan of Care: 1-2: MODERATE COMPLEXITY   EXAMINATION:   Observation/Orthostatic Postural Assessment: To PT with straight cane. Does not use cane in PT. Good heel toe gait. Increased step length and gait speed. . Pt with flexed posture due to chronic back problems. He reports sleeping in a recliner. Reports he has had many different kinds of treatment for back but with little success.   Tight hip flexors due to flexed posture. Tight HS and HC. Difficult to assess HS tightness due to inability to ie flat however with stretching in long sitting, he is unable to sit to upright. .   Palpation:          Pain medial knee   Functional Mobility:         Gait/Ambulation:  Independent with rolling walker        Stairs: WITH RAILING        ROM:     R knee 0    L knee -5 to 130                                    Strength:     Knee ext  R 4/5,  L 4+/5  Knee flexion  R 4/5,  L 4+/5         Hip flexion R 4/5,  L 4+/5     Hip abd   R 4-/5,  L 4-/5              Neurological Screen: Intact to light touch  Balance:  Fair,  Good with walker   Body Structures Involved:  1. Bones  2. Joints  3. Muscles  4. Ligaments Body Functions Affected:  1. Sensory/Pain  2. Neuromusculoskeletal  3. Movement Related Activities and Participation Affected:  1. Learning and Applying Knowledge  2. General Tasks and Demands  3. Mobility  4. Self Care  5. Domestic Life  6. Community, Social and [place]   Number of elements (examined above) that affect the Plan of Care: 3: MODERATE COMPLEXITY   CLINICAL PRESENTATION:   Presentation: Evolving clinical presentation with changing clinical characteristics: MODERATE COMPLEXITY   CLINICAL DECISION MAKING:   Outcome Measure: Tool Used: Lower Extremity Functional Scale (LEFS)  Score:  Initial: 15/80 Most Recent: 44/80 (Date: 8/24/17 )   Interpretation of Score: 20 questions each scored on a 5 point scale with 0 representing \"extreme difficulty or unable to perform\" and 4 representing \"no difficulty\". The lower the score, the greater the functional disability. 80/80 represents no disability. Minimal detectable change is 9 points. Score 80 79-63 62-48 47-32 31-16 15-1 0   Modifier CH CI CJ CK CL CM CN     ?  Mobility - Walking and Moving Around:     - CURRENT STATUS: CK - 40%-59% impaired, limited or restricted    - GOAL STATUS: CK - 40%-59% impaired, limited or restricted    - D/C STATUS:  ---------------To be determined---------------      Medical Necessity:   · Patient is expected to demonstrate progress in strength, range of motion and gait to increase independence with home and community activities. Reason for Services/Other Comments:  · Patient continues to require skilled intervention due to decreased ROM and strength and fair gait . Use of outcome tool(s) and clinical judgement create a POC that gives a: Clear prediction of patient's progress: LOW COMPLEXITY            TREATMENT:   (In addition to Assessment/Re-Assessment sessions the following treatments were rendered)  Pre-treatment Symptoms/Complaints:  Patient reports overall knee pain is better. Back pain is limiting factor for increased activity. Pain: Initial: Pain Intensity 1: 2  Post Session: 1      THERAPEUTIC EXERCISE: (30 minutes):  Exercises per grid below to improve mobility, strength and gait. Required moderate verbal and manual cues to promote proper body alignment, promote proper body posture and promote proper body mechanics. Progressed resistance, range and repetitions as indicated. Pt is unable to lie down due to back issues. Worked on knee in supine in sitting with back to wall. ). Performed  exercise program of QS (quadriceps sets)  SAQ's (short arc quadriceps), and  LAQ's (long arc quadriceps)   Sitting [address] at 30# x 4 sets of 10. Worked on standing cable for hip flex and abd at 37.5# and hip ext at 50#  x 3 sets of 20. Work on double 6 in step up and over x 30. Worked on sitting knee ext at 20# x 3 sets 10 and single leg at 10# x 3 sets 10. . Sit to stand from plinth 1 x 20 Worked to increase quad contraction and quad control at heel strike and stance. NuStep at level 5 x 12. Manual Therapy ( 30 min  ): Patella and patella tendon mobs. Soft tissue work to [employer] and HS. PROM for knee flex and extn. Sitting knee flex to 130.   Passive knee ext to neutral Tape to knee along the medial aspect of his knee to help decrease burning and pain. Therapeutic Modalities:    HEP: As directed. Treatment/Session Assessment:  Pt is post R TKA on 7/17/17. He had been at [facility] for rehab until 8/2. Presented with decreased ROM and strength of R knee and LE. Good increase in ROM. Good increase in ROM inflexion and extension. Good gait pattern. Steady increase in strength with improved gait and balance. Needs to increase LE strength to compensate for chronic back pain. Posture is improving as LE strength increases. Back pain is limiting factor. Returning to water exercises. . Patient continues to be pleased with his progress and hopes to start doing some traveling later this year. Very motivated. Instructed patient to continue home exercises and stretches as directed. Plan probable Dc next week. · Response to Treatment:  Understood exercises. Increased knee flexion. · Compliance with Program/Exercises: Doing exercises at home   · Recommendations/Intent for next treatment session: \"Next visit will focus on aggressive ROM and strength of R knee and LE. Gait training\".   Total Treatment Duration:  PT Patient Time In/Time Out  Time In: 0300  Time Out: 0400  Treatment number [address], PT

## 2017-09-26 ENCOUNTER — HOSPITAL ENCOUNTER (OUTPATIENT)
Dept: PHYSICAL THERAPY | Age: 67
Discharge: HOME | End: 2017-09-26
Payer: MEDICARE

## 2017-09-26 PROCEDURE — 97140 MANUAL THERAPY 1/> REGIONS: CPT

## 2017-09-26 PROCEDURE — 97110 THERAPEUTIC EXERCISES: CPT

## 2017-09-26 NOTE — PROGRESS NOTES
[facility]   [address], [address]  [phone]   [phone]    Jenny Estevez  : 1950       OUTPATIENT PHYSICAL THERAPY:Daily Note 2017    ICD-10: Treatment Diagnosis:   R26.2 Difficulty in walking, not elsewhere classified      M25.661 Stiffness of right knee, not elsewhere classified      M25.561 Pain in right knee     Precautions/Allergies:   Bactrim [sulfamethoxazole-trimethoprim]   Fall Risk Score: 2 (? 5 = High Risk)  MD Orders: Eval and treat MEDICAL/REFERRING DIAGNOSIS:  Presence of right artificial knee joint [Z96.651]   DATE OF ONSET:   REFERRING PHYSICIAN: Shubham Epperson., *  RETURN PHYSICIAN APPOINTMENT: Aug 31, 2017     INITIAL ASSESSMENT:  Mr. Freddy Joy presents with decreased ROM and strength of R knee and LE post R TKA on 17. PT with fair gait with rolling walker. Work to increase ROM and strength to progress ambulation to cane and then no assistive device. Progress strength to enable return to prior activity level. PROBLEM LIST (Impacting functional limitations):  1. Decreased Strength  2. Decreased ADL/Functional Activities  3. Decreased Ambulation Ability/Technique  4. Increased Pain  5. Decreased Flexibility/Joint Mobility  6. Edema/Girth INTERVENTIONS PLANNED:  1. Home Exercise Program (HEP)  2. Manual Therapy  3. Range of Motion (ROM)  4. Therapeutic Activites  5. Therapeutic Exercise/Strengthening   TREATMENT PLAN:  Effective Dates: 8/3/17 TO 10/31/17. Frequency/Duration: 2 times a week for 12 weeks and upon reassessment,  will adjust frequency and duration as progress indicates. GOALS: (Goals have been discussed and agreed upon with patient.)  Short-Term Functional Goals: Time Frame: 4 weeks  1. Establish independent HEP with no cueing to increase ROM and strength  MET  2. Increase R knee ROM to 0-120 to increase ease of sitting and ambulation. MET  3.  Independent gait with straight cane in home and community  MET  4. Increase LE strength so able to initiate reciprocal pattern on stairs. MET   Discharge Goals: Time Frame: 12 weeks   1. Improve score on LEFS by 9 points to enable prolonged sitting, standing and ambulation  MET  2. Increase R knee ROM to 0-125 to normalize gait. MET  3. Independent gait without assistive device in home and community  PARTIAL, MET IN HOME  4. Increase LE strength so able to perform reciprocal pattern on stairs with railing. ONGOING  5. 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 Thank you for this referral,  Rene Valdez, PT     Referring Physician Signature: Clark Guerrero., *              Date                    The information in this section was collected on 8/3/17 (except where otherwise noted). HISTORY:   History of Present Injury/Illness (Reason for Referral):  Pt is post R TKA on 7/17/17. He was at [facility] until 8/2 for rehab of R knee. He is now referred to outpatient PT for aggressive ROM and progressive strengthening of R knee and LE's. PT to PT with rolling walker. Progress to cane and then no assistive device. Pt does report that he has a chronic back problem and is unable to lie down. Past Medical History/Comorbidities:   Mr. Hugo Dowling  has a past medical history of Asthma (dx childhood); Atrial fibrillation ([address] Cervical spondylosis (6/20/2013); Chronic back pain (2013); Chronic pain; Colon polyps; COPD (chronic obstructive pulmonary disease) ([address] (8/17/2016); Coronary atherosclerosis of native coronary vessel (8/17/2016); Diabetes ([address] (dx 5/14); Diverticulosis; Edema (8/17/2016); Former smoker; GERD (gastroesophageal reflux disease); High cholesterol; History of kidney stones;  Hypertension; Obesity (BMI 30-39.9); Status post total right knee replacement (7/17/2017); and Unspecified sleep apnea. He also has no past medical history of Adverse effect of anesthesia; Aneurysm ([facility] Autoimmune disease ([facility] Cancer ([facility] Chronic kidney disease; Coagulation defects; Coagulation disorder ([facility] DEMENTIA; Difficult intubation; Endocarditis; Heart failure ([facility] Ill-defined condition; Infectious disease; Liver disease; Malignant hyperthermia due to anesthesia; Nausea & vomiting; Neurological disorder; Nicotine vapor product user; Non-nicotine vapor product user; Other ill-defined conditions; Pseudocholinesterase deficiency; Psychiatric disorder; PUD (peptic ulcer disease); Rheumatic fever; Seizures ([facility] Stroke [facility]); Thromboembolus ([facility] Thyroid disease; or Unspecified adverse effect of anesthesia. Mr. Micah Larson  has a past surgical history that includes cystoscopy,insert ureteral stent; colonoscopy (10/6/14); colonoscopy (11/20/14); lumbar laminectomy (2009); retinal detachment repair (Bilateral); cataract removal; other surgical (20 yrs ago); colectomy (2015); and heart catheterization (2012). Social History/Living Environment:     Lives in 2 story home with spouse  Prior Level of Function/Work/Activity:  Retired from [employer]. Now sales and business owner  Dominant Side:         RIGHT    Current Medications:       Current Outpatient Prescriptions:     melatonin tab tablet, Take 5 mg by mouth nightly. Indications: help with sleep, Disp: , Rfl:     amoxicillin (AMOXIL) 875 mg tablet, Take 1 Tab by mouth two (2) times a day., Disp: 10 Tab, Rfl: 0    chlorthalidone (HYGROTEN) 25 mg tablet, Take 1 Tab by mouth daily. , Disp: 90 Tab, Rfl: 0    metoprolol tartrate (LOPRESSOR) 25 mg tablet, Take 2 tablets by mouth twice daily, Disp: 180 Tab, Rfl: 0    atorvastatin (LIPITOR) 40 mg tablet, Take 0.5 Tabs by mouth daily. , Disp: 90 Tab, Rfl: 0    montelukast (SINGULAIR) 10 mg tablet, Take 1 Tab by mouth daily. , Disp: 90 Tab, Rfl: 0    pantoprazole (PROTONIX) 40 mg tablet, Take 1 Tab by mouth daily. , Disp: 90 Tab, Rfl: 0    ezetimibe (ZETIA) 10 mg tablet, Take 1 Tab by mouth nightly., Disp: 90 Tab, Rfl: 0    metFORMIN ER (GLUCOPHAGE XR) 500 mg tablet, Take 2 tablets every morning, Disp: 180 Tab, Rfl: 0    fluticasone-salmeterol (ADVAIR DISKUS) 500-50 mcg/dose diskus inhaler, Take 1 Puff by inhalation two (2) times a day., Disp: 3 Inhaler, Rfl: 0    apixaban (ELIQUIS) 5 mg tablet, Take 1 Tab by mouth two (2) times a day., Disp: 180 Tab, Rfl: 0    lisinopril (PRINIVIL, ZESTRIL) 10 mg tablet, Take 1 Tab by mouth daily. , Disp: 90 Tab, Rfl: 0    dapagliflozin (FARXIGA) 10 mg tab, Take 1 Tab by mouth daily. , Disp: 90 Tab, Rfl: 0    doxazosin (CARDURA) 4 mg tablet, Take 1 Tab by mouth daily. , Disp: 90 Tab, Rfl: 0    celecoxib (CELEBREX) 200 mg capsule, Take 1 Cap by mouth daily. , Disp: 90 Cap, Rfl: 0    dulaglutide (TRULICITY) 1.5 QM/4.9 mL sub-q pen, 0.5 mL by SubCUTAneous route every seven (7) days. , Disp: 12 Pen, Rfl: 0    meclizine (ANTIVERT) 25 mg tablet, Take 1 Tab by mouth three (3) times daily as needed for Dizziness. , Disp: 19 Tab, Rfl: 0    diazePAM (VALIUM) 5 mg tablet, Take 1 Tab by mouth every eight (8) hours as needed for Anxiety (severe vertigo spells). Max Daily Amount: 15 mg., Disp: 20 Tab, Rfl: 0    aspirin delayed-release 81 mg tablet, Take 162 mg by mouth nightly. Decrease to one 81 mg tab on 7/12/17, Disp: , Rfl:     Omega-3 Fatty Acids 300 mg cap, Take 1 Cap by mouth daily. , Disp: , Rfl:     ANTI-FUNGAL 2 % topical powder, APPLY TO ABDOMEN 2 TIMES PER DAY, Disp: , Rfl: 11    Blood-Glucose Meter monitoring kit, Check fs every day; DM2, E11/9, Contour glucometer, Disp: 1 Kit, Rfl: 0    glucose blood VI test strips (ASCENSIA CONTOUR) strip, Check fs every day, DM2, E11.9, Disp: 100 Strip, Rfl: 1    lancets 28 gauge misc, Check fs every day, DM2, E11.9, Disp: 100 Lancet, Rfl: 1    albuterol (PROVENTIL VENTOLIN) 2.5 mg /3 mL (0.083 %) nebulizer solution, 3 mL by Nebulization route once for 1 dose. (Patient taking differently: 2.5 mg by Nebulization route daily as needed.), Disp: 24 Each, Rfl: 11    albuterol (PROAIR HFA) 90 mcg/actuation inhaler, Take 2 Puffs by inhalation every four (4) hours as needed for Wheezing. Take / use AM day of surgery  per anesthesia protocols if needed. , Disp: , Rfl:     cholecalciferol, vitamin D3, (VITAMIN D3) 2,000 unit tab, Take 1 tablet by mouth daily. , Disp: , Rfl:     diphenhydrAMINE (BENADRYL ALLERGY) 25 mg tablet, Take 25 mg by mouth nightly as needed for Sleep., Disp: , Rfl:     b complex vitamins (B COMPLEX 1) tablet, Take 1 tablet by mouth every morning. Stop seven days prior to surgery per anesthesia protocol., Disp: , Rfl:     green tea leaf extract (GREEN TEA) Cap, Take 630 mg by mouth every morning. Stop seven days prior to surgery per anesthesia protocol., Disp: , Rfl:     vitamin E (AQUA GEMS) 400 unit capsule, Take 400 Units by mouth every morning. Stop seven days prior to surgery per anesthesia protocol., Disp: , Rfl:     FOLIC ACID PO, Take 507 mcg by mouth every morning. Stop seven days prior to surgery per anesthesia protocol., Disp: , Rfl:     DOCUSATE CALCIUM (STOOL SOFTENER PO), Take 1 capsule by mouth every morning., Disp: , Rfl:     mometasone (NASONEX) 50 mcg/Actuation nasal spray, 2 Sprays by Both Nostrils route nightly as needed. , Disp: , Rfl:    Date Last Reviewed:  9/26/2017     Number of Personal Factors/Comorbidities that affect the Plan of Care: 1-2: MODERATE COMPLEXITY   EXAMINATION:   Observation/Orthostatic Postural Assessment: To PT with straight cane. Does not use cane in PT. Good heel toe gait. Increased step length and gait speed. . Pt with flexed posture due to chronic back problems. He reports sleeping in a recliner. Reports he has had many different kinds of treatment for back but with little success.   Tight hip flexors due to flexed posture. Tight HS and HC. Difficult to assess HS tightness due to inability to ie flat however with stretching in long sitting, he is unable to sit to upright. .   Palpation:          Pain medial knee   Functional Mobility:         Gait/Ambulation:  Independent with rolling walker        Stairs: WITH RAILING        ROM:     R knee 0    L knee -5 to 130                                    Strength:     Knee ext  R 4/5,  L 4+/5  Knee flexion  R 4/5,  L 4+/5         Hip flexion R 4/5,  L 4+/5     Hip abd   R 4-/5,  L 4-/5              Neurological Screen: Intact to light touch  Balance:  Fair,  Good with walker   Body Structures Involved:  1. Bones  2. Joints  3. Muscles  4. Ligaments Body Functions Affected:  1. Sensory/Pain  2. Neuromusculoskeletal  3. Movement Related Activities and Participation Affected:  1. Learning and Applying Knowledge  2. General Tasks and Demands  3. Mobility  4. Self Care  5. Domestic Life  6. Community, Social and [place]   Number of elements (examined above) that affect the Plan of Care: 3: MODERATE COMPLEXITY   CLINICAL PRESENTATION:   Presentation: Evolving clinical presentation with changing clinical characteristics: MODERATE COMPLEXITY   CLINICAL DECISION MAKING:   Outcome Measure: Tool Used: Lower Extremity Functional Scale (LEFS)  Score:  Initial: 15/80 Most Recent: 44/80 (Date: 8/24/17 )   Interpretation of Score: 20 questions each scored on a 5 point scale with 0 representing \"extreme difficulty or unable to perform\" and 4 representing \"no difficulty\". The lower the score, the greater the functional disability. 80/80 represents no disability. Minimal detectable change is 9 points. Score 80 79-63 62-48 47-32 31-16 15-1 0   Modifier CH CI CJ CK CL CM CN     ?  Mobility - Walking and Moving Around:     - CURRENT STATUS: CK - 40%-59% impaired, limited or restricted    - GOAL STATUS: CK - 40%-59% impaired, limited or restricted    - D/C STATUS:  ---------------To be determined---------------      Medical Necessity:   · Patient is expected to demonstrate progress in strength, range of motion and gait to increase independence with home and community activities. Reason for Services/Other Comments:  · Patient continues to require skilled intervention due to decreased ROM and strength and fair gait . Use of outcome tool(s) and clinical judgement create a POC that gives a: Clear prediction of patient's progress: LOW COMPLEXITY            TREATMENT:   (In addition to Assessment/Re-Assessment sessions the following treatments were rendered)  Pre-treatment Symptoms/Complaints:  Patient reports back   Pain: Initial: Pain Intensity 1: 2  Post Session: 1      THERAPEUTIC EXERCISE: (30 minutes):  Exercises per grid below to improve mobility, strength and gait. Required moderate verbal and manual cues to promote proper body alignment, promote proper body posture and promote proper body mechanics. Progressed resistance, range and repetitions as indicated. Pt is unable to lie down due to back issues. Worked on knee in supine in sitting with back to wall. ). Performed  exercise program of QS (quadriceps sets)  SAQ's (short arc quadriceps), and  LAQ's (long arc quadriceps)   Sitting [address] at 30# x 4 sets of 10. Worked on standing cable for hip flex and abd at 37.5# and hip ext at 50#  x 3 sets of 20. Work on double 6 in step up and over x 30. Worked on sitting knee ext at 20# x 3 sets 10 and single leg at 10# x 3 sets 10. . Sit to stand from plinth 1 x 20 Worked to increase quad contraction and quad control at heel strike and stance. NuStep at level 5 x 12. Chair squats x 10      Manual Therapy ( 30 min  ): Patella and patella tendon mobs. Soft tissue work to [employer] and HS. PROM for knee flex and extn. Sitting knee flex to 130. Passive knee ext to neutral Tape to knee along the medial aspect of his knee to help decrease burning and pain.          Therapeutic Modalities:    HEP: As directed. Treatment/Session Assessment:  Pt is post R TKA on 7/17/17. He had been at [facility] for [facility] until 8/2. Presented with decreased ROM and strength of R knee and LE. Good increase in ROM. Good increase in ROM inflexion and extension. Good gait pattern. Steady increase in strength with improved gait and balance. Needs to increase LE strength to compensate for chronic back pain. Posture is improving as LE strength increases. Back pain is limiting factor. Very motivated. Instructed patient to continue home exercises and stretches as directed. Patient hopes to become independent with all exercises after next visit. · Response to Treatment:  Understood exercises. Increased knee flexion. · Compliance with Program/Exercises: Doing exercises at home   · Recommendations/Intent for next treatment session: \"Next visit will focus on aggressive ROM and strength of R knee and LE. Gait training\".   Total Treatment Duration:  PT Patient Time In/Time Out  Time In: 0300  Time Out: 0400  Treatment number [id number] [place], [place]

## 2017-09-27 PROBLEM — E66.9 OBESITY: Status: ACTIVE | Noted: 2017-09-27

## 2017-09-28 ENCOUNTER — HOSPITAL ENCOUNTER (OUTPATIENT)
Dept: PHYSICAL THERAPY | Age: 67
Discharge: HOME | End: 2017-09-28
Payer: MEDICARE

## 2017-09-28 PROCEDURE — G8979 MOBILITY GOAL STATUS: HCPCS

## 2017-09-28 PROCEDURE — 97110 THERAPEUTIC EXERCISES: CPT

## 2017-09-28 PROCEDURE — 97140 MANUAL THERAPY 1/> REGIONS: CPT

## 2017-09-28 PROCEDURE — G8978 MOBILITY CURRENT STATUS: HCPCS

## 2017-09-28 NOTE — PROGRESS NOTES
[facility] at [facility]   [address], [address] [phone]   [phone]    Araceli Mora  : 1950       OUTPATIENT PHYSICAL THERAPY:Daily Note and Progress Report 2017    ICD-10: Treatment Diagnosis:   R26.2 Difficulty in walking, not elsewhere classified      M25.661 Stiffness of right knee, not elsewhere classified      M25.561 Pain in right knee     Precautions/Allergies:   Bactrim [sulfamethoxazole-trimethoprim]   Fall Risk Score: 2 (? 5 = High Risk)  MD Orders: Eval and treat MEDICAL/REFERRING DIAGNOSIS:  Presence of right artificial knee joint [Z96.651]   DATE OF ONSET:   REFERRING PHYSICIAN: Josette Demarco., *  RETURN PHYSICIAN APPOINTMENT: Aug 31, 2017     INITIAL ASSESSMENT:  Mr. Kobi Arciniega presents with decreased ROM and strength of R knee and LE post R TKA on 17. PT with fair gait with rolling walker. Work to increase ROM and strength to progress ambulation to cane and then no assistive device. Progress strength to enable return to prior activity level. PROBLEM LIST (Impacting functional limitations):  1. Decreased Strength  2. Decreased ADL/Functional Activities  3. Decreased Ambulation Ability/Technique  4. Increased Pain  5. Decreased Flexibility/Joint Mobility  6. Edema/Girth INTERVENTIONS PLANNED:  1. Home Exercise Program (HEP)  2. Manual Therapy  3. Range of Motion (ROM)  4. Therapeutic Activites  5. Therapeutic Exercise/Strengthening   TREATMENT PLAN:  Effective Dates: 8/3/17 TO 10/31/17. Frequency/Duration: 2 times a week for 12 weeks and upon reassessment,  will adjust frequency and duration as progress indicates. GOALS: (Goals have been discussed and agreed upon with patient.)  Short-Term Functional Goals: Time Frame: 4 weeks  1. Establish independent HEP with no cueing to increase ROM and strength  MET  2. Increase R knee ROM to 0-120 to increase ease of sitting and ambulation. MET  3. Independent gait with straight cane in home and community  MET  4. Increase LE strength so able to initiate reciprocal pattern on stairs. MET   Discharge Goals: Time Frame: 12 weeks   1. Improve score on LEFS by 9 points to enable prolonged sitting, standing and ambulation  MET  2. Increase R knee ROM to 0-125 to normalize gait. MET  3. Independent gait without assistive device in home and community  MET  4. Increase LE strength so able to perform reciprocal pattern on stairs with railing. ONGOING  5. Increase LE strength so able to return to work in his business  ONGOING  Rehabilitation Potential For Stated Goals: [address] [facility] therapy, I certify that the treatment plan above will be carried out by a therapist or under their direction. Thank you for this referral,  Rolando Plasencia, PT     Referring Physician Signature: Huang Soria., *              Date                    The information in this section was collected on 8/3/17 (except where otherwise noted). HISTORY:   History of Present Injury/Illness (Reason for Referral):  Pt is post R TKA on 7/17/17. He was at [facility] until 8/2 for rehab of R knee. He is now referred to outpatient PT for aggressive ROM and progressive strengthening of R knee and LE's. PT to PT with rolling walker. Progress to cane and then no assistive device. Pt does report that he has a chronic back problem and is unable to lie down. Past Medical History/Comorbidities:   Mr. Lyn Morales  has a past medical history of Asthma (dx childhood); Atrial fibrillation ([address] Cervical spondylosis (6/20/2013); Chronic back pain (2013); Chronic pain; Colon polyps; COPD (chronic obstructive pulmonary disease) ([address] (8/17/2016); Coronary atherosclerosis of native coronary vessel (8/17/2016); Diabetes ([address] (dx 5/14); Diverticulosis; Edema (8/17/2016); Former smoker; GERD (gastroesophageal reflux disease); High cholesterol; History of kidney stones;  Hypertension; Obesity (BMI 30-39.9); Status post total right knee replacement (7/17/2017); and Unspecified sleep apnea. He also has no past medical history of Adverse effect of anesthesia; Aneurysm ([facility] Autoimmune disease ([facility] Cancer ([facility] Chronic kidney disease; Coagulation defects; Coagulation disorder ([address] DEMENTIA; Difficult intubation; Endocarditis; Heart failure ([address] Ill-defined condition; Infectious disease; Liver disease; Malignant hyperthermia due to anesthesia; Nausea & vomiting; Neurological disorder; Nicotine vapor product user; Non-nicotine vapor product user; Other ill-defined conditions; Pseudocholinesterase deficiency; Psychiatric disorder; PUD (peptic ulcer disease); Rheumatic fever; Seizures ([facility] Stroke [facility]); Thromboembolus ([facility] Thyroid disease; or Unspecified adverse effect of anesthesia. Mr. Crystal Yusuf  has a past surgical history that includes cystoscopy,insert ureteral stent; colonoscopy (10/6/14); colonoscopy (11/20/14); lumbar laminectomy (2009); retinal detachment repair (Bilateral); cataract removal; other surgical (20 yrs ago); colectomy (2015); and heart catheterization (2012). Social History/Living Environment:     Lives in 2 story home with spouse  Prior Level of Function/Work/Activity:  Retired from [employer]. Now sales and business owner  Dominant Side:         RIGHT    Current Medications:       Current Outpatient Prescriptions:     melatonin tab tablet, Take 5 mg by mouth nightly. Indications: help with sleep, Disp: , Rfl:     amoxicillin (AMOXIL) 875 mg tablet, Take 1 Tab by mouth two (2) times a day., Disp: 10 Tab, Rfl: 0    chlorthalidone (HYGROTEN) 25 mg tablet, Take 1 Tab by mouth daily. , Disp: 90 Tab, Rfl: 0    metoprolol tartrate (LOPRESSOR) 25 mg tablet, Take 2 tablets by mouth twice daily, Disp: 180 Tab, Rfl: 0    atorvastatin (LIPITOR) 40 mg tablet, Take 0.5 Tabs by mouth daily. , Disp: 90 Tab, Rfl: 0    montelukast (SINGULAIR) 10 mg tablet, Take 1 Tab by mouth daily. , Disp: 90 Tab, Rfl: 0    pantoprazole (PROTONIX) 40 mg tablet, Take 1 Tab by mouth daily. , Disp: 90 Tab, Rfl: 0    ezetimibe (ZETIA) 10 mg tablet, Take 1 Tab by mouth nightly., Disp: 90 Tab, Rfl: 0    metFORMIN ER (GLUCOPHAGE XR) 500 mg tablet, Take 2 tablets every morning, Disp: 180 Tab, Rfl: 0    fluticasone-salmeterol (ADVAIR DISKUS) 500-50 mcg/dose diskus inhaler, Take 1 Puff by inhalation two (2) times a day., Disp: 3 Inhaler, Rfl: 0    apixaban (ELIQUIS) 5 mg tablet, Take 1 Tab by mouth two (2) times a day., Disp: 180 Tab, Rfl: 0    lisinopril (PRINIVIL, ZESTRIL) 10 mg tablet, Take 1 Tab by mouth daily. , Disp: 90 Tab, Rfl: 0    dapagliflozin (FARXIGA) 10 mg tab, Take 1 Tab by mouth daily. , Disp: 90 Tab, Rfl: 0    doxazosin (CARDURA) 4 mg tablet, Take 1 Tab by mouth daily. , Disp: 90 Tab, Rfl: 0    celecoxib (CELEBREX) 200 mg capsule, Take 1 Cap by mouth daily. , Disp: 90 Cap, Rfl: 0    dulaglutide (TRULICITY) 1.5 MO/0.6 mL sub-q pen, 0.5 mL by SubCUTAneous route every seven (7) days. , Disp: 12 Pen, Rfl: 0    aspirin delayed-release 81 mg tablet, Take 162 mg by mouth nightly. Decrease to one 81 mg tab on 7/12/17, Disp: , Rfl:     Omega-3 Fatty Acids 300 mg cap, Take 1 Cap by mouth daily. , Disp: , Rfl:     ANTI-FUNGAL 2 % topical powder, APPLY TO ABDOMEN 2 TIMES PER DAY, Disp: , Rfl: 11    Blood-Glucose Meter monitoring kit, Check fs every day; DM2, E11/9, Contour glucometer, Disp: 1 Kit, Rfl: 0    glucose blood VI test strips (ASCENSIA CONTOUR) strip, Check fs every day, DM2, E11.9, Disp: 100 Strip, Rfl: 1    lancets 28 gauge misc, Check fs every day, DM2, E11.9, Disp: 100 Lancet, Rfl: 1    albuterol (PROVENTIL VENTOLIN) 2.5 mg /3 mL (0.083 %) nebulizer solution, 3 mL by Nebulization route once for 1 dose.  (Patient taking differently: 2.5 mg by Nebulization route daily as needed.), Disp: 24 Each, Rfl: 11    albuterol (PROAIR HFA) 90 mcg/actuation inhaler, Take 2 Puffs by inhalation every four (4) hours as needed for Wheezing. Take / use AM day of surgery  per anesthesia protocols if needed. , Disp: , Rfl:     cholecalciferol, vitamin D3, (VITAMIN D3) 2,000 unit tab, Take 1 tablet by mouth daily. , Disp: , Rfl:     diphenhydrAMINE (BENADRYL ALLERGY) 25 mg tablet, Take 25 mg by mouth nightly as needed for Sleep., Disp: , Rfl:     b complex vitamins (B COMPLEX 1) tablet, Take 1 tablet by mouth every morning. Stop seven days prior to surgery per anesthesia protocol., Disp: , Rfl:     green tea leaf extract (GREEN TEA) Cap, Take 630 mg by mouth every morning. Stop seven days prior to surgery per anesthesia protocol., Disp: , Rfl:     vitamin E (AQUA GEMS) 400 unit capsule, Take 400 Units by mouth every morning. Stop seven days prior to surgery per anesthesia protocol., Disp: , Rfl:     FOLIC ACID PO, Take 401 mcg by mouth every morning. Stop seven days prior to surgery per anesthesia protocol., Disp: , Rfl:     DOCUSATE CALCIUM (STOOL SOFTENER PO), Take 1 capsule by mouth every morning., Disp: , Rfl:     mometasone (NASONEX) 50 mcg/Actuation nasal spray, 2 Sprays by Both Nostrils route nightly as needed. , Disp: , Rfl:    Date Last Reviewed:  9/28/2017     Number of Personal Factors/Comorbidities that affect the Plan of Care: 1-2: MODERATE COMPLEXITY   EXAMINATION:   Observation/Orthostatic Postural Assessment: To PT without assistive device. No longer using cane. Good heel toe gait. Increased step length and gait speed. . Pt with flexed posture due to chronic back problems. This is his major limitation with increased activity. He reports sleeping in a recliner as unable to lay flat on bed. Reports he has had many different kinds of treatment for back but with little success. Have attempted assist with elevating legs to help ie in bed but everything seems to increase back pain. Tight hip flexors due to flexed posture. Tight HS and HC.   Difficult to assess HS tightness due to inability to ie flat however with stretching in long sitting, he is unable to sit to upright. .   Palpation:          Pain medial knee Relief with taping  Functional Mobility:         Gait/Ambulation:  Independent with rolling walker        Stairs: WITH RAILING        ROM:     R knee 0    L knee -5 to 130                                    Strength:     Knee ext  R 4+/5,  L 4+/5  Knee flexion  R 4+/5,  L 4+/5         Hip flexion R /5,  L 4+/5     Hip abd   R 4-/5,  L 4-/5              Neurological Screen: Intact to light touch  Balance:  Fair,  Good with walker   Body Structures Involved:  1. Bones  2. Joints  3. Muscles  4. Ligaments Body Functions Affected:  1. Sensory/Pain  2. Neuromusculoskeletal  3. Movement Related Activities and Participation Affected:  1. Learning and Applying Knowledge  2. General Tasks and Demands  3. Mobility  4. Self Care  5. Domestic Life  6. Community, Social and [place]   Number of elements (examined above) that affect the Plan of Care: 3: MODERATE COMPLEXITY   CLINICAL PRESENTATION:   Presentation: Evolving clinical presentation with changing clinical characteristics: MODERATE COMPLEXITY   CLINICAL DECISION MAKING:   Outcome Measure: Tool Used: Lower Extremity Functional Scale (LEFS)  Score:  Initial: 15/80 Most Recent: 43/80 (Date: 9/28/17 )   Interpretation of Score: 20 questions each scored on a 5 point scale with 0 representing \"extreme difficulty or unable to perform\" and 4 representing \"no difficulty\". The lower the score, the greater the functional disability. 80/80 represents no disability. Minimal detectable change is 9 points. Score 80 79-63 62-48 47-32 31-16 15-1 0   Modifier CH CI CJ CK CL CM CN     ?  Mobility - Walking and Moving Around:     - CURRENT STATUS: CK - 40%-59% impaired, limited or restricted    - GOAL STATUS: CK - 40%-59% impaired, limited or restricted    - D/C STATUS:  ---------------To be determined---------------      Medical Necessity:   · Patient is expected to demonstrate progress in strength, range of motion and gait to increase independence with home and community activities. Reason for Services/Other Comments:  · Patient continues to require skilled intervention due to decreased ROM and strength and fair gait . Use of outcome tool(s) and clinical judgement create a POC that gives a: Clear prediction of patient's progress: LOW COMPLEXITY            TREATMENT:   (In addition to Assessment/Re-Assessment sessions the following treatments were rendered)  Pre-treatment Symptoms/Complaints:  Patient reports back is painful but knee feels good. Wants to increase strength due to back issues. Pain: Initial:    Post Session: 1      THERAPEUTIC EXERCISE: (30 minutes):  Exercises per grid below to improve mobility, strength and gait. Required moderate verbal and manual cues to promote proper body alignment, promote proper body posture and promote proper body mechanics. Progressed resistance, range and repetitions as indicated. Pt is unable to lie down due to back issues. Worked on knee in supine in sitting with back to wall. ). Performed  exercise program of QS (quadriceps sets)  SAQ's (short arc quadriceps), and  LAQ's (long arc quadriceps)   Sitting [address] at 30# x 4 sets of 10. Worked on standing cable for hip flex and abd at 37.5# and hip ext at 50#  x 3 sets of 20. Work on double 6 in step up and over x 30. Worked on sitting knee ext at 20# x 3 sets 10 and single leg at 10# x 3 sets 10. . Sit to stand from plinth 1 x 20 Worked to increase quad contraction and quad control at heel strike and stance. NuStep at level 5 x 12. Chair squats x 10      Manual Therapy ( 30 min  ): Patella and patella tendon mobs. Soft tissue work to [employer] and HS. PROM for knee flex and extn. Sitting knee flex to 133. Passive knee ext to neutral.  Tape to knee along the medial aspect of his knee to help decrease burning and pain.          Therapeutic Modalities:    HEP: As directed. Treatment/Session Assessment:  Pt is post R TKA on 7/17/17. He had been at [facility] for rehab until 8/2. Presented with decreased ROM and strength of R knee and LE. Good increase in ROM. Good increase in ROM inflexion and extension. Good gait pattern. Steady increase in strength with improved gait and balance. Flexed posture due to chronic back issues. He reports no prior treatment has helped. Did suggest looking into nerve blocks. Needs to increase LE strength to compensate for chronic back pain. Posture is improving as LE strength increases. Back pain is limiting factor. Very motivated. Instructed patient to continue home exercises and stretches as directed. Patient hopes to become independent with all exercises after next visit. · Response to Treatment:  Understood exercises. Increased knee flexion. · Compliance with Program/Exercises: Doing exercises at home   · Recommendations/Intent for next treatment session: \"Next visit will focus on aggressive ROM and strength of R knee and LE. Gait training\". Continue 1 x week for 2 weeks.   Total Treatment Duration:     [facility],

## 2017-10-04 ENCOUNTER — HOSPITAL ENCOUNTER (OUTPATIENT)
Dept: DIABETES SERVICES | Age: 67
Discharge: HOME | End: 2017-10-04
Payer: MEDICARE

## 2017-10-04 PROCEDURE — G0109 DIAB MANAGE TRN IND/GROUP: HCPCS

## 2017-10-04 NOTE — PROGRESS NOTES
Attended nutrition diabetes #2 group session today. Topics included: plate method for portion control; fiber and sodium guidelines; sugar substitutes; alcohol; eating out; recipe modification; label reading. Voiced/demonstrated understanding of material covered. Anticipated adherence is average. Problems/barriers: none identified. Pt's nutrition goal is to choose heart healthy fats. Pt will change from butter to olive oil. Recommend check pre-meal blood glucose and 2 hour post-prandial blood glucose to see how food choices affect blood glucose. Plan for follow up is attend diabetes medical #1 class on 10/12/17.

## 2017-10-05 ENCOUNTER — HOSPITAL ENCOUNTER (OUTPATIENT)
Dept: PHYSICAL THERAPY | Age: 67
Discharge: HOME | End: 2017-10-05
Payer: MEDICARE

## 2017-10-05 PROCEDURE — 97110 THERAPEUTIC EXERCISES: CPT

## 2017-10-05 PROCEDURE — 97140 MANUAL THERAPY 1/> REGIONS: CPT

## 2017-10-05 NOTE — PROGRESS NOTES
[facility] at [facility]   [address], [address]  [phone]   [phone]    Stefanie Scale  : 1950       OUTPATIENT PHYSICAL THERAPY:Daily Note and Progress Report 10/5/2017    ICD-10: Treatment Diagnosis:   R26.2 Difficulty in walking, not elsewhere classified      M25.661 Stiffness of right knee, not elsewhere classified      M25.561 Pain in right knee     Precautions/Allergies:   Bactrim [sulfamethoxazole-trimethoprim]   Fall Risk Score: 2 (? 5 = High Risk)  MD Orders: Eval and treat MEDICAL/REFERRING DIAGNOSIS:  Presence of right artificial knee joint [Z96.651]   DATE OF ONSET:   REFERRING PHYSICIAN: Amalia Joseph., *  RETURN PHYSICIAN APPOINTMENT: Aug 31, 2017     INITIAL ASSESSMENT:  Mr. Crystal Yusuf presents with decreased ROM and strength of R knee and LE post R TKA on 17. PT with fair gait with rolling walker. Work to increase ROM and strength to progress ambulation to cane and then no assistive device. Progress strength to enable return to prior activity level. PROBLEM LIST (Impacting functional limitations):  1. Decreased Strength  2. Decreased ADL/Functional Activities  3. Decreased Ambulation Ability/Technique  4. Increased Pain  5. Decreased Flexibility/Joint Mobility  6. Edema/Girth INTERVENTIONS PLANNED:  1. Home Exercise Program (HEP)  2. Manual Therapy  3. Range of Motion (ROM)  4. Therapeutic Activites  5. Therapeutic Exercise/Strengthening   TREATMENT PLAN:  Effective Dates: 8/3/17 TO 10/31/17. Frequency/Duration: 2 times a week for 12 weeks and upon reassessment,  will adjust frequency and duration as progress indicates. GOALS: (Goals have been discussed and agreed upon with patient.)  Short-Term Functional Goals: Time Frame: 4 weeks  1. Establish independent HEP with no cueing to increase ROM and strength  MET  2. Increase R knee ROM to 0-120 to increase ease of sitting and ambulation. MET  3. Independent gait with straight cane in home and community  MET  4. Increase LE strength so able to initiate reciprocal pattern on stairs. MET   Discharge Goals: Time Frame: 12 weeks   1. Improve score on LEFS by 9 points to enable prolonged sitting, standing and ambulation  MET  2. Increase R knee ROM to 0-125 to normalize gait. MET  3. Independent gait without assistive device in home and community  MET  4. Increase LE strength so able to perform reciprocal pattern on stairs with railing. ONGOING  5. Increase LE strength so able to return to work in his business  ONGOING  Rehabilitation Potential For Stated Goals: [address] therapy, I certify that the treatment plan above will be carried out by a therapist or under their direction. Thank you for this referral,  Raheem Anaya, PT     Referring Physician Signature: Pratibha Vincent., *              Date                    The information in this section was collected on 8/3/17 (except where otherwise noted). HISTORY:   History of Present Injury/Illness (Reason for Referral):  Pt is post R TKA on 7/17/17. He was at [facility] until 8/2 for rehab of R knee. He is now referred to outpatient PT for aggressive ROM and progressive strengthening of R knee and LE's. PT to PT with rolling walker. Progress to cane and then no assistive device. Pt does report that he has a chronic back problem and is unable to lie down. Past Medical History/Comorbidities:   Mr. Palomares Speaker  has a past medical history of Asthma (dx childhood); Atrial fibrillation ([address] Cervical spondylosis (6/20/2013); Chronic back pain (2013); Chronic pain; Colon polyps; COPD (chronic obstructive pulmonary disease) ([address] (8/17/2016); Coronary atherosclerosis of native coronary vessel (8/17/2016); Diabetes ([address] (dx 5/14); Diverticulosis; Edema (8/17/2016); Former smoker; GERD (gastroesophageal reflux disease); High cholesterol; History of kidney stones;  Hypertension; Obesity (BMI 30-39.9); Status post total right knee replacement (7/17/2017); and Unspecified sleep apnea. He also has no past medical history of Adverse effect of anesthesia; Aneurysm ([facility] Autoimmune disease ([facility] Cancer ([facility] Chronic kidney disease; Coagulation defects; Coagulation disorder ([facility] DEMENTIA; Difficult intubation; Endocarditis; Heart failure ([address] Ill-defined condition; Infectious disease; Liver disease; Malignant hyperthermia due to anesthesia; Nausea & vomiting; Neurological disorder; Nicotine vapor product user; Non-nicotine vapor product user; Other ill-defined conditions; Pseudocholinesterase deficiency; Psychiatric disorder; PUD (peptic ulcer disease); Rheumatic fever; Seizures ([facility] Stroke [facility]); Thromboembolus ([facility] Thyroid disease; or Unspecified adverse effect of anesthesia. Mr. Raul Dang  has a past surgical history that includes cystoscopy,insert ureteral stent; colonoscopy (10/6/14); colonoscopy (11/20/14); lumbar laminectomy (2009); retinal detachment repair (Bilateral); cataract removal; other surgical (20 yrs ago); colectomy (2015); and heart catheterization (2012). Social History/Living Environment:     Lives in 2 story home with spouse  Prior Level of Function/Work/Activity:  Retired from [employer]. Now sales and business owner  Dominant Side:         RIGHT    Current Medications:       Current Outpatient Prescriptions:     melatonin tab tablet, Take 5 mg by mouth nightly. Indications: help with sleep, Disp: , Rfl:     amoxicillin (AMOXIL) 875 mg tablet, Take 1 Tab by mouth two (2) times a day., Disp: 10 Tab, Rfl: 0    chlorthalidone (HYGROTEN) 25 mg tablet, Take 1 Tab by mouth daily. , Disp: 90 Tab, Rfl: 0    metoprolol tartrate (LOPRESSOR) 25 mg tablet, Take 2 tablets by mouth twice daily, Disp: 180 Tab, Rfl: 0    atorvastatin (LIPITOR) 40 mg tablet, Take 0.5 Tabs by mouth daily. , Disp: 90 Tab, Rfl: 0    montelukast (SINGULAIR) 10 mg tablet, Take 1 Tab by mouth daily. , Disp: 90 Tab, Rfl: 0    pantoprazole (PROTONIX) 40 mg tablet, Take 1 Tab by mouth daily. , Disp: 90 Tab, Rfl: 0    ezetimibe (ZETIA) 10 mg tablet, Take 1 Tab by mouth nightly., Disp: 90 Tab, Rfl: 0    metFORMIN ER (GLUCOPHAGE XR) 500 mg tablet, Take 2 tablets every morning, Disp: 180 Tab, Rfl: 0    fluticasone-salmeterol (ADVAIR DISKUS) 500-50 mcg/dose diskus inhaler, Take 1 Puff by inhalation two (2) times a day., Disp: 3 Inhaler, Rfl: 0    apixaban (ELIQUIS) 5 mg tablet, Take 1 Tab by mouth two (2) times a day., Disp: 180 Tab, Rfl: 0    lisinopril (PRINIVIL, ZESTRIL) 10 mg tablet, Take 1 Tab by mouth daily. , Disp: 90 Tab, Rfl: 0    dapagliflozin (FARXIGA) 10 mg tab, Take 1 Tab by mouth daily. , Disp: 90 Tab, Rfl: 0    doxazosin (CARDURA) 4 mg tablet, Take 1 Tab by mouth daily. , Disp: 90 Tab, Rfl: 0    celecoxib (CELEBREX) 200 mg capsule, Take 1 Cap by mouth daily. , Disp: 90 Cap, Rfl: 0    dulaglutide (TRULICITY) 1.5 LF/8.6 mL sub-q pen, 0.5 mL by SubCUTAneous route every seven (7) days. , Disp: 12 Pen, Rfl: 0    aspirin delayed-release 81 mg tablet, Take 162 mg by mouth nightly. Decrease to one 81 mg tab on 7/12/17, Disp: , Rfl:     Omega-3 Fatty Acids 300 mg cap, Take 1 Cap by mouth daily. , Disp: , Rfl:     ANTI-FUNGAL 2 % topical powder, APPLY TO ABDOMEN 2 TIMES PER DAY, Disp: , Rfl: 11    Blood-Glucose Meter monitoring kit, Check fs every day; DM2, E11/9, Contour glucometer, Disp: 1 Kit, Rfl: 0    glucose blood VI test strips (ASCENSIA CONTOUR) strip, Check fs every day, DM2, E11.9, Disp: 100 Strip, Rfl: 1    lancets 28 gauge misc, Check fs every day, DM2, E11.9, Disp: 100 Lancet, Rfl: 1    albuterol (PROVENTIL VENTOLIN) 2.5 mg /3 mL (0.083 %) nebulizer solution, 3 mL by Nebulization route once for 1 dose.  (Patient taking differently: 2.5 mg by Nebulization route daily as needed.), Disp: 24 Each, Rfl: 11    albuterol (PROAIR HFA) 90 mcg/actuation inhaler, Take 2 Puffs by inhalation every four (4) hours as needed for Wheezing. Take / use AM day of surgery  per anesthesia protocols if needed. , Disp: , Rfl:     cholecalciferol, vitamin D3, (VITAMIN D3) 2,000 unit tab, Take 1 tablet by mouth daily. , Disp: , Rfl:     diphenhydrAMINE (BENADRYL ALLERGY) 25 mg tablet, Take 25 mg by mouth nightly as needed for Sleep., Disp: , Rfl:     b complex vitamins (B COMPLEX 1) tablet, Take 1 tablet by mouth every morning. Stop seven days prior to surgery per anesthesia protocol., Disp: , Rfl:     green tea leaf extract (GREEN TEA) Cap, Take 630 mg by mouth every morning. Stop seven days prior to surgery per anesthesia protocol., Disp: , Rfl:     vitamin E (AQUA GEMS) 400 unit capsule, Take 400 Units by mouth every morning. Stop seven days prior to surgery per anesthesia protocol., Disp: , Rfl:     FOLIC ACID PO, Take 282 mcg by mouth every morning. Stop seven days prior to surgery per anesthesia protocol., Disp: , Rfl:     DOCUSATE CALCIUM (STOOL SOFTENER PO), Take 1 capsule by mouth every morning., Disp: , Rfl:     mometasone (NASONEX) 50 mcg/Actuation nasal spray, 2 Sprays by Both Nostrils route nightly as needed. , Disp: , Rfl:    Date Last Reviewed:  10/5/2017     Number of Personal Factors/Comorbidities that affect the Plan of Care: 1-2: MODERATE COMPLEXITY   EXAMINATION:   Observation/Orthostatic Postural Assessment: To PT without assistive device. No longer using cane. Good heel toe gait. Increased step length and gait speed. . Pt with flexed posture due to chronic back problems. This is his major limitation with increased activity. He reports sleeping in a recliner as unable to lay flat on bed. Reports he has had many different kinds of treatment for back but with little success. Have attempted assist with elevating legs to help ie in bed but everything seems to increase back pain. Tight hip flexors due to flexed posture. Tight HS and HC.   Difficult to assess HS tightness due to inability to ie flat however with stretching in long sitting, he is unable to sit to upright. .   Palpation:          Pain medial knee Relief with taping  Functional Mobility:         Gait/Ambulation:  Independent with rolling walker        Stairs: WITH RAILING        ROM:     R knee 0    L knee -5 to 130                                    Strength:     Knee ext  R 4+/5,  L 4+/5  Knee flexion  R 4+/5,  L 4+/5         Hip flexion R /5,  L 4+/5     Hip abd   R 4-/5,  L 4-/5              Neurological Screen: Intact to light touch  Balance:  Fair,  Good with walker   Body Structures Involved:  1. Bones  2. Joints  3. Muscles  4. Ligaments Body Functions Affected:  1. Sensory/Pain  2. Neuromusculoskeletal  3. Movement Related Activities and Participation Affected:  1. Learning and Applying Knowledge  2. General Tasks and Demands  3. Mobility  4. Self Care  5. Domestic Life  6. Community, Social and [place]   Number of elements (examined above) that affect the Plan of Care: 3: MODERATE COMPLEXITY   CLINICAL PRESENTATION:   Presentation: Evolving clinical presentation with changing clinical characteristics: MODERATE COMPLEXITY   CLINICAL DECISION MAKING:   Outcome Measure: Tool Used: Lower Extremity Functional Scale (LEFS)  Score:  Initial: 15/80 Most Recent: 43/80 (Date: 9/28/17 )   Interpretation of Score: 20 questions each scored on a 5 point scale with 0 representing \"extreme difficulty or unable to perform\" and 4 representing \"no difficulty\". The lower the score, the greater the functional disability. 80/80 represents no disability. Minimal detectable change is 9 points. Score 80 79-63 62-48 47-32 31-16 15-1 0   Modifier CH CI CJ CK CL CM CN     ?  Mobility - Walking and Moving Around:     - CURRENT STATUS: CK - 40%-59% impaired, limited or restricted    - GOAL STATUS: CK - 40%-59% impaired, limited or restricted    - D/C STATUS:  ---------------To be determined---------------      Medical Necessity:   · Patient is expected to demonstrate progress in strength, range of motion and gait to increase independence with home and community activities. Reason for Services/Other Comments:  · Patient continues to require skilled intervention due to decreased ROM and strength and fair gait . Use of outcome tool(s) and clinical judgement create a POC that gives a: Clear prediction of patient's progress: LOW COMPLEXITY            TREATMENT:   (In addition to Assessment/Re-Assessment sessions the following treatments were rendered)  Pre-treatment Symptoms/Complaints:  Patient reports knee hurt without the tape. He states that it's still a little stiff, but not bad. Pain: Initial: Pain Intensity 1: 1  Post Session: 1      THERAPEUTIC EXERCISE: (30 minutes):  Exercises per grid below to improve mobility, strength and gait. Required moderate verbal and manual cues to promote proper body alignment, promote proper body posture and promote proper body mechanics. Progressed resistance, range and repetitions as indicated. Pt is unable to lie down due to back issues. Worked on knee in supine in sitting with back to wall. ). Performed  exercise program of QS (quadriceps sets)  SAQ's (short arc quadriceps), and  LAQ's (long arc quadriceps)   Sitting [address] at 30# x 4 sets of 10. Worked on standing cable for hip flex and abd at 37.5# and hip ext at 50#  x 3 sets of 20. Work on double 6 in step up and over x 30. Worked on sitting knee ext at 20# x 3 sets 10 and single leg at 10# x 3 sets 10. . Sit to stand from plinth 1 x 20 Worked to increase quad contraction and quad control at heel strike and stance. NuStep at level 5 x 12. Chair squats x 10      Manual Therapy ( 30 min  ): Patella and patella tendon mobs. Soft tissue work to [employer] and HS. PROM for knee flex and extn. Sitting knee flex to 133. Passive knee ext to neutral.  Tape to knee along the medial aspect of his knee to help decrease burning and pain.          Therapeutic Modalities:    HEP: As directed. Treatment/Session Assessment:  Pt is post R TKA on 7/17/17. He had been at [facility] for rehab until 8/2. Presented with decreased ROM and strength of R knee and LE. Good increase in ROM. Good increase in ROM inflexion and extension. Good gait pattern. Steady increase in strength with improved gait and balance. Posture is improving as LE strength increases. Back pain is limiting factor. Very motivated. Instructed patient to continue home exercises and stretches as directed. · Response to Treatment:  Understood exercises. Increased knee flexion. · Compliance with Program/Exercises: Doing exercises at home   · Recommendations/Intent for next treatment session: \"Next visit will focus on aggressive ROM and strength of R knee and LE. Gait training\". Continue 1 x week for 2 weeks.   Total Treatment Duration:  PT Patient Time In/Time Out  Time In: 0400  Time Out: 0500  Treatment number [id number]

## 2017-10-12 ENCOUNTER — HOSPITAL ENCOUNTER (OUTPATIENT)
Dept: DIABETES SERVICES | Age: 67
Discharge: HOME | End: 2017-10-12
Payer: MEDICARE

## 2017-10-12 ENCOUNTER — HOSPITAL ENCOUNTER (OUTPATIENT)
Dept: PHYSICAL THERAPY | Age: 67
Discharge: HOME | End: 2017-10-12
Payer: MEDICARE

## 2017-10-12 PROCEDURE — G0109 DIAB MANAGE TRN IND/GROUP: HCPCS

## 2017-10-12 PROCEDURE — 97110 THERAPEUTIC EXERCISES: CPT

## 2017-10-12 PROCEDURE — 97140 MANUAL THERAPY 1/> REGIONS: CPT

## 2017-10-12 NOTE — PROGRESS NOTES
[facility] at [facility]   [address], [address]  [phone]   [phone]    Sp Fraga  : 1950       OUTPATIENT PHYSICAL THERAPY:Daily Note 10/12/2017    ICD-10: Treatment Diagnosis:   R26.2 Difficulty in walking, not elsewhere classified      M25.661 Stiffness of right knee, not elsewhere classified      M25.561 Pain in right knee     Precautions/Allergies:   Bactrim [sulfamethoxazole-trimethoprim]   Fall Risk Score: 2 (? 5 = High Risk)  MD Orders: Eval and treat MEDICAL/REFERRING DIAGNOSIS:  Presence of right artificial knee joint [Z96.651]   DATE OF ONSET:   REFERRING PHYSICIAN: Adam Rose., *  RETURN PHYSICIAN APPOINTMENT: Aug 31, 2017     INITIAL ASSESSMENT:  Mr. Karissa Elizalde presents with decreased ROM and strength of R knee and LE post R TKA on 17. PT with fair gait with rolling walker. Work to increase ROM and strength to progress ambulation to cane and then no assistive device. Progress strength to enable return to prior activity level. PROBLEM LIST (Impacting functional limitations):  1. Decreased Strength  2. Decreased ADL/Functional Activities  3. Decreased Ambulation Ability/Technique  4. Increased Pain  5. Decreased Flexibility/Joint Mobility  6. Edema/Girth INTERVENTIONS PLANNED:  1. Home Exercise Program (HEP)  2. Manual Therapy  3. Range of Motion (ROM)  4. Therapeutic Activites  5. Therapeutic Exercise/Strengthening   TREATMENT PLAN:  Effective Dates: 8/3/17 TO 10/31/17. Frequency/Duration: 2 times a week for 12 weeks and upon reassessment,  will adjust frequency and duration as progress indicates. GOALS: (Goals have been discussed and agreed upon with patient.)  Short-Term Functional Goals: Time Frame: 4 weeks  1. Establish independent HEP with no cueing to increase ROM and strength  MET  2. Increase R knee ROM to 0-120 to increase ease of sitting and ambulation. MET  3.  Independent gait with straight cane in home and community  MET  4. Increase LE strength so able to initiate reciprocal pattern on stairs. MET   Discharge Goals: Time Frame: 12 weeks   1. Improve score on LEFS by 9 points to enable prolonged sitting, standing and ambulation  MET  2. Increase R knee ROM to 0-125 to normalize gait. MET  3. Independent gait without assistive device in home and community  MET  4. Increase LE strength so able to perform reciprocal pattern on stairs with railing. ONGOING  5. Increase LE strength so able to return to work in his business  ONGOING  Rehabilitation Potential For Stated Goals: [address] [facility], I certify that the treatment plan above will be carried out by a therapist or under their direction. Thank you for this referral,  Cristian Dubose, PT     Referring Physician Signature: Arjun Carvajal, *              Date                    The information in this section was collected on 8/3/17 (except where otherwise noted). HISTORY:   History of Present Injury/Illness (Reason for Referral):  Pt is post R TKA on 7/17/17. He was at [facility] until 8/2 for rehab of R knee. He is now referred to outpatient PT for aggressive ROM and progressive strengthening of R knee and LE's. PT to PT with rolling walker. Progress to cane and then no assistive device. Pt does report that he has a chronic back problem and is unable to lie down. Past Medical History/Comorbidities:   Mr. Kecia Vanessa  has a past medical history of Asthma (dx childhood); Atrial fibrillation ([address] Cervical spondylosis (6/20/2013); Chronic back pain (2013); Chronic pain; Colon polyps; COPD (chronic obstructive pulmonary disease) ([address] (8/17/2016); Coronary atherosclerosis of native coronary vessel (8/17/2016); Diabetes ([address] (dx 5/14); Diverticulosis; Edema (8/17/2016); Former smoker; GERD (gastroesophageal reflux disease); High cholesterol; History of kidney stones; Hypertension; Obesity (BMI 30-39.9);  Status post total right knee replacement (7/17/2017); and Unspecified sleep apnea. He also has no past medical history of Adverse effect of anesthesia; Aneurysm ([address] Autoimmune disease ([address] Cancer ([address] Chronic kidney disease; Coagulation defects; Coagulation disorder ([address] DEMENTIA; Difficult intubation; Endocarditis; Heart failure ([address] Ill-defined condition; Infectious disease; Liver disease; Malignant hyperthermia due to anesthesia; Nausea & vomiting; Neurological disorder; Nicotine vapor product user; Non-nicotine vapor product user; Other ill-defined conditions; Pseudocholinesterase deficiency; Psychiatric disorder; PUD (peptic ulcer disease); Rheumatic fever; Seizures ([address] Stroke [facility]); Thromboembolus ([address] Thyroid disease; or Unspecified adverse effect of anesthesia. Mr. Aileen Delgado  has a past surgical history that includes cystoscopy,insert ureteral stent; colonoscopy (10/6/14); colonoscopy (11/20/14); lumbar laminectomy (2009); retinal detachment repair (Bilateral); cataract removal; other surgical (20 yrs ago); colectomy (2015); and heart catheterization (2012). Social History/Living Environment:     Lives in 2 story home with spouse  Prior Level of Function/Work/Activity:  Retired from [employer]. Now sales and business owner  Dominant Side:         RIGHT    Current Medications:       Current Outpatient Prescriptions:     melatonin tab tablet, Take 5 mg by mouth nightly. Indications: help with sleep, Disp: , Rfl:     amoxicillin (AMOXIL) 875 mg tablet, Take 1 Tab by mouth two (2) times a day., Disp: 10 Tab, Rfl: 0    chlorthalidone (HYGROTEN) 25 mg tablet, Take 1 Tab by mouth daily. , Disp: 90 Tab, Rfl: 0    metoprolol tartrate (LOPRESSOR) 25 mg tablet, Take 2 tablets by mouth twice daily, Disp: 180 Tab, Rfl: 0    atorvastatin (LIPITOR) 40 mg tablet, Take 0.5 Tabs by mouth daily. , Disp: 90 Tab, Rfl: 0    montelukast (SINGULAIR) 10 mg tablet, Take 1 Tab by mouth daily. , Disp: 90 Tab, Rfl: 0   pantoprazole (PROTONIX) 40 mg tablet, Take 1 Tab by mouth daily. , Disp: 90 Tab, Rfl: 0    ezetimibe (ZETIA) 10 mg tablet, Take 1 Tab by mouth nightly., Disp: 90 Tab, Rfl: 0    metFORMIN ER (GLUCOPHAGE XR) 500 mg tablet, Take 2 tablets every morning, Disp: 180 Tab, Rfl: 0    fluticasone-salmeterol (ADVAIR DISKUS) 500-50 mcg/dose diskus inhaler, Take 1 Puff by inhalation two (2) times a day., Disp: 3 Inhaler, Rfl: 0    apixaban (ELIQUIS) 5 mg tablet, Take 1 Tab by mouth two (2) times a day., Disp: 180 Tab, Rfl: 0    lisinopril (PRINIVIL, ZESTRIL) 10 mg tablet, Take 1 Tab by mouth daily. , Disp: 90 Tab, Rfl: 0    dapagliflozin (FARXIGA) 10 mg tab, Take 1 Tab by mouth daily. , Disp: 90 Tab, Rfl: 0    doxazosin (CARDURA) 4 mg tablet, Take 1 Tab by mouth daily. , Disp: 90 Tab, Rfl: 0    celecoxib (CELEBREX) 200 mg capsule, Take 1 Cap by mouth daily. , Disp: 90 Cap, Rfl: 0    dulaglutide (TRULICITY) 1.5 JR/7.4 mL sub-q pen, 0.5 mL by SubCUTAneous route every seven (7) days. , Disp: 12 Pen, Rfl: 0    aspirin delayed-release 81 mg tablet, Take 162 mg by mouth nightly. Decrease to one 81 mg tab on 7/12/17, Disp: , Rfl:     Omega-3 Fatty Acids 300 mg cap, Take 1 Cap by mouth daily. , Disp: , Rfl:     ANTI-FUNGAL 2 % topical powder, APPLY TO ABDOMEN 2 TIMES PER DAY, Disp: , Rfl: 11    Blood-Glucose Meter monitoring kit, Check fs every day; DM2, E11/9, Contour glucometer, Disp: 1 Kit, Rfl: 0    glucose blood VI test strips (ASCENSIA CONTOUR) strip, Check fs every day, DM2, E11.9, Disp: 100 Strip, Rfl: 1    lancets 28 gauge misc, Check fs every day, DM2, E11.9, Disp: 100 Lancet, Rfl: 1    albuterol (PROVENTIL VENTOLIN) 2.5 mg /3 mL (0.083 %) nebulizer solution, 3 mL by Nebulization route once for 1 dose.  (Patient taking differently: 2.5 mg by Nebulization route daily as needed.), Disp: 24 Each, Rfl: 11    albuterol (PROAIR HFA) 90 mcg/actuation inhaler, Take 2 Puffs by inhalation every four (4) hours as needed for Wheezing. Take / use AM day of surgery  per anesthesia protocols if needed. , Disp: , Rfl:     cholecalciferol, vitamin D3, (VITAMIN D3) 2,000 unit tab, Take 1 tablet by mouth daily. , Disp: , Rfl:     diphenhydrAMINE (BENADRYL ALLERGY) 25 mg tablet, Take 25 mg by mouth nightly as needed for Sleep., Disp: , Rfl:     b complex vitamins (B COMPLEX 1) tablet, Take 1 tablet by mouth every morning. Stop seven days prior to surgery per anesthesia protocol., Disp: , Rfl:     green tea leaf extract (GREEN TEA) Cap, Take 630 mg by mouth every morning. Stop seven days prior to surgery per anesthesia protocol., Disp: , Rfl:     vitamin E (AQUA GEMS) 400 unit capsule, Take 400 Units by mouth every morning. Stop seven days prior to surgery per anesthesia protocol., Disp: , Rfl:     FOLIC ACID PO, Take 631 mcg by mouth every morning. Stop seven days prior to surgery per anesthesia protocol., Disp: , Rfl:     DOCUSATE CALCIUM (STOOL SOFTENER PO), Take 1 capsule by mouth every morning., Disp: , Rfl:     mometasone (NASONEX) 50 mcg/Actuation nasal spray, 2 Sprays by Both Nostrils route nightly as needed. , Disp: , Rfl:    Date Last Reviewed:  10/12/2017     Number of Personal Factors/Comorbidities that affect the Plan of Care: 1-2: MODERATE COMPLEXITY   EXAMINATION:   Observation/Orthostatic Postural Assessment: To PT without assistive device. No longer using cane. Good heel toe gait. Increased step length and gait speed. . Pt with flexed posture due to chronic back problems. This is his major limitation with increased activity. He reports sleeping in a recliner as unable to lay flat on bed. Reports he has had many different kinds of treatment for back but with little success. Have attempted assist with elevating legs to help ie in bed but everything seems to increase back pain. Tight hip flexors due to flexed posture. Tight HS and HC.   Difficult to assess HS tightness due to inability to ie flat however with stretching in long sitting, he is unable to sit to upright. .   Palpation:          Pain medial knee Relief with taping  Functional Mobility:         Gait/Ambulation:  Independent with rolling walker        Stairs: WITH RAILING        ROM:     R knee 0    L knee -5 to 130                                    Strength:     Knee ext  R 4+/5,  L 4+/5  Knee flexion  R 4+/5,  L 4+/5         Hip flexion R /5,  L 4+/5     Hip abd   R 4-/5,  L 4-/5              Neurological Screen: Intact to light touch  Balance:  Fair,  Good with walker   Body Structures Involved:  1. Bones  2. Joints  3. Muscles  4. Ligaments Body Functions Affected:  1. Sensory/Pain  2. Neuromusculoskeletal  3. Movement Related Activities and Participation Affected:  1. Learning and Applying Knowledge  2. General Tasks and Demands  3. Mobility  4. Self Care  5. Domestic Life  6. Community, Social and [place]   Number of elements (examined above) that affect the Plan of Care: 3: MODERATE COMPLEXITY   CLINICAL PRESENTATION:   Presentation: Evolving clinical presentation with changing clinical characteristics: MODERATE COMPLEXITY   CLINICAL DECISION MAKING:   Outcome Measure: Tool Used: Lower Extremity Functional Scale (LEFS)  Score:  Initial: 15/80 Most Recent: 43/80 (Date: 9/28/17 )   Interpretation of Score: 20 questions each scored on a 5 point scale with 0 representing \"extreme difficulty or unable to perform\" and 4 representing \"no difficulty\". The lower the score, the greater the functional disability. 80/80 represents no disability. Minimal detectable change is 9 points. Score 80 79-63 62-48 47-32 31-16 15-1 0   Modifier CH CI CJ CK CL CM CN     ?  Mobility - Walking and Moving Around:     - CURRENT STATUS: CK - 40%-59% impaired, limited or restricted    - GOAL STATUS: CK - 40%-59% impaired, limited or restricted    - D/C STATUS:  ---------------To be determined---------------      Medical Necessity:   · Patient is expected to demonstrate progress in strength, range of motion and gait to increase independence with home and community activities. Reason for Services/Other Comments:  · Patient continues to require skilled intervention due to decreased ROM and strength and fair gait . Use of outcome tool(s) and clinical judgement create a POC that gives a: Clear prediction of patient's progress: LOW COMPLEXITY            TREATMENT:   (In addition to Assessment/Re-Assessment sessions the following treatments were rendered)  Pre-treatment Symptoms/Complaints:  Patient reports back is painful but knee feels good. Wants to increase strength due to back issues. Pain: Initial: Pain Intensity 1: 2  Post Session: 1      THERAPEUTIC EXERCISE: (30 minutes):  Exercises per grid below to improve mobility, strength and gait. Required moderate verbal and manual cues to promote proper body alignment, promote proper body posture and promote proper body mechanics. Progressed resistance, range and repetitions as indicated. Pt is unable to lie down due to back issues. Worked on knee in supine in sitting with back to wall. ). Performed  exercise program of QS (quadriceps sets)  SAQ's (short arc quadriceps), and  LAQ's (long arc quadriceps)   Sitting [address] at 30# x 4 sets of 10. Worked on standing cable for hip flex and abd at 37.5# and hip ext at 50#  x 3 sets of 20. Work on double 6 in step up and over x 30. Worked on sitting knee ext at 20# x 3 sets 10 and single leg at 10# x 3 sets 10. . Sit to stand from plinth 1 x 20 Worked to increase quad contraction and quad control at heel strike and stance. NuStep at level 5 x 12. Chair squats x 10      Manual Therapy ( 30 min  ): Patella and patella tendon mobs. Soft tissue work to [employer] and HS. PROM for knee flex and extn. Sitting knee flex to 133. Passive knee ext to neutral.  Tape to knee along the medial aspect of his knee to help decrease burning and pain.          Therapeutic Modalities:    HEP: As directed. Treatment/Session Assessment:  Pt is post R TKA on 7/17/17. He had been at [facility] for rehab until 8/2. Presented with decreased ROM and strength of R knee and LE. Good increase in ROM. Good increase in ROM inflexion and extension. Good gait pattern. Steady increase in strength with improved gait and balance. Flexed posture due to chronic back issues. He reports no prior treatment has helped. Did suggest looking into nerve blocks. Needs to increase LE strength to compensate for chronic back pain. Posture is improving as LE strength increases. Back pain is limiting factor. Very motivated. . Continue to work on strengthening. · Response to Treatment:  Understood exercises. Increased knee flexion. · Compliance with Program/Exercises: Doing exercises at home   · Recommendations/Intent for next treatment session: \"Next visit will focus on aggressive ROM and strength of R knee and LE. Gait training\". Continue 1 x week for 2 weeks.   Total Treatment Duration:  PT Patient Time In/Time Out  Time In: 0400  Time Out: 0500  Treatment number 20  Zeny Allen, PT,

## 2017-10-12 NOTE — PROGRESS NOTES
Participant attended Diabetes #1 session today. Topics included: Characteristics/pathophysiology type 1/type 2 diabetes; Goal/acceptable blood glucose ranges/Hgb A1C/interpreting/using results; Using medications safely; Sick day management; Prevention/detection/treatment of acute complications. - Verbalized understanding of material covered.   -Goal for next session Diabetes Two  -Anticipated adherence is   Good   -Problems/barriers may be none anticipated

## 2017-10-19 ENCOUNTER — HOSPITAL ENCOUNTER (OUTPATIENT)
Dept: PHYSICAL THERAPY | Age: 67
Discharge: HOME | End: 2017-10-19
Payer: MEDICARE

## 2017-10-19 PROCEDURE — 97140 MANUAL THERAPY 1/> REGIONS: CPT

## 2017-10-19 PROCEDURE — 97110 THERAPEUTIC EXERCISES: CPT

## 2017-10-19 NOTE — PROGRESS NOTES
[facility]   [address], [address]  [phone]   [phone]    Mihaela Kansas  : 1950       OUTPATIENT PHYSICAL THERAPY:Daily Note 10/19/2017    ICD-10: Treatment Diagnosis:   R26.2 Difficulty in walking, not elsewhere classified      M25.661 Stiffness of right knee, not elsewhere classified      M25.561 Pain in right knee     Precautions/Allergies:   Bactrim [sulfamethoxazole-trimethoprim]   Fall Risk Score: 2 (? 5 = High Risk)  MD Orders: Eval and treat MEDICAL/REFERRING DIAGNOSIS:  Presence of right artificial knee joint [Z96.651]   DATE OF ONSET:   REFERRING PHYSICIAN: Brittany Suazo., *  RETURN PHYSICIAN APPOINTMENT: Aug 31, 2017     INITIAL ASSESSMENT:  Mr. Luz Maria Curry presents with decreased ROM and strength of R knee and LE post R TKA on 17. PT with fair gait with rolling walker. Work to increase ROM and strength to progress ambulation to cane and then no assistive device. Progress strength to enable return to prior activity level. PROBLEM LIST (Impacting functional limitations):  1. Decreased Strength  2. Decreased ADL/Functional Activities  3. Decreased Ambulation Ability/Technique  4. Increased Pain  5. Decreased Flexibility/Joint Mobility  6. Edema/Girth INTERVENTIONS PLANNED:  1. Home Exercise Program (HEP)  2. Manual Therapy  3. Range of Motion (ROM)  4. Therapeutic Activites  5. Therapeutic Exercise/Strengthening   TREATMENT PLAN:  Effective Dates: 8/3/17 TO 10/31/17. Frequency/Duration: 2 times a week for 12 weeks and upon reassessment,  will adjust frequency and duration as progress indicates. GOALS: (Goals have been discussed and agreed upon with patient.)  Short-Term Functional Goals: Time Frame: 4 weeks  1. Establish independent HEP with no cueing to increase ROM and strength  MET  2. Increase R knee ROM to 0-120 to increase ease of sitting and ambulation. MET  3.  Independent gait with straight cane in home and community  MET  4. Increase LE strength so able to initiate reciprocal pattern on stairs. MET   Discharge Goals: Time Frame: 12 weeks   1. Improve score on LEFS by 9 points to enable prolonged sitting, standing and ambulation  MET  2. Increase R knee ROM to 0-125 to normalize gait. MET  3. Independent gait without assistive device in home and community  MET  4. Increase LE strength so able to perform reciprocal pattern on stairs with railing. ONGOING  5. Increase LE strength so able to return to work in his business  ONGOING  Rehabilitation Potential For Stated Goals: [facility] I certify that the treatment plan above will be carried out by a therapist or under their direction. Thank you for this referral,  Krzysztof Pride, PT     Referring Physician Signature: Rusty Yang., *              Date                    The information in this section was collected on 8/3/17 (except where otherwise noted). HISTORY:   History of Present Injury/Illness (Reason for Referral):  Pt is post R TKA on 7/17/17. He was at [facility] until 8/2 for rehab of R knee. He is now referred to outpatient PT for aggressive ROM and progressive strengthening of R knee and LE's. PT to PT with rolling walker. Progress to cane and then no assistive device. Pt does report that he has a chronic back problem and is unable to lie down. Past Medical History/Comorbidities:   Mr. Lia Arreola  has a past medical history of Asthma (dx childhood); Atrial fibrillation ([address] Cervical spondylosis (6/20/2013); Chronic back pain (2013); Chronic pain; Colon polyps; COPD (chronic obstructive pulmonary disease) ([address] (8/17/2016); Coronary atherosclerosis of native coronary vessel (8/17/2016); Diabetes ([address] (dx 5/14); Diverticulosis; Edema (8/17/2016); Former smoker; GERD (gastroesophageal reflux disease); High cholesterol; History of kidney stones; Hypertension; Obesity (BMI 30-39.9);  Status post total right knee replacement (7/17/2017); and Unspecified sleep apnea. He also has no past medical history of Adverse effect of anesthesia; Aneurysm ([facility] Autoimmune disease ([facility] Cancer ([facility] Chronic kidney disease; Coagulation defects; Coagulation disorder ([facility] DEMENTIA; Difficult intubation; Endocarditis; Heart failure ([facility] Ill-defined condition; Infectious disease; Liver disease; Malignant hyperthermia due to anesthesia; Nausea & vomiting; Neurological disorder; Nicotine vapor product user; Non-nicotine vapor product user; Other ill-defined conditions; Pseudocholinesterase deficiency; Psychiatric disorder; PUD (peptic ulcer disease); Rheumatic fever; Seizures ([facility] Stroke [facility]); Thromboembolus ([facility] Thyroid disease; or Unspecified adverse effect of anesthesia. Mr. Crystal Yusuf  has a past surgical history that includes cystoscopy,insert ureteral stent; colonoscopy (10/6/14); colonoscopy (11/20/14); lumbar laminectomy (2009); retinal detachment repair (Bilateral); cataract removal; other surgical (20 yrs ago); colectomy (2015); and heart catheterization (2012). Social History/Living Environment:     Lives in 2 story home with spouse  Prior Level of Function/Work/Activity:  Retired from [employer]. Now sales and business owner  Dominant Side:         RIGHT    Current Medications:       Current Outpatient Prescriptions:     melatonin tab tablet, Take 5 mg by mouth nightly. Indications: help with sleep, Disp: , Rfl:     amoxicillin (AMOXIL) 875 mg tablet, Take 1 Tab by mouth two (2) times a day., Disp: 10 Tab, Rfl: 0    chlorthalidone (HYGROTEN) 25 mg tablet, Take 1 Tab by mouth daily. , Disp: 90 Tab, Rfl: 0    metoprolol tartrate (LOPRESSOR) 25 mg tablet, Take 2 tablets by mouth twice daily, Disp: 180 Tab, Rfl: 0    atorvastatin (LIPITOR) 40 mg tablet, Take 0.5 Tabs by mouth daily. , Disp: 90 Tab, Rfl: 0    montelukast (SINGULAIR) 10 mg tablet, Take 1 Tab by mouth daily. , Disp: 90 Tab, Rfl: 0   pantoprazole (PROTONIX) 40 mg tablet, Take 1 Tab by mouth daily. , Disp: 90 Tab, Rfl: 0    ezetimibe (ZETIA) 10 mg tablet, Take 1 Tab by mouth nightly., Disp: 90 Tab, Rfl: 0    metFORMIN ER (GLUCOPHAGE XR) 500 mg tablet, Take 2 tablets every morning, Disp: 180 Tab, Rfl: 0    fluticasone-salmeterol (ADVAIR DISKUS) 500-50 mcg/dose diskus inhaler, Take 1 Puff by inhalation two (2) times a day., Disp: 3 Inhaler, Rfl: 0    apixaban (ELIQUIS) 5 mg tablet, Take 1 Tab by mouth two (2) times a day., Disp: 180 Tab, Rfl: 0    lisinopril (PRINIVIL, ZESTRIL) 10 mg tablet, Take 1 Tab by mouth daily. , Disp: 90 Tab, Rfl: 0    dapagliflozin (FARXIGA) 10 mg tab, Take 1 Tab by mouth daily. , Disp: 90 Tab, Rfl: 0    doxazosin (CARDURA) 4 mg tablet, Take 1 Tab by mouth daily. , Disp: 90 Tab, Rfl: 0    celecoxib (CELEBREX) 200 mg capsule, Take 1 Cap by mouth daily. , Disp: 90 Cap, Rfl: 0    dulaglutide (TRULICITY) 1.5 DN/7.1 mL sub-q pen, 0.5 mL by SubCUTAneous route every seven (7) days. , Disp: 12 Pen, Rfl: 0    aspirin delayed-release 81 mg tablet, Take 162 mg by mouth nightly. Decrease to one 81 mg tab on 7/12/17, Disp: , Rfl:     Omega-3 Fatty Acids 300 mg cap, Take 1 Cap by mouth daily. , Disp: , Rfl:     ANTI-FUNGAL 2 % topical powder, APPLY TO ABDOMEN 2 TIMES PER DAY, Disp: , Rfl: 11    Blood-Glucose Meter monitoring kit, Check fs every day; DM2, E11/9, Contour glucometer, Disp: 1 Kit, Rfl: 0    glucose blood VI test strips (ASCENSIA CONTOUR) strip, Check fs every day, DM2, E11.9, Disp: 100 Strip, Rfl: 1    lancets 28 gauge misc, Check fs every day, DM2, E11.9, Disp: 100 Lancet, Rfl: 1    albuterol (PROVENTIL VENTOLIN) 2.5 mg /3 mL (0.083 %) nebulizer solution, 3 mL by Nebulization route once for 1 dose.  (Patient taking differently: 2.5 mg by Nebulization route daily as needed.), Disp: 24 Each, Rfl: 11    albuterol (PROAIR HFA) 90 mcg/actuation inhaler, Take 2 Puffs by inhalation every four (4) hours as needed for Wheezing. Take / use AM day of surgery  per anesthesia protocols if needed. , Disp: , Rfl:     cholecalciferol, vitamin D3, (VITAMIN D3) 2,000 unit tab, Take 1 tablet by mouth daily. , Disp: , Rfl:     diphenhydrAMINE (BENADRYL ALLERGY) 25 mg tablet, Take 25 mg by mouth nightly as needed for Sleep., Disp: , Rfl:     b complex vitamins (B COMPLEX 1) tablet, Take 1 tablet by mouth every morning. Stop seven days prior to surgery per anesthesia protocol., Disp: , Rfl:     green tea leaf extract (GREEN TEA) Cap, Take 630 mg by mouth every morning. Stop seven days prior to surgery per anesthesia protocol., Disp: , Rfl:     vitamin E (AQUA GEMS) 400 unit capsule, Take 400 Units by mouth every morning. Stop seven days prior to surgery per anesthesia protocol., Disp: , Rfl:     FOLIC ACID PO, Take 804 mcg by mouth every morning. Stop seven days prior to surgery per anesthesia protocol., Disp: , Rfl:     DOCUSATE CALCIUM (STOOL SOFTENER PO), Take 1 capsule by mouth every morning., Disp: , Rfl:     mometasone (NASONEX) 50 mcg/Actuation nasal spray, 2 Sprays by Both Nostrils route nightly as needed. , Disp: , Rfl:    Date Last Reviewed:  10/19/2017     Number of Personal Factors/Comorbidities that affect the Plan of Care: 1-2: MODERATE COMPLEXITY   EXAMINATION:   Observation/Orthostatic Postural Assessment: To PT without assistive device. No longer using cane. Good heel toe gait. Increased step length and gait speed. . Pt with flexed posture due to chronic back problems. This is his major limitation with increased activity. He reports sleeping in a recliner as unable to lay flat on bed. Reports he has had many different kinds of treatment for back but with little success. Have attempted assist with elevating legs to help ie in bed but everything seems to increase back pain. Tight hip flexors due to flexed posture. Tight HS and HC.   Difficult to assess HS tightness due to inability to ie flat however with stretching in long sitting, he is unable to sit to upright. .   Palpation:          Pain medial knee Relief with taping  Functional Mobility:         Gait/Ambulation:  Independent with rolling walker        Stairs: WITH RAILING        ROM:     R knee 0    L knee -5 to 130                                    Strength:     Knee ext  R 4+/5,  L 4+/5  Knee flexion  R 4+/5,  L 4+/5         Hip flexion R /5,  L 4+/5     Hip abd   R 4-/5,  L 4-/5              Neurological Screen: Intact to light touch  Balance:  Fair,  Good with walker   Body Structures Involved:  1. Bones  2. Joints  3. Muscles  4. Ligaments Body Functions Affected:  1. Sensory/Pain  2. Neuromusculoskeletal  3. Movement Related Activities and Participation Affected:  1. Learning and Applying Knowledge  2. General Tasks and Demands  3. Mobility  4. Self Care  5. Domestic Life  6. Community, Social and [place]   Number of elements (examined above) that affect the Plan of Care: 3: MODERATE COMPLEXITY   CLINICAL PRESENTATION:   Presentation: Evolving clinical presentation with changing clinical characteristics: MODERATE COMPLEXITY   CLINICAL DECISION MAKING:   Outcome Measure: Tool Used: Lower Extremity Functional Scale (LEFS)  Score:  Initial: 15/80 Most Recent: 43/80 (Date: 9/28/17 )   Interpretation of Score: 20 questions each scored on a 5 point scale with 0 representing \"extreme difficulty or unable to perform\" and 4 representing \"no difficulty\". The lower the score, the greater the functional disability. 80/80 represents no disability. Minimal detectable change is 9 points. Score 80 79-63 62-48 47-32 31-16 15-1 0   Modifier CH CI CJ CK CL CM CN     ?  Mobility - Walking and Moving Around:     - CURRENT STATUS: CK - 40%-59% impaired, limited or restricted    - GOAL STATUS: CK - 40%-59% impaired, limited or restricted    - D/C STATUS:  ---------------To be determined---------------      Medical Necessity:   · Patient is expected to demonstrate progress in strength, range of motion and gait to increase independence with home and community activities. Reason for Services/Other Comments:  · Patient continues to require skilled intervention due to decreased ROM and strength and fair gait . Use of outcome tool(s) and clinical judgement create a POC that gives a: Clear prediction of patient's progress: LOW COMPLEXITY            TREATMENT:   (In addition to Assessment/Re-Assessment sessions the following treatments were rendered)  Pre-treatment Symptoms/Complaints:  Patient reports feeling pretty good today. Pain: Initial: Pain Intensity 1: 1  Post Session: 1      THERAPEUTIC EXERCISE: (30 minutes):  Exercises per grid below to improve mobility, strength and gait. Required moderate verbal and manual cues to promote proper body alignment, promote proper body posture and promote proper body mechanics. Progressed resistance, range and repetitions as indicated. Pt is unable to lie down due to back issues. Worked on knee in supine in sitting with back to wall. ). Performed  exercise program of QS (quadriceps sets)  SAQ's (short arc quadriceps), and  LAQ's (long arc quadriceps)   Sitting [address] at 30# x 4 sets of 10. Worked on standing cable for hip flex and abd at 37.5# and hip ext at 50#  x 3 sets of 20. Work on double 6 in step up and over x 30. Worked on sitting knee ext at 20# x 3 sets 10 and single leg at 10# x 3 sets 10. . Sit to stand from plinth 1 x 20 Worked to increase quad contraction and quad control at heel strike and stance. NuStep at level 5 x 12. Chair squats x 10      Manual Therapy ( 30 min  ): Patella and patella tendon mobs. Soft tissue work to [employer] and HS. PROM for knee flex and extn. Passive knee ext to neutral.  Tape to knee along the medial aspect of his knee to help decrease burning and pain. Therapeutic Modalities:    HEP: As directed. Treatment/Session Assessment:  Pt is post R TKA on 7/17/17.   He had been at [facility] for rehab until 8/2. Presented with decreased ROM and strength of R knee and LE. Good increase in ROM. Good increase in ROM inflexion and extension. Flexed posture due to chronic back issues. He reports no prior treatment has helped. Needs to increase LE strength to compensate for chronic back pain. Posture is improving as LE strength increases. Very motivated. . Continue to work on strengthening. · Response to Treatment:  Understood exercises. Increased knee flexion. · Compliance with Program/Exercises: Doing exercises at home   · Recommendations/Intent for next treatment session: \"Next visit will focus on aggressive ROM and strength of R knee and LE. Gait training\". Continue 1 x week for 2 weeks.   Total Treatment Duration:  PT Patient Time In/Time Out  Time In: 0400  Time Out: 0500  Treatment number [id number] [facility]

## 2017-10-24 ENCOUNTER — HOSPITAL ENCOUNTER (OUTPATIENT)
Dept: PHYSICAL THERAPY | Age: 67
Discharge: HOME | End: 2017-10-24
Payer: MEDICARE

## 2017-10-24 PROCEDURE — G8980 MOBILITY D/C STATUS: HCPCS

## 2017-10-24 PROCEDURE — G8979 MOBILITY GOAL STATUS: HCPCS

## 2017-10-24 PROCEDURE — 97110 THERAPEUTIC EXERCISES: CPT

## 2017-10-24 PROCEDURE — 97140 MANUAL THERAPY 1/> REGIONS: CPT

## 2017-10-24 NOTE — PROGRESS NOTES
[facility] [address] [facility]   [address] [place] [address], [address]  [phone]   [phone]    [facility]  : 1950       OUTPATIENT PHYSICAL THERAPY:Daily Note and Discharge 10/24/2017    ICD-10: Treatment Diagnosis:   R26.2 Difficulty in walking, not elsewhere classified      M25.661 Stiffness of right knee, not elsewhere classified      M25.561 Pain in right knee     Precautions/Allergies:   Bactrim [sulfamethoxazole-trimethoprim]   Fall Risk Score: 2 (? 5 = High Risk)  MD Orders: Eval and treat MEDICAL/REFERRING DIAGNOSIS:  Presence of right artificial knee joint [Z96.651]   DATE OF ONSET:   REFERRING PHYSICIAN: Brittany Suazo., *  RETURN PHYSICIAN APPOINTMENT: Aug 31, 2017     INITIAL ASSESSMENT:  Mr. Luz Maria Curry presents with decreased ROM and strength of R knee and LE post R TKA on 17. PT with fair gait with rolling walker. Work to increase ROM and strength to progress ambulation to cane and then no assistive device. Progress strength to enable return to prior activity level. PROBLEM LIST (Impacting functional limitations):  1. Decreased Strength  2. Decreased ADL/Functional Activities  3. Decreased Ambulation Ability/Technique  4. Increased Pain  5. Decreased Flexibility/Joint Mobility  6. Edema/Girth INTERVENTIONS PLANNED:  1. Home Exercise Program (HEP)  2. Manual Therapy  3. Range of Motion (ROM)  4. Therapeutic Activites  5. Therapeutic Exercise/Strengthening   TREATMENT PLAN:  Effective Dates: 8/3/17 TO 10/31/17. Frequency/Duration: 2 times a week for 12 weeks and upon reassessment,  will adjust frequency and duration as progress indicates. GOALS: (Goals have been discussed and agreed upon with patient.)  Short-Term Functional Goals: Time Frame: 4 weeks  1. Establish independent HEP with no cueing to increase ROM and strength  MET  2. Increase R knee ROM to 0-120 to increase ease of sitting and ambulation. MET  3. Independent gait with straight cane in home and community  MET  4. Increase LE strength so able to initiate reciprocal pattern on stairs. MET   Discharge Goals: Time Frame: 12 weeks   1. Improve score on LEFS by 9 points to enable prolonged sitting, standing and ambulation  MET  2. Increase R knee ROM to 0-125 to normalize gait. MET  3. Independent gait without assistive device in home and community  MET  4. Increase LE strength so able to perform reciprocal pattern on stairs with railing. ONGOING  5. 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 Thank you for this referral,  Zoey Woods, PT     Referring Physician Signature: Maria Ines Cardozo., *              Date                    The information in this section was collected on 8/3/17 (except where otherwise noted). HISTORY:   History of Present Injury/Illness (Reason for Referral):  Pt is post R TKA on 7/17/17. He was at [facility] until 8/2 for rehab of R knee. He is now referred to outpatient PT for aggressive ROM and progressive strengthening of R knee and LE's. PT to PT with rolling walker. Progress to cane and then no assistive device. Pt does report that he has a chronic back problem and is unable to lie down. Past Medical History/Comorbidities:   Mr. Sae Sánchez  has a past medical history of Asthma (dx childhood); Atrial fibrillation ([address] Cervical spondylosis (6/20/2013); Chronic back pain (2013); Chronic pain; Colon polyps; COPD (chronic obstructive pulmonary disease) ([address] (8/17/2016); Coronary atherosclerosis of native coronary vessel (8/17/2016); Diabetes ([address] (dx 5/14); Diverticulosis; Edema (8/17/2016); Former smoker; GERD (gastroesophageal reflux disease); High cholesterol; History of kidney stones;  Hypertension; Obesity (BMI 30-39.9); Status post total right knee replacement (7/17/2017); and Unspecified sleep apnea. He also has no past medical history of Adverse effect of anesthesia; Aneurysm ([facility] Autoimmune disease ([facility] Cancer ([facility] Chronic kidney disease; Coagulation defects; Coagulation disorder ([facility] DEMENTIA; Difficult intubation; Endocarditis; Heart failure ([facility] Ill-defined condition; Infectious disease; Liver disease; Malignant hyperthermia due to anesthesia; Nausea & vomiting; Neurological disorder; Nicotine vapor product user; Non-nicotine vapor product user; Other ill-defined conditions; Pseudocholinesterase deficiency; Psychiatric disorder; PUD (peptic ulcer disease); Rheumatic fever; Seizures ([facility] Stroke [facility]); Thromboembolus ([facility] Thyroid disease; or Unspecified adverse effect of anesthesia. Mr. Abhi Mathews  has a past surgical history that includes cystoscopy,insert ureteral stent; colonoscopy (10/6/14); colonoscopy (11/20/14); lumbar laminectomy (2009); retinal detachment repair (Bilateral); cataract removal; other surgical (20 yrs ago); colectomy (2015); and heart catheterization (2012). 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 Indications: help with sleep, Disp: , Rfl:     amoxicillin (AMOXIL) 875 mg tablet, Take 1 Tab by mouth two (2) times a day., Disp: 10 Tab, Rfl: 0    chlorthalidone (HYGROTEN) 25 mg tablet, Take 1 Tab by mouth daily. , Disp: 90 Tab, Rfl: 0    metoprolol tartrate (LOPRESSOR) 25 mg tablet, Take 2 tablets by mouth twice daily, Disp: 180 Tab, Rfl: 0    atorvastatin (LIPITOR) 40 mg tablet, Take 0.5 Tabs by mouth daily. , Disp: 90 Tab, Rfl: 0    montelukast (SINGULAIR) 10 mg tablet, Take 1 Tab by mouth daily. , Disp: 90 Tab, Rfl: 0    pantoprazole (PROTONIX) 40 mg tablet, Take 1 Tab by mouth daily. , Disp: 90 Tab, Rfl: 0    ezetimibe (ZETIA) 10 mg tablet, Take 1 Tab by mouth nightly., Disp: 90 Tab, Rfl: 0    metFORMIN ER (GLUCOPHAGE XR) 500 mg tablet, Take 2 tablets every morning, Disp: 180 Tab, Rfl: 0    fluticasone-salmeterol (ADVAIR DISKUS) 500-50 mcg/dose diskus inhaler, Take 1 Puff by inhalation two (2) times a day., Disp: 3 Inhaler, Rfl: 0    apixaban (ELIQUIS) 5 mg tablet, Take 1 Tab by mouth two (2) times a day., Disp: 180 Tab, Rfl: 0    lisinopril (PRINIVIL, ZESTRIL) 10 mg tablet, Take 1 Tab by mouth daily. , Disp: 90 Tab, Rfl: 0    dapagliflozin (FARXIGA) 10 mg tab, Take 1 Tab by mouth daily. , Disp: 90 Tab, Rfl: 0    doxazosin (CARDURA) 4 mg tablet, Take 1 Tab by mouth daily. , Disp: 90 Tab, Rfl: 0    celecoxib (CELEBREX) 200 mg capsule, Take 1 Cap by mouth daily. , Disp: 90 Cap, Rfl: 0    dulaglutide (TRULICITY) 1.5 NT/2.7 mL sub-q pen, 0.5 mL by SubCUTAneous route every seven (7) days. , Disp: 12 Pen, Rfl: 0    aspirin delayed-release 81 mg tablet, Take 162 mg by mouth nightly. Decrease to one 81 mg tab on 7/12/17, Disp: , Rfl:     Omega-3 Fatty Acids 300 mg cap, Take 1 Cap by mouth daily. , Disp: , Rfl:     ANTI-FUNGAL 2 % topical powder, APPLY TO ABDOMEN 2 TIMES PER DAY, Disp: , Rfl: 11    Blood-Glucose Meter monitoring kit, Check fs every day; DM2, E11/9, Contour glucometer, Disp: 1 Kit, Rfl: 0    glucose blood VI test strips (ASCENSIA CONTOUR) strip, Check fs every day, DM2, E11.9, Disp: 100 Strip, Rfl: 1    lancets 28 gauge misc, Check fs every day, DM2, E11.9, Disp: 100 Lancet, Rfl: 1    albuterol (PROVENTIL VENTOLIN) 2.5 mg /3 mL (0.083 %) nebulizer solution, 3 mL by Nebulization route once for 1 dose.  (Patient taking differently: 2.5 mg by Nebulization route daily as needed.), Disp: 24 Each, Rfl: 11    albuterol (PROAIR HFA) 90 mcg/actuation inhaler, Take 2 Puffs by inhalation every four (4) hours as needed for Wheezing. Take / use AM day of surgery  per anesthesia protocols if needed. , Disp: , Rfl:     cholecalciferol, vitamin D3, (VITAMIN D3) 2,000 unit tab, Take 1 tablet by mouth daily. , Disp: , Rfl:     diphenhydrAMINE (BENADRYL ALLERGY) 25 mg tablet, Take 25 mg by mouth nightly as needed for Sleep., Disp: , Rfl:     b complex vitamins (B COMPLEX 1) tablet, Take 1 tablet by mouth every morning. Stop seven days prior to surgery per anesthesia protocol., Disp: , Rfl:     green tea leaf extract (GREEN TEA) Cap, Take 630 mg by mouth every morning. Stop seven days prior to surgery per anesthesia protocol., Disp: , Rfl:     vitamin E (AQUA GEMS) 400 unit capsule, Take 400 Units by mouth every morning. Stop seven days prior to surgery per anesthesia protocol., Disp: , Rfl:     FOLIC ACID PO, Take 137 mcg by mouth every morning. Stop seven days prior to surgery per anesthesia protocol., Disp: , Rfl:     DOCUSATE CALCIUM (STOOL SOFTENER PO), Take 1 capsule by mouth every morning., Disp: , Rfl:     mometasone (NASONEX) 50 mcg/Actuation nasal spray, 2 Sprays by Both Nostrils route nightly as needed. , Disp: , Rfl:    Date Last Reviewed:  10/24/2017     Number of Personal Factors/Comorbidities that affect the Plan of Care: 1-2: MODERATE COMPLEXITY   EXAMINATION:   Observation/Orthostatic Postural Assessment: To PT without assistive device. No longer using cane. Good heel toe gait. Increased step length and gait speed. . Pt with flexed posture due to chronic back problems. This is his major limitation with increased activity. He reports sleeping in a recliner as unable to lay flat on bed. Reports he has had many different kinds of treatment for back but with little success. Have attempted assist with elevating legs to help ie in bed but everything seems to increase back pain. Tight hip flexors due to flexed posture. Tight HS and HC.   Difficult to assess HS tightness due to inability to ie flat however with stretching in long sitting, he is unable to sit to upright. .   Palpation:          Pain medial knee Relief with taping  Functional Mobility:         Gait/Ambulation:  Independent with rolling walker        Stairs: WITH RAILING        ROM:     R knee 0    L knee -5 to 130                                    Strength:     Knee ext  R 4+/5,  L 4+/5  Knee flexion  R 4+/5,  L 4+/5         Hip flexion R /5,  L 4+/5     Hip abd   R 4-/5,  L 4-/5              Neurological Screen: Intact to light touch  Balance:  Fair,  Good with walker   Body Structures Involved:  1. Bones  2. Joints  3. Muscles  4. Ligaments Body Functions Affected:  1. Sensory/Pain  2. Neuromusculoskeletal  3. Movement Related Activities and Participation Affected:  1. Learning and Applying Knowledge  2. General Tasks and Demands  3. Mobility  4. Self Care  5. Domestic Life  6. Community, Social and [place]   Number of elements (examined above) that affect the Plan of Care: 3: MODERATE COMPLEXITY   CLINICAL PRESENTATION:   Presentation: Evolving clinical presentation with changing clinical characteristics: MODERATE COMPLEXITY   CLINICAL DECISION MAKING:   Outcome Measure: Tool Used: Lower Extremity Functional Scale (LEFS)  Score:  Initial: 15/80 Most Recent: 44/80 (Date: 9/28/17 )   Interpretation of Score: 20 questions each scored on a 5 point scale with 0 representing \"extreme difficulty or unable to perform\" and 4 representing \"no difficulty\". The lower the score, the greater the functional disability. 80/80 represents no disability. Minimal detectable change is 9 points. Score 80 79-63 62-48 47-32 31-16 15-1 0   Modifier CH CI CJ CK CL CM CN     ?  Mobility - Walking and Moving Around:     - CURRENT STATUS: CK - 40%-59% impaired, limited or restricted    - GOAL STATUS: CK - 40%-59% impaired, limited or restricted    - D/C STATUS:  CK - 40%-59% impaired, limited or restricted      Medical Necessity:   · Patient is expected to demonstrate progress in strength, range of motion and gait to increase independence with home and community activities. Reason for Services/Other Comments:  · Patient continues to require skilled intervention due to decreased ROM and strength and fair gait . Use of outcome tool(s) and clinical judgement create a POC that gives a: Clear prediction of patient's progress: LOW COMPLEXITY            TREATMENT:   (In addition to Assessment/Re-Assessment sessions the following treatments were rendered)  Pre-treatment Symptoms/Complaints:  Patient reports feeling pretty good today. Pain: Initial: Pain Intensity 1: 1  Post Session: 1      THERAPEUTIC EXERCISE: (30 minutes):  Exercises per grid below to improve mobility, strength and gait. Required moderate verbal and manual cues to promote proper body alignment, promote proper body posture and promote proper body mechanics. Progressed resistance, range and repetitions as indicated. Pt is unable to lie down due to back issues. Worked on knee in supine in sitting with back to wall. ). Performed  exercise program of QS (quadriceps sets)  SAQ's (short arc quadriceps), and  LAQ's (long arc quadriceps)   Sitting [address] at 30# x 4 sets of 10. Worked on standing cable for hip flex and abd at 37.5# and hip ext at 50#  x 3 sets of 20. Work on double 6 in step up and over x 30. Worked on sitting knee ext at 20# x 3 sets 10 and single leg at 10# x 3 sets 10. . Sit to stand from plinth 1 x 20 Worked to increase quad contraction and quad control at heel strike and stance. NuStep at level 5 x 12. Chair squats x 10      Manual Therapy ( 30 min  ): Patella and patella tendon mobs. Soft tissue work to [employer] and HS. PROM for knee flex and extn. Passive knee ext to neutral.  Tape to knee along the medial aspect of his knee to help decrease burning and pain. Therapeutic Modalities:    HEP: As directed.    Treatment/Session Assessment:  Pt is post R TKA on 7/17/17. He had been at [facility] for rehab until 8/2. Presented with decreased ROM and strength of R knee and LE. Good increase in ROM. Good increase in ROM inflexion and extension. Flexed posture due to chronic back issues. He reports no prior treatment has helped. Needs to increase LE strength to compensate for chronic back pain. Posture is improving as LE strength increases. Very motivated. . Continue to work on strengthening. · Response to Treatment:  Understood exercises. Increased knee flexion. · Compliance with Program/Exercises: Doing exercises at home   · Recommendations/Intent for next treatment session: \"Next visit will focus on aggressive ROM and strength of R knee and LE. Gait training\". Continue 1 x week for 2 weeks.   Total Treatment Duration:  PT Patient Time In/Time Out  Time In: 0430  Time Out: 0530  Treatment number [id number],

## 2017-10-31 ENCOUNTER — HOSPITAL ENCOUNTER (OUTPATIENT)
Dept: DIABETES SERVICES | Age: 67
Discharge: HOME | End: 2017-10-31
Payer: MEDICARE

## 2017-10-31 PROCEDURE — G0109 DIAB MANAGE TRN IND/GROUP: HCPCS

## 2017-10-31 NOTE — PROGRESS NOTES
Participant attended Diabetes #2 session today. Topics included: Prevention/detection/treatment of chronic complications; sleep apnea; Developing strategies to promote health/change behavior/recommended screenings; Developing strategies to address psychosocial issues; Goal setting. Participants goal/support plan includes Nutritional Goal: To improve diet . I will choose heart healthy fats- October 2017 and reevaluate in a month. Medical goal:  To prevent problems I will visit my dentist twice a year and schedule the appointment by end of November 2017 and go twice a year after that  . Problems/barriers may be: anticipated; comments: Pt opts to test  Blood sugars possibly twice every other day. Plan for follow up/Recommendations: mail follow up survey in 3 months.

## 2017-12-28 ENCOUNTER — HOSPITAL ENCOUNTER (OUTPATIENT)
Dept: DIABETES SERVICES | Age: 67
Discharge: HOME | End: 2017-12-28
Payer: MEDICARE

## 2017-12-28 PROCEDURE — G0109 DIAB MANAGE TRN IND/GROUP: HCPCS

## 2017-12-28 NOTE — PROGRESS NOTES
Attended diabetes follow up group class. Open forum for clients to ask questions based on entire diabetes self management education program. Education topics are driven in this class based on clients' individual questions and needs.

## 2018-01-29 ENCOUNTER — RX ONLY (OUTPATIENT)
Age: 68
Setting detail: RX ONLY
End: 2018-01-29

## 2018-01-29 ENCOUNTER — APPOINTMENT (RX ONLY)
Dept: URBAN - METROPOLITAN AREA CLINIC 349 | Facility: CLINIC | Age: 68
Setting detail: DERMATOLOGY
End: 2018-01-29

## 2018-01-29 DIAGNOSIS — L82.0 INFLAMED SEBORRHEIC KERATOSIS: ICD-10-CM

## 2018-01-29 DIAGNOSIS — L91.8 OTHER HYPERTROPHIC DISORDERS OF THE SKIN: ICD-10-CM

## 2018-01-29 DIAGNOSIS — D22 MELANOCYTIC NEVI: ICD-10-CM

## 2018-01-29 PROBLEM — D22.5 MELANOCYTIC NEVI OF TRUNK: Status: ACTIVE | Noted: 2018-01-29

## 2018-01-29 PROCEDURE — ? COUNSELING

## 2018-01-29 PROCEDURE — ? BENIGN DESTRUCTION

## 2018-01-29 PROCEDURE — 99213 OFFICE O/P EST LOW 20 MIN: CPT | Mod: 25

## 2018-01-29 PROCEDURE — 17110 DESTRUCTION B9 LES UP TO 14: CPT

## 2018-01-29 RX ORDER — HYDROCORTISONE ACETATE, ALOE VERA LEAF AND IODOQUINOL 20; 10; 10 MG/G; MG/G; MG/G
GEL TOPICAL
Qty: 1 | Refills: 5 | Status: ERX

## 2018-01-29 ASSESSMENT — LOCATION DETAILED DESCRIPTION DERM
LOCATION DETAILED: LEFT MEDIAL INFERIOR CHEST
LOCATION DETAILED: LEFT CLAVICULAR SKIN
LOCATION DETAILED: LEFT AXILLARY VAULT
LOCATION DETAILED: RIGHT RIB CAGE
LOCATION DETAILED: LEFT CLAVICULAR NECK
LOCATION DETAILED: RIGHT CLAVICULAR NECK
LOCATION DETAILED: EPIGASTRIC SKIN
LOCATION DETAILED: RIGHT AXILLARY VAULT
LOCATION DETAILED: SUPERIOR THORACIC SPINE

## 2018-01-29 ASSESSMENT — LOCATION SIMPLE DESCRIPTION DERM
LOCATION SIMPLE: LEFT AXILLARY VAULT
LOCATION SIMPLE: CHEST
LOCATION SIMPLE: RIGHT AXILLARY VAULT
LOCATION SIMPLE: LEFT ANTERIOR NECK
LOCATION SIMPLE: UPPER BACK
LOCATION SIMPLE: RIGHT ANTERIOR NECK
LOCATION SIMPLE: ABDOMEN
LOCATION SIMPLE: LEFT CLAVICULAR SKIN

## 2018-01-29 ASSESSMENT — LOCATION ZONE DERM
LOCATION ZONE: TRUNK
LOCATION ZONE: AXILLAE
LOCATION ZONE: NECK

## 2018-01-29 NOTE — PROCEDURE: BENIGN DESTRUCTION
Include Z78.9 (Other Specified Conditions Influencing Health Status) As An Associated Diagnosis?: Yes
Add 52 Modifier (Optional): no
Consent: The patient's consent was obtained including but not limited to risks of crusting, scabbing, blistering, scarring, darker or lighter pigmentary change, recurrence, incomplete removal and infection.
Treatment Number (Will Not Render If 0): 0
Medical Necessity Clause: This procedure was medically necessary because the lesions that were treated were:
Medical Necessity Information: It is in your best interest to select a reason for this procedure from the list below. All of these items fulfill various CMS LCD requirements except the new and changing color options.
Post-Care Instructions: I reviewed with the patient in detail post-care instructions. Patient is to wear sunprotection, and avoid picking at any of the treated lesions. Pt may apply Vaseline to crusted or scabbing areas.
Detail Level: Zone

## 2018-05-21 PROBLEM — D12.6 TUBULOVILLOUS ADENOMA OF COLON: Status: ACTIVE | Noted: 2018-05-21

## 2018-07-30 ENCOUNTER — APPOINTMENT (RX ONLY)
Dept: URBAN - METROPOLITAN AREA CLINIC 349 | Facility: CLINIC | Age: 68
Setting detail: DERMATOLOGY
End: 2018-07-30

## 2018-07-30 ENCOUNTER — RX ONLY (OUTPATIENT)
Age: 68
Setting detail: RX ONLY
End: 2018-07-30

## 2018-07-30 DIAGNOSIS — I87.2 VENOUS INSUFFICIENCY (CHRONIC) (PERIPHERAL): ICD-10-CM

## 2018-07-30 DIAGNOSIS — L30.4 ERYTHEMA INTERTRIGO: ICD-10-CM | Status: STABLE

## 2018-07-30 PROCEDURE — ? TREATMENT REGIMEN

## 2018-07-30 PROCEDURE — ? COUNSELING

## 2018-07-30 PROCEDURE — ? PRESCRIPTION

## 2018-07-30 PROCEDURE — 99213 OFFICE O/P EST LOW 20 MIN: CPT

## 2018-07-30 RX ORDER — TRIAMCINOLONE ACETONIDE 1 MG/G
CREAM TOPICAL
Qty: 1 | Refills: 3 | Status: ERX | COMMUNITY
Start: 2018-07-30

## 2018-07-30 RX ORDER — HYDROCORTISONE ACETATE, ALOE VERA LEAF AND IODOQUINOL 20; 10; 10 MG/G; MG/G; MG/G
GEL TOPICAL
Qty: 1 | Refills: 11 | Status: ERX

## 2018-07-30 RX ORDER — IODOQUINOL, HYDROCORTISONE ACETATE AND ALOE VERA LEAF 10; 20; 10 MG/G; MG/G; MG/G
GEL TOPICAL
Qty: 1 | Refills: 5 | Status: ERX

## 2018-07-30 RX ORDER — CEPHALEXIN 500 MG/1
CAPSULE ORAL
Qty: 28 | Refills: 0 | Status: ERX | COMMUNITY
Start: 2018-07-30

## 2018-07-30 RX ADMIN — CEPHALEXIN: 500 CAPSULE ORAL at 00:00

## 2018-07-30 RX ADMIN — TRIAMCINOLONE ACETONIDE: 1 CREAM TOPICAL at 00:00

## 2018-07-30 ASSESSMENT — LOCATION ZONE DERM
LOCATION ZONE: TRUNK
LOCATION ZONE: LEG

## 2018-07-30 ASSESSMENT — LOCATION SIMPLE DESCRIPTION DERM
LOCATION SIMPLE: GROIN
LOCATION SIMPLE: RIGHT PRETIBIAL REGION
LOCATION SIMPLE: ABDOMEN
LOCATION SIMPLE: LEFT PRETIBIAL REGION

## 2018-07-30 ASSESSMENT — LOCATION DETAILED DESCRIPTION DERM
LOCATION DETAILED: PERIUMBILICAL SKIN
LOCATION DETAILED: LEFT DISTAL PRETIBIAL REGION
LOCATION DETAILED: RIGHT DISTAL PRETIBIAL REGION
LOCATION DETAILED: RIGHT INGUINAL CREASE

## 2018-07-30 NOTE — PROCEDURE: TREATMENT REGIMEN
Initiate Treatment: Alcortin, Zeasorb
Detail Level: Detailed
Initiate Treatment: Support stockings once daily, Triamcinolone cream bid
Detail Level: Zone

## 2018-08-22 PROBLEM — E66.01 OBESITY, MORBID (HCC): Status: ACTIVE | Noted: 2018-08-22

## 2018-08-22 PROBLEM — I87.8 VENOUS STASIS: Chronic | Status: ACTIVE | Noted: 2018-08-22

## 2018-09-25 ENCOUNTER — APPOINTMENT (RX ONLY)
Dept: URBAN - METROPOLITAN AREA CLINIC 349 | Facility: CLINIC | Age: 68
Setting detail: DERMATOLOGY
End: 2018-09-25

## 2018-09-25 DIAGNOSIS — I87.2 VENOUS INSUFFICIENCY (CHRONIC) (PERIPHERAL): ICD-10-CM | Status: INADEQUATELY CONTROLLED

## 2018-09-25 PROCEDURE — ? COUNSELING

## 2018-09-25 PROCEDURE — 99213 OFFICE O/P EST LOW 20 MIN: CPT

## 2018-09-25 PROCEDURE — ? TREATMENT REGIMEN

## 2018-09-25 PROCEDURE — ? PRESCRIPTION

## 2018-09-25 RX ORDER — CEPHALEXIN 500 MG/1
CAPSULE ORAL
Qty: 60 | Refills: 0 | Status: ERX

## 2018-09-25 ASSESSMENT — LOCATION SIMPLE DESCRIPTION DERM
LOCATION SIMPLE: RIGHT PRETIBIAL REGION
LOCATION SIMPLE: LEFT PRETIBIAL REGION

## 2018-09-25 ASSESSMENT — LOCATION DETAILED DESCRIPTION DERM
LOCATION DETAILED: LEFT DISTAL PRETIBIAL REGION
LOCATION DETAILED: RIGHT DISTAL PRETIBIAL REGION

## 2018-09-25 ASSESSMENT — LOCATION ZONE DERM: LOCATION ZONE: LEG

## 2018-09-25 NOTE — PROCEDURE: TREATMENT REGIMEN
Initiate Treatment: Support stockings once daily, Triamcinolone cream bid, Keflex 500 mg twice daily for one month
Detail Level: Zone

## 2019-01-11 ENCOUNTER — HOSPITAL ENCOUNTER (OUTPATIENT)
Dept: WOUND CARE | Age: 69
Discharge: HOME | End: 2019-01-11
Attending: SURGERY
Payer: MEDICARE

## 2019-01-11 VITALS
OXYGEN SATURATION: 93 % | SYSTOLIC BLOOD PRESSURE: 150 MMHG | RESPIRATION RATE: 20 BRPM | WEIGHT: 315 LBS | HEIGHT: 78 IN | TEMPERATURE: 98.3 F | BODY MASS INDEX: 36.45 KG/M2 | HEART RATE: 83 BPM | DIASTOLIC BLOOD PRESSURE: 79 MMHG

## 2019-01-11 PROCEDURE — 99214 OFFICE O/P EST MOD 30 MIN: CPT

## 2019-01-11 PROCEDURE — 29581 APPL MULTLAYER CMPRN SYS LEG: CPT

## 2019-01-11 NOTE — WOUND CARE
[address]  [address], [address]  Phone: [phone]  Fax: [phone]    Patient: Huan Juarez MRN: [id number]  SSN: [id number]    YOB: 1950  Age: 80 y.o. Sex: male       Return Appointment: 1 week with Kendell Rodriges MD    Instructions: Left lower leg:  Cleanse wound with normal saline. Apply Xeroform gauze to wound bed., wrap with Coban 2. Do not get wet. May purchase cast cover to use when showering. Return for dressing change with clinician on Tuesday. Appointment with physician in 1 week. Should you experience increased redness, swelling, pain, foul odor, size of wound(s), or have a temperature over 101 degrees please contact the [address] [place] [address] Road at [phone] or if after hours contact your primary care physician or go to the hospital emergency department.     Signed By: Tosha Sena RN     January 11, 2019

## 2019-01-11 NOTE — WOUND CARE
01/11/19 1624   Wound Leg Lower Medial   Date First Assessed/Time First Assessed: 01/11/19 1535   POA: Yes  Wound Type: Trauma  Location: Leg Lower  Orientation: Medial   Wound Length (cm) 2.1 cm   Wound Width (cm) 0.1 cm   Wound Depth (cm) 0.1   Wound Surface area (cm^2) 0.21 cm^2   Condition of Base Epithelializing   Epithelialization (%) 100   Tissue Type Percent Pink 100   Drainage Amount  Scant   Drainage Color Serous   Wound Odor None   Periwound Skin Condition Intact   Cleansing and Cleansing Agents  Normal saline   Dressing Type Applied (xeroform gauze, abd, coban 2)       Patient taking Eliquis 5 mg, Aspirin 81 mg

## 2019-01-15 ENCOUNTER — HOSPITAL ENCOUNTER (OUTPATIENT)
Dept: WOUND CARE | Age: 69
Discharge: HOME | End: 2019-01-15
Attending: SURGERY
Payer: MEDICARE

## 2019-01-15 VITALS
SYSTOLIC BLOOD PRESSURE: 137 MMHG | OXYGEN SATURATION: 95 % | RESPIRATION RATE: 20 BRPM | DIASTOLIC BLOOD PRESSURE: 85 MMHG | TEMPERATURE: 97.9 F | HEART RATE: 78 BPM

## 2019-01-15 PROCEDURE — 29581 APPL MULTLAYER CMPRN SYS LEG: CPT

## 2019-01-15 NOTE — WOUND CARE
01/15/19 1034 Wound Leg Lower Medial  
Date First Assessed/Time First Assessed: 01/11/19 1535   POA: Yes  Wound Type: Trauma  Location: Leg Lower  Orientation: Medial  
DRESSING STATUS Clean, dry, and intact DRESSING TYPE (xeroform, abd, coban 2) Non-Pressure Injury Partial thickness (epider/derm) Wound Length (cm) 0.1 cm Wound Width (cm) 0.1 cm Wound Depth (cm) 0.1 Wound Surface area (cm^2) 0.01 cm^2 Change in Wound Size % 95.24 Condition of Base Epithelializing Epithelialization (%) 100 Tissue Type Percent Pink 100 Drainage Amount  Scant Drainage Color Serous Wound Odor None Periwound Skin Condition Intact Cleansing and Cleansing Agents  Soap and water Dressing Type Applied (xeroform, abd, coflex tlc) **Patient is on an anticoagulant Eliquis 5mg & ASA 81mg**

## 2019-01-17 ENCOUNTER — HOSPITAL ENCOUNTER (EMERGENCY)
Age: 69
Discharge: HOME | End: 2019-01-17
Attending: EMERGENCY MEDICINE
Payer: MEDICARE

## 2019-01-17 ENCOUNTER — APPOINTMENT (OUTPATIENT)
Dept: CT IMAGING | Age: 69
End: 2019-01-17
Attending: EMERGENCY MEDICINE
Payer: MEDICARE

## 2019-01-17 VITALS
TEMPERATURE: 98.4 F | SYSTOLIC BLOOD PRESSURE: 109 MMHG | OXYGEN SATURATION: 94 % | DIASTOLIC BLOOD PRESSURE: 53 MMHG | WEIGHT: 315 LBS | HEART RATE: 78 BPM | RESPIRATION RATE: 18 BRPM | BODY MASS INDEX: 39.32 KG/M2

## 2019-01-17 DIAGNOSIS — K52.9 GASTROENTERITIS: Primary | ICD-10-CM

## 2019-01-17 DIAGNOSIS — E86.0 DEHYDRATION: ICD-10-CM

## 2019-01-17 LAB
ALBUMIN SERPL-MCNC: 3.6 G/DL (ref 3.2–4.6)
ALBUMIN/GLOB SERPL: 0.9 {RATIO}
ALP SERPL-CCNC: 136 U/L (ref 50–136)
ALT SERPL-CCNC: 41 U/L (ref 12–65)
ANION GAP SERPL CALC-SCNC: 13 MMOL/L
AST SERPL-CCNC: 57 U/L (ref 15–37)
ATRIAL RATE: 80 BPM
BASOPHILS # BLD: 0.1 K/UL (ref 0–0.2)
BASOPHILS NFR BLD: 1 % (ref 0–2)
BILIRUB SERPL-MCNC: 0.5 MG/DL (ref 0.2–1.1)
BUN SERPL-MCNC: 35 MG/DL (ref 8–23)
CALCIUM SERPL-MCNC: 8.4 MG/DL (ref 8.3–10.4)
CALCULATED P AXIS, ECG09: 118 DEGREES
CALCULATED R AXIS, ECG10: -64 DEGREES
CALCULATED T AXIS, ECG11: 43 DEGREES
CHLORIDE SERPL-SCNC: 106 MMOL/L (ref 98–107)
CO2 SERPL-SCNC: 19 MMOL/L (ref 21–32)
CREAT SERPL-MCNC: 1.52 MG/DL (ref 0.8–1.5)
DIAGNOSIS, 93000: NORMAL
DIFFERENTIAL METHOD BLD: ABNORMAL
EOSINOPHIL # BLD: 0.1 K/UL (ref 0–0.8)
EOSINOPHIL NFR BLD: 1 % (ref 0.5–7.8)
ERYTHROCYTE [DISTWIDTH] IN BLOOD BY AUTOMATED COUNT: 15.6 % (ref 11.9–14.6)
GLOBULIN SER CALC-MCNC: 3.9 G/DL (ref 2.3–3.5)
GLUCOSE BLD STRIP.AUTO-MCNC: 279 MG/DL (ref 65–100)
GLUCOSE SERPL-MCNC: 262 MG/DL (ref 65–100)
HCT VFR BLD AUTO: 49.9 % (ref 41.1–50.3)
HGB BLD-MCNC: 16.2 G/DL (ref 13.6–17.2)
IMM GRANULOCYTES # BLD AUTO: 0 K/UL (ref 0–0.5)
IMM GRANULOCYTES NFR BLD AUTO: 1 % (ref 0–5)
LACTATE BLD-SCNC: 2.13 MMOL/L (ref 0.5–1.9)
LACTATE BLD-SCNC: 3.21 MMOL/L (ref 0.5–1.9)
LIPASE SERPL-CCNC: 140 U/L (ref 73–393)
LYMPHOCYTES # BLD: 1.6 K/UL (ref 0.5–4.6)
LYMPHOCYTES NFR BLD: 18 % (ref 13–44)
MCH RBC QN AUTO: 29.1 PG (ref 26.1–32.9)
MCHC RBC AUTO-ENTMCNC: 32.5 G/DL (ref 31.4–35)
MCV RBC AUTO: 89.6 FL (ref 79.6–97.8)
MONOCYTES # BLD: 1 K/UL (ref 0.1–1.3)
MONOCYTES NFR BLD: 11 % (ref 4–12)
NEUTS SEG # BLD: 6.1 K/UL (ref 1.7–8.2)
NEUTS SEG NFR BLD: 69 % (ref 43–78)
NRBC # BLD: 0 K/UL (ref 0–0.2)
P-R INTERVAL, ECG05: 190 MS
PLATELET # BLD AUTO: 192 K/UL (ref 150–450)
PMV BLD AUTO: 9.6 FL (ref 9.4–12.3)
POTASSIUM SERPL-SCNC: 3.3 MMOL/L (ref 3.5–5.1)
PROCALCITONIN SERPL-MCNC: 0.3 NG/ML
PROT SERPL-MCNC: 7.5 G/DL
Q-T INTERVAL, ECG07: 394 MS
QRS DURATION, ECG06: 114 MS
QTC CALCULATION (BEZET), ECG08: 449 MS
RBC # BLD AUTO: 5.57 M/UL (ref 4.23–5.6)
SODIUM SERPL-SCNC: 138 MMOL/L (ref 136–145)
TROPONIN I BLD-MCNC: 0.01 NG/ML (ref 0.02–0.05)
VENTRICULAR RATE, ECG03: 78 BPM
WBC # BLD AUTO: 8.8 K/UL (ref 4.3–11.1)

## 2019-01-17 PROCEDURE — 74011636320 HC RX REV CODE- 636/320: Performed by: EMERGENCY MEDICINE

## 2019-01-17 PROCEDURE — 93005 ELECTROCARDIOGRAM TRACING: CPT | Performed by: EMERGENCY MEDICINE

## 2019-01-17 PROCEDURE — 74011000258 HC RX REV CODE- 258: Performed by: EMERGENCY MEDICINE

## 2019-01-17 PROCEDURE — 96361 HYDRATE IV INFUSION ADD-ON: CPT | Performed by: EMERGENCY MEDICINE

## 2019-01-17 PROCEDURE — 83690 ASSAY OF LIPASE: CPT

## 2019-01-17 PROCEDURE — 74011250637 HC RX REV CODE- 250/637: Performed by: EMERGENCY MEDICINE

## 2019-01-17 PROCEDURE — 85025 COMPLETE CBC W/AUTO DIFF WBC: CPT

## 2019-01-17 PROCEDURE — 83631 LACTOFERRIN FECAL (QUANT): CPT

## 2019-01-17 PROCEDURE — 81003 URINALYSIS AUTO W/O SCOPE: CPT | Performed by: EMERGENCY MEDICINE

## 2019-01-17 PROCEDURE — 87177 OVA AND PARASITES SMEARS: CPT

## 2019-01-17 PROCEDURE — 82962 GLUCOSE BLOOD TEST: CPT

## 2019-01-17 PROCEDURE — 87086 URINE CULTURE/COLONY COUNT: CPT

## 2019-01-17 PROCEDURE — 74177 CT ABD & PELVIS W/CONTRAST: CPT

## 2019-01-17 PROCEDURE — 99285 EMERGENCY DEPT VISIT HI MDM: CPT | Performed by: EMERGENCY MEDICINE

## 2019-01-17 PROCEDURE — 96360 HYDRATION IV INFUSION INIT: CPT | Performed by: EMERGENCY MEDICINE

## 2019-01-17 PROCEDURE — 74011250636 HC RX REV CODE- 250/636: Performed by: EMERGENCY MEDICINE

## 2019-01-17 PROCEDURE — 80053 COMPREHEN METABOLIC PANEL: CPT

## 2019-01-17 PROCEDURE — 87040 BLOOD CULTURE FOR BACTERIA: CPT

## 2019-01-17 PROCEDURE — 87045 FECES CULTURE AEROBIC BACT: CPT

## 2019-01-17 PROCEDURE — 83605 ASSAY OF LACTIC ACID: CPT

## 2019-01-17 PROCEDURE — 87449 NOS EACH ORGANISM AG IA: CPT

## 2019-01-17 PROCEDURE — 87088 URINE BACTERIA CULTURE: CPT

## 2019-01-17 PROCEDURE — 84484 ASSAY OF TROPONIN QUANT: CPT

## 2019-01-17 PROCEDURE — 84145 PROCALCITONIN (PCT): CPT

## 2019-01-17 PROCEDURE — 87186 SC STD MICRODIL/AGAR DIL: CPT

## 2019-01-17 RX ORDER — SODIUM CHLORIDE 0.9 % (FLUSH) 0.9 %
10 SYRINGE (ML) INJECTION
Status: COMPLETED | OUTPATIENT
Start: 2019-01-17 | End: 2019-01-17

## 2019-01-17 RX ORDER — DIPHENOXYLATE HYDROCHLORIDE AND ATROPINE SULFATE 2.5; .025 MG/1; MG/1
2 TABLET ORAL
Status: COMPLETED | OUTPATIENT
Start: 2019-01-17 | End: 2019-01-17

## 2019-01-17 RX ORDER — ONDANSETRON 8 MG/1
8 TABLET, ORALLY DISINTEGRATING ORAL
Qty: 10 TAB | Refills: 0 | Status: SHIPPED | OUTPATIENT
Start: 2019-01-17 | End: 2019-06-11

## 2019-01-17 RX ADMIN — SODIUM CHLORIDE 100 ML: 900 INJECTION, SOLUTION INTRAVENOUS at 16:34

## 2019-01-17 RX ADMIN — IOPAMIDOL 100 ML: 755 INJECTION, SOLUTION INTRAVENOUS at 16:34

## 2019-01-17 RX ADMIN — DIPHENOXYLATE HYDROCHLORIDE AND ATROPINE SULFATE 2 TABLET: 2.5; .025 TABLET ORAL at 17:07

## 2019-01-17 RX ADMIN — SODIUM CHLORIDE 1000 ML: 900 INJECTION, SOLUTION INTRAVENOUS at 14:22

## 2019-01-17 RX ADMIN — SODIUM CHLORIDE 1000 ML: 900 INJECTION, SOLUTION INTRAVENOUS at 13:31

## 2019-01-17 RX ADMIN — Medication 10 ML: at 16:34

## 2019-01-17 NOTE — ED NOTES
I have reviewed discharge instructions with the patient. The patient verbalized understanding. Patient left ED via Discharge Method: ambulatory to Home with spouse. Opportunity for questions and clarification provided. Patient given 1 scripts. To continue your aftercare when you leave the hospital, you may receive an automated call from our care team to check in on how you are doing. This is a free service and part of our promise to provide the best care and service to meet your aftercare needs.  If you have questions, or wish to unsubscribe from this service please call [phone]. Thank you for Choosing our [facility] Emergency Department.

## 2019-01-17 NOTE — DISCHARGE INSTRUCTIONS
Patient Education        Dehydration: Care Instructions  Your Care Instructions  Dehydration happens when your body loses too much fluid. This might happen when you do not drink enough water or you lose large amounts of fluids from your body because of diarrhea, vomiting, or sweating. Severe dehydration can be life-threatening. Water and minerals called electrolytes help put your body fluids back in balance. Learn the early signs of fluid loss, and drink more fluids to prevent dehydration. Follow-up care is a key part of your treatment and safety. Be sure to make and go to all appointments, and call your doctor if you are having problems. It's also a good idea to know your test results and keep a list of the medicines you take. How can you care for yourself at home? · To prevent dehydration, drink plenty of fluids, enough so that your urine is light yellow or clear like water. Choose water and other caffeine-free clear liquids until you feel better. If you have kidney, heart, or liver disease and have to limit fluids, talk with your doctor before you increase the amount of fluids you drink. · If you do not feel like eating or drinking, try taking small sips of water, sports drinks, or other rehydration drinks. · Get plenty of rest.  To prevent dehydration  · Add more fluids to your diet and daily routine, unless your doctor has told you not to. · During hot weather, drink more fluids. Drink even more fluids if you exercise a lot. Stay away from drinks with alcohol or caffeine. · Watch for the symptoms of dehydration. These include:  ? A dry, sticky mouth. ? Dark yellow urine, and not much of it. ? Dry and sunken eyes. ? Feeling very tired. · Learn what problems can lead to dehydration. These include:  ? Diarrhea, fever, and vomiting. ? Any illness with a fever, such as pneumonia or the flu. ?  Activities that cause heavy sweating, such as endurance races and heavy outdoor work in hot or humid weather. ? Alcohol or drug abuse or withdrawal.  ? Certain medicines, such as cold and allergy pills (antihistamines), diet pills (diuretics), and laxatives. ? Certain diseases, such as diabetes, cancer, and heart or kidney disease. When should you call for help? Call 911 anytime you think you may need emergency care. For example, call if:    · You passed out (lost consciousness).    Call your doctor now or seek immediate medical care if:    · You are confused and cannot think clearly.     · You are dizzy or lightheaded, or you feel like you may faint.     · You have signs of needing more fluids. You have sunken eyes and a dry mouth, and you pass only a little dark urine.     · You cannot keep fluids down.    Watch closely for changes in your health, and be sure to contact your doctor if:    · You are not making tears.     · Your skin is very dry and sags slowly back into place after you pinch it.     · Your mouth and eyes are very dry. Where can you learn more? Go to http://chiqui-ruthy.info/. Enter S374 in the search box to learn more about \"Dehydration: Care Instructions. \"  Current as of: November 20, 2017  Content Version: 11.8  © 9918-0175 [employer]. Care instructions adapted under license by [employer] (which disclaims liability or warranty for this information). If you have questions about a medical condition or this instruction, always ask your healthcare professional. [place] [address] any warranty or liability for your use of this information. Patient Education        Gastroenteritis: Care Instructions  Your Care Instructions    Gastroenteritis is an illness that may cause nausea, vomiting, and diarrhea. It is sometimes called \"stomach flu. \" It can be caused by bacteria or a virus. You will probably begin to feel better in 1 to 2 days. In the meantime, get plenty of rest and make sure you do not become dehydrated.  Dehydration occurs when your body loses too much fluid. Follow-up care is a key part of your treatment and safety. Be sure to make and go to all appointments, and call your doctor if you are having problems. It's also a good idea to know your test results and keep a list of the medicines you take. How can you care for yourself at home? · If your doctor prescribed antibiotics, take them as directed. Do not stop taking them just because you feel better. You need to take the full course of antibiotics. · Drink plenty of fluids to prevent dehydration, enough so that your urine is light yellow or clear like water. Choose water and other caffeine-free clear liquids until you feel better. If you have kidney, heart, or liver disease and have to limit fluids, talk with your doctor before you increase your fluid intake. · Drink fluids slowly, in frequent, small amounts, because drinking too much too fast can cause vomiting. · Begin eating mild foods, such as dry toast, yogurt, applesauce, bananas, and rice. Avoid spicy, hot, or high-fat foods, and do not drink alcohol or caffeine for a day or two. Do not drink milk or eat ice cream until you are feeling better. How to prevent gastroenteritis  · Keep hot foods hot and cold foods cold. · Do not eat meats, dressings, salads, or other foods that have been kept at room temperature for more than 2 hours. · Use a thermometer to check your refrigerator. It should be between 34°F and 40°F.  · Defrost meats in the refrigerator or microwave, not on the kitchen counter. · Keep your hands and your kitchen clean. Wash your hands, cutting boards, and countertops with hot soapy water frequently. · Cook meat until it is well done. · Do not eat raw eggs or uncooked sauces made with raw eggs. · Do not take chances. If food looks or tastes spoiled, throw it out. When should you call for help? Call 911 anytime you think you may need emergency care.  For example, call if:    · You vomit blood or what looks like coffee grounds.     · You passed out (lost consciousness).     · You pass maroon or very bloody stools.    Call your doctor now or seek immediate medical care if:    · You have severe belly pain.     · You have signs of needing more fluids. You have sunken eyes, a dry mouth, and pass only a little dark urine.     · You feel like you are going to faint.     · You have increased belly pain that does not go away in 1 to 2 days.     · You have new or increased nausea, or you are vomiting.     · You have a new or higher fever.     · Your stools are black and tarlike or have streaks of blood.    Watch closely for changes in your health, and be sure to contact your doctor if:    · You are dizzy or lightheaded.     · You urinate less than usual, or your urine is dark yellow or brown.     · You do not feel better with each day that goes by. Where can you learn more? Go to http://chiqui-ruthy.info/. Enter N142 in the search box to learn more about \"Gastroenteritis: Care Instructions. \"  Current as of: November 18, 2017  Content Version: 11.8  © 9638-2635 [employer]. Care instructions adapted under license by [employer] (which disclaims liability or warranty for this information). If you have questions about a medical condition or this instruction, always ask your healthcare professional. [address] any warranty or liability for your use of this information.

## 2019-01-17 NOTE — ED TRIAGE NOTES
Patient states of n/v/d x2 day with lower quadrant abd pain. Patient ambulatory upon triage concerns of not absorbing medication. Hypotension 81/55

## 2019-01-17 NOTE — ED PROVIDER NOTES
The history is provided by the patient and the spouse. Abdominal Pain This is a new problem. The current episode started 2 days ago. The problem occurs constantly. The problem has been gradually worsening. The pain is associated with vomiting and previous surgery. The pain is located in the LLQ. The quality of the pain is cramping and pressure-like. The pain is severe. Associated symptoms include anorexia, belching, diarrhea, nausea and vomiting. Pertinent negatives include no fever, no constipation, no dysuria, no frequency, no hematuria, no headaches, no arthralgias, no myalgias, no chest pain and no back pain. The pain is worsened by vomiting. The pain is relieved by nothing. Past workup includes surgery. His past medical history is significant for diverticulitis. The patient's surgical history includes colectomy. Past Medical History:  
Diagnosis Date  Asthma dx childhood  
 last attack during high school; advair at hs; albuterol prn; neb prn  Atrial fibrillation ([address] 2 epiosode of A-fib last about 6-8 months ago (noted on 6/27/17); on Eliquis  Cervical spondylosis 6/20/2013  Chronic back pain 2013 RIGHT SIDE WITH UNCLEAR ETIOLOGY  Chronic pain  Colon polyps  COPD (chronic obstructive pulmonary disease) ([address] 8/17/2016  Coronary atherosclerosis of native coronary vessel 8/17/2016 08/2012: Mild non-obstructive CAD  Diabetes ([address] dx 5/14  
 type 2; oral meds; no bs checks at home  Diverticulosis  Edema 8/17/2016  Former smoker  GERD (gastroesophageal reflux disease)   
  controlled with med  High cholesterol  History of kidney stones  Hypertension   
 controlled with med  Obesity (BMI 30-39. 9) BMI 39  
 Status post total right knee replacement 7/17/2017  Unspecified sleep apnea   
 wears cpap at hs  
 
 
Past Surgical History:  
[facility]    
 kidney stone removed cystoscopy  HX CATARACT REMOVAL    
 bilateral with lens implants; x3  
 HX COLECTOMY  2015  HX COLONOSCOPY  10/6/14  HX COLONOSCOPY  14  HX HEART CATHETERIZATION    
 no stents  HX LUMBAR LAMINECTOMY    
 no hardware  HX OTHER SURGICAL  20 yrs ago  
 colo-rectal sx for fissure  HX RETINAL DETACHMENT REPAIR Bilateral   
 
   
Family History:  
Problem Relation Age of Onset  Heart Disease Father BOWEL OBSTRUCTION  
 Heart Attack Father 80 MI  
 Heart Disease Brother 72 STENT HEART  
 Suicide Brother  Parkinsonism Mother  Inflammatory Bowel Dz Son  Malignant Hyperthermia Neg Hx  Pseudocholinesterase Deficiency Neg Hx  Delayed Awakening Neg Hx  Post-op Nausea/Vomiting Neg Hx  Emergence Delirium Neg Hx  Post-op Cognitive Dysfunction Neg Hx Social History Socioeconomic History  Marital status:  Spouse name: Not on file  Number of children: Not on file  Years of education: Not on file  Highest education level: Not on file Social Needs  Financial resource strain: Not on file  Food insecurity - worry: Not on file  Food insecurity - inability: Not on file  Transportation needs - medical: Not on file  Transportation needs - non-medical: Not on file Occupational History  Not on file Tobacco Use  Smoking status: Former Smoker Packs/day: 1.50 Years: 25.00 Pack years: 37.50 Types: Cigarettes Last attempt to quit: 2012 Years since quittin.7  Smokeless tobacco: Never Used Substance and Sexual Activity  Alcohol use: Yes Alcohol/week: 1.8 oz Types: 3 Cans of beer per week  Drug use: No  
 Sexual activity: Not on file Other Topics Concern [address] Service Not Asked  Blood Transfusions Not Asked  Caffeine Concern Not Asked  Occupational Exposure Not Asked [place] Hazards Not Asked  Sleep Concern Not Asked  Stress Concern Not Asked  Weight Concern Not Asked  Special Diet Not Asked  Back Care Not Asked  Exercise Not Asked  Bike Helmet Not Asked [place] Yes  Self-Exams Not Asked Social History Narrative 1/28/15:  PATIENT IS  TO [facility]. THEY HAVE TWO GROWN CHILDREN. 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 ALLERGIES: Bactrim [sulfamethoxazole-trimethoprim] Review of Systems Constitutional: Negative for activity change, chills, diaphoresis and fever. HENT: Negative for dental problem, hearing loss, nosebleeds, rhinorrhea and sore throat. Eyes: Negative for pain, discharge, redness and visual disturbance. Respiratory: Negative for cough, chest tightness and shortness of breath. Cardiovascular: Negative for chest pain, palpitations and leg swelling. Gastrointestinal: Positive for abdominal pain, anorexia, diarrhea, nausea and vomiting. Negative for constipation. Endocrine: Negative for cold intolerance, heat intolerance, polydipsia and polyuria. Genitourinary: Negative for dysuria, flank pain, frequency and hematuria. Musculoskeletal: Negative for arthralgias, back pain, joint swelling, myalgias and neck pain. Skin: Negative for pallor and rash. Allergic/Immunologic: Negative for environmental allergies and food allergies. Neurological: Negative for dizziness, tremors, light-headedness, numbness and headaches. Hematological: Negative for adenopathy. Does not bruise/bleed easily. Psychiatric/Behavioral: Negative for confusion and dysphoric mood. The patient is not nervous/anxious and is not hyperactive. All other systems reviewed and are negative. Vitals:  
 01/17/19 1304 01/17/19 1337 BP: (!) 81/55 90/52 Pulse: 79 Resp: 18 Temp: 98.3 °F (36.8 °C) Physical Exam  
Constitutional: He is oriented to person, place, and time.  He appears well-developed and well-nourished. He appears distressed. HENT:  
Head: Normocephalic and atraumatic. Right Ear: External ear normal.  
Left Ear: External ear normal.  
Mouth/Throat: Oropharynx is clear and moist. Mucous membranes are dry. No oropharyngeal exudate. Eyes: Conjunctivae and EOM are normal. Pupils are equal, round, and reactive to light. No scleral icterus. Neck: Normal range of motion. Neck supple. No JVD present. No thyromegaly present. Cardiovascular: Normal rate, regular rhythm, normal heart sounds and intact distal pulses. Exam reveals no gallop and no friction rub. No murmur heard. Pulmonary/Chest: Effort normal and breath sounds normal. No respiratory distress. He has no wheezes. Abdominal: Soft. He exhibits no distension. Bowel sounds are decreased. There is no hepatosplenomegaly. There is tenderness in the suprapubic area and left lower quadrant. Musculoskeletal: Normal range of motion. He exhibits no edema, tenderness or deformity. Neurological: He is alert and oriented to person, place, and time. No cranial nerve deficit or sensory deficit. He exhibits normal muscle tone. Coordination normal.  
Skin: Skin is warm and dry. Capillary refill takes less than 2 seconds. No rash noted. Psychiatric: He has a normal mood and affect. His behavior is normal. Judgment and thought content normal.  
Nursing note and vitals reviewed. MDM Number of Diagnoses or Management Options Diagnosis management comments: Differential diagnosis Gastroenteritis, colitis, diverticulitis, bowel obstruction, ischemic bowel 3:26 PM 
Patient improving with hydration Awaiting CT of abdomen Care turned over to Dr. Nalini Kent. Bedside rounds completed Care turned over to me pending CT scan and repeat lactic acid. CT scan shows no injury or abdominal pathology. Lactic acid has improved to 2.1. Patient is tolerating oral intake.   At this time he would prefer to be treated at home with nausea medication. He does appear safe for discharge home. I suspect the elevated lactic acid is related to dehydration. Amount and/or Complexity of Data Reviewed Clinical lab tests: ordered and reviewed Tests in the radiology section of CPT®: ordered Tests in the medicine section of CPT®: ordered and reviewed Decide to obtain previous medical records or to obtain history from someone other than the patient: yes Obtain history from someone other than the patient: yes Review and summarize past medical records: yes Risk of Complications, Morbidity, and/or Mortality Presenting problems: moderate Diagnostic procedures: moderate Management options: moderate General comments: Elements of this note have been dictated via voice recognition software. Text and phrases may be limited by the accuracy of the software. The chart has been reviewed, but errors may still be present. Patient Progress Patient progress: stable Procedures

## 2019-01-18 ENCOUNTER — HOSPITAL ENCOUNTER (OUTPATIENT)
Dept: WOUND CARE | Age: 69
Discharge: HOME | End: 2019-01-18
Attending: SURGERY
Payer: MEDICARE

## 2019-01-18 ENCOUNTER — HOSPITAL ENCOUNTER (EMERGENCY)
Age: 69
Discharge: HOME | End: 2019-01-18
Attending: EMERGENCY MEDICINE
Payer: MEDICARE

## 2019-01-18 VITALS
WEIGHT: 310 LBS | DIASTOLIC BLOOD PRESSURE: 54 MMHG | RESPIRATION RATE: 16 BRPM | OXYGEN SATURATION: 94 % | SYSTOLIC BLOOD PRESSURE: 112 MMHG | HEIGHT: 78 IN | BODY MASS INDEX: 35.87 KG/M2 | HEART RATE: 72 BPM | TEMPERATURE: 98 F

## 2019-01-18 VITALS
SYSTOLIC BLOOD PRESSURE: 117 MMHG | DIASTOLIC BLOOD PRESSURE: 57 MMHG | OXYGEN SATURATION: 93 % | TEMPERATURE: 98.2 F | HEART RATE: 81 BPM | RESPIRATION RATE: 16 BRPM

## 2019-01-18 DIAGNOSIS — R19.7 DIARRHEA, UNSPECIFIED TYPE: Primary | ICD-10-CM

## 2019-01-18 LAB
C DIFF GDH STL QL: NORMAL
C DIFF TOX A+B STL QL IA: NORMAL
CLINICAL CONSIDERATION: NORMAL
INTERPRETATION: NORMAL
PCR REFLEX: NORMAL

## 2019-01-18 PROCEDURE — 99282 EMERGENCY DEPT VISIT SF MDM: CPT | Performed by: EMERGENCY MEDICINE

## 2019-01-18 PROCEDURE — 99213 OFFICE O/P EST LOW 20 MIN: CPT

## 2019-01-18 PROCEDURE — 74011250637 HC RX REV CODE- 250/637: Performed by: EMERGENCY MEDICINE

## 2019-01-18 RX ORDER — DIPHENOXYLATE HYDROCHLORIDE AND ATROPINE SULFATE 2.5; .025 MG/1; MG/1
2 TABLET ORAL
Qty: 20 TAB | Refills: 0 | Status: SHIPPED | OUTPATIENT
Start: 2019-01-18 | End: 2019-06-11

## 2019-01-18 RX ORDER — DIPHENOXYLATE HYDROCHLORIDE AND ATROPINE SULFATE 2.5; .025 MG/1; MG/1
2 TABLET ORAL
Status: COMPLETED | OUTPATIENT
Start: 2019-01-18 | End: 2019-01-18

## 2019-01-18 RX ADMIN — DIPHENOXYLATE HYDROCHLORIDE AND ATROPINE SULFATE 2 TABLET: 2.5; .025 TABLET ORAL at 02:49

## 2019-01-18 NOTE — ED NOTES
I have reviewed discharge instructions with the patient. The patient verbalized understanding. Patient left ED via Discharge Method: ambulatory to Home with spouse. Opportunity for questions and clarification provided. Patient given 1 scripts. Ashwini Brody RN To continue your aftercare when you leave the hospital, you may receive an automated call from our care team to check in on how you are doing. This is a free service and part of our promise to provide the best care and service to meet your aftercare needs.  If you have questions, or wish to unsubscribe from this service please call [phone]. Thank you for Choosing our [facility] Emergency Department.

## 2019-01-18 NOTE — WOUND CARE
[address] [place] [address] [address] Phone: [phone] Fax: [phone] Patient: Rocio Curtis MRN: [id number]  SSN: [id number] YOB: 1950  Age: 80 y.o. Sex: male Return Appointment: Discharge with Kelly Thao MD 
 
Instructions: No dressing needed Should you experience increased redness, swelling, pain, foul odor, size of wound(s), or have a temperature over 101 degrees please contact the [address] [place] [address] Road at [phone] or if after hours contact your primary care physician or go to the hospital emergency department. Signed By: Pia Bcek RN   
 January 18, 2019

## 2019-01-18 NOTE — ED PROVIDER NOTES
23-year-old male presenting for persistent diarrhea. He was seen earlier in the day and had extensive workup, given fluids, Zofran, and Lomotil. His workup was essentially negative and the patient felt much better after fluids and his medications. He went home and ate some soup eggroll cheese sandwich. Shortly thereafter began feeling abdominal cramping and developed watery diarrhea once again. He reports that the Lomotil seemed to work but after it wore off his diarrhea has returned. He can go to the bathroom every 30 minutes to an hour. He describes watery diarrhea. He denies fevers. He had vomiting yesterday but that has since resolved. The history is provided by the patient. Diarrhea This is a recurrent problem. The current episode started 2 days ago. The problem occurs constantly. The problem has been gradually improving. The pain is located in the generalized abdominal region. The pain is at a severity of 2/10. The pain is mild. Associated symptoms include anorexia, diarrhea, flatus, nausea and vomiting. Nothing worsens the pain. Relieved by: Lomotil. Past Medical History:  
Diagnosis Date  Asthma dx childhood  
 last attack during high school; advair at hs; albuterol prn; neb prn  Atrial fibrillation ([address] 2 epiosode of A-fib last about 6-8 months ago (noted on 6/27/17); on Eliquis  Cervical spondylosis 6/20/2013  Chronic back pain 2013 RIGHT SIDE WITH UNCLEAR ETIOLOGY  Chronic pain  Colon polyps  COPD (chronic obstructive pulmonary disease) ([address] 8/17/2016  Coronary atherosclerosis of native coronary vessel 8/17/2016 08/2012: Mild non-obstructive CAD  Diabetes ([address] dx 5/14  
 type 2; oral meds; no bs checks at home  Diverticulosis  Edema 8/17/2016  Former smoker  GERD (gastroesophageal reflux disease)   
  controlled with med  High cholesterol  History of kidney stones  Hypertension   
 controlled with med  Obesity (BMI 30-39. 9) BMI 39  
 Status post total right knee replacement 2017  Unspecified sleep apnea   
 wears cpap at hs  
 
 
Past Surgical History:  
Procedure Laterality Date  CYSTOSCOPY,INSERT URETERAL STENT    
 kidney stone removed cystoscopy  HX CATARACT REMOVAL    
 bilateral with lens implants; x3  
 HX COLECTOMY    HX COLONOSCOPY  10/6/14  HX COLONOSCOPY  14  HX HEART CATHETERIZATION    
 no stents  HX LUMBAR LAMINECTOMY    
 no hardware  HX OTHER SURGICAL  20 yrs ago  
 colo-rectal sx for fissure  HX RETINAL DETACHMENT REPAIR Bilateral   
 
   
Family History:  
Problem Relation Age of Onset  Heart Disease Father BOWEL OBSTRUCTION  
 Heart Attack Father 80 MI  
 Heart Disease Brother 72 STENT HEART  
 Suicide Brother  Parkinsonism Mother  Inflammatory Bowel Dz Son  Malignant Hyperthermia Neg Hx  Pseudocholinesterase Deficiency Neg Hx  Delayed Awakening Neg Hx  Post-op Nausea/Vomiting Neg Hx  Emergence Delirium Neg Hx  Post-op Cognitive Dysfunction Neg Hx Social History Socioeconomic History  Marital status:  Spouse name: Not on file  Number of children: Not on file  Years of education: Not on file  Highest education level: Not on file Social Needs  Financial resource strain: Not on file  Food insecurity - worry: Not on file  Food insecurity - inability: Not on file  Transportation needs - medical: Not on file  Transportation needs - non-medical: Not on file Occupational History  Not on file Tobacco Use  Smoking status: Former Smoker Packs/day: 1.50 Years: 25.00 Pack years: 37.50 Types: Cigarettes Last attempt to quit: 2012 Years since quittin.8  Smokeless tobacco: Never Used Substance and Sexual Activity  Alcohol use: Yes Alcohol/week: 1.8 oz Types: 3 Cans of beer per week  Drug use: No  
 Sexual activity: Not on file Other Topics Concern [address] Service Not Asked  Blood Transfusions Not Asked  Caffeine Concern Not Asked  Occupational Exposure Not Asked [place] Hazards Not Asked  Sleep Concern Not Asked  Stress Concern Not Asked  Weight Concern Not Asked  Special Diet Not Asked  Back Care Not Asked  Exercise Not Asked  Bike Helmet Not Asked [address] Yes  Self-Exams Not Asked Social History Narrative 1/28/15:  PATIENT IS  TO [facility]. THEY HAVE TWO GROWN CHILDREN. 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 ALLERGIES: Bactrim [sulfamethoxazole-trimethoprim] Review of Systems Gastrointestinal: Positive for anorexia, diarrhea, flatus, nausea and vomiting. All other systems reviewed and are negative. Vitals:  
 01/18/19 0211 BP: 117/57 Pulse: 81 Resp: 16 Temp: 98.2 °F (36.8 °C) SpO2: 93% Physical Exam  
Constitutional: He is oriented to person, place, and time. He appears well-developed and well-nourished. HENT:  
Head: Normocephalic and atraumatic. Eyes: Conjunctivae and EOM are normal. Pupils are equal, round, and reactive to light. Neck: Normal range of motion. Neck supple. Cardiovascular: Normal rate, regular rhythm, normal heart sounds and intact distal pulses. Pulmonary/Chest: Effort normal and breath sounds normal.  
Abdominal: Soft. Hyperactive bowel sounds Musculoskeletal: Normal range of motion. He exhibits no deformity. Neurological: He is alert and oriented to person, place, and time. No cranial nerve deficit. Skin: Skin is warm and dry. Psychiatric: He has a normal mood and affect. His behavior is normal.  
Nursing note and vitals reviewed. MDM Number of Diagnoses or Management Options Diagnosis management comments: 44-year-old well-appearing male presenting for persistent diarrhea. review of the medical record reveals the patient was seen in the past 12 hours where he received labs, fluids, CT of his abdomen, and had stool samples sent to the lab for analysis. He appears to have responded to the Lomotil but the dose he was given has worn off. He was not provided with a prescription for this. Amount and/or Complexity of Data Reviewed Review and summarize past medical records: yes (Review the patient's workup from earlier in the day) Risk of Complications, Morbidity, and/or Mortality Presenting problems: moderate Diagnostic procedures: moderate Management options: moderate General comments: Given the patient's vital signs are unremarkable, he appears clinically well, and had an extensive evaluation earlier in the day and do not feel it is necessary to repeat any of the labs. I do think that the patient responded appropriately to Lomotil but now that the dose has worn off he is symptomatic again. I will re-dose the patient here in the emergency department and discharge him with a prescription for the same. He is aware that the stool cultures may take 24-48 hours and that he'll be contacted with any positive results. Patient Progress Patient progress: improved Procedures

## 2019-01-18 NOTE — DISCHARGE INSTRUCTIONS
It is imperative that you adequately rehydrate. The optimal concentration is to take a sports drink like Powerade or Gatorade and mix it half and half with water. I recommend he drink at least 4 bottles of this per day mixed with water while you are still having diarrhea. If the diarrhea slackens, you can reduce her fluid intake.

## 2019-01-18 NOTE — WOUND CARE
01/18/19 1505 Wound Leg Lower Medial;Left Date First Assessed/Time First Assessed: 01/11/19 1535   POA: Yes  Wound Type: Trauma  Location: Leg Lower  Orientation: Medial;Left DRESSING STATUS Clean, dry, and intact DRESSING TYPE (xeroform, coflex) Wound Length (cm) 0 cm Wound Width (cm) 0 cm Wound Depth (cm) 0 Wound Surface area (cm^2) 0 cm^2 Change in Wound Size % 100 Condition of Base Epithelializing Drainage Amount  None Wound Odor None Periwound Skin Condition Intact Cleansing and Cleansing Agents  Soap and water

## 2019-01-20 LAB
BACTERIA SPEC CULT: NORMAL
SERVICE CMNT-IMP: NORMAL

## 2019-01-21 LAB
BACTERIA SPEC CULT: ABNORMAL
BACTERIA SPEC CULT: ABNORMAL
LACTOFERRIN, LCTFLT: 32.1 UG/ML(G) (ref 0–7.24)
SERVICE CMNT-IMP: ABNORMAL

## 2019-01-21 NOTE — PROGRESS NOTES
Spoke with patient who states he is feeling better. He states diarrhea has improved. He states he has an appointment with his PCP on Wednesday for a follow up. He states he would like to wait on antibiotic in order to have his urine rechecked by pcp.

## 2019-01-22 LAB
BACTERIA SPEC CULT: NORMAL
BACTERIA SPEC CULT: NORMAL
SERVICE CMNT-IMP: NORMAL
SERVICE CMNT-IMP: NORMAL

## 2019-01-24 LAB
O+P SPEC MICRO: NORMAL
O+P STL CONC: NORMAL
SPECIMEN SOURCE: NORMAL

## 2019-01-29 ENCOUNTER — APPOINTMENT (RX ONLY)
Dept: URBAN - METROPOLITAN AREA CLINIC 349 | Facility: CLINIC | Age: 69
Setting detail: DERMATOLOGY
End: 2019-01-29

## 2019-01-29 DIAGNOSIS — I87.2 VENOUS INSUFFICIENCY (CHRONIC) (PERIPHERAL): ICD-10-CM | Status: WORSENING

## 2019-01-29 DIAGNOSIS — L30.4 ERYTHEMA INTERTRIGO: ICD-10-CM | Status: IMPROVED

## 2019-01-29 PROCEDURE — ? TREATMENT REGIMEN

## 2019-01-29 PROCEDURE — 99213 OFFICE O/P EST LOW 20 MIN: CPT

## 2019-01-29 PROCEDURE — ? COUNSELING

## 2019-01-29 ASSESSMENT — LOCATION SIMPLE DESCRIPTION DERM
LOCATION SIMPLE: ABDOMEN
LOCATION SIMPLE: LEFT PRETIBIAL REGION
LOCATION SIMPLE: RIGHT PRETIBIAL REGION
LOCATION SIMPLE: GROIN
LOCATION SIMPLE: RIGHT THIGH

## 2019-01-29 ASSESSMENT — LOCATION DETAILED DESCRIPTION DERM
LOCATION DETAILED: RIGHT ANTERIOR PROXIMAL THIGH
LOCATION DETAILED: PERIUMBILICAL SKIN
LOCATION DETAILED: RIGHT INGUINAL CREASE
LOCATION DETAILED: RIGHT DISTAL PRETIBIAL REGION
LOCATION DETAILED: LEFT DISTAL PRETIBIAL REGION

## 2019-01-29 ASSESSMENT — LOCATION ZONE DERM
LOCATION ZONE: LEG
LOCATION ZONE: TRUNK

## 2019-01-29 NOTE — PROCEDURE: TREATMENT REGIMEN
Detail Level: Zone
Continue Regimen: TMC daily to legs
Continue Regimen: Alcortin when flaring\\nShower daily and apply powder\\nApply Teaspoon vinager mixed with water and let area dry. Then apply powder

## 2019-03-20 ENCOUNTER — HOSPITAL ENCOUNTER (OUTPATIENT)
Dept: LAB | Age: 69
Discharge: HOME | End: 2019-03-20

## 2019-03-20 PROCEDURE — 88305 TISSUE EXAM BY PATHOLOGIST: CPT

## 2019-03-31 PROBLEM — L97.929 CHRONIC VENOUS HYPERTENSION WITH ULCER AND INFLAMMATION INVOLVING LEFT SIDE (HCC): Status: ACTIVE | Noted: 2019-03-31

## 2019-03-31 PROBLEM — I87.332 CHRONIC VENOUS HYPERTENSION WITH ULCER AND INFLAMMATION INVOLVING LEFT SIDE (HCC): Status: ACTIVE | Noted: 2019-03-31

## 2019-03-31 NOTE — PROGRESS NOTES
Wound Center Progress Note Patient: Luz Leal MRN: [id number]  SSN: [id number] YOB: 1950  Age: 80 y.o. Sex: male Subjective: Chief Complaint: LLE Wound History of Present Illness:    
 
Wound Caused By: mild trauma, venous stasis, pigmented purpuric dermatosis Associated Signs and Symptoms: pruritis, drainage Timing constant Quality: wound Severity: full thickness Modifying Factors: No current compression Tolerated compression. No claudication pain. Minimal drainage. Past Medical History:  
Diagnosis Date  Asthma dx childhood  
 last attack during high school; advair at hs; albuterol prn; neb prn  Atrial fibrillation ([address] 2 epiosode of A-fib last about 6-8 months ago (noted on 6/27/17); on Eliquis  Cervical spondylosis 6/20/2013  Chronic back pain 2013 RIGHT SIDE WITH UNCLEAR ETIOLOGY  Chronic pain  Colon polyps  COPD (chronic obstructive pulmonary disease) ([address] 8/17/2016  Coronary atherosclerosis of native coronary vessel 8/17/2016 08/2012: Mild non-obstructive CAD  Diabetes ([address] dx 5/14  
 type 2; oral meds; no bs checks at home  Diverticulosis  Edema 8/17/2016  Former smoker  GERD (gastroesophageal reflux disease)   
  controlled with med  High cholesterol  History of kidney stones  Hypertension   
 controlled with med  Obesity (BMI 30-39. 9) BMI 39  
 Status post total right knee replacement 7/17/2017  Unspecified sleep apnea   
 wears cpap at hs  
  
Past Surgical History:  
Procedure Laterality Date  CYSTOSCOPY,INSERT URETERAL STENT    
 kidney stone removed cystoscopy  HX CATARACT REMOVAL    
 bilateral with lens implants; x3  
 HX COLECTOMY  2015  HX COLONOSCOPY  10/6/14  HX COLONOSCOPY  11/20/14  HX HEART CATHETERIZATION  2012  
 no stents  HX LUMBAR LAMINECTOMY  2009  
 no hardware  HX OTHER SURGICAL  20 yrs ago colo-rectal sx for fissure  HX RETINAL DETACHMENT REPAIR Bilateral   
 
Family History Problem Relation Age of Onset  Heart Disease Father BOWEL OBSTRUCTION  
 Heart Attack Father 80 MI  
 Heart Disease Brother 72 STENT HEART  
 Suicide Brother  Parkinsonism Mother  Inflammatory Bowel Dz Son  Malignant Hyperthermia Neg Hx  Pseudocholinesterase Deficiency Neg Hx  Delayed Awakening Neg Hx  Post-op Nausea/Vomiting Neg Hx  Emergence Delirium Neg Hx  Post-op Cognitive Dysfunction Neg Hx Social History Tobacco Use  Smoking status: Former Smoker Packs/day: 1.50 Years: 25.00 Pack years: 37.50 Types: Cigarettes Last attempt to quit: 2012 Years since quittin.9  Smokeless tobacco: Never Used Substance Use Topics  Alcohol use: Yes Alcohol/week: 1.8 oz Types: 3 Cans of beer per week Prior to Admission medications Medication Sig Start Date End Date Taking? Authorizing Provider  
glimepiride (AMARYL) 1 mg tablet Take 1 Tab by mouth Daily (before breakfast). 19   Geneva Gupta MD  
diphenoxylate-atropine (LOMOTIL) 2.5-0.025 mg per tablet Take 2 Tabs by mouth four (4) times daily as needed for Diarrhea (1 tab after each stool for max 8 per day). Max Daily Amount: 8 Tabs. Take after each stool for a maximum of 8 tablets daily 19   Belynda Severin, MD  
ondansetron (ZOFRAN ODT) 8 mg disintegrating tablet Take 1 Tab by mouth every twelve (12) hours as needed for Nausea. 19   Christa Machado MD  
ondansetron (ZOFRAN ODT) 8 mg disintegrating tablet Take 1 Tab by mouth every eight (8) hours as needed for Nausea. 19   Geneva Gupta MD  
apixaban (ELIQUIS) 5 mg tablet Take 1 Tab by mouth two (2) times a day. 18   Geneva Gupta MD  
dapagliflozin (FARXIGA) 10 mg tab tablet Take 1 Tab by mouth daily.  18   Geneva Gupta MD  
 metoprolol tartrate (LOPRESSOR) 25 mg tablet Take 2 Tabs by mouth two (2) times a day. 12/7/18   Micah Russ MD  
[facility]) 625 mg tablet Six a day 12/7/18   Micah Russ MD  
atorvastatin (LIPITOR) 20 mg tablet Take 1 Tab by mouth daily. 12/7/18   Micah Russ MD  
montelukast (SINGULAIR) 10 mg tablet Take 1 Tab by mouth daily. 12/7/18   Micah Russ MD  
pantoprazole (PROTONIX) 40 mg tablet Take 1 Tab by mouth daily. 12/7/18   Micah Russ MD  
ezetimibe (ZETIA) 10 mg tablet Take 1 Tab by mouth nightly. 12/7/18   Micah Russ MD  
lisinopril (PRINIVIL, ZESTRIL) 10 mg tablet Take 1 Tab by mouth daily. 12/7/18   Micah Russ MD  
doxazosin (CARDURA) 4 mg tablet Take 1 Tab by mouth daily. 12/7/18   Micah Russ MD  
celecoxib (CELEBREX) 200 mg capsule Take 1 Cap by mouth daily. 12/7/18   Micah Russ MD  
fluticasone-vilanterol (BREO ELLIPTA) 704-65 mcg/dose inhaler Take 1 Puff by inhalation daily. 12/7/18   Micah Russ MD  
albuterol (PROVENTIL HFA, VENTOLIN HFA, PROAIR HFA) 90 mcg/actuation inhaler Take 1 Puff by inhalation every four (4) hours as needed for Wheezing. 11/27/18   Micah Russ MD  
B.infantis-B.ani-B.long-B.bifi (PROBIOTIC 4X) 10-15 mg TbEC Take  by mouth. Provider, Historical  
aspirin delayed-release 81 mg tablet Take 162 mg by mouth nightly. Decrease to one 81 mg tab on 7/12/17    Provider, Historical  
ANTI-FUNGAL 2 % topical powder APPLY TO ABDOMEN 2 TIMES PER DAY 4/24/17   Provider, Historical  
Blood-Glucose Meter monitoring kit Check fs every day; DM2, E11/9, Contour glucometer 1/26/17   Barrera Salter MD  
glucose blood VI test strips (ASCENSIA CONTOUR) strip Check fs every day, DM2, E11.9 1/26/17   Barrera Salter MD  
lancets 28 gauge misc Check fs every day, DM2, E11.9 1/26/17   Barrera Salter MD  
albuterol (PROVENTIL VENTOLIN) 2.5 mg /3 mL (0.083 %) nebulizer solution 3 mL by Nebulization route once for 1 dose. Patient taking differently: 2.5 mg by Nebulization route daily as needed. 8/1/16   Moiz Otoole MD  
 
Allergies Allergen Reactions  Bactrim [Sulfamethoxazole-Trimethoprim] Itching Review of Systems: 
CONSTITUTIONAL: No fever, chills HEAD: No headache EYES: No visual loss ENT: No hearing loss SKIN: No rash CARDIOVASCULAR: No chest pain RESPIRATORY: No shortness of breath GASTROINTESTINAL: No nausea, vomiting GENITOURINARY: No excessive urination NEUROLOGICAL: No weakness MUSCULOSKELETAL: No muscle pain. No neck pain HEMATOLOGIC: No easy bleeding LYMPHATICS: No lymphedema. PSYCHIATRIC: No current depression ENDOCRINOLOGIC: No high sugars ALLERGIES: No history of asthma, hives, eczema or rhinitis. Lab Results Component Value Date/Time Hemoglobin A1c 7.9 (H) 11/27/2018 08:30 AM  
 Hemoglobin A1c (POC) 6.8 (A) 04/05/2016 09:30 AM  
  
 
Immunization History Administered Date(s) Administered  Hep A Vaccine 02/09/2000, 11/08/2000  Influenza High Dose Vaccine PF 11/02/2016, 08/24/2017, 09/11/2018  Influenza Vaccine 10/01/2012, 10/09/2013, 10/01/2014, 10/08/2015  Pneumococcal Conjugate (PCV-13) 05/21/2018  Pneumococcal Polysaccharide (PPSV-23) 01/01/2010  TB Skin Test (PPD) 01/01/2009  TB Skin Test (PPD) Intradermal 07/17/2017  Td 02/09/2000  Tdap 06/30/2015, 07/13/2015  Zoster Recombinant 09/11/2018  Zoster Vaccine, Live 08/21/2014 Body mass index is 34.92 kg/m². Current medications: 
Current Outpatient Medications Medication Sig Dispense Refill  glimepiride (AMARYL) 1 mg tablet Take 1 Tab by mouth Daily (before breakfast). 30 Tab 5  diphenoxylate-atropine (LOMOTIL) 2.5-0.025 mg per tablet Take 2 Tabs by mouth four (4) times daily as needed for Diarrhea (1 tab after each stool for max 8 per day). Max Daily Amount: 8 Tabs.  Take after each stool for a maximum of 8 tablets daily 20 Tab 0  
  ondansetron (ZOFRAN ODT) 8 mg disintegrating tablet Take 1 Tab by mouth every twelve (12) hours as needed for Nausea. 10 Tab 0  
 ondansetron (ZOFRAN ODT) 8 mg disintegrating tablet Take 1 Tab by mouth every eight (8) hours as needed for Nausea. 6 Tab 1  
 apixaban (ELIQUIS) 5 mg tablet Take 1 Tab by mouth two (2) times a day. 180 Tab 1  
 dapagliflozin (FARXIGA) 10 mg tab tablet Take 1 Tab by mouth daily. 90 Tab 1  
 metoprolol tartrate (LOPRESSOR) 25 mg tablet Take 2 Tabs by mouth two (2) times a day. 360 Tab 1  
 colesevelam (WELCHOL) 625 mg tablet Six a day 540 Tab 1  
 atorvastatin (LIPITOR) 20 mg tablet Take 1 Tab by mouth daily. 90 Tab 1  
 montelukast (SINGULAIR) 10 mg tablet Take 1 Tab by mouth daily. 90 Tab 1  
 pantoprazole (PROTONIX) 40 mg tablet Take 1 Tab by mouth daily. 90 Tab 1  
 ezetimibe (ZETIA) 10 mg tablet Take 1 Tab by mouth nightly. 90 Tab 1  
 lisinopril (PRINIVIL, ZESTRIL) 10 mg tablet Take 1 Tab by mouth daily. 90 Tab 1  
 doxazosin (CARDURA) 4 mg tablet Take 1 Tab by mouth daily. 90 Tab 1  celecoxib (CELEBREX) 200 mg capsule Take 1 Cap by mouth daily. 90 Cap 1  
 fluticasone-vilanterol (BREO ELLIPTA) 200-25 mcg/dose inhaler Take 1 Puff by inhalation daily. 1 Inhaler 5  
 albuterol (PROVENTIL HFA, VENTOLIN HFA, PROAIR HFA) 90 mcg/actuation inhaler Take 1 Puff by inhalation every four (4) hours as needed for Wheezing. 1 Inhaler 5  
 B.infantis-B.ani-B.long-B.bifi (PROBIOTIC 4X) 10-15 mg TbEC Take  by mouth.  aspirin delayed-release 81 mg tablet Take 162 mg by mouth nightly. Decrease to one 81 mg tab on 7/12/17  ANTI-FUNGAL 2 % topical powder APPLY TO ABDOMEN 2 TIMES PER DAY  11  Blood-Glucose Meter monitoring kit Check fs every day; DM2, E11/9, Contour glucometer 1 Kit 0  
 glucose blood VI test strips (ASCENSIA CONTOUR) strip Check fs every day, DM2, E11.9 100 Strip 1  
 lancets 28 gauge misc Check fs every day, DM2, E11.9 100 Lancet 1  
  albuterol (PROVENTIL VENTOLIN) 2.5 mg /3 mL (0.083 %) nebulizer solution 3 mL by Nebulization route once for 1 dose. (Patient taking differently: 2.5 mg by Nebulization route daily as needed.) 24 Each 11 Objective:  
 
Physical Exam:  
 
Visit Vitals /54 (BP 1 Location: Left leg) Pulse 72 Temp 98 °F (36.7 °C) Resp 16 Ht 6' 7\" (2.007 m) Wt 140.6 kg (310 lb) SpO2 94% BMI 34.92 kg/m² General: well developed, well nourished Psych: cooperative. Pleasant Neuro: alert and oriented to person/place/situation. Otherwise nonfocal. 
Derm: Normal turgor for age, dry skin HEENT: Normocephalic, atraumatic. EOMI. Neck: Normal range of motion. Chest: Respirations nonlabored Cardio: RRR Abdomen: Soft, nondistended Lower extremities: No hemosiderrosis No significant varicosities Capillary refill <3 sec Right 2+ DP/PT Left 2+ DP/PT Ulcer Description: Wound Abdomen Mid (Active) Number of days: 2788 Wound Knee Right (Active) Number of days: 002 [REMOVED] Wound Leg Lower Medial;Left (Removed) Dressing Status  Clean, dry, and intact 1/18/2019  3:05 PM  
Non-Pressure Injury Partial thickness (epider/derm) 1/15/2019 10:34 AM  
Wound Length (cm) 0 cm 1/18/2019  3:05 PM  
Wound Width (cm) 0 cm 1/18/2019  3:05 PM  
Wound Depth (cm) 0 1/18/2019  3:05 PM  
Wound Surface area (cm^2) 0 cm^2 1/18/2019  3:05 PM  
Change in Wound Size % 100 1/18/2019  3:05 PM  
Condition of Base Epithelializing 1/18/2019  3:05 PM  
Epithelialization (%) 100 1/15/2019 10:34 AM  
Tissue Type Percent Pink 100 1/15/2019 10:34 AM  
Drainage Amount  None 1/18/2019  3:05 PM  
Drainage Color Serous 1/15/2019 10:34 AM  
Wound Odor None 1/18/2019  3:05 PM  
Periwound Skin Condition Intact 1/18/2019  3:05 PM  
Cleansing and Cleansing Agents  Soap and water 1/18/2019  3:05 PM  
Number of days: 7 Problem List  Date Reviewed: 1/23/2019 Codes Class Noted Chronic venous hypertension with ulcer and inflammation involving left side ([address] ICD-10-CM: B31.400, Q85.801 ICD-9-CM: 459.33  3/31/2019 Obesity, morbid ([address] ICD-10-CM: E66.01 
ICD-9-CM: 278.01  8/22/2018 Venous stasis (Chronic) ICD-10-CM: I07.4 ICD-9-CM: 459.81  8/22/2018 Tubulovillous adenoma of colon ICD-10-CM: D12.6 ICD-9-CM: 211.3  5/21/2018 Overview Addendum 3/26/2019  3:49 PM by MD James Brown MD, hx of complicated colonoscopies secondary to tortuous colon, poor preps, inflamed diverticulae. Had partial colectomy. Consult March '19 colonoscopy completed; path pending History of smoking 30 or more pack years ICD-10-CM: Z87.891 ICD-9-CM: V15.82  8/8/2017 Medicare annual wellness visit, subsequent (Chronic) ICD-10-CM: Z00.00 ICD-9-CM: V70.0  8/7/2017 Overview Signed 5/21/2018 11:56 AM by Celsa Delaney MD  
  6-15-98 Status post total right knee replacement ICD-10-CM: Y29.049 ICD-9-CM: V43.65  7/17/2017 OA (osteoarthritis) of knee ICD-10-CM: M17.10 ICD-9-CM: 715.36  5/15/2017 Overview Signed 5/15/2017  3:23 PM by Edd Lombard, MD Dr. Darrell Brawn for tkr. Coronary atherosclerosis of native coronary vessel ICD-10-CM: I25.10 ICD-9-CM: 414.01  8/17/2016 Overview Signed 8/17/2016 10:58 AM by Chicho Madrid 08/2012: Mild non-obstructive CAD 
  
  
   
 COPD (chronic obstructive pulmonary disease) (HCC) ICD-10-CM: J44.9 ICD-9-CM: 800  8/17/2016 Edema ICD-10-CM: R60.9 ICD-9-CM: 782.3  8/17/2016 Paroxysmal atrial fibrillation (HCC) ICD-10-CM: I48.0 ICD-9-CM: 427.31  8/17/2016 Overview Signed 5/15/2017  3:21 PM by Edd Lombard, MD Dr. Libbie Leopard cardiology q 6mo. [facility] strict[facility] ICD-10-CM: C47.784 ICD-9-CM: 560.9  1/28/2015 Andropause ICD-10-CM: N50.89 ICD-9-CM: 608.89  8/18/2014 Diabetes mellitus type 2 in obese [facility]) ICD-10-CM: E11.69, E66.9 ICD-9-CM: 250.00, 278.00  6/23/2014 Esophageal reflux ICD-10-CM: K21.9 ICD-9-CM: 530.81  10/9/2013 Diverticulosis ICD-10-CM: K57.90 ICD-9-CM: 562.10  6/20/2013 Cervical spondylosis ICD-10-CM: W15.194 ICD-9-CM: 721.0  6/20/2013 Benign prostatic hyperplasia ICD-10-CM: N40.0 ICD-9-CM: 600.00  6/20/2013 Overview Signed 5/21/2018 11:57 AM by Beatrice Terrell MD  
  [birth year] Chronic back pain ICD-10-CM: M54.9, G89.29 ICD-9-CM: 724.5, 338.29  6/20/2013 Overview Signed 8/8/2017  9:29 AM by MD MARY Pop Duke, PT, Neurosurgeon Hyperlipemia ICD-10-CM: E78.5 ICD-9-CM: 272.4  3/11/2013 Obstructive sleep apnea (adult) (pediatric) ICD-10-CM: G47.33 
ICD-9-CM: 327.23  3/11/2013 Overview Signed 5/15/2017  3:23 PM by Kuldip Mead MD  
  Followed by sleep center. Essential hypertension ICD-10-CM: I10 
ICD-9-CM: 401.9  3/11/2013 Extrinsic asthma ICD-10-CM: J45.909 ICD-9-CM: 493.00  3/11/2013 Assessment/Plan: Wound healed. Would benefit from OTC compression. Hose. Discharged.   
 
Signed By: Gerry Alvarado MD

## 2019-03-31 NOTE — PROGRESS NOTES
Wound Center Progress Note    Patient: Merlinda Jock MRN: [id number]  SSN: [id number]    YOB: 1950  Age: 80 y.o. Sex: male      Subjective:     Chief Complaint:    LLE Wound    History of Present Illness:       Wound Caused By: mild trauma, venous stasis, pigmented purpuric dermatosis  Associated Signs and Symptoms: pruritis, drainage  Timing constant  Quality: wound  Severity: full thickness  Modifying Factors: No current compression      Past Medical History:   Diagnosis Date    Asthma dx childhood    last attack during high school; advair at hs; albuterol prn; neb prn     Atrial fibrillation (HCC)     2 epiosode of A-fib last about 6-8 months ago (noted on 6/27/17); on Eliquis     Cervical spondylosis 6/20/2013    Chronic back pain 2013    RIGHT SIDE WITH UNCLEAR ETIOLOGY    Chronic pain     Colon polyps     COPD (chronic obstructive pulmonary disease) ([facility] 8/17/2016    Coronary atherosclerosis of native coronary vessel 8/17/2016 08/2012: Mild non-obstructive CAD    Diabetes ([address] dx 5/14    type 2; oral meds; no bs checks at home    Diverticulosis     Edema 8/17/2016    Former smoker     GERD (gastroesophageal reflux disease)      controlled with med    High cholesterol     History of kidney stones     Hypertension     controlled with med    Obesity (BMI 30-39. 9)     BMI 39    Status post total right knee replacement 7/17/2017    Unspecified sleep apnea     wears cpap at hs      Past Surgical History:   Procedure Laterality Date    CYSTOSCOPY,INSERT URETERAL STENT      kidney stone removed cystoscopy     HX CATARACT REMOVAL      bilateral with lens implants; x3    HX COLECTOMY  2015    HX COLONOSCOPY  10/6/14    HX COLONOSCOPY  11/20/14    HX HEART CATHETERIZATION  2012    no stents     HX LUMBAR LAMINECTOMY  2009    no hardware     HX OTHER SURGICAL  20 yrs ago    colo-rectal sx for fissure    HX RETINAL DETACHMENT REPAIR Bilateral      Family History   Problem Relation Age of Onset    Heart Disease Father         BOWEL OBSTRUCTION    Heart Attack Father 80        MI    Heart Disease Brother 72        STENT HEART    Suicide Brother     Parkinsonism Mother     Inflammatory Bowel Dz Son     Malignant Hyperthermia Neg Hx     Pseudocholinesterase Deficiency Neg Hx     Delayed Awakening Neg Hx     Post-op Nausea/Vomiting Neg Hx     Emergence Delirium Neg Hx     Post-op Cognitive Dysfunction Neg Hx       Social History     Tobacco Use    Smoking status: Former Smoker     Packs/day: 1.50     Years: 25.00     Pack years: 37.50     Types: Cigarettes     Last attempt to quit: 2012     Years since quittin.9    Smokeless tobacco: Never Used   Substance Use Topics    Alcohol use: Yes     Alcohol/week: 1.8 oz     Types: 3 Cans of beer per week       Prior to Admission medications    Medication Sig Start Date End Date Taking? Authorizing Provider   glimepiride (AMARYL) 1 mg tablet Take 1 Tab by mouth Daily (before breakfast). 19   Ehsan Aj MD   diphenoxylate-atropine (LOMOTIL) 2.5-0.025 mg per tablet Take 2 Tabs by mouth four (4) times daily as needed for Diarrhea (1 tab after each stool for max 8 per day). Max Daily Amount: 8 Tabs. Take after each stool for a maximum of 8 tablets daily 19   Christin Sweeney MD   ondansetron (ZOFRAN ODT) 8 mg disintegrating tablet Take 1 Tab by mouth every twelve (12) hours as needed for Nausea. 19   Akil Araiza MD   ondansetron (ZOFRAN ODT) 8 mg disintegrating tablet Take 1 Tab by mouth every eight (8) hours as needed for Nausea. 19   Ehsan Aj MD   apixaban (ELIQUIS) 5 mg tablet Take 1 Tab by mouth two (2) times a day. 18   Ehsan Aj MD   dapagliflozin (FARXIGA) 10 mg tab tablet Take 1 Tab by mouth daily. 18   Ehsan Aj MD   metoprolol tartrate (LOPRESSOR) 25 mg tablet Take 2 Tabs by mouth two (2) times a day.  18   Ehsan Aj MD [facility]) 625 mg tablet Six a day 12/7/18   Dilan Cronin MD   atorvastatin (LIPITOR) 20 mg tablet Take 1 Tab by mouth daily. 12/7/18   Dilan Cronin MD   montelukast (SINGULAIR) 10 mg tablet Take 1 Tab by mouth daily. 12/7/18   Dilan Cronin MD   pantoprazole (PROTONIX) 40 mg tablet Take 1 Tab by mouth daily. 12/7/18   Dilan Cronin MD   ezetimibe (ZETIA) 10 mg tablet Take 1 Tab by mouth nightly. 12/7/18   Dilan Cronin MD   lisinopril (PRINIVIL, ZESTRIL) 10 mg tablet Take 1 Tab by mouth daily. 12/7/18   Dilan Cronin MD   doxazosin (CARDURA) 4 mg tablet Take 1 Tab by mouth daily. 12/7/18   Dilan Cronin MD   celecoxib (CELEBREX) 200 mg capsule Take 1 Cap by mouth daily. 12/7/18   Dilan Cronin MD   fluticasone-vilanterol (BREO ELLIPTA) 078-31 mcg/dose inhaler Take 1 Puff by inhalation daily. 12/7/18   Dilan Cronin MD   albuterol (PROVENTIL HFA, VENTOLIN HFA, PROAIR HFA) 90 mcg/actuation inhaler Take 1 Puff by inhalation every four (4) hours as needed for Wheezing. 11/27/18   Dilan Cronin MD   B.infantis-B.ani-B.long-B.bifi (PROBIOTIC 4X) 10-15 mg TbEC Take  by mouth. Provider, Historical   aspirin delayed-release 81 mg tablet Take 162 mg by mouth nightly. Decrease to one 81 mg tab on 7/12/17    Provider, Historical   ANTI-FUNGAL 2 % topical powder APPLY TO ABDOMEN 2 TIMES PER DAY 4/24/17   Provider, Historical   Blood-Glucose Meter monitoring kit Check fs every day; DM2, E11/9, Contour glucometer 1/26/17   Theresa Hernandez MD   glucose blood VI test strips (ASCENSIA CONTOUR) strip Check fs every day, DM2, E11.9 1/26/17   Theresa Hernandez MD   lancets 28 gauge misc Check fs every day, DM2, E11.9 1/26/17   Theresa Hernandez MD   albuterol (PROVENTIL VENTOLIN) 2.5 mg /3 mL (0.083 %) nebulizer solution 3 mL by Nebulization route once for 1 dose. Patient taking differently: 2.5 mg by Nebulization route daily as needed.  8/1/16   Luke Darden MD     Allergies   Allergen Reactions    Bactrim [Sulfamethoxazole-Trimethoprim] Itching        Review of Systems:  CONSTITUTIONAL: No fever, chills  HEAD: No headache  EYES: No visual loss  ENT: No hearing loss  SKIN: No rash  CARDIOVASCULAR: No chest pain  RESPIRATORY: No shortness of breath  GASTROINTESTINAL: No nausea, vomiting  GENITOURINARY: No excessive urination  NEUROLOGICAL: No weakness  MUSCULOSKELETAL: No muscle pain. No neck pain  HEMATOLOGIC: No easy bleeding  LYMPHATICS: No lymphedema. PSYCHIATRIC: No current depression  ENDOCRINOLOGIC: No high sugars  ALLERGIES: No history of asthma, hives, eczema or rhinitis. Lab Results   Component Value Date/Time    Hemoglobin A1c 7.9 (H) 11/27/2018 08:30 AM    Hemoglobin A1c (POC) 6.8 (A) 04/05/2016 09:30 AM        Immunization History   Administered Date(s) Administered    Hep A Vaccine 02/09/2000, 11/08/2000    Influenza High Dose Vaccine PF 11/02/2016, 08/24/2017, 09/11/2018    Influenza Vaccine 10/01/2012, 10/09/2013, 10/01/2014, 10/08/2015    Pneumococcal Conjugate (PCV-13) 05/21/2018    Pneumococcal Polysaccharide (PPSV-23) 01/01/2010    TB Skin Test (PPD) 01/01/2009    TB Skin Test (PPD) Intradermal 07/17/2017    Td 02/09/2000    Tdap 06/30/2015, 07/13/2015    Zoster Recombinant 09/11/2018    Zoster Vaccine, Live 08/21/2014       Body mass index is 40.49 kg/m². Current medications:  Current Outpatient Medications   Medication Sig Dispense Refill    glimepiride (AMARYL) 1 mg tablet Take 1 Tab by mouth Daily (before breakfast). 30 Tab 5    diphenoxylate-atropine (LOMOTIL) 2.5-0.025 mg per tablet Take 2 Tabs by mouth four (4) times daily as needed for Diarrhea (1 tab after each stool for max 8 per day). Max Daily Amount: 8 Tabs. Take after each stool for a maximum of 8 tablets daily 20 Tab 0    ondansetron (ZOFRAN ODT) 8 mg disintegrating tablet Take 1 Tab by mouth every twelve (12) hours as needed for Nausea.  10 Tab 0    ondansetron (ZOFRAN ODT) 8 mg disintegrating tablet Take 1 Tab by mouth every eight (8) hours as needed for Nausea. 6 Tab 1    apixaban (ELIQUIS) 5 mg tablet Take 1 Tab by mouth two (2) times a day. 180 Tab 1    dapagliflozin (FARXIGA) 10 mg tab tablet Take 1 Tab by mouth daily. 90 Tab 1    metoprolol tartrate (LOPRESSOR) 25 mg tablet Take 2 Tabs by mouth two (2) times a day. 360 Tab 1    colesevelam (WELCHOL) 625 mg tablet Six a day 540 Tab 1    atorvastatin (LIPITOR) 20 mg tablet Take 1 Tab by mouth daily. 90 Tab 1    montelukast (SINGULAIR) 10 mg tablet Take 1 Tab by mouth daily. 90 Tab 1    pantoprazole (PROTONIX) 40 mg tablet Take 1 Tab by mouth daily. 90 Tab 1    ezetimibe (ZETIA) 10 mg tablet Take 1 Tab by mouth nightly. 90 Tab 1    lisinopril (PRINIVIL, ZESTRIL) 10 mg tablet Take 1 Tab by mouth daily. 90 Tab 1    doxazosin (CARDURA) 4 mg tablet Take 1 Tab by mouth daily. 90 Tab 1    celecoxib (CELEBREX) 200 mg capsule Take 1 Cap by mouth daily. 90 Cap 1    fluticasone-vilanterol (BREO ELLIPTA) 200-25 mcg/dose inhaler Take 1 Puff by inhalation daily. 1 Inhaler 5    albuterol (PROVENTIL HFA, VENTOLIN HFA, PROAIR HFA) 90 mcg/actuation inhaler Take 1 Puff by inhalation every four (4) hours as needed for Wheezing. 1 Inhaler 5    B.infantis-B.ani-B.long-B.bifi (PROBIOTIC 4X) 10-15 mg TbEC Take  by mouth.  aspirin delayed-release 81 mg tablet Take 162 mg by mouth nightly. Decrease to one 81 mg tab on 7/12/17      ANTI-FUNGAL 2 % topical powder APPLY TO ABDOMEN 2 TIMES PER DAY  11    Blood-Glucose Meter monitoring kit Check fs every day; DM2, E11/9, Contour glucometer 1 Kit 0    glucose blood VI test strips (ASCENSIA CONTOUR) strip Check fs every day, DM2, E11.9 100 Strip 1    lancets 28 gauge misc Check fs every day, DM2, E11.9 100 Lancet 1    albuterol (PROVENTIL VENTOLIN) 2.5 mg /3 mL (0.083 %) nebulizer solution 3 mL by Nebulization route once for 1 dose.  (Patient taking differently: 2.5 mg by Nebulization route daily as needed.) 24 Each 11         Objective:     Physical Exam:     Visit Vitals  /79 (BP 1 Location: Left arm)   Pulse 83   Temp 98.3 °F (36.8 °C)   Resp 20   Ht 6' 7\" (2.007 m)   Wt (!) 163 kg (359 lb 6.4 oz)   SpO2 93%   BMI 40.49 kg/m²       General: well developed, well nourished  Psych: cooperative. Pleasant  Neuro: alert and oriented to person/place/situation. Otherwise nonfocal.  Derm: Normal turgor for age, dry skin  HEENT: Normocephalic, atraumatic. EOMI. Neck: Normal range of motion.   Chest: Respirations nonlabored  Cardio: RRR  Abdomen: Soft, nondistended  Lower extremities:   No hemosiderrosis  No significant varicosities  Capillary refill <3 sec    Right  2+ DP/PT    Left  2+ DP/PT    Ulcer Description:   Wound Abdomen Mid (Active)   Number of days: 1523       Wound Knee Right (Active)   Number of days: 622       [REMOVED] Wound Leg Lower Medial;Left (Removed)   Dressing Status  Clean, dry, and intact 1/18/2019  3:05 PM   Non-Pressure Injury Partial thickness (epider/derm) 1/15/2019 10:34 AM   Wound Length (cm) 0 cm 1/18/2019  3:05 PM   Wound Width (cm) 0 cm 1/18/2019  3:05 PM   Wound Depth (cm) 0 1/18/2019  3:05 PM   Wound Surface area (cm^2) 0 cm^2 1/18/2019  3:05 PM   Change in Wound Size % 100 1/18/2019  3:05 PM   Condition of Base Epithelializing 1/18/2019  3:05 PM   Epithelialization (%) 100 1/15/2019 10:34 AM   Tissue Type Percent Pink 100 1/15/2019 10:34 AM   Drainage Amount  None 1/18/2019  3:05 PM   Drainage Color Serous 1/15/2019 10:34 AM   Wound Odor None 1/18/2019  3:05 PM   Periwound Skin Condition Intact 1/18/2019  3:05 PM   Cleansing and Cleansing Agents  Soap and water 1/18/2019  3:05 PM   Number of days: 7           Problem List  Date Reviewed: 1/23/2019          Codes Class Noted    Chronic venous hypertension with ulcer and inflammation involving left side ([facility] [address] ICD-10-CM: V89.798, L97.929  ICD-9-CM: 459.33  3/31/2019        Obesity, morbid ([facility] ICD-10-CM: E66.01  ICD-9-CM: 278.01  8/22/2018        Venous stasis (Chronic) ICD-10-CM: I87.8  ICD-9-CM: 459.81  8/22/2018        Tubulovillous adenoma of colon ICD-10-CM: D12.6  ICD-9-CM: 211.3  5/21/2018    Overview Addendum 3/26/2019  3:49 PM by MD Jaleesa Aleman MD, hx of complicated colonoscopies secondary to tortuous colon, poor preps, inflamed diverticulae. Had partial colectomy. Consult March '19 colonoscopy completed; path pending             History of smoking 30 or more pack years ICD-10-CM: Z87.891  ICD-9-CM: V15.82  8/8/2017        Medicare annual wellness visit, subsequent (Chronic) ICD-10-CM: Z00.00  ICD-9-CM: V70.0  8/7/2017    Overview Signed 5/21/2018 11:56 AM by Dilan Cronin MD     5-21-18             Status post total right knee replacement ICD-10-CM: L97.997  ICD-9-CM: V43.65  7/17/2017        OA (osteoarthritis) of knee ICD-10-CM: M17.10  ICD-9-CM: 715.36  5/15/2017    Overview Signed 5/15/2017  3:23 PM by MD Dr. Jose Ramon Masters for tkr. Coronary atherosclerosis of native coronary vessel ICD-10-CM: I25.10  ICD-9-CM: 414.01  8/17/2016    Overview Signed 8/17/2016 10:58 AM by Dinorah Rodriguez     08/2012: Mild non-obstructive CAD             COPD (chronic obstructive pulmonary disease) ([facility] [address] ICD-10-CM: J44.9  ICD-9-CM: 496  8/17/2016        Edema ICD-10-CM: R60.9  ICD-9-CM: 782.3  8/17/2016        Paroxysmal atrial fibrillation (HCC) ICD-10-CM: I48.0  ICD-9-CM: 427.31  8/17/2016    Overview Signed 5/15/2017  3:21 PM by MD Dr. Hoda Masters cardiology q 6mo.              Diverticular stricture [facility]) ICD-10-CM: P45.482  ICD-9-CM: 560.9  1/28/2015        Andropause ICD-10-CM: N50.89  ICD-9-CM: 608.89  8/18/2014        Diabetes mellitus type 2 in obese [facility]) ICD-10-CM: E11.69, E66.9  ICD-9-CM: 250.00, 278.00  6/23/2014        Esophageal reflux ICD-10-CM: K21.9  ICD-9-CM: 530.81  10/9/2013        Diverticulosis ICD-10-CM: K57.90  ICD-9-CM: 562.10  6/20/2013 Cervical spondylosis ICD-10-CM: J71.394  ICD-9-CM: 721.0  6/20/2013        Benign prostatic hyperplasia ICD-10-CM: N40.0  ICD-9-CM: 600.00  6/20/2013    Overview Signed 5/21/2018 11:57 AM by Luis Antonio Warner MD     5-21-18             Chronic back pain ICD-10-CM: M54.9, G89.29  ICD-9-CM: 724.5, 338.29  6/20/2013    Overview Signed 8/8/2017  9:29 AM by MD MARY Prather Duke, PT, Neurosurgeon             Hyperlipemia ICD-10-CM: E78.5  ICD-9-CM: 272.4  3/11/2013        Obstructive sleep apnea (adult) (pediatric) ICD-10-CM: O55.59  ICD-9-CM: 327.23  3/11/2013    Overview Signed 5/15/2017  3:23 PM by Rosina Nicholas MD     Followed by sleep center. Essential hypertension ICD-10-CM: I10  ICD-9-CM: 401.9  3/11/2013        Extrinsic asthma ICD-10-CM: J45.909  ICD-9-CM: 493.00  3/11/2013                    Assessment/Plan:     Pulses adequate. Start compression. Diabetic control. Recheck in one week with physician, clinician visit Tuesday.       Signed By: Ruy English MD

## 2019-08-06 ENCOUNTER — APPOINTMENT (RX ONLY)
Dept: URBAN - METROPOLITAN AREA CLINIC 349 | Facility: CLINIC | Age: 69
Setting detail: DERMATOLOGY
End: 2019-08-06

## 2019-08-06 ENCOUNTER — RX ONLY (OUTPATIENT)
Age: 69
Setting detail: RX ONLY
End: 2019-08-06

## 2019-08-06 DIAGNOSIS — D18.0 HEMANGIOMA: ICD-10-CM

## 2019-08-06 DIAGNOSIS — I87.2 VENOUS INSUFFICIENCY (CHRONIC) (PERIPHERAL): ICD-10-CM

## 2019-08-06 DIAGNOSIS — L30.4 ERYTHEMA INTERTRIGO: ICD-10-CM | Status: RESOLVING

## 2019-08-06 DIAGNOSIS — L82.1 OTHER SEBORRHEIC KERATOSIS: ICD-10-CM

## 2019-08-06 PROBLEM — D18.01 HEMANGIOMA OF SKIN AND SUBCUTANEOUS TISSUE: Status: ACTIVE | Noted: 2019-08-06

## 2019-08-06 PROCEDURE — 99213 OFFICE O/P EST LOW 20 MIN: CPT

## 2019-08-06 PROCEDURE — ? COUNSELING

## 2019-08-06 PROCEDURE — ? PRESCRIPTION

## 2019-08-06 PROCEDURE — ? TREATMENT REGIMEN

## 2019-08-06 RX ORDER — IODOQUINOL, HYDROCORTISONE ACETATE AND ALOE VERA LEAF 10; 20; 10 MG/G; MG/G; MG/G
GEL TOPICAL
Qty: 1 | Refills: 6 | Status: ERX

## 2019-08-06 RX ORDER — NITROGLYCERIN 20 MG/G
OINTMENT TOPICAL
Qty: 1 | Refills: 3 | Status: ERX

## 2019-08-06 RX ORDER — MICONAZOLE NITRATE 20.6 MG/G
POWDER TOPICAL
Qty: 1 | Refills: 11 | Status: ERX

## 2019-08-06 ASSESSMENT — LOCATION DETAILED DESCRIPTION DERM
LOCATION DETAILED: RIGHT ANTERIOR PROXIMAL THIGH
LOCATION DETAILED: RIGHT DISTAL PRETIBIAL REGION
LOCATION DETAILED: LEFT DISTAL PRETIBIAL REGION
LOCATION DETAILED: RIGHT INGUINAL CREASE
LOCATION DETAILED: PERIUMBILICAL SKIN

## 2019-08-06 ASSESSMENT — LOCATION ZONE DERM
LOCATION ZONE: LEG
LOCATION ZONE: TRUNK

## 2019-08-06 ASSESSMENT — LOCATION SIMPLE DESCRIPTION DERM
LOCATION SIMPLE: GROIN
LOCATION SIMPLE: ABDOMEN
LOCATION SIMPLE: RIGHT PRETIBIAL REGION
LOCATION SIMPLE: LEFT PRETIBIAL REGION
LOCATION SIMPLE: RIGHT THIGH

## 2019-08-23 ENCOUNTER — RX ONLY (OUTPATIENT)
Age: 69
Setting detail: RX ONLY
End: 2019-08-23

## 2019-08-23 RX ORDER — IODOQUINOL, HYDROCORTISONE ACETATE AND ALOE VERA LEAF 10; 20; 10 MG/G; MG/G; MG/G
GEL TOPICAL
Qty: 1 | Refills: 6 | Status: ERX

## 2020-04-06 NOTE — PERIOP NOTES
Patient's JOINT PREHAB/PAT walk-in appointment changed to phone assessment. This RN spoke with patient and instructed patient not to come to scheduled PAT appointment; patient informed a PAT nurse will call on 4/7/20 to complete assessment over the phone. Patient verbalized understanding and requested nurse call [phone] to complete assessment.

## 2020-04-07 ENCOUNTER — HOSPITAL ENCOUNTER (OUTPATIENT)
Dept: SURGERY | Age: 70
Discharge: HOME | End: 2020-04-07

## 2020-04-14 PROBLEM — Z71.89 ADVANCE CARE PLANNING: Chronic | Status: ACTIVE | Noted: 2020-04-14

## 2020-04-14 PROBLEM — I87.8 VENOUS STASIS: Status: ACTIVE | Noted: 2018-08-22

## 2020-04-14 PROBLEM — Z71.89 ADVANCE CARE PLANNING: Status: ACTIVE | Noted: 2020-04-14

## 2020-05-19 VITALS — WEIGHT: 315 LBS | HEIGHT: 72 IN | BODY MASS INDEX: 42.66 KG/M2

## 2020-05-19 NOTE — PERIOP NOTES
PLEASE CONTINUE TAKING ALL PRESCRIPTION MEDICATIONS UP TO THE DAY OF SURGERY UNLESS OTHERWISE DIRECTED BELOW. DISCONTINUE all vitamins and supplements 21 days prior to surgery. DISCONTINUE Non-Steriodal Anti-Inflammatory (NSAIDS) such as Advil and Aleve 5 days prior to surgery. Home Medications to take  the day of surgery   Use:  Albuterol nebulizer and inhaler; Breo Ellipta; Flonase nasal spray~if needed   Take:  Doxazosin, Metorpolol. Pantoprazole,         Home Medications   to Hold   Eliquis~hold for 72 hours prior to surgery        Comments   BRING:  Non formulary:  Stacey Rein in original bottles; inhalers, nebulizer, Trulicity pen; remaining Dynahex solution      *Visitor policy of NO visitors per patient discussed. Please do not bring home medications with you on the day of surgery unless otherwise directed by your nurse. If you are instructed to bring home medications, please give them to your nurse as they will be administered by the nursing staff. If you have any questions, please call [facility] [phone] or [facility] [phone]. A copy of this note was provided to the patient for reference.

## 2020-05-19 NOTE — PERIOP NOTES
EK20~abnormal;  19; ECHO 18; Cardiac clearance note from Dr Yumi Serra 20 all placed on chart for anesthesia to review prior to surgery.

## 2020-05-19 NOTE — PERIOP NOTES
Request faxed to [facility] re:  Clearance for patient to hold Eliquis for 72 hours prior to surgery while remaining on 81 mg Aspirin.

## 2020-05-19 NOTE — PERIOP NOTES
Patient verified name and . Order for consent NOT found in EHR and unable to match consent with case posting; patient verified. Type 3 surgery, phone assessment complete. Labs per surgeon: unknown, no orders ; Labs per anesthesia protocol: CBC, BMP to be done at outpt lab; type and screen on DOS  EKG:completed 20~abnormal~placed on chart along with cardiac clearance note 20 per Dr Lord Redding for anesthesia review~see note;     CT Spine ~thoracic/lumbar with contrast result 16~placed on chart for anesthesia reference. MRSA/MSSA swab collected; pharmacy to review and dose antibiotic as appropriate. COVID swab screening test scheduled for 20 at 1315. [facility] approved surgical skin cleanser and instructions to return bottle on DOS given per hospital policy. Patient provided with handouts including Guide to Surgery, Pain Management, Hand Hygiene, Blood Transfusion Education, and [facility] Brochure. Patient answered medical/surgical history questions at their best of ability. All prior to admission medications documented in [facility]. Original medication prescription bottle NOT visualized during patient appointment. Patient instructed to hold all vitamins 3 weeks prior to surgery and NSAIDS 5 days prior to surgery. Patient teach back successful and patient demonstrates knowledge of instruction.

## 2020-05-20 ENCOUNTER — HOSPITAL ENCOUNTER (OUTPATIENT)
Dept: SURGERY | Age: 70
Discharge: HOME | End: 2020-05-20

## 2020-05-20 ENCOUNTER — HOSPITAL ENCOUNTER (OUTPATIENT)
Dept: LAB | Age: 70
Discharge: HOME | End: 2020-05-20
Attending: ORTHOPAEDIC SURGERY
Payer: MEDICARE

## 2020-05-20 LAB
ANION GAP SERPL CALC-SCNC: 8 MMOL/L (ref 7–16)
BACTERIA SPEC CULT: NORMAL
BUN SERPL-MCNC: 15 MG/DL (ref 8–23)
CALCIUM SERPL-MCNC: 8.8 MG/DL (ref 8.3–10.4)
CHLORIDE SERPL-SCNC: 108 MMOL/L (ref 98–107)
CO2 SERPL-SCNC: 23 MMOL/L (ref 21–32)
CREAT SERPL-MCNC: 0.95 MG/DL (ref 0.8–1.5)
ERYTHROCYTE [DISTWIDTH] IN BLOOD BY AUTOMATED COUNT: 14.7 % (ref 11.9–14.6)
GLUCOSE SERPL-MCNC: 239 MG/DL (ref 65–100)
HCT VFR BLD AUTO: 44.6 % (ref 41.1–50.3)
HGB BLD-MCNC: 14.4 G/DL (ref 13.6–17.2)
MCH RBC QN AUTO: 29.2 PG (ref 26.1–32.9)
MCHC RBC AUTO-ENTMCNC: 32.3 G/DL (ref 31.4–35)
MCV RBC AUTO: 90.5 FL (ref 79.6–97.8)
NRBC # BLD: 0 K/UL (ref 0–0.2)
PLATELET # BLD AUTO: 183 K/UL (ref 150–450)
PMV BLD AUTO: 10.3 FL (ref 9.4–12.3)
POTASSIUM SERPL-SCNC: 4 MMOL/L (ref 3.5–5.1)
RBC # BLD AUTO: 4.93 M/UL (ref 4.23–5.6)
SARS-COV-2, NAA: NOT DETECTED
SERVICE CMNT-IMP: NORMAL
SODIUM SERPL-SCNC: 139 MMOL/L (ref 136–145)
WBC # BLD AUTO: 6.5 K/UL (ref 4.3–11.1)

## 2020-05-20 PROCEDURE — 80048 BASIC METABOLIC PNL TOTAL CA: CPT

## 2020-05-20 PROCEDURE — 85027 COMPLETE CBC AUTOMATED: CPT

## 2020-05-20 PROCEDURE — 87641 MR-STAPH DNA AMP PROBE: CPT

## 2020-05-20 NOTE — PERIOP NOTES
Lab results within limits per anesthesia protocol; OK for surgery.      Recent Results (from the past 12 hour(s))   CBC W/O DIFF    Collection Time: 05/20/20 11:32 AM   Result Value Ref Range    WBC 6.5 4.3 - 11.1 K/uL    RBC 4.93 4.23 - 5.6 M/uL    HGB 14.4 13.6 - 17.2 g/dL    HCT 44.6 41.1 - 50.3 %    MCV 90.5 79.6 - 97.8 FL    MCH 29.2 26.1 - 32.9 PG    MCHC 32.3 31.4 - 35.0 g/dL    RDW 14.7 (H) 11.9 - 14.6 %    PLATELET 713 520 - 962 K/uL    MPV 10.3 9.4 - 12.3 FL    ABSOLUTE NRBC 0.00 0.0 - 0.2 K/uL   METABOLIC PANEL, BASIC    Collection Time: 05/20/20 11:32 AM   Result Value Ref Range    Sodium 139 136 - 145 mmol/L    Potassium 4.0 3.5 - 5.1 mmol/L    Chloride 108 (H) 98 - 107 mmol/L    CO2 23 21 - 32 mmol/L    Anion gap 8 7 - 16 mmol/L    Glucose 239 (H) 65 - 100 mg/dL    BUN 15 8 - 23 MG/DL    Creatinine 0.95 0.8 - 1.5 MG/DL    GFR est AA >60 >60 ml/min/1.73m2    GFR est non-AA >60 >60 ml/min/1.73m2    Calcium 8.8 8.3 - 10.4 MG/DL

## 2020-05-20 NOTE — PERIOP NOTES
Received phone call from Eva Millan at [facility]. Dr Zofia Potts stated okay for pt to hold eliquis for 72 hours prior to surgery with spinal anesthesia. Telephone encounter in EMR 5/20/2020 states: This is acceptable. Hold as requested. Thanks.

## 2020-06-12 ENCOUNTER — APPOINTMENT (RX ONLY)
Dept: URBAN - METROPOLITAN AREA CLINIC 349 | Facility: CLINIC | Age: 70
Setting detail: DERMATOLOGY
End: 2020-06-12

## 2020-06-12 DIAGNOSIS — L82.0 INFLAMED SEBORRHEIC KERATOSIS: ICD-10-CM

## 2020-06-12 DIAGNOSIS — L82.1 OTHER SEBORRHEIC KERATOSIS: ICD-10-CM

## 2020-06-12 PROCEDURE — ? LIQUID NITROGEN

## 2020-06-12 PROCEDURE — 99213 OFFICE O/P EST LOW 20 MIN: CPT | Mod: 25

## 2020-06-12 PROCEDURE — 17110 DESTRUCTION B9 LES UP TO 14: CPT

## 2020-06-12 PROCEDURE — ? COUNSELING

## 2020-06-12 ASSESSMENT — LOCATION DETAILED DESCRIPTION DERM
LOCATION DETAILED: LEFT CLAVICULAR NECK
LOCATION DETAILED: RIGHT CLAVICULAR NECK
LOCATION DETAILED: LEFT MEDIAL SUPERIOR CHEST
LOCATION DETAILED: LEFT CLAVICULAR SKIN
LOCATION DETAILED: STERNUM

## 2020-06-12 ASSESSMENT — LOCATION SIMPLE DESCRIPTION DERM
LOCATION SIMPLE: CHEST
LOCATION SIMPLE: RIGHT ANTERIOR NECK
LOCATION SIMPLE: LEFT CLAVICULAR SKIN
LOCATION SIMPLE: LEFT ANTERIOR NECK

## 2020-06-12 ASSESSMENT — LOCATION ZONE DERM
LOCATION ZONE: TRUNK
LOCATION ZONE: NECK

## 2020-06-12 NOTE — PROCEDURE: LIQUID NITROGEN
Number Of Freeze-Thaw Cycles: 3 freeze-thaw cycles
Render Post-Care Instructions In Note?: no
Consent: The patient's consent was obtained including but not limited to risks of crusting, scabbing, blistering, scarring, darker or lighter pigmentary change, recurrence, incomplete removal and infection.
Detail Level: Detailed
Post-Care Instructions: I reviewed with the patient in detail post-care instructions. Patient is to wear sunprotection, and avoid picking at any of the treated lesions. Pt may apply Vaseline to crusted or scabbing areas.
Medical Necessity Clause: This procedure was medically necessary because the lesions that were treated were:
Medical Necessity Information: It is in your best interest to select a reason for this procedure from the list below. All of these items fulfill various CMS LCD requirements except the new and changing color options.
Duration Of Freeze Thaw-Cycle (Seconds): 2
Include Z78.9 (Other Specified Conditions Influencing Health Status) As An Associated Diagnosis?: Yes

## 2020-06-15 PROBLEM — K52.9 CHRONIC DIARRHEA: Status: ACTIVE | Noted: 2020-06-15

## 2020-06-21 RX ORDER — CEFAZOLIN SODIUM/WATER 2 G/20 ML
2 SYRINGE (ML) INTRAVENOUS ONCE
Status: CANCELLED | OUTPATIENT
Start: 2020-06-21 | End: 2020-06-21

## 2020-06-22 ENCOUNTER — HOSPITAL ENCOUNTER (OUTPATIENT)
Dept: SURGERY | Age: 70
Discharge: HOME | End: 2020-06-22
Payer: MEDICARE

## 2020-06-22 VITALS
DIASTOLIC BLOOD PRESSURE: 83 MMHG | BODY MASS INDEX: 36.45 KG/M2 | HEART RATE: 89 BPM | TEMPERATURE: 97.6 F | SYSTOLIC BLOOD PRESSURE: 165 MMHG | OXYGEN SATURATION: 94 % | WEIGHT: 315 LBS | RESPIRATION RATE: 18 BRPM | HEIGHT: 78 IN

## 2020-06-22 LAB
ANION GAP SERPL CALC-SCNC: 8 MMOL/L (ref 7–16)
APTT PPP: 30.8 SEC (ref 24.3–35.4)
BACTERIA SPEC CULT: NORMAL
BASOPHILS # BLD: 0.1 K/UL (ref 0–0.2)
BASOPHILS NFR BLD: 1 % (ref 0–2)
BUN SERPL-MCNC: 18 MG/DL (ref 8–23)
CALCIUM SERPL-MCNC: 9.3 MG/DL (ref 8.3–10.4)
CHLORIDE SERPL-SCNC: 105 MMOL/L (ref 98–107)
CO2 SERPL-SCNC: 27 MMOL/L (ref 21–32)
CREAT SERPL-MCNC: 1.01 MG/DL (ref 0.8–1.5)
DIFFERENTIAL METHOD BLD: ABNORMAL
EOSINOPHIL # BLD: 0.4 K/UL (ref 0–0.8)
EOSINOPHIL NFR BLD: 6 % (ref 0.5–7.8)
ERYTHROCYTE [DISTWIDTH] IN BLOOD BY AUTOMATED COUNT: 15 % (ref 11.9–14.6)
EST. AVERAGE GLUCOSE BLD GHB EST-MCNC: 166 MG/DL
GLUCOSE SERPL-MCNC: 302 MG/DL (ref 65–100)
HBA1C MFR BLD: 7.4 %
HCT VFR BLD AUTO: 47.2 % (ref 41.1–50.3)
HGB BLD-MCNC: 15 G/DL (ref 13.6–17.2)
IMM GRANULOCYTES # BLD AUTO: 0 K/UL (ref 0–0.5)
IMM GRANULOCYTES NFR BLD AUTO: 0 % (ref 0–5)
INR PPP: 1.2
LYMPHOCYTES # BLD: 2.6 K/UL (ref 0.5–4.6)
LYMPHOCYTES NFR BLD: 32 % (ref 13–44)
MCH RBC QN AUTO: 29.3 PG (ref 26.1–32.9)
MCHC RBC AUTO-ENTMCNC: 31.8 G/DL (ref 31.4–35)
MCV RBC AUTO: 92.2 FL (ref 79.6–97.8)
MONOCYTES # BLD: 0.8 K/UL (ref 0.1–1.3)
MONOCYTES NFR BLD: 10 % (ref 4–12)
NEUTS SEG # BLD: 4.1 K/UL (ref 1.7–8.2)
NEUTS SEG NFR BLD: 51 % (ref 43–78)
NRBC # BLD: 0 K/UL (ref 0–0.2)
PLATELET # BLD AUTO: 208 K/UL (ref 150–450)
PMV BLD AUTO: 10.7 FL (ref 9.4–12.3)
POTASSIUM SERPL-SCNC: 4.2 MMOL/L (ref 3.5–5.1)
PROTHROMBIN TIME: 15.2 SEC (ref 12–14.7)
RBC # BLD AUTO: 5.12 M/UL (ref 4.23–5.6)
SERVICE CMNT-IMP: NORMAL
SODIUM SERPL-SCNC: 140 MMOL/L (ref 136–145)
WBC # BLD AUTO: 8 K/UL (ref 4.3–11.1)

## 2020-06-22 PROCEDURE — 85025 COMPLETE CBC W/AUTO DIFF WBC: CPT

## 2020-06-22 PROCEDURE — 87641 MR-STAPH DNA AMP PROBE: CPT

## 2020-06-22 PROCEDURE — 83036 HEMOGLOBIN GLYCOSYLATED A1C: CPT

## 2020-06-22 PROCEDURE — 80048 BASIC METABOLIC PNL TOTAL CA: CPT

## 2020-06-22 PROCEDURE — 85610 PROTHROMBIN TIME: CPT

## 2020-06-22 PROCEDURE — 36415 COLL VENOUS BLD VENIPUNCTURE: CPT

## 2020-06-22 PROCEDURE — 85730 THROMBOPLASTIN TIME PARTIAL: CPT

## 2020-06-22 NOTE — PERIOP NOTES
PLEASE CONTINUE TAKING ALL PRESCRIPTION MEDICATIONS UP TO THE DAY OF SURGERY UNLESS OTHERWISE DIRECTED BELOW. DISCONTINUE all vitamins, herbals and supplements 21 days prior to surgery. DISCONTINUE Non-Steriodal Anti-Inflammatory (NSAIDS) such as Advil, Ibuprofen, and Aleve 5 days prior to surgery. Home Medications to HOLD      All vitamins, supplements, and herbals stop NOW. All NSAIDs such as Advil, Aleve, Ibuprofen, Diclofenac, Naproxen, etc. Stop 5 days prior to surgery. Decrease 162 mg Aspirin to 81 mg Aspirin 5 days prior to surgery. Hold Tylenol (Acetaminophen) 24 hours prior to surgery. Hold Eliquis 72 hours prior to surgery--Last dose 6/25/20 (Thursday)      Home Medications to take  the day of surgery   Doxazosin, inhalers and nebulizer, Metoprolol, Protonix         Comments   Bring inhalers, CPAP, Farxiga, Trulicity, and bottle of soap (Dynahex) to the hospital on the day of surgery. Please do not bring home medications with you on the day of surgery unless otherwise directed by your nurse. If you are instructed to bring home medications, please give them to your nurse as they will be administered by the nursing staff. If you have any questions, please call [facility] [phone] or [facility] [phone]. Copy of above instructions given to patient.

## 2020-06-22 NOTE — PERIOP NOTES
Patient verified name and . Order for consent found in EHR and matches case posting; patient verified. Type 3 surgery, PAT walk-in joint assessment complete. Labs per surgeon: CBC,BMP, PT/PTT, Hemoglobin A1c; results pending. T&S DOS and POC glucose DOS; orders signed and held in EHR. Labs per anesthesia protocol: no additional  EKG: completed 20; EKG results and all cardiac records reviewed by Dr. Keren Duran (anesthesia) on 20, per Abbey Azul RN note \"Dr. Keren Duran, anesthesia, reviewed chart - OK to proceed. \"    Patient had previous PAT joint assessment on 20. Surgery was postponed due to [place]. Patient states no changes in medical/surgical history and no changes in medications. Cardiology note (20) with surgical clearance, EKG (20), Echo (18), and cardiology telephone encounter with clearance to hold Eliquis (20) located in EHR if needed for anesthesia reference. Patient informed a Covid swab is required 7 days prior to surgery. The testing center is located at the [address], [place]. For questions or concerns the patient is advised to call [phone]. An appointment is required and the testing clinic is closed from - for lunch and on weekends. Appointment date/time 20 at [phone] found in EHR and provided to patient. MRSA/MSSA swab collected; pharmacy to review and dose antibiotic as appropriate. Hospital approved surgical skin cleanser and instructions to return bottle on DOS given per hospital policy. Patient provided with handouts including Guide to Surgery, Pain Management, Hand Hygiene, Blood Transfusion Education, and [place] [facility] Brochure. Patient answered medical/surgical history questions at their best of ability. All prior to admission medications documented in [facility]. Original medication prescription bottle NOT visualized during patient appointment.      Patient instructed to Pt notified of normal pap/hpv results. hold all vitamins, supplements, herbals 3 weeks prior to surgery, NSAIDS 5 days prior to surgery, Tylenol (Acetaminophen) 24 hours prior to surgery. Patient instructed to decrease 162 mg ASA to 81 mg ASA 5 days prior to surgery per anesthesia protocol. Patient instructed to hold Eliquis 72 hours prior to surgery, per anesthesia protocol-last dose 6/25/20. Patient teach back successful and patient demonstrates knowledge of instruction.

## 2020-06-22 NOTE — PERIOP NOTES
Labs dated 6/22/20 routed via [address] [facility] to patients PCP, Dr. René Garrett, per Dr. Anushka Du' request. Lab results also routed via [facility] to surgeon, Dr. Anushka Du. Anesthesia to review Pt results and glucose results-charge nurse to f/u. All other lab results listed below within anesthesia limits. Recent Results (from the past 12 hour(s))   CBC WITH AUTOMATED DIFF    Collection Time: 06/22/20  8:05 AM   Result Value Ref Range    WBC 8.0 4.3 - 11.1 K/uL    RBC 5.12 4.23 - 5.6 M/uL    HGB 15.0 13.6 - 17.2 g/dL    HCT 47.2 41.1 - 50.3 %    MCV 92.2 79.6 - 97.8 FL    MCH 29.3 26.1 - 32.9 PG    MCHC 31.8 31.4 - 35.0 g/dL    RDW 15.0 (H) 11.9 - 14.6 %    PLATELET 707 664 - 417 K/uL    MPV 10.7 9.4 - 12.3 FL    ABSOLUTE NRBC 0.00 0.0 - 0.2 K/uL    DF AUTOMATED      NEUTROPHILS 51 43 - 78 %    LYMPHOCYTES 32 13 - 44 %    MONOCYTES 10 4.0 - 12.0 %    EOSINOPHILS 6 0.5 - 7.8 %    BASOPHILS 1 0.0 - 2.0 %    IMMATURE GRANULOCYTES 0 0.0 - 5.0 %    ABS. NEUTROPHILS 4.1 1.7 - 8.2 K/UL    ABS. LYMPHOCYTES 2.6 0.5 - 4.6 K/UL    ABS. MONOCYTES 0.8 0.1 - 1.3 K/UL    ABS. EOSINOPHILS 0.4 0.0 - 0.8 K/UL    ABS. BASOPHILS 0.1 0.0 - 0.2 K/UL    ABS. IMM.  GRANS. 0.0 0.0 - 0.5 K/UL   PROTHROMBIN TIME + INR    Collection Time: 06/22/20  8:05 AM   Result Value Ref Range    Prothrombin time 15.2 (H) 12.0 - 14.7 sec    INR 1.2     PTT    Collection Time: 06/22/20  8:05 AM   Result Value Ref Range    aPTT 30.8 24.3 - 95.0 SEC   METABOLIC PANEL, BASIC    Collection Time: 06/22/20  8:05 AM   Result Value Ref Range    Sodium 140 136 - 145 mmol/L    Potassium 4.2 3.5 - 5.1 mmol/L    Chloride 105 98 - 107 mmol/L    CO2 27 21 - 32 mmol/L    Anion gap 8 7 - 16 mmol/L    Glucose 302 (H) 65 - 100 mg/dL    BUN 18 8 - 23 MG/DL    Creatinine 1.01 0.8 - 1.5 MG/DL    GFR est AA >60 >60 ml/min/1.73m2    GFR est non-AA >60 >60 ml/min/1.73m2    Calcium 9.3 8.3 - 10.4 MG/DL   HEMOGLOBIN A1C WITH EAG    Collection Time: 06/22/20  8:05 AM   Result Value Ref Range    Hemoglobin A1c 7.4 %    Est. average glucose 166 mg/dL

## 2020-06-22 NOTE — PERIOP NOTES
Recent Results (from the past 12 hour(s))   CBC WITH AUTOMATED DIFF    Collection Time: 06/22/20  8:05 AM   Result Value Ref Range    WBC 8.0 4.3 - 11.1 K/uL    RBC 5.12 4.23 - 5.6 M/uL    HGB 15.0 13.6 - 17.2 g/dL    HCT 47.2 41.1 - 50.3 %    MCV 92.2 79.6 - 97.8 FL    MCH 29.3 26.1 - 32.9 PG    MCHC 31.8 31.4 - 35.0 g/dL    RDW 15.0 (H) 11.9 - 14.6 %    PLATELET 193 288 - 610 K/uL    MPV 10.7 9.4 - 12.3 FL    ABSOLUTE NRBC 0.00 0.0 - 0.2 K/uL    DF AUTOMATED      NEUTROPHILS 51 43 - 78 %    LYMPHOCYTES 32 13 - 44 %    MONOCYTES 10 4.0 - 12.0 %    EOSINOPHILS 6 0.5 - 7.8 %    BASOPHILS 1 0.0 - 2.0 %    IMMATURE GRANULOCYTES 0 0.0 - 5.0 %    ABS. NEUTROPHILS 4.1 1.7 - 8.2 K/UL    ABS. LYMPHOCYTES 2.6 0.5 - 4.6 K/UL    ABS. MONOCYTES 0.8 0.1 - 1.3 K/UL    ABS. EOSINOPHILS 0.4 0.0 - 0.8 K/UL    ABS. BASOPHILS 0.1 0.0 - 0.2 K/UL    ABS. IMM.  GRANS. 0.0 0.0 - 0.5 K/UL   PROTHROMBIN TIME + INR    Collection Time: 06/22/20  8:05 AM   Result Value Ref Range    Prothrombin time 15.2 (H) 12.0 - 14.7 sec    INR 1.2     PTT    Collection Time: 06/22/20  8:05 AM   Result Value Ref Range    aPTT 30.8 24.3 - 50.3 SEC   METABOLIC PANEL, BASIC    Collection Time: 06/22/20  8:05 AM   Result Value Ref Range    Sodium 140 136 - 145 mmol/L    Potassium 4.2 3.5 - 5.1 mmol/L    Chloride 105 98 - 107 mmol/L    CO2 27 21 - 32 mmol/L    Anion gap 8 7 - 16 mmol/L    Glucose 302 (H) 65 - 100 mg/dL    BUN 18 8 - 23 MG/DL    Creatinine 1.01 0.8 - 1.5 MG/DL    GFR est AA >60 >60 ml/min/1.73m2    GFR est non-AA >60 >60 ml/min/1.73m2    Calcium 9.3 8.3 - 10.4 MG/DL   HEMOGLOBIN A1C WITH EAG    Collection Time: 06/22/20  8:05 AM   Result Value Ref Range    Hemoglobin A1c 7.4 %    Est. average glucose 166 mg/dL

## 2020-06-22 NOTE — ADVANCED PRACTICE NURSE
Total Joint Surgery Preoperative Chart Review      Patient ID:  Cally Grant  [id number]  41 y.o.  1950  Surgeon: Dr. Caden Hewitt  Date of Surgery: 6/29/2020  Procedure: Total Left Hip Arthroplasty  Primary Care Physician: Edvin Marques -[phone]  Specialty Physician(s):      Subjective:   Cally Grant is a 77 y.o. WHITE OR  male who presents for preoperative evaluation for Total Left Hip arthroplasty. This is a preoperative chart review note based on data collected by the nurse at the surgical Pre-Assessment visit. Past Medical History:   Diagnosis Date    Adverse effect of anesthesia     unable to have spinal anesthesia when had TKR~r/t lumbar spine problems; CT Spine with contrast 7/20/16    Asthma dx childhood    last attack during high school; advair at hs; albuterol prn; neb prn     Atrial fibrillation (HCC)     2 epiosode of A-fib last about 6-8 months ago (noted on 6/27/17); on Eliquis     Cervical spondylosis 6/20/2013    Chronic back pain 2013    RIGHT SIDE WITH UNCLEAR ETIOLOGY    Chronic pain     Colon polyps     COPD (chronic obstructive pulmonary disease) ([facility] 8/17/2016    Coronary atherosclerosis of native coronary vessel 8/17/2016 08/2012: Mild non-obstructive CAD    Diabetes ([address] dx 5/14    type 2; oral meds; no bs checks at home    Diverticulosis     Edema 8/17/2016    Former smoker     GERD (gastroesophageal reflux disease)      controlled with med    High cholesterol     History of kidney stones     Hypertension     controlled with med    Obesity (BMI 30-39. 9)     BMI 39    Status post total right knee replacement 7/17/2017    Unspecified sleep apnea     wears cpap at hs      Past Surgical History:   Procedure Laterality Date    CYSTOSCOPY,INSERT URETERAL STENT      kidney stone removed cystoscopy ~pt states no stent was placed    HX CATARACT REMOVAL      bilateral with lens implants; x3    HX COLECTOMY  2015    HX COLONOSCOPY 10/6/14    HX COLONOSCOPY  14    HX HEART CATHETERIZATION      no stents     HX KNEE REPLACEMENT Right     HX LUMBAR LAMINECTOMY  2009    no hardware     HX OTHER SURGICAL  20 yrs ago    colo-rectal sx for fissure    HX RETINAL DETACHMENT REPAIR Bilateral      Family History   Problem Relation Age of Onset    Heart Disease Father         BOWEL OBSTRUCTION    Heart Attack Father 80        MI    Heart Disease Brother 72        STENT HEART    Suicide Brother     Parkinsonism Mother     Inflammatory Bowel Dz Son     Malignant Hyperthermia Neg Hx     Pseudocholinesterase Deficiency Neg Hx     Delayed Awakening Neg Hx     Post-op Nausea/Vomiting Neg Hx     Emergence Delirium Neg Hx     Post-op Cognitive Dysfunction Neg Hx       Social History     Tobacco Use    Smoking status: Former Smoker     Packs/day: 1.50     Years: 25.00     Pack years: 37.50     Types: Cigarettes     Last attempt to quit: 2012     Years since quittin.2    Smokeless tobacco: Never Used   Substance Use Topics    Alcohol use: Yes     Alcohol/week: 3.0 standard drinks     Types: 3 Cans of beer per week       Prior to Admission medications    Medication Sig Start Date End Date Taking? Authorizing Provider   pantoprazole (PROTONIX) 40 mg tablet Take 1 Tab by mouth daily. Patient taking differently: Take 40 mg by mouth daily. Take / use AM day of surgery  per anesthesia protocols. Indications: gastroesophageal reflux disease 3/17/20  Yes Thad Little MD   montelukast (SINGULAIR) 10 mg tablet Take 1 Tab by mouth daily. Patient taking differently: Take 10 mg by mouth nightly. Indications: controller medication for asthma 3/17/20  Yes Thad Little MD   metoprolol tartrate (LOPRESSOR) 25 mg tablet Take 2 Tabs by mouth two (2) times a day. Patient taking differently: Take 50 mg by mouth two (2) times a day. Take / use AM day of surgery  per anesthesia protocols.   Indications: ventricular rate control in atrial fibrillation 3/17/20  Yes Buffy Jonas MD   lisinopriL (PRINIVIL, ZESTRIL) 10 mg tablet Take 1 Tab by mouth daily. Patient taking differently: Take 10 mg by mouth daily. Indications: high blood pressure 3/17/20  Yes Buffy Jonas MD   glimepiride (AMARYL) 1 mg tablet Take 1 Tab by mouth Daily (before breakfast). 3/17/20  Yes Buffy Jonas MD   ezetimibe (ZETIA) 10 mg tablet Take 1 Tab by mouth nightly. Patient taking differently: Take 10 mg by mouth nightly. Indications: high cholesterol 3/17/20  Yes Buffy Jonas MD   doxazosin (CARDURA) 4 mg tablet Take 1 Tab by mouth daily. Patient taking differently: Take 4 mg by mouth daily. Take / use AM day of surgery  per anesthesia protocols. Indications: enlarged prostate with urination problem 3/17/20  Yes Buffy Jonas MD   celecoxib (CeleBREX) 200 mg capsule Take 1 Cap by mouth daily. Patient taking differently: Take 200 mg by mouth daily. Indications: joint damage causing pain and loss of function 3/17/20  Yes Buffy Jonas MD   atorvastatin (LIPITOR) 20 mg tablet Take 1 Tab by mouth daily. Patient taking differently: Take 20 mg by mouth nightly. Indications: high cholesterol 3/17/20  Yes Buffy Jonas MD   Trulicity 1.5 CG/0.1 mL sub-q pen 0.5 mL by SubCUTAneous route every seven (7) days. Patient taking differently: 1.5 mg by SubCUTAneous route every seven (7) days. Takes every  Wednesday at bedtime  Indications: type 2 diabetes mellitus 3/17/20  Yes Buffy Jonas MD   apixaban (Eliquis) 5 mg tablet Take 1 Tab by mouth two (2) times a day. Patient taking differently: Take 5 mg by mouth two (2) times a day. Indications: treatment to prevent blood clots in chronic atrial fibrillation 3/17/20  Yes Buffy Jonas MD   dapagliflozin Murphy Roa) 10 mg tab tablet Take 1 Tab by mouth daily. Patient taking differently: Take 10 mg by mouth daily.  Indications: type 2 diabetes mellitus 3/17/20  Yes Buffy Jonas MD   fluticasone furoate-vilanteroL (Breo Ellipta) 200-25 mcg/dose inhaler Take 1 Puff by inhalation daily. Patient taking differently: Take 1 Puff by inhalation daily. Take / use AM day of surgery  per anesthesia protocols. Indications: controller medication for asthma 3/17/20  Yes Yesika Abebe MD   fluticasone propionate [facility] ALLERGY RELIEF) 50 mcg/actuation nasal spray 2 Sprays by Both Nostrils route daily as needed. Take / use AM day of surgery  per anesthesia protocols, if needed  Indications: inflammation of the nose due to an allergy   Yes Provider, Historical   B.infantis-B.ani-B.long-B.bifi (PROBIOTIC 4X) 10-15 mg TbEC Take  by mouth daily. Yes Provider, Historical   aspirin delayed-release 81 mg tablet Take 162 mg by mouth nightly. Yes Provider, Historical   ANTI-FUNGAL 2 % topical powder APPLY TO ABDOMEN 2 TIMES PER DAY 4/24/17  Yes Provider, Historical   glucose blood VI test strips (ASCENSIA CONTOUR) strip Check fs every day, DM2, E11.9 1/26/17  Yes Pablito Gordon MD   lancets 28 gauge misc Check fs every day, DM2, E11.9 1/26/17  Yes Pablito Gordon MD   albuterol (PROVENTIL VENTOLIN) 2.5 mg /3 mL (0.083 %) nebulizer solution 3 mL by Nebulization route once for 1 dose. Patient taking differently: 2.5 mg by Nebulization route daily as needed. Pt states has not done in several years; Take / use AM day of surgery  per anesthesia protocols. 8/1/16  Yes Ni Chavez MD   albuterol (PROVENTIL HFA, VENTOLIN HFA, PROAIR HFA) 90 mcg/actuation inhaler Take 1 Puff by inhalation every four (4) hours as needed for Wheezing. Patient taking differently: Take 1 Puff by inhalation every four (4) hours as needed for Wheezing. For wheezing; Take / use AM day of surgery  per anesthesia protocols.  11/27/18   Yesika Abebe MD   Blood-Glucose Meter monitoring kit Check fs every day; DM2, E11/9, Contour glucometer 1/26/17   Pablito Gordon MD     Allergies   Allergen Reactions    Bactrim [Sulfamethoxazole-Trimethoprim] Itching Objective:     Physical Exam:   Patient Vitals for the past 24 hrs:   Temp Pulse Resp BP SpO2   06/22/20 0814 97.6 °F (36.4 °C) 89 18 165/83 94 %       ECG:    EKG Results     None          Data Review:   Labs:   Recent Results (from the past 24 hour(s))   CBC WITH AUTOMATED DIFF    Collection Time: 06/22/20  8:05 AM   Result Value Ref Range    WBC 8.0 4.3 - 11.1 K/uL    RBC 5.12 4.23 - 5.6 M/uL    HGB 15.0 13.6 - 17.2 g/dL    HCT 47.2 41.1 - 50.3 %    MCV 92.2 79.6 - 97.8 FL    MCH 29.3 26.1 - 32.9 PG    MCHC 31.8 31.4 - 35.0 g/dL    RDW 15.0 (H) 11.9 - 14.6 %    PLATELET 306 645 - 255 K/uL    MPV 10.7 9.4 - 12.3 FL    ABSOLUTE NRBC 0.00 0.0 - 0.2 K/uL    DF AUTOMATED      NEUTROPHILS 51 43 - 78 %    LYMPHOCYTES 32 13 - 44 %    MONOCYTES 10 4.0 - 12.0 %    EOSINOPHILS 6 0.5 - 7.8 %    BASOPHILS 1 0.0 - 2.0 %    IMMATURE GRANULOCYTES 0 0.0 - 5.0 %    ABS. NEUTROPHILS 4.1 1.7 - 8.2 K/UL    ABS. LYMPHOCYTES 2.6 0.5 - 4.6 K/UL    ABS. MONOCYTES 0.8 0.1 - 1.3 K/UL    ABS. EOSINOPHILS 0.4 0.0 - 0.8 K/UL    ABS. BASOPHILS 0.1 0.0 - 0.2 K/UL    ABS. IMM.  GRANS. 0.0 0.0 - 0.5 K/UL         Problem List:  )  Patient Active Problem List   Diagnosis Code    Hyperlipemia E78.5    Obstructive sleep apnea (adult) (pediatric) G47.33    Essential hypertension I10    Extrinsic asthma J45.909    Diverticulosis K57.90    Cervical spondylosis M47.812    Benign prostatic hyperplasia N40.0    Chronic back pain M54.9, G89.29    Esophageal reflux K21.9    Diabetes mellitus type 2 in obese (HCC) E11.69, E66.9    Andropause N50.89    Diverticular stricture ([address] K56.699    Coronary atherosclerosis of native coronary vessel I25.10    COPD [facility]chronic obstructive pulmonary disease) [facility] J44.9    Edema R60.9    Paroxysmal atrial fibrillation ([facility]) I48.0    OA (osteoarthritis) of knee M17.10    Status post total right knee replacement Z96.651    Medicare annual wellness visit, subsequent Z00.00    History of smoking 30 or more pack years Z87.891    Tubulovillous adenoma of colon D12.6    Obesity, morbid ([address] E66.01    Venous stasis I87.8    Chronic venous hypertension with ulcer and inflammation involving left side (HCC) I87.332, L97.929    Advance care planning Z71.89    Chronic diarrhea K52.9       Total Joint Surgery Pre-Assessment Recommendations:           Patient is to wear home CPAP during hospitalization. Albuterol every 6 hours as need during hospitalization.      Signed By: Capri Vaughan NP-C    June 22, 2020

## 2020-06-27 NOTE — H&P
[address] [facility]  Pre Operative History and Physical Exam    Patient ID:  Madelyn Perez  [id number]  11 y.o.  1950    Today: June 26, 2020           CC:  Left hip pain    HPI:   The patient has end stage arthritis of the left hip. The patient was evaluated and examined during a consultation prior to this office visit. There have been no changes to the patient's orthopedic condition since the initial consultation. The patient has failed previous conservative treatment for this condition including antiinflammatories , and lifestyle modifications. The necessity for joint replacement is present. The patient will be admitted the day of surgery for Procedure(s) (LRB):  HIP ARTHROPLASTY TOTAL/ LEFT/ DEPUY (Left)      Past Medical/Surgical History:  Past Medical History:   Diagnosis Date    Adverse effect of anesthesia     unable to have spinal anesthesia when had TKR~r/t lumbar spine problems; CT Spine with contrast 7/20/16    Asthma dx childhood    last attack during high school; advair at hs; albuterol prn; neb prn     Atrial fibrillation (HCC)     2 epiosode of A-fib last about 6-8 months ago (noted on 6/27/17); on Eliquis     Cervical spondylosis 6/20/2013    Chronic back pain 2013    RIGHT SIDE WITH UNCLEAR ETIOLOGY    Chronic pain     Colon polyps     COPD (chronic obstructive pulmonary disease) ([facility] [address] 8/17/2016    Coronary atherosclerosis of native coronary vessel 8/17/2016 08/2012: Mild non-obstructive CAD    Diabetes ([facility] [address] dx 5/14    type 2; oral meds; no bs checks at home    Diverticulosis     Edema 8/17/2016    Former smoker     GERD (gastroesophageal reflux disease)      controlled with med    High cholesterol     History of kidney stones     Hypertension     controlled with med    Obesity (BMI 30-39. 9)     BMI 39    Status post total right knee replacement 7/17/2017    Unspecified sleep apnea     wears cpap at hs     Past Surgical History:   Procedure Laterality Date    CYSTOSCOPY,INSERT URETERAL STENT      kidney stone removed cystoscopy ~pt states no stent was placed    HX CATARACT REMOVAL      bilateral with lens implants; x3    HX COLECTOMY  2015    HX COLONOSCOPY  10/6/14    HX COLONOSCOPY  11/20/14    HX HEART CATHETERIZATION  2012    no stents     HX KNEE REPLACEMENT Right     HX LUMBAR LAMINECTOMY  2009    no hardware     HX OTHER SURGICAL  20 yrs ago    colo-rectal sx for fissure    HX RETINAL DETACHMENT REPAIR Bilateral         Allergies: Allergies   Allergen Reactions    Bactrim [Sulfamethoxazole-Trimethoprim] Itching        Physical Exam:   General: NAD, Alert, Oriented, Appears their stated age     [de-identified]: NC/AT, PERRL    Skin: No rashes, lesions or wounds seen      Psych: normal affect      Heart: Regular Rate, Rhythm     Lungs: unlabored respirations, normal breath sounds     Abdomen: Soft and non-distended     Ortho: Pain with limited ROM of the left hip    Neuro: no focal defects, sensation is equal bilaterally     Lymph: no lymphadenopathy     Meds:   No current facility-administered medications for this encounter. Current Outpatient Medications   Medication Sig    pantoprazole (PROTONIX) 40 mg tablet Take 1 Tab by mouth daily. (Patient taking differently: Take 40 mg by mouth daily. Take / use AM day of surgery  per anesthesia protocols. Indications: gastroesophageal reflux disease)    montelukast (SINGULAIR) 10 mg tablet Take 1 Tab by mouth daily. (Patient taking differently: Take 10 mg by mouth nightly. Indications: controller medication for asthma)    metoprolol tartrate (LOPRESSOR) 25 mg tablet Take 2 Tabs by mouth two (2) times a day. (Patient taking differently: Take 50 mg by mouth two (2) times a day. Take / use AM day of surgery  per anesthesia protocols. Indications: ventricular rate control in atrial fibrillation)    lisinopriL (PRINIVIL, ZESTRIL) 10 mg tablet Take 1 Tab by mouth daily.  (Patient taking differently: Take 10 mg by mouth daily. Indications: high blood pressure)    glimepiride (AMARYL) 1 mg tablet Take 1 Tab by mouth Daily (before breakfast).  ezetimibe (ZETIA) 10 mg tablet Take 1 Tab by mouth nightly. (Patient taking differently: Take 10 mg by mouth nightly. Indications: high cholesterol)    doxazosin (CARDURA) 4 mg tablet Take 1 Tab by mouth daily. (Patient taking differently: Take 4 mg by mouth daily. Take / use AM day of surgery  per anesthesia protocols. Indications: enlarged prostate with urination problem)    celecoxib (CeleBREX) 200 mg capsule Take 1 Cap by mouth daily. (Patient taking differently: Take 200 mg by mouth daily. Indications: joint damage causing pain and loss of function)    atorvastatin (LIPITOR) 20 mg tablet Take 1 Tab by mouth daily. (Patient taking differently: Take 20 mg by mouth nightly. Indications: high cholesterol)    Trulicity 1.5 GB/8.0 mL sub-q pen 0.5 mL by SubCUTAneous route every seven (7) days. (Patient taking differently: 1.5 mg by SubCUTAneous route every seven (7) days. Takes every  Wednesday at bedtime  Indications: type 2 diabetes mellitus)    apixaban (Eliquis) 5 mg tablet Take 1 Tab by mouth two (2) times a day. (Patient taking differently: Take 5 mg by mouth two (2) times a day. Indications: treatment to prevent blood clots in chronic atrial fibrillation)    dapagliflozin (Farxiga) 10 mg tab tablet Take 1 Tab by mouth daily. (Patient taking differently: Take 10 mg by mouth daily. Indications: type 2 diabetes mellitus)    fluticasone furoate-vilanteroL (Breo Ellipta) 200-25 mcg/dose inhaler Take 1 Puff by inhalation daily. (Patient taking differently: Take 1 Puff by inhalation daily. Take / use AM day of surgery  per anesthesia protocols. Indications: controller medication for asthma)    fluticasone propionate (FLONASE ALLERGY RELIEF) 50 mcg/actuation nasal spray 2 Sprays by Both Nostrils route daily as needed.  Take / use AM day of surgery per anesthesia protocols, if needed  Indications: inflammation of the nose due to an allergy    albuterol (PROVENTIL HFA, VENTOLIN HFA, PROAIR HFA) 90 mcg/actuation inhaler Take 1 Puff by inhalation every four (4) hours as needed for Wheezing. (Patient taking differently: Take 1 Puff by inhalation every four (4) hours as needed for Wheezing. For wheezing; Take / use AM day of surgery  per anesthesia protocols.)    B.infantis-B.ani-B.long-B.bifi (PROBIOTIC 4X) 10-15 mg TbEC Take  by mouth daily.  aspirin delayed-release 81 mg tablet Take 162 mg by mouth nightly.  ANTI-FUNGAL 2 % topical powder APPLY TO ABDOMEN 2 TIMES PER DAY    Blood-Glucose Meter monitoring kit Check fs every day; DM2, E11/9, Contour glucometer    glucose blood VI test strips (ASCENSIA CONTOUR) strip Check fs every day, DM2, E11.9    lancets 28 gauge misc Check fs every day, DM2, E11.9    albuterol (PROVENTIL VENTOLIN) 2.5 mg /3 mL (0.083 %) nebulizer solution 3 mL by Nebulization route once for 1 dose. (Patient taking differently: 2.5 mg by Nebulization route daily as needed. Pt states has not done in several years;  Take / use AM day of surgery  per anesthesia protocols.)         Labs:  Hospital Outpatient Visit on 06/22/2020   Component Date Value Ref Range Status    WBC 06/22/2020 8.0  4.3 - 11.1 K/uL Final    RBC 06/22/2020 5.12  4.23 - 5.6 M/uL Final    HGB 06/22/2020 15.0  13.6 - 17.2 g/dL Final    HCT 06/22/2020 47.2  41.1 - 50.3 % Final    MCV 06/22/2020 92.2  79.6 - 97.8 FL Final    MCH 06/22/2020 29.3  26.1 - 32.9 PG Final    MCHC 06/22/2020 31.8  31.4 - 35.0 g/dL Final    RDW 06/22/2020 15.0* 11.9 - 14.6 % Final    PLATELET 81/02/9055 513  150 - 450 K/uL Final    MPV 06/22/2020 10.7  9.4 - 12.3 FL Final    ABSOLUTE NRBC 06/22/2020 0.00  0.0 - 0.2 K/uL Final    **Note: Absolute NRBC parameter is now reported with Hemogram**    DF 06/22/2020 AUTOMATED    Final    NEUTROPHILS 06/22/2020 51  43 - 78 % Final    LYMPHOCYTES 06/22/2020 32  13 - 44 % Final    MONOCYTES 06/22/2020 10  4.0 - 12.0 % Final    EOSINOPHILS 06/22/2020 6  0.5 - 7.8 % Final    BASOPHILS 06/22/2020 1  0.0 - 2.0 % Final    IMMATURE GRANULOCYTES 06/22/2020 0  0.0 - 5.0 % Final    ABS. NEUTROPHILS 06/22/2020 4.1  1.7 - 8.2 K/UL Final    ABS. LYMPHOCYTES 06/22/2020 2.6  0.5 - 4.6 K/UL Final    ABS. MONOCYTES 06/22/2020 0.8  0.1 - 1.3 K/UL Final    ABS. EOSINOPHILS 06/22/2020 0.4  0.0 - 0.8 K/UL Final    ABS. BASOPHILS 06/22/2020 0.1  0.0 - 0.2 K/UL Final    ABS. IMM. GRANS. 06/22/2020 0.0  0.0 - 0.5 K/UL Final    Prothrombin time 06/22/2020 15.2* 12.0 - 14.7 sec Final    INR 06/22/2020 1.2    Final    Comment: Suggested therapeutic INR range:  Venous thrombosis and embolus  2.0-3.0  Prosthetic heart valve         2.5-3.5  ** Note new reference range and method **      aPTT 06/22/2020 30.8  24.3 - 35.4 SEC Final    Comment: Heparin Therapeutic Range = 74 - 123 seconds  In addition to factor deficiency, monitoring heparin therapy, etc., evaluation of a prolonged aPTT result should include consideration of preanalytic variables such as heparin flush contamination, specimen integrity issues, etc.      Sodium 06/22/2020 140  136 - 145 mmol/L Final    Potassium 06/22/2020 4.2  3.5 - 5.1 mmol/L Final    Chloride 06/22/2020 105  98 - 107 mmol/L Final    CO2 06/22/2020 27  21 - 32 mmol/L Final    Anion gap 06/22/2020 8  7 - 16 mmol/L Final    Glucose 06/22/2020 302* 65 - 100 mg/dL Final    Comment: 47 - 60 mg/dl Consistent with, but not fully diagnostic of hypoglycemia.   101 - 125 mg/dl Impaired fasting glucose/consistent with pre-diabetes mellitus  > 126 mg/dl Fasting glucose consistent with overt diabetes mellitus      BUN 06/22/2020 18  8 - 23 MG/DL Final    Creatinine 06/22/2020 1.01  0.8 - 1.5 MG/DL Final    GFR est AA 06/22/2020 >60  >60 ml/min/1.73m2 Final    GFR est non-AA 06/22/2020 >60  >60 ml/min/1.73m2 Final    Comment: (NOTE)  Estimated GFR is calculated using the Modification of Diet in Renal   Disease (MDRD) Study equation, reported for both  Americans   (GFRAA) and non- Americans (GFRNA), and normalized to 1.73m2   body surface area. The physician must decide which value applies to   the patient. The MDRD study equation should only be used in   individuals age 25 or older. It has not been validated for the   following: pregnant women, patients with serious comorbid conditions,   or on certain medications, or persons with extremes of body size,   muscle mass, or nutritional status.  Calcium 06/22/2020 9.3  8.3 - 10.4 MG/DL Final    Hemoglobin A1c 06/22/2020 7.4  % Final    Est. average glucose 06/22/2020 166  mg/dL Final    Comment: (NOTE)  The eAG should be interpreted with patient characteristics in mind   since ethnicity, interindividual differences, red cell lifespan,   variation in rates of glycation, etc. may affect the validity of the   calculation.  Special Requests: 06/22/2020 NASAL O2    Final    Culture result: 06/22/2020 SA target not detected. A MRSA NEGATIVE, SA NEGATIVE test result does not preclude MRSA or SA nasal colonization. Final   Orders Only on 06/19/2020   Component Date Value Ref Range Status    SARS-CoV-2, TIMOTHY 06/22/2020 Not Detected  Not Detected Final    Comment: Testing was performed using the krys(R) SARS-CoV-2 test.  This test was developed and its performance characteristics determined  by [employer]. This test has not been FDA cleared or  approved. This test has been authorized by FDA under an Emergency Use  Authorization (EUA). This test is only authorized for the duration of  time the declaration that circumstances exist justifying the  authorization of the emergency use of in vitro diagnostic tests for  detection of SARS-CoV-2 virus and/or diagnosis of COVID-19 infection  under section 564(b)(1) of the Act, 21 U. S.C. 360bbb-3(b)(1), unless  the authorization is terminated or revoked sooner. When diagnostic testing is negative, the possibility of a false  negative result should be considered in the context of a patient's  recent exposures and the presence of clinical signs and symptoms  consistent with COVID-19. An individual without symptoms of COVID-19  and who is not shedding SARS-CoV-2 virus would expect to have a  negati                           ve (not detected) result in this assay. Orders Only on 05/23/2020   Component Date Value Ref Range Status    SARS-CoV-2, TIMOTHY 05/20/2020 Not Detected   Final   Hospital Outpatient Visit on 05/20/2020   Component Date Value Ref Range Status    WBC 05/20/2020 6.5  4.3 - 11.1 K/uL Final    RBC 05/20/2020 4.93  4.23 - 5.6 M/uL Final    HGB 05/20/2020 14.4  13.6 - 17.2 g/dL Final    HCT 05/20/2020 44.6  41.1 - 50.3 % Final    MCV 05/20/2020 90.5  79.6 - 97.8 FL Final    MCH 05/20/2020 29.2  26.1 - 32.9 PG Final    MCHC 05/20/2020 32.3  31.4 - 35.0 g/dL Final    RDW 05/20/2020 14.7* 11.9 - 14.6 % Final    PLATELET 36/95/7408 348  150 - 450 K/uL Final    MPV 05/20/2020 10.3  9.4 - 12.3 FL Final    ABSOLUTE NRBC 05/20/2020 0.00  0.0 - 0.2 K/uL Final    **Note: Absolute NRBC parameter is now reported with Hemogram**    Sodium 05/20/2020 139  136 - 145 mmol/L Final    Potassium 05/20/2020 4.0  3.5 - 5.1 mmol/L Final    Chloride 05/20/2020 108* 98 - 107 mmol/L Final    CO2 05/20/2020 23  21 - 32 mmol/L Final    Anion gap 05/20/2020 8  7 - 16 mmol/L Final    Glucose 05/20/2020 239* 65 - 100 mg/dL Final    Comment: 47 - 60 mg/dl Consistent with, but not fully diagnostic of hypoglycemia.   101 - 125 mg/dl Impaired fasting glucose/consistent with pre-diabetes mellitus  > 126 mg/dl Fasting glucose consistent with overt diabetes mellitus      BUN 05/20/2020 15  8 - 23 MG/DL Final    Creatinine 05/20/2020 0.95  0.8 - 1.5 MG/DL Final    GFR est AA 05/20/2020 >60  >60 ml/min/1.73m2 Final    GFR est non-AA 05/20/2020 >60  >60 ml/min/1.73m2 Final    Comment: (NOTE)  Estimated GFR is calculated using the Modification of Diet in Renal   Disease (MDRD) Study equation, reported for both  Americans   (GFRAA) and non- Americans (GFRNA), and normalized to 1.73m2   body surface area. The physician must decide which value applies to   the patient. The MDRD study equation should only be used in   individuals age 25 or older. It has not been validated for the   following: pregnant women, patients with serious comorbid conditions,   or on certain medications, or persons with extremes of body size,   muscle mass, or nutritional status.  Calcium 05/20/2020 8.8  8.3 - 10.4 MG/DL Final    Special Requests: 05/20/2020 NO SPECIAL REQUESTS    Final    Culture result: 05/20/2020 SA target not detected. A MRSA NEGATIVE, SA NEGATIVE test result does not preclude MRSA or SA nasal colonization.     Final                 Patient Active Problem List   Diagnosis Code    Hyperlipemia E78.5    Obstructive sleep apnea (adult) (pediatric) G47.33    Essential hypertension I10    Extrinsic asthma J45.909    Diverticulosis K57.90    Cervical spondylosis M47.812    Benign prostatic hyperplasia N40.0    Chronic back pain M54.9, G89.29    Esophageal reflux K21.9    Diabetes mellitus type 2 in obese ([facility]) E11.69, E66.9    Andropause N50.89    Diverticular stricture ([facility] [address] K56.699    Coronary atherosclerosis of native coronary vessel I25.10    COPD (chronic obstructive pulmonary disease) ([facility]) J44.9    Edema R60.9    Paroxysmal atrial fibrillation ([facility]) I48.0    OA (osteoarthritis) of knee M17.10    Status post total right knee replacement Z96.651    Medicare annual wellness visit, subsequent Z00.00    History of smoking 30 or more pack years Z87.891    Tubulovillous adenoma of colon D12.6    Obesity, morbid ([facility]) E66.01    Venous stasis I87.8  Chronic venous hypertension with ulcer and inflammation involving left side (HCC) I87.332, L97.929    Advance care planning Z71.89    Chronic diarrhea K52.9         Assessment:   1. Arthritis of the left hip      Plan:    1. Proceed with scheduled Procedure(s) (LRB):  HIP ARTHROPLASTY TOTAL/ LEFT/ DEPUY (Left)      The patient was counseled at length about the risks of collin Covid-19 during their perioperative period and any recovery window from their procedure. The patient was made aware that collin Covid-19  may worsen their prognosis for recovering from their procedure and lend to a higher morbidity and/or mortality risk. All material risks, benefits, and reasonable alternatives including postponing the procedure were discussed. The patient does  wish to proceed with the procedure at this time.          Signed By: LYNDON Harris  June 26, 2020

## 2020-06-28 ENCOUNTER — ANESTHESIA EVENT (OUTPATIENT)
Dept: SURGERY | Age: 70
DRG: 470 | End: 2020-06-28
Payer: MEDICARE

## 2020-06-29 ENCOUNTER — ANESTHESIA (OUTPATIENT)
Dept: SURGERY | Age: 70
DRG: 470 | End: 2020-06-29
Payer: MEDICARE

## 2020-06-29 ENCOUNTER — HOSPITAL ENCOUNTER (INPATIENT)
Age: 70
LOS: 2 days | Discharge: HOME HEALTH | DRG: 470 | End: 2020-07-01
Attending: ORTHOPAEDIC SURGERY | Admitting: ORTHOPAEDIC SURGERY
Payer: MEDICARE

## 2020-06-29 ENCOUNTER — APPOINTMENT (OUTPATIENT)
Dept: GENERAL RADIOLOGY | Age: 70
DRG: 470 | End: 2020-06-29
Attending: PHYSICIAN ASSISTANT
Payer: MEDICARE

## 2020-06-29 DIAGNOSIS — Z96.642 STATUS POST LEFT HIP REPLACEMENT: Primary | ICD-10-CM

## 2020-06-29 PROBLEM — M16.12 OSTEOARTHRITIS OF LEFT HIP: Status: ACTIVE | Noted: 2020-06-29

## 2020-06-29 LAB
GLUCOSE BLD STRIP.AUTO-MCNC: 221 MG/DL (ref 65–100)
GLUCOSE BLD STRIP.AUTO-MCNC: 230 MG/DL (ref 65–100)
GLUCOSE BLD STRIP.AUTO-MCNC: 367 MG/DL (ref 65–100)
GLUCOSE BLD STRIP.AUTO-MCNC: 383 MG/DL (ref 65–100)
HGB BLD-MCNC: 13.5 G/DL (ref 13.6–17.2)

## 2020-06-29 PROCEDURE — 74011000250 HC RX REV CODE- 250: Performed by: ORTHOPAEDIC SURGERY

## 2020-06-29 PROCEDURE — 74011250637 HC RX REV CODE- 250/637: Performed by: PHYSICIAN ASSISTANT

## 2020-06-29 PROCEDURE — 36415 COLL VENOUS BLD VENIPUNCTURE: CPT

## 2020-06-29 PROCEDURE — 76010000172 HC OR TIME 2.5 TO 3 HR INTENSV-TIER 1: Performed by: ORTHOPAEDIC SURGERY

## 2020-06-29 PROCEDURE — 86580 TB INTRADERMAL TEST: CPT | Performed by: ORTHOPAEDIC SURGERY

## 2020-06-29 PROCEDURE — 77030013708 HC HNDPC SUC IRR PULS STRY –B: Performed by: ORTHOPAEDIC SURGERY

## 2020-06-29 PROCEDURE — 72170 X-RAY EXAM OF PELVIS: CPT

## 2020-06-29 PROCEDURE — 77030021678 HC GLIDESCP STAT DISP VERT -B: Performed by: ANESTHESIOLOGY

## 2020-06-29 PROCEDURE — 77030019557 HC ELECTRD VES SEAL MEDT -F: Performed by: ORTHOPAEDIC SURGERY

## 2020-06-29 PROCEDURE — 76210000006 HC OR PH I REC 0.5 TO 1 HR: Performed by: ORTHOPAEDIC SURGERY

## 2020-06-29 PROCEDURE — 74011636637 HC RX REV CODE- 636/637: Performed by: INTERNAL MEDICINE

## 2020-06-29 PROCEDURE — 74011250636 HC RX REV CODE- 250/636: Performed by: NURSE ANESTHETIST, CERTIFIED REGISTERED

## 2020-06-29 PROCEDURE — 77030008462 HC STPLR SKN PROX J&J -A: Performed by: ORTHOPAEDIC SURGERY

## 2020-06-29 PROCEDURE — 77030031139 HC SUT VCRL2 J&J -A: Performed by: ORTHOPAEDIC SURGERY

## 2020-06-29 PROCEDURE — 94640 AIRWAY INHALATION TREATMENT: CPT

## 2020-06-29 PROCEDURE — 77030018836 HC SOL IRR NACL ICUM -A: Performed by: ORTHOPAEDIC SURGERY

## 2020-06-29 PROCEDURE — 74011000250 HC RX REV CODE- 250: Performed by: ANESTHESIOLOGY

## 2020-06-29 PROCEDURE — 74011000250 HC RX REV CODE- 250: Performed by: NURSE ANESTHETIST, CERTIFIED REGISTERED

## 2020-06-29 PROCEDURE — 94760 N-INVAS EAR/PLS OXIMETRY 1: CPT

## 2020-06-29 PROCEDURE — 77030040922 HC BLNKT HYPOTHRM STRY -A: Performed by: ANESTHESIOLOGY

## 2020-06-29 PROCEDURE — 0SRB04A REPLACEMENT OF LEFT HIP JOINT WITH CERAMIC ON POLYETHYLENE SYNTHETIC SUBSTITUTE, UNCEMENTED, OPEN APPROACH: ICD-10-PCS | Performed by: ORTHOPAEDIC SURGERY

## 2020-06-29 PROCEDURE — 74011250636 HC RX REV CODE- 250/636: Performed by: ANESTHESIOLOGY

## 2020-06-29 PROCEDURE — 77030006835 HC BLD SAW SAG STRY -B: Performed by: ORTHOPAEDIC SURGERY

## 2020-06-29 PROCEDURE — 77030037088 HC TUBE ENDOTRACH ORAL NSL COVD-A: Performed by: ANESTHESIOLOGY

## 2020-06-29 PROCEDURE — 76060000037 HC ANESTHESIA 3 TO 3.5 HR: Performed by: ORTHOPAEDIC SURGERY

## 2020-06-29 PROCEDURE — 85018 HEMOGLOBIN: CPT

## 2020-06-29 PROCEDURE — 77030022668 HC TIP SUC IRR PULS STRY –B: Performed by: ORTHOPAEDIC SURGERY

## 2020-06-29 PROCEDURE — 77030007880 HC KT SPN EPDRL BBMI -B: Performed by: ANESTHESIOLOGY

## 2020-06-29 PROCEDURE — 94762 N-INVAS EAR/PLS OXIMTRY CONT: CPT

## 2020-06-29 PROCEDURE — 82962 GLUCOSE BLOOD TEST: CPT

## 2020-06-29 PROCEDURE — 74011250636 HC RX REV CODE- 250/636: Performed by: ORTHOPAEDIC SURGERY

## 2020-06-29 PROCEDURE — 88305 TISSUE EXAM BY PATHOLOGIST: CPT

## 2020-06-29 PROCEDURE — 74011636637 HC RX REV CODE- 636/637: Performed by: ANESTHESIOLOGY

## 2020-06-29 PROCEDURE — 74011000302 HC RX REV CODE- 302: Performed by: ORTHOPAEDIC SURGERY

## 2020-06-29 PROCEDURE — 65270000029 HC RM PRIVATE

## 2020-06-29 PROCEDURE — 77030008459 HC STPLR SKN COOP -B: Performed by: ORTHOPAEDIC SURGERY

## 2020-06-29 PROCEDURE — 74011250636 HC RX REV CODE- 250/636: Performed by: PHYSICIAN ASSISTANT

## 2020-06-29 PROCEDURE — 77030012935 HC DRSG AQUACEL BMS -B: Performed by: ORTHOPAEDIC SURGERY

## 2020-06-29 PROCEDURE — 77010033678 HC OXYGEN DAILY

## 2020-06-29 PROCEDURE — 77030003665 HC NDL SPN BBMI -A: Performed by: ANESTHESIOLOGY

## 2020-06-29 PROCEDURE — 77030018846 HC SOL IRR STRL H20 ICUM -A

## 2020-06-29 PROCEDURE — C1776 JOINT DEVICE (IMPLANTABLE): HCPCS | Performed by: ORTHOPAEDIC SURGERY

## 2020-06-29 PROCEDURE — 74011000258 HC RX REV CODE- 258: Performed by: ORTHOPAEDIC SURGERY

## 2020-06-29 DEVICE — SCREW BNE L25MM DIA6.5MM CANC HIP S STL GRIPTION FULL THRD: Type: IMPLANTABLE DEVICE | Site: HIP | Status: FUNCTIONAL

## 2020-06-29 DEVICE — CUP ACET DIA60MM HIP GRIPTION PRI CEMENTLESS FIX SECT SER: Type: IMPLANTABLE DEVICE | Site: HIP | Status: FUNCTIONAL

## 2020-06-29 DEVICE — STEM FEM SZ 15 L165MM NK L40.3MM 50MM HI OFFSET 125DEG HIP: Type: IMPLANTABLE DEVICE | Site: HIP | Status: FUNCTIONAL

## 2020-06-29 DEVICE — HEAD FEM DIA36MM +8MM OFFSET 12/14 TAPR HIP CERAMIC BIOLOX: Type: IMPLANTABLE DEVICE | Site: HIP | Status: FUNCTIONAL

## 2020-06-29 DEVICE — LINER ACET OD60MM ID36MM LIP ALTRX PINN: Type: IMPLANTABLE DEVICE | Site: HIP | Status: FUNCTIONAL

## 2020-06-29 DEVICE — HIP H1 TOT STD COCR HD IMPL CAPPED SYNTHES: Type: IMPLANTABLE DEVICE | Status: FUNCTIONAL

## 2020-06-29 RX ORDER — ROCURONIUM BROMIDE 10 MG/ML
INJECTION, SOLUTION INTRAVENOUS AS NEEDED
Status: DISCONTINUED | OUTPATIENT
Start: 2020-06-29 | End: 2020-06-29 | Stop reason: HOSPADM

## 2020-06-29 RX ORDER — SODIUM CHLORIDE 0.9 % (FLUSH) 0.9 %
5-40 SYRINGE (ML) INJECTION AS NEEDED
Status: DISCONTINUED | OUTPATIENT
Start: 2020-06-29 | End: 2020-06-29 | Stop reason: HOSPADM

## 2020-06-29 RX ORDER — FENTANYL CITRATE 50 UG/ML
100 INJECTION, SOLUTION INTRAMUSCULAR; INTRAVENOUS ONCE
Status: DISCONTINUED | OUTPATIENT
Start: 2020-06-29 | End: 2020-06-29 | Stop reason: HOSPADM

## 2020-06-29 RX ORDER — SODIUM CHLORIDE 0.9 % (FLUSH) 0.9 %
5-40 SYRINGE (ML) INJECTION EVERY 8 HOURS
Status: DISCONTINUED | OUTPATIENT
Start: 2020-06-29 | End: 2020-07-01 | Stop reason: HOSPADM

## 2020-06-29 RX ORDER — LIDOCAINE HYDROCHLORIDE 20 MG/ML
INJECTION, SOLUTION EPIDURAL; INFILTRATION; INTRACAUDAL; PERINEURAL AS NEEDED
Status: DISCONTINUED | OUTPATIENT
Start: 2020-06-29 | End: 2020-06-29 | Stop reason: HOSPADM

## 2020-06-29 RX ORDER — HYDROCODONE BITARTRATE AND ACETAMINOPHEN 7.5; 325 MG/1; MG/1
1 TABLET ORAL AS NEEDED
Status: DISCONTINUED | OUTPATIENT
Start: 2020-06-29 | End: 2020-06-29 | Stop reason: HOSPADM

## 2020-06-29 RX ORDER — NALOXONE HYDROCHLORIDE 0.4 MG/ML
.2-.4 INJECTION, SOLUTION INTRAMUSCULAR; INTRAVENOUS; SUBCUTANEOUS
Status: DISCONTINUED | OUTPATIENT
Start: 2020-06-29 | End: 2020-07-01 | Stop reason: HOSPADM

## 2020-06-29 RX ORDER — DIPHENHYDRAMINE HCL 25 MG
25 CAPSULE ORAL
Status: DISCONTINUED | OUTPATIENT
Start: 2020-06-29 | End: 2020-07-01 | Stop reason: HOSPADM

## 2020-06-29 RX ORDER — SODIUM CHLORIDE 0.9 % (FLUSH) 0.9 %
5-40 SYRINGE (ML) INJECTION EVERY 8 HOURS
Status: DISCONTINUED | OUTPATIENT
Start: 2020-06-29 | End: 2020-06-29 | Stop reason: HOSPADM

## 2020-06-29 RX ORDER — HYDROMORPHONE HYDROCHLORIDE 1 MG/ML
1 INJECTION, SOLUTION INTRAMUSCULAR; INTRAVENOUS; SUBCUTANEOUS
Status: DISCONTINUED | OUTPATIENT
Start: 2020-06-29 | End: 2020-07-01 | Stop reason: HOSPADM

## 2020-06-29 RX ORDER — ACETAMINOPHEN 325 MG/1
975 TABLET ORAL ONCE
Status: DISCONTINUED | OUTPATIENT
Start: 2020-06-29 | End: 2020-06-29 | Stop reason: ALTCHOICE

## 2020-06-29 RX ORDER — SUCCINYLCHOLINE CHLORIDE 20 MG/ML
INJECTION INTRAMUSCULAR; INTRAVENOUS AS NEEDED
Status: DISCONTINUED | OUTPATIENT
Start: 2020-06-29 | End: 2020-06-29 | Stop reason: HOSPADM

## 2020-06-29 RX ORDER — ASPIRIN 81 MG/1
81 TABLET ORAL EVERY 12 HOURS
Status: COMPLETED | OUTPATIENT
Start: 2020-06-29 | End: 2020-06-29

## 2020-06-29 RX ORDER — NALOXONE HYDROCHLORIDE 0.4 MG/ML
0.1 INJECTION, SOLUTION INTRAMUSCULAR; INTRAVENOUS; SUBCUTANEOUS AS NEEDED
Status: DISCONTINUED | OUTPATIENT
Start: 2020-06-29 | End: 2020-06-29 | Stop reason: HOSPADM

## 2020-06-29 RX ORDER — SODIUM CHLORIDE 9 MG/ML
25 INJECTION, SOLUTION INTRAVENOUS CONTINUOUS
Status: DISCONTINUED | OUTPATIENT
Start: 2020-06-29 | End: 2020-06-29 | Stop reason: HOSPADM

## 2020-06-29 RX ORDER — DOXAZOSIN 4 MG/1
4 TABLET ORAL DAILY
Status: DISCONTINUED | OUTPATIENT
Start: 2020-06-30 | End: 2020-07-01 | Stop reason: HOSPADM

## 2020-06-29 RX ORDER — CEFAZOLIN SODIUM/WATER 2 G/20 ML
2 SYRINGE (ML) INTRAVENOUS ONCE
Status: DISCONTINUED | OUTPATIENT
Start: 2020-06-29 | End: 2020-06-29 | Stop reason: SDUPTHER

## 2020-06-29 RX ORDER — HYDROMORPHONE HYDROCHLORIDE 2 MG/ML
0.5 INJECTION, SOLUTION INTRAMUSCULAR; INTRAVENOUS; SUBCUTANEOUS
Status: DISCONTINUED | OUTPATIENT
Start: 2020-06-29 | End: 2020-06-29 | Stop reason: HOSPADM

## 2020-06-29 RX ORDER — METOPROLOL TARTRATE 50 MG/1
50 TABLET ORAL 2 TIMES DAILY
Status: DISCONTINUED | OUTPATIENT
Start: 2020-06-29 | End: 2020-07-01 | Stop reason: HOSPADM

## 2020-06-29 RX ORDER — DEXAMETHASONE SODIUM PHOSPHATE 4 MG/ML
INJECTION, SOLUTION INTRA-ARTICULAR; INTRALESIONAL; INTRAMUSCULAR; INTRAVENOUS; SOFT TISSUE AS NEEDED
Status: DISCONTINUED | OUTPATIENT
Start: 2020-06-29 | End: 2020-06-29 | Stop reason: HOSPADM

## 2020-06-29 RX ORDER — OXYCODONE HYDROCHLORIDE 5 MG/1
5 TABLET ORAL
Status: DISCONTINUED | OUTPATIENT
Start: 2020-06-29 | End: 2020-06-29 | Stop reason: HOSPADM

## 2020-06-29 RX ORDER — PANTOPRAZOLE SODIUM 40 MG/1
40 TABLET, DELAYED RELEASE ORAL DAILY
Status: DISCONTINUED | OUTPATIENT
Start: 2020-06-30 | End: 2020-07-01 | Stop reason: HOSPADM

## 2020-06-29 RX ORDER — SODIUM CHLORIDE 0.9 % (FLUSH) 0.9 %
5-40 SYRINGE (ML) INJECTION AS NEEDED
Status: DISCONTINUED | OUTPATIENT
Start: 2020-06-29 | End: 2020-07-01 | Stop reason: HOSPADM

## 2020-06-29 RX ORDER — LISINOPRIL 5 MG/1
10 TABLET ORAL DAILY
Status: DISCONTINUED | OUTPATIENT
Start: 2020-06-30 | End: 2020-07-01 | Stop reason: HOSPADM

## 2020-06-29 RX ORDER — BUDESONIDE AND FORMOTEROL FUMARATE DIHYDRATE 160; 4.5 UG/1; UG/1
2 AEROSOL RESPIRATORY (INHALATION) 2 TIMES DAILY
Status: DISCONTINUED | OUTPATIENT
Start: 2020-06-29 | End: 2020-07-01 | Stop reason: HOSPADM

## 2020-06-29 RX ORDER — MIDAZOLAM HYDROCHLORIDE 1 MG/ML
2 INJECTION, SOLUTION INTRAMUSCULAR; INTRAVENOUS
Status: COMPLETED | OUTPATIENT
Start: 2020-06-29 | End: 2020-06-29

## 2020-06-29 RX ORDER — ACETAMINOPHEN 500 MG
1000 TABLET ORAL EVERY 6 HOURS
Status: DISCONTINUED | OUTPATIENT
Start: 2020-06-29 | End: 2020-07-01 | Stop reason: HOSPADM

## 2020-06-29 RX ORDER — ONDANSETRON 2 MG/ML
INJECTION INTRAMUSCULAR; INTRAVENOUS AS NEEDED
Status: DISCONTINUED | OUTPATIENT
Start: 2020-06-29 | End: 2020-06-29 | Stop reason: HOSPADM

## 2020-06-29 RX ORDER — ALBUTEROL SULFATE 0.83 MG/ML
2.5 SOLUTION RESPIRATORY (INHALATION)
Status: DISCONTINUED | OUTPATIENT
Start: 2020-06-29 | End: 2020-06-29 | Stop reason: CLARIF

## 2020-06-29 RX ORDER — AMOXICILLIN 250 MG
2 CAPSULE ORAL DAILY
Status: DISCONTINUED | OUTPATIENT
Start: 2020-06-30 | End: 2020-07-01 | Stop reason: HOSPADM

## 2020-06-29 RX ORDER — ACETAMINOPHEN 650 MG/1
650 SUPPOSITORY RECTAL ONCE
Status: DISCONTINUED | OUTPATIENT
Start: 2020-06-29 | End: 2020-06-29 | Stop reason: ALTCHOICE

## 2020-06-29 RX ORDER — HYDROMORPHONE HYDROCHLORIDE 2 MG/1
2 TABLET ORAL
Qty: 30 TAB | Refills: 0 | Status: SHIPPED | OUTPATIENT
Start: 2020-06-29 | End: 2020-07-04

## 2020-06-29 RX ORDER — TRANEXAMIC ACID 100 MG/ML
INJECTION, SOLUTION INTRAVENOUS AS NEEDED
Status: DISCONTINUED | OUTPATIENT
Start: 2020-06-29 | End: 2020-06-29 | Stop reason: HOSPADM

## 2020-06-29 RX ORDER — SODIUM CHLORIDE, SODIUM LACTATE, POTASSIUM CHLORIDE, CALCIUM CHLORIDE 600; 310; 30; 20 MG/100ML; MG/100ML; MG/100ML; MG/100ML
100 INJECTION, SOLUTION INTRAVENOUS CONTINUOUS
Status: DISCONTINUED | OUTPATIENT
Start: 2020-06-29 | End: 2020-06-29 | Stop reason: HOSPADM

## 2020-06-29 RX ORDER — MONTELUKAST SODIUM 10 MG/1
10 TABLET ORAL
Status: DISCONTINUED | OUTPATIENT
Start: 2020-06-29 | End: 2020-07-01 | Stop reason: HOSPADM

## 2020-06-29 RX ORDER — EPHEDRINE SULFATE/0.9% NACL/PF 50 MG/5 ML
SYRINGE (ML) INTRAVENOUS AS NEEDED
Status: DISCONTINUED | OUTPATIENT
Start: 2020-06-29 | End: 2020-06-29 | Stop reason: HOSPADM

## 2020-06-29 RX ORDER — FLUTICASONE PROPIONATE 50 MCG
2 SPRAY, SUSPENSION (ML) NASAL
Status: DISCONTINUED | OUTPATIENT
Start: 2020-06-29 | End: 2020-07-01 | Stop reason: HOSPADM

## 2020-06-29 RX ORDER — FAMOTIDINE 20 MG/1
20 TABLET, FILM COATED ORAL ONCE
Status: DISCONTINUED | OUTPATIENT
Start: 2020-06-29 | End: 2020-06-29 | Stop reason: HOSPADM

## 2020-06-29 RX ORDER — GLIMEPIRIDE 2 MG/1
1 TABLET ORAL
Status: DISCONTINUED | OUTPATIENT
Start: 2020-06-30 | End: 2020-07-01 | Stop reason: HOSPADM

## 2020-06-29 RX ORDER — DEXAMETHASONE SODIUM PHOSPHATE 100 MG/10ML
10 INJECTION INTRAMUSCULAR; INTRAVENOUS ONCE
Status: ACTIVE | OUTPATIENT
Start: 2020-06-30 | End: 2020-07-01

## 2020-06-29 RX ORDER — PROPOFOL 10 MG/ML
INJECTION, EMULSION INTRAVENOUS AS NEEDED
Status: DISCONTINUED | OUTPATIENT
Start: 2020-06-29 | End: 2020-06-29 | Stop reason: HOSPADM

## 2020-06-29 RX ORDER — KETOROLAC TROMETHAMINE 30 MG/ML
INJECTION, SOLUTION INTRAMUSCULAR; INTRAVENOUS AS NEEDED
Status: DISCONTINUED | OUTPATIENT
Start: 2020-06-29 | End: 2020-06-29 | Stop reason: HOSPADM

## 2020-06-29 RX ORDER — FENTANYL CITRATE 50 UG/ML
INJECTION, SOLUTION INTRAMUSCULAR; INTRAVENOUS AS NEEDED
Status: DISCONTINUED | OUTPATIENT
Start: 2020-06-29 | End: 2020-06-29 | Stop reason: HOSPADM

## 2020-06-29 RX ORDER — SODIUM CHLORIDE 9 MG/ML
100 INJECTION, SOLUTION INTRAVENOUS CONTINUOUS
Status: DISPENSED | OUTPATIENT
Start: 2020-06-29 | End: 2020-07-01

## 2020-06-29 RX ORDER — ALBUTEROL SULFATE 90 UG/1
2 AEROSOL, METERED RESPIRATORY (INHALATION)
Status: DISCONTINUED | OUTPATIENT
Start: 2020-06-29 | End: 2020-07-01 | Stop reason: HOSPADM

## 2020-06-29 RX ORDER — LIDOCAINE HYDROCHLORIDE 10 MG/ML
INJECTION, SOLUTION EPIDURAL; INFILTRATION; INTRACAUDAL; PERINEURAL
Status: DISCONTINUED | OUTPATIENT
Start: 2020-06-29 | End: 2020-06-29 | Stop reason: HOSPADM

## 2020-06-29 RX ORDER — LIDOCAINE HYDROCHLORIDE 10 MG/ML
0.1 INJECTION INFILTRATION; PERINEURAL AS NEEDED
Status: DISCONTINUED | OUTPATIENT
Start: 2020-06-29 | End: 2020-06-29 | Stop reason: HOSPADM

## 2020-06-29 RX ORDER — ONDANSETRON 4 MG/1
4 TABLET, ORALLY DISINTEGRATING ORAL
Status: DISCONTINUED | OUTPATIENT
Start: 2020-06-29 | End: 2020-07-01 | Stop reason: HOSPADM

## 2020-06-29 RX ORDER — CELECOXIB 200 MG/1
200 CAPSULE ORAL EVERY 12 HOURS
Status: DISCONTINUED | OUTPATIENT
Start: 2020-06-29 | End: 2020-07-01 | Stop reason: HOSPADM

## 2020-06-29 RX ORDER — IPRATROPIUM BROMIDE AND ALBUTEROL SULFATE 2.5; .5 MG/3ML; MG/3ML
3 SOLUTION RESPIRATORY (INHALATION)
Status: DISCONTINUED | OUTPATIENT
Start: 2020-06-29 | End: 2020-07-01 | Stop reason: HOSPADM

## 2020-06-29 RX ORDER — BUPIVACAINE HYDROCHLORIDE 7.5 MG/ML
INJECTION, SOLUTION EPIDURAL; RETROBULBAR
Status: DISCONTINUED | OUTPATIENT
Start: 2020-06-29 | End: 2020-06-29 | Stop reason: HOSPADM

## 2020-06-29 RX ORDER — ROPIVACAINE HYDROCHLORIDE 2 MG/ML
INJECTION, SOLUTION EPIDURAL; INFILTRATION; PERINEURAL AS NEEDED
Status: DISCONTINUED | OUTPATIENT
Start: 2020-06-29 | End: 2020-06-29 | Stop reason: HOSPADM

## 2020-06-29 RX ORDER — MIDAZOLAM HYDROCHLORIDE 1 MG/ML
INJECTION, SOLUTION INTRAMUSCULAR; INTRAVENOUS AS NEEDED
Status: DISCONTINUED | OUTPATIENT
Start: 2020-06-29 | End: 2020-06-29 | Stop reason: HOSPADM

## 2020-06-29 RX ORDER — HYDROMORPHONE HYDROCHLORIDE 2 MG/1
2 TABLET ORAL
Status: DISCONTINUED | OUTPATIENT
Start: 2020-06-29 | End: 2020-07-01 | Stop reason: HOSPADM

## 2020-06-29 RX ADMIN — LIDOCAINE HYDROCHLORIDE 0.1 ML: 10 INJECTION, SOLUTION INFILTRATION; PERINEURAL at 08:25

## 2020-06-29 RX ADMIN — Medication 10 MG: at 11:12

## 2020-06-29 RX ADMIN — ROCURONIUM BROMIDE 10 MG: 10 INJECTION, SOLUTION INTRAVENOUS at 11:01

## 2020-06-29 RX ADMIN — Medication 1 AMPULE: at 22:34

## 2020-06-29 RX ADMIN — PROPOFOL 300 MG: 10 INJECTION, EMULSION INTRAVENOUS at 09:59

## 2020-06-29 RX ADMIN — HYDROMORPHONE HYDROCHLORIDE 1 MG: 1 INJECTION, SOLUTION INTRAMUSCULAR; INTRAVENOUS; SUBCUTANEOUS at 13:49

## 2020-06-29 RX ADMIN — MIDAZOLAM 2 MG: 1 INJECTION INTRAMUSCULAR; INTRAVENOUS at 09:27

## 2020-06-29 RX ADMIN — INSULIN HUMAN 5 UNITS: 100 INJECTION, SOLUTION PARENTERAL at 08:53

## 2020-06-29 RX ADMIN — HUMAN INSULIN 10 UNITS: 100 INJECTION, SOLUTION SUBCUTANEOUS at 17:14

## 2020-06-29 RX ADMIN — HYDROMORPHONE HYDROCHLORIDE 2 MG: 2 TABLET ORAL at 22:32

## 2020-06-29 RX ADMIN — CELECOXIB 200 MG: 200 CAPSULE ORAL at 22:33

## 2020-06-29 RX ADMIN — SODIUM CHLORIDE, SODIUM LACTATE, POTASSIUM CHLORIDE, AND CALCIUM CHLORIDE 100 ML/HR: 600; 310; 30; 20 INJECTION, SOLUTION INTRAVENOUS at 08:25

## 2020-06-29 RX ADMIN — HYDROMORPHONE HYDROCHLORIDE 1 MG: 1 INJECTION, SOLUTION INTRAMUSCULAR; INTRAVENOUS; SUBCUTANEOUS at 17:13

## 2020-06-29 RX ADMIN — SODIUM CHLORIDE, SODIUM LACTATE, POTASSIUM CHLORIDE, AND CALCIUM CHLORIDE: 600; 310; 30; 20 INJECTION, SOLUTION INTRAVENOUS at 10:22

## 2020-06-29 RX ADMIN — ONDANSETRON 4 MG: 4 TABLET, ORALLY DISINTEGRATING ORAL at 22:38

## 2020-06-29 RX ADMIN — TUBERCULIN PURIFIED PROTEIN DERIVATIVE 5 UNITS: 5 INJECTION, SOLUTION INTRADERMAL at 08:53

## 2020-06-29 RX ADMIN — DEXAMETHASONE SODIUM PHOSPHATE 10 MG: 4 INJECTION, SOLUTION INTRAMUSCULAR; INTRAVENOUS at 10:32

## 2020-06-29 RX ADMIN — ASPIRIN 81 MG: 81 TABLET, COATED ORAL at 22:31

## 2020-06-29 RX ADMIN — ROCURONIUM BROMIDE 40 MG: 10 INJECTION, SOLUTION INTRAVENOUS at 10:23

## 2020-06-29 RX ADMIN — BUPIVACAINE HYDROCHLORIDE 12.5 MG: 7.5 INJECTION, SOLUTION EPIDURAL; RETROBULBAR at 09:43

## 2020-06-29 RX ADMIN — HYDROMORPHONE HYDROCHLORIDE 0.5 MG: 2 INJECTION INTRAMUSCULAR; INTRAVENOUS; SUBCUTANEOUS at 12:40

## 2020-06-29 RX ADMIN — MONTELUKAST 10 MG: 10 TABLET, FILM COATED ORAL at 22:32

## 2020-06-29 RX ADMIN — METOPROLOL TARTRATE 50 MG: 50 TABLET, FILM COATED ORAL at 22:33

## 2020-06-29 RX ADMIN — CEFAZOLIN SODIUM 3 G: 100 INJECTION, POWDER, LYOPHILIZED, FOR SOLUTION INTRAVENOUS at 17:00

## 2020-06-29 RX ADMIN — LIDOCAINE HYDROCHLORIDE 5 MG: 10 INJECTION, SOLUTION EPIDURAL; INFILTRATION; INTRACAUDAL; PERINEURAL at 09:43

## 2020-06-29 RX ADMIN — FENTANYL CITRATE 50 MCG: 50 INJECTION INTRAMUSCULAR; INTRAVENOUS at 09:32

## 2020-06-29 RX ADMIN — BUDESONIDE AND FORMOTEROL FUMARATE DIHYDRATE 2 PUFF: 160; 4.5 AEROSOL RESPIRATORY (INHALATION) at 21:17

## 2020-06-29 RX ADMIN — SUCCINYLCHOLINE CHLORIDE 160 MG: 20 INJECTION, SOLUTION INTRAMUSCULAR; INTRAVENOUS at 09:59

## 2020-06-29 RX ADMIN — ONDANSETRON 4 MG: 4 TABLET, ORALLY DISINTEGRATING ORAL at 17:40

## 2020-06-29 RX ADMIN — LIDOCAINE HYDROCHLORIDE 100 MG: 20 INJECTION, SOLUTION EPIDURAL; INFILTRATION; INTRACAUDAL; PERINEURAL at 09:59

## 2020-06-29 RX ADMIN — ONDANSETRON 4 MG: 2 INJECTION INTRAMUSCULAR; INTRAVENOUS at 10:32

## 2020-06-29 RX ADMIN — Medication 3 AMPULE: at 08:01

## 2020-06-29 RX ADMIN — PHENYLEPHRINE HYDROCHLORIDE 100 MCG: 10 INJECTION INTRAVENOUS at 11:30

## 2020-06-29 RX ADMIN — HUMAN INSULIN 10 UNITS: 100 INJECTION, SOLUTION SUBCUTANEOUS at 22:39

## 2020-06-29 RX ADMIN — MIDAZOLAM 2 MG: 1 INJECTION INTRAMUSCULAR; INTRAVENOUS at 09:40

## 2020-06-29 RX ADMIN — CEFAZOLIN 3 G: 1 INJECTION, POWDER, FOR SOLUTION INTRAVENOUS at 09:27

## 2020-06-29 RX ADMIN — FENTANYL CITRATE 50 MCG: 50 INJECTION INTRAMUSCULAR; INTRAVENOUS at 09:47

## 2020-06-29 RX ADMIN — ROCURONIUM BROMIDE 10 MG: 10 INJECTION, SOLUTION INTRAVENOUS at 09:59

## 2020-06-29 RX ADMIN — SUGAMMADEX 200 MG: 100 INJECTION, SOLUTION INTRAVENOUS at 12:18

## 2020-06-29 RX ADMIN — ACETAMINOPHEN 1000 MG: 500 TABLET, FILM COATED ORAL at 17:14

## 2020-06-29 RX ADMIN — TRANEXAMIC ACID 1000 MG: 100 INJECTION, SOLUTION INTRAVENOUS at 09:51

## 2020-06-29 RX ADMIN — HYDROMORPHONE HYDROCHLORIDE 0.5 MG: 2 INJECTION INTRAMUSCULAR; INTRAVENOUS; SUBCUTANEOUS at 12:44

## 2020-06-29 RX ADMIN — SODIUM CHLORIDE, SODIUM LACTATE, POTASSIUM CHLORIDE, AND CALCIUM CHLORIDE: 600; 310; 30; 20 INJECTION, SOLUTION INTRAVENOUS at 09:27

## 2020-06-29 NOTE — CONSULTS
Hospitalist consulted for Medical management. Chart Reviewed. Pt has PMH of hypertension, DM 2, P A. fib on Eliquis, HLD and other medical problems. Pt is currently on home meds. His most recent hemoglobin A1c was 7.2, will add insulin sliding scale and monitor blood sugars. Continue current meds as ordered. Diabetic diet. Will sign off. Please call with questions or any Actute medical issues, at which time a formal consult can be done. No bill charged to the pt.

## 2020-06-29 NOTE — PERIOP NOTES
[facility] NOTIFIED OF PT'S . ORDER RECEIVED FOR INSULIN 5 U REGULAR TO BE GIVEN SQ NOW.

## 2020-06-29 NOTE — PROGRESS NOTES
Care Management Interventions  PCP Verified by CM: Yes  Mode of Transport at Discharge: [address] (CM Consult): SNF  Partner SNF: Yes  Discharge Durable Medical Equipment: No  Physical Therapy Consult: Yes  Occupational Therapy Consult: Yes  Current Support Network: Lives with Spouse  Confirm Follow Up Transport: Family  The Plan for Transition of Care is Related to the Following Treatment Goals : return to independent function  The Patient and/or Patient Representative was Provided with a Choice of Provider and Agrees with the Discharge Plan?: Yes  Freedom of Choice List was Provided with Basic Dialogue that Supports the Patient's Individualized Plan of Care/Goals, Treatment Preferences and Shares the Quality Data Associated with the Providers?: Yes  Discharge Location  Discharge Placement: Skilled nursing facility    Patient is a 77y.o. year old male admitted for Left CODY . He is 6'6\" and 334 lbs and lives with wife. Pt/spouse report they have made arrangement for him to go to [facility] for rehab just as the wife did after her two total joint replacements. Shaniqua at [facility] confirms they are holding him a pvt room for Thurs 7-2. Wife is willing to consider taking him home if MD and staff feel this is appropriate.  Will follow

## 2020-06-29 NOTE — ADDENDUM NOTE
Addendum  created 06/29/20 1313 by Alexandra Segovia MD    [facility] saved

## 2020-06-29 NOTE — OP NOTES
Total Hip Procedure Note    Ewa Tao,  [id number],  1950    Pre-operative Diagnosis:  Localized osteoarthrosis of left hip [M16.12]  Post-operative Diagnosis: same    Procedure: Procedure(s) (LRB):  HIP ARTHROPLASTY TOTAL/ LEFT/ DEPUY (Left)  CPT code: 48700  Location: [address] [place] [address]  Surgeon: Delmy Bruno MD  Assistant: CARLOS Leonard, Oval Cockayne    Anesthesia: Spinal    Indications: Patient has end stage arthritis. They have tried and failed conservative management. Findings: severe degenerative arthritis, acetabular osteophytes and bone spurs around the femoral head. EBL: 300cc  BMI: Body mass index is 38.94 kg/m². Procedure: The complexity of total joint surgery requires the use of a first assistant for positioning, retraction and expertise in closure. Ewa Tao was brought to the operating room and positioned on the operating table. Anesthesia was administered. IV antibiotics were administered. A time out identifying the patient, procedure, operative side and surgeon was administered and charted by the OR Nurse. The patient was positioned on the contralateral decubitus position. The limb was prepped and draped in the usual sterile fashion. A posterior approach was utilized to expose the hip. The incision was carried through the subcutaneous tissue and underlying fascia. Hemostasis obtained using the bovie cauterization. A Charnley retractor was inserted. The short external rotators were divided at their insertion. The sciatic nerve was palpated and identified. Next, a T-shaped capsular incision was accomplished and femoral head was dislocated. The neck was osteotomized a measured distance above the lesser trochanter. The neck and head cut was measured with a caliper. .    Acetabular retractors were placed and the appropriate capsulotomy performed. Soft tissue was removed from the acetabulum. The acetabulum was sequentially reamed.  Using trial components the acetabular component was sized. The acetabular component was impacted at approximately 40 degrees horizontal tilt and 20 degrees anteversion. One 6.5mm screws were used to fixate the cup. The real polyethylene liner was impacted into position. Utilizing the femoral retractor, the canal was prepared with appropriate lateralization and reamed with the starter reamer. The canal was then broached progressively to accommodate the DePuy stem. The broach was positioned with the anatomic femoral anteversion. A calcar planar was utilized. Trials were preformed using various neck length heads until leg length had been restored. The hip was carried through a range of motion and was found to be stable to flexion greater than 90 degrees and on internal and external rotation. Limb lengths were thought to be equilibrated and with appropriate stability as mentioned above. All trial components were removed. The cementless permanent stem was impacted into place. A trial reduction was performed and the above neck length was selected. The permanent ceramic femoral head was impacted into place. The hip was reduced and the stability was as mentioned as above. Copious irrigation was accomplished about the surgical site. The sciatic nerve was palpated and noted to be intact. The capsule was repaired with an absorbable suture and the external rotators were reattached with a #5 Mersilene. The operative hip was injected with 60 cc of Neuropin, 1cc of 10 mg of morphine, and 1 cc of 30 mg of Toradol. The Hip was then soaked with a diluted betadine solution for approximately 3 minutes and then thoroughly irrigated. A #1 PDS suture was used to re-approximate the fascia. Then, 1 gram (100 mg/ml) of Transexamic Acid was injected into the joint space. An insorb stapler with absorbable sutures were used to approximate the subcutaneous layers. Staples were applied in an occlusive fashion and a sterile bandage was placed.  The patient was then rolled to a supine position. The sponge, needle and instrument counts were correct. The patient tolerated the procedure without difficulty and left the operating room in satisfactory condition. Implants:   Implant Name Type Inv. Item Serial No.  Lot No. LRB No. Used   CUP ACET SECTOR GRIPTION 60MM -- TI - [id number]  CUP ACET SECTOR GRIPTION 60MM -- TI  [facility] [id number] Left 1   LINER ACET PINN LIP LNR [id number] -- ALTRX - [id number]  LINER ACET PINN LIP LNR [id number] -- ALTRX  [facility] [id number] Left 1   SCREW BONE FEM CANC 6. [id number] - [id number]  SCREW BONE FEM CANC 6. [id number] [facility] [id number] Left 1   STEM FEM LAT COXA VARA SZ 15 -- CORAIL2 - [id number]  STEM FEM LAT COXA VARA SZ 15 -- CORAIL2 [id number] [facility] [id number] Left 1   HEAD FEM 36MM ARTICL/EZ +8MM - [id number]  HEAD FEM 36MM ARTICL/EZ +8MM [id number] [facility] [id number] Left 1           Signed By: Adama Lau MD  6/29/2020,  11:33 AM

## 2020-06-29 NOTE — PERIOP NOTES
TRANSFER - IN REPORT:    Verbal report received from [facility] on Maxx Heather  being received from 3rd floor ortho for routine progression of care      Report consisted of patients Situation, Background, Assessment and   Recommendations(SBAR). Information from the following report(s) Kardex and MAR was reviewed with the receiving nurse. Opportunity for questions and clarification was provided. Assessment completed upon patients arrival to unit and care assumed.

## 2020-06-29 NOTE — PERIOP NOTES
Betadine lavage:  17.5cc of betadine lot # [id number] , exp. Date 08/2021 ,  in 500cc of . 9NS Lot # [id number] , exp.  Date : [id number]

## 2020-06-29 NOTE — PERIOP NOTES
TRANSFER - OUT REPORT:    Verbal report given to Gisell Gaming RN on Yasemin Feeling  being transferred to preop holding for routine progression of care       Report consisted of patients Situation, Background, Assessment and   Recommendations(SBAR). Information from the following report(s) SBAR, MAR and Recent Results was reviewed with the receiving nurse. Lines:   Peripheral IV 06/29/20 Left Arm (Active)   Site Assessment Clean, dry, & intact 6/29/2020  8:24 AM   Phlebitis Assessment 0 6/29/2020  8:24 AM   Infiltration Assessment 0 6/29/2020  8:24 AM   Dressing Status Clean, dry, & intact 6/29/2020  8:24 AM   Dressing Type Tape;Transparent 6/29/2020  8:24 AM   Hub Color/Line Status Green; Infusing 6/29/2020  8:24 AM        Opportunity for questions and clarification was provided. Patient transported with:   Tech     Insulin ordered and given. TB test placed. Dr Miranda Said to look at wound on left medial calf and [place], [id number] saw wound on calf only.

## 2020-06-29 NOTE — PERIOP NOTES
TRANSFER - OUT REPORT:    Verbal report given to Rosalbasunilerickalashaejavier Perdomo on Shanita Joel  being transferred to 18 for routine post - op       Report consisted of patients Situation, Background, Assessment and   Recommendations(SBAR). Information from the following report(s) SBAR, Kardex, OR Summary and Procedure Summary was reviewed with the receiving nurse. Lines:   Peripheral IV 06/29/20 Left Arm (Active)   Site Assessment Clean, dry, & intact 6/29/2020 12:36 PM   Phlebitis Assessment 0 6/29/2020 12:36 PM   Infiltration Assessment 0 6/29/2020 12:36 PM   Dressing Status Clean, dry, & intact 6/29/2020 12:36 PM   Dressing Type Tape;Transparent 6/29/2020 12:36 PM   Hub Color/Line Status Infusing 6/29/2020 12:36 PM        Opportunity for questions and clarification was provided. Patient transported with:   O2 @ 4 liters  Tech    VTE prophylaxis orders have been written for Shanita Joel. Patient and family given floor number and nurses name. Family updated re: pt status after security code verified.

## 2020-06-29 NOTE — PROGRESS NOTES
OT/PT note: attempted to see patient 3 times this afternoon. He is still too numb  to attempt out of bed activities safely. Educated on Hip precautions and POC.  Will see in the am.  Teodoro Mesa OTR/FEDE

## 2020-06-29 NOTE — ANESTHESIA PREPROCEDURE EVALUATION
Relevant Problems   No relevant active problems       Anesthetic History               Review of Systems / Medical History  Patient summary reviewed and pertinent labs reviewed    Pulmonary    COPD    Sleep apnea: CPAP  Smoker (Former)  Asthma        Neuro/Psych              Cardiovascular    Hypertension  Valvular problems/murmurs (mild): mitral insufficiency      Dysrhythmias : atrial fibrillation  CAD (08/2012: Mild non-obstructive CAD)    Exercise tolerance: >4 METS  Comments: TTE: 8/2018 EF 60-65%, mild MR   GI/Hepatic/Renal     GERD: well controlled    Renal disease: stones       Endo/Other    Diabetes: type 2    Morbid obesity     Other Findings   Comments: Chronic back pain         Physical Exam    Airway  Mallampati: II  TM Distance: > 6 cm  Neck ROM: normal range of motion   Mouth opening: Normal     Cardiovascular  Regular rate and rhythm,  S1 and S2 normal,  no murmur, click, rub, or gallop             Dental  No notable dental hx       Pulmonary  Breath sounds clear to auscultation               Abdominal         Other Findings            Anesthetic Plan    ASA: 3  Anesthesia type: spinal            Anesthetic plan and risks discussed with: Patient      Last Eliquis dose 96 hours ago

## 2020-06-29 NOTE — H&P
The patient has end stage arthritis of the left hip. The patient was seen and examined and there are no changes to the patient's orthopedic condition. The necessity for the joint replacement is still present, and the H&P from the office is still current. The patient will be admitted today for Procedure(s) (LRB):  HIP ARTHROPLASTY TOTAL/ LEFT/ DEPUY (Left) . The patient has a small open wound to his left lower leg that occurred last night. Will ask Dr. Sherley Barrett to evaluate his wound prior to proceeding with surgery today.

## 2020-06-29 NOTE — PROGRESS NOTES
06/29/20 1452   Oxygen Therapy   O2 Sat (%) 94 %   Pulse via Oximetry 64 beats per minute   O2 Device Nasal cannula   O2 Flow Rate (L/min) 4 l/min   Patient achieved   2250  Ml/sec on IS. Patient encouraged to do 10 breaths every hour while awake-patient agreed and demonstrated. No shortness of breath or distress noted. BS are clear b/l. [facility] notes reviewed- pt has home CPAP. Sat monitor ordered HS.

## 2020-06-29 NOTE — ANESTHESIA PROCEDURE NOTES
Spinal Block    Start time: 6/29/2020 9:39 AM  End time: 6/29/2020 9:43 AM  Performed by: Kaela Anaya MD  Authorized by: Kaela Anaya MD     Pre-procedure:   Indications: primary anesthetic  Preanesthetic Checklist: patient identified, risks and benefits discussed, anesthesia consent, site marked, patient being monitored and timeout performed    Timeout Time: 09:39          Spinal Block:   Patient Position:  Seated  Prep Region:  Lumbar  Prep: DuraPrep      Location:  L3-4  Technique:  Single shot    Local Dose (mL):  3    Needle:   Needle Type:  Quincke  Needle Gauge:  22 G  Attempts:  1      Events: CSF confirmed, no blood with aspiration and no paresthesia        Assessment:  Insertion:  Uncomplicated  Patient tolerance:  Patient tolerated the procedure well with no immediate complications  GA induced due to incomplete relaxation of L hip

## 2020-06-30 ENCOUNTER — HOME HEALTH ADMISSION (OUTPATIENT)
Dept: HOME HEALTH SERVICES | Facility: HOME HEALTH | Age: 70
End: 2020-06-30
Payer: MEDICARE

## 2020-06-30 PROBLEM — E11.65 HYPERGLYCEMIA DUE TO TYPE 2 DIABETES MELLITUS (HCC): Status: ACTIVE | Noted: 2020-06-30

## 2020-06-30 LAB
GLUCOSE BLD STRIP.AUTO-MCNC: 267 MG/DL (ref 65–100)
GLUCOSE BLD STRIP.AUTO-MCNC: 293 MG/DL (ref 65–100)
GLUCOSE BLD STRIP.AUTO-MCNC: 303 MG/DL (ref 65–100)
GLUCOSE BLD STRIP.AUTO-MCNC: 315 MG/DL (ref 65–100)
HGB BLD-MCNC: 12.9 G/DL (ref 13.6–17.2)

## 2020-06-30 PROCEDURE — 97161 PT EVAL LOW COMPLEX 20 MIN: CPT

## 2020-06-30 PROCEDURE — 74011250636 HC RX REV CODE- 250/636: Performed by: PHYSICIAN ASSISTANT

## 2020-06-30 PROCEDURE — 74011000250 HC RX REV CODE- 250: Performed by: PHYSICIAN ASSISTANT

## 2020-06-30 PROCEDURE — 97535 SELF CARE MNGMENT TRAINING: CPT

## 2020-06-30 PROCEDURE — 97110 THERAPEUTIC EXERCISES: CPT

## 2020-06-30 PROCEDURE — 94760 N-INVAS EAR/PLS OXIMETRY 1: CPT

## 2020-06-30 PROCEDURE — 82962 GLUCOSE BLOOD TEST: CPT

## 2020-06-30 PROCEDURE — 85018 HEMOGLOBIN: CPT

## 2020-06-30 PROCEDURE — 97165 OT EVAL LOW COMPLEX 30 MIN: CPT

## 2020-06-30 PROCEDURE — 97530 THERAPEUTIC ACTIVITIES: CPT

## 2020-06-30 PROCEDURE — 65270000029 HC RM PRIVATE

## 2020-06-30 PROCEDURE — 74011250637 HC RX REV CODE- 250/637: Performed by: ORTHOPAEDIC SURGERY

## 2020-06-30 PROCEDURE — 74011636637 HC RX REV CODE- 636/637: Performed by: FAMILY MEDICINE

## 2020-06-30 PROCEDURE — 36415 COLL VENOUS BLD VENIPUNCTURE: CPT

## 2020-06-30 PROCEDURE — 74011636637 HC RX REV CODE- 636/637: Performed by: INTERNAL MEDICINE

## 2020-06-30 PROCEDURE — 74011250637 HC RX REV CODE- 250/637: Performed by: PHYSICIAN ASSISTANT

## 2020-06-30 PROCEDURE — 94640 AIRWAY INHALATION TREATMENT: CPT

## 2020-06-30 RX ORDER — POLYETHYLENE GLYCOL 3350 17 G/17G
17 POWDER, FOR SOLUTION ORAL DAILY
Status: DISCONTINUED | OUTPATIENT
Start: 2020-06-30 | End: 2020-07-01 | Stop reason: HOSPADM

## 2020-06-30 RX ORDER — NYSTATIN 100000 [USP'U]/G
POWDER TOPICAL 2 TIMES DAILY
Status: DISCONTINUED | OUTPATIENT
Start: 2020-06-30 | End: 2020-07-01 | Stop reason: HOSPADM

## 2020-06-30 RX ORDER — POLYETHYLENE GLYCOL 3350 17 G/17G
17 POWDER, FOR SOLUTION ORAL DAILY
Status: DISCONTINUED | OUTPATIENT
Start: 2020-07-01 | End: 2020-06-30

## 2020-06-30 RX ORDER — INSULIN GLARGINE 100 [IU]/ML
10 INJECTION, SOLUTION SUBCUTANEOUS ONCE
Status: COMPLETED | OUTPATIENT
Start: 2020-06-30 | End: 2020-06-30

## 2020-06-30 RX ADMIN — HUMAN INSULIN 9 UNITS: 100 INJECTION, SOLUTION SUBCUTANEOUS at 11:35

## 2020-06-30 RX ADMIN — ACETAMINOPHEN 1000 MG: 500 TABLET, FILM COATED ORAL at 17:06

## 2020-06-30 RX ADMIN — INSULIN GLARGINE 10 UNITS: 100 INJECTION, SOLUTION SUBCUTANEOUS at 11:34

## 2020-06-30 RX ADMIN — APIXABAN 5 MG: 5 TABLET, FILM COATED ORAL at 20:42

## 2020-06-30 RX ADMIN — BUDESONIDE AND FORMOTEROL FUMARATE DIHYDRATE 2 PUFF: 160; 4.5 AEROSOL RESPIRATORY (INHALATION) at 08:40

## 2020-06-30 RX ADMIN — ACETAMINOPHEN 1000 MG: 500 TABLET, FILM COATED ORAL at 11:33

## 2020-06-30 RX ADMIN — HUMAN INSULIN 9 UNITS: 100 INJECTION, SOLUTION SUBCUTANEOUS at 17:05

## 2020-06-30 RX ADMIN — POLYETHYLENE GLYCOL (3350) 17 G: 17 POWDER, FOR SOLUTION ORAL at 17:24

## 2020-06-30 RX ADMIN — HYDROMORPHONE HYDROCHLORIDE 2 MG: 2 TABLET ORAL at 06:19

## 2020-06-30 RX ADMIN — SENNOSIDES AND DOCUSATE SODIUM 2 TABLET: 8.6; 5 TABLET ORAL at 08:58

## 2020-06-30 RX ADMIN — HUMAN INSULIN 12 UNITS: 100 INJECTION, SOLUTION SUBCUTANEOUS at 08:57

## 2020-06-30 RX ADMIN — ONDANSETRON 4 MG: 4 TABLET, ORALLY DISINTEGRATING ORAL at 15:16

## 2020-06-30 RX ADMIN — HYDROMORPHONE HYDROCHLORIDE 1 MG: 1 INJECTION, SOLUTION INTRAMUSCULAR; INTRAVENOUS; SUBCUTANEOUS at 00:13

## 2020-06-30 RX ADMIN — ACETAMINOPHEN 1000 MG: 500 TABLET, FILM COATED ORAL at 06:14

## 2020-06-30 RX ADMIN — HUMAN INSULIN 12 UNITS: 100 INJECTION, SOLUTION SUBCUTANEOUS at 22:27

## 2020-06-30 RX ADMIN — MONTELUKAST 10 MG: 10 TABLET, FILM COATED ORAL at 22:20

## 2020-06-30 RX ADMIN — CELECOXIB 200 MG: 200 CAPSULE ORAL at 08:58

## 2020-06-30 RX ADMIN — HUMAN INSULIN 5 UNITS: 100 INJECTION, SOLUTION SUBCUTANEOUS at 00:16

## 2020-06-30 RX ADMIN — PANTOPRAZOLE SODIUM 40 MG: 40 TABLET, DELAYED RELEASE ORAL at 08:59

## 2020-06-30 RX ADMIN — HYDROMORPHONE HYDROCHLORIDE 2 MG: 2 TABLET ORAL at 22:20

## 2020-06-30 RX ADMIN — DOXAZOSIN 4 MG: 4 TABLET ORAL at 08:59

## 2020-06-30 RX ADMIN — NYSTATIN: 100000 POWDER TOPICAL at 10:55

## 2020-06-30 RX ADMIN — ONDANSETRON 4 MG: 4 TABLET, ORALLY DISINTEGRATING ORAL at 11:42

## 2020-06-30 RX ADMIN — METOPROLOL TARTRATE 50 MG: 50 TABLET, FILM COATED ORAL at 08:59

## 2020-06-30 RX ADMIN — Medication 1 AMPULE: at 09:01

## 2020-06-30 RX ADMIN — NYSTATIN: 100000 POWDER TOPICAL at 21:00

## 2020-06-30 RX ADMIN — METOPROLOL TARTRATE 50 MG: 50 TABLET, FILM COATED ORAL at 20:42

## 2020-06-30 RX ADMIN — HYDROMORPHONE HYDROCHLORIDE 2 MG: 2 TABLET ORAL at 15:16

## 2020-06-30 RX ADMIN — CEFAZOLIN SODIUM 3 G: 100 INJECTION, POWDER, LYOPHILIZED, FOR SOLUTION INTRAVENOUS at 00:36

## 2020-06-30 RX ADMIN — ACETAMINOPHEN 1000 MG: 500 TABLET, FILM COATED ORAL at 00:15

## 2020-06-30 RX ADMIN — ONDANSETRON 4 MG: 4 TABLET, ORALLY DISINTEGRATING ORAL at 06:22

## 2020-06-30 RX ADMIN — BUDESONIDE AND FORMOTEROL FUMARATE DIHYDRATE 2 PUFF: 160; 4.5 AEROSOL RESPIRATORY (INHALATION) at 20:55

## 2020-06-30 RX ADMIN — Medication 1 AMPULE: at 20:42

## 2020-06-30 RX ADMIN — HYDROMORPHONE HYDROCHLORIDE 2 MG: 2 TABLET ORAL at 10:55

## 2020-06-30 RX ADMIN — CELECOXIB 200 MG: 200 CAPSULE ORAL at 20:42

## 2020-06-30 RX ADMIN — APIXABAN 5 MG: 5 TABLET, FILM COATED ORAL at 08:59

## 2020-06-30 RX ADMIN — GLIMEPIRIDE 1 MG: 2 TABLET ORAL at 08:59

## 2020-06-30 NOTE — PROGRESS NOTES
2020         Post Op day: 1 Day Post-OpProcedure(s) (LRB):  HIP ARTHROPLASTY TOTAL/ LEFT/ DEPUY (Left)      Admit Date: 2020  Admit Diagnosis: Localized osteoarthrosis of left hip [M16.12]  Osteoarthritis of left hip [M16.12]    LAB:    Recent Results (from the past 24 hour(s))   GLUCOSE, POC    Collection Time: 20  8:33 AM   Result Value Ref Range    Glucose (POC) 230 (H) 65 - 100 mg/dL   GLUCOSE, POC    Collection Time: 20  9:20 AM   Result Value Ref Range    Glucose (POC) 221 (H) 65 - 100 mg/dL   GLUCOSE, POC    Collection Time: 20  4:09 PM   Result Value Ref Range    Glucose (POC) 367 (H) 65 - 100 mg/dL   HEMOGLOBIN    Collection Time: 20  7:55 PM   Result Value Ref Range    HGB 13.5 (L) 13.6 - 17.2 g/dL   GLUCOSE, POC    Collection Time: 20  9:56 PM   Result Value Ref Range    Glucose (POC) 383 (H) 65 - 100 mg/dL   HEMOGLOBIN    Collection Time: 20  3:39 AM   Result Value Ref Range    HGB 12.9 (L) 13.6 - 17.2 g/dL     Vital Signs:    No data found. Temp (24hrs), Av.8 °F (36.6 °C), Min:97.2 °F (36.2 °C), Max:98.4 °F (36.9 °C)    Body mass index is 38.94 kg/m².   Pain Control:   Pain Assessment  Pain Scale 1: Numeric (0 - 10)  Pain Intensity 1: 8  Pain Onset 1: (a year or more)  Pain Location 1: Back  Pain Orientation 1: Left  Pain Description 1: Aching  Pain Intervention(s) 1: Medication (see MAR)    Subjective: Doing well, No complaints, No SOB, No Chest Pain, No nausea or vomiting     Objective: Vital Signs are Stable, No Acute Distress, Alert and Oriented, Dressing is dry,  Neurovascular exam is normal.       PT/OT:            Assistive Device: Walker (comment)                Wieght Bearing Status: WBAT    Meds:  [unfilled]  [unfilled]  [unfilled]    Assessment:   Patient Active Problem List   Diagnosis Code    Hyperlipemia E78.5    Obstructive sleep apnea (adult) (pediatric) G47.33    Essential hypertension I10    Extrinsic asthma J45.909    Diverticulosis K57.90    Cervical spondylosis M47.812    Benign prostatic hyperplasia N40.0    Chronic back pain M54.9, G89.29    Esophageal reflux K21.9    Diabetes mellitus type 2 in obese ([facility]) E11.69, E66.9    Andropause N50.89    Diverticular stricture ([facility]) K56.699    Coronary atherosclerosis of native coronary vessel I25.10    COPD (chronic obstructive pulmonary disease) ([facility]) J44.9    Edema R60.9    Paroxysmal atrial fibrillation ([facility]) I48.0    OA (osteoarthritis) of knee M17.10    Status post total right knee replacement Z96.651    Medicare annual wellness visit, subsequent Z00.00    History of smoking 30 or more pack years Z87.891    Tubulovillous adenoma of colon D12.6    Obesity, morbid ([facility]) E66.01    Venous stasis I87.8    Chronic venous hypertension with ulcer and inflammation involving left side ([facility]) I87.332, L97.929    Advance care planning Z71.89    Chronic diarrhea K52.9    Osteoarthritis of left hip M16.12    Status post left hip replacement Z96.642    Hyperglycemia due to type 2 diabetes mellitus ([facility] E11.65             Plan: Continue Physical Therapy, Monitor labs, home today or tomorrow        Signed By: Julee Perez MD

## 2020-06-30 NOTE — PROGRESS NOTES
Problem: Mobility Impaired (Adult and Pediatric)  Goal: *Acute Goals and Plan of Care (Insert Text)  Description: GOALS (1-4 days):  (1.)Mr. Doreen Hameed will move from supine to sit and sit to supine  in bed with INDEPENDENT. (2.)Mr. Doreen Hameed will transfer from bed to chair and chair to bed with INDEPENDENT using the least restrictive device. (3.)Mr. Doreen Hameed will ambulate with INDEPENDENT for 200 feet with the least restrictive device. (4.)Mr. Doreen Hameed will state/observe CODY precautions with 0 verbal cues. ________________________________________________________________________________________________     Outcome: Progressing Towards Goal     PHYSICAL THERAPY JOINT CAMP CODY: Initial Assessment 6/30/2020  INPATIENT: Hospital Day: 2  [place] / Product Type: Medicare /      NAME/AGE/GENDER: Yasemin Alcaraz is a 77 y.o. male   PRIMARY DIAGNOSIS:  Localized osteoarthrosis of left hip [M16.12]   Procedure(s) and Anesthesia Type:     * HIP ARTHROPLASTY TOTAL/ LEFT/ DEPUY - Spinal (Left)  ICD-10: Treatment Diagnosis:    Pain in Left Knee (M25.562)  Difficulty in walking, Not elsewhere classified (R26.2)  Other abnormalities of gait and mobility (R26.89)      ASSESSMENT:     Mr. Doreen Hameed presents with increased pain, decreased strength, decreased gait, and decreased mobility due to recent left CODY. Patient's wife is concern about patient going home and her having to manage his insulin. Patient's wife would prefer placement at [facility] for rehab. Will continue to monitor [facility] versus home with home health. Patient would benefit from skilled physical therapy to address problems and goals.       This section established at most recent assessment   PROBLEM LIST (Impairments causing functional limitations):  Decreased ADL/Functional Activities  Decreased Transfer Abilities  Decreased Ambulation Ability/Technique  Increased Pain  Decreased strength   INTERVENTIONS PLANNED: (Benefits and precautions of physical therapy have been discussed with the patient.)  bed mobility  gait training  home exercise program (HEP)  Range of Motion: active/assisted/passive  Therapeutic Activities  therapeutic exercise/strengthening  transfer training  Group Therapy     TREATMENT PLAN: Frequency/Duration: Follow patient BID for duration of hospital stay to address above goals. Rehabilitation Potential For Stated Goals: Excellent     RECOMMENDED REHABILITATION/EQUIPMENT: (at time of discharge pending progress): Continue Skilled Therapy. HISTORY:   History of Present Injury/Illness (Reason for Referral):  Patient s/p left CODY. Past Medical History/Comorbidities:   Mr. Nafisa Nunes  has a past medical history of Adverse effect of anesthesia, Asthma (dx childhood), Atrial fibrillation ([address] Cervical spondylosis (6/20/2013), Chronic back pain (2013), Chronic pain, Colon polyps, COPD (chronic obstructive pulmonary disease) ([address] (8/17/2016), Coronary atherosclerosis of native coronary vessel (8/17/2016), Diabetes ([address] (dx 5/14), Diverticulosis, Edema (8/17/2016), Former smoker, GERD (gastroesophageal reflux disease), High cholesterol, History of kidney stones, Hypertension, Obesity (BMI 30-39.9), Status post total right knee replacement (7/17/2017), and Unspecified sleep apnea.  He also has no past medical history of Aneurysm ([address] Autoimmune disease ([address] Cancer ([address] Chronic kidney disease, Coagulation defects, Coagulation disorder ([address] Dementia, Difficult intubation, Endocarditis, Heart failure ([address] Ill-defined condition, Infectious disease, Liver disease, Malignant hyperthermia due to anesthesia, Nausea & vomiting, Neurological disorder, Nicotine vapor product user, Non-nicotine vapor product user, Other ill-defined conditions(799.89), Pseudocholinesterase deficiency, Psychiatric disorder, PUD (peptic ulcer disease), Rheumatic fever, Seizures ([address] Stroke ([address] Thromboembolus ([address] Thyroid disease, or Unspecified adverse effect of anesthesia. Mr. Radha Acuña  has a past surgical history that includes pr cystoscopy,insert ureteral stent; hx colonoscopy (10/6/14); hx colonoscopy (14); hx lumbar laminectomy (2009); hx retinal detachment repair (Bilateral); hx cataract removal; hx other surgical (20 yrs ago); hx colectomy (2015); hx heart catheterization (2012); and hx knee replacement (Right). Social History/Living Environment:    Lives with spouse. Prior Level of Function/Work/Activity:  Independent   Number of Personal Factors/Comorbidities that affect the Plan of Care: 0: LOW COMPLEXITY   EXAMINATION:   Most Recent Physical Functioning:   Gross Assessment: Yes  Gross Assessment  AROM: Generally decreased, functional(except left lower extremity)  Strength: Generally decreased, functional(except left lower extremity)                          Transfers  Sit to Stand: Minimum assistance(Sitting in chair upon arrival)  Stand to Sit: Contact guard assistance    Balance  Sitting: Intact  Standing: Pull to stand; With support    Posture  Posture Assessment:  Forward head;Rounded shoulders;Kyphosis         Weight Bearing Status  Right Side Weight Bearing: Full  Left Side Weight Bearing: As tolerated  Distance (ft): 50 Feet (ft)  Ambulation - Level of Assistance: Contact guard assistance  Assistive Device: Walker, rolling  Step Length: Left shortened  Stance: Left decreased  Gait Abnormalities: Antalgic        Braces/Orthotics: NOne           Body Structures Involved:  Nerves  Joints  Muscles Body Functions Affected:  Neuromusculoskeletal Activities and Participation Affected:  General Tasks and Demands  Mobility  Community, Social and [place]   Number of elements that affect the Plan of Care: 4+: HIGH COMPLEXITY   CLINICAL PRESENTATION:   Presentation: Stable and uncomplicated: LOW COMPLEXITY   CLINICAL DECISION MAKIN [facility] Mobility Inpatient Short Form  How much difficulty does the patient currently have. .. Unable A Lot A Little None   1. Turning over in bed (including adjusting bedclothes, sheets and blankets)? [] 1   [] 2   [x] 3   [] 4   2. Sitting down on and standing up from a chair with arms ( e.g., wheelchair, bedside commode, etc.)   [] 1   [] 2   [x] 3   [] 4   3. Moving from lying on back to sitting on the side of the bed? [] 1   [] 2   [x] 3   [] 4   How much help from another person does the patient currently need. .. Total A Lot A Little None   4. Moving to and from a bed to a chair (including a wheelchair)? [] 1   [] 2   [x] 3   [] 4   5. Need to walk in hospital room? [] 1   [] 2   [x] 3   [] 4   6. Climbing 3-5 steps with a railing? [] 1   [] 2   [x] 3   [] 4   © 2007, Trustees of [address] [place] [address], under license to [employer]. All rights reserved     Score:  Initial: 18 Most Recent: X (Date: -- )    Interpretation of Tool:  Represents activities that are increasingly more difficult (i.e. Bed mobility, Transfers, Gait). Medical Necessity:     Patient is expected to demonstrate progress in   strength and range of motion   to   improve safety during activities of daily living   . Reason for Services/Other Comments:  Patient continues to require skilled intervention due to   Recent left CODY   . Use of outcome tool(s) and clinical judgement create a POC that gives a: Clear prediction of patient's progress: LOW COMPLEXITY            TREATMENT:   (In addition to Assessment/Re-Assessment sessions the following treatments were rendered)     Pre-treatment Symptoms/Complaints:  Decreased strength/ decreased mobility  Pain Initial:     2 Post Session:  2     Therapeutic Activity: (    20 minutes): Therapeutic activities including Chair transfers, Toilet transfers, Ambulation on level ground to improve mobility, strength, and balance. Required minimal verbal cues to improve safety with rolling walker   to promote dynamic balance in standing. Therapeutic Exercise: (10 Minutes):  Exercises per grid below to improve strength. Required minimal verbal cues to promote proper body alignment. Progressed repetitions as indicated. Initial Evaluation: 10 minutes   Date:  6/30/2020 Date:   Date:     ACTIVITY/EXERCISE AM PM AM PM AM PM   GROUP THERAPY  []  []  []  []  []  []   Ankle Pumps 15 reps        Quad Sets 15 reps        Gluteal Sets 15 reps        Hip ABd/ADduction 15 reps        Straight Leg Raises         Knee Slides 15 reps        Short Arc Quads         Long Arc Quads         Chair Slides                  B = bilateral; AA = active assistive; A = active; P = passive      Treatment/Session Assessment:     Response to Treatment:  tolerated well. Education:  [] Home Exercises  [] Fall Precautions  [x] Hip Precautions [] D/C Instruction Review  [] Hip Prosthesis Review  [x] Walker Management/Safety [] Adaptive Equipment as Needed       Interdisciplinary Collaboration:   Occupational Therapist    After treatment position/precautions:   Up in chair  Bed/Chair-wheels locked  Call light within reach    Compliance with Program/Exercises: Will assess as treatment progresses. Recommendations/Intent for next treatment session:  Treatment next visit will focus on increasing Mr. Bernie Kelley independence with bed mobility, transfers, gait training, strength/ROM exercises, modalities for pain, and patient education.       Total Treatment Duration:  PT Patient Time In/Time Out  Time In: 0825  Time Out: [address], PT

## 2020-06-30 NOTE — CONSULTS
Hospitalist Consult Note     Admit Date:  2020  7:22 AM   Name:  Sosa Cam   Age:  77 y.o.  :  1950   MRN:  [id number]   PCP:  Amado Willis MD  Treatment Team: Attending Provider: Prudencio Snyder MD; Care Manager: Mercy Richmond; Consulting Provider: Charly Mann MD; Physical Therapist: Mazin Zhang, PT; Physical Therapist: Sonali Brooks PT; Occupational Therapist: Chiquita Lisa, OT  Hyperglycemia, medical management  HPI:   61-year-old  male patient with known history of COPD, sleep apnea, proximal A. fib on Eliquis, hypertension, diabetes mellitus type 2 on Trulicity and oral antidiabetic medication, osteoarthritis, coronary disease and morbid obesity. Patient normally takes his Trulicity on Wednesday, did take his Trulicity last Thursday. Once a week. Patient is presently status post HIP ARTHROPLASTY TOTAL/ LEFT/ DEPUY (Left)    Apart from a episode of vomiting last night after getting pain medication, mild pain- dull- constant 5 out of 10, no radiation, improved after pain medication over the left hip region, elevated glucose last night around 300s, other 10 reactions apart from the one mentioned above patient other review of systems were negative noncontributory. Glucose 383 last night  Hemoglobin 12.9  Patient did get one-time dose of steroid yesterday    10 systems reviewed and negative except as noted in HPI.   Past Medical History:   Diagnosis Date    Adverse effect of anesthesia     unable to have spinal anesthesia when had TKR~r/t lumbar spine problems; CT Spine with contrast 16    Asthma dx childhood    last attack during high school; advair at hs; albuterol prn; neb prn     Atrial fibrillation (HCC)     2 epiosode of A-fib last about 6-8 months ago (noted on 17); on Eliquis     Cervical spondylosis 2013    Chronic back pain 2013    RIGHT SIDE WITH UNCLEAR ETIOLOGY    Chronic pain     Colon polyps     COPD (chronic obstructive pulmonary disease) ([facility] 8/17/2016    Coronary atherosclerosis of native coronary vessel 8/17/2016 08/2012: Mild non-obstructive CAD    Diabetes ([facility] dx 5/14    type 2; oral meds; no bs checks at home    Diverticulosis     Edema 8/17/2016    Former smoker     GERD (gastroesophageal reflux disease)      controlled with med    High cholesterol     History of kidney stones     Hypertension     controlled with med    Obesity (BMI 30-39. 9)     BMI 39    Status post total right knee replacement 7/17/2017    Unspecified sleep apnea     wears cpap at hs      Past Surgical History:   Procedure Laterality Date    CYSTOSCOPY,INSERT URETERAL STENT      kidney stone removed cystoscopy ~pt states no stent was placed    HX CATARACT REMOVAL      bilateral with lens implants; x3    HX COLECTOMY  2015    HX COLONOSCOPY  10/6/14    HX COLONOSCOPY  11/20/14    HX HEART CATHETERIZATION  2012    no stents     HX KNEE REPLACEMENT Right     HX LUMBAR LAMINECTOMY  2009    no hardware     HX OTHER SURGICAL  20 yrs ago    colo-rectal sx for fissure    HX RETINAL DETACHMENT REPAIR Bilateral       Current Facility-Administered Medications   Medication Dose Route Frequency    tuberculin injection 5 Units  5 Units IntraDERMal ONCE    albuterol-ipratropium (DUO-NEB) 2.5 MG-0.5 MG/3 ML  3 mL Nebulization Q4H PRN    apixaban (ELIQUIS) tablet 5 mg  5 mg Oral BID    doxazosin (CARDURA) tablet 4 mg  4 mg Oral DAILY    budesonide-formoteroL (SYMBICORT) 160-4.5 mcg/actuation HFA inhaler 2 Puff  2 Puff Inhalation BID    fluticasone propionate (FLONASE) 50 mcg/actuation nasal spray 2 Spray  2 Spray Both Nostrils DAILY PRN    glimepiride (AMARYL) tablet 1 mg  1 mg Oral ACB    lisinopriL (PRINIVIL, ZESTRIL) tablet 10 mg  10 mg Oral DAILY    metoprolol tartrate (LOPRESSOR) tablet 50 mg  50 mg Oral BID    montelukast (SINGULAIR) tablet 10 mg  10 mg Oral QHS    pantoprazole (PROTONIX) tablet 40 mg  40 mg Oral DAILY    alcohol 62% (NOZIN) nasal  1 Ampule  1 Ampule Topical Q12H    0.9% sodium chloride infusion  100 mL/hr IntraVENous CONTINUOUS    sodium chloride (NS) flush 5-40 mL  5-40 mL IntraVENous Q8H    sodium chloride (NS) flush 5-40 mL  5-40 mL IntraVENous PRN    acetaminophen (TYLENOL) tablet 1,000 mg  1,000 mg Oral Q6H    celecoxib (CELEBREX) capsule 200 mg  200 mg Oral Q12H    HYDROmorphone (DILAUDID) tablet 2 mg  2 mg Oral Q4H PRN    HYDROmorphone (PF) (DILAUDID) injection 1 mg  1 mg IntraVENous Q3H PRN    naloxone (NARCAN) injection 0.2-0.4 mg  0.2-0.4 mg IntraVENous Q10MIN PRN    dexamethasone (DECADRON) 10 mg/mL injection 10 mg  10 mg IntraVENous ONCE    ondansetron (ZOFRAN ODT) tablet 4 mg  4 mg Oral Q4H PRN    diphenhydrAMINE (BENADRYL) capsule 25 mg  25 mg Oral Q4H PRN    senna-docusate (PERICOLACE) 8.6-50 mg per tablet 2 Tab  2 Tab Oral DAILY    albuterol (PROVENTIL HFA, VENTOLIN HFA, PROAIR HFA) inhaler 2 Puff  2 Puff Inhalation Q6H PRN    dapagliflozin (FARXIGA) tablet 10 mg (Patient Supplied)  10 mg Oral DAILY    insulin regular (NOVOLIN R, HUMULIN R) injection   SubCUTAneous AC&HS     Allergies   Allergen Reactions    Bactrim [Sulfamethoxazole-Trimethoprim] Itching      Social History     Tobacco Use    Smoking status: Former Smoker     Packs/day: 1.50     Years: 25.00     Pack years: 37.50     Types: Cigarettes     Last attempt to quit: 2012     Years since quittin.2    Smokeless tobacco: Never Used   Substance Use Topics    Alcohol use:  Yes     Alcohol/week: 3.0 standard drinks     Types: 3 Cans of beer per week      Family History   Problem Relation Age of Onset    Heart Disease Father         BOWEL OBSTRUCTION    Heart Attack Father 80        MI    Heart Disease Brother 72        STENT HEART    Suicide Brother     Parkinsonism Mother     Inflammatory Bowel Dz Son     Malignant Hyperthermia Neg Hx     Pseudocholinesterase Deficiency Neg Hx     Delayed Awakening Neg Hx     Post-op Nausea/Vomiting Neg Hx     Emergence Delirium Neg Hx     Post-op Cognitive Dysfunction Neg Hx       Immunization History   Administered Date(s) Administered    Hep A Vaccine 02/09/2000, 11/08/2000    Influenza High Dose Vaccine PF 11/02/2016, 08/24/2017, 09/11/2018    Influenza Vaccine 10/01/2012, 10/09/2013, 10/01/2014, 10/08/2015    Influenza Vaccine (Tri) Adjuvanted 08/21/2019    Pneumococcal Conjugate (PCV-13) 05/21/2018    Pneumococcal Polysaccharide (PPSV-23) 01/01/2010    TB Skin Test (PPD) 01/01/2009    TB Skin Test (PPD) Intradermal 07/17/2017, 06/29/2020    Td 02/09/2000    Tdap 06/30/2015, 07/13/2015    Zoster Recombinant 09/11/2018, 02/22/2019    Zoster Vaccine, Live 08/21/2014       Objective:     Patient Vitals for the past 24 hrs:   Temp Pulse Resp BP SpO2   06/29/20 2118 -- -- -- -- 97 %   06/29/20 1930 97.9 °F (36.6 °C) (!) 102 18 139/78 95 %   06/29/20 1523 97.5 °F (36.4 °C) 88 18 155/75 94 %   06/29/20 1452 -- -- -- -- 94 %   06/29/20 1330 -- 90 18 129/78 93 %   06/29/20 1315 -- 92 18 -- 94 %   06/29/20 1310 -- 92 18 127/61 93 %   06/29/20 1305 -- 83 18 123/61 94 %   06/29/20 1300 -- 93 18 118/56 94 %   06/29/20 1246 -- 92 18 139/78 93 %   06/29/20 1241 -- 97 16 161/90 93 %   06/29/20 1236 -- (!) 102 16 157/89 90 %   06/29/20 1231 97.2 °F (36.2 °C) (!) 109 16 (!) 119/101 94 %   06/29/20 0738 98.4 °F (36.9 °C) 82 18 123/69 90 %     Oxygen Therapy  O2 Sat (%): 97 % (06/29/20 2118)  Pulse via Oximetry: 104 beats per minute (06/29/20 2118)  O2 Device: Room air (06/29/20 2118)  O2 Flow Rate (L/min): 4 l/min (06/29/20 1452)    Intake/Output Summary (Last 24 hours) at 6/30/2020 0628  Last data filed at 6/29/2020 1930  Gross per 24 hour   Intake 2220 ml   Output 250 ml   Net 1970 ml       Physical Exam:  General:    Well nourished. Alert. Eyes:   Normal sclera.   Extraocular movements intact. ENT:  Normocephalic, atraumatic. Moist mucous membranes  CV:   RRR. No murmur, rub, or gallop. Lungs:  CTAB. No wheezing, rhonchi, or rales. Abdomen: Soft, nontender, nondistended. Bowel sounds normal.  Morbid obesity  Extremities: Warm and dry. No cyanosis or edema. Good range of movement of all extremities, mild pain left hip on movement of left lower extremity. Neurologic: CN II-XII grossly intact. Sensation intact. Skin:     No rashes or jaundice. Dressing left hip region intact no soaking. Mild tenderness on palpation. Psych:  Normal mood and affect. I reviewed the labs, imaging, EKGs, telemetry, and other studies done this admission. Data Review:   Recent Results (from the past 24 hour(s))   GLUCOSE, POC    Collection Time: 06/29/20  8:33 AM   Result Value Ref Range    Glucose (POC) 230 (H) 65 - 100 mg/dL   GLUCOSE, POC    Collection Time: 06/29/20  9:20 AM   Result Value Ref Range    Glucose (POC) 221 (H) 65 - 100 mg/dL   GLUCOSE, POC    Collection Time: 06/29/20  4:09 PM   Result Value Ref Range    Glucose (POC) 367 (H) 65 - 100 mg/dL   HEMOGLOBIN    Collection Time: 06/29/20  7:55 PM   Result Value Ref Range    HGB 13.5 (L) 13.6 - 17.2 g/dL   GLUCOSE, POC    Collection Time: 06/29/20  9:56 PM   Result Value Ref Range    Glucose (POC) 383 (H) 65 - 100 mg/dL   HEMOGLOBIN    Collection Time: 06/30/20  3:39 AM   Result Value Ref Range    HGB 12.9 (L) 13.6 - 17.2 g/dL       All Micro Results     None          Other Studies:  No results found.     Assessment and Plan:     Hospital Problems as of 6/30/2020 Date Reviewed: 6/29/2020          Codes Class Noted - Resolved POA    Hyperglycemia due to type 2 diabetes mellitus ([facility] ICD-10-CM: E11.65  ICD-9-CM: 250.00  6/30/2020 - Present Unknown        Osteoarthritis of left hip ICD-10-CM: M16.12  ICD-9-CM: 715.95  6/29/2020 - Present Yes        * (Principal) Status post left hip replacement ICD-10-CM: X18.428  ICD-9-CM: V43.64  6/29/2020 - Present No        Coronary atherosclerosis of native coronary vessel ICD-10-CM: I25.10  ICD-9-CM: 414.01  8/17/2016 - Present Yes    Overview Signed 8/17/2016 10:58 AM by Ld Machado     08/2012: Mild non-obstructive CAD             COPD (chronic obstructive pulmonary disease) ([facility] [address] ICD-10-CM: J44.9  ICD-9-CM: 496  8/17/2016 - Present Yes        Paroxysmal atrial fibrillation (HCC) ICD-10-CM: I48.0  ICD-9-CM: 427.31  8/17/2016 - Present Yes    Overview Signed 5/15/2017  3:21 PM by MD Dr. Becky Durham cardiology q 6mo. Diabetes mellitus type 2 in obese [facility]) ICD-10-CM: E11.69, E66.9  ICD-9-CM: 250.00, 278.00  6/23/2014 - Present Yes        Hyperlipemia ICD-10-CM: E78.5  ICD-9-CM: 272.4  3/11/2013 - Present Yes        Obstructive sleep apnea (adult) (pediatric) ICD-10-CM: G47.33  ICD-9-CM: 327.23  3/11/2013 - Present Yes    Overview Signed 5/15/2017  3:23 PM by David Briones MD     Followed by sleep center. Essential hypertension ICD-10-CM: I10  ICD-9-CM: 401.9  3/11/2013 - Present Yes              PLAN:  -Uncontrolled diabetes type mellitus type II-probably secondary to steroid dose given yesterday-continue sliding scale, continue patient other antidiabetic medication.  -Status post left hip arthroplasty-pain management and DVT prophylaxis as per primary  -Hypertension stable  -COPD stable continue home inhalers  -Sleep apnea, uses CPAP at night  -Hyperlipidemia  -Proximal A. fib on Eliquis  -Morbid obesity    Full code  We will follow. If sugars are still high, may need temporarily sliding scale for few days.     Signed:  Joelle Shelley MD

## 2020-06-30 NOTE — PROGRESS NOTES
The pt was sitting up in the recliner, awake and watching the television. He was fully oriented and in no obvious distress. Hs skin was P/W/D and he had strong peripheral pulses. The surgical site on his left hip was covered without any major bleeding or fluid drainage. He was able to move his lower extremities and wiggle his toes without effort. He did request pain medications and was medicated as indicated in the [facility]

## 2020-06-30 NOTE — PROGRESS NOTES
PROGRESS NOTE    I saw and examined the patient. His consult was done after midnight. His blood sugars remain high, likely due to stress (Cortisol) of surgery and Dexamethasone. Asymptomatic. I will give Lantus 10U x 1 dose to counteract the Decadron. Continue all home meds. Ok to discharge from medical viewpoint as glucose should return to normal over next week with healing and steroids out of system.     Lewis Roman, DO

## 2020-06-30 NOTE — PROGRESS NOTES
Problem: Mobility Impaired (Adult and Pediatric)  Goal: *Acute Goals and Plan of Care (Insert Text)  Description: GOALS (1-4 days):  (1.)Mr. Humberto Sheffield will move from supine to sit and sit to supine  in bed with INDEPENDENT. (2.)Mr. Humberto Sheffield will transfer from bed to chair and chair to bed with INDEPENDENT using the least restrictive device. (3.)Mr. Humberto Sheffield will ambulate with INDEPENDENT for 200 feet with the least restrictive device. (4.)Mr. Humberto Sheffield will state/observe CODY precautions with 0 verbal cues. ________________________________________________________________________________________________     Outcome: Progressing Towards Goal     PHYSICAL THERAPY JOINT CAMP CODY: Initial Assessment 6/30/2020  INPATIENT: Hospital Day: 2  [place] / Product Type: Medicare /      NAME/AGE/GENDER: Maxx Romero is a 77 y.o. male   PRIMARY DIAGNOSIS:  Localized osteoarthrosis of left hip [M16.12]   Procedure(s) and Anesthesia Type:     * HIP ARTHROPLASTY TOTAL/ LEFT/ DEPUY - Spinal (Left)  ICD-10: Treatment Diagnosis:    · Pain in Left Knee (M25.562)  · Difficulty in walking, Not elsewhere classified (R26.2)  · Other abnormalities of gait and mobility (R26.89)      ASSESSMENT:     Mr. Humberto Sheffield presents with increased pain, decreased strength, decreased gait, and decreased mobility due to recent left CODY. Patient's wife is concern about patient going home and her having to manage his insulin. Patient's wife would prefer placement at [facility] for rehab. Will continue to monitor [facility] versus home with home health. Patient would benefit from skilled physical therapy to address problems and goals. This section established at most recent assessment   PROBLEM LIST (Impairments causing functional limitations):  1. Decreased ADL/Functional Activities  2. Decreased Transfer Abilities  3. Decreased Ambulation Ability/Technique  4. Increased Pain  5.  Decreased strength   INTERVENTIONS PLANNED: (Benefits and precautions of physical therapy have been discussed with the patient.)  1. bed mobility  2. gait training  3. home exercise program (HEP)  4. Range of Motion: active/assisted/passive  5. Therapeutic Activities  6. therapeutic exercise/strengthening  7. transfer training  8. Group Therapy     TREATMENT PLAN: Frequency/Duration: Follow patient BID for duration of hospital stay to address above goals. Rehabilitation Potential For Stated Goals: Excellent     RECOMMENDED REHABILITATION/EQUIPMENT: (at time of discharge pending progress): Continue Skilled Therapy. HISTORY:   History of Present Injury/Illness (Reason for Referral):  Patient s/p left CODY. Past Medical History/Comorbidities:   Mr. Althea Walker  has a past medical history of Adverse effect of anesthesia, Asthma (dx childhood), Atrial fibrillation ([address] Cervical spondylosis (6/20/2013), Chronic back pain (2013), Chronic pain, Colon polyps, COPD (chronic obstructive pulmonary disease) ([address] (8/17/2016), Coronary atherosclerosis of native coronary vessel (8/17/2016), Diabetes ([address] (dx 5/14), Diverticulosis, Edema (8/17/2016), Former smoker, GERD (gastroesophageal reflux disease), High cholesterol, History of kidney stones, Hypertension, Obesity (BMI 30-39.9), Status post total right knee replacement (7/17/2017), and Unspecified sleep apnea.  He also has no past medical history of Aneurysm ([address] Autoimmune disease ([address] Cancer ([address] Chronic kidney disease, Coagulation defects, Coagulation disorder ([address] Dementia, Difficult intubation, Endocarditis, Heart failure ([address] Ill-defined condition, Infectious disease, Liver disease, Malignant hyperthermia due to anesthesia, Nausea & vomiting, Neurological disorder, Nicotine vapor product user, Non-nicotine vapor product user, Other ill-defined conditions(799.89), Pseudocholinesterase deficiency, Psychiatric disorder, PUD (peptic ulcer disease), Rheumatic fever, Seizures ([address] Stroke ([facility] Thromboembolus [facility]), Thyroid disease, or Unspecified adverse effect of anesthesia. Mr. Althea Walker  has a past surgical history that includes pr cystoscopy,insert ureteral stent; hx colonoscopy (10/6/14); hx colonoscopy (14); hx lumbar laminectomy (); hx retinal detachment repair (Bilateral); hx cataract removal; hx other surgical (20 yrs ago); hx colectomy (); hx heart catheterization (); and hx knee replacement (Right). Social History/Living Environment:    Lives with spouse. Prior Level of Function/Work/Activity:  Independent   Number of Personal Factors/Comorbidities that affect the Plan of Care: 0: LOW COMPLEXITY   EXAMINATION:   Most Recent Physical Functioning:   Gross Assessment: Yes  Gross Assessment  AROM: Generally decreased, functional(except left lower extremity)  Strength: Generally decreased, functional(except left lower extremity)                          Transfers  Sit to Stand: Stand-by assistance  Stand to Sit: Stand-by assistance    Balance  Sitting: Intact; Without support  Standing: Intact; With support    Posture  Posture Assessment: Forward head;Rounded shoulders;Kyphosis         Weight Bearing Status  Right Side Weight Bearing: Full  Left Side Weight Bearing: As tolerated  Distance (ft): (50)  Ambulation - Level of Assistance: Contact guard assistance  Assistive Device: Walker, rolling  Step Length: Left shortened  Stance: Left decreased  Gait Abnormalities: Antalgic        Braces/Orthotics: NOne           Body Structures Involved:  1. Nerves  2. Joints  3. Muscles Body Functions Affected:  1. Neuromusculoskeletal Activities and Participation Affected:  1. General Tasks and Demands  2. Mobility  3.  Community, Social and [place]   Number of elements that affect the Plan of Care: 4+: HIGH COMPLEXITY   CLINICAL PRESENTATION:   Presentation: Stable and uncomplicated: LOW COMPLEXITY   CLINICAL DECISION MAKIN [employer]ED | [employer] Mobility Inpatient Short Form  How much difficulty does the patient currently have. .. Unable A Lot A Little None   1. Turning over in bed (including adjusting bedclothes, sheets and blankets)? [] 1   [] 2   [x] 3   [] 4   2. Sitting down on and standing up from a chair with arms ( e.g., wheelchair, bedside commode, etc.)   [] 1   [] 2   [x] 3   [] 4   3. Moving from lying on back to sitting on the side of the bed? [] 1   [] 2   [x] 3   [] 4   How much help from another person does the patient currently need. .. Total A Lot A Little None   4. Moving to and from a bed to a chair (including a wheelchair)? [] 1   [] 2   [x] 3   [] 4   5. Need to walk in hospital room? [] 1   [] 2   [x] 3   [] 4   6. Climbing 3-5 steps with a railing? [] 1   [] 2   [x] 3   [] 4   © 2007, Trustees of [address] [place] [address], under license to [employer]. All rights reserved     Score:  Initial: 18 Most Recent: X (Date: -- )    Interpretation of Tool:  Represents activities that are increasingly more difficult (i.e. Bed mobility, Transfers, Gait). Medical Necessity:     · Patient is expected to demonstrate progress in   · strength and range of motion  ·  to   · improve safety during activities of daily living   · .  Reason for Services/Other Comments:  · Patient continues to require skilled intervention due to   · Recent left CODY   · . Use of outcome tool(s) and clinical judgement create a POC that gives a: Clear prediction of patient's progress: LOW COMPLEXITY            TREATMENT:   (In addition to Assessment/Re-Assessment sessions the following treatments were rendered)     Pre-treatment Symptoms/Complaints:  Pt sleepy and states he didn't sleep well last night. Agreeable to treatment  Pain Initial:   Pain Intensity 1: 5  Post Session:  5     Therapeutic Activity: (   10  minutes): Therapeutic activities including Chair transfers, Ambulation on level ground to improve mobility, strength, and balance.   Required minimal verbal cues to improve safety with rolling walker   to promote dynamic balance in standing. Therapeutic Exercise: (15 Minutes):  Exercises per grid below to improve strength. Required minimal verbal cues to promote proper body alignment. Progressed repetitions as indicated. Date:  6/30/2020 Date:   Date:     ACTIVITY/EXERCISE AM PM AM PM AM PM   GROUP THERAPY  []  []  []  []  []  []   Ankle Pumps 15 reps 15       Quad Sets 15 reps 15       Gluteal Sets 15 reps 15       Hip ABd/ADduction 15 reps 15       Straight Leg Raises         Knee Slides 15 reps        Short Arc Quads         Long Arc Quads  15       Chair Slides  15                B = bilateral; AA = active assistive; A = active; P = passive      Treatment/Session Assessment:     Response to Treatment:  tolerated well. Slow to move with decreased endurance but no physical assistance needed with mobility    Education:  [] Home Exercises  [] Fall Precautions  [x] Hip Precautions [] D/C Instruction Review  [] Hip Prosthesis Review  [x] Walker Management/Safety [] Adaptive Equipment as Needed       Interdisciplinary Collaboration:   o Occupational Therapist    After treatment position/precautions:   o Up in chair  o Bed/Chair-wheels locked  o Call light within reach    Compliance with Program/Exercises: Will assess as treatment progresses. Recommendations/Intent for next treatment session:  Treatment next visit will focus on increasing Mr. Hina Hermosillo independence with bed mobility, transfers, gait training, strength/ROM exercises, modalities for pain, and patient education.       Total Treatment Duration:  PT Patient Time In/Time Out  Time In: 1335  Time Out: 1400    Lakeisha Mantilla PT

## 2020-06-30 NOTE — PROGRESS NOTES
Patient placed on continuous sat monitor  HS per protocol. Monitor history deleted prior to placing on patient. Patient working on IS    Follow up from initial assessment    SOB evaluated: None noted    Breath Sounds: Clear    IS patient effort: Good  Patient achieved: 2500     Ml/sec    Patient resting comfortably, denies any discomfort at this time. Home CPAP set up and within reach.

## 2020-06-30 NOTE — PROGRESS NOTES
The pt was asleep in his recliner on his CPAP. He was easily awoken and in no obvious distress. He had no complaints, but did ask for a pillow. His dressing over the surgical site looked clean and did not show any excessive drainage or bleeding. The pt returned back to sleep.

## 2020-06-30 NOTE — PROGRESS NOTES
Care Management Interventions  PCP Verified by CM: Yes  Mode of Transport at Discharge: Self  Transition of Care Consult (CM Consult): [address]: Yes  Partner SNF: Yes  Discharge Durable Medical Equipment: No  Physical Therapy Consult: Yes  Occupational Therapy Consult: Yes  Current Support Network: Lives with Spouse  Confirm Follow Up Transport: Family  The Plan for Transition of Care is Related to the Following Treatment Goals : return to independent function  The Patient and/or Patient Representative was Provided with a Choice of Provider and Agrees with the Discharge Plan?: Yes  Freedom of Choice List was Provided with Basic Dialogue that Supports the Patient's Individualized Plan of Care/Goals, Treatment Preferences and Shares the Quality Data Associated with the Providers?: Yes  Discharge Location  Discharge Placement: Home with home health     Spoke with pt and spouse this afternoon. They have decided to cancel rehab plans at [facility] and plan to go home with home health tomorrow Wed 7-1. He was agreeable to [facility] and did not have a preference towards providers. Referral sent to [facility]. He has all appropriate equipment. [facility] notified to cancel plans.

## 2020-06-30 NOTE — PROGRESS NOTES
Problem: Self Care Deficits Care Plan (Adult)  Goal: *Acute Goals and Plan of Care (Insert Text)  Description: GOALS:   DISCHARGE GOALS (in preparation for going home/rehab):  3 days  1. Mr. Jim Hernandez will perform one lower body dressing activity with minimal assistance with adaptive equipment to demonstrate improved functional mobility and safety. 2.  Mr. Jim Hernandez will perform one lower body bathing activity with minimal  assistance with adaptive equipment to demonstrate improved functional mobility and safety. 3.  Mr. Jim Hernandez will perform toileting/toilet transfer with contact guard assistance with adaptive equipment to demonstrate improved functional mobility and safety. 4.  Mr. Jim Hernandez will perform shower transfer with contact guard assistance with adaptive equipment to demonstrate improved functional mobility and safety. 5.  Mr. Jim Hernandez will state CODY precautions with two verbal cues to demonstrate improved functional mobility and safety. Outcome: Resolved/Not Met       [facility]CC[facility] THERAPY CODY: Initial Assessment, Daily Note, Discharge, Treatment Day: Day of Assessment, and AM 6/30/2020  INPATIENT: Hospital Day: 2  [place] / Product Type: Medicare /      NAME/AGE/GENDER: Katya Hatfield is a 77 y.o. male   PRIMARY DIAGNOSIS:  Localized osteoarthrosis of left hip [M16.12]   Procedure(s) and Anesthesia Type:     * HIP ARTHROPLASTY TOTAL/ LEFT/ DEPUY - Spinal (Left)  ICD-10: Treatment Diagnosis:    Pain in left hip (M25.552)  Stiffness of Left Hip, Not elsewhere classified (J99.199)      ASSESSMENT:     Mr. Jim Hernandez is s/p left CODY and presents with decreased weight bearing on left LE and decreased independence with functional mobility and activities of daily living as compared to prior level of function and safety. Patient would benefit from skilled Occupational Therapy to maximize independence and safety with self-care task and functional mobility.   Pt would also benefit from education on lower body adaptive equipment and hip precautions post-surgery in preparation for going home. Patient plans for further rehab at home with home health services and good family support. OT reviewed therapy schedule and plan of care with patient. Patient was able to transfer and perform self care skills as charted below. Patient instructed to call for assistance when needing to get up from the recliner and all needs in reach. Patient verbalized understanding of call light. Patient completed shower and dressing as charted below in ADL grid and is ambulating with rolling walker and stand by assist.  Patient has met 5/5 goals and plans to return home with good family support. Family able to provide patient with appropriate level of assistance at this time. OT reviewed hip precautions throughout session. Patient instructed to call for assistance when needing to get up from recliner and all needs in reach. Patient verbalized understanding of call light. This section established at most recent assessment   PROBLEM LIST (Impairments causing functional limitations):  Decreased Strength  Decreased ADL/Functional Activities  Decreased Transfer Abilities  Increased Pain  Increased Fatigue  Decreased Flexibility/Joint Mobility  Decreased Knowledge of Precautions   INTERVENTIONS PLANNED: (Benefits and precautions of occupational therapy have been discussed with the patient.)  Activities of daily living training  Adaptive equipment training  Balance training  Clothing management  Donning&doffing training  Theraputic activity     TREATMENT PLAN: Frequency/Duration: Follow patient 1 time to address above goals. Rehabilitation Potential For Stated Goals: Good     RECOMMENDED REHABILITATION/EQUIPMENT: (at time of discharge pending progress): Continue Skilled Therapy. OCCUPATIONAL PROFILE AND HISTORY:   History of Present Injury/Illness (Reason for Referral):   Pt presents this date s/p (left) CODY. Past Medical History/Comorbidities:   Mr. Nafisa Nunes  has a past medical history of Adverse effect of anesthesia, Asthma (dx childhood), Atrial fibrillation ([address] Cervical spondylosis (6/20/2013), Chronic back pain (2013), Chronic pain, Colon polyps, COPD (chronic obstructive pulmonary disease) ([address] (8/17/2016), Coronary atherosclerosis of native coronary vessel (8/17/2016), Diabetes ([address] (dx 5/14), Diverticulosis, Edema (8/17/2016), Former smoker, GERD (gastroesophageal reflux disease), High cholesterol, History of kidney stones, Hypertension, Obesity (BMI 30-39.9), Status post total right knee replacement (7/17/2017), and Unspecified sleep apnea. He also has no past medical history of Aneurysm ([address] Autoimmune disease ([address] Cancer ([address] Chronic kidney disease, Coagulation defects, Coagulation disorder ([address] Dementia, Difficult intubation, Endocarditis, Heart failure ([address] Ill-defined condition, Infectious disease, Liver disease, Malignant hyperthermia due to anesthesia, Nausea & vomiting, Neurological disorder, Nicotine vapor product user, Non-nicotine vapor product user, Other ill-defined conditions(799.89), Pseudocholinesterase deficiency, Psychiatric disorder, PUD (peptic ulcer disease), Rheumatic fever, Seizures ([address] Stroke ([address] Thromboembolus ([address] Thyroid disease, or Unspecified adverse effect of anesthesia. Mr. Nafisa Nunes  has a past surgical history that includes pr cystoscopy,insert ureteral stent; hx colonoscopy (10/6/14); hx colonoscopy (11/20/14); hx lumbar laminectomy (2009); hx retinal detachment repair (Bilateral); hx cataract removal; hx other surgical (20 yrs ago); hx colectomy (2015); hx heart catheterization (2012); and hx knee replacement (Right).   Social History/Living Environment:      Prior Level of Function/Work/Activity:  Independent      Number of Personal Factors/Comorbidities that affect the Plan of Care: Brief history (0):  LOW COMPLEXITY   ASSESSMENT OF OCCUPATIONAL PERFORMANCE[de-identified]   Most Recent Physical Functioning:   Balance  Sitting: Intact  Standing: Intact; With support(rolling walker)       Gross Assessment: Yes  Gross Assessment  AROM: Generally decreased, functional(except left lower extremity)  Strength: Generally decreased, functional(except left lower extremity)            Coordination  Fine Motor Skills-Upper: Left Intact; Right Intact  Gross Motor Skills-Upper: Left Intact; Right Intact         Mental Status  Neurologic State: Alert  Orientation Level: Oriented X4  Cognition: Appropriate decision making  Perception: Appears intact  Perseveration: No perseveration noted  Safety/Judgement: Awareness of environment                Basic ADLs (From Assessment) Complex ADLs (From Assessment)   Basic ADL  Feeding: Independent  Oral Facial Hygiene/Grooming: Stand-by assistance(standing at sink)  Bathing: Supervision(seated on shower chair with long handled sponge)  Type of Bath: Chlorhexidine (CHG), Full, Shower  Upper Body Dressing: Supervision  Lower Body Dressing: Stand-by assistance(verbal cues for tech)  Toileting: Stand by assistance(elevated seat walker)     Grooming/Bathing/Dressing Activities of Daily Living     Cognitive Retraining  Safety/Judgement: Awareness of environment                 Functional Transfers  Bathroom Mobility: Stand-by assistance  Toilet Transfer : Stand-by assistance(elevated seat walker)  Shower Transfer: Stand-by assistance(shower chair walker)     Bed/Mat Mobility  Sit to Stand: Stand-by assistance  Stand to Sit: Stand-by assistance         Physical Skills Involved:  Range of Motion  Balance  Strength Cognitive Skills Affected (resulting in the inability to perform in a timely and safe manner):  none  Psychosocial Skills Affected:  Environmental Adaptation   Number of elements that affect the Plan of Care: 3-5:  MODERATE COMPLEXITY   CLINICAL DECISION MAKIN [facility] [address] [id number] AM-PAC 6 Clicks   Daily Activity Inpatient Short Form  How much help from another person does the patient currently need. .. Total A Lot A Little None   1. Putting on and taking off regular lower body clothing? [] 1   [] 2   [] 3   [x] 4   2. Bathing (including washing, rinsing, drying)? [] 1   [] 2   [] 3   [x] 4   3. Toileting, which includes using toilet, bedpan or urinal?   [] 1   [] 2   [] 3   [x] 4   4. Putting on and taking off regular upper body clothing? [] 1   [] 2   [] 3   [x] 4   5. Taking care of personal grooming such as brushing teeth? [] 1   [] 2   [] 3   [x] 4   6. Eating meals? [] 1   [] 2   [] 3   [x] 4   © 2007, Trustees of [address] [place] [address] [address] [address], under license to [employer]. All rights reserved     Score:  Initial: 24 Most Recent: X (Date: -- )    Interpretation of Tool:  Represents activities that are increasingly more difficult (i.e. Bed mobility, Transfers, Gait). Use of outcome tool(s) and clinical judgement create a POC that gives a: LOW COMPLEXITY            TREATMENT:   (In addition to Assessment/Re-Assessment sessions the following treatments were rendered)     Pre-treatment Symptoms/Complaints:  no complaint of pain during self care  Pain: Initial:      Post Session:  0     Self Care: (45 min): Procedure(s) (per grid) utilized to improve and/or restore self-care/home management as related to dressing, bathing, toileting, and grooming. Required minimal verbal and   cueing to facilitate activities of daily living skills and compensatory activities. OT evaluation completed   Treatment/Session Assessment:     Response to Treatment:  pt up in room and to shower tolerated well.     Education:  [] Home Exercises  [x] Fall Precautions  [x] Hip Precautions [] Going Home Video  [] Knee/Hip Prosthesis Review  [x] Walker Management/Safety [x] Adaptive Equipment as Needed       Interdisciplinary Collaboration:   Physical Therapist  Occupational Therapist  Registered Nurse    After treatment position/precautions:   Up in chair  Bed/Chair-wheels locked  Call light within reach  RN notified     Compliance with Program/Exercises: Compliant all of the time. Recommendations/Intent for next treatment session:  Pt doing well all goals met and will do well at home with support from wife. Patient will be discharged home with home health PT. No further Occupational Therapy warranted, will discharge Occupational Therapy services.         Total Treatment Duration:  OT Patient Time In/Time Out  Time In: 1005  Time Out: [address], OT

## 2020-07-01 VITALS
TEMPERATURE: 99.1 F | OXYGEN SATURATION: 92 % | BODY MASS INDEX: 36.45 KG/M2 | WEIGHT: 315 LBS | HEART RATE: 91 BPM | HEIGHT: 78 IN | SYSTOLIC BLOOD PRESSURE: 109 MMHG | RESPIRATION RATE: 16 BRPM | DIASTOLIC BLOOD PRESSURE: 54 MMHG

## 2020-07-01 LAB
GLUCOSE BLD STRIP.AUTO-MCNC: 261 MG/DL (ref 65–100)
HGB BLD-MCNC: 11.7 G/DL (ref 13.6–17.2)

## 2020-07-01 PROCEDURE — 97110 THERAPEUTIC EXERCISES: CPT

## 2020-07-01 PROCEDURE — 74011250637 HC RX REV CODE- 250/637: Performed by: PHYSICIAN ASSISTANT

## 2020-07-01 PROCEDURE — 74011250637 HC RX REV CODE- 250/637: Performed by: ORTHOPAEDIC SURGERY

## 2020-07-01 PROCEDURE — 82962 GLUCOSE BLOOD TEST: CPT

## 2020-07-01 PROCEDURE — 97530 THERAPEUTIC ACTIVITIES: CPT

## 2020-07-01 PROCEDURE — 74011636637 HC RX REV CODE- 636/637: Performed by: FAMILY MEDICINE

## 2020-07-01 PROCEDURE — 94760 N-INVAS EAR/PLS OXIMETRY 1: CPT

## 2020-07-01 PROCEDURE — 94640 AIRWAY INHALATION TREATMENT: CPT

## 2020-07-01 PROCEDURE — 36415 COLL VENOUS BLD VENIPUNCTURE: CPT

## 2020-07-01 PROCEDURE — 85018 HEMOGLOBIN: CPT

## 2020-07-01 RX ADMIN — ACETAMINOPHEN 1000 MG: 500 TABLET, FILM COATED ORAL at 13:40

## 2020-07-01 RX ADMIN — NYSTATIN: 100000 POWDER TOPICAL at 08:49

## 2020-07-01 RX ADMIN — Medication 1 AMPULE: at 08:49

## 2020-07-01 RX ADMIN — APIXABAN 5 MG: 5 TABLET, FILM COATED ORAL at 08:51

## 2020-07-01 RX ADMIN — POLYETHYLENE GLYCOL (3350) 17 G: 17 POWDER, FOR SOLUTION ORAL at 08:52

## 2020-07-01 RX ADMIN — DOXAZOSIN 4 MG: 4 TABLET ORAL at 08:51

## 2020-07-01 RX ADMIN — LISINOPRIL 10 MG: 5 TABLET ORAL at 08:51

## 2020-07-01 RX ADMIN — CELECOXIB 200 MG: 200 CAPSULE ORAL at 08:51

## 2020-07-01 RX ADMIN — ACETAMINOPHEN 1000 MG: 500 TABLET, FILM COATED ORAL at 00:40

## 2020-07-01 RX ADMIN — METOPROLOL TARTRATE 50 MG: 50 TABLET, FILM COATED ORAL at 08:51

## 2020-07-01 RX ADMIN — SENNOSIDES AND DOCUSATE SODIUM 2 TABLET: 8.6; 5 TABLET ORAL at 08:50

## 2020-07-01 RX ADMIN — PANTOPRAZOLE SODIUM 40 MG: 40 TABLET, DELAYED RELEASE ORAL at 08:51

## 2020-07-01 RX ADMIN — ACETAMINOPHEN 1000 MG: 500 TABLET, FILM COATED ORAL at 06:46

## 2020-07-01 RX ADMIN — BUDESONIDE AND FORMOTEROL FUMARATE DIHYDRATE 2 PUFF: 160; 4.5 AEROSOL RESPIRATORY (INHALATION) at 08:42

## 2020-07-01 RX ADMIN — HUMAN INSULIN 6 UNITS: 100 INJECTION, SOLUTION SUBCUTANEOUS at 07:30

## 2020-07-01 RX ADMIN — GLIMEPIRIDE 1 MG: 2 TABLET ORAL at 06:47

## 2020-07-01 NOTE — PROGRESS NOTES
2020         Post Op day: 2 Days Post-OpProcedure(s) (LRB):  HIP ARTHROPLASTY TOTAL/ LEFT/ DEPUY (Left)      Admit Date: 2020  Admit Diagnosis: Localized osteoarthrosis of left hip [M16.12]  Osteoarthritis of left hip [M16.12]    LAB:    Recent Results (from the past 24 hour(s))   GLUCOSE, POC    Collection Time: 20  7:20 AM   Result Value Ref Range    Glucose (POC) 303 (H) 65 - 100 mg/dL   GLUCOSE, POC    Collection Time: 20 11:12 AM   Result Value Ref Range    Glucose (POC) 267 (H) 65 - 100 mg/dL   GLUCOSE, POC    Collection Time: 20  3:56 PM   Result Value Ref Range    Glucose (POC) 293 (H) 65 - 100 mg/dL   GLUCOSE, POC    Collection Time: 20  9:05 PM   Result Value Ref Range    Glucose (POC) 315 (H) 65 - 100 mg/dL   HEMOGLOBIN    Collection Time: 20  3:19 AM   Result Value Ref Range    HGB 11.7 (L) 13.6 - 17.2 g/dL   GLUCOSE, POC    Collection Time: 20  6:17 AM   Result Value Ref Range    Glucose (POC) 261 (H) 65 - 100 mg/dL     Vital Signs:    Patient Vitals for the past 8 hrs:   BP Temp Pulse Resp SpO2   20 0425 126/70 97.9 °F (36.6 °C) 82 16 97 %   20 0041 125/64 98.8 °F (37.1 °C) 90 16 91 %     Temp (24hrs), Av.3 °F (36.8 °C), Min:97.9 °F (36.6 °C), Max:98.8 °F (37.1 °C)    Body mass index is 38.94 kg/m². Pain Control:   Pain Assessment  Pain Scale 1: Numeric (0 - 10)  Pain Intensity 1: 0  Pain Onset 1: at rest  Pain Location 1: Hip  Pain Orientation 1: Left  Pain Description 1: Aching, Constant, Dull  Pain Intervention(s) 1: Medication (see MAR)    Subjective: Doing well, No complaints, No SOB, No Chest Pain, No nausea or vomiting     Objective: Vital Signs are Stable, No Acute Distress, Alert and Oriented, Dressing is dry,  Neurovascular exam is normal. small amount of drainage to dressing.        PT/OT:            Assistive Device: Walker (comment)                Wieght Bearing Status: WBAT    Meds:  [unfilled]  [unfilled]  [unfilled]    Assessment:   Patient Active Problem List   Diagnosis Code    Hyperlipemia E78.5    Obstructive sleep apnea (adult) (pediatric) G47.33    Essential hypertension I10    Extrinsic asthma J45.909    Diverticulosis K57.90    Cervical spondylosis M47.812    Benign prostatic hyperplasia N40.0    Chronic back pain M54.9, G89.29    Esophageal reflux K21.9    Diabetes mellitus type 2 in obese (HCC) E11.69, E66.9    Andropause N50.89    Diverticular stricture ([facility] K56.699    Coronary atherosclerosis of native coronary vessel I25.10    COPD (chronic obstructive pulmonary disease) ([facility]) J44.9    Edema R60.9    Paroxysmal atrial fibrillation ([facility]) I48.0    OA (osteoarthritis) of knee M17.10    Status post total right knee replacement Z96.651    Medicare annual wellness visit, subsequent Z00.00    History of smoking 30 or more pack years Z87.891    Tubulovillous adenoma of colon D12.6    Obesity, morbid ([facility]) E66.01    Venous stasis I87.8    Chronic venous hypertension with ulcer and inflammation involving left side ([facility]) I87.332, L97.929    Advance care planning Z71.89    Chronic diarrhea K52.9    Osteoarthritis of left hip M16.12    Status post left hip replacement Z96.642    Hyperglycemia due to type 2 diabetes mellitus (HCC) E11.65             Plan: Continue Physical Therapy, Monitor labs, pt has decided to go home with home health today instead of rehab facility. Home later today after PT.         Signed By: LYNDON Morse

## 2020-07-01 NOTE — PROGRESS NOTES
Problem: Mobility Impaired (Adult and Pediatric)  Goal: *Acute Goals and Plan of Care (Insert Text)  Description: GOALS (1-4 days):  (1.)Mr. Carmelo Kilgore will move from supine to sit and sit to supine  in bed with INDEPENDENT. (2.)Mr. Carmelo Kilgore will transfer from bed to chair and chair to bed with INDEPENDENT using the least restrictive device. (3.)Mr. Carmelo Kilgore will ambulate with INDEPENDENT for 200 feet with the least restrictive device. (4.)Mr. Carmeol Kilgore will state/observe CODY precautions with 0 verbal cues. ________________________________________________________________________________________________     Outcome: Progressing Towards Goal     PHYSICAL THERAPY JOINT CAMP CODY: Daily Note and AM 7/1/2020  INPATIENT: Hospital Day: 3  [place] / Product Type: Medicare /      NAME/AGE/GENDER: David Vargas is a 77 y.o. male   PRIMARY DIAGNOSIS:  Localized osteoarthrosis of left hip [M16.12]   Procedure(s) and Anesthesia Type:     * HIP ARTHROPLASTY TOTAL/ LEFT/ DEPUY - Spinal (Left)  ICD-10: Treatment Diagnosis:    · Pain in Left Knee (M25.562)  · Difficulty in walking, Not elsewhere classified (R26.2)  · Other abnormalities of gait and mobility (R26.89)      ASSESSMENT:     Mr. Carmelo Kilgore presents with increased pain, decreased strength, decreased gait, and decreased mobility due to recent left CODY. Patient's wife is concern about patient going home and her having to manage his insulin. Patient's wife would prefer placement at [facility] for rehab. Will continue to monitor [facility] versus home with home health. Patient would benefit from skilled physical therapy to address problems and goals. 7/1/20:  Patient participated well this am.  Anxious to go home and be able to sleep. Did well with exercises with occasional assist.  Ambulated a bit further with RW. Not overly mobile but limited by posture more than CODY. Safe on his feet with walker but decreased tolerance for walking. This section established at most recent assessment   PROBLEM LIST (Impairments causing functional limitations):  1. Decreased ADL/Functional Activities  2. Decreased Transfer Abilities  3. Decreased Ambulation Ability/Technique  4. Increased Pain  5. Decreased strength   INTERVENTIONS PLANNED: (Benefits and precautions of physical therapy have been discussed with the patient.)  1. bed mobility  2. gait training  3. home exercise program (HEP)  4. Range of Motion: active/assisted/passive  5. Therapeutic Activities  6. therapeutic exercise/strengthening  7. transfer training  8. Group Therapy     TREATMENT PLAN: Frequency/Duration: Follow patient BID for duration of hospital stay to address above goals. Rehabilitation Potential For Stated Goals: Excellent     RECOMMENDED REHABILITATION/EQUIPMENT: (at time of discharge pending progress): Continue Skilled Therapy. HISTORY:   History of Present Injury/Illness (Reason for Referral):  Patient s/p left CODY. Past Medical History/Comorbidities:   Mr. Davey Majano  has a past medical history of Adverse effect of anesthesia, Asthma (dx childhood), Atrial fibrillation ([address] Cervical spondylosis (6/20/2013), Chronic back pain (2013), Chronic pain, Colon polyps, COPD (chronic obstructive pulmonary disease) ([address] (8/17/2016), Coronary atherosclerosis of native coronary vessel (8/17/2016), Diabetes ([address] (dx 5/14), Diverticulosis, Edema (8/17/2016), Former smoker, GERD (gastroesophageal reflux disease), High cholesterol, History of kidney stones, Hypertension, Obesity (BMI 30-39.9), Status post total right knee replacement (7/17/2017), and Unspecified sleep apnea.  He also has no past medical history of Aneurysm ([address] Autoimmune disease ([address] Cancer ([address] Chronic kidney disease, Coagulation defects, Coagulation disorder ([address] Dementia, Difficult intubation, Endocarditis, Heart failure ([address] Ill-defined condition, Infectious disease, Liver disease, Malignant hyperthermia due to anesthesia, Nausea & vomiting, Neurological disorder, Nicotine vapor product user, Non-nicotine vapor product user, Other ill-defined conditions(799.89), Pseudocholinesterase deficiency, Psychiatric disorder, PUD (peptic ulcer disease), Rheumatic fever, Seizures ([address] Stroke ([address] Thromboembolus ([address] Thyroid disease, or Unspecified adverse effect of anesthesia. Mr. Angie Flood  has a past surgical history that includes pr cystoscopy,insert ureteral stent; hx colonoscopy (10/6/14); hx colonoscopy (11/20/14); hx lumbar laminectomy (2009); hx retinal detachment repair (Bilateral); hx cataract removal; hx other surgical (20 yrs ago); hx colectomy (2015); hx heart catheterization (2012); and hx knee replacement (Right). Social History/Living Environment:    Lives with spouse. Prior Level of Function/Work/Activity:  Independent   Number of Personal Factors/Comorbidities that affect the Plan of Care: 0: LOW COMPLEXITY   EXAMINATION:   Most Recent Physical Functioning:      Gross Assessment  AROM: Generally decreased, functional  Strength: Generally decreased, functional                LLE Strength  L Hip Flexion: 2+  L Hip ABduction: 2  L Knee Extension: 3  L Ankle Dorsiflexion: 4         Transfers  Sit to Stand: Supervision  Stand to Sit: Supervision    Balance  Sitting: Intact  Standing: With support    Posture  Posture Assessment: Trunk flexion         Weight Bearing Status  Right Side Weight Bearing: Full  Left Side Weight Bearing: As tolerated  Distance (ft): 80 Feet (ft)  Ambulation - Level of Assistance: Supervision  Assistive Device: Walker, rolling  Base of Support: Center of gravity altered  Speed/Charlette: Pace decreased (<100 feet/min)  Step Length: Left shortened;Right shortened  Stance: Left decreased  Gait Abnormalities: Antalgic  Interventions: Safety awareness training;Verbal cues     Braces/Orthotics: NOne           Body Structures Involved:  1. Nerves  2. Joints  3.  Muscles Body Functions Affected:  1. Neuromusculoskeletal Activities and Participation Affected:  1. General Tasks and Demands  2. Mobility  3. Community, Social and [place]   Number of elements that affect the Plan of Care: 4+: HIGH COMPLEXITY   CLINICAL PRESENTATION:   Presentation: Stable and uncomplicated: LOW COMPLEXITY   CLINICAL DECISION MAKIN [facility] Mobility Inpatient Short Form  How much difficulty does the patient currently have. .. Unable A Lot A Little None   1. Turning over in bed (including adjusting bedclothes, sheets and blankets)? [] 1   [] 2   [x] 3   [] 4   2. Sitting down on and standing up from a chair with arms ( e.g., wheelchair, bedside commode, etc.)   [] 1   [] 2   [x] 3   [] 4   3. Moving from lying on back to sitting on the side of the bed? [] 1   [] 2   [x] 3   [] 4   How much help from another person does the patient currently need. .. Total A Lot A Little None   4. Moving to and from a bed to a chair (including a wheelchair)? [] 1   [] 2   [x] 3   [] 4   5. Need to walk in hospital room? [] 1   [] 2   [x] 3   [] 4   6. Climbing 3-5 steps with a railing? [] 1   [] 2   [x] 3   [] 4   © , Trustees of [address] [place] [address] [address] [id number], under license to [employer]. All rights reserved     Score:  Initial: 18 Most Recent: X (Date: -- )    Interpretation of Tool:  Represents activities that are increasingly more difficult (i.e. Bed mobility, Transfers, Gait). Medical Necessity:     · Patient is expected to demonstrate progress in   · strength and range of motion  ·  to   · improve safety during activities of daily living   · .  Reason for Services/Other Comments:  · Patient continues to require skilled intervention due to   · Recent left CODY   · .    Use of outcome tool(s) and clinical judgement create a POC that gives a: Clear prediction of patient's progress: LOW COMPLEXITY            TREATMENT:   (In addition to Assessment/Re-Assessment sessions the following treatments were rendered)     Pre-treatment Symptoms/Complaints:  Patient agreeable to therapy. Pain Initial:   Pain Intensity 1: 3  Post Session:  3     Therapeutic Activity: (   10  minutes): Therapeutic activities including Chair transfers and Ambulation on level ground to improve mobility, strength, balance and coordination. Required minimal verbal cues to improve safety with rolling walker Safety awareness training;Verbal cues to promote static and dynamic balance in standing. Therapeutic Exercise: (20 Minutes):  Exercises per grid below to improve strength. Required minimal verbal and manual cues to promote proper body alignment. Progressed repetitions as indicated. Date:  6/30/2020 Date:   7/1/20 Date:     ACTIVITY/EXERCISE AM PM AM PM AM PM   GROUP THERAPY  []  []  []  []  []  []   Ankle Pumps 15 reps 15 20      Quad Sets 15 reps 15 20      Gluteal Sets 15 reps 15 20      Hip ABd/ADduction 15 reps 15 20 AA      Straight Leg Raises         Knee Slides 15 reps  20      Short Arc Quads   20      Long Arc Quads  15 20      Chair Slides  15                B = bilateral; AA = active assistive; A = active; P = passive      Treatment/Session Assessment:     Response to Treatment: Participated well and anxious to discharge home. Good d/c plan and support. Education:  [x] Home Exercises  [x] Fall Precautions  [x] Hip Precautions [x] D/C Instruction Review  [] Hip Prosthesis Review  [x] Walker Management/Safety [] Adaptive Equipment as Needed       Interdisciplinary Collaboration:   o Physical Therapist  o Registered Nurse    After treatment position/precautions:   o Up in chair  o Bed/Chair-wheels locked  o Call light within reach  o RN notified    Compliance with Program/Exercises: Will assess as treatment progresses.     Recommendations/Intent for next treatment session:  Treatment next visit will focus on increasing Mr. Marta Ann independence with bed mobility, transfers, gait training, strength/ROM exercises, modalities for pain, and patient education.       Total Treatment Duration:  PT Patient Time In/Time Out  Time In: 1030  Time Out: [address] Avani Warren PT

## 2020-07-01 NOTE — PROGRESS NOTES
Changed dressing to left lower leg, small skin tear still oozing clear fluid, pink granulation in center. Placed skin prep barrier around perimeter and placed silicone foam dressing over.

## 2020-07-01 NOTE — PROGRESS NOTES
PROGRESS NOTE    I saw the patient, but did not exam him. He feels better. Blood sugars slowly improving. I instructed him to return back to his home diabetes meds since his last A1C with within limit. Patient may be discharged. No charge to patient for today's visit.     Rebecca Abbasi, DO

## 2020-07-01 NOTE — DISCHARGE INSTRUCTIONS
[facility]   Patient Discharge Instructions    Jc Rojas / [id number] : 1950    Admitted 2020 Discharged: 2020     IF YOU HAVE ANY PROBLEMS ONCE YOU ARE AT HOME CALL THE FOLLOWING NUMBERS:   Main office number: [phone]      Medications    · The medications you are to continue on are listed on the medication reconciliation sheet. · Narcotic pain medications as well as supplemental iron can cause constipation. If this occurs try stopping the narcotic pain medication and/or the iron. · It is important that you take the medication exactly as they are prescribed. · Medications which increase your risk of blood clots are listed to stop for 5 weeks after surgery as well as medications or supplements which increase your risk of bleeding complications. · Keep your medication in the bottles provided by the pharmacist and keep a list of the medication names, dosages, and times to be taken in your wallet. · Do not take other medications without consulting your doctor. Important Information    Do NOT smoke as this will greatly increase your risk of infection! Resume your prehospital diet. If you have excessive nausea or vomitting call your doctor's office     Leg swelling and warmth is normal for 6 months after surgery. If you experience swelling in your leg elevate you leg while laying down with your toes above your heart. If you have sudden onset severe swelling with leg pain call our office. The stitches deep inside take approximately 6 months to dissolve. There will be sharp shooting, stinging and burning pain. This is normal and will resolve between 3-6 months after surgery. Difficulty sleeping is normal following total Knee and Hip replacement. You may try melatonin, an over-the-counter sleep aid or benadryl to help with sleep. Most patients will resume sleeping through the night 8 weeks after surgery. Home Physical Therapy is arranged. Home Health will contact you within 48 hrs of discharge that you have chosen. If you have not received a call within this time frame please contact that provider you chose. You should be given this information before you leave the hospital.     You are at a risk for falls. Use the rolling walker when walking. Patients who have had a joint replacement should not drive if they are still taking narcotic pain mediation during the daytime hours. Most patients wean themselves off of pain medication within 2-5 weeks after surgery. When to Call the office    - If you have a temperature greater then 101  - Uncontrolled vomiting   - Loose control of your bladder or bowel function  - Are unable to bear any weight   - Need a pain medication refill       DISCHARGE SUMMARY from Nurse    The following personal items collected during your admission are returned to you:   Dental Appliance: Dental Appliances: None  Vision: Visual Aid: Glasses  Hearing Aid:    Jewelry:    Clothing: Clothing: None  Other Valuables: Other Valuables: Cell Phone, Wallet(with Sue)  Valuables sent to safe:      PATIENT INSTRUCTIONS:    After general anesthesia or intravenous sedation, for 24 hours or while taking prescription Narcotics:  · Limit your activities  · Do not drive and operate hazardous machinery  · Do not make important personal or business decisions  · Do  not drink alcoholic beverages  · If you have not urinated within 8 hours after discharge, please contact your surgeon on call. Report the following to your surgeon:  · Excessive pain, swelling, redness or odor of or around the surgical area  · Temperature over 101  · Nausea and vomiting lasting longer than 4 hours or if unable to take medications  · Any signs of decreased circulation or nerve impairment to extremity: change in color, persistent  numbness, tingling, coldness or increase pain  · Any questions, call office @ [phone]      Keep scheduled follow up appointment. If need to change, call office @ [phone]. *  Please give a list of your current medications to your Primary Care Provider. *  Please update this list whenever your medications are discontinued, doses are      changed, or new medications (including over-the-counter products) are added. *  Please carry medication information at all times in case of emergency situations. Patient Education        Hip Replacement Surgery (Posterior): What to Expect at Home  Your Recovery  Hip replacement surgery replaces the worn parts of your hip joint. When you leave the hospital, you will probably be walking with crutches or a walker. You may be able to climb a few stairs and get in and out of bed and chairs. But you will need someone to help you at home for the next few weeks or until you have more energy and can move around better. If you need more extensive rehab, you may go to a specialized rehab center for more treatment. You will go home with a bandage and stitches, staples, tissue glue, or tape strips. You can remove the bandage when your doctor tells you to. If you have stitches or staples, your doctor will remove them 10 days to 3 weeks after your surgery. Glue or tape strips will fall off on their own over time. You may still have some mild pain, and the area may be swollen for 3 to 4 months after surgery. Your doctor will give you medicine for the pain. You will continue the rehabilitation program (rehab) you started in the hospital. The better you do with your rehab exercises, the sooner you will get your strength and movement back. Most people are able to return to work 4 weeks to 4 months after surgery. This care sheet gives you a general idea about how long it will take for you to recover. But each person recovers at a different pace. Follow the steps below to get better as quickly as possible. How can you care for yourself at home?   Activity  · Your doctor may not want your affected leg to cross the center of your body toward the other leg. If so, your therapist may suggest these ideas:  ? Do not cross your legs. ? Be very careful as you get in or out of bed or a car, so your leg does not cross that imaginary line in the middle of your body. · Rest when you feel tired. You may take a nap, but do not stay in bed all day. · Work with your physical therapist to learn the best way to exercise. You will probably have to use crutches or a walker for at least 4 to 6 weeks. · Your doctor may advise you to stay away from activities that put stress on the joint. This includes sports such as tennis, football, and jogging. · Try not to sit for too long at one time. You will feel less stiff if you take a short walk about every hour. When you sit, use chairs with arms, and do not sit in low chairs. · Do not bend over more than 90 degrees (like the angle in a letter \"L\"). · Sleep on your back with your legs slightly apart or on your side with a pillow between your knees for about 6 weeks or as your doctor tells you. Do not sleep on your stomach or affected leg. · Ask your doctor when you can drive again. · Most people are able to return to work 4 weeks to 4 months after surgery. · Ask your doctor when it is okay for you to have sex. Diet  · By the time you leave the hospital, you will probably be eating your normal diet. If your stomach is upset, try bland, low-fat foods like plain rice, broiled chicken, toast, and yogurt. Your doctor may recommend that you take iron and vitamin supplements. · Drink plenty of fluids (unless your doctor tells you not to). · Eat healthy foods, and watch your portion sizes. Try to stay at your ideal weight. Too much weight puts more stress on your new hip joint. · You may notice that your bowel movements are not regular right after your surgery. This is common. Try to avoid constipation and straining with bowel movements. You may want to take a fiber supplement every day.  If you have not had a bowel movement after a couple of days, ask your doctor about taking a mild laxative. Medicines  · Your doctor will tell you if and when you can restart your medicines. He or she will also give you instructions about taking any new medicines. · If you take aspirin or some other blood thinner, ask your doctor if and when to start taking it again. Make sure that you understand exactly what your doctor wants you to do. · Your doctor may give you a blood-thinning medicine to prevent blood clots. If you take a blood thinner, be sure you get instructions about how to take your medicine safely. Blood thinners can cause serious bleeding problems. This medicine could be in pill form or as a shot (injection). If a shot is necessary, your doctor will tell you how to do this. · Be safe with medicines. Take pain medicines exactly as directed. ? If the doctor gave you a prescription medicine for pain, take it as prescribed. ? If you are not taking a prescription pain medicine, ask your doctor if you can take an over-the-counter medicine. · If you think your pain medicine is making you sick to your stomach:  ? Take your medicine after meals (unless your doctor has told you not to). ? Ask your doctor for a different pain medicine. · If your doctor prescribed antibiotics, take them as directed. Do not stop taking them just because you feel better. You need to take the full course of antibiotics. Incision care  · If your doctor told you how to care for your cut (incision), follow your doctor's instructions. You will have a dressing over the cut. A dressing helps the incision heal and protects it. Your doctor will tell you how to take care of this. · If you did not get instructions, follow this general advice:  ?  If you have strips of tape on the cut the doctor made, leave the tape on for a week or until it falls off.  ? If you have stitches or staples, your doctor will tell you when to come back to have them removed. ? If you have skin adhesive on the cut, leave it on until it falls off. Skin adhesive is also called glue or liquid stitches. ? Change the bandage every day. ? Wash the area daily with warm water, and pat it dry. Don't use hydrogen peroxide or alcohol. They can slow healing. ? You may cover the area with a gauze bandage if it oozes fluid or rubs against clothing. ? You may shower 24 to 48 hours after surgery. Pat the incision dry. Don't swim or take a bath for the first 2 weeks, or until your doctor tells you it is okay. Exercise  · Your rehab program will include a number of exercises to do. Always do them as your therapist tells you. Ice and elevation  · For pain, put ice or a cold pack on the area for 10 to 20 minutes at a time. Put a thin cloth between the ice and your skin. · Your ankle may swell for about 3 months. Prop up your ankle when you ice it or anytime you sit or lie down. Try to keep it above the level of your heart. This will help reduce swelling. Other instructions  Continue to wear your support stockings as your doctor says. These help to prevent blood clots. The length of time that you will have to wear them depends on your activity level and the amount of swelling you have. Most people wear these stockings for 4 to 6 weeks after surgery. Preventing falls is also very important. To prevent falls:  · Arrange furniture so that you will not trip on it. · Get rid of throw rugs, and move electrical cords out of the way. · Walk only in areas with plenty of light. · Put grab bars in showers and bathtubs. · Avoid icy or snowy sidewalks. · Wear shoes with sturdy, flat soles. Follow-up care is a key part of your treatment and safety. Be sure to make and go to all appointments, and call your doctor if you are having problems. It's also a good idea to know your test results and keep a list of the medicines you take. When should you call for help?    [id number] anytime you think you may need emergency care. For example, call if:  · You passed out (lost consciousness). · You have severe trouble breathing. · You have sudden chest pain and shortness of breath, or you cough up blood. Call your doctor now or seek immediate medical care if:  · You have signs that your hip may be dislocated, including:  ? Severe pain and not being able to stand. ? A crooked leg that looks like your hip is out of position. ? Not being able to bend or straighten your leg. · Your leg or foot is cool or pale or changes color. · You cannot feel or move your leg. · You have signs of a blood clot, such as:  ? Pain in your calf, back of the knee, thigh, or groin. ? Redness and swelling in your leg or groin. · Your incision comes open and begins to bleed, or the bleeding increases. · You feel like your heart is racing or beating irregularly. · You have signs of infection, such as:  ? Increased pain, swelling, warmth, or redness. ? Red streaks leading from the incision. ? Pus draining from the incision. ? A fever. Watch closely for changes in your health, and be sure to contact your doctor if:  · You do not have a bowel movement after taking a laxative. · You do not get better as expected. Where can you learn more? Go to http://chiqui-ruthy.info/  Enter Q746 in the search box to learn more about \"Hip Replacement Surgery (Posterior): What to Expect at Home. \"  Current as of: March 2, 2020               Content Version: 12.5  © 2006-2020 [employer]. Care instructions adapted under license by [employer] (which disclaims liability or warranty for this information). If you have questions about a medical condition or this instruction, always ask your healthcare professional. [address] any warranty or liability for your use of this information.              These are general instructions for a healthy lifestyle:    No smoking/ No tobacco products/ Avoid exposure to second hand smoke    Surgeon General's Warning:  Quitting smoking now greatly reduces serious risk to your health. Obesity, smoking, and sedentary lifestyle greatly increases your risk for illness    A healthy diet, regular physical exercise & weight monitoring are important for maintaining a healthy lifestyle    You may be retaining fluid if you have a history of heart failure or if you experience any of the following symptoms:  Weight gain of 3 pounds or more overnight or 5 pounds in a week, increased swelling in our hands or feet or shortness of breath while lying flat in bed. Please call your doctor as soon as you notice any of these symptoms; do not wait until your next office visit. Recognize signs and symptoms of STROKE:    F-face looks uneven    A-arms unable to move or move even    S-speech slurred or non-existent    T-time-call 911 as soon as signs and symptoms begin-DO NOT go       Back to bed or wait to see if you get better-TIME IS BRAIN. The discharge information has been reviewed with the patient. The patient verbalized understanding. Information obtained by :  I understand that if any problems occur once I am at home I am to contact my physician. I understand and acknowledge receipt of the instructions indicated above.                                                                                                                                            Physician's or R.N.'s Signature                                                                  Date/Time                                                                                                                                              Patient or Representative Signature                                                          Date/Time

## 2020-07-01 NOTE — PROGRESS NOTES
Pt resting in the recliner,  Alert and oriented,  Denies pain at this time. Dressing to left hip clean dry and intact. NV status WNL's. Pt stating he will stay in the recliner tonight. No noted distress,  Pt knows to call for assistance when he needs to use the bathroom.

## 2020-07-01 NOTE — PROGRESS NOTES
06/30/20 2055   Pre-Treatment   Breathing Pattern Regular   Cough Non-productive   Breath Sounds Bilateral Clear   Post-Treatment   Breathing Pattern Regular   Cough Non-productive   Breath Sounds Bilateral Clear   Procedures   $$ Subsequent Procedure MDI   Delivery Source MDI with spacer   Aerosolized Medications Symbicort

## 2020-07-02 ENCOUNTER — HOME CARE VISIT (OUTPATIENT)
Dept: SCHEDULING | Facility: HOME HEALTH | Age: 70
End: 2020-07-02
Payer: MEDICARE

## 2020-07-02 ENCOUNTER — PATIENT OUTREACH (OUTPATIENT)
Dept: CASE MANAGEMENT | Age: 70
End: 2020-07-02

## 2020-07-02 VITALS
TEMPERATURE: 98.8 F | OXYGEN SATURATION: 93 % | HEART RATE: 66 BPM | DIASTOLIC BLOOD PRESSURE: 66 MMHG | RESPIRATION RATE: 16 BRPM | SYSTOLIC BLOOD PRESSURE: 126 MMHG

## 2020-07-02 PROCEDURE — G0151 HHCP-SERV OF PT,EA 15 MIN: HCPCS

## 2020-07-02 PROCEDURE — 3331090001 HH PPS REVENUE CREDIT

## 2020-07-02 PROCEDURE — 400013 HH SOC

## 2020-07-02 PROCEDURE — 3331090002 HH PPS REVENUE DEBIT

## 2020-07-02 NOTE — PROGRESS NOTES
Transition of Care Hospital Discharge Follow-Up      Date/Time:  2020 12:37 PM    Patient was admitted to [facility] on 20 and discharged on 20 for Status post left hip replacement. The physician discharge summary was not available at the time of outreach. Patient was contacted within 1 business days of discharge. Inpatient RUR score: 9   Was this a readmission? No, planned surgery   Patient stated reason for the readmission: n/a  Patients top risk factors for readmission: complications of surgery      LPN Care Coordinator contacted the patient by telephone to perform post hospital discharge assessment. Verified name and  with patient as identifiers. Provided introduction to self, and explanation of the Care Coordinator role. Patient received hospital discharge instructions. LPN reviewed discharge instructions and red flags with patient who verbalized understanding. Patient given an opportunity to ask questions and does not have any further questions or concerns at this time. The patient agrees to contact the PCP office for questions related to their healthcare. LPN provided contact information for future reference. Home Health orders at discharge: [address]: [facility]  Date of initial or scheduled visit: 20  (Assist with coordination of services if necessary.)    Durable Medical Equipment ordered at discharge: patient has needed [address]: n/a  Durable Medical Equipment received: n/a  (Assist patient in obtaining DME orders &/or equipment if necessary.)    Medication(s):   Medication review was performed with patient, who verbalizes understanding of administration of home medications. There were no barriers to obtaining medications identified at this time. Patient states he did not  dilaudid and will not be taking pain medication.       Current Outpatient Medications   Medication Sig    HYDROmorphone (DILAUDID) 2 mg tablet Take 1 Tab by mouth every four (4) hours as needed for Pain for up to 5 days. Max Daily Amount: 12 mg.  pantoprazole (PROTONIX) 40 mg tablet Take 1 Tab by mouth daily. (Patient taking differently: Take 40 mg by mouth daily. Take / use AM day of surgery  per anesthesia protocols. Indications: gastroesophageal reflux disease)    montelukast (SINGULAIR) 10 mg tablet Take 1 Tab by mouth daily. (Patient taking differently: Take 10 mg by mouth nightly. Indications: controller medication for asthma)    metoprolol tartrate (LOPRESSOR) 25 mg tablet Take 2 Tabs by mouth two (2) times a day. (Patient taking differently: Take 50 mg by mouth two (2) times a day. Take / use AM day of surgery  per anesthesia protocols. Indications: ventricular rate control in atrial fibrillation)    lisinopriL (PRINIVIL, ZESTRIL) 10 mg tablet Take 1 Tab by mouth daily. (Patient taking differently: Take 10 mg by mouth daily. Indications: high blood pressure)    glimepiride (AMARYL) 1 mg tablet Take 1 Tab by mouth Daily (before breakfast).  ezetimibe (ZETIA) 10 mg tablet Take 1 Tab by mouth nightly. (Patient taking differently: Take 10 mg by mouth nightly. Indications: high cholesterol)    doxazosin (CARDURA) 4 mg tablet Take 1 Tab by mouth daily. (Patient taking differently: Take 4 mg by mouth daily. Take / use AM day of surgery  per anesthesia protocols. Indications: enlarged prostate with urination problem)    celecoxib (CeleBREX) 200 mg capsule Take 1 Cap by mouth daily. (Patient taking differently: Take 200 mg by mouth daily. Indications: joint damage causing pain and loss of function)    atorvastatin (LIPITOR) 20 mg tablet Take 1 Tab by mouth daily. (Patient taking differently: Take 20 mg by mouth nightly. Indications: high cholesterol)    Trulicity 1.5 MG/5.9 mL sub-q pen 0.5 mL by SubCUTAneous route every seven (7) days. (Patient taking differently: 1.5 mg by SubCUTAneous route every seven (7) days.  Takes every  Wednesday at bedtime  Indications: type 2 diabetes mellitus)    apixaban (Eliquis) 5 mg tablet Take 1 Tab by mouth two (2) times a day. (Patient taking differently: Take 5 mg by mouth two (2) times a day. Indications: treatment to prevent blood clots in chronic atrial fibrillation)    dapagliflozin (Farxiga) 10 mg tab tablet Take 1 Tab by mouth daily. (Patient taking differently: Take 10 mg by mouth daily. Indications: type 2 diabetes mellitus)    fluticasone furoate-vilanteroL (Breo Ellipta) 200-25 mcg/dose inhaler Take 1 Puff by inhalation daily. (Patient taking differently: Take 1 Puff by inhalation daily. Take / use AM day of surgery  per anesthesia protocols. Indications: controller medication for asthma)    fluticasone propionate (FLONASE ALLERGY RELIEF) 50 mcg/actuation nasal spray 2 Sprays by Both Nostrils route daily as needed. Take / use AM day of surgery  per anesthesia protocols, if needed  Indications: inflammation of the nose due to an allergy    albuterol (PROVENTIL HFA, VENTOLIN HFA, PROAIR HFA) 90 mcg/actuation inhaler Take 1 Puff by inhalation every four (4) hours as needed for Wheezing. (Patient taking differently: Take 1 Puff by inhalation every four (4) hours as needed for Wheezing. For wheezing; Take / use AM day of surgery  per anesthesia protocols.)    B.infantis-B.ani-B.long-B.bifi (PROBIOTIC 4X) 10-15 mg TbEC Take  by mouth daily.  ANTI-FUNGAL 2 % topical powder APPLY TO ABDOMEN 2 TIMES PER DAY    Blood-Glucose Meter monitoring kit Check fs every day; DM2, E11/9, Contour glucometer    glucose blood VI test strips (ASCENSIA CONTOUR) strip Check fs every day, DM2, E11.9    lancets 28 gauge misc Check fs every day, DM2, E11.9    albuterol (PROVENTIL VENTOLIN) 2.5 mg /3 mL (0.083 %) nebulizer solution 3 mL by Nebulization route once for 1 dose. (Patient taking differently: 2.5 mg by Nebulization route daily as needed. Pt states has not done in several years;  Take / use AM day of surgery  per anesthesia protocols.)     No current facility-administered medications for this visit. There are no discontinued medications. ADL assessment: independent  (If patient is unable to perform ADLs  what is the limiting factor(s)? Do they have a support system that can assist? If no support system is present, discuss possible assistance that they may be able to obtain. Escalate for SW if ongoing issues are verbalized by pt or anticipated)    [facility] follow up appointment(s):   Future Appointments   Date Time Provider Lorraine Coronel   8/24/2020  1:00 PM Andria Jimenez MD [facility]   9/14/2020  9:00 AM Andreas Flores MD [facility]   9/21/2020  3:00 PM MD ELVI Hunt [facility]      Non-[facility] follow up appointment(s): Dr. Aldair Kenney ortho 3-4 weeks  7 Day follow up with PCP or Specialist: will call if need arises  Transportation arranged: no needs at this time    Covid Risk Education    Patient has following risk factors of: COPD, diabetes and preop test negative. Education provided regarding infection prevention, and signs and symptoms of COVID-19 and when to seek medical attention with patient who verbalized understanding. Discussed exposure protocols and quarantine From CDC: Are you at higher risk for severe illness?  and given an opportunity for questions and concerns. The patient agrees to contact the COVID-19 hotline [phone] or PCP office for questions related to COVID-19. For more information on steps you can take to protect yourself, see CDC's How to Protect Yourself     Patient/family/caregiver given information for Keyur Muhammad and agrees to enroll no  Patient's preferred e-mail: declines  Patient's preferred phone number: declines      Any other questions or concerns expressed by patient?  None at this time    Scheduled next follow up call or referral to CTN/ ACM: Sarah Hastings will follow per workflow guidelines  Next follow up call: within 21 days    Goals Addressed                 This Visit's Progress     Attends follow-up appointments as directed.

## 2020-07-03 PROCEDURE — 3331090002 HH PPS REVENUE DEBIT

## 2020-07-03 PROCEDURE — 3331090001 HH PPS REVENUE CREDIT

## 2020-07-04 ENCOUNTER — HOME CARE VISIT (OUTPATIENT)
Dept: SCHEDULING | Facility: HOME HEALTH | Age: 70
End: 2020-07-04
Payer: MEDICARE

## 2020-07-04 PROCEDURE — 3331090002 HH PPS REVENUE DEBIT

## 2020-07-04 PROCEDURE — G0299 HHS/HOSPICE OF RN EA 15 MIN: HCPCS

## 2020-07-04 PROCEDURE — 3331090001 HH PPS REVENUE CREDIT

## 2020-07-05 VITALS
RESPIRATION RATE: 18 BRPM | HEART RATE: 83 BPM | SYSTOLIC BLOOD PRESSURE: 146 MMHG | DIASTOLIC BLOOD PRESSURE: 82 MMHG | TEMPERATURE: 97.2 F | OXYGEN SATURATION: 92 %

## 2020-07-05 PROCEDURE — 3331090002 HH PPS REVENUE DEBIT

## 2020-07-05 PROCEDURE — 3331090001 HH PPS REVENUE CREDIT

## 2020-07-06 PROCEDURE — 3331090002 HH PPS REVENUE DEBIT

## 2020-07-06 PROCEDURE — 3331090001 HH PPS REVENUE CREDIT

## 2020-07-07 ENCOUNTER — HOME CARE VISIT (OUTPATIENT)
Dept: SCHEDULING | Facility: HOME HEALTH | Age: 70
End: 2020-07-07
Payer: MEDICARE

## 2020-07-07 VITALS
RESPIRATION RATE: 17 BRPM | SYSTOLIC BLOOD PRESSURE: 137 MMHG | TEMPERATURE: 98.6 F | HEART RATE: 92 BPM | DIASTOLIC BLOOD PRESSURE: 74 MMHG

## 2020-07-07 PROCEDURE — 3331090002 HH PPS REVENUE DEBIT

## 2020-07-07 PROCEDURE — A6258 TRANSPARENT FILM >16<=48 IN: HCPCS

## 2020-07-07 PROCEDURE — G0151 HHCP-SERV OF PT,EA 15 MIN: HCPCS

## 2020-07-07 PROCEDURE — 3331090001 HH PPS REVENUE CREDIT

## 2020-07-08 ENCOUNTER — HOME CARE VISIT (OUTPATIENT)
Dept: SCHEDULING | Facility: HOME HEALTH | Age: 70
End: 2020-07-08
Payer: MEDICARE

## 2020-07-08 VITALS
DIASTOLIC BLOOD PRESSURE: 78 MMHG | HEART RATE: 85 BPM | SYSTOLIC BLOOD PRESSURE: 138 MMHG | TEMPERATURE: 98.1 F | RESPIRATION RATE: 18 BRPM

## 2020-07-08 VITALS
DIASTOLIC BLOOD PRESSURE: 62 MMHG | TEMPERATURE: 98.1 F | RESPIRATION RATE: 17 BRPM | HEART RATE: 61 BPM | SYSTOLIC BLOOD PRESSURE: 121 MMHG

## 2020-07-08 PROCEDURE — G0152 HHCP-SERV OF OT,EA 15 MIN: HCPCS

## 2020-07-08 PROCEDURE — 3331090001 HH PPS REVENUE CREDIT

## 2020-07-08 PROCEDURE — 3331090002 HH PPS REVENUE DEBIT

## 2020-07-08 PROCEDURE — G0151 HHCP-SERV OF PT,EA 15 MIN: HCPCS

## 2020-07-09 PROCEDURE — 3331090002 HH PPS REVENUE DEBIT

## 2020-07-09 PROCEDURE — 3331090001 HH PPS REVENUE CREDIT

## 2020-07-10 ENCOUNTER — HOME CARE VISIT (OUTPATIENT)
Dept: SCHEDULING | Facility: HOME HEALTH | Age: 70
End: 2020-07-10
Payer: MEDICARE

## 2020-07-10 VITALS
DIASTOLIC BLOOD PRESSURE: 64 MMHG | HEART RATE: 72 BPM | SYSTOLIC BLOOD PRESSURE: 124 MMHG | TEMPERATURE: 97.2 F | OXYGEN SATURATION: 95 % | RESPIRATION RATE: 18 BRPM

## 2020-07-10 PROCEDURE — 3331090002 HH PPS REVENUE DEBIT

## 2020-07-10 PROCEDURE — 3331090001 HH PPS REVENUE CREDIT

## 2020-07-10 PROCEDURE — G0157 HHC PT ASSISTANT EA 15: HCPCS

## 2020-07-11 PROCEDURE — 3331090001 HH PPS REVENUE CREDIT

## 2020-07-11 PROCEDURE — 3331090002 HH PPS REVENUE DEBIT

## 2020-07-11 NOTE — PROGRESS NOTES
hydromorphone 2 mg tablet 1 tablet by mouth every 4 hours as needed for pain for up to 5 days started on 7.8.20 by Dr. Jerald Rachel     got refill 7. 7.20 cyclobenzaprine 5 mg tablet as needed for muscle spasm by Dr. Nikole Crawley

## 2020-07-12 PROCEDURE — 3331090001 HH PPS REVENUE CREDIT

## 2020-07-12 PROCEDURE — 3331090002 HH PPS REVENUE DEBIT

## 2020-07-13 ENCOUNTER — HOME CARE VISIT (OUTPATIENT)
Dept: SCHEDULING | Facility: HOME HEALTH | Age: 70
End: 2020-07-13
Payer: MEDICARE

## 2020-07-13 PROCEDURE — G0157 HHC PT ASSISTANT EA 15: HCPCS

## 2020-07-13 PROCEDURE — 3331090001 HH PPS REVENUE CREDIT

## 2020-07-13 PROCEDURE — 3331090002 HH PPS REVENUE DEBIT

## 2020-07-14 VITALS
TEMPERATURE: 97.2 F | DIASTOLIC BLOOD PRESSURE: 72 MMHG | RESPIRATION RATE: 16 BRPM | HEART RATE: 74 BPM | SYSTOLIC BLOOD PRESSURE: 124 MMHG

## 2020-07-14 PROCEDURE — 3331090002 HH PPS REVENUE DEBIT

## 2020-07-14 PROCEDURE — 3331090001 HH PPS REVENUE CREDIT

## 2020-07-15 ENCOUNTER — HOME CARE VISIT (OUTPATIENT)
Dept: SCHEDULING | Facility: HOME HEALTH | Age: 70
End: 2020-07-15
Payer: MEDICARE

## 2020-07-15 PROCEDURE — 3331090002 HH PPS REVENUE DEBIT

## 2020-07-15 PROCEDURE — 3331090001 HH PPS REVENUE CREDIT

## 2020-07-15 PROCEDURE — G0158 HHC OT ASSISTANT EA 15: HCPCS

## 2020-07-16 ENCOUNTER — HOME CARE VISIT (OUTPATIENT)
Dept: HOME HEALTH SERVICES | Facility: HOME HEALTH | Age: 70
End: 2020-07-16
Payer: MEDICARE

## 2020-07-16 ENCOUNTER — HOME CARE VISIT (OUTPATIENT)
Dept: SCHEDULING | Facility: HOME HEALTH | Age: 70
End: 2020-07-16
Payer: MEDICARE

## 2020-07-16 VITALS
DIASTOLIC BLOOD PRESSURE: 68 MMHG | RESPIRATION RATE: 17 BRPM | SYSTOLIC BLOOD PRESSURE: 120 MMHG | OXYGEN SATURATION: 95 % | TEMPERATURE: 98.3 F | HEART RATE: 80 BPM

## 2020-07-16 PROCEDURE — A4649 SURGICAL SUPPLIES: HCPCS

## 2020-07-16 PROCEDURE — G0157 HHC PT ASSISTANT EA 15: HCPCS

## 2020-07-16 PROCEDURE — 3331090001 HH PPS REVENUE CREDIT

## 2020-07-16 PROCEDURE — 3331090002 HH PPS REVENUE DEBIT

## 2020-07-17 ENCOUNTER — HOME CARE VISIT (OUTPATIENT)
Dept: HOME HEALTH SERVICES | Facility: HOME HEALTH | Age: 70
End: 2020-07-17
Payer: MEDICARE

## 2020-07-17 PROCEDURE — 3331090002 HH PPS REVENUE DEBIT

## 2020-07-17 PROCEDURE — 3331090001 HH PPS REVENUE CREDIT

## 2020-07-18 PROCEDURE — 3331090001 HH PPS REVENUE CREDIT

## 2020-07-18 PROCEDURE — 3331090002 HH PPS REVENUE DEBIT

## 2020-07-19 PROCEDURE — 3331090001 HH PPS REVENUE CREDIT

## 2020-07-19 PROCEDURE — 3331090002 HH PPS REVENUE DEBIT

## 2020-07-20 ENCOUNTER — HOME CARE VISIT (OUTPATIENT)
Dept: HOME HEALTH SERVICES | Facility: HOME HEALTH | Age: 70
End: 2020-07-20
Payer: MEDICARE

## 2020-07-20 PROCEDURE — 3331090001 HH PPS REVENUE CREDIT

## 2020-07-20 PROCEDURE — 3331090002 HH PPS REVENUE DEBIT

## 2020-07-21 ENCOUNTER — HOME CARE VISIT (OUTPATIENT)
Dept: SCHEDULING | Facility: HOME HEALTH | Age: 70
End: 2020-07-21
Payer: MEDICARE

## 2020-07-21 VITALS
TEMPERATURE: 98.2 F | DIASTOLIC BLOOD PRESSURE: 74 MMHG | OXYGEN SATURATION: 98 % | SYSTOLIC BLOOD PRESSURE: 126 MMHG | RESPIRATION RATE: 18 BRPM | HEART RATE: 75 BPM

## 2020-07-21 PROCEDURE — G0157 HHC PT ASSISTANT EA 15: HCPCS

## 2020-07-21 PROCEDURE — 3331090001 HH PPS REVENUE CREDIT

## 2020-07-21 PROCEDURE — 3331090002 HH PPS REVENUE DEBIT

## 2020-07-22 ENCOUNTER — HOME CARE VISIT (OUTPATIENT)
Dept: SCHEDULING | Facility: HOME HEALTH | Age: 70
End: 2020-07-22
Payer: MEDICARE

## 2020-07-22 VITALS
RESPIRATION RATE: 18 BRPM | HEART RATE: 72 BPM | DIASTOLIC BLOOD PRESSURE: 64 MMHG | TEMPERATURE: 97.3 F | SYSTOLIC BLOOD PRESSURE: 124 MMHG

## 2020-07-22 PROCEDURE — 3331090001 HH PPS REVENUE CREDIT

## 2020-07-22 PROCEDURE — G0152 HHCP-SERV OF OT,EA 15 MIN: HCPCS

## 2020-07-22 PROCEDURE — 3331090002 HH PPS REVENUE DEBIT

## 2020-07-23 ENCOUNTER — HOME CARE VISIT (OUTPATIENT)
Dept: SCHEDULING | Facility: HOME HEALTH | Age: 70
End: 2020-07-23
Payer: MEDICARE

## 2020-07-23 ENCOUNTER — PATIENT OUTREACH (OUTPATIENT)
Dept: CASE MANAGEMENT | Age: 70
End: 2020-07-23

## 2020-07-23 VITALS
HEART RATE: 78 BPM | TEMPERATURE: 97.6 F | RESPIRATION RATE: 16 BRPM | DIASTOLIC BLOOD PRESSURE: 64 MMHG | SYSTOLIC BLOOD PRESSURE: 136 MMHG

## 2020-07-23 VITALS
TEMPERATURE: 98.2 F | RESPIRATION RATE: 17 BRPM | DIASTOLIC BLOOD PRESSURE: 65 MMHG | SYSTOLIC BLOOD PRESSURE: 126 MMHG | HEART RATE: 75 BPM

## 2020-07-23 PROCEDURE — 3331090001 HH PPS REVENUE CREDIT

## 2020-07-23 PROCEDURE — 3331090002 HH PPS REVENUE DEBIT

## 2020-07-23 PROCEDURE — G0151 HHCP-SERV OF PT,EA 15 MIN: HCPCS

## 2020-07-23 NOTE — PROGRESS NOTES
Transition of Care Hospital Discharge Follow-Up      Date/Time:  2020 12:25 PM    LPN Care Coordinator contacted the patient by telephone to perform post hospital follow up. Verified name and  with patient as identifiers. LPN reinforced discharge instructions and red flags with patient who verbalized understanding. Patient given an opportunity to ask questions and does not have any further questions or concerns at this time. The patient agrees to contact the PCP office for questions related to their healthcare    ERICumasha Etorbidea 52 follow up appointment(s):   Future Appointments   Date Time Provider Lorraine Coronel   2020  3:00 PM Maryann Evans [address] [facility] [address]   2020  3:00 PM Loura [place], PT [facility]   2020  3:00 PM Loura [place], PT [facility]   8/3/2020  3:00 PM Loura [place], PT [facility]   2020  3:00 PM Loura [place], PT [facility]   8/10/2020  3:00 PM Loura [place], PT [facility]   2020  3:00 PM Loura [place], PT [facility]   2020  3:00 PM Loura [place], PT [facility]   2020  3:00 PM Loura [place], PT [facility]   2020  1:00 PM Paulina Crespo MD [facility]   2020  9:00 AM Scooter Figueredo MD [facility]   2020  3:00 PM Scooter Figueredo MD [facility]      Non-[facility] follow up appointment(s): Dr. Gina Mccabe  Patient attended follow up appointments since last contact: Patient states he is progressing and doing well, will now attend outpatient therapy.   Follow up visits attended as scheduled  What was the outcome of the appointment:     Home Health Active: discharged with [address]: n/a  Any issues/ progress:  (Assist with coordination of services if necessary.)      Medication(s):   Medication changes since discharge:     Current Outpatient Medications   Medication Sig    HYDROmorphone (DILAUDID) 2 mg tablet Take 2 mg by mouth every four (4) hours as needed for Pain.  cyclobenzaprine (FLEXERIL) 10 mg tablet Take 5 mg by mouth three (3) times daily as needed for Muscle Spasm(s).  cyclobenzaprine (FLEXERIL) 10 mg tablet Take 5 mg by mouth three (3) times daily as needed for Muscle Spasm(s).  polyethylene glycol (MIRALAX) 17 gram packet Take 17 g by mouth daily. Mix with 8 oz water    menthol/zinc/aloe/chamomil oil (CHAMOSYN EX) Apply 1 Dose to affected area as needed (skin irritation).  petrolatum,white (REMEDY PHYTOPLEX Z-GUARD EX) Apply 1 Dose to affected area as needed (skin irritation).  acetaminophen (TYLENOL) 500 mg tablet Take 500-1,000 mg by mouth every six (6) hours as needed for Pain.  diphenhydrAMINE (BENADRYL) 50 mg capsule Take 100 mg by mouth nightly as needed for Sleep.  nystatin (Nyamyc) powder Apply 1 Dose to affected area as needed for Other (yeast between skin folds). Apply enough powder to cover the affected area 2-3 times daily following shower under abdominal folds    colesevelam (WELCHOL) 625 mg tablet Take 625-3,750 mg by mouth as needed for Diarrhea. Patient taking 2 tabs following diarrheal bowel movement up to 3 times. Maximum daily dosage: 6 tabs    pantoprazole (PROTONIX) 40 mg tablet Take 1 Tab by mouth daily. (Patient taking differently: Take 40 mg by mouth daily. Indications: gastroesophageal reflux disease)    montelukast (SINGULAIR) 10 mg tablet Take 1 Tab by mouth daily. (Patient taking differently: Take 10 mg by mouth nightly. Indications: controller medication for asthma)    metoprolol tartrate (LOPRESSOR) 25 mg tablet Take 2 Tabs by mouth two (2) times a day. (Patient taking differently: Take 50 mg by mouth two (2) times a day. Indications: ventricular rate control in atrial fibrillation)    lisinopriL (PRINIVIL, ZESTRIL) 10 mg tablet Take 1 Tab by mouth daily. (Patient taking differently: Take 10 mg by mouth daily.  Indications: high blood pressure)    glimepiride (AMARYL) 1 mg tablet Take 1 Tab by mouth Daily (before breakfast).  ezetimibe (ZETIA) 10 mg tablet Take 1 Tab by mouth nightly. (Patient taking differently: Take 10 mg by mouth nightly. Indications: high cholesterol)    doxazosin (CARDURA) 4 mg tablet Take 1 Tab by mouth daily. (Patient taking differently: Take 4 mg by mouth daily. Indications: enlarged prostate with urination problem)    celecoxib (CeleBREX) 200 mg capsule Take 1 Cap by mouth daily. (Patient taking differently: Take 200 mg by mouth daily. Indications: joint damage causing pain and loss of function)    atorvastatin (LIPITOR) 20 mg tablet Take 1 Tab by mouth daily. (Patient taking differently: Take 20 mg by mouth nightly. Indications: high cholesterol)    Trulicity 1.5 LE/8.4 mL sub-q pen 0.5 mL by SubCUTAneous route every seven (7) days. (Patient taking differently: 1.5 mg by SubCUTAneous route every seven (7) days. Takes every  Wednesday at bedtime  Indications: type 2 diabetes mellitus)    apixaban (Eliquis) 5 mg tablet Take 1 Tab by mouth two (2) times a day. (Patient taking differently: Take 5 mg by mouth two (2) times a day. Indications: treatment to prevent blood clots in chronic atrial fibrillation)    dapagliflozin (Farxiga) 10 mg tab tablet Take 1 Tab by mouth daily. (Patient taking differently: Take 10 mg by mouth daily. Indications: type 2 diabetes mellitus)    fluticasone furoate-vilanteroL (Breo Ellipta) 200-25 mcg/dose inhaler Take 1 Puff by inhalation daily. (Patient taking differently: Take 1 Puff by inhalation daily. Indications: controller medication for asthma)    fluticasone propionate (FLONASE ALLERGY RELIEF) 50 mcg/actuation nasal spray 2 Sprays by Both Nostrils route daily as needed for Allergies. Indications: inflammation of the nose due to an allergy    albuterol (PROVENTIL HFA, VENTOLIN HFA, PROAIR HFA) 90 mcg/actuation inhaler Take 1 Puff by inhalation every four (4) hours as needed for Wheezing.     B.infantis-B.ani-B.long-B.bifi (PROBIOTIC 4X) 10-15 mg TbEC Take  by mouth daily.  ANTI-FUNGAL 2 % topical powder APPLY TO ABDOMEN 2 TIMES PER DAY    Blood-Glucose Meter monitoring kit Check fs every day; DM2, E11/9, Contour glucometer    glucose blood VI test strips (ASCENSIA CONTOUR) strip Check fs every day, DM2, E11.9    lancets 28 gauge misc Check fs every day, DM2, E11.9    albuterol (PROVENTIL VENTOLIN) 2.5 mg /3 mL (0.083 %) nebulizer solution 3 mL by Nebulization route once for 1 dose. (Patient taking differently: 2.5 mg by Nebulization route daily as needed for Wheezing. Patient states he hasn't used in many years)     No current facility-administered medications for this visit. Other Issues/ Miscellaneous? (Transportation, access to meals, ability to perform ADLs, adequate caregiver support, etc.) no  Referrals needed? (SW, Diabetes education, HH, etc.) no    Any other questions or concerns expressed by patient? Not at this time    Schedule next appointment with LATONYA ALANIS or referral to CTN/ ACM: n/a  Graduation: yes  Next Outreach Scheduled: n/a    Goals      Attends follow-up appointments as directed.

## 2020-07-24 ENCOUNTER — HOSPITAL ENCOUNTER (EMERGENCY)
Age: 70
Discharge: HOME | End: 2020-07-24
Attending: EMERGENCY MEDICINE
Payer: MEDICARE

## 2020-07-24 VITALS
DIASTOLIC BLOOD PRESSURE: 69 MMHG | HEIGHT: 78 IN | BODY MASS INDEX: 36.45 KG/M2 | HEART RATE: 88 BPM | RESPIRATION RATE: 18 BRPM | TEMPERATURE: 98 F | OXYGEN SATURATION: 97 % | SYSTOLIC BLOOD PRESSURE: 137 MMHG | WEIGHT: 315 LBS

## 2020-07-24 DIAGNOSIS — R42 DIZZINESS: Primary | ICD-10-CM

## 2020-07-24 LAB
ALBUMIN SERPL-MCNC: 3.8 G/DL (ref 3.2–4.6)
ALBUMIN/GLOB SERPL: 0.9 {RATIO} (ref 1.2–3.5)
ALP SERPL-CCNC: 210 U/L (ref 50–136)
ALT SERPL-CCNC: 22 U/L (ref 12–65)
ANION GAP SERPL CALC-SCNC: 6 MMOL/L (ref 7–16)
AST SERPL-CCNC: 18 U/L (ref 15–37)
BASOPHILS # BLD: 0.1 K/UL (ref 0–0.2)
BASOPHILS NFR BLD: 1 % (ref 0–2)
BILIRUB SERPL-MCNC: 0.3 MG/DL (ref 0.2–1.1)
BUN SERPL-MCNC: 23 MG/DL (ref 8–23)
CALCIUM SERPL-MCNC: 8.9 MG/DL (ref 8.3–10.4)
CHLORIDE SERPL-SCNC: 109 MMOL/L (ref 98–107)
CO2 SERPL-SCNC: 26 MMOL/L (ref 21–32)
CREAT SERPL-MCNC: 0.95 MG/DL (ref 0.8–1.5)
DIFFERENTIAL METHOD BLD: ABNORMAL
EOSINOPHIL # BLD: 0.5 K/UL (ref 0–0.8)
EOSINOPHIL NFR BLD: 7 % (ref 0.5–7.8)
ERYTHROCYTE [DISTWIDTH] IN BLOOD BY AUTOMATED COUNT: 15.2 % (ref 11.9–14.6)
GLOBULIN SER CALC-MCNC: 4.1 G/DL (ref 2.3–3.5)
GLUCOSE SERPL-MCNC: 261 MG/DL (ref 65–100)
HCT VFR BLD AUTO: 42.9 % (ref 41.1–50.3)
HGB BLD-MCNC: 14 G/DL (ref 13.6–17.2)
IMM GRANULOCYTES # BLD AUTO: 0 K/UL (ref 0–0.5)
IMM GRANULOCYTES NFR BLD AUTO: 0 % (ref 0–5)
LYMPHOCYTES # BLD: 2.2 K/UL (ref 0.5–4.6)
LYMPHOCYTES NFR BLD: 30 % (ref 13–44)
MCH RBC QN AUTO: 29.7 PG (ref 26.1–32.9)
MCHC RBC AUTO-ENTMCNC: 32.6 G/DL (ref 31.4–35)
MCV RBC AUTO: 90.9 FL (ref 79.6–97.8)
MONOCYTES # BLD: 0.7 K/UL (ref 0.1–1.3)
MONOCYTES NFR BLD: 10 % (ref 4–12)
NEUTS SEG # BLD: 3.8 K/UL (ref 1.7–8.2)
NEUTS SEG NFR BLD: 52 % (ref 43–78)
NRBC # BLD: 0 K/UL (ref 0–0.2)
PLATELET # BLD AUTO: 181 K/UL (ref 150–450)
PMV BLD AUTO: 9.9 FL (ref 9.4–12.3)
POTASSIUM SERPL-SCNC: 4 MMOL/L (ref 3.5–5.1)
PROT SERPL-MCNC: 7.9 G/DL (ref 6.3–8.2)
RBC # BLD AUTO: 4.72 M/UL (ref 4.23–5.6)
SODIUM SERPL-SCNC: 141 MMOL/L (ref 136–145)
WBC # BLD AUTO: 7.3 K/UL (ref 4.3–11.1)

## 2020-07-24 PROCEDURE — 93005 ELECTROCARDIOGRAM TRACING: CPT | Performed by: EMERGENCY MEDICINE

## 2020-07-24 PROCEDURE — 80053 COMPREHEN METABOLIC PANEL: CPT

## 2020-07-24 PROCEDURE — 99285 EMERGENCY DEPT VISIT HI MDM: CPT

## 2020-07-24 PROCEDURE — 85025 COMPLETE CBC W/AUTO DIFF WBC: CPT

## 2020-07-24 NOTE — ED TRIAGE NOTES
Pt c/o dizziness and bilateral arm numbness that started last night. Pt states he felt light headed when he stood up from the recliner that he had been sitting in for hours. Pt denies nausea. Pt masked upon arrival to the ED.

## 2020-07-24 NOTE — ED PROVIDER NOTES
55-year-old gentleman who is 3&4/7 weeks status post left hip replacement surgery. He presents to the ER with some nonspecific dizziness/\"wooziness\". It is nonvertiginous and there is no nausea. Specifically, just prior to arrival, he had been seated in his recliner for couple of hours and when he got up and stood upright he suddenly began to feel faint and lightheaded. He grabbed onto his walker, and then sat back down. Soon as he sat down he started to feel better. Few moments later he arose again, again feeling dizzy and lightheaded albeit less so than a few minutes before. Patient feels back to normal now. He has had no chest pain no dyspnea no palpitations. Patient is on Eliquis anticoagulation for a past history of A. Fib. He came home immediately following the surgery on June 29 and is participating in home health therapy. He did not go to rehab initially. He was released from home health yesterday, states he has not been feeling good lately, particularly in the mornings, due to not sleeping well since the surgery. He initially had some trouble constipation which was relieved with MiraLAX which actually swung the needle into far in the other direction. He estimates he has had 2-3 diarrheal stools a day over the past week but that is improving. There is been no hematochezia or melena.     Eating and drinking okay, and having no urinary difficulty           Past Medical History:   Diagnosis Date    Adverse effect of anesthesia     unable to have spinal anesthesia when had TKR~r/t lumbar spine problems; CT Spine with contrast 7/20/16    Asthma dx childhood    last attack during high school; advair at hs; albuterol prn; neb prn     Atrial fibrillation (HCC)     2 epiosode of A-fib last about 6-8 months ago (noted on 6/27/17); on Eliquis     Cervical spondylosis 6/20/2013    Chronic back pain 2013    RIGHT SIDE WITH UNCLEAR ETIOLOGY    Chronic pain     Colon polyps     COPD (chronic obstructive pulmonary disease) ([facility] 8/17/2016    Coronary atherosclerosis of native coronary vessel 8/17/2016 08/2012: Mild non-obstructive CAD    Diabetes ([facility] dx 5/14    type 2; oral meds; no bs checks at home    Diverticulosis     Edema 8/17/2016    Former smoker     GERD (gastroesophageal reflux disease)      controlled with med    High cholesterol     History of kidney stones     Hypertension     controlled with med    Obesity (BMI 30-39. 9)     BMI 39    Status post total right knee replacement 7/17/2017    Unspecified sleep apnea     wears cpap at hs       Past Surgical History:   Procedure Laterality Date    CYSTOSCOPY,INSERT URETERAL STENT      kidney stone removed cystoscopy ~pt states no stent was placed    HX CATARACT REMOVAL      bilateral with lens implants; x3    HX COLECTOMY  2015    HX COLONOSCOPY  10/6/14    HX COLONOSCOPY  11/20/14    HX HEART CATHETERIZATION  2012    no stents     HX KNEE REPLACEMENT Right     HX LUMBAR LAMINECTOMY  2009    no hardware     HX OTHER SURGICAL  20 yrs ago    colo-rectal sx for fissure    HX RETINAL DETACHMENT REPAIR Bilateral          Family History:   Problem Relation Age of Onset    Heart Disease Father         BOWEL OBSTRUCTION    Heart Attack Father 80        MI    Heart Disease Brother 72        STENT HEART    Suicide Brother     Parkinsonism Mother     Inflammatory Bowel Dz Son     Malignant Hyperthermia Neg Hx     Pseudocholinesterase Deficiency Neg Hx     Delayed Awakening Neg Hx     Post-op Nausea/Vomiting Neg Hx     Emergence Delirium Neg Hx     Post-op Cognitive Dysfunction Neg Hx        Social History     Socioeconomic History    Marital status:      Spouse name: Not on file    Number of children: Not on file    Years of education: Not on file    Highest education level: Not on file   Occupational History    Not on file   Social Needs    Financial resource strain: Not on file    Food insecurity Worry: Not on file     Inability: Not on file    Transportation needs     Medical: Not on file     Non-medical: Not on file   Tobacco Use    Smoking status: Former Smoker     Packs/day: 1.50     Years: 25.00     Pack years: 37.50     Types: Cigarettes     Last attempt to quit: 2012     Years since quittin.3    Smokeless tobacco: Never Used   Substance and Sexual Activity    Alcohol use: Yes     Alcohol/week: 3.0 standard drinks     Types: 3 Cans of beer per week    Drug use: No    Sexual activity: Not on file   Lifestyle    Physical activity     Days per week: Not on file     Minutes per session: Not on file    Stress: Not on file   Relationships    Social connections     Talks on phone: Not on file     Gets together: Not on file     Attends [employer] service: Not on file     Active member of club or organization: Not on file     Attends meetings of clubs or organizations: Not on file     Relationship status: Not on file    Intimate partner violence     Fear of current or ex partner: Not on file     Emotionally abused: Not on file     Physically abused: Not on file     Forced sexual activity: Not on file   Other Topics Concern     Service Not Asked    Blood Transfusions Not Asked    Caffeine Concern Not Asked    Occupational Exposure Not Asked   Jerrilyn Bouillon Hazards Not Asked    Sleep Concern Not Asked    Stress Concern Not Asked    Weight Concern Not Asked    Special Diet Not Asked    Back Care Not Asked    Exercise Not Asked    Bike Helmet Not Asked    Seat Belt Yes    Self-Exams Not Asked   Social History Narrative    1/28/15:  PATIENT IS  TO [facility]. THEY HAVE TWO GROWN CHILDREN. 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 ALLERGIES: Bactrim [sulfamethoxazole-trimethoprim]    Review of Systems   Constitutional: Positive for fatigue. Negative for chills and fever. HENT: Negative for rhinorrhea and sore throat.     Eyes: Negative for discharge and redness. Respiratory: Negative for cough and shortness of breath. Cardiovascular: Negative for chest pain and palpitations. Gastrointestinal: Negative for abdominal pain, diarrhea, nausea and vomiting. Musculoskeletal: Negative for arthralgias and back pain. Skin: Negative for rash. Neurological: Positive for dizziness and light-headedness. Negative for headaches. Psychiatric/Behavioral: Positive for sleep disturbance. All other systems reviewed and are negative. Vitals:    07/24/20 1734 07/24/20 1833 07/24/20 1837   BP: 152/76 148/71 139/61   Pulse: 92 97 90   Resp: 17  18   Temp: 98.4 °F (36.9 °C)     SpO2: 94% 94%    Weight: 152 kg (335 lb)     Height: 6' 7\" (2.007 m)              Physical Exam  Vitals signs and nursing note reviewed. Constitutional:       General: He is not in acute distress. Appearance: Normal appearance. He is well-developed. He is not ill-appearing, toxic-appearing or diaphoretic. Comments: Orthostatics are negative numerically. HENT:      Head: Normocephalic and atraumatic. Eyes:      General: No scleral icterus. Right eye: No discharge. Left eye: No discharge. Conjunctiva/sclera: Conjunctivae normal.      Pupils: Pupils are equal, round, and reactive to light. Neck:      Musculoskeletal: Normal range of motion and neck supple. Cardiovascular:      Rate and Rhythm: Normal rate and regular rhythm. Heart sounds: Normal heart sounds. No murmur. No gallop. Pulmonary:      Effort: Pulmonary effort is normal. No respiratory distress. Breath sounds: Normal breath sounds. No wheezing or rales. Abdominal:      General: Bowel sounds are normal.      Palpations: Abdomen is soft. Tenderness: There is no abdominal tenderness. There is no guarding. Musculoskeletal: Normal range of motion. Skin:     General: Skin is warm and dry. Neurological:      General: No focal deficit present.       Mental Status: He is alert and oriented to person, place, and time. Mental status is at baseline. Motor: No abnormal muscle tone. Comments: cni 2-12 grossly   Psychiatric:         Mood and Affect: Mood normal.         Behavior: Behavior normal.          MDM  Number of Diagnoses or Management Options  Dizziness:   Diagnosis management comments: Medical decision making note:  Nonspecific dizziness/orthostatic in nature though he does not have orthostatic hypotension. Ncheck labs, EKG    This concludes the \"medical decision making note\" part of this emergency department visit note.            Procedures

## 2020-07-25 ENCOUNTER — PATIENT OUTREACH (OUTPATIENT)
Dept: CASE MANAGEMENT | Age: 70
End: 2020-07-25

## 2020-07-25 LAB
ATRIAL RATE: 98 BPM
CALCULATED P AXIS, ECG09: 104 DEGREES
CALCULATED R AXIS, ECG10: -59 DEGREES
CALCULATED T AXIS, ECG11: 63 DEGREES
DIAGNOSIS, 93000: NORMAL
Q-T INTERVAL, ECG07: 348 MS
QRS DURATION, ECG06: 110 MS
QTC CALCULATION (BEZET), ECG08: 425 MS
VENTRICULAR RATE, ECG03: 90 BPM

## 2020-07-25 NOTE — DISCHARGE INSTRUCTIONS
Drink extra fluids over the next day or 2      Patient Education        Dizziness: Care Instructions  Your Care Instructions  Dizziness is the feeling of unsteadiness or fuzziness in your head. It is different than having vertigo, which is a feeling that the room is spinning or that you are moving or falling. It is also different from lightheadedness, which is the feeling that you are about to faint. It can be hard to know what causes dizziness. Some people feel dizzy when they have migraine headaches. Sometimes bouts of flu can make you feel dizzy. Some medical conditions, such as heart problems or high blood pressure, can make you feel dizzy. Many medicines can cause dizziness, including medicines for high blood pressure, pain, or anxiety. If a medicine causes your symptoms, your doctor may recommend that you stop or change the medicine. If it is a problem with your heart, you may need medicine to help your heart work better. If there is no clear reason for your symptoms, your doctor may suggest watching and waiting for a while to see if the dizziness goes away on its own. Follow-up care is a key part of your treatment and safety. Be sure to make and go to all appointments, and call your doctor if you are having problems. It's also a good idea to know your test results and keep a list of the medicines you take. How can you care for yourself at home? · If your doctor recommends or prescribes medicine, take it exactly as directed. Call your doctor if you think you are having a problem with your medicine. · Do not drive while you feel dizzy. · Try to prevent falls. Steps you can take include:  ? Using nonskid mats, adding grab bars near the tub, and using night-lights. ? Clearing your home so that walkways are free of anything you might trip on.  ? Letting family and friends know that you have been feeling dizzy. This will help them know how to help you. When should you call for help?    [id number] anytime you think you may need emergency care. For example, call if:  · You passed out (lost consciousness). · You have dizziness along with symptoms of a heart attack. These may include:  ? Chest pain or pressure, or a strange feeling in the chest.  ? Sweating. ? Shortness of breath. ? Nausea or vomiting. ? Pain, pressure, or a strange feeling in the back, neck, jaw, or upper belly or in one or both shoulders or arms. ? Lightheadedness or sudden weakness. ? A fast or irregular heartbeat. · You have symptoms of a stroke. These may include:  ? Sudden numbness, tingling, weakness, or loss of movement in your face, arm, or leg, especially on only one side of your body. ? Sudden vision changes. ? Sudden trouble speaking. ? Sudden confusion or trouble understanding simple statements. ? Sudden problems with walking or balance. ? A sudden, severe headache that is different from past headaches. Call your doctor now or seek immediate medical care if:  · You feel dizzy and have a fever, headache, or ringing in your ears. · You have new or increased nausea and vomiting. · Your dizziness does not go away or comes back. Watch closely for changes in your health, and be sure to contact your doctor if:  · You do not get better as expected. Where can you learn more? Go to http://chiqui-ruthy.info/  Enter Q823 in the search box to learn more about \"Dizziness: Care Instructions. \"  Current as of: June 26, 2019               Content Version: 12.5  © 8491-2832 [employer]. Care instructions adapted under license by [employer] (which disclaims liability or warranty for this information). If you have questions about a medical condition or this instruction, always ask your healthcare professional. [place] [address] any warranty or liability for your use of this information.          Patient Education        Lightheadedness or Faintness: Care Instructions  Your Care Instructions  Lightheadedness is a feeling that you are about to faint or \"pass out. \" You do not feel as if you or your surroundings are moving. It is different from vertigo, which is the feeling that you or things around you are spinning or tilting. Lightheadedness usually goes away or gets better when you lie down. If lightheadedness gets worse, it can lead to a fainting spell. It is common to feel lightheaded from time to time. Lightheadedness usually is not caused by a serious problem. It often is caused by a short-lasting drop in blood pressure and blood flow to your head that occurs when you get up too quickly from a seated or lying position. Follow-up care is a key part of your treatment and safety. Be sure to make and go to all appointments, and call your doctor if you are having problems. It's also a good idea to know your test results and keep a list of the medicines you take. How can you care for yourself at home? · Lie down for 1 or 2 minutes when you feel lightheaded. After lying down, sit up slowly and remain sitting for 1 to 2 minutes before slowly standing up. · Avoid movements, positions, or activities that have made you lightheaded in the past.  · Get plenty of rest, especially if you have a cold or flu, which can cause lightheadedness. · Make sure you drink plenty of fluids, especially if you have a fever or have been sweating. · Do not drive or put yourself and others in danger while you feel lightheaded. When should you call for help? [id number] anytime you think you may need emergency care. For example, call if:  · You have symptoms of a stroke. These may include:  ? Sudden numbness, tingling, weakness, or loss of movement in your face, arm, or leg, especially on only one side of your body. ? Sudden vision changes. ? Sudden trouble speaking. ? Sudden confusion or trouble understanding simple statements. ? Sudden problems with walking or balance.   ? A sudden, severe headache that is different from past headaches. · You have symptoms of a heart attack. These may include:  ? Chest pain or pressure, or a strange feeling in the chest.  ? Sweating. ? Shortness of breath. ? Nausea or vomiting. ? Pain, pressure, or a strange feeling in the back, neck, jaw, or upper belly or in one or both shoulders or arms. ? Lightheadedness or sudden weakness. ? A fast or irregular heartbeat. After you call 911, the  may tell you to chew 1 adult-strength or 2 to 4 low-dose aspirin. Wait for an ambulance. Do not try to drive yourself. Watch closely for changes in your health, and be sure to contact your doctor if:  · Your lightheadedness gets worse or does not get better with home care. Where can you learn more? Go to http://chiqui-ruthy.info/  Enter [id number] in the search box to learn more about \"Lightheadedness or Faintness: Care Instructions. \"  Current as of: June 26, 2019               Content Version: 12.5  © 6828-9700 [employer]. Care instructions adapted under license by [employer] (which disclaims liability or warranty for this information). If you have questions about a medical condition or this instruction, always ask your healthcare professional. [address] any warranty or liability for your use of this information.

## 2020-07-25 NOTE — PROGRESS NOTES
1st attempt for COVID 19 Risk Education. Unable to reach patient on the contact information provided, left voice message. This Care Coordinator will attempt to contact patient again within 1 business day.

## 2020-07-25 NOTE — ED NOTES
I have reviewed discharge instructions with the patient. The patient verbalized understanding. Patient left ED via Discharge Method: wheelchair to Home with  wife). Opportunity for questions and clarification provided. Patient given 0 scripts. To continue your aftercare when you leave the hospital, you may receive an automated call from our care team to check in on how you are doing. This is a free service and part of our promise to provide the best care and service to meet your aftercare needs.  If you have questions, or wish to unsubscribe from this service please call [phone]. Thank you for Choosing our [address] [place] [address] Emergency Department.

## 2020-07-27 ENCOUNTER — HOSPITAL ENCOUNTER (OUTPATIENT)
Dept: PHYSICAL THERAPY | Age: 70
Discharge: HOME | End: 2020-07-27
Payer: MEDICARE

## 2020-07-27 ENCOUNTER — PATIENT OUTREACH (OUTPATIENT)
Dept: CASE MANAGEMENT | Age: 70
End: 2020-07-27

## 2020-07-27 PROCEDURE — 97162 PT EVAL MOD COMPLEX 30 MIN: CPT

## 2020-07-27 PROCEDURE — 97110 THERAPEUTIC EXERCISES: CPT

## 2020-07-27 NOTE — PROGRESS NOTES
Patient contacted regarding recent discharge and COVID-19 risk. Discussed COVID-19 related testing which was available at this time. Test results were negative. Patient informed of results, if available? yes    Care Transition Nurse/ Ambulatory Care Manager contacted the patient by telephone to perform post discharge assessment. Verified name and  with patient as identifiers. Patient has following risk factors of: dizziness. CCreviewed discharge instructions, medical action plan and red flags related to discharge diagnosis. Reviewed and educated them on any new and changed medications related to discharge diagnosis. Advised obtaining a 90-day supply of all daily and as-needed medications. Advance Care Planning:   Does patient have an Advance Directive: yes, reviewed and current     Education provided regarding infection prevention, and signs and symptoms of COVID-19 and when to seek medical attention with parent who verbalized understanding. Discussed exposure protocols and quarantine from [address] you at higher risk for severe illness  and given an opportunity for questions and concerns. The parent agrees to contact the COVID-19 hotline [phone] or PCP office for questions related to their healthcare. CTN/ACM provided contact information for future reference. From CDC: Are you at higher risk for severe illness?  Wash your hands often.  Avoid close contact (6 feet, which is about two arm lengths) with people who are sick.  Put distance between yourself and other people if COVID-19 is spreading in your community.  Clean and disinfect frequently touched surfaces.  Avoid all cruise travel and non-essential air travel.  Call your healthcare professional if you have concerns about COVID-19 and your underlying condition or if you are sick.     For more information on steps you can take to protect yourself, see CDC's How to Protect Yourself      Patient/family/caregiver given information for GetWell Loop and agrees to enroll no  Patient's preferred e-mail:   Patient's preferred phone number:   Based on Loop alert triggers, patient will be contacted by nurse care manager for worsening symptoms. Plan for follow-up call in 7-14 days based on severity of symptoms and risk factors.

## 2020-07-27 NOTE — THERAPY EVALUATION
Jolie Menezes  : 1950  [place]  Secondary[place] [facility]  [address], [address]  [phone]   [phone]        OUTPATIENT PHYSICAL THERAPY:Initial Assessment 2020   ICD-10: Treatment Diagnosis: M25.552, R26.2, O87.799  Precautions/Allergies:   Bactrim [sulfamethoxazole-trimethoprim]   TREATMENT PLAN:  Effective Dates: 2020 TO 10/25/2020 (90 days). Frequency/Duration: 2 times a week for 90 Day(s) MEDICAL/REFERRING DIAGNOSIS:  Presence of left artificial hip joint [Z96.642]   DATE OF ONSET: 2020  REFERRING PHYSICIAN: Last Benavides MD MD Orders: Cy Saeed and treat s/p L CODY  Return MD Appointment: 2020     INITIAL ASSESSMENT:  Mr. Mouna Brandon presents to physical therapy with MD diagnosis of a L CODY. Pt demonstrated signs and symptoms consistent with this diagnosis. On objective examination, the patient demonstrated deficits in ROM, strength, ambulatory ability, functional mobility, joint mobility, balance, independence with gait . The patient also had increased pain. The patient is limited in the following activities: walking, standing, sitting. The patient has a good  prognosis for recovery based on the examination findings and may be limited by: N/A. PROBLEM LIST (Impacting functional limitations):  1. Decreased Strength  2. Decreased ADL/Functional Activities  3. Decreased Transfer Abilities  4. Decreased Ambulation Ability/Technique  5. Decreased Balance  6. Increased Pain  7. Decreased Activity Tolerance  8. Decreased Flexibility/Joint Mobility  9. Decreased Skin Integrity/Hygeine INTERVENTIONS PLANNED: (Treatment may consist of any combination of the following)  1. Balance Exercise  2. Cryotherapy  3. Gait Training  4. Home Exercise Program (HEP)  5. Neuromuscular Re-education/Strengthening  6. Range of Motion (ROM)  7.  Therapeutic Activites  8. Therapeutic Exercise/Strengthening     GOALS: (Goals have been discussed and agreed upon with patient.)  Short-Term Functional Goals: Time Frame: 4 weeks  1. Pt will be able to ambulate with a single point cane  2. Pt will   Discharge Goals: Time Frame: 10 weeks  1. Pt will be able to walk for at least 15min without assistive device  2. OUTCOME MEASURE:   Tool Used: Lower Extremity Functional Scale (LEFS)  Score:  Initial: 10/80 Most Recent: X/80 (Date: -- )   Interpretation of Score: 20 questions each scored on a 5 point scale with 0 representing \"extreme difficulty or unable to perform\" and 4 representing \"no difficulty\". The lower the score, the greater the functional disability. 80/80 represents no disability. Minimal detectable change is 9 points. MEDICAL NECESSITY:   · Patient is expected to demonstrate progress in strength, range of motion, balance, coordination and functional technique to increase independence with ambulation, ADLs, household duties. REASON FOR SERVICES/OTHER COMMENTS:  · Patient requires skilled physical therapy in order to return to prior level of function. Total Duration:       Rehabilitation Potential For Stated Goals: Good  Regarding Jc Rojas therapy, I certify that the treatment plan above will be carried out by a therapist or under their direction. Thank you for this referral,  Abundio Saucedo PT, DPT, OCS  Referring Physician Signature: Bella Tavares MD _______________________________ Date _____________     PAIN/SUBJECTIVE:   Initial:   4/10 Post Session:  4/10   HISTORY:   History of Injury/Illness (Reason for Referral):  Pt is s/p L CODY on 6/29/2020. Since then the patient reports he has been doing fine. Pt reports he wasn't having much hip pain, but constant and consistent LBP. Pt reports having some back pain since the surgery, but that has been improving.  Was discharged from home health last Thursday, states incision is well-healing, staples are removed and steri-strips are falling off now. Pt ambulates primarily with a rolling walker, feels comfortable with it and is interested in progressing to a cane. Past Medical History/Comorbidities:   Mr. 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 He also has no past medical history of Aneurysm ([address] Autoimmune disease ([address] Cancer ([address] Chronic kidney disease, Coagulation defects, Coagulation disorder ([address] Dementia, Difficult intubation, Endocarditis, Heart failure ([address] Ill-defined condition, Infectious disease, Liver disease, Malignant hyperthermia due to anesthesia, Nausea & vomiting, Neurological disorder, Nicotine vapor product user, Non-nicotine vapor product user, Other ill-defined conditions(799.89), Pseudocholinesterase deficiency, Psychiatric disorder, PUD (peptic ulcer disease), Rheumatic fever, Seizures ([address] Stroke ([address] Thromboembolus ([address] Thyroid disease, or Unspecified adverse effect of anesthesia. Mr. Carmelo Kilgore  has a past surgical history that includes pr cystoscopy,insert ureteral stent; hx colonoscopy (10/6/14); hx colonoscopy (11/20/14); hx lumbar laminectomy (2009); hx retinal detachment repair (Bilateral); hx cataract removal; hx other surgical (20 yrs ago); hx colectomy (2015); hx heart catheterization (2012); and hx knee replacement (Right).   Social History/Living Environment:     [facility] home with stairs, adaptive shower equipment, rolling walker  Prior Level of Function/Work/Activity:  Exercised 3-4x/week (walking, aqua walking)  Personal Factors:          Sex:  male        Age:  77 y.o. Ambulatory/Rehab Services H2 Model Falls Risk Assessment   Risk Factors:       (1)  Dizziness/Vertigo       (1)  Gender [Male]       (1)  Visual Impairment [specify:  Corrective lenses] Ability to Rise from Chair:       (1)  Pushes up, successful in one attempt   Falls Prevention Plan: Mobility Assistance Device (specify):  Rolling walker   Total: (5 or greater = High Risk): 4   ©2010 [facility] of [employer]. All Rights Reserved. [facility] Patent #[id number]. Federal Law prohibits the replication, distribution or use without written permission from [facility] [email or web address]   Current Medications:       Current Outpatient Medications:     HYDROmorphone (DILAUDID) 2 mg tablet, Take 2 mg by mouth every four (4) hours as needed for Pain., Disp: , Rfl:     cyclobenzaprine (FLEXERIL) 10 mg tablet, Take 5 mg by mouth three (3) times daily as needed for Muscle Spasm(s). , Disp: , Rfl:     cyclobenzaprine (FLEXERIL) 10 mg tablet, Take 5 mg by mouth three (3) times daily as needed for Muscle Spasm(s). , Disp: , Rfl:     polyethylene glycol (MIRALAX) 17 gram packet, Take 17 g by mouth daily. Mix with 8 oz water, Disp: , Rfl:     menthol/zinc/aloe/chamomil oil (CHAMOSYN EX), Apply 1 Dose to affected area as needed (skin irritation). , Disp: , Rfl:     petrolatum,white (REMEDY PHYTOPLEX Z-GUARD EX), Apply 1 Dose to affected area as needed (skin irritation). , Disp: , Rfl:     acetaminophen (TYLENOL) 500 mg tablet, Take 500-1,000 mg by mouth every six (6) hours as needed for Pain., Disp: , Rfl:     diphenhydrAMINE (BENADRYL) 50 mg capsule, Take 100 mg by mouth nightly as needed for Sleep., Disp: , Rfl:     nystatin (Nyamyc) powder, Apply 1 Dose to affected area as needed for Other (yeast between skin folds).  Apply enough powder to cover the affected area 2-3 times daily following shower under abdominal folds, Disp: , Rfl:     colesevelam (WELCHOL) 625 mg tablet, Take 625-3,750 mg by mouth as needed for Diarrhea. Patient taking 2 tabs following diarrheal bowel movement up to 3 times. Maximum daily dosage: 6 tabs, Disp: , Rfl:     pantoprazole (PROTONIX) 40 mg tablet, Take 1 Tab by mouth daily. (Patient taking differently: Take 40 mg by mouth daily. Indications: gastroesophageal reflux disease), Disp: 90 Tab, Rfl: 1    montelukast (SINGULAIR) 10 mg tablet, Take 1 Tab by mouth daily. (Patient taking differently: Take 10 mg by mouth nightly. Indications: controller medication for asthma), Disp: 90 Tab, Rfl: 1    metoprolol tartrate (LOPRESSOR) 25 mg tablet, Take 2 Tabs by mouth two (2) times a day. (Patient taking differently: Take 50 mg by mouth two (2) times a day. Indications: ventricular rate control in atrial fibrillation), Disp: 360 Tab, Rfl: 1    lisinopriL (PRINIVIL, ZESTRIL) 10 mg tablet, Take 1 Tab by mouth daily. (Patient taking differently: Take 10 mg by mouth daily. Indications: high blood pressure), Disp: 90 Tab, Rfl: 1    glimepiride (AMARYL) 1 mg tablet, Take 1 Tab by mouth Daily (before breakfast). , Disp: 90 Tab, Rfl: 1    ezetimibe (ZETIA) 10 mg tablet, Take 1 Tab by mouth nightly. (Patient taking differently: Take 10 mg by mouth nightly. Indications: high cholesterol), Disp: 90 Tab, Rfl: 1    doxazosin (CARDURA) 4 mg tablet, Take 1 Tab by mouth daily. (Patient taking differently: Take 4 mg by mouth daily. Indications: enlarged prostate with urination problem), Disp: 90 Tab, Rfl: 1    celecoxib (CeleBREX) 200 mg capsule, Take 1 Cap by mouth daily. (Patient taking differently: Take 200 mg by mouth daily. Indications: joint damage causing pain and loss of function), Disp: 90 Cap, Rfl: 1    atorvastatin (LIPITOR) 20 mg tablet, Take 1 Tab by mouth daily. (Patient taking differently: Take 20 mg by mouth nightly. Indications: high cholesterol), Disp: 90 Tab, Rfl: 1    Trulicity 1.5 NL/4.9 mL sub-q pen, 0.5 mL by SubCUTAneous route every seven (7) days. (Patient taking differently: 1.5 mg by SubCUTAneous route every seven (7) days. Takes every  Wednesday at bedtime  Indications: type 2 diabetes mellitus), Disp: 12 Pen, Rfl: 1    apixaban (Eliquis) 5 mg tablet, Take 1 Tab by mouth two (2) times a day. (Patient taking differently: Take 5 mg by mouth two (2) times a day. Indications: treatment to prevent blood clots in chronic atrial fibrillation), Disp: 180 Tab, Rfl: 1    dapagliflozin (Farxiga) 10 mg tab tablet, Take 1 Tab by mouth daily. (Patient taking differently: Take 10 mg by mouth daily. Indications: type 2 diabetes mellitus), Disp: 90 Tab, Rfl: 1    fluticasone furoate-vilanteroL (Breo Ellipta) 200-25 mcg/dose inhaler, Take 1 Puff by inhalation daily. (Patient taking differently: Take 1 Puff by inhalation daily. Indications: controller medication for asthma), Disp: 1 Inhaler, Rfl: 5    fluticasone propionate (FLONASE ALLERGY RELIEF) 50 mcg/actuation nasal spray, 2 Sprays by Both Nostrils route daily as needed for Allergies. Indications: inflammation of the nose due to an allergy, Disp: , Rfl:     albuterol (PROVENTIL HFA, VENTOLIN HFA, PROAIR HFA) 90 mcg/actuation inhaler, Take 1 Puff by inhalation every four (4) hours as needed for Wheezing., Disp: 1 Inhaler, Rfl: 5    B.infantis-B.ani-B.long-B.bifi (PROBIOTIC 4X) 10-15 mg TbEC, Take  by mouth daily. , Disp: , Rfl:     ANTI-FUNGAL 2 % topical powder, APPLY TO ABDOMEN 2 TIMES PER DAY, Disp: , Rfl: 11    Blood-Glucose Meter monitoring kit, Check fs every day; DM2, E11/9, Contour glucometer, Disp: 1 Kit, Rfl: 0    glucose blood VI test strips (ASCENSIA CONTOUR) strip, Check fs every day, DM2, E11.9, Disp: 100 Strip, Rfl: 1    lancets 28 gauge misc, Check fs every day, DM2, E11.9, Disp: 100 Lancet, Rfl: 1    albuterol (PROVENTIL VENTOLIN) 2.5 mg /3 mL (0.083 %) nebulizer solution, 3 mL by Nebulization route once for 1 dose. (Patient taking differently: 2.5 mg by Nebulization route daily as needed for Wheezing. Patient states he hasn't used in many years), Disp: 24 Each, Rfl: 11   Date Last Reviewed:  2020   Number of Personal Factors/Comorbidities that affect the Plan of Care: 3+: HIGH COMPLEXITY   EXAMINATION:   Observation  Gait: Patient ambulates slowly with rolling walker    ROM   Right Left   Flexion 110 80   Extension 0 0   ER WNL 15   IR 10 0     Strength (all MMT scores are graded on a scale of 0-5)   Right Left   Hip      Flexion 5 NT   Abduction 4 NT   Extension 4 NT   Glute Max 4 NT   Knee     Extension 5 5   Flexion 5 5   Ankle     Dorsiflexion 5 5   Plantarflexion 5 5       Joint/Soft Tissue Mobility   Description   Joint Mobility  Hip: not assessed due to surgical precautions   Soft Tissue Mobility TTP around incision     Functional Mobility  TU.5sec  30sec Sit to Stand: 11x w/ use of hands       Body Structures Involved:  1. Bones  2. Joints  3. Muscles  4. Ligaments Body Functions Affected:  1. Neuromusculoskeletal  2. Movement Related Activities and Participation Affected:  1. General Tasks and Demands  2. Mobility  3. Self Care  4. Domestic Life  5. Interpersonal Interactions and Relationships  6.  Community, Social and [place]   Number of elements (examined above) that affect the Plan of Care: 4+: HIGH COMPLEXITY   CLINICAL PRESENTATION:   Presentation: Stable and uncomplicated: LOW COMPLEXITY   CLINICAL DECISION MAKING:   Use of outcome tool(s) and clinical judgement create a POC that gives a: Clear prediction of patient's progress: LOW COMPLEXITY     Tae Pearce PT, DPT, OCS

## 2020-07-27 NOTE — PROGRESS NOTES
Yasemin Alcaraz  : 1950  [place]  Secondary: [place] [facility] [facility]  [address], [address]  [phone]   [phone]       OUTPATIENT PHYSICAL THERAPY:Daily Note 2020      TREATMENT:   PT Patient Time In/Time Out  Time In: 0010  Time Out: 1557      Total Time: 48min  Visit Count:  1   ICD-10: Treatment Diagnosis: M25.552, R26.2, Z96.642      Subjective: See Evaluation note for 2020 for details   Pain:     Objective: See Evaluation note for 2020 for details      Therapeutic Exercise: (25min) Done in order to improve strength, ROM and understanding of current condition.     Date:   Date:   Date:   Date:     Activity/Exercise Parameters      Education Discussed examination findings, HEP, plan of care      Walking Practice with cane      Glute Sets [id number]      Hip ADD Iso [id number]      Clams x30, seated green TB                 Manual Therapy: (0min) Done in order to improve joint and soft tissue mobility,reduce muscle guarding, and decrease muscle tone   Date:   Date:   Date:   Date:     Type Parameters      Joint Mobilization       Soft Tissue Mobilization           Modalities: (-) Done in order to reduce swelling and pain    Assessment: See Evaluation note for 2020 for details    Plan: See Evaluation note for 2020 for details    Future Appointments   Date Time Provider Lorraine Coronel   2020  3:00 PM Lauren Elkin, PT [facility]   8/3/2020  3:00 PM Lauren Elkin, PT [facility]   2020  3:00 PM Lauren Elkin, PT [facility]   8/10/2020  3:00 PM Lauren Elkin, PT [facility]   2020  3:00 PM Lauren Elkin, PT [facility]   2020  3:00 PM Lauren Elkin, PT [facility]   2020  3:00 PM Lauren Elkin, PT [facility]   2020  1:00 PM Andi Marcum MD [facility]   9/14/2020  9:00 AM Amena Boyce MD [facility]   9/21/2020  3:00 PM Amena Boyce MD [facility]       Zuleima Whitaker Time:  13min evaluation      Arlet Vyas PT, DPT, OCS

## 2020-07-28 NOTE — PROGRESS NOTES
Patient with trace hematuria on urine dipstick. On Eliquis. No urinary symptoms. Hgb/Hct normal. I will refer back to PCP to make him aware. He may have a history of this. Reviewing this test result now after patient has been discharged.

## 2020-07-29 ENCOUNTER — HOSPITAL ENCOUNTER (OUTPATIENT)
Dept: PHYSICAL THERAPY | Age: 70
Discharge: HOME | End: 2020-07-29
Payer: MEDICARE

## 2020-07-29 PROCEDURE — 97110 THERAPEUTIC EXERCISES: CPT

## 2020-07-29 NOTE — PROGRESS NOTES
Vandana Brar  : 1950  [place]  Secondary: Polo Olivarez [address] [facility]  [address], [address]  [phone]   [phone]       OUTPATIENT PHYSICAL THERAPY:Daily Note 2020      TREATMENT:   PT Patient Time In/Time Out  Time In: 1500  Time Out: 1545      Total Time: 45min  Visit Count:  2   ICD-10: Treatment Diagnosis: M25.552, R26.2, E79.534      Subjective: Pt states he was a little sore after the last session but he is doing ok   Pain:     Objective: Therapeutic Exercise: (45min) Done in order to improve strength, ROM and understanding of current condition.     Date:   Date:   Date:   Date:     Activity/Exercise Parameters      Education Discussed examination findings, HEP, plan of care      Walking Practice with cane      Glute Sets [id number]      Hip ADD Iso [id number]      Clams x30, seated green TB [id number], seated green TB     LAQ  3x15, 3lbs     Hip ER/IR  [id number] iso in neutral     SLR  3x10 (quad set on L)     Weight Shifts  x5min     Marching  x30 standing     Step Taps  3x1min, 6\"                Manual Therapy: (0min) Done in order to improve joint and soft tissue mobility,reduce muscle guarding, and decrease muscle tone   Date:   Date:   Date:   Date:     Type Parameters      Joint Mobilization       Soft Tissue Mobilization           Modalities: (-) Done in order to reduce swelling and pain    Assessment: Pt able to progress some exercises, but fatigued quickly    Plan: Continue per POC    Future Appointments   Date Time Provider Lorraine Coronel   8/3/2020  3:00 PM Yogesh Cooper, PT [facility]   2020  3:00 PM Yogesh Cooper, PT [facility]   8/10/2020  3:00 PM Yogesh Cooper, PT [facility]   2020  3:00 PM Yogesh Cooper, PT [facility]   2020  3:00 PM Yogesh Cooper, PT [facility]   2020 3:00 PM Yogesh Cooper, PT [facility]   8/24/2020  1:00 PM Turner Bates MD [facility]   9/14/2020  9:00 AM Sue Castaneda MD [facility]   9/21/2020  3:00 PM Sue Castaneda MD [facility]       Aly Yusuf Time:        Tevin Jeronimo PT, DPT, OCS

## 2020-08-03 ENCOUNTER — HOSPITAL ENCOUNTER (OUTPATIENT)
Dept: PHYSICAL THERAPY | Age: 70
Discharge: HOME | End: 2020-08-03
Payer: MEDICARE

## 2020-08-03 PROCEDURE — 97110 THERAPEUTIC EXERCISES: CPT

## 2020-08-03 NOTE — PROGRESS NOTES
Vandana Brar  : 1950  [place]  [place]: [place] [address] [facility]  [address], [address]  [phone]   [phone]       OUTPATIENT PHYSICAL THERAPY:Daily Note 8/3/2020      TREATMENT:   PT Patient Time In/Time Out  Time In: 1502  Time Out: 1546      Total Time: 44min  Visit Count:  3   ICD-10: Treatment Diagnosis: M25.552, R26.2, Y34.630      Subjective: Pt states he is doing well   Pain:     Objective: Therapeutic Exercise: (44min) Done in order to improve strength, ROM and understanding of current condition.     Date:   Date:   Date:  8/3 Date:        Activity/Exercise Parameters         Education Discussed examination findings, HEP, plan of care         NuStep   x8min lvl 1       Walking Practice with cane  x45ft with cane       Glute Sets [id number]         Hip ADD Iso [id number]         Clams x30, seated green TB 92o3xdt, seated green TB        LAQ  3x15, 3lbs 3x15, 3lbs       Hip ER/IR  [id number] iso in neutral        SLR  3x10 (quad set on L)        Weight Shifts  x5min X4min, split stance       Marching  x30 standing 2x15 standing       Step Taps  3x1min, 6\" 3x1min, 6\"       Sit to Stand   3x10                                                                                 Manual Therapy: (0min) Done in order to improve joint and soft tissue mobility,reduce muscle guarding, and decrease muscle tone   Date:   Date:   Date:   Date:     Type Parameters      Joint Mobilization       Soft Tissue Mobilization           Modalities: (-) Done in order to reduce swelling and pain    Assessment: Pt encouraged to spend more time at home with week walking with a cane in order to progress fully off walker    Plan: Continue per POC    Future Appointments   Date Time Provider Lorraine Coronel   2020  3:00 PM LASHELL Manzanares   8/10/2020  3:00 PM Andreas Penn, PT [facility]   8/12/2020  3:00 PM Andreas Penn, PT [facility]   8/17/2020  3:00 PM Andreas Penn, PT [facility]   8/19/2020  3:00 PM Andreas Penn, PT [facility]   8/24/2020  1:00 PM Boris Buckner MD [facility]   9/14/2020  9:00 AM MD ELVI Wright [facility]   9/21/2020  3:00 PM MD ELVI Wright [facility]       Mahsa Vela Time:        Mily Duncan PT, DPT, OCS

## 2020-08-05 ENCOUNTER — HOSPITAL ENCOUNTER (OUTPATIENT)
Dept: PHYSICAL THERAPY | Age: 70
Discharge: HOME | End: 2020-08-05
Payer: MEDICARE

## 2020-08-05 PROCEDURE — 97110 THERAPEUTIC EXERCISES: CPT

## 2020-08-05 NOTE — PROGRESS NOTES
Terry Eli  : 1950  [place]  [place]: [place] [address]00 [facility] Therapy  [address], [address]  [phone]   [phone]       OUTPATIENT PHYSICAL THERAPY:Daily Note 2020      TREATMENT:   PT Patient Time In/Time Out  Time In: 0305  Time Out: 0405      Total Time: 44min  Visit Count:  4   ICD-10: Treatment Diagnosis: M25.552, R26.2, O69.810      Subjective: Pt states he is doing well. Sees Dr Lex Gorman tomorrow.  Pain: 0    Objective: Therapeutic Exercise: (55min) Done in order to improve strength, ROM and understanding of current condition. Date:   Date:   Date:  8/3 Date:        Activity/Exercise Parameters         Education Discussed examination findings, HEP, plan of care         NuStep   x8min lvl 1 X 10 min, level 2      Walking Practice with cane  x45ft with cane X 50-75' with cane x 3      Glute Sets [id number]         Hip ADD Iso 26x0kok         Clams x30, seated green TB 06i6lwm, seated green TB        LAQ  3x15, 3lbs 3x15, 3lbs 3 x 15,  3#      Hip ER/IR  [id number] iso in neutral        SLR  3x10 (quad set on L)        Weight Shifts  x5min X4min, split stance X 2 min       Marching  x30 standing 2x15 standing       Step Taps  3x1min, 6\" 3x1min, 6\"       Sit to Stand   3x10 3 x 10, 2 sets with ball at knees      Heel raises    2 x 20                                                                      Manual Therapy: (0min) Done in order to improve joint and soft tissue mobility,reduce muscle guarding, and decrease muscle tone   Date:   Date:   Date:   Date:     Type Parameters      Joint Mobilization       Soft Tissue Mobilization           Modalities: (-) Done in order to reduce swelling and pain    Assessment: Pt encouraged to spend more time at home with week walking with a cane in order to progress fully off walker.   Pt stated that he doesn't yet fully trust his balance so transitioning to cane is a bit scary. Deconditioning also impedes him going to the cane full time.     Plan: Continue per POC    Future Appointments   Date Time Provider Lroraine Berna   8/10/2020  3:00 PM Yogesh Cooper, PT [facility]   8/12/2020  3:00 PM Yogesh Cooper, PT [facility]   8/17/2020  3:00 PM Yogesh Cooper, PT [facility]   8/19/2020  3:00 PM Yogesh Cooper, PT [facility]   8/24/2020  1:00 PM Turner Bates MD [facility]   9/14/2020  9:00 AM Sue Castaneda MD [facility]   9/21/2020  3:00 PM Sue Castaneda MD [facility]       Aly Yusuf Time:        Tevin Jeronimo PT, DPT, OCS

## 2020-08-10 ENCOUNTER — HOSPITAL ENCOUNTER (OUTPATIENT)
Dept: PHYSICAL THERAPY | Age: 70
Discharge: HOME | End: 2020-08-10
Payer: MEDICARE

## 2020-08-10 ENCOUNTER — PATIENT OUTREACH (OUTPATIENT)
Dept: CASE MANAGEMENT | Age: 70
End: 2020-08-10

## 2020-08-10 PROCEDURE — 97110 THERAPEUTIC EXERCISES: CPT

## 2020-08-10 NOTE — PROGRESS NOTES
Juan Antonio Christiano  : 1950  [place]  Secondary: [place] 3500 [facility] Therapy  [address], [address]  [phone]   [phone]       OUTPATIENT PHYSICAL THERAPY:Daily Note 8/10/2020      TREATMENT:   PT Patient Time In/Time Out  Time In: 1505  Time Out: 1550      Total Time: 45min  Visit Count:  5   ICD-10: Treatment Diagnosis: M25.552, R26.2, S56.637      Subjective: Pt states he got a good report from Dr. Travon Mercer, is able to drive now     Pain: 0    Objective: Therapeutic Exercise: (45min) Done in order to improve strength, ROM and understanding of current condition.     Date:   Date:   Date:  8/3 Date:   Date:  8/10     Activity/Exercise Parameters         Education Discussed examination findings, HEP, plan of care         NuStep   x8min lvl 1 X 10 min, level 2 X10min, lvl 2     Walking Practice with cane  x45ft with cane X 50-75' with cane x 3 x60ft x 3     Glute Sets 62b0teo         Hip ADD Iso 15h9xdp         Clams x30, seated green TB 70u7jed, seated green TB        LAQ  3x15, 3lbs 3x15, 3lbs 3 x 15,  3# 3x15, 5lbs     Hip ER/IR  [id number] iso in neutral        SLR  3x10 (quad set on L)        Weight Shifts  x5min X4min, split stance X 2 min       Marching  x30 standing 2x15 standing  2x10, 5lbs     Step Taps  3x1min, 6\" 3x1min, 6\"       Sit to Stand   3x10 3 x 10, 2 sets with ball at knees 3x10, 2 sets with ball at knees     Heel raises    2 x 20 3x15     Hamstring Curls     2x8, 5lbs                                                           Manual Therapy: (0min) Done in order to improve joint and soft tissue mobility,reduce muscle guarding, and decrease muscle tone   Date:   Date:   Date:   Date:     Type Parameters      Joint Mobilization       Soft Tissue Mobilization           Modalities: (-) Done in order to reduce swelling and pain    Assessment: Pt has progressed off walker, came to PT using straight cane. Pt ambulates slow and with caution.  Has difficulty walking initially after standing    Plan: Continue per POC    Future Appointments   Date Time Provider Lorraine Coronel   8/12/2020  3:00 PM Lauren Granger, PT [facility]   8/17/2020  3:00 PM Lauren Granger, PT [facility]   8/19/2020  3:00 PM Lauren Granger PT [facility]   8/24/2020  1:00 PM Andi Marcum MD [facility]   9/14/2020  9:00 AM Fritz Ibanez MD [facility]   9/21/2020  3:00 PM Fritz Ibanez MD [facility]       Lacy Ramon Time:        Rajani Barclay PT, DPT, OCS

## 2020-08-10 NOTE — PROGRESS NOTES
Patient resolved from Transition of Care episode on 8/10/2020  Discussed COVID-19 related testing which was available at this time. Test results were negative. Patient informed of results, if available? yes     Patient/family has been provided the following resources and education related to COVID-19:                         Signs, symptoms and red flags related to COVID-19            CDC exposure and quarantine guidelines            [facility] exposure contact - [phone]            Contact for their local Department of Health                 Patient currently reports that the following symptoms have improved:  Dizziness. No further outreach scheduled with this CTN/ACM/LPN/HC/ MA. Episode of Care resolved. Patient has this CTN/ACM/LPN/HC/MA contact information if future needs arise.

## 2020-08-12 ENCOUNTER — HOSPITAL ENCOUNTER (OUTPATIENT)
Dept: PHYSICAL THERAPY | Age: 70
Discharge: HOME | End: 2020-08-12
Payer: MEDICARE

## 2020-08-12 PROCEDURE — 97110 THERAPEUTIC EXERCISES: CPT

## 2020-08-12 NOTE — PROGRESS NOTES
Maxx Romero  : 1950  [place]  [place]: [place] 3500 [facility] Therapy  [address], [address]  [phone]   [phone]       OUTPATIENT PHYSICAL THERAPY:Daily Note 2020      TREATMENT:   PT Patient Time In/Time Out  Time In: 1507  Time Out: 1553      Total Time: 46min  Visit Count:  6   ICD-10: Treatment Diagnosis: M25.552, R26.2, Z96.642      Subjective: Pt states he is a little sore today because he did a lot of standing and walking yesterday    Pain: 0    Objective: Therapeutic Exercise: (46min) Done in order to improve strength, ROM and understanding of current condition.     Date:   Date:   Date:  8/3 Date:   Date:  8/10 Date:      Activity/Exercise Parameters         Education Discussed examination findings, HEP, plan of care         NuStep   x8min lvl 1 X 10 min, level 2 X10min, lvl 2 x12min lvl 1    Walking Practice with cane  x45ft with cane X 50-75' with cane x 3 x60ft x 3 x30ft, x60ft, x90ft    Glute Sets 95m7nax         Hip ADD Iso 32e1gls     [id number] ball squeeze    Clams x30, seated green TB 47e9iff, seated green TB        LAQ  3x15, 3lbs 3x15, 3lbs 3 x 15,  3# 3x15, 5lbs 3x15, 5lbs    Hip ER/IR  [id number] iso in neutral        SLR  3x10 (quad set on L)        Weight Shifts  x5min X4min, split stance X 2 min       Marching  x30 standing 2x15 standing  2x10, 5lbs 2x10, 5lbs    Step Taps  3x1min, 6\" 3x1min, 6\"       Sit to Stand   3x10 3 x 10, 2 sets with ball at knees 3x10, 2 sets with ball at knees 3x10    Heel raises    2 x 20 3x15 3x15    Hamstring Curls     2x8, 5lbs 2x8, 5lbs                                                          Manual Therapy: (0min) Done in order to improve joint and soft tissue mobility,reduce muscle guarding, and decrease muscle tone   Date:   Date:   Date:   Date:     Type Parameters      Joint Mobilization Soft Tissue Mobilization           Modalities: (-) Done in order to reduce swelling and pain    Assessment: Pt continues to improve gait and walking ability.  Able to increase total volume of exercise    Plan: Continue per POC    Future Appointments   Date Time Provider Lorraine Coronel   8/17/2020  3:00 PM Oli Pollocks, PT [facility]   8/19/2020  3:00 PM Oli Jo, PT [facility]   8/24/2020  1:00 PM Mayo Ramirez MD [facility]   9/14/2020  9:00 AM Stella Rouse MD [facility]   9/21/2020  3:00 PM Stella Rouse MD [facility]       Alberta Delcid Time:        Arch Lewis PT, DPT, OCS

## 2020-08-17 ENCOUNTER — HOSPITAL ENCOUNTER (OUTPATIENT)
Dept: PHYSICAL THERAPY | Age: 70
Discharge: HOME | End: 2020-08-17
Payer: MEDICARE

## 2020-08-17 PROCEDURE — 97110 THERAPEUTIC EXERCISES: CPT

## 2020-08-17 NOTE — PROGRESS NOTES
Sosa Cam  : 1950  [place]  Secondary: Polo Olivarez 3500 [facility] Therapy  [address], [address]  [phone]   [phone]       OUTPATIENT PHYSICAL THERAPY:Daily Note 2020      TREATMENT:   PT Patient Time In/Time Out  Time In: 1500  Time Out: 1556      Total Time: 56min  Visit Count:  7   ICD-10: Treatment Diagnosis: M25.552, R26.2, Z50.404      Subjective: Pt states he is doing well, seems to be getting better   Pain: 0    Objective: Therapeutic Exercise: (53min) Done in order to improve strength, ROM and understanding of current condition.     Date:   Date:   Date:  8/3 Date:   Date:  8/10 Date:   Date:     Activity/Exercise Parameters         Education Discussed examination findings, HEP, plan of care         NuStep   x8min lvl 1 X 10 min, level 2 X10min, lvl 2 x12min lvl 1 x15min lvl 2   Walking Practice with cane  x45ft with cane X 50-75' with cane x 3 x60ft x 3 x30ft, x60ft, x90ft x30ft, x60ft, x90ft   Glute Sets 66s0cuy         Hip ADD Iso 29f1hlo     [id number] ball squeeze    Clams x30, seated green TB 36c7swe, seated green TB        LAQ  3x15, 3lbs 3x15, 3lbs 3 x 15,  3# 3x15, 5lbs 3x15, 5lbs    Hip ER/IR  [id number] iso in neutral        SLR  3x10 (quad set on L)        Weight Shifts  x5min X4min, split stance X 2 min    3x10 SL marching   Marching  x30 standing 2x15 standing  2x10, 5lbs 2x10, 5lbs    Step Taps  3x1min, 6\" 3x1min, 6\"       Sit to Stand   3x10 3 x 10, 2 sets with ball at knees 3x10, 2 sets with ball at knees 3x10 3x10   Heel raises    2 x 20 3x15 3x15    Hamstring Curls     2x8, 5lbs 2x8, 5lbs    Sidestepping       3x30ft   Hip ABD       3x8 B standing   Step Ups       3x5 B 6\"                           Manual Therapy: (0min) Done in order to improve joint and soft tissue mobility,reduce muscle guarding, and decrease muscle tone Date:  7/27 Date:   Date:   Date:     Type Parameters      Joint Mobilization       Soft Tissue Mobilization           Modalities: (-) Done in order to reduce swelling and pain    Assessment: Pt able to increase amount of weight bearing and standing exercise he was able to do today    Plan: Continue per POC    Future Appointments   Date Time Provider Lorraine Coronel   8/19/2020  3:00 PM [facility]   8/24/2020  1:00 PM Nay Storm MD [facility]   9/14/2020  9:00 AM MD ELVI Juárez EM[facility] EMG   9/21/2020  3:00 PM MD ELVI Juárez EM[facility] EMG       [place] Time:        Jasmin Milligan PT, DPT, OCS

## 2020-08-19 ENCOUNTER — HOSPITAL ENCOUNTER (OUTPATIENT)
Dept: PHYSICAL THERAPY | Age: 70
Discharge: HOME | End: 2020-08-19
Payer: MEDICARE

## 2020-08-19 PROCEDURE — 97110 THERAPEUTIC EXERCISES: CPT

## 2020-08-19 NOTE — PROGRESS NOTES
Renee Alonzo  : 1950  [place]  [place]: [place] 3500 [facility] Therapy  [address], [address]  [phone]   [phone]       OUTPATIENT PHYSICAL THERAPY:Daily Note 2020      TREATMENT:   PT Patient Time In/Time Out  Time In: 1511  Time Out: 1604      Total Time: 53min  Visit Count:  8   ICD-10: Treatment Diagnosis: M25.552, R26.2, Z96.642      Subjective: Pt states he is doing well today, no new updates   Pain: 0    Objective: Therapeutic Exercise: (53min) Done in order to improve strength, ROM and understanding of current condition.     Date:   Date:  8/10 Date:   Date:   Date:       Activity/Exercise          Education          NuStep X 10 min, level 2 X10min, lvl 2 x12min lvl 1 x15min lvl 2 x13min lvl 4     Walking X 50-75' with cane x 3 x60ft x 3 x30ft, x60ft, x90ft x30ft, x60ft, x90ft      Glute Sets          Hip ADD Iso   [id number] ball squeeze       Clams          LAQ 3 x 15,  3# 3x15, 5lbs 3x15, 5lbs       Hip ER/IR          SLR          Weight Shifts X 2 min    3x10 SL marching 3x15 SL marching     Marching  2x10, 5lbs 2x10, 5lbs       Step Taps          Sit to Stand 3 x 10, 2 sets with ball at knees 3x10, 2 sets with ball at knees 3x10 3x10 3x10, lower chair     Heel raises 2 x 20 3x15 3x15  3x15     Hamstring Curls  2x8, 5lbs 2x8, 5lbs       Sidestepping    3x30ft 3x30ft     Hip ABD    3x8 B standing 3x8 B standing     Step Ups    3x5 B 6\" 4x5 B standing                             Manual Therapy: (0min) Done in order to improve joint and soft tissue mobility,reduce muscle guarding, and decrease muscle tone   Date:   Date:   Date:   Date:     Type Parameters      Joint Mobilization       Soft Tissue Mobilization           Modalities: (-) Done in order to reduce swelling and pain    Assessment: Pt able to decrease amount of UE support needed for several exercises today, still with some hesitancy with initial steps    Plan: Continue per POC    Future Appointments   Date Time Provider Lorraine Robbinsi   8/24/2020  1:00 PM Delores Rodgers MD [facility]   8/24/2020  3:00 PM Juan Montero, PT [facility]   8/26/2020  3:00 PM Juan Montero, PT [facility]   8/31/2020  3:00 PM Juan Montero, PT [facility]   9/2/2020  3:00 PM Juan Montero, PT [facility]   9/9/2020  3:00 PM Juan Montero, PT [facility]   9/14/2020  9:00 AM Lynn Rinaldi MD [facility]   9/21/2020  3:00 PM Lynn Rinaldi MD [facility]       [facility] Time:        Nova Van PT, DPT, OCS

## 2020-08-24 ENCOUNTER — HOSPITAL ENCOUNTER (OUTPATIENT)
Dept: PHYSICAL THERAPY | Age: 70
Discharge: HOME | End: 2020-08-24
Payer: MEDICARE

## 2020-08-24 PROCEDURE — 97110 THERAPEUTIC EXERCISES: CPT

## 2020-08-24 NOTE — PROGRESS NOTES
Atiya Cagle  : 1950  [place]  [place]: [place] 3500 [facility] Therapy  [address], [address]  [phone]   [phone]       OUTPATIENT PHYSICAL THERAPY:Daily Note 2020      TREATMENT:   PT Patient Time In/Time Out  Time In: 1505  Time Out: 1600      Total Time: 55min  Visit Count:  9   ICD-10: Treatment Diagnosis: M25.552, R26.2, I58.861      Subjective: Pt states he is continuing to improve   Pain: 0    Objective: Therapeutic Exercise: (55min) Done in order to improve strength, ROM and understanding of current condition.     Date:   Date:  8/10 Date:   Date:   Date:   Date:      Activity/Exercise          Education          NuStep X 10 min, level 2 X10min, lvl 2 x12min lvl 1 x15min lvl 2 x13min lvl 4 x15min lvl 3    Walking X 50-75' with cane x 3 x60ft x 3 x30ft, x60ft, x90ft x30ft, x60ft, x90ft      Glute Sets          Hip ADD Iso   [id number] ball squeeze       Clams          LAQ 3 x 15,  3# 3x15, 5lbs 3x15, 5lbs       Hip ER/IR          SLR          Weight Shifts X 2 min    3x10 SL marching 3x15 SL marching     Marching  2x10, 5lbs 2x10, 5lbs       Step Taps      3x10, 6\" (only one hand support)    Sit to Stand 3 x 10, 2 sets with ball at knees 3x10, 2 sets with ball at knees 3x10 3x10 3x10, lower chair 3x10, lower chair    Heel raises 2 x 20 3x15 3x15  3x15     Hamstring Curls  2x8, 5lbs 2x8, 5lbs       Sidestepping    3x30ft 3x30ft 3x30ft    Hip ABD/Ext    3x8 B standing 3x8 B standing 3x10 B standing    Step Ups    3x5 B 6\" 4x5 B standing     Mini Squat      3x15                  Manual Therapy: (0min) Done in order to improve joint and soft tissue mobility,reduce muscle guarding, and decrease muscle tone   Date:   Date:   Date:   Date:     Type Parameters      Joint Mobilization       Soft Tissue Mobilization           Modalities: (-) Done in order to reduce swelling and pain    Assessment: Pt able to perform exercises with less UE support today.  Continuing to progress toward ambulation w/o AD    Plan: Continue per POC    Future Appointments   Date Time Provider Lorraine Coronel   8/26/2020  3:00 PM Meena Kam, PT [facility]   8/31/2020  3:00 PM [facility]   9/2/2020  3:00 PM [facility]   9/9/2020  3:00 PM [facility]   9/14/2020  9:00 AM Humberto Antoine MD [facility]   9/21/2020  3:00 PM Humberto Antoine MD [facility]       Javy Morris Time:        Helga Hernandez PT, DPT, OCS

## 2020-08-26 ENCOUNTER — HOSPITAL ENCOUNTER (OUTPATIENT)
Dept: PHYSICAL THERAPY | Age: 70
Discharge: HOME | End: 2020-08-26
Payer: MEDICARE

## 2020-08-27 NOTE — PROGRESS NOTES
Hitesh Castro  : 1950  [place]  [place]: [place] 3500 [facility] Therapy  [address], [facility] [address]  [phone]   [phone]        OUTPATIENT DAILY NOTE    NAME/AGE/GENDER: Hitesh Castro is a 77 y.o. male. DATE: 2020    Mr. Stratton Pi for today's appointment due to not feeling well and wanting to be careful.     Cecily Petty, PT

## 2020-08-31 ENCOUNTER — APPOINTMENT (OUTPATIENT)
Dept: PHYSICAL THERAPY | Age: 70
End: 2020-08-31
Payer: MEDICARE

## 2020-09-02 ENCOUNTER — APPOINTMENT (OUTPATIENT)
Dept: PHYSICAL THERAPY | Age: 70
End: 2020-09-02

## 2020-09-09 ENCOUNTER — APPOINTMENT (OUTPATIENT)
Dept: PHYSICAL THERAPY | Age: 70
End: 2020-09-09

## 2020-12-03 ENCOUNTER — RX ONLY (OUTPATIENT)
Age: 70
Setting detail: RX ONLY
End: 2020-12-03

## 2020-12-03 RX ORDER — IODOQUINOL, HYDROCORTISONE ACETATE AND ALOE VERA LEAF 10; 20; 10 MG/G; MG/G; MG/G
GEL TOPICAL
Qty: 1 | Refills: 6 | Status: ERX

## 2020-12-30 ENCOUNTER — RX ONLY (OUTPATIENT)
Age: 70
Setting detail: RX ONLY
End: 2020-12-30

## 2020-12-30 RX ORDER — HYDROCORTISONE, IODOQUINOL 10; 10 MG/G; MG/G
CREAM TOPICAL
Qty: 1 | Refills: 4 | Status: ERX | COMMUNITY
Start: 2020-12-30

## 2021-01-05 NOTE — THERAPY DISCHARGE
Maxx Romero  : 1950  [place]  [place]: [place] 3500 [facility] Therapy  [address], [address]  [phone]   [phone]        OUTPATIENT PHYSICAL THERAPY:Discontinuation Summary 21   ICD-10: Treatment Diagnosis: M25.552, R26.2, J24.948  Precautions/Allergies:   Bactrim [sulfamethoxazole-trimethoprim]   TREATMENT PLAN:  Effective Dates: discharge MEDICAL/REFERRING DIAGNOSIS:  Presence of left artificial hip joint [Z96.642]   DATE OF ONSET: 2020  REFERRING PHYSICIAN: Cony Cesar MD MD Orders: Dania Roy and treat s/p L CODY  Return MD Appointment: 2020   Maxx Romero has been seen in physical therapy from  to  for 9 visits. Treatment has been discontinued at this time due to Decline in medical status. The below goals were met prior to discontinuation. Some goals were not met due to decline in medical status and premature discharge. Thank you for this referral.        1.  1.      GOALS: (Goals have been discussed and agreed upon with patient.)  Short-Term Functional Goals: Time Frame: 4 weeks  1. Pt will be able to ambulate with a single point cane  2. Pt will   Discharge Goals: Time Frame: 10 weeks  1. Pt will be able to walk for at least 15min without assistive device  2. OUTCOME MEASURE:   Tool Used: Lower Extremity Functional Scale (LEFS)  Score:  Initial: 10/80 Most Recent:  (Date: -- )   Interpretation of Score: 20 questions each scored on a 5 point scale with 0 representing \"extreme difficulty or unable to perform\" and 4 representing \"no difficulty\". The lower the score, the greater the functional disability. 80/80 represents no disability. Minimal detectable change is 9 points.       Rehabilitation Potential For Stated Goals: Good  Regarding Maxx Romero therapy, I certify that the treatment plan above will be carried out by a therapist or under their direction.   Thank you for this referral,  Helga Hernandez PT, DPT, OCS  Referring Physician Signature: Shelley Ibarra MD _______________________________ Date _____________     Helga Hernandez PT, DPT, OCS

## 2021-03-17 ENCOUNTER — APPOINTMENT (RX ONLY)
Dept: URBAN - METROPOLITAN AREA CLINIC 349 | Facility: CLINIC | Age: 71
Setting detail: DERMATOLOGY
End: 2021-03-17

## 2021-03-17 DIAGNOSIS — L57.8 OTHER SKIN CHANGES DUE TO CHRONIC EXPOSURE TO NONIONIZING RADIATION: ICD-10-CM

## 2021-03-17 DIAGNOSIS — D22 MELANOCYTIC NEVI: ICD-10-CM

## 2021-03-17 PROBLEM — D22.5 MELANOCYTIC NEVI OF TRUNK: Status: ACTIVE | Noted: 2021-03-17

## 2021-03-17 PROBLEM — C44.42 SQUAMOUS CELL CARCINOMA OF SKIN OF SCALP AND NECK: Status: ACTIVE | Noted: 2021-03-17

## 2021-03-17 PROBLEM — C44.41 BASAL CELL CARCINOMA OF SKIN OF SCALP AND NECK: Status: ACTIVE | Noted: 2021-03-17

## 2021-03-17 PROCEDURE — ? SHAVE REMOVAL AND DESTRUCTION

## 2021-03-17 PROCEDURE — ? COUNSELING

## 2021-03-17 PROCEDURE — A4550 SURGICAL TRAYS: HCPCS

## 2021-03-17 PROCEDURE — 17272 DSTR MAL LES S/N/H/F/G 1.1-2: CPT

## 2021-03-17 PROCEDURE — 88305 TISSUE EXAM BY PATHOLOGIST: CPT

## 2021-03-17 PROCEDURE — 99213 OFFICE O/P EST LOW 20 MIN: CPT | Mod: 25

## 2021-03-17 PROCEDURE — ? PATHOLOGY BILLING

## 2021-03-17 ASSESSMENT — LOCATION ZONE DERM
LOCATION ZONE: TRUNK
LOCATION ZONE: FACE

## 2021-03-17 ASSESSMENT — LOCATION SIMPLE DESCRIPTION DERM
LOCATION SIMPLE: LEFT FOREHEAD
LOCATION SIMPLE: CHEST

## 2021-03-17 ASSESSMENT — LOCATION DETAILED DESCRIPTION DERM
LOCATION DETAILED: LEFT MEDIAL FOREHEAD
LOCATION DETAILED: LEFT MEDIAL INFERIOR CHEST

## 2021-03-17 NOTE — PROCEDURE: PATHOLOGY BILLING
Immunohistochemistry ([id number] and [id number]) billing is not performed here. Please use the Immunohistochemistry Stain Billing plan to accomplish this. Immunohistochemistry ([id number] and [id number]) billing is not performed here. Please use the Immunohistochemistry Stain Billing plan to accomplish this.

## 2021-03-17 NOTE — PROCEDURE: SHAVE REMOVAL AND DESTRUCTION
Anesthesia Volume In Cc: 1
Was Curettage Performed?: Yes
Render Path Notes In Note?: No
Wound Care: Vaseline
Anesthesia Volume In Cc: 0
Notification Instructions: Patient will be notified of biopsy results. However, patient instructed to call the office if not contacted within 2 weeks.
Consent: Written consent was obtained and risks were reviewed including but not limited to scarring, infection, bleeding, scabbing, incomplete removal, nerve damage and allergy to anesthesia.
Detail Level: Detailed
Number Of Curettages: 2
Hemostasis: Electrocautery
Post-Care Instructions: I reviewed with the patient in detail post-care instructions. Patient is to keep the biopsy site dry overnight, and then apply bacitracin twice daily until healed. Patient may apply hydrogen peroxide soaks to remove any crusting.  After the procedure, the patient was observed for 5-10 minutes and was oriented to,person, place and time and denied feeling dizzy, queasy and stated that they were not going to faint
Size After Destruction (Required For Destruction Billing): 1.2
Dressing: Band-Aid
Cautery Type: electrodesiccation
Bill As?: Note: Bill Malignant Destruction If Path Confirms Malignant Lesion. Only Bill As Shave Removal If Path Comes Back Benign. Do Not Bill Shave Removal On Malignant Lesions.: Malignant Destruction
Anesthesia Type: 2% lidocaine with epinephrine
Accession #: Dr Weir read
Billing Type: Third-Party Bill

## 2021-04-08 PROBLEM — M54.2 CERVICAL PAIN (NECK): Status: ACTIVE | Noted: 2021-04-08

## 2021-05-04 ENCOUNTER — HOSPITAL ENCOUNTER (OUTPATIENT)
Dept: PHYSICAL THERAPY | Age: 71
Discharge: HOME | End: 2021-05-04
Payer: MEDICARE

## 2021-05-04 DIAGNOSIS — M50.30 DDD (DEGENERATIVE DISC DISEASE), CERVICAL: ICD-10-CM

## 2021-05-04 DIAGNOSIS — M47.812 CERVICAL FACET JOINT SYNDROME: ICD-10-CM

## 2021-05-04 DIAGNOSIS — M54.2 NECK PAIN: ICD-10-CM

## 2021-05-04 DIAGNOSIS — M51.34 DDD (DEGENERATIVE DISC DISEASE), THORACIC: ICD-10-CM

## 2021-05-04 DIAGNOSIS — M40.204 KYPHOSIS OF THORACIC REGION, UNSPECIFIED KYPHOSIS TYPE: ICD-10-CM

## 2021-05-04 DIAGNOSIS — M54.2 CERVICALGIA: ICD-10-CM

## 2021-05-04 PROCEDURE — 97162 PT EVAL MOD COMPLEX 30 MIN: CPT

## 2021-05-04 PROCEDURE — 97110 THERAPEUTIC EXERCISES: CPT

## 2021-05-04 NOTE — THERAPY EVALUATION
Celena Gonzalez : 1950 [place] Secondary: [address] at [facility] LUCIANA GAGE [address] [facility], [address] [place] [address],[address], [place] [address]. 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  
Bactrim [sulfamethoxazole-trimethoprim] TREATMENT PLAN: 
Effective Dates: 2021 TO 2021 (90 days). Frequency/Duration: 2 times a week for 90 Day(s) MEDICAL/REFERRING DIAGNOSIS: 
Neck pain [M54.2] Cervicalgia [M54.2] Cervical facet joint syndrome [M47.812] DDD (degenerative disc disease), cervical [M50.30] DDD (degenerative disc disease), thoracic [M51.34] Kyphosis of thoracic region, unspecified kyphosis type [M40.204] DATE OF ONSET: chronic REFERRING PHYSICIAN: Alta GIBBONS MD Orders: Eval and treat, include modalities of soft tissue, dry needling, and traction Return MD Appointment: NA  
 
INITIAL ASSESSMENT:  Mr. Fernando uSazo presents with complaints of neck pain and decreased posture that have been limiting his ability to perform ADLs. He presents with decreased cervical ROM, decreased gait, decreased posture and decreased strength. He will benefit from skilled physical therapy to increase functional mobility. PROBLEM LIST (Impacting functional limitations): 1. Decreased Strength 2. Decreased Ambulation Ability/Technique 3. Increased Pain 4. Decreased Flexibility/Joint Mobility 5. Decreased posture  INTERVENTIONS PLANNED: (Treatment may consist of any combination of the following) 1. Manual Therapy 2. Therapeutic Exercise/Strengthening 3. Aquatic therapy 4. Modalities as needed for pain 5. Therapeutic activities GOALS: (Goals have been discussed and agreed upon with patient.) Short-Term Functional Goals: Time Frame: 30 days 1.  Pt will increase cervical ROM to 50 deg bilateral for improved movement for driving. 2. Pt will be able to lie on his side or back for at least 30 minutes for decreased pain for sleeping in bed. 3. Pt will be able to demonstrate neutral spine in sitting for improved posture. 4. Pt will be independent with HEP. Discharge Goals: Time Frame: 90 days 1. Pt will increase cervical ROM to 60 deg bilateral for improved mobility. 2. Pt will report he is able to sleep in the bed with no increase in pain for improved sleep. 3. Pt will increase LE strength to 5/5 for improved gait. 4. Pt will demonstrate neutral spine in standing with no verbal cueing. 5. Pt will be able to ambulate with upright trunk with least resistive assistive device for decreased stress on his neck during gait. OUTCOME MEASURE:  
Tool Used: Neck Disability Index (NDI) Score:  Initial: 11/50  Most Recent: X/50 (Date: -- ) Interpretation of Score: The Neck Disability Index is a revised form of the Oswestry Low Back Pain Index and is designed to measure the activities of daily living in person's with neck pain. Each section is scored on a 0-5 scale, 5 representing the greatest disability. The scores of each section are added together for a total score of 50. MEDICAL NECESSITY:  
· Patient is expected to demonstrate progress in strength and posture to improve mobility for ADLs. REASON FOR SERVICES/OTHER COMMENTS: 
· Patient continues to require skilled intervention due to decreased ROM, decreased posture and gait that is limiting his ability to perform ADLs. Corinna Bowden Total Duration: 50 minutes PT Patient Time In/Time Out Time In: 1140 Time Out: 1230 Rehabilitation Potential For Stated Goals: Good Regarding Leigh Ann Delgadillo's therapy, I certify that the treatment plan above will be carried out by a therapist or under their direction. Thank you for this referral, Britney Bustamante, PT Referring Physician Signature: Leonette Castleman Deuce Joaquin _______________________________ Date _____________ PAIN/SUBJECTIVE:  
Initial:   5/10 Post Session:  5/10 HISTORY:  
History of Injury/Illness (Reason for Referral): First hurt neck 25 years ago. He went to chiropractor then and that helped. He has started to lean over when he walks and that has started to put a lot of stress on his neck. Difficulty turning his neck. Difficulty driving and turning his neck. He did have weakness in the left arm and fluttering in his chest so he went to his cardiologist. The arm weakness has gone away. Cardiologist referred him to Dr. Jesús Bond and he saw his PA who sent him to physical therapy. He also has some pain in the side of back that limits him from lying down. .  
Past Medical History/Comorbidities:  
Mr. Maria Guadalupe Calvin  has a past medical history of Adverse effect of anesthesia, Asthma (dx childhood), Atrial fibrillation ([facility] [address] Cervical spondylosis (6/20/2013), Chronic back pain (2013), Chronic pain, Colon polyps, COPD (chronic obstructive pulmonary disease) ([address] (8/17/2016), Coronary atherosclerosis of native coronary vessel (8/17/2016), Diabetes ([address] (dx 5/14), Diverticulosis, Edema (8/17/2016), Former smoker, GERD (gastroesophageal reflux disease), High cholesterol, History of kidney stones, Hypertension, Obesity (BMI 30-39.9), Status post total right knee replacement (7/17/2017), and Unspecified sleep apnea. Mr. Maria Guadalupe Calvin  has a past surgical history that includes pr cystoscopy,insert ureteral stent; hx colonoscopy (10/6/14); hx colonoscopy (11/20/14); hx lumbar laminectomy (2009); hx retinal detachment repair (Bilateral); hx cataract removal; hx other surgical (20 yrs ago); hx total colectomy (2015); hx heart catheterization (2012); and hx knee replacement (Right). Left hip CODY. Social History/Living Environment:  
 Lives with family Prior Level of Function/Work/Activity: 
Ambulates with a standard cane for ~ 1 year.    
Ambulatory/Rehab Services H2 Model Falls Risk Assessment Risk Factors: 
     (1)  Gender [Male] Ability to Rise from Chair: 
     (1)  Pushes up, successful in one attempt Falls Prevention Plan: No modifications necessary Total: (5 or greater = High Risk): 2  
©2010 [facility] . [facility] Patent #[id number]. Federal Law prohibits the replication, distribution or use without written permission from [facility] RAP Index Current Medications:   
  
Current Outpatient Medications:  
  insulin glargine-lixisenatide (Soliqua 100/33) 100 unit-33 mcg/mL inpn, 40 units sub-q daily  Indications: type 2 diabetes mellitus, Disp: 3 mL, Rfl: 5 
  Insulin Needles, Disposable, (BD Ultra-Fine Short Pen Needle) 31 gauge x 5/16\" ndle, by Does Not Apply route as needed (Use as directed). , Disp: 1 Package, Rfl: 5 
  lancets 28 gauge misc, Check fs every day, DM2, E11.9, Disp: 100 Lancet, Rfl: 1 
  glucose blood VI test strips (Ascensia CONTOUR) strip, Check fs every day, DM2, E11.9, Disp: 100 Strip, Rfl: 1 
  pantoprazole (PROTONIX) 40 mg tablet, Take 1 Tab by mouth daily. , Disp: 90 Tab, Rfl: 1 
  montelukast (SINGULAIR) 10 mg tablet, Take 1 Tab by mouth nightly. Indications: controller medication for asthma, Disp: 90 Tab, Rfl: 1 
  lisinopriL (PRINIVIL, ZESTRIL) 10 mg tablet, Take 1 Tab by mouth daily. Indications: high blood pressure, Disp: 90 Tab, Rfl: 1 
  ezetimibe (ZETIA) 10 mg tablet, Take 1 Tab by mouth nightly. Indications: high cholesterol, Disp: 90 Tab, Rfl: 1 
  doxazosin (CARDURA) 4 mg tablet, Take 1 Tab by mouth daily. Indications: enlarged prostate with urination problem, Disp: 90 Tab, Rfl: 1   celecoxib (CeleBREX) 200 mg capsule, Take 1 Cap by mouth daily. Indications: joint damage causing pain and loss of function, Disp: 90 Cap, Rfl: 1 
  atorvastatin (LIPITOR) 20 mg tablet, Take 1 Tab by mouth nightly.  Indications: high cholesterol, Disp: 90 Tab, Rfl: 1 
  apixaban (Eliquis) 5 mg tablet, Take 1 Tab by mouth two (2) times a day. Indications: treatment to prevent blood clots in chronic atrial fibrillation, Disp: 180 Tab, Rfl: 1 
  dapagliflozin (Farxiga) 10 mg tab tablet, Take 1 Tab by mouth daily. Indications: type 2 diabetes mellitus, Disp: 90 Tab, Rfl: 1 
  fluticasone furoate-vilanteroL (Breo Ellipta) 200-25 mcg/dose inhaler, Take 1 Puff by inhalation daily. , Disp: 3 Inhaler, Rfl: 1 
  metoprolol tartrate (LOPRESSOR) 50 mg tablet, Take 1 Tab by mouth two (2) times a day. Indications: ventricular rate control in atrial fibrillation, Disp: 60 Tab, Rfl: 11 
  acetaminophen (TYLENOL) 500 mg tablet, Take 500-1,000 mg by mouth every six (6) hours as needed for Pain., Disp: , Rfl:  
  diphenhydrAMINE (BENADRYL) 50 mg capsule, Take 100 mg by mouth nightly as needed for Sleep., Disp: , Rfl:  
  colesevelam (WELCHOL) 625 mg tablet, Take 625-3,750 mg by mouth as needed for Diarrhea. Patient taking 2 tabs following diarrheal bowel movement up to 3 times. Maximum daily dosage: 6 tabs, Disp: , Rfl:  
  fluticasone propionate (FLONASE ALLERGY RELIEF) 50 mcg/actuation nasal spray, 2 Sprays by Both Nostrils route daily as needed for Allergies. Indications: inflammation of the nose due to an allergy, Disp: , Rfl:  
  albuterol (PROVENTIL HFA, VENTOLIN HFA, PROAIR HFA) 90 mcg/actuation inhaler, Take 1 Puff by inhalation every four (4) hours as needed for Wheezing., Disp: 1 Inhaler, Rfl: 5   ANTI-FUNGAL 2 % topical powder, APPLY TO ABDOMEN 2 TIMES PER DAY, Disp: , Rfl: 11   Blood-Glucose Meter monitoring kit, Check fs every day; DM2, E11/9, Contour glucometer, Disp: 1 Kit, Rfl: 0 
  albuterol (PROVENTIL VENTOLIN) 2.5 mg /3 mL (0.083 %) nebulizer solution, 3 mL by Nebulization route once for 1 dose. (Patient taking differently: 2.5 mg by Nebulization route daily as needed for Wheezing. Patient states he hasn't used in many years), Disp: 24 Each, Rfl: 11 Date Last Reviewed:  5-6-21 Number of Personal Factors/Comorbidities that affect the Plan of Care: 1-2: MODERATE COMPLEXITY EXAMINATION:  
Observation/Orthostatic Postural Assessment: kyphosis at cervical/thoracic junction in standing and sitting. Stands with trunk in a flexed position. Palpation: tightness to upper traps B.    
ROM:  
Cervical Flexion (% of Normal): 100 Cervical Extension (% of Normal): 25 Cervical Left Rotation (% of Normal): 30 Cervical Right Rotation (% of Normal): 40 
  
   decreased shoulder flexion ROM B Special Tests: Difficulty to perform special tests due to patient unable to lie down on the table. Neurological Screen: n/t . Functional Mobility:  Sit to/from Stand:independent. Bed Mobility: n/t. Walking on level ground: ambulates with cane and trunk and neck in flexed position, wide base of support. Balance:  n/t Body Structures Involved: 1. Joints 2. Muscles 3. Ligaments Body Functions Affected: 1. Neuromusculoskeletal Activities and Participation Affected: 1. Community, Social and [facility] Number of elements (examined above) that affect the Plan of Care: 3: MODERATE COMPLEXITY CLINICAL PRESENTATION:  
Presentation: Evolving clinical presentation with changing clinical characteristics: MODERATE COMPLEXITY CLINICAL DECISION MAKING:  
Use of outcome tool(s) and clinical judgement create a POC that gives a: Questionable prediction of patient's progress: MODERATE COMPLEXITY

## 2021-05-05 NOTE — PROGRESS NOTES
Angelo Booker  : 1950  [place] / Product Type: Medicare /  [address] at [facility] LUCIANA GAGE  [address] [place] [address], [address], [place] [address].  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 Frequency/Duration: 2 times a week for 90 Day(s) MEDICAL/REFERRING DIAGNOSIS:  Neck pain [M54.2]  Cervicalgia [M54.2]  Cervical facet joint syndrome [M47.812]  DDD (degenerative disc disease), cervical [M50.30]  DDD (degenerative disc disease), thoracic [M51.34]  Kyphosis of thoracic region, unspecified kyphosis type [M40.204]   DATE OF ONSET: chronic  REFERRING PHYSICIAN: James GIBBONS MD Orders:  Eval and treat, include modalities of soft tissue, dry needling, and traction  Return MD Appointment: NA       Pre-treatment Symptoms/Complaints:  See Eval  Pain: Initial:   5/10 Post Session:  5/10   Medications Last Reviewed:  21    Updated Objective Findings:   See evaluation note from today     TREATMENT:     Therapeutic Exercise: ( 10 minutes): Instructed in HEP with standing hip flexor stretch with foot on the chair and leaning forward keeping the back leg straight. Also instructed in chin tucks in sitting 5\"x10. HEP: handout given with 2 exercises  [facility] Portal    Treatment/Session Summary:    · Response to Treatment:  good return demonstration of HEP. Will need to review chin tucks. · Communication/Consultation:  None today  · Equipment provided today:  None today  · Recommendations/Intent for next treatment session: Next visit will focus on posture, postural strengthening, stretching.     Total Treatment Billable Duration:  10 minutes + Eval  PT Patient Time In/Time Out  Time In: 1140  Time Out: 96792 Rohini Michael, PT    Future Appointments   Date Time Provider Lorraine Berna   5/7/2021  1:15 PM Jerryl Clines, PT [facility]   5/11/2021  3:30 PM Jerryl Clines, PT [facility]   5/13/2021  2:00 PM Jerryl Clines, PT [facility]   5/14/2021 11:20 AM Michelle Hua NP [facility] PP   5/18/2021  1:00 PM Jerryl Clines, PT [facility]   5/21/2021  1:15 PM Jerryl Clines, PT [facility]   5/25/2021  1:15 PM Jerryl Clines, PT [facility]   5/27/2021  1:00 PM Jerryl Clines, PT [facility]   7/13/2021 10:00 AM Nithya Cervantes MD [facility]   7/20/2021  3:00 PM Nithya Cervantes MD [facility]   9/13/2021  1:30 PM ECHO 36 [facility] U[facility]G UCD   9/23/2021  1:30 PM Tone Hays MD [facility]

## 2021-05-11 ENCOUNTER — HOSPITAL ENCOUNTER (OUTPATIENT)
Dept: PHYSICAL THERAPY | Age: 71
Discharge: HOME | End: 2021-05-11
Payer: MEDICARE

## 2021-05-11 PROCEDURE — 97110 THERAPEUTIC EXERCISES: CPT

## 2021-05-11 NOTE — PROGRESS NOTES
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 Frequency/Duration: 2 times a week for 90 Day(s) MEDICAL/REFERRING DIAGNOSIS:  neck and back pain   DATE OF ONSET: chronic  REFERRING PHYSICIAN: Sherri Oh*  MD Orders:  Eval and treat, include modalities of soft tissue, dry needling, and traction  Return MD Appointment: NA       Pre-treatment Symptoms/Complaints: had increase in neck and upper back pain on Saturday after doing therapy and then his gym program.  ALso R LE pain got flared up but is better today. Pain: Initial:   5/10 Post Session:  No increase in pain   Medications Last Reviewed:  21    Updated Objective Findings: demonstrates sitting up with scap squeeze with poor neck posture.       TREATMENT:   Manual (0 min) not today   Therapeutic Exercise: ( 40 minutes):    -- sitting on reclining table with theraball behind his back:  scap squeeze 10 sec hold  10 x2 sets  -- standing bent over, lat dorsi stretch and cat stretch with over pressure to mid back 10 to 15 reps  -- standing against ball and trying to work on neck and back posture  -- neck retraction practice 10x  -- standing: pelvis shift forward, weight shift forward   HEP: handout given with updates   [employer] Portal    Treatment/Session Summary:    · Response to Treatment: by session end, improved understanding of exercises for improving sternal elevation/thoracic extension  · Communication/Consultation:  None today  · Equipment provided today:  None today  · Recommendations/Intent for next treatment session: Next visit will focus on posture, postural strengthening, stretching. Upper thoracic extension stretch in tolerated position-- continue modified praharrison shay.     Total Treatment Billable Duration:  40 min  PT Patient Time In/Time Out  Time In: 9223  Time Out: 401 [address] [place] [address], PT    Future Appointments   Date Time Provider Lorraine Coronel   5/13/2021  2:00 PM Juan Spillers, PT [facility]   5/14/2021 11:20 AM Douglas Key, NP [facility]   5/18/2021  1:00 PM Juan Spillers, PT [facility]   5/21/2021  1:15 PM Juan Spillers, PT [facility]   5/25/2021  1:15 PM Juan Spillers, PT [facility]   5/27/2021  1:00 PM Juan Spillers, PT [facility]   7/13/2021 10:00 AM Brad Reyes MD [facility] EMG   7/20/2021  3:00 PM Brad Reyes MD [facility] EMG   9/13/2021  1:30 PM ECHO 39 [facility] UCD   9/23/2021  1:30 PM Tina Fox MD [address] [place] [address]

## 2021-05-13 ENCOUNTER — HOSPITAL ENCOUNTER (OUTPATIENT)
Dept: PHYSICAL THERAPY | Age: 71
Discharge: HOME | End: 2021-05-13
Payer: MEDICARE

## 2021-05-13 PROCEDURE — 97110 THERAPEUTIC EXERCISES: CPT

## 2021-05-13 NOTE — PROGRESS NOTES
Jeremiah Ulloa  : 1950  [place] / Product Type: Medicare /  [address] at [facility] LUCIANA GAGE  [address] [place] [address], [address], [place] [address].  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 Frequency/Duration: 2 times a week for 90 Day(s) MEDICAL/REFERRING DIAGNOSIS:  neck and back pain   DATE OF ONSET: chronic  REFERRING PHYSICIAN: Sandra Mccann*  MD Orders:  Eval and treat, include modalities of soft tissue, dry needling, and traction  Return MD Appointment: NA       Pre-treatment Symptoms/Complaints: not bad pain from last session. Pain: Initial:   5/10 Post Session:  No increase in pain   Medications Last Reviewed:  21    Updated Objective Findings: demonstrates sitting up with scap squeeze with poor neck posture. TREATMENT:   Manual (0 min) not today   Therapeutic Exercise: ( 30 minutes):    -- sitting on table with theraball/tilted cushion behind his back:  10 sec x 8, then with yellow band 10 sec x 5, scap squeeze 10 sec hold  With yellow band horz ABD  -- standing bent over, lat dorsi stretch and cat stretch with over pressure to mid back 10 to 15 reps, then single arm touch front 5x ea side  -- standing: pelvis shift forward with holding onto theraball 15 sec x 5  HEP: handout given with updates   [employer] Portal    Treatment/Session Summary:    · Response to Treatment: mild improvement in ability to extend thoracic cage with doing cat exercises. Good tolerance with addition of theraband exercises.   · Communication/Consultation:  None today  · Equipment provided today:  None today  · Recommendations/Intent for next treatment session: Next visit will focus on posture, postural strengthening, stretching. Upper thoracic extension stretch in tolerated position-- continue modified prayer stretching. Add UBE.      Total Treatment Billable Duration:  30 min  PT Patient Time In/Time Out  Time In: 1410  Time Out: LASHELL Verma    Future Appointments   Date Time Provider Lorraine Berna   5/14/2021 11:20 AM Keshawn Stallings NP [facility]   5/18/2021  1:00 PM Brenna Breech, PT [facility]   5/21/2021  1:15 PM Brenna Breech, PT [facility]   5/25/2021  1:15 PM Brenna Breech, PT [facility]   5/27/2021  1:00 PM Brenna Breech, PT [facility]   7/13/2021 10:00 AM Sarah Cadet MD [facility] EMG   7/20/2021  3:00 PM Sarah Cadet MD [facility] EMG   9/13/2021  1:30 PM ECHO 39 [facility] UCD   9/23/2021  1:30 PM Adama Ni MD S[facility] DG UCD

## 2021-05-18 ENCOUNTER — HOSPITAL ENCOUNTER (OUTPATIENT)
Dept: PHYSICAL THERAPY | Age: 71
Discharge: HOME | End: 2021-05-18
Payer: MEDICARE

## 2021-05-18 PROCEDURE — 97110 THERAPEUTIC EXERCISES: CPT

## 2021-05-18 NOTE — PROGRESS NOTES
David Vargas  : 1950  [place] / Product Type: Medicare /  [address]  at [facility] LUCIANA GAGE  [address] [place] [address], [address], [place] [address].  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 Frequency/Duration: 2 times a week for 90 Day(s) MEDICAL/REFERRING DIAGNOSIS:  neck and back pain   DATE OF ONSET: chronic  REFERRING PHYSICIAN: Zacarias Posey*  MD Orders:  Eval and treat, include modalities of soft tissue, dry needling, and traction  Return MD Appointment: NA       Pre-treatment Symptoms/Complaints:  Exercises are making neck feel sore- not clear that they are helping. Had to take a day off after PT visit but had also gone to gym to do pool walking program.   Pain: Initial:   5/10 Post Session:  No increase in pain   Medications Last Reviewed:  21    Updated Objective Findings: pt unable to stand up straight without bending knees slightly     TREATMENT:   Manual (0 min) not today   Therapeutic Exercise: ( 40 minutes):    -- sitting on table with theraball/tilted cushion behind his back:  10 sec x 8, then with yellow band 10 sec x 5, scap squeeze 10 sec hold  With yellow band horz ABD  -- standing bent over, lat dorsi stretch and cat stretch with over pressure to mid back 10 to 15 reps, then single arm touch front 5x ea side  -- sitting hip drop R 10x   -- standing: pelvis shift forward with open stance legs and trying to stand tall.  10 sec x 5 ea side  -- sitting with hips higher then knees with practicing sitting up straight- not leaning back -- several prolonged holds and then with Lumbar Protective Mechanism retraining for trunk flexion with isotonics and then quick isotonics ~ 7 min  -- sitting hip hinging with trying to keep back straight ~ 10x  HEP: handout given with updates   [employer] Portal    Treatment/Session Summary:    · Response to Treatment: pt stated exercises were very difficult. He made good efforts. Assigned pelvis shift forward with open stance position when he is in pool because of pain to LE. · Communication/Consultation:  None today  · Equipment provided today:  None today  · Recommendations/Intent for next treatment session: Next visit will focus on posture, postural strengthening, stretching. Upper thoracic extension stretch in tolerated position-- continue modified prayer stretching. Add UBE.      Total Treatment Billable Duration:  40 min  PT Patient Time In/Time Out  Time In: 1300  Time Out: Vel Rodríguez 2174, PT    Future Appointments   Date Time Provider Lorraine Coronel   5/21/2021  1:15 PM Jean Abdalla, PT [facility]   5/25/2021  1:15 PM Jean Abdalla, PT [facility]   5/27/2021  1:00 PM Jean Abdalla, PT [facility]   7/13/2021 10:00 AM Junie Olmedo MD [facility] EMG EMG   7/20/2021  3:00 PM Junie Olmedo MD [facility] EMG EMG   9/13/2021  1:30 PM ECHO 39 [facility] UCDG D   9/23/2021  1:30 PM Lorraine Jeong MD [facility] D   5/17/2022 11:20 AM Judi Lesch, NP [facility] PS[facility] PP

## 2021-05-21 ENCOUNTER — HOSPITAL ENCOUNTER (OUTPATIENT)
Dept: PHYSICAL THERAPY | Age: 71
Discharge: HOME | End: 2021-05-21
Payer: MEDICARE

## 2021-05-21 PROCEDURE — 97110 THERAPEUTIC EXERCISES: CPT

## 2021-05-21 NOTE — PROGRESS NOTES
Kevyn Iniguez  : 1950  [place] / Product Type: Medicare /  [address] at [facility] LUCIANA GAGE  [address] [place] [address], [address], [place] [address].  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 Frequency/Duration: 2 times a week for 90 Day(s) MEDICAL/REFERRING DIAGNOSIS:  neck and back pain   DATE OF ONSET: chronic  REFERRING PHYSICIAN: Elise Liriano*  MD Orders:  Eval and treat, include modalities of soft tissue, dry needling, and traction  Return MD Appointment: NA       Pre-treatment Symptoms/Complaints:  States that he wants to be able to turn his neck better and not be as bent over, which affects his balance and would like to go back to sleeping in his bed.    Pain: Initial:   5/10 Post Session:  No increase in pain   Medications Last Reviewed:  21    Updated Objective Findings: not today     TREATMENT:   Manual (0 min) not today   Therapeutic Exercise: ( 40 minutes):    -- sitting on table with wedge/tilted cushion behind his back: -- stretching back over wedge with assistance 10x                                                                                          -- yellow band horz ABD 10 sec x 10  -- standing bent over, lat dorsi stretch and cat stretch  15 reps,  -- standing: pelvis shift forward with open stance legs and trying to stand tall-- NEXT  -- standing -- several prolonged holds and then with Lumbar Protective Mechanism retraining for trunk flexion with isotonics ~ 5 min   -- sitting trunk rotation with overpressure 10 x ea side  -- sitting hip hinging with trying to keep back straight ~ 10x  HEP: continue current HEP with addition of yellow band HORZ ABD and standing practice. [employer] Portal    Treatment/Session Summary:    · Response to Treatment: pt stated exercises were very difficult. He made good efforts. Assigned pelvis shift forward with open stance position when he is in pool because of pain to LE. · Communication/Consultation:  None today  · Equipment provided today:  None today  · Recommendations/Intent for next treatment session: Next visit will continue focus on posture, postural strengthening, stretching. Upper thoracic extension and rotation stretch in tolerated position-- continue modified prayer stretching. Add UBE. Also work on neck rotation in standing as well.      Total Treatment Billable Duration:  40 min  PT Patient Time In/Time Out  Time In: 1120  Time Out: [facility], PT    Future Appointments   Date Time Provider Lorraine Coronel   5/25/2021  1:15 PM Paola Casas, PT [facility]   5/27/2021  1:00 PM [facility]   7/13/2021 10:00 AM Maritza Taylor MD [facility]   7/20/2021  3:00 PM Maritza Taylor MD [facility]   9/13/2021  1:30 PM ECHO 39 [facility]   9/23/2021  1:30 PM Nasim Luna MD [facility] UCD   5/17/2022 11:20 AM Tree Covarrubias NP [facility] PP

## 2021-05-25 ENCOUNTER — HOSPITAL ENCOUNTER (OUTPATIENT)
Dept: PHYSICAL THERAPY | Age: 71
Discharge: HOME | End: 2021-05-25
Payer: MEDICARE

## 2021-05-25 PROCEDURE — 97110 THERAPEUTIC EXERCISES: CPT

## 2021-05-25 NOTE — PROGRESS NOTES
Melly Bell  : 1950  [place] / Product Type: Medicare /  [address] at [facility] [place]UC[place]NA GAGE  [address] [facility], [address], [place] [address].  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 Frequency/Duration: 2 times a week for 90 Day(s) MEDICAL/REFERRING DIAGNOSIS:  neck and back pain   DATE OF ONSET: chronic  REFERRING PHYSICIAN: Bernice Valverde*  MD Orders:  Eval and treat, include modalities of soft tissue, dry needling, and traction  Return MD Appointment: NA       Pre-treatment Symptoms/Complaints:  States that he wants to be able to turn his neck better and not be as bent over, which affects his balance and would like to go back to sleeping in his bed.    Pain: Initial:   5/10 Post Session:  No increase in pain   Medications Last Reviewed:  21    Updated Objective Findings: not today     TREATMENT:   Manual (0 min) not today   Therapeutic Exercise: ( 40 minutes):    -- sitting on table with wedge/tilted cushion behind his back: -- stretching back over wedge with assistance 10x                                                                                          -- yellow band horz ABD 30x  -- standing bent over, lat dorsi stretch and cat stretch 10 reps, + over pressure for 10 more reps  -- standing bent over trunk rotation with assist 5x ea side  -- standing with back against wall: attempted shoulder slide but painful and stiff to shoulders  -- standing: pelvis shift forward with open stance legs and trying to stand tall-- prolonged holds and with elevating 1 arm at a time and then with 1 min holds x 2  -- sitting hip hinging with trying to keep back straight ~ with prolonged holds 10 sec x 10  HEP: continue current HEP with addition of yellow band HORZ ABD and standing practice. [employer] Portal    Treatment/Session Summary:    · Response to Treatment: pt did well with hip hinging hold. · Communication/Consultation:  None today  · Equipment provided today:  None today  · Recommendations/Intent for next treatment session: Next visit will continue focus on posture, postural strengthening, stretching. Upper thoracic extension and rotation stretch in tolerated position-- continue modified prayer stretching. Add UBE. Also work on neck rotation in standing as well.      Total Treatment Billable Duration:  40 min  PT Patient Time In/Time Out  Time In: 1320  Time Out: [address], PT    Future Appointments   Date Time Provider Lorraine Coronel   6/3/2021  4:15 PM Philip Mishra, PT [facility]   6/8/2021 11:15 AM Philip Mishra, PT [facility]   6/10/2021  2:30 PM Philip Mishra, PT [facility]   6/15/2021  1:45 PM Philip Mishra, PT [facility]   6/17/2021  1:00 PM Philip Mishra, PT [facility]   7/13/2021 10:00 AM Rober Carreon MD [facility]   7/20/2021  3:00 PM Rober Carreon MD [facility]   9/13/2021  1:30 PM ECHO 36 [facility] UCD   9/23/2021  1:30 PM Delgado Portillo MD [facility] U[facility]   5/17/2022 11:20 AM Pasha Lorenz NP [facility] PP

## 2021-05-27 ENCOUNTER — APPOINTMENT (OUTPATIENT)
Dept: PHYSICAL THERAPY | Age: 71
End: 2021-05-27
Payer: MEDICARE

## 2021-06-03 ENCOUNTER — HOSPITAL ENCOUNTER (OUTPATIENT)
Dept: PHYSICAL THERAPY | Age: 71
Discharge: HOME | End: 2021-06-03
Payer: MEDICARE

## 2021-06-03 PROCEDURE — 97110 THERAPEUTIC EXERCISES: CPT

## 2021-06-03 NOTE — PROGRESS NOTES
Alexandre Sadler  : 1950  [place] / Product Type: Medicare /  [address]  at [facility] [place]UCIANA GAGE  [address] [facility], [address] [place] [address],[address], [facility] [address].  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 Frequency/Duration: 2 times a week for 90 Day(s) MEDICAL/REFERRING DIAGNOSIS:  neck and back pain   DATE OF ONSET: chronic  REFERRING PHYSICIAN: Herson Garcia*  MD Orders:  Eval and treat, include modalities of soft tissue, dry needling, and traction  Return MD Appointment: NA       Pre-treatment Symptoms/Complaints:  States that [facility] and had to sleep in a different chair which increased his back pain. Pain: Initial:   5/10 Post Session:  No increase in pain   Medications Last Reviewed:  6/3/21   started turmeric, fish oil, D3 and cinnamon  Updated Objective Findings: pt seems to be able to sit up a little better but not able to stay still for long likely because of pain. TREATMENT:   Manual (0 min) not today   Therapeutic Exercise: ( 40 minutes):    -- sitting on table with feed back for posture. -- sitting hip hinging with trying to keep back straight ~ with prolonged holds 10 sec x 10  -- sit to stand with hip hinging and pelvis glide forward in standing -- hold standing 10 sec; 5 with R foot back, 5x with L foot back.   -- standing with use of walker and resistance to back extension, neck extension   -- standing bent over trunk rotation with assist 10x ea side  -- sitting trunk rotation with assist 10x ea side  -- standing: pelvis shift forward with open stance legs and trying to stand tall-- prolonged holds   HEP: continue updated HEP/standing practice. [employer] Portal    Treatment/Session Summary:    · Response to Treatment: pt doing a little better with standing practice, but still awkward for him. Trunk rotation L was helpful to decrease pain on the R side. · Communication/Consultation:  None today  · Equipment provided today:  None today  · Recommendations/Intent for next treatment session: Next visit will continue focus on posture, postural strengthening, stretching. Upper thoracic extension and rotation stretch in tolerated position. Trial of UBE. Also work on neck rotation in standing as well.      Total Treatment Billable Duration:  40 min  PT Patient Time In/Time Out  Time In: 1620  Time Out: [address], L[place]HELL    Future Appointments   Date Time Provider Lorraine Coronel   6/10/2021  2:30 PM Forerst Sue, PT [facility]   6/15/2021  1:45 PM Forrest Sue, PT [facility]   6/17/2021  1:00 PM Forrest Sue, PT [facility]   6/22/2021 10:15 AM Rebecca Aguirre, PT [facility]   6/24/2021 10:15 AM Rebecca Aguirre, PT [facility]   6/29/2021 10:15 AM Rebecca Aguirre, PT [facility]   7/1/2021 10:30 AM Annie Aguirre, PT [facility]   7/6/2021 11:15 AM Forrest Sue, PT [facility]   7/8/2021 11:15 AM Forrest Sue, PT [facility]   7/13/2021 10:00 AM Hill Fu MD [facility]   7/20/2021  3:00 PM Hill Fu MD [facility]   8/4/2021 12:30 PM Catalino Wahl RD [facility]   9/13/2021  1:30 PM ECHO 36 [facility] U[facility]   9/23/2021  1:30 PM Mariel Humphreys MD [facility]   5/17/2022 11:20 AM Arash Price NP [facility] PP

## 2021-06-08 ENCOUNTER — HOSPITAL ENCOUNTER (OUTPATIENT)
Dept: PHYSICAL THERAPY | Age: 71
Discharge: HOME | End: 2021-06-08
Payer: MEDICARE

## 2021-06-08 PROCEDURE — 97110 THERAPEUTIC EXERCISES: CPT

## 2021-06-08 NOTE — PROGRESS NOTES
Melly Bell  : 1950  [place] / Product Type: Medicare /  [address] at [facility] LUCIANA GAGE  [address] [facility], [address] [place] [address],[address], [place] [address].  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 Frequency/Duration: 2 times a week for 90 Day(s) MEDICAL/REFERRING DIAGNOSIS:  neck and back pain   DATE OF ONSET: chronic  REFERRING PHYSICIAN: Bernice Valverde*  MD Orders:  Eval and treat, include modalities of soft tissue, dry needling, and traction  Return MD Appointment: NA       Pre-treatment Symptoms/Complaints:  States that the area on his lower back is now hurting all the time. Pain: Initial:   5/10 Post Session:  No increase in pain   Medications Last Reviewed:  21  Updated Objective Findings: no new updates. TREATMENT:   Manual (0 min) not today   Therapeutic Exercise: ( 40 minutes):    -- sitting on table with feed back for posture. -- sitting hip hinging with trying to keep back straight ~ with prolonged holds 10 sec  with progression to shoulder HORZ ABD ~ 5x  -- sitting trunk rotation with assist at [id number]/[id number] L side  -- sit to stand with hip hinging and pelvis glide forward in standing -- hold standing 10 sec x 10  -- wall push ups 10x  -- facing wall step up 4 in step backwards and return 10x ea side  -- standing bent over trunk rotation with assist 10x L side  HEP: continue current HEP with addition of yellow band HORZ ABD and standing practice. [employer] Portal    Treatment/Session Summary:    · Response to Treatment: pt continues to need to strengthen LE for help with standing longer.   He often wants to sit down quickly which is also because of fatigue and soreness to back with prolonged standing. · Communication/Consultation:  None today  · Equipment provided today:  None today  · Recommendations/Intent for next treatment session: Next visit will continue focus on posture, postural strengthening, stretching. Upper thoracic extension and rotation stretch in tolerated position-- continue modified prayer stretching. Add UBE. Also work on neck rotation in standing as well.      Total Treatment Billable Duration:  40 min  PT Patient Time In/Time Out  Time In: 1120  Time Out: [address], PT    Future Appointments   Date Time Provider Lorraine Berna   6/10/2021  2:30 PM Irl Michelle, PT [facility]   6/15/2021  1:45 PM Irl Michelle, PT [facility]   6/17/2021  1:00 PM Irl Michelle, PT [facility]   6/22/2021 10:15 AM Rebecca Hansen, PT [facility]   6/24/2021 10:15 AM Rebecca Hansen, PT [facility]   6/29/2021 10:15 AM Rebecca Hansen, PT [facility]   7/1/2021 10:30 AM Zofia Hansen, PT [facility]   7/6/2021 11:15 AM Irl Michelle, PT [facility]   7/8/2021 11:15 AM Irl Michelle, PT [facility]   7/13/2021 10:00 AM Dorman Olszewski, MD [facility]   7/20/2021  3:00 PM Dorman Olszewski, MD [facility]   8/4/2021 12:30 PM Blanka Tsai, RD [facility]   9/13/2021  1:30 PM ECHO 36 [facility]   9/23/2021  1:30 PM Lasha Badillo MD [facility]   5/17/2022 11:20 AM Sasha Landers NP [facility] PP

## 2021-06-10 ENCOUNTER — HOSPITAL ENCOUNTER (OUTPATIENT)
Dept: PHYSICAL THERAPY | Age: 71
Discharge: HOME | End: 2021-06-10
Payer: MEDICARE

## 2021-06-10 PROCEDURE — 97110 THERAPEUTIC EXERCISES: CPT

## 2021-06-10 NOTE — PROGRESS NOTES
Joey Pop  : 1950  [place] / Product Type: Medicare /  [address] at [facility] [place]UC[place]NA GAGE  [address] [place] [address], [address], [place] [address].  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 Frequency/Duration: 2 times a week for 90 Day(s) MEDICAL/REFERRING DIAGNOSIS:  neck and back pain   DATE OF ONSET: chronic  REFERRING PHYSICIAN: Gale Lagunas MD Orders:  Eval and treat, include modalities of soft tissue, dry needling, and traction  Return MD Appointment: NA       Pre-treatment Symptoms/Complaints:  States that he has been on the go since last session because wife had to go in for a TKR so he did not have time to do exercises and is exhausted. Back is a little achy today. Pain: Initial:   2-3/10 Post Session:  No increase in pain   Medications Last Reviewed:  21  Updated Objective Findings:pt still needs encouragement to not sit with PPT and keep feet on ground     TREATMENT:   Manual (0 min) not today   Therapeutic Exercise: ( 40 minutes):    -- sitting on table with feed back for posture.   -- sitting hip hinging with trying to keep back straight ~ with prolonged holds 10 sec -- 5X3 reps  with progression to shoulder HORZ ABD with modified ROM~ 10sec x 5  -- sit to stand with hip hinging and pelvis glide forward in standing 5X 4   -- hold standing with Lumbar Protective Mechanism retraining for trunk flexion ~ 5 reps with sustained isotonics  -- sitting trunk rotation with assist at [id number] L side  -- wall push ups with feed back on form 15x, did last 5 with heels raised  -- standing with hands on wall: hip flexion 10x ea side, and after resting- hip extension 5x ea side  HEP: continue current HEP. [employer] Portal    Treatment/Session Summary:    · Response to Treatment: pt has difficulty with standing exercises but we are progressing with working hip muscles as well. Working towards working posterior chain. · Communication/Consultation:  None today  · Equipment provided today:  None today  · Recommendations/Intent for next treatment session: Next visit will continue focus on posture, postural strengthening, stretching. Upper thoracic extension and rotation stretch in tolerated position-- continue modified prayer stretching.     Total Treatment Billable Duration:  40 min  PT Patient Time In/Time Out  Time In: 9703  Time Out: [facility] 9938, PT    Future Appointments   Date Time Provider Lorraine Berna   6/15/2021  1:45 PM Jean People, PT [facility]   6/17/2021  1:00 PM Jean People, PT [facility]   6/22/2021 10:15 AM Rebecca Tavares, PT [facility]   6/24/2021 10:15 AM Rebecca Tavares, PT [facility]   6/29/2021 10:15 AM Rebecca Tavares, PT [facility]   7/1/2021 10:30 AM Rebecca Tavares, PT [facility]   7/6/2021 11:15 AM Jean People, PT [facility]   7/8/2021 11:15 AM Jean People, PT [facility]   7/13/2021 10:00 AM Junie Olmedo MD [facility]   7/20/2021  3:00 PM Junie Olmedo MD [facility]   8/4/2021 12:30 PM Erick Rahman RD [facility]   9/13/2021  1:30 PM ECHO 36 [facility]   9/23/2021  1:30 PM Lorraine Jeong MD [facility]   5/17/2022 11:20 AM Judi Lesch, NP [facility] PP

## 2021-06-10 NOTE — PROGRESS NOTES
Enrique Marking  : 1950  [place]  Secondary: [address]  [address] [facility], [address].  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 Frequency/Duration: 2 times a week for 90 Day(s) MEDICAL/REFERRING DIAGNOSIS:  neck and back pain   DATE OF ONSET: chronic   REFERRING PHYSICIAN: Seferino GIBBONS MD Orders: Eval and treat, include modalities of soft tissue, dry needling, and traction  Return MD Appointment: NA     ASSESSMENT (6/10/21):  Mr. Lauro Day presented with complaints of neck pain and decreased posture that have been limiting his ability to perform ADLs. He presents with lumbar flexion acquired scoliosis that has affected his thoracic/cervcial spine. We have been working on his posture with strengthening his back/cervical extensors with exercises and stretching to psoas. He is unable to fully straighten his back but is tolerating standing up straighter for short periods which is progress for him. Pt has also made progress in pain levels and thoracic and cervical rotation ROM. He will benefit from continued PT. PROBLEM LIST (Impacting functional limitations):  1. Decreased Strength  2. Decreased Ambulation Ability/Technique  3. Increased Pain  4. Decreased Flexibility/Joint Mobility   5. Decreased posture  INTERVENTIONS PLANNED: (Treatment may consist of any combination of the following)  1. Manual Therapy  2. Therapeutic Exercise/Strengthening   3. Aquatic therapy   4. Modalities as needed for pain  5.  Therapeutic activities      GOALS: (Goals have been discussed and agreed upon with patient.)  Short-Term Functional Goals: Time Frame: 30 days  1. Pt will increase cervical ROM to 50 deg bilateral for improved movement for driving. PROGRESSED 6/10/21  2. Pt will be able to lie on his side or back for at least 30 minutes for decreased pain for sleeping in bed. ONGOING 6/10/21  3. Pt will be able to demonstrate neutral spine in sitting for improved posture. GOOD Progression 6/10/21  4. Pt will be independent with HEP. MET 6/10/21  Discharge Goals: Time Frame: 90 days  1. Pt will increase cervical ROM to 60 deg bilateral for improved mobility. 2. Pt will report he is able to sleep in the bed with no increase in pain for improved sleep. 3. Pt will increase LE strength to 5/5 for improved gait. 4. Pt will demonstrate neutral spine in standing with no verbal cueing. 5. Pt will be able to ambulate with upright trunk with least resistive assistive device for decreased stress on his neck during gait. OUTCOME MEASURE:   Tool Used: Neck Disability Index (NDI)  Score:  Initial: 11/50  Most Recent: X/50 (Date: -- )   Interpretation of Score: The Neck Disability Index is a revised form of the Oswestry Low Back Pain Index and is designed to measure the activities of daily living in person's with neck pain. Each section is scored on a 0-5 scale, 5 representing the greatest disability. The scores of each section are added together for a total score of 50. MEDICAL NECESSITY:   · Patient is expected to demonstrate progress in strength and posture to improve mobility for ADLs. REASON FOR SERVICES/OTHER COMMENTS:  · Patient continues to require skilled intervention due to decreased ROM, decreased posture and gait that is limiting his ability to perform ADLs. .  Total Duration: 50 minutes  PT Patient Time In/Time Out  Time In: 5839  Time Out: 1520    Rehabilitation Potential For Stated Goals: Good    Thank you for this referral,    Rodríguez Platt PT             PAIN/SUBJECTIVE:   Initial:   2-3/10 Post Session:  2/10   HISTORY:      Ambulatory/Rehab Services H2 Model Falls Risk Assessment   Risk Factors:       (1)  Gender [Male] Ability to Rise from Chair:       (1)  Pushes up, successful in one attempt   Falls Prevention Plan:       No modifications necessary   Total: (5 or greater = High Risk): 2   ©2010 [facility] [employer]. All Rights Reserved. [facility] Patent #[id number]. Federal Law prohibits the replication, distribution or use without written permission from [facility] [employer]   Current Medications:       Current Outpatient Medications:     lancets 28 gauge misc, Check fs every day, DM2, E11.9, Disp: 100 Lancet, Rfl: 1    glucose blood VI test strips (Ascensia CONTOUR) strip, Check fs every day, DM2, E11.9, Disp: 100 Strip, Rfl: 1    Insulin Needles, Disposable, (BD Jocelynn 2nd Gen Pen Needle) 32 gauge x 5/32\" ndle, by Does Not Apply route., Disp: , Rfl:     insulin glargine-lixisenatide (Soliqua 100/33) 100 unit-33 mcg/mL inpn, 40 units sub-q daily  Indications: type 2 diabetes mellitus, Disp: 3 mL, Rfl: 5    pantoprazole (PROTONIX) 40 mg tablet, Take 1 Tab by mouth daily. , Disp: 90 Tab, Rfl: 1    montelukast (SINGULAIR) 10 mg tablet, Take 1 Tab by mouth nightly. Indications: controller medication for asthma, Disp: 90 Tab, Rfl: 1    lisinopriL (PRINIVIL, ZESTRIL) 10 mg tablet, Take 1 Tab by mouth daily. Indications: high blood pressure, Disp: 90 Tab, Rfl: 1    ezetimibe (ZETIA) 10 mg tablet, Take 1 Tab by mouth nightly. Indications: high cholesterol, Disp: 90 Tab, Rfl: 1    doxazosin (CARDURA) 4 mg tablet, Take 1 Tab by mouth daily. Indications: enlarged prostate with urination problem, Disp: 90 Tab, Rfl: 1    celecoxib (CeleBREX) 200 mg capsule, Take 1 Cap by mouth daily. Indications: joint damage causing pain and loss of function, Disp: 90 Cap, Rfl: 1    atorvastatin (LIPITOR) 20 mg tablet, Take 1 Tab by mouth nightly.  Indications: high cholesterol, Disp: 90 Tab, Rfl: 1    apixaban (Eliquis) 5 mg tablet, Take 1 Tab by mouth two (2) times a day. Indications: treatment to prevent blood clots in chronic atrial fibrillation, Disp: 180 Tab, Rfl: 1    dapagliflozin (Farxiga) 10 mg tab tablet, Take 1 Tab by mouth daily. Indications: type 2 diabetes mellitus, Disp: 90 Tab, Rfl: 1    fluticasone furoate-vilanteroL (Breo Ellipta) 200-25 mcg/dose inhaler, Take 1 Puff by inhalation daily. , Disp: 3 Inhaler, Rfl: 1    metoprolol tartrate (LOPRESSOR) 50 mg tablet, Take 1 Tab by mouth two (2) times a day. Indications: ventricular rate control in atrial fibrillation, Disp: 60 Tab, Rfl: 11    acetaminophen (TYLENOL) 500 mg tablet, Take 500-1,000 mg by mouth every six (6) hours as needed for Pain., Disp: , Rfl:     diphenhydrAMINE (BENADRYL) 50 mg capsule, Take 100 mg by mouth nightly as needed for Sleep., Disp: , Rfl:     colesevelam (WELCHOL) 625 mg tablet, Take 625-3,750 mg by mouth as needed for Diarrhea. Patient taking 2 tabs following diarrheal bowel movement up to 3 times. Maximum daily dosage: 6 tabs, Disp: , Rfl:     fluticasone propionate (FLONASE ALLERGY RELIEF) 50 mcg/actuation nasal spray, 2 Sprays by Both Nostrils route daily as needed for Allergies. Indications: inflammation of the nose due to an allergy, Disp: , Rfl:     albuterol (PROVENTIL HFA, VENTOLIN HFA, PROAIR HFA) 90 mcg/actuation inhaler, Take 1 Puff by inhalation every four (4) hours as needed for Wheezing., Disp: 1 Inhaler, Rfl: 5    ANTI-FUNGAL 2 % topical powder, APPLY TO ABDOMEN 2 TIMES PER DAY, Disp: , Rfl: 11    Blood-Glucose Meter monitoring kit, Check fs every day; DM2, E11/9, Contour glucometer, Disp: 1 Kit, Rfl: 0    albuterol (PROVENTIL VENTOLIN) 2.5 mg /3 mL (0.083 %) nebulizer solution, 3 mL by Nebulization route once for 1 dose. (Patient taking differently: 2.5 mg by Nebulization route daily as needed for Wheezing.  Patient states he hasn't used in many years), Disp: 24 Each, Rfl: 11   Date Last Reviewed: 6/10/21   EXAMINATION:   Observation/Orthostatic Postural Assessment: kyphosis at cervical/thoracic junction in standing and sitting. Stands with trunk in a flexed position. Unable to get head over pelvis but is working with modifying his posture to open stance position with knees slightly bent to upright posture more easily. He is able to hold this posture for only short periods right now ~ 20 sec. Palpation: tightness to upper traps B. Tender to R T11    ROM:   SHoulder flexion AROM R = 90 degrees, L = 100 degrees  Lumbar extension = negative ~ 10 degrees, thoracic extension = negative ~ 20 degrees (unable to get to neutral) Mostly at upper thoracic pt has lost more mobility  Cervical Left Rotation (% of Normal): 35  Cervical Right Rotation (% of Normal): 50                         Special Tests: Lumbar Protective Mechanism for trunk flexion = 2/5   Neurological Screen: n/t . Functional Mobility:  Sit to/from Stand:independent. Bed Mobility: n/t. Walking on level ground: ambulates with cane and trunk and neck in flexed position, wide base of support. Have worked with him with RW but pt prefers use of cane for easier transitions.    Balance:  n/t

## 2021-06-15 ENCOUNTER — HOSPITAL ENCOUNTER (OUTPATIENT)
Dept: PHYSICAL THERAPY | Age: 71
Discharge: HOME | End: 2021-06-15
Payer: MEDICARE

## 2021-06-15 PROCEDURE — 97110 THERAPEUTIC EXERCISES: CPT

## 2021-06-15 NOTE — PROGRESS NOTES
Hamida French  : 1950  [place] / Product Type: Medicare /  [address]  at [facility] [address]  [address] [facility], [address], [place] [address].  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 Frequency/Duration: 2 times a week for 90 Day(s) MEDICAL/REFERRING DIAGNOSIS:  neck and back pain   DATE OF ONSET: chronic  REFERRING PHYSICIAN: Marialuisa Jurado, Manual [place]*  MD Orders:  Eval and treat, include modalities of soft tissue, dry needling, and traction  Return MD Appointment: NA       Pre-treatment Symptoms/Complaints:  Walks in without cane. Has not yet tried sleeping on side because on stabbing pain when he does. Pain: Initial:   2/10 Post Session:  No increase in pain   Medications Last Reviewed:  21  Updated Objective Findings: neck rotation L = 40 degrees     TREATMENT:   Manual (0 min) not today   Therapeutic Exercise: ( 40 minutes):    -- sitting on table with feed back for posture.   -- neck retraction 10 sec x 5  -- thoracic rotation with neck neutral 10x ea side with min A, 5x ea way no assist  -- c1/2 rotation L with assist to block C2 5x  -- sitting hip hinging with trying to keep back straight ~ with prolonged holds 15 sec -- 5 reps  with progression to shoulder HORZ ABD with modified ROM~ 10sec x 5  -- sit to stand with hip hinging and pelvis glide forward in standing 7x   -- hold standing with Lumbar Protective Mechanism retraining for trunk flexion ~ 5 reps with sustained isotonics  -- sitting trunk rotation with assist at T11/[id number] L side  -- wall push ups with feed back on form 15x, did last 5 with heels raised  -- standing with hands on wall: hip flexion 10x ea side, and hip extension 5x ea side  HEP: continue current HEP. [employer] Portal    Treatment/Session Summary:    · Response to Treatment: neck rotation L = 55 degrees after work on c 1/2   · Communication/Consultation:  None today  · Equipment provided today:  None today  · Recommendations/Intent for next treatment session: Next visit will continue focus on posture, postural strengthening, stretching.  Pt education for self c1/2 mobilization     Total Treatment Billable Duration:  40 min  PT Patient Time In/Time Out  Time In: 1350  Time Out: 100 [facility], PT    Future Appointments   Date Time Provider Lorraine Berna   6/17/2021  1:00 PM Bebe Werner, PT [facility]   6/22/2021 10:15 AM Ryanne Massey, PT [facility]   6/24/2021 10:15 AM Rebecca Massey, PT [facility]   6/29/2021 10:15 AM Rebecca Massey, PT [facility]   7/1/2021 10:30 AM Rebecca Massey, PT [facility]   7/6/2021 11:15 AM Bebe Werner, PT [facility]   7/8/2021 11:15 AM Bebe Werner, PT [facility]   7/13/2021 10:00 AM Saulo Mullins MD [facility]   7/20/2021  3:00 PM Saulo Mullins MD [facility]   8/4/2021 12:30 PM [facility]a [facility]   9/13/2021  1:30 PM ECHO 36 [facility] U[facility]   9/23/2021  1:30 PM Deonte Marquis MD [facility]   5/17/2022 11:20 AM Rian Khan NP [facility] PP

## 2021-06-16 ENCOUNTER — APPOINTMENT (RX ONLY)
Dept: URBAN - METROPOLITAN AREA CLINIC 349 | Facility: CLINIC | Age: 71
Setting detail: DERMATOLOGY
End: 2021-06-16

## 2021-06-16 DIAGNOSIS — Z85.828 PERSONAL HISTORY OF OTHER MALIGNANT NEOPLASM OF SKIN: ICD-10-CM

## 2021-06-16 PROBLEM — C44.329 SQUAMOUS CELL CARCINOMA OF SKIN OF OTHER PARTS OF FACE: Status: ACTIVE | Noted: 2021-06-16

## 2021-06-16 PROCEDURE — A4550 SURGICAL TRAYS: HCPCS

## 2021-06-16 PROCEDURE — ? SHAVE REMOVAL AND DESTRUCTION

## 2021-06-16 PROCEDURE — ? COUNSELING

## 2021-06-16 PROCEDURE — 17282 DSTR MAL LS F/E/E/N/L/M1.1-2: CPT

## 2021-06-16 PROCEDURE — 88305 TISSUE EXAM BY PATHOLOGIST: CPT

## 2021-06-16 PROCEDURE — ? PATHOLOGY BILLING

## 2021-06-16 NOTE — PROCEDURE: PATHOLOGY BILLING
Immunohistochemistry ([id number] and [id number]) billing is not performed here. Please use the Immunohistochemistry Stain Billing plan to accomplish this. Immunohistochemistry ([id number] and [id number]) billing is not performed here. Please use the Immunohistochemistry Stain Billing plan to accomplish this.

## 2021-06-16 NOTE — PROCEDURE: SHAVE REMOVAL AND DESTRUCTION
Cautery Type: electrodesiccation
Notification Instructions: Patient will be notified of biopsy results. However, patient instructed to call the office if not contacted within 2 weeks.
Detail Level: Detailed
Consent: Written consent was obtained and risks were reviewed including but not limited to scarring, infection, bleeding, scabbing, incomplete removal, nerve damage and allergy to anesthesia.
Bill As?: Note: Bill Malignant Destruction If Path Confirms Malignant Lesion. Only Bill As Shave Removal If Path Comes Back Benign. Do Not Bill Shave Removal On Malignant Lesions.: Malignant Destruction
Accession #: dr [email or web address] read
Size After Destruction (Required For Destruction Billing): 1.1
Post-Care Instructions: I reviewed with the patient in detail post-care instructions. Patient is to keep the biopsy site dry overnight, and then apply bacitracin twice daily until healed. Patient may apply hydrogen peroxide soaks to remove any crusting.  After the procedure, the patient was observed for 5-10 minutes and was oriented to,person, place and time and denied feeling dizzy, queasy and stated that they were not going to faint
Bill 11915 For Specimen Handling/Conveyance To Laboratory?: no
Anesthesia Volume In Cc: 1
Was Curettage Performed?: Yes
Size Of Lesion In Cm: 0
Wound Care: Vaseline
Billing Type: Third-Party Bill
Hemostasis: Electrocautery
Dressing: Band-Aid
Anesthesia Type: 2% lidocaine with epinephrine

## 2021-06-17 ENCOUNTER — HOSPITAL ENCOUNTER (OUTPATIENT)
Dept: PHYSICAL THERAPY | Age: 71
Discharge: HOME | End: 2021-06-17
Payer: MEDICARE

## 2021-06-17 NOTE — PROGRESS NOTES
Renee Moreno  : 1950  [place]  Secondary: [address],  [address]  [phone]   [phone]      Pt cancelled today's session.    Eladio Banuelos, PT MSPT

## 2021-06-22 ENCOUNTER — HOSPITAL ENCOUNTER (OUTPATIENT)
Dept: PHYSICAL THERAPY | Age: 71
Discharge: HOME | End: 2021-06-22
Payer: MEDICARE

## 2021-06-22 PROCEDURE — 97110 THERAPEUTIC EXERCISES: CPT

## 2021-06-22 NOTE — PROGRESS NOTES
Kevyn Iniguez  : 1950  [place] / Product Type: Medicare /  [address] at [facility] [address]  [address] [facility], [address], [place].  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 Frequency/Duration: 2 times a week for 90 Day(s) MEDICAL/REFERRING DIAGNOSIS:  neck and back pain   DATE OF ONSET: chronic  REFERRING PHYSICIAN: Elise Liriano*  MD Orders:  Eval and treat, include modalities of soft tissue, dry needling, and traction  Return MD Appointment: NA       Pre-treatment Symptoms/Complaints:  Pt reports he continue with the sharp pain in his side when he tries to lie on his side. Pain: Initial:   2/10 Post Session:  No increase in pain   Medications Last Reviewed:  21  U     TREATMENT:   Manual (0 min) not today   Therapeutic Exercise: ( 40 minutes):    -- sitting on table with feed back for posture. -- neck retraction 10 sec x 5  -- thoracic rotation with neck neutral 5x ea way no assist  -- sitting hip hinging with trying to keep back straight ~ with prolonged holds 15 sec -- 5 reps   -- sit to stand with hip hinging and pelvis glide forward in standing 7x   -- hold standing with Lumbar Protective Mechanism retraining for trunk flexion ~ 5 reps with sustained isotonics  -- standing with hands on wall: hip flexion 10x ea side, and hip extension 5x ea side  -- Sitting pec stretch with pulling shoulders back 20\"x3  -- pt standing leaning over table and hip extension stretch 30\"x3 B  --Sitting with good posture and trunk extension isometrics 5'x10  -- thoracic extension over ball with min assist x10 reps.    HEP: continue current HEP. [facility] Portal    Treatment/Session Summary:    · Response to Treatment: Continues with decreased posture in standing. He did do better in sitting and keeping weight on his feet. · Communication/Consultation:  None today  · Equipment provided today:  None today  · Recommendations/Intent for next treatment session: Next visit will continue focus on posture, postural strengthening, stretching.  Pt education for self c1/2 mobilization     Total Treatment Billable Duration:  40 min  PT Patient Time In/Time Out  Time In: 1030  Time Out: [address], PT    Future Appointments   Date Time Provider Lorraine Coronel   6/24/2021 10:15 AM Twan Moore, PT [facility]   6/29/2021 10:15 AM Rebecca Reynolds, PT [facility]   7/1/2021 10:30 AM Rebecca Reynolds, PT [facility]   7/6/2021 11:15 AM Lucy Mahoney, PT [facility]   7/8/2021 11:15 AM Lucy Mahoney, PT [facility]   7/13/2021 10:00 AM Sara Malave MD [facility]   7/20/2021  3:00 PM Sara Malave MD [facility]   8/4/2021 12:30 PM Jameel Segovia RD [facility]   9/13/2021  1:30 PM ECHO 39 [facility]   9/23/2021  1:30 PM Honorio Greer MD [facility]   5/17/2022 11:20 AM Reta Gilford, NP [facility] PP

## 2021-06-24 ENCOUNTER — HOSPITAL ENCOUNTER (OUTPATIENT)
Dept: PHYSICAL THERAPY | Age: 71
Discharge: HOME | End: 2021-06-24
Payer: MEDICARE

## 2021-06-24 PROCEDURE — 97110 THERAPEUTIC EXERCISES: CPT

## 2021-06-24 NOTE — PROGRESS NOTES
Mj Navarrete  : 1950  [place] / Product Type: Medicare /  [address] at [facility] [address]  [address] [place] [address], [address], [place] [address].  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 Frequency/Duration: 2 times a week for 90 Day(s) MEDICAL/REFERRING DIAGNOSIS:  neck and back pain   DATE OF ONSET: chronic  REFERRING PHYSICIAN: Shai Villafana*  MD Orders:  Eval and treat, include modalities of soft tissue, dry needling, and traction  Return MD Appointment: NA       Pre-treatment Symptoms/Complaints:  Pt reports he liked the stretch we did last session. Pain: Initial:   2/10 Post Session:  No increase in pain   Medications Last Reviewed:  21       TREATMENT:   Manual (0 min) not today   Therapeutic Exercise: ( 40 minutes):    -- sitting on table with feed back for posture. -- neck retraction 10 sec x 5  -- thoracic rotation with neck neutral 5x ea way no assist  -- sitting hip hinging with trying to keep back straight ~ with prolonged holds 15 sec -- 5 reps   -- sit to stand with hip hinging and pelvis glide forward in standing 7x   -- hold standing with Lumbar Protective Mechanism retraining for trunk flexion ~ 5 reps with sustained isotonics  -- standing with hands on wall: hip flexion 10x ea side, and hip extension 5x ea side  -- Sitting pec stretch with pulling shoulders back 20\"x3  -- pt standing leaning over table and hip extension stretch 30\"x3 B  -- thoracic extension over ball with min assist x10 reps. -- Sitting PROM to thoracic spine for thoracic extension ROM. HEP: continue current HEP.   [facility] Portal    Treatment/Session Summary:    · Response to Treatment: Continues with decreased standing posture and difficulty extending thoracic spine. · Communication/Consultation:  None today  · Equipment provided today:  None today  · Recommendations/Intent for next treatment session: Next visit will continue focus on posture, postural strengthening, stretching.  Pt education for self c1/2 mobilization     Total Treatment Billable Duration:  40 min  PT Patient Time In/Time Out  Time In: 3065  Time Out: [address] Radha, L[place]    Future Appointments   Date Time Provider Lorraine Coronel   6/29/2021 10:15 AM Twan Yu, PT [facility]   7/1/2021 10:30 AM Alanis Acevedo, PT [facility]   7/8/2021 11:15 AM Brenna Hays, PT [facility]   7/13/2021 10:00 AM Sarah Cadet MD [facility]   7/20/2021  3:00 PM Sarah Cadet MD [facility]   8/4/2021 12:30 PM Cristela Singh RD [facility]   9/13/2021  1:30 PM ECHO 39 [facility] D   9/23/2021  1:30 PM Adama Ni MD [facility] D   5/17/2022 11:20 AM Keshawn Stallings NP [facility] PP

## 2021-06-29 ENCOUNTER — HOSPITAL ENCOUNTER (OUTPATIENT)
Dept: PHYSICAL THERAPY | Age: 71
Discharge: HOME | End: 2021-06-29
Payer: MEDICARE

## 2021-06-29 ENCOUNTER — APPOINTMENT (OUTPATIENT)
Dept: GENERAL RADIOLOGY | Age: 71
End: 2021-06-29
Attending: EMERGENCY MEDICINE
Payer: MEDICARE

## 2021-06-29 ENCOUNTER — HOSPITAL ENCOUNTER (EMERGENCY)
Age: 71
Discharge: HOME | End: 2021-06-29
Attending: EMERGENCY MEDICINE
Payer: MEDICARE

## 2021-06-29 ENCOUNTER — APPOINTMENT (OUTPATIENT)
Dept: CT IMAGING | Age: 71
End: 2021-06-29
Attending: EMERGENCY MEDICINE
Payer: MEDICARE

## 2021-06-29 VITALS
WEIGHT: 315 LBS | DIASTOLIC BLOOD PRESSURE: 86 MMHG | BODY MASS INDEX: 36.45 KG/M2 | SYSTOLIC BLOOD PRESSURE: 140 MMHG | HEIGHT: 78 IN | OXYGEN SATURATION: 94 % | RESPIRATION RATE: 16 BRPM | TEMPERATURE: 97.9 F | HEART RATE: 79 BPM

## 2021-06-29 DIAGNOSIS — M25.562 ACUTE PAIN OF LEFT KNEE: ICD-10-CM
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
## 2021-06-29 DIAGNOSIS — S01.81XA FACIAL LACERATION, INITIAL ENCOUNTER: ICD-10-CM

## 2021-06-29 DIAGNOSIS — M54.50 ACUTE RIGHT-SIDED LOW BACK PAIN WITHOUT SCIATICA: ICD-10-CM

## 2021-06-29 PROCEDURE — 72100 X-RAY EXAM L-S SPINE 2/3 VWS: CPT

## 2021-06-29 PROCEDURE — 99283 EMERGENCY DEPT VISIT LOW MDM: CPT

## 2021-06-29 PROCEDURE — 74011250636 HC RX REV CODE- 250/636: Performed by: NURSE PRACTITIONER

## 2021-06-29 PROCEDURE — 97110 THERAPEUTIC EXERCISES: CPT

## 2021-06-29 PROCEDURE — 74011250637 HC RX REV CODE- 250/637: Performed by: NURSE PRACTITIONER

## 2021-06-29 PROCEDURE — 90471 IMMUNIZATION ADMIN: CPT

## 2021-06-29 PROCEDURE — 90715 TDAP VACCINE 7 YRS/> IM: CPT | Performed by: NURSE PRACTITIONER

## 2021-06-29 PROCEDURE — 73562 X-RAY EXAM OF KNEE 3: CPT

## 2021-06-29 PROCEDURE — 75810000293 HC SIMP/SUPERF WND  RPR

## 2021-06-29 RX ORDER — CYCLOBENZAPRINE HCL 10 MG
10 TABLET ORAL
Qty: 10 TABLET | Refills: 0 | Status: SHIPPED | OUTPATIENT
Start: 2021-06-29 | End: 2021-07-09

## 2021-06-29 RX ORDER — CYCLOBENZAPRINE HCL 10 MG
10 TABLET ORAL
Status: COMPLETED | OUTPATIENT
Start: 2021-06-29 | End: 2021-06-29

## 2021-06-29 RX ORDER — HYDROCODONE BITARTRATE AND ACETAMINOPHEN 5; 325 MG/1; MG/1
1 TABLET ORAL
Qty: 12 TABLET | Refills: 0 | Status: SHIPPED | OUTPATIENT
Start: 2021-06-29 | End: 2021-07-02

## 2021-06-29 RX ADMIN — TETANUS TOXOID, REDUCED DIPHTHERIA TOXOID AND ACELLULAR PERTUSSIS VACCINE, ADSORBED 0.5 ML: 5; 2.5; 8; 8; 2.5 SUSPENSION INTRAMUSCULAR at 18:50

## 2021-06-29 RX ADMIN — CYCLOBENZAPRINE 10 MG: 10 TABLET, FILM COATED ORAL at 18:50

## 2021-06-29 NOTE — ED NOTES
I have reviewed discharge instructions with the patient. The patient verbalized understanding. Patient left ED via Discharge Method: ambulatory to Home with daughters. Opportunity for questions and clarification provided. Patient given 2 scripts. To continue your aftercare when you leave the hospital, you may receive an automated call from our care team to check in on how you are doing. This is a free service and part of our promise to provide the best care and service to meet your aftercare needs.  If you have questions, or wish to unsubscribe from this service please call [phone]. Thank you for Choosing our [employer] Emergency Department.

## 2021-06-29 NOTE — ED TRIAGE NOTES
Pt states he slipped on a wet floor at home today and hit forehead and bridge of nose with glasses. States pain in left knee and in lower back. Masked for triage.

## 2021-06-29 NOTE — DISCHARGE INSTRUCTIONS
Take medication as prescribed. Use caution when taking medication as it can cause drowsiness. Follow-up in 1 week for suture removal.  Do not get sutures or glue wet for 24 hours. Then you may shower and pat dry after showering. Do not scrub area. Glue will flake off by itself over the next 5 to 7 days. Keep Neosporin on sutures to minimize scarring. Apply ice to your knee for 15-20 minute intervals to decrease pain and swelling. Monitor for any confusion, blurry vision, headache, or other concerning symptoms. Return to the emergency department for any new, worsening, or concerning symptoms.

## 2021-06-29 NOTE — ED PROVIDER NOTES
29-year-old male who presents to the emergency department today after a fall. Patient states that he had cleaning ladies at his house today and then just mopped the floor and he slipped on the wet floor. Patient states he usually ambulates with a cane because he walks hunched over due to chronic back issues. Patient reports he hit his face and his left knee. He reports some right lower back pain since the fall. Patient denies any loss of consciousness, dizziness, blurry vision, headache, nausea, vomiting, diarrhea, difficulty ambulating, confusion, slurred speech. Patient is on Eliquis for history of atrial fibrillation. The history is provided by the patient. Fall  The accident occurred 1 to 2 hours ago. The fall occurred while walking. He fell from a height of ground level. He landed on hard floor. The volume of blood lost was minimal. The point of impact was the head and left knee. Associated symptoms include laceration. Pertinent negatives include no visual change, no fever, no numbness, no abdominal pain, no bowel incontinence, no nausea, no vomiting, no hematuria, no headaches, no extremity weakness, no hearing loss, no loss of consciousness and no tingling. The symptoms are aggravated by activity and pressure on injury. He has tried acetaminophen for the symptoms. It is unknown when the patient last had a tetanus shot.         Past Medical History:   Diagnosis Date    Adverse effect of anesthesia     unable to have spinal anesthesia when had TKR~r/t lumbar spine problems; CT Spine with contrast 7/20/16    Asthma dx childhood    last attack during high school; advair at hs; albuterol prn; neb prn     Atrial fibrillation (HCC)     2 epiosode of A-fib last about 6-8 months ago (noted on 6/27/17); on Eliquis     Cervical spondylosis 6/20/2013    Chronic back pain 2013    RIGHT SIDE WITH UNCLEAR ETIOLOGY    Chronic pain     Colon polyps     COPD (chronic obstructive pulmonary disease) ([facility] [address] 8/17/2016    Coronary atherosclerosis of native coronary vessel 8/17/2016 08/2012: Mild non-obstructive CAD    Diabetes ([facility] [address] dx 5/14    type 2; oral meds; no bs checks at home    Diverticulosis     Edema 8/17/2016    Former smoker     GERD (gastroesophageal reflux disease)      controlled with med    High cholesterol     History of kidney stones     Hypertension     controlled with med    Obesity (BMI 30-39. 9)     BMI 39    Status post total right knee replacement 7/17/2017    Unspecified sleep apnea     wears cpap at hs       Past Surgical History:   Procedure Laterality Date    HX CATARACT REMOVAL      bilateral with lens implants; x3    HX COLONOSCOPY  10/6/14    HX COLONOSCOPY  11/20/14    HX HEART CATHETERIZATION  2012    no stents     HX KNEE REPLACEMENT Right     HX LUMBAR LAMINECTOMY  2009    no hardware     HX OTHER SURGICAL  20 yrs ago    colo-rectal sx for fissure    HX RETINAL DETACHMENT REPAIR Bilateral     HX TOTAL COLECTOMY  2015    OK CYSTOSCOPY,INSERT URETERAL STENT      kidney stone removed cystoscopy ~pt states no stent was placed         Family History:   Problem Relation Age of Onset    Heart Disease Father         BOWEL OBSTRUCTION    Heart Attack Father 80        MI    Heart Disease Brother 72        STENT HEART    Suicide Brother     Parkinsonism Mother     Inflammatory Bowel Dz Son     Malignant Hyperthermia Neg Hx     Pseudocholinesterase Deficiency Neg Hx     Delayed Awakening Neg Hx     Post-op Nausea/Vomiting Neg Hx     Emergence Delirium Neg Hx     Post-op Cognitive Dysfunction Neg Hx        Social History     Socioeconomic History    Marital status:      Spouse name: Not on file    Number of children: Not on file    Years of education: Not on file    Highest education level: Not on file   Occupational History    Not on file   Tobacco Use    Smoking status: Former Smoker     Packs/day: 1.50     Years: 25.00     Pack years: 37.50 Types: Cigarettes     Quit date: 2012     Years since quittin.2    Smokeless tobacco: Never Used   Substance and Sexual Activity    Alcohol use: Yes     Alcohol/week: 3.0 standard drinks     Types: 3 Cans of beer per week    Drug use: No    Sexual activity: Not on file   Other Topics Concern     Service Not Asked    Blood Transfusions Not Asked    Caffeine Concern Not Asked    Occupational Exposure Not Asked    Hobby Hazards Not Asked    Sleep Concern Not Asked    Stress Concern Not Asked    Weight Concern Not Asked    Special Diet Not Asked    Back Care Not Asked    Exercise Not Asked    Bike Helmet Not Asked    [address] Yes    Self-Exams Not Asked   Social History Narrative    1/28/15:  PATIENT IS  TO [facility]. THEY HAVE TWO GROWN CHILDREN. 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 Social Determinants of Health     Financial Resource Strain:     Difficulty of Paying Living Expenses:    Food Insecurity:     Worried About Running Out of Food in the Last Year:     [address] in the Last Year:    Transportation Needs:     Lack of Transportation (Medical):  Lack of Transportation (Non-Medical):    Physical Activity:     Days of Exercise per Week:     Minutes of Exercise per Session:    Stress:     Feeling of Stress :    Social Connections:     Frequency of Communication with Friends and Family:     Frequency of Social Gatherings with Friends and Family:     Attends Muslim Services:     Active Member of Clubs or Organizations:     Attends Club or Organization Meetings:     Marital Status:    Intimate Partner Violence:     Fear of Current or Ex-Partner:     Emotionally Abused:     Physically Abused:     Sexually Abused: ALLERGIES: Bactrim [sulfamethoxazole-trimethoprim]    Review of Systems   Constitutional: Negative for fever.    Gastrointestinal: Negative for abdominal pain, bowel incontinence, nausea and vomiting. Genitourinary: Negative for hematuria. Musculoskeletal: Positive for back pain. Negative for extremity weakness. Neurological: Negative for tingling, loss of consciousness, numbness and headaches. All other systems reviewed and are negative. Vitals:    06/29/21 1659   BP: (!) 140/86   Pulse: 79   Resp: 16   Temp: 97.9 °F (36.6 °C)   SpO2: 94%   Weight: 145.2 kg (320 lb)   Height: 6' 7\" (2.007 m)            Physical Exam  Vitals and nursing note reviewed. Constitutional:       General: He is not in acute distress. Appearance: Normal appearance. He is obese. He is not ill-appearing, toxic-appearing or diaphoretic. HENT:      Head: Normocephalic. Abrasion and laceration present. No raccoon eyes, Luna's sign, right periorbital erythema or left periorbital erythema. Comments: Approximately 1 cm vertical laceration between eyebrows. Approximately 3 cm superficial laceration, centered just above brows. Right Ear: Tympanic membrane, ear canal and external ear normal.      Left Ear: Ear canal and external ear normal.      Mouth/Throat:      Mouth: Mucous membranes are moist.      Pharynx: Oropharynx is clear. Eyes:      General: Lids are normal. Vision grossly intact. Gaze aligned appropriately. No scleral icterus. Extraocular Movements: Extraocular movements intact. Right eye: Normal extraocular motion and no nystagmus. Left eye: Normal extraocular motion and no nystagmus. Conjunctiva/sclera: Conjunctivae normal.      Pupils: Pupils are equal, round, and reactive to light. Cardiovascular:      Rate and Rhythm: Normal rate. Pulses: Normal pulses. Heart sounds: Normal heart sounds. Pulmonary:      Effort: Pulmonary effort is normal. No respiratory distress. Breath sounds: Normal breath sounds. Abdominal:      General: Abdomen is flat. Bowel sounds are normal. There is no distension. Palpations: Abdomen is soft. Tenderness: There is no abdominal tenderness. Musculoskeletal:      Cervical back: Normal, normal range of motion and neck supple. No rigidity, tenderness or bony tenderness. Normal range of motion. Thoracic back: Normal. No tenderness or bony tenderness. Normal range of motion. Lumbar back: Spasms and tenderness present. No bony tenderness. Normal range of motion. Right knee: Normal.      Left knee: Swelling present. No deformity, lacerations or crepitus. Normal range of motion. No tenderness. Comments: No midline tenderness with palpation to cervical, thoracic, or lumbar spine. Tenderness with palpation to right lower back. Slight swelling over anterior left knee. Mild bruising and small abrasion. Full range of motion of left knee. Skin:     Capillary Refill: Capillary refill takes less than 2 seconds. Findings: Laceration present. Neurological:      General: No focal deficit present. Mental Status: He is alert and oriented to person, place, and time. GCS: GCS eye subscore is 4. GCS verbal subscore is 5. GCS motor subscore is 6. Cranial Nerves: Cranial nerves are intact. Sensory: Sensation is intact. Motor: Motor function is intact. Coordination: Coordination is intact. Gait: Gait is intact. Comments: Patient alert and oriented. Patient answers all questions appropriately. He is ambulatory with steady gait. No ataxia noted. Psychiatric:         Mood and Affect: Mood normal.         Behavior: Behavior normal.         Thought Content: Thought content normal.         Judgment: Judgment normal.          MDM  Number of Diagnoses or Management Options  Acute pain of left knee  Acute right-sided low back pain without sciatica  Facial laceration, initial encounter  Fall, initial encounter  Diagnosis management comments: 60-year-old male who presents to emergency department today after a fall. Neuro exam without any abnormal findings. PERRL. He is ambulatory with normal, steady gait. Patient is on Eliquis and did have a head injury with the fall. CT scan ordered, however patient states he is unable to have CT scan done because he is unable to lay down. Have discussed with the patient the option of trying to put a wedge behind him to get CT scan. Patient states he does not feel that he would even be able to do this with a wedge behind him after treatment for pain. Have discussed with patient the increased risk of brain bleed due to his age and blood thinners. Patient states he understands the risk but still declines to have CT scan done today. I have discussed with patient symptoms to monitor for such as headache, dizziness, slurred speech, or gait issues and to return to the emergency department immediately should he experience any concerning symptoms. Patient verbalizes understanding agreement of return precautions. Horizontal laceration to forehead repaired with Dermabond. Running suture to vertical laceration between brows. Patient educated on wound care. Patient encouraged to follow-up with his primary care provider or the emergency department in 1 week to have sutures removed. Knee x-ray negative for acute fracture. Noted on lumbar x-ray that there is a probable old compression fracture to T12. Patient does not have any pain or tenderness to this area.  I have discussed the results of all labs, procedures, radiographs, and/or treatments with the patient and available family members. Shala Ty is agreed upon by the patient and the patient is ready for discharge.  Questions about treatment in the ED and differential diagnosis of presenting condition were answered. Césaraguila Crowderildefonso was given verbal discharge instructions including, but not limited to, importance of returning to the emergency department for any concern of worsening or continued symptoms.  Instructions were given to follow up with a primary care provider or specialist within 1-2 days. Rony Rosas effects of medications, if prescribed, were discussed and patient was advised to refrain from significant physical activity until followed up by primary care physician and to not drive or operate heavy machinery after taking any sedating substances.      Voice dictation software was used during the making of this note.  This software is not perfect and grammatical and other typographical errors may be present.  This note has been proofread, but may still contain errors. Amount and/or Complexity of Data Reviewed  Tests in the radiology section of CPT®: reviewed  Discuss the patient with other providers: yes (Dr. Karly Lechuga)    Risk of Complications, Morbidity, and/or Mortality  Presenting problems: moderate  Diagnostic procedures: moderate  Management options: moderate    Patient Progress  Patient progress: improved    ED Course as of Jun 29 1853   Tue Jun 29, 2021 1814 IMPRESSION  1. Multilevel degenerative change. 2.  Possible T12 compression fracture. Most likely old. If there is continued  clinical concern, CT evaluation is recommended. XR SPINE LUMB 2 OR 3 V [BC]      ED Course User Index  [BC] Viral Ford NP       Wound Closure by Adhesive    Date/Time: 6/29/2021 7:39 PM  Performed by: Viral Ford NP  Authorized by: Viral Ford NP     Consent:     Consent obtained:  Verbal    Consent given by:  Patient    Risks discussed:  Pain and infection  Anesthesia (see MAR for exact dosages):      Anesthesia method:  None  Laceration details:     Location:  Face    Face location:  Forehead    Length (cm):  3  Repair type:     Repair type:  Simple  Pre-procedure details:     Preparation:  Patient was prepped and draped in usual sterile fashion  Exploration:     Hemostasis achieved with:  Direct pressure    Wound exploration: wound explored through full range of motion      Contaminated: no    Treatment:     Area cleansed with:  Betadine and saline    Amount of cleaning:  Standard Irrigation solution:  Sterile saline    Irrigation method:  Syringe  Skin repair:     Repair method:  Tissue adhesive  Approximation:     Approximation:  Close  Post-procedure details:     Dressing:  Open (no dressing)    Patient tolerance of procedure: Tolerated well, no immediate complications  Wound Repair    Date/Time: 6/29/2021 7:41 PM  Preparation: skin prepped with Betadine  Pre-procedure re-eval: Immediately prior to the procedure, the patient was reevaluated and found suitable for the planned procedure and any planned medications. Time out: Immediately prior to the procedure a time out was called to verify the correct patient, procedure, equipment, staff and marking as appropriate. .  Location details: face  Wound length:2.5 cm or less  Anesthesia: local infiltration    Anesthesia:  Local Anesthetic: lidocaine 1% without epinephrine  Anesthetic total: 0.5 mL  Foreign bodies: no foreign bodies  Irrigation solution: saline  Irrigation method: syringe  Skin closure: Prolene (5-0)  Number of sutures: 4  Technique: running  Approximation: close  Dressing: antibiotic ointment  Patient tolerance: patient tolerated the procedure well with no immediate complications  My total time at bedside, performing this procedure was 1-15 minutes.

## 2021-06-29 NOTE — PROGRESS NOTES
Jesus Tan  : 1950  [place] / Product Type: Medicare /  [address] at [facility] LUCIANA GAGE  [address] [place] [address], [address], [place] [address].  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 Frequency/Duration: 2 times a week for 90 Day(s) MEDICAL/REFERRING DIAGNOSIS:  neck and back pain   DATE OF ONSET: chronic  REFERRING PHYSICIAN: Yuri Caba*  MD Orders:  Eval and treat, include modalities of soft tissue, dry needling, and traction  Return MD Appointment: NA       Pre-treatment Symptoms/Complaints:  Pt reports no new changes. Pain: Initial:   2/10 Post Session:  No increase in pain   Medications Last Reviewed:  21       TREATMENT:   Manual (0 min) not today   Therapeutic Exercise: ( 40 minutes):    -- sitting on table with feed back for posture. -- neck retraction 10 sec x 5 with manual assist   -- thoracic rotation with neck neutral 5x ea way no assist  -- sitting hip hinging with trying to keep back straight ~ with prolonged holds 15 sec -- 5 reps   -- sit to stand with hip hinging and pelvis glide forward in standing 7x   -- hold standing with Lumbar Protective Mechanism retraining for trunk flexion ~ 5 reps with sustained isotonics  -- standing with hands on wall: hip flexion 10x ea side, and hip extension 5x ea side  -- Sitting pec stretch with pulling shoulders back 20\"x3  -- pt standing leaning over table and hip extension stretch 30\"x3 B  -- thoracic extension over ball with min assist x10 reps. -- Sitting PROM to thoracic spine for thoracic extension ROM. HEP: continue current HEP.   Litzy Melo Portal    Treatment/Session Summary:    · Response to Treatment: Difficulty standing on L LE to perform R LE hip extension exercise. · Communication/Consultation:  None today  · Equipment provided today:  None today  · Recommendations/Intent for next treatment session: Next visit will continue focus on posture, postural strengthening, stretching.  Pt education for self c1/2 mobilization     Total Treatment Billable Duration:  40 min  PT Patient Time In/Time Out  Time In: 1020  Time Out: [address] [facility] LASHELL Garcia    Future Appointments   Date Time Provider Lorraine Coronel   7/1/2021 10:30 AM Gabriella Amador, PT [facility]   7/8/2021 11:15 AM Felecia Lala, LASHELL [facility]   7/13/2021 10:00 AM Joanna Goins MD [facility]   7/20/2021  3:00 PM Joanna Goins MD [facility]   8/4/2021 12:30 PM Velma Herrera RD [facility]   9/13/2021  1:30 PM ECHO 39 SSA UCDG UCD   9/23/2021  1:30 PM Archana Gould MD [facility]   5/17/2022 11:20 AM Isis Espinoza NP [facility] PSCD PP

## 2021-07-01 ENCOUNTER — APPOINTMENT (OUTPATIENT)
Dept: PHYSICAL THERAPY | Age: 71
End: 2021-07-01
Payer: MEDICARE

## 2021-07-06 ENCOUNTER — APPOINTMENT (OUTPATIENT)
Dept: PHYSICAL THERAPY | Age: 71
End: 2021-07-06
Payer: MEDICARE

## 2021-07-07 ENCOUNTER — HOSPITAL ENCOUNTER (EMERGENCY)
Age: 71
Discharge: HOME | End: 2021-07-07
Attending: STUDENT IN AN ORGANIZED HEALTH CARE EDUCATION/TRAINING PROGRAM
Payer: MEDICARE

## 2021-07-07 VITALS
SYSTOLIC BLOOD PRESSURE: 123 MMHG | DIASTOLIC BLOOD PRESSURE: 74 MMHG | HEART RATE: 90 BPM | TEMPERATURE: 98.5 F | RESPIRATION RATE: 18 BRPM | OXYGEN SATURATION: 91 %

## 2021-07-07 DIAGNOSIS — Z48.02 VISIT FOR SUTURE REMOVAL: Primary | ICD-10-CM

## 2021-07-07 PROCEDURE — 75810000275 HC EMERGENCY DEPT VISIT NO LEVEL OF CARE

## 2021-07-07 NOTE — ED NOTES
I have reviewed discharge instructions with the patient. The patient verbalized understanding. Patient left ED via Discharge Method: ambulatory to Home with self. Opportunity for questions and clarification provided. Patient given 0 scripts. To continue your aftercare when you leave the hospital, you may receive an automated call from our care team to check in on how you are doing. This is a free service and part of our promise to provide the best care and service to meet your aftercare needs.  If you have questions, or wish to unsubscribe from this service please call [phone]. Thank you for Choosing our [address] [place] [address] Emergency Department.

## 2021-07-07 NOTE — ED PROVIDER NOTES
64-year-old male who presents for suture removal.  Sutures have been in place for 9 days. Has had an unremarkable healing course. Has no new medical complaints           Past Medical History:   Diagnosis Date    Adverse effect of anesthesia     unable to have spinal anesthesia when had TKR~r/t lumbar spine problems; CT Spine with contrast 7/20/16    Asthma dx childhood    last attack during high school; advair at hs; albuterol prn; neb prn     Atrial fibrillation (HCC)     2 epiosode of A-fib last about 6-8 months ago (noted on 6/27/17); on Eliquis     Cervical spondylosis 6/20/2013    Chronic back pain 2013    RIGHT SIDE WITH UNCLEAR ETIOLOGY    Chronic pain     Colon polyps     COPD (chronic obstructive pulmonary disease) ([facility] [address] 8/17/2016    Coronary atherosclerosis of native coronary vessel 8/17/2016 08/2012: Mild non-obstructive CAD    Diabetes ([facility] [address] dx 5/14    type 2; oral meds; no bs checks at home    Diverticulosis     Edema 8/17/2016    Former smoker     GERD (gastroesophageal reflux disease)      controlled with med    High cholesterol     History of kidney stones     Hypertension     controlled with med    Obesity (BMI 30-39. 9)     BMI 39    Status post total right knee replacement 7/17/2017    Unspecified sleep apnea     wears cpap at hs       Past Surgical History:   Procedure Laterality Date    HX CATARACT REMOVAL      bilateral with lens implants; x3    HX COLONOSCOPY  10/6/14    HX COLONOSCOPY  11/20/14    HX HEART CATHETERIZATION  2012    no stents     HX KNEE REPLACEMENT Right     HX LUMBAR LAMINECTOMY  2009    no hardware     HX OTHER SURGICAL  20 yrs ago    colo-rectal sx for fissure    HX RETINAL DETACHMENT REPAIR Bilateral     HX TOTAL COLECTOMY  2015    AR CYSTOSCOPY,INSERT URETERAL STENT      kidney stone removed cystoscopy ~pt states no stent was placed         Family History:   Problem Relation Age of Onset    Heart Disease Father         BOWEL OBSTRUCTION  Heart Attack Father 80        MI    Heart Disease Brother 72        STENT HEART    Suicide Brother     Parkinsonism Mother     Inflammatory Bowel Dz Son     Malignant Hyperthermia Neg Hx     Pseudocholinesterase Deficiency Neg Hx     Delayed Awakening Neg Hx     Post-op Nausea/Vomiting Neg Hx     Emergence Delirium Neg Hx     Post-op Cognitive Dysfunction Neg Hx        Social History     Socioeconomic History    Marital status:      Spouse name: Not on file    Number of children: Not on file    Years of education: Not on file    Highest education level: Not on file   Occupational History    Not on file   Tobacco Use    Smoking status: Former Smoker     Packs/day: 1.50     Years: 25.00     Pack years: 37.50     Types: Cigarettes     Quit date: 2012     Years since quittin.2    Smokeless tobacco: Never Used   Substance and Sexual Activity    Alcohol use: Yes     Alcohol/week: 3.0 standard drinks     Types: 3 Cans of beer per week    Drug use: No    Sexual activity: Not on file   Other Topics Concern     Service Not Asked    Blood Transfusions Not Asked    Caffeine Concern Not Asked    Occupational Exposure Not Asked    Hobby Hazards Not Asked    Sleep Concern Not Asked    Stress Concern Not Asked    Weight Concern Not Asked    Special Diet Not Asked    Back Care Not Asked    Exercise Not Asked    Bike Helmet Not Asked    [facility] Yes    Self-Exams Not Asked   Social History Narrative    1/28/15:  PATIENT IS  TO [facility]. THEY HAVE TWO GROWN CHILDREN. 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 Social Determinants of Health     Financial Resource Strain:     Difficulty of Paying Living Expenses:    Food Insecurity:     Worried About Running Out of Food in the Last Year:     [address] in the Last Year:    Transportation Needs:     Lack of Transportation (Medical):      Lack of Transportation (Non-Medical):    Physical Activity:     Days of Exercise per Week:     Minutes of Exercise per Session:    Stress:     Feeling of Stress :    Social Connections:     Frequency of Communication with Friends and Family:     Frequency of Social Gatherings with Friends and Family:     Attends Judaism Services:     Active Member of Clubs or Organizations:     Attends Club or Organization Meetings:     Marital Status:    Intimate Partner Violence:     Fear of Current or Ex-Partner:     Emotionally Abused:     Physically Abused:     Sexually Abused: ALLERGIES: Bactrim [sulfamethoxazole-trimethoprim]    Review of Systems   Constitutional: Negative for chills and fever. HENT: Negative for facial swelling. Respiratory: Negative for chest tightness and shortness of breath. Cardiovascular: Negative for chest pain. Gastrointestinal: Negative for abdominal pain, nausea and vomiting. Musculoskeletal: Negative for myalgias. Skin: Positive for wound (Repaired with sutures. ). Neurological: Negative for headaches. Psychiatric/Behavioral: Negative for confusion. All other systems reviewed and are negative. There were no vitals filed for this visit. Physical Exam  Constitutional:       Appearance: Normal appearance. HENT:      Head: Normocephalic and atraumatic. Mouth/Throat:      Mouth: Mucous membranes are moist.   Eyes:      Pupils: Pupils are equal, round, and reactive to light. Cardiovascular:      Rate and Rhythm: Normal rate and regular rhythm. Pulmonary:      Effort: No respiratory distress. Breath sounds: Normal breath sounds. Abdominal:      Palpations: Abdomen is soft. Tenderness: There is no abdominal tenderness. There is no guarding. Skin:     General: Skin is warm and dry. Neurological:      Mental Status: He is alert and oriented to person, place, and time. Mental status is at baseline.    Psychiatric:         Mood and Affect: Mood normal. MDM  Number of Diagnoses or Management Options  Diagnosis management comments: Sutures removed by oskar Perez. Patient discharged home with instructions to follow-up with primary care.     Risk of Complications, Morbidity, and/or Mortality  Presenting problems: low  Diagnostic procedures: low  Management options: low    Patient Progress  Patient progress: improved         Procedures

## 2021-07-08 ENCOUNTER — HOSPITAL ENCOUNTER (OUTPATIENT)
Dept: PHYSICAL THERAPY | Age: 71
Discharge: HOME | End: 2021-07-08
Payer: MEDICARE

## 2021-07-08 PROCEDURE — 97110 THERAPEUTIC EXERCISES: CPT

## 2021-07-08 NOTE — PROGRESS NOTES
Yogesh Horton  : 1950  [place] / Product Type: Medicare /  [address]  at [facility] LUCIANA GAGE  [address] [facility], [address] [place] [address],[address], [facility] [address]  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 Frequency/Duration: 2 times a week for 90 Day(s) MEDICAL/REFERRING DIAGNOSIS:  neck and back pain   DATE OF ONSET: chronic  REFERRING PHYSICIAN: Elsa Vann Si*  MD Orders:  Eval and treat, include modalities of soft tissue, dry needling, and traction  Return MD Appointment: NA       Pre-treatment Symptoms/Complaints:  Pt had to cancel last visit after slipping on slick floor that had just been cleaned. Back feels better since fall but R knee is still painful. Xray of knee showed no fracture. He has had stitches removed from forehead. Pain: Initial:   no VAS today Post Session:  No increase in pain but increase in soreness. Medications Last Reviewed:  21   TREATMENT:   Manual (0 min) not today   Therapeutic Exercise: ( 38 minutes):    -- sitting on table with feed back for posture. -- sitting BARRY pec stretch 5 sec x 10 with over pressure to thoracic spine  -- neck retraction 10 sec x 5 with manual assist and also worked with overpressure to T1/T2  -- standing in corner unilateral psoas stretch with foot on chair -- several reps for R psoas; then did L psoas in standing for several reps.   For both sides did manual overpressure to low back  -- thoracic rotation with neck neutral 10x ea way with assist  -- sitting hip hinging with trying to keep back straight ~ with prolonged holds 15 sec -- 3 reps   -- sitting with Lumbar Protective Mechanism retraining for trunk flexion and extension in hip hinged position ~ 3 reps ea way with sustained isotonics  HEP: continue current HEP and work on standing psoas stretch as done in clinic today (not with use of chair) for several reps through out the day. Pt verbally agreed. [employer] Portal    Treatment/Session Summary:    · Response to Treatment: Pt demonstrated improved understanding of pelvic glide forward with open stance position. · Communication/Consultation:  None today  · Equipment provided today:  None today  · Recommendations/Intent for next treatment session: Next visit will continue focus on posture, postural strengthening, stretching.  Pt education for self c1/2 mobilization     Total Treatment Billable Duration:  38 min  PT Patient Time In/Time Out  Time In: 7304  Time Out: Jori Dupont, PT    Future Appointments   Date Time Provider Lorraine Berna   7/13/2021 10:00 AM Audley Peabody, MD [facility] EMG   7/15/2021  1:45 PM Speculator Barks, PT [facility]   7/20/2021  1:45 PM Speculator Barks, PT [facility]   7/20/2021  3:00 PM Audley Peabody, MD [facility] EMG   7/22/2021  2:45 PM Manju Barks, PT [facility]   7/27/2021  4:15 PM Manju Barks, PT [facility]   7/29/2021  1:45 PM Manju Barks, PT [facility]   8/3/2021  1:00 PM Manju Barks, PT [facility]   8/4/2021 12:30 PM Perla Mane, RD [facility]   8/5/2021  1:00 PM Speculator Barks, PT [facility]   8/10/2021 11:15 AM Radha Meals, PT [facility]   8/12/2021  1:00 PM Manju Barks, PT [facility]   9/13/2021  1:30 PM ECHO 36 [facility] UCD   9/23/2021  1:30 PM Nicholas Coley MD [facility]   5/17/2022 11:20 AM Jennifer Delgado NP [facility] PP

## 2021-07-15 ENCOUNTER — HOSPITAL ENCOUNTER (OUTPATIENT)
Dept: PHYSICAL THERAPY | Age: 71
Discharge: HOME | End: 2021-07-15
Payer: MEDICARE

## 2021-07-15 PROCEDURE — 97110 THERAPEUTIC EXERCISES: CPT

## 2021-07-15 NOTE — PROGRESS NOTES
Lisa Patel  : 1950  [place] / Product Type: Medicare /  [address]  at [facility] LUCIANA GAGE  [address] [facility], [address], [place] [address].  [phone]   [phone]       OUTPATIENT PHYSICAL THERAPY: Daily Treatment Note 7/15/2021  Visit Count: 15     ICD-10: Treatment Diagnosis: neck pain M54.2, postural kyphosis M40.03, Pain in thoracic spine M54.6  Precautions/Allergies:   Bactrim [sulfamethoxazole-trimethoprim]   TREATMENT PLAN:  Effective Dates: 2021 TO 2021 (90 days). Frequency/Duration: 2 times a week for 90 Day(s) MEDICAL/REFERRING DIAGNOSIS:  neck and back pain   DATE OF ONSET: chronic  REFERRING PHYSICIAN: Michele Naik*  MD Orders:  Eval and treat, include modalities of soft tissue, dry needling, and traction  Return MD Appointment: NA       Pre-treatment Symptoms/Complaints:  Pt's wife is having a difficult time with her knee surgery which affects him. His back has not been doing well lately. He thinks it is inflamed. L  knee still hurting. Pain: Initial:   no VAS today Post Session:  No increase in pain and less pain at mid thoracic by session end. Medications Last Reviewed:  7/15/21   TREATMENT:   Manual (0 min) not today   Therapeutic Exercise: ( 40 minutes):    -- sitting on table with feed back for posture.   -- turning to R/L without neck motion 10x ea way  -- sitting BARRY pec stretch x 10 with over pressure to thoracic spine  -- psoas stretch in sitting with over pressure to thoracic spine  -- neck retraction 10 sec x 5 with manual assist and also worked with overpressure to T1/T2  -- sitting hip hinging with trying to keep back straight ~ with prolonged holds 15 sec -- 5 reps  -- standing moving into open stance position with over pressure to lower thoracic and lumbar as needed-- ~ 1 min hold on both sides-- focus was posture   -- standing trunk rotation R with wall assist as he breath in, and trunk flexion L as he breaths out x8  HEP: continue current HEP with addition of standing trunk rotation R with wall assist.   [employer] Portal    Treatment/Session Summary:    · Response to Treatment: Pt still struggles with standing up straight but has shown improvement. He has less time to do HEP because of his wife. Seemed to have less thoracic/rib cage pain after doing standing trunk rotation R with wall asssit. · Communication/Consultation:  None today  · Equipment provided today:  None today  · Recommendations/Intent for next treatment session: Next visit will continue focus on posture, postural strengthening, stretching. Pt education for self c1/2 mobilization . Continue with standing trunk rotation exercises.      Total Treatment Billable Duration:  40 min  PT Patient Time In/Time Out  Time In: 0523  Time Out: [facility],     Future Appointments   Date Time Provider Lorraine Berna   7/20/2021  1:45 PM Keara Nikita, PT [facility]   7/20/2021  3:00 PM Andreina Ba MD [facility] EMG EMG   7/22/2021  2:45 PM Keara Nikita, PT [facility]   7/27/2021  4:15 PM Keara Nikita, PT [facility]   7/29/2021  1:45 PM Keara Nikita, PT [facility]   8/3/2021  1:00 PM Keara Nikita, PT [facility]   8/4/2021 12:30 PM Chris Kapadia, RD [facility]   8/5/2021  1:00 PM Keara Nikita, PT [facility]   8/10/2021 11:15 AM Hammad Houser, PT [facility]   8/12/2021  1:00 PM Keara Nikita, PT [facility]   9/13/2021  1:30 PM ECHO 36 [facility] UCD   9/23/2021  1:30 PM Padmini Mitchell MD [facility]   5/17/2022 11:20 AM Ronnie Greer NP [facility] PP

## 2021-07-20 ENCOUNTER — HOSPITAL ENCOUNTER (OUTPATIENT)
Dept: PHYSICAL THERAPY | Age: 71
Discharge: HOME | End: 2021-07-20
Payer: MEDICARE

## 2021-07-20 PROBLEM — M16.12 OSTEOARTHRITIS OF LEFT HIP: Status: RESOLVED | Noted: 2020-06-29 | Resolved: 2021-07-20

## 2021-07-20 PROCEDURE — 97110 THERAPEUTIC EXERCISES: CPT

## 2021-07-20 NOTE — PROGRESS NOTES
Yogesh Horton  : 1950  [place] / Product Type: Medicare /  [address] at [facility] LUCIANA GAGE  [address] [place] [address], [address], [address] [place] [address]  [phone]   [phone]       OUTPATIENT PHYSICAL THERAPY: Daily Treatment Note 2021  Visit Count: 16     ICD-10: Treatment Diagnosis: neck pain M54.2, postural kyphosis M40.03, Pain in thoracic spine M54.6  Precautions/Allergies:   Bactrim [sulfamethoxazole-trimethoprim]   TREATMENT PLAN:  Effective Dates: 2021 TO 2021 (90 days). Frequency/Duration: 2 times a week for 90 Day(s) MEDICAL/REFERRING DIAGNOSIS:  neck and back pain   DATE OF ONSET: chronic  REFERRING PHYSICIAN: Alexx Vann Si*  MD Orders:  Eval and treat, include modalities of soft tissue, dry needling, and traction  Return MD Appointment: NA       Pre-treatment Symptoms/Complaints:  Pt c/o some pain at R sternocleidomastoid muscle area. Neck feels \"a little better\" in general.  Pain: Initial:   no VAS today Post Session:  No increase in pain and less pain at mid thoracic by session end.     Medications Last Reviewed:  21   TREATMENT:   Manual (0 min) not today   Therapeutic Exercise: ( 40 minutes):   -- sitting R sternocleidomastoid muscle  Stretch-- several reps with progression to self  myofascial release to proximal part of the muscle   -- sitting on table with sternum left and over pressure to mid back  -- turning to R/L without neck motion 10x ea way  -- sitting BARRY pec stretch x 10 with over pressure to thoracic spine  -- psoas stretch in sitting with over pressure to thoracic spine  -- psoas stretch in standing: standing moving into open stance position with over pressure to lower thoracic and lumbar as needed-- ~ 1 min hold on both sides-- focus was posture   -- supine with table in mild trunk flexion position:  theraball bridges with min A for ball control x 10 and then LTR x 10  KInesio tape with U strip around R patella. Pt instructed to wear tape for 48 hours or if any redness or itching of skin occurred. Pt verbally agreed. NEXT  -- neck retraction 10 sec x 5 with manual assist and also worked with overpressure to T1/T2 -- next   -- standing trunk rotation R with wall assist as he breath in, and trunk flexion L as he breaths out- next  HEP: continue current HEP with addition of standing trunk rotation R with wall assist.   [employer] Portal    Treatment/Session Summary:    · Response to Treatment: Pt had some increase in side pain after supine exercises, but this improved with doing some trunk rotation stretches again. Neck pain seemed less after doing R sternocleidomastoid muscle  Stretch. · Communication/Consultation:  None today  · Equipment provided today:  None today  · Recommendations/Intent for next treatment session: Next visit will continue focus on posture, postural strengthening, stretching. Pt review of self c1/2 mobilization . Continue with standing trunk rotation exercises.      Total Treatment Billable Duration:  40 min  PT Patient Time In/Time Out  Time In: 1350  Time Out: [address] FAJARDO, PT    Future Appointments   Date Time Provider Lorraine Coronel   7/20/2021  3:00 PM Radha Pride MD [facility] EMG EMG   7/22/2021  2:45 PM Chestine , PT [facility]   7/27/2021  4:15 PM Chestine , PT [facility]   7/29/2021  1:45 PM Chestine , PT [facility]   8/3/2021  1:00 PM Chestine , PT [facility]   8/4/2021 12:30 PM Jeremy Beckett RD [facility]   8/5/2021  1:00 PM Chestine , PT [facility]   8/10/2021 11:15 AM Archibald Schlatter, Zara Bowman, PT [facility]   8/12/2021  1:00 PM Chestine , PT [facility]   9/13/2021  1:30 PM ECHO 39 [facility] UCD   9/23/2021  1:30 PM Briana Lomeli MD [facility] U[facility] UCD   5/17/2022 11:20 AM Angella Peck NP [facility] PP

## 2021-07-22 ENCOUNTER — HOSPITAL ENCOUNTER (OUTPATIENT)
Dept: PHYSICAL THERAPY | Age: 71
Discharge: HOME | End: 2021-07-22
Payer: MEDICARE

## 2021-07-22 PROCEDURE — 97110 THERAPEUTIC EXERCISES: CPT

## 2021-07-22 NOTE — PROGRESS NOTES
Dakota Search  : 1950  [place] / Product Type: Medicare /  [address] at [facility] LUCIANA GAGE  [address] [place] [address], [address], [address] [place] [address]  [phone]   [phone]       OUTPATIENT PHYSICAL THERAPY: Daily Treatment Note 2021  Visit Count: 17     ICD-10: Treatment Diagnosis: neck pain M54.2, postural kyphosis M40.03, Pain in thoracic spine M54.6  Precautions/Allergies:   Bactrim [sulfamethoxazole-trimethoprim]   TREATMENT PLAN:  Effective Dates: 2021 TO 2021 (90 days). Frequency/Duration: 2 times a week for 90 Day(s) MEDICAL/REFERRING DIAGNOSIS:  neck and back pain   DATE OF ONSET: chronic  REFERRING PHYSICIAN: Bakari Fernandez*  MD Orders:  Eval and treat, include modalities of soft tissue, dry needling, and traction  Return MD Appointment: NA       Pre-treatment Symptoms/Complaints:  Pt states that neck is doing fair; his back is OK. He does not feel he has increased his neck ROM much but does feel he has less pain and no episodes of arms going numbs. Would like more neck motion with turning L.  .  Pain: Initial:   no VAS today Post Session:  No VAS today   Medications Last Reviewed:  21   TREATMENT:   Manual (0 min) not today   Therapeutic Exercise: ( 30 minutes):   -- standing trunk rotation  with wall assist as he breaths in, and trunk flexion opposite direcgtion as he breaths out- 10x ea way  -- sitting on table with sternum left and over pressure to mid back x 10  -- sitting  sternocleidomastoid muscle  Stretch- R 3 reps x 1, L side 3 reps x 2  -- worked on sitting in car with holding steering wheel to assist turn upper body to the L when stopped  -- turning to R/L without neck motion 10x ea way  -- sitting BARRY pec stretch x 10 with over pressure to thoracic spine  -- psoas stretch in standing: standing moving into open stance position with use of arm to increase trunk rotation to the opposite side 30 sec x 3 ea side  KInesio tape with U strip around R patella. Pt instructed to wear tape for 48 hours or if any redness or itching of skin occurred. Pt verbally agreed. NEXT:  -- neck retraction 10 sec x 5 with manual assist and also worked with overpressure to T1/T2 -- next   HEP: continue current HEP with addition of standing trunk rotation R with wall assist.   [employer] Portal    Treatment/Session Summary:    · Response to Treatment: Pt able to turn head/body L with stretches and with guidance. Will work on standing psoas and trunk rotation stretching as part of HEP. · Communication/Consultation:  None today  · Equipment provided today:  None today  · Recommendations/Intent for next treatment session: Next visit will continue focus on posture, postural strengthening, stretching. Work on neck retraction with T1/2 overpressure/PA mob.      Total Treatment Billable Duration:  30 min  PT Patient Time In/Time Out  Time In: 9999  Time Out: [facility],     Future Appointments   Date Time Provider Lorraine Coronel   7/27/2021  4:15 PM Mili Barahona, PT [facility]   7/29/2021  1:45 PM Mili Barahona, PT [facility]   8/3/2021  1:00 PM Mili Barahona, PT [facility]   8/4/2021 12:30 PM Maria G Howard, RD [facility]   8/5/2021  1:00 PM Mili Barahona, PT [facility]   8/10/2021 11:15 AM Cecil Fergsuon, [address] [facility], PT [facility]   8/12/2021  1:00 PM Mili Barahona, PT [facility]   9/13/2021  1:30 PM ECHO 36 [facility] UCD   9/23/2021  1:30 PM Seth Wright MD [facility] UCD   10/7/2021 10:00 AM Abril Lagunas MD [facility] EMG   10/12/2021  2:00 PM Abril Lagunas MD [facility] EMG   5/17/2022 11:20 AM Kev Kapadia, NP [facility] PP

## 2021-07-27 ENCOUNTER — HOSPITAL ENCOUNTER (OUTPATIENT)
Dept: PHYSICAL THERAPY | Age: 71
Discharge: HOME | End: 2021-07-27
Payer: MEDICARE

## 2021-07-27 PROCEDURE — 97110 THERAPEUTIC EXERCISES: CPT

## 2021-07-27 NOTE — PROGRESS NOTES
Alireza Callisamar  : 1950  [place] / Product Type: Medicare /  [address] at [facility] LUCIANA GAGE  [address] [place] [address], [address], [place] [address].  [phone]   [phone]       OUTPATIENT PHYSICAL THERAPY: Daily Treatment Note 2021  Visit Count: 18     ICD-10: Treatment Diagnosis: neck pain M54.2, postural kyphosis M40.03, Pain in thoracic spine M54.6  Precautions/Allergies:   Bactrim [sulfamethoxazole-trimethoprim]   TREATMENT PLAN:  Effective Dates: 2021 TO 2021 (90 days). Frequency/Duration: 2 times a week for 90 Day(s) MEDICAL/REFERRING DIAGNOSIS:  neck and back pain   DATE OF ONSET: chronic  REFERRING PHYSICIAN: Gabriel Stone*  MD Orders:  Eval and treat, include modalities of soft tissue, dry needling, and traction  Return MD Appointment: NA       Pre-treatment Symptoms/Complaints:  Pt states that neck has not been doing well this week.    Pain: Initial:   no VAS today Post Session:  No VAS today   Medications Last Reviewed:  21   TREATMENT:   Manual (0 min) not today   Therapeutic Exercise: ( 30 minutes):   -- neck retraction 10 sec x 10 with manual assist and also worked with overpressure to T3/4/5  -- sitting on table with sternum lift and over pressure to mid back x 10  -- standing trunk rotation  with wall assist as he breaths in, and trunk flexion opposite direcgtion as he breaths out- 10x ea way  -- standing psoas stretch on high step 30 sec x 3 ea side  -- turning to R/L without neck motion 10x ea way  -- sitting BARRY pec stretch x 10 with over pressure to thoracic spine  -- psoas stretch in standing: standing moving into open stance position with use of arm to increase trunk rotation to the opposite side 30 sec x 3 ea side  -- standing open stance shoulder ABD (low) yellow band 10x  -- standng open stance-with back to wall, scap squeeze    HEP: continue current HEP with addition of posoas stretch in standing and standing in open stance position with varying stretches. [employer] Portal    Treatment/Session Summary:    · Response to Treatment: Pt with some improvement in ability to stand up straighter with work done on thoracic and cervical simultaneously. Poor endurance with this though. Pt agreed to work on open stance posture at least 5 x daily. · Communication/Consultation:  None today  · Equipment provided today:  None today  · Recommendations/Intent for next treatment session: Next visit will continue focus on posture, postural strengthening, stretching. COntinue work on neck retraction with T1/2 overpressure/PA mob. Also- neck traction and AP mob in tolerated position.      Total Treatment Billable Duration:  40 min  PT Patient Time In/Time Out  Time In: 8538  Time Out: LASHELL Diamond    Future Appointments   Date Time Provider Lorraine Coronel   7/29/2021  1:45 PM Dea Moseley, PT [facility]   8/3/2021  1:00 PM Dea Moseley, PT [facility]   8/4/2021 12:30 PM Madeleine Warren RD [facility]   8/5/2021  1:00 PM Dea Moseley, PT [facility]   8/10/2021 11:15 AM Trevon Flood, PT [facility]   8/12/2021  1:00 PM Dea Moseley, PT [facility]   9/13/2021  1:30 PM ECHO 39 [facility]   9/23/2021  1:30 PM Mamadou Mcclure MD [facility]   10/7/2021 10:00 AM Yuliana Davis MD [facility]   10/12/2021  2:00 PM Yuliana Davis MD [facility]   5/17/2022 11:20 AM Forest Ek, NP [facility]

## 2021-07-29 ENCOUNTER — HOSPITAL ENCOUNTER (OUTPATIENT)
Dept: PHYSICAL THERAPY | Age: 71
Discharge: HOME | End: 2021-07-29
Payer: MEDICARE

## 2021-07-29 PROCEDURE — 97110 THERAPEUTIC EXERCISES: CPT

## 2021-07-29 PROCEDURE — 97140 MANUAL THERAPY 1/> REGIONS: CPT

## 2021-07-29 NOTE — PROGRESS NOTES
Eva Garay  : 1950  [place] / Product Type: Medicare /  [address] at [facility] LUCIANA GAGE  [address] [place] [address], [address], [place] [address].  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 Frequency/Duration: 2 times a week for 90 Day(s) MEDICAL/REFERRING DIAGNOSIS:  neck and back pain   DATE OF ONSET: chronic  REFERRING PHYSICIAN: Malik Medina*  MD Orders:  Eval and treat, include modalities of soft tissue, dry needling, and traction  Return MD Appointment: NA       Pre-treatment Symptoms/Complaints:  Pt states that he was very sore after last session and had increase in  Problems with L shoulder. Pain: Initial:   no VAS today Post Session:  No VAS today but stated neck felt better. Medications Last Reviewed:  21   TREATMENT:   Manual (25 min)   -- tool assisted soft tissue release to Cervical 4-7 multifidus and BARRY occiput, BARRY upper trap  --  myofascial release to cervical extensors with long cervical ligament release  -- lower cspine AP and rotation mobilization  -- sitting cervical distraction  Therapeutic Exercise: ( 10 minutes):   --  Thoracic extension with manual overpressure  -- turning to R/L without neck motion 10x ea way with cervical distraction and then with towel assist to neck rotation L only  HEP: continue updated HEP and instructed to Ice L shoulder ~ 3 x per day x 10-15 min  Modalities: pt left clinic with [facility] pack [facility]    Treatment/Session Summary:    · Response to Treatment: Pt with some improvement with neck rotation L and stated neck felt better by session end.  Had some problems with L shoulder during session so instructed him in continued icing. · Communication/Consultation:  None today  · Equipment provided today:  None today  · Recommendations/Intent for next treatment session: Next visit will continue focus on posture, postural strengthening, stretching. COntinue work on neck retraction with T1/2 overpressure/PA mob and neck traction and AP mob in tolerated position.      Total Treatment Billable Duration:  35 min  PT Patient Time In/Time Out  Time In: 9457  Time Out: LASHELL Foster    Future Appointments   Date Time Provider Lorraine Coronel   8/3/2021  1:00 PM Davon Martinez, PT [facility]   8/4/2021 12:30 PM Ann Owusu, RD [facility]   8/5/2021  1:00 PM Davon Martinez, PT [facility]   8/10/2021 11:15 AM Thad Hoang, PT [facility]   8/12/2021  1:00 PM [facility]   9/13/2021  1:30 PM ECHO 39 [facility]   9/23/2021  1:30 PM Lucio Ball MD [facility]   10/7/2021 10:00 AM Екатерина Stanley MD [facility]   10/12/2021  2:00 PM Екатерина Stanley MD [facility]   5/17/2022 11:20 AM Ady Mullen NP [facility] PP

## 2021-08-03 ENCOUNTER — HOSPITAL ENCOUNTER (OUTPATIENT)
Dept: PHYSICAL THERAPY | Age: 71
Discharge: HOME | End: 2021-08-03
Payer: MEDICARE

## 2021-08-03 PROCEDURE — 97140 MANUAL THERAPY 1/> REGIONS: CPT

## 2021-08-03 PROCEDURE — 97110 THERAPEUTIC EXERCISES: CPT

## 2021-08-03 NOTE — PROGRESS NOTES
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 Frequency/Duration: 2 times a week for 90 Day(s) MEDICAL/REFERRING DIAGNOSIS:  neck and back pain   DATE OF ONSET: chronic  REFERRING PHYSICIAN: Elvia Sutherland*  MD Orders:  Eval and treat, include modalities of soft tissue, dry needling, and traction  Return MD Appointment: NA       Pre-treatment Symptoms/Complaints:  Pt states that his neck had no pain x 2 days. He tried exercise for trunk rotation two times but both times caused his R arm to go numb. Pain: Initial:   no VAS today Post Session:  No VAS today but stated neck felt better.      Medications Last Reviewed:  21   TREATMENT:   Manual (15  min)   --  myofascial release to cervical extensors with long cervical ligament release  -- lower cspine AP and rotation mobilization  -- sitting cervical distraction-- several reps  Therapeutic Exercise: ( 25 minutes):   -- standing psoas stretch with open stance position with cues for improved arm placement 10 sec x ~  5 ea side  -- heel raises with hands on wall to increase trunk extension x 10  --  Thoracic extension with manual overpressure in sitting x 10  -- neck rotation with manual assist 10x ea way  -- turning neck L with cervical distraction and towel assist to neck rotation turning L   HEP: continue updated HEP and instructed to Ice L shoulder ~ 3 x per day x 10-15 min  Modalities: pt left clinic with ice pack to L emmanuel  [facility] Portal    Treatment/Session Summary:    · Response to Treatment: Pt stated neck felt better by session en. · Communication/Consultation:  None today  · Equipment provided today:  None today  · Recommendations/Intent for next treatment session: Next visit will continue focus on posture, postural strengthening, stretching. COntinue work on neck retraction with T1/2 overpressure/PA mob and neck traction and AP mob in tolerated position.      Total Treatment Billable Duration:  40 min  PT Patient Time In/Time Out  Time In: 1302  Time Out: Vel Rodríguez 2174, PT    Future Appointments   Date Time Provider Lorraine Coronel   8/4/2021 12:30 PM [facility] Anderson [facility]   8/5/2021  1:00 PM Claudia Sanabria, PT [facility]   8/10/2021 11:15 AM Natalya Forrester, PT [facility]   8/12/2021  1:00 PM Claudia Sanabria, PT [facility]   9/13/2021  1:30 PM ECHO 39 [facility]   9/23/2021  1:30 PM Larry Mandujano MD [facility]   10/7/2021 10:00 AM Jame Gutierrez MD [facility]   10/12/2021  2:00 PM Jame Gutierrez MD [facility]   5/17/2022 11:20 AM Raghu Soler NP [facility]

## 2021-08-04 ENCOUNTER — HOSPITAL ENCOUNTER (OUTPATIENT)
Dept: DIABETES SERVICES | Age: 71
Discharge: HOME | End: 2021-08-04
Payer: MEDICARE

## 2021-08-04 PROCEDURE — G0108 DIAB MANAGE TRN  PER INDIV: HCPCS

## 2021-08-04 NOTE — PROGRESS NOTES
This is a one on one appointment. Due to being during [facility]- public health emergency, social distancing and mandatory precautions are in place and utilized. Client attended two hour yearly follow up session today. Topics discussed were client driven. Topics included what Hgb A1C reflects and goal <7%, blood sugar target ranges, glycemic variability and goal is < 50 points from before a meal to after a meal, basics of carbohydrates, proteins, fats and emphasized using heart healthy foods, label reading, alcohol, lipid results and dietary means to improve lipids, apps to help with counting carbohydrates and grocery shopping ([employer]) and [employer]. Educated on using high fiber carbohydrates, carbohydrate counting limiting carbohydrates to 45 grams or less per meal, having a 7 gram protein with every meal and snack. Educated on plate method for portion control and slowing eating time down to help with weight loss. Educated on role of exercise with blood sugar control and weight loss. Helped plan meals with pt. Educated on signs/symptoms of low blood sugar and treatment. Written info provided. Current diet: 3 meals, occasional evening snack. Affirmed that pt cut back on his portion sizes and cut back on carbohydrates. Pt reported a 31 lb wt loss over the past 2 months. Affirmed pt drinking over 64 oz water/day. Exercise: Pt currently walks in the pool for 1 to 1.25 hrs 3-4 days/week. Pt thinking about getting a recumbent bike to use on days he does not go to the [facility] to the pool. Currently checking FBS and reports 150-220 range. Educated on checking blood sugars in pairs. Provided RD, CDE name and phone number.

## 2021-08-05 ENCOUNTER — HOSPITAL ENCOUNTER (OUTPATIENT)
Dept: PHYSICAL THERAPY | Age: 71
Discharge: HOME | End: 2021-08-05
Payer: MEDICARE

## 2021-08-05 PROCEDURE — 97110 THERAPEUTIC EXERCISES: CPT

## 2021-08-05 PROCEDURE — 97140 MANUAL THERAPY 1/> REGIONS: CPT

## 2021-08-05 NOTE — PROGRESS NOTES
Kavitha Worthy  : 1950  [place] / Product Type: Medicare /  [address] at [facility] LUCIANA GAGE  [address] [place] [address], [address], [place] [address].  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 Frequency/Duration: 2 times a week for 60 and then 1 x per week for 30 Day(s) MEDICAL/REFERRING DIAGNOSIS:  neck and back pain   DATE OF ONSET: chronic  REFERRING PHYSICIAN: Layla Barnard*  MD Orders:  Eval and treat, include modalities of soft tissue, dry needling, and traction  Return MD Appointment: NA       Pre-treatment Symptoms/Complaints:  Pt states that his neck did better again with manual intervention done last treatment. Pt feels he has made progress with neck pain since starting PT. Pain: Initial:   no VAS today Post Session:  No VAS but no c/o neck pain     See recertification for updates.    Medications Last Reviewed:  21     TREATMENT:   Manual (15  min)   --  myofascial release to cervical extensors with long cervical ligament release  -- grade 3 PA mob to mid to upper thoracic   Therapeutic Exercise: ( 15 minutes):   -- standing psoas stretch with open stance position with 1 foot on 8 in step 30 sec ea side  --  Thoracic extension with manual overpressure in sitting x 10  -- neck rotation with manual assist 10x to the L  -- R shoulder physio mobs for ER at neutral and 45 ABD 3\" x 10 ea  -- AAROM for shoulder ER in sitting x 10  HEP: continue updated HEP and instructed to Ice L shoulder ~ 3 x per day x 10-15 min  Modalities: [facility] clinic [facility]    Treatment/Session Summary: · Response to Treatment: Pt very stiff to BARRY shoulders and this is affecting function. Had some improvement in R shoulder ER with mobilzation. · Communication/Consultation:  None today  · Equipment provided today:  None today  · Recommendations/Intent for next treatment session: Next visit will continue focus on posture, postural strengthening, stretching. COntinue work on neck retraction with T1/2 overpressure/PA mob and neck traction and AP mob in tolerated position.      Total Treatment Billable Duration:  30 min  PT Patient Time In/Time Out  Time In: 1309  Time Out: LASHELL Foster    Future Appointments   Date Time Provider Lorraine Coronel   8/10/2021 11:15 AM Thad Hoang, PT [facility]   8/12/2021  1:00 PM Davon Porch, PT [facility]   8/17/2021  1:45 PM Davon Porch, PT [facility]   8/19/2021  2:30 PM Davon Porch, PT [facility]   8/24/2021  1:15 PM Davon Porch, PT [facility]   8/26/2021  1:00 PM Davon Porch, PT [facility]   8/31/2021  1:00 PM Davon Porch, PT [facility]   9/2/2021  1:00 PM Davon Porch, PT [facility]   9/7/2021  1:00 PM Davon Porch, PT [facility]   9/9/2021  1:00 PM Davon Porch, PT [facility]   9/13/2021  1:30 PM ECHO 39 [facility]   9/14/2021  1:15 PM Davon Porch, PT [facility]   9/16/2021  1:15 PM Davon Porch, PT [facility]   9/23/2021  1:30 PM Lucio Ball MD [facility]   10/7/2021 10:00 AM Екатерина Stanley MD [facility]   10/12/2021  2:00 PM Екатерина Stanley MD [facility] EMG EMG   5/17/2022 11:20 AM BATSHEVA Coreas PSCD PP

## 2021-08-05 NOTE — PROGRESS NOTES
Aleah Sabillon  : 1950  [place]  [place]: [place] [address] [facility] LUCIANA GAGE  [address] [facility], [address] [place] [address],[address], 6[address] 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 Frequency/Duration: 2 times a week for 60 and then 1 x per week for 30 Day(s) MEDICAL/REFERRING DIAGNOSIS:  neck and back pain   DATE OF ONSET: chronic   REFERRING PHYSICIAN: Nay GIBBONS MD Orders: Eval and treat, include modalities of soft tissue, dry needling, and traction  Return MD Appointment: NA     ASSESSMENT (21):  Mr. Jaqueline Ortega presented with complaints of neck pain and decreased posture that have been limiting his ability to perform ADLs. He presents with thoraco-lumbar flexion acquired scoliosis that has affected his thoracic/cervcial spine. He reports improvement in neck pain with doing  myofascial release and manual cervical traction. We have been working on his posture with strengthening his back/cervical extensors with exercises and stretching to psoas. He is unable to fully straighten his back but is tolerating standing up straighter for short periods which is progress for him. Pt has also made progress in pain levels and thoracic and cervical rotation ROM. He has severe loss of motion in R >L shoulder with previous diagnosis of BARRY RTC tear. He would benefit from ongoing PT to continue to improve neck and thoracic AROM and improve BARRY shoulder mobility as he uses his hand and arms to stand at times and to do exercises for neck and posture.  Pt would like to see neck rotation R continue to improve for driving and to no longer have episodes of shoulder numbness when he uses his arms for activities. PROBLEM LIST (Impacting functional limitations):  1. Decreased Strength  2. Decreased Ambulation Ability/Technique  3. Increased Pain  4. Decreased Flexibility/Joint Mobility   5. Decreased posture  INTERVENTIONS PLANNED: (Treatment may consist of any combination of the following)  1. Manual Therapy  2. Therapeutic Exercise/Strengthening   3. Aquatic therapy   4. Modalities as needed for pain  5. Therapeutic activities      GOALS: (Goals have been discussed and agreed upon with patient.)  Short-Term Functional Goals: Time Frame: 30 days  1. Pt will increase cervical ROM to 50 deg bilateral for improved movement for driving. MET 8/5/21  2. Pt will be able to lie on his side or back for at least 30 minutes for decreased pain for sleeping in bed. ONGOING 6/10/21  3. Pt will be able to demonstrate neutral spine in sitting for improved posture. Progressed 8/5/21 - unable to get cervical spine better then -25 degrees to neutral, or thoracic spine to extend beyond - 20 degrees. 4. Pt will be independent with HEP. MET 6/10/21  Discharge Goals: Time Frame: 90 days  1. Pt will increase cervical ROM to 60 deg bilateral for improved mobility. PROGRESSED 8/5/21  2. Pt will report he is able to sleep in the bed with no increase in pain for improved sleep. ONGOING 8/5/21  3. Pt will increase LE strength to 5/5 for improved gait. ONGOING 8/5/21  4. Pt will demonstrate neutral spine in standing with no verbal cueing. PROGRESSED 8/4/21 for short duration  5. Pt will be able to ambulate with upright trunk with least resistive assistive device for decreased stress on his neck during gait. ONGOING 8/5/21  New DIscharge Goals  1. BARRY shoulder ER AROM at least 25 degrees at 45 ABD for improved use with activity. 2.   Independent with discharge level HEP.      OUTCOME MEASURE:   Tool Used: Neck Disability Index (NDI)  Score:  Initial: 11/50 Most Recent: 8/50 (Date: 8/5/21 )   Interpretation of Score: The Neck Disability Index is a revised form of the Oswestry Low Back Pain Index and is designed to measure the activities of daily living in person's with neck pain. Each section is scored on a 0-5 scale, 5 representing the greatest disability. The scores of each section are added together for a total score of 50. MEDICAL NECESSITY:   · Patient is expected to demonstrate progress in strength and posture to improve mobility for ADLs. REASON FOR SERVICES/OTHER COMMENTS:  · Patient continues to require skilled intervention due to decreased ROM, decreased posture and gait that is limiting his ability to perform ADLs. .  Total Duration: 50 minutes  PT Patient Time In/Time Out  Time In: 1309  Time Out: 1410    Rehabilitation Potential For Stated Goals: Good to fair    Regarding Cuca Stokes therapy, I certify that the treatment plan above will be carried out by a therapist or under their direction. Thank you for this referral,  Jaleesa Hyatt, PT     Referring Physician Signature: Kandi JENKINS* _______________________________ Date _____________             PAIN/SUBJECTIVE:   Initial:   2-3/10 Post Session:  2/10   HISTORY:      Ambulatory/Rehab Services H2 Model Falls Risk Assessment   Risk Factors:       (1)  Gender [Male] Ability to Rise from Chair:       (1)  Pushes up, successful in one attempt   Falls Prevention Plan:       No modifications necessary   Total: (5 or greater = High Risk): 2   ©2010 [facility] [address] Patent #[id number]. Federal Law prohibits the replication, distribution or use without written permission from [facility] [employer]   Current Medications:       Current Outpatient Medications:     glucose blood VI test strips (blood glucose test) strip, by Does Not Apply route See Admin Instructions. Ascenia Contour next test strips.   Check FS every day, DM2, E11.9, Disp: 100 Strip, Rfl: 5    cinnamon bark (Cinnamon) 500 mg cap, Take  by mouth., Disp: , Rfl:     omega 3-dha-epa-fish oil (Fish OiL) 100-160-1,000 mg cap, Take  by mouth., Disp: , Rfl:     cholecalciferol, vitamin D3, (Vitamin D3) 50 mcg (2,000 unit) tab, Take  by mouth., Disp: , Rfl:     TURMERIC PO, Take  by mouth., Disp: , Rfl:     insulin glargine-lixisenatide (Soliqua 100/33) 100 unit-33 mcg/mL inpn, 60 units sub-q daily  Indications: type 2 diabetes mellitus, Disp: 3 mL, Rfl: 5    pantoprazole (PROTONIX) 40 mg tablet, Take 1 Tablet by mouth daily. , Disp: 90 Tablet, Rfl: 1    montelukast (SINGULAIR) 10 mg tablet, Take 1 Tablet by mouth nightly. Indications: controller medication for asthma, Disp: 90 Tablet, Rfl: 1    lisinopriL (PRINIVIL, ZESTRIL) 10 mg tablet, Take 1 Tablet by mouth daily. Indications: high blood pressure, Disp: 90 Tablet, Rfl: 1    ezetimibe (ZETIA) 10 mg tablet, Take 1 Tablet by mouth nightly. Indications: high cholesterol, Disp: 90 Tablet, Rfl: 1    doxazosin (CARDURA) 4 mg tablet, Take 1 Tablet by mouth daily. Indications: enlarged prostate with urination problem, Disp: 90 Tablet, Rfl: 1    celecoxib (CeleBREX) 200 mg capsule, Take 1 Capsule by mouth daily. Indications: joint damage causing pain and loss of function, Disp: 90 Capsule, Rfl: 1    atorvastatin (LIPITOR) 20 mg tablet, Take 1 Tablet by mouth nightly. Indications: high cholesterol, Disp: 90 Tablet, Rfl: 1    apixaban (Eliquis) 5 mg tablet, Take 1 Tablet by mouth two (2) times a day. Indications: treatment to prevent blood clots in chronic atrial fibrillation, Disp: 180 Tablet, Rfl: 1    dapagliflozin (Farxiga) 10 mg tab tablet, Take 1 Tablet by mouth daily. Indications: type 2 diabetes mellitus, Disp: 90 Tablet, Rfl: 1    fluticasone furoate-vilanteroL (Breo Ellipta) 200-25 mcg/dose inhaler, Take 1 Puff by inhalation daily. , Disp: 3 Inhaler, Rfl: 1    metoprolol tartrate (LOPRESSOR) 50 mg tablet, Take 1 Tablet by mouth two (2) times a day. Indications: ventricular rate control in atrial fibrillation, Disp: 180 Tablet, Rfl: 1    lancets 28 gauge misc, Check fs every day, DM2, E11.9, Disp: 100 Lancet, Rfl: 1    Insulin Needles, Disposable, (BD Jocelynn 2nd Gen Pen Needle) 32 gauge x 5/32\" ndle, by Does Not Apply route., Disp: , Rfl:     acetaminophen (TYLENOL) 500 mg tablet, Take 500-1,000 mg by mouth every six (6) hours as needed for Pain., Disp: , Rfl:     diphenhydrAMINE (BENADRYL) 50 mg capsule, Take 100 mg by mouth nightly as needed for Sleep., Disp: , Rfl:     colesevelam (WELCHOL) 625 mg tablet, Take 625-3,750 mg by mouth as needed for Diarrhea. Patient taking 2 tabs following diarrheal bowel movement up to 3 times. Maximum daily dosage: 6 tabs, Disp: , Rfl:     fluticasone propionate (FLONASE ALLERGY RELIEF) 50 mcg/actuation nasal spray, 2 Sprays by Both Nostrils route daily as needed for Allergies. Indications: inflammation of the nose due to an allergy, Disp: , Rfl:     albuterol (PROVENTIL HFA, VENTOLIN HFA, PROAIR HFA) 90 mcg/actuation inhaler, Take 1 Puff by inhalation every four (4) hours as needed for Wheezing., Disp: 1 Inhaler, Rfl: 5    ANTI-FUNGAL 2 % topical powder, APPLY TO ABDOMEN 2 TIMES PER DAY, Disp: , Rfl: 11    Blood-Glucose Meter monitoring kit, Check fs every day; DM2, E11/9, Contour glucometer, Disp: 1 Kit, Rfl: 0    albuterol (PROVENTIL VENTOLIN) 2.5 mg /3 mL (0.083 %) nebulizer solution, 3 mL by Nebulization route once for 1 dose. (Patient taking differently: 2.5 mg by Nebulization route daily as needed for Wheezing. Patient states he hasn't used in many years), Disp: 24 Each, Rfl: 11   Date Last Reviewed:  8/5/21   EXAMINATION:   Observation/Orthostatic Postural Assessment: kyphosis at cervical/thoracic junction in standing and sitting. Stands with trunk in a flexed position that mostly originates at thoracic spine.   . Unable to get head over pelvis but is working with modifying his posture to open stance position with knees slightly bent to upright posture more easily. He is able to hold this posture for only short periods right now ~ 30 sec  Palpation: thoracic PA mob is rigid at ~ T10, T11   ROM:   Neck rotation R = 55 degrees, L = 50 degrees; ext = 28 degrees, Flexion = 50 degrees. AROM Right shoulder  Left shoulder   Forward elevation 90  Can place and hold at 105 scaption 100   Abduction     External rotation 0 20   Internal rotation Fn - unable to touch R buttock Fn: To L 5      PROM RIght shoulder  Left shoulder   Flexion 105 120   Abduction - -   External rotation 15 TBA   Internal rotation       LUMBAR:  extension = negative ~ 10 degrees, thoracic extension = negative ~ 40 degrees (unable to get to neutral). Strength:   AROM Right shoulder  Left shoulder   Forward elevation 4 in available ROM 4 in available ROM   Abduction     External rotation 4- 4-   Internal rotation     Horz ABD 4- 4+      Special Tests: Lumbar Protective Mechanism for trunk flexion = 2/5   Neurological Screen: n/t . Functional Mobility:  Sit to/from Stand:independent. Bed Mobility: n/t. Walking on level ground: ambulates with cane and trunk and neck in flexed position, wide base of support. Have worked with him with RW but pt prefers use of cane for easier transitions.    Balance:  n/t

## 2021-08-10 ENCOUNTER — HOSPITAL ENCOUNTER (OUTPATIENT)
Dept: PHYSICAL THERAPY | Age: 71
Discharge: HOME | End: 2021-08-10
Payer: MEDICARE

## 2021-08-10 PROCEDURE — 97110 THERAPEUTIC EXERCISES: CPT

## 2021-08-10 PROCEDURE — 97140 MANUAL THERAPY 1/> REGIONS: CPT

## 2021-08-10 NOTE — PROGRESS NOTES
Saranya Fischer  : 1950  [place] / Product Type: Medicare /  [address]  at [address] [place] [address] Rd  [address] [facility], [address], [place] [address].  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 Frequency/Duration: 2 times a week for 60 and then 1 x per week for 30 Day(s) MEDICAL/REFERRING DIAGNOSIS:  neck and back pain   DATE OF ONSET: chronic  REFERRING PHYSICIAN: Denae Herrera*  MD Orders:  Eval and treat, include modalities of soft tissue, dry needling, and traction  Return MD Appointment: NA       Pre-treatment Symptoms/Complaints:  Pt states neck pain comes and goes. He feels like the soft tissue mobilization helps his neck.    Pain: Initial:   no VAS today Post Session:  No VAS but no c/o neck pain       Medications Last Reviewed:  21     TREATMENT:   Manual (15  min)   --  myofascial release to cervical extensors and upper trap with hawk  tools  -- grade 3 PA mob to mid to upper thoracic   Therapeutic Exercise: ( 15 minutes):   -- standing psoas stretch with open stance position with 1 foot on 8 in step 30 sec ea side  --  Thoracic extension with manual overpressure in sitting x 10  -- R shoulder physio mobs for ER at neutral and 45 ABD 3\" x 10 ea  -- AAROM for shoulder ER in sitting x 10  -- sitting thoracic extension isometric 5'x10  -- standing isometric trunk flexion in stance position 30\"x2    HEP: continue updated HEP and instructed to Ice L shoulder ~ 3 x per day x 10-15 min    [facility] Portal    Treatment/Session Summary:    · Response to Treatment: petechiae to bilateral upper traps after tool assisted mobilization with hawk  tools. Improved redness by end of session. · Communication/Consultation:  None today  · Equipment provided today:  None today  · Recommendations/Intent for next treatment session: Next visit will continue focus on posture, postural strengthening, stretching. COntinue work on neck retraction with T1/2 overpressure/PA mob and neck traction and AP mob in tolerated position.      Total Treatment Billable Duration:  30 min  PT Patient Time In/Time Out  Time In: 1130  Time Out: 810 [address] [place] [address] Radha     Future Appointments   Date Time Provider Lorraine Coronel   8/12/2021  1:00 PM Yessenia Florida, PT [facility]   8/17/2021  1:45 PM Yessenia Florida, PT [facility]   8/19/2021  2:30 PM Yessenia Florida, PT [facility]   8/24/2021  1:15 PM Yessenia Florida, PT [facility]   8/26/2021  1:00 PM Yessenia Florida, PT [facility]   8/31/2021  1:00 PM Yessenia Florida, PT [facility]   9/2/2021  1:00 PM Yessenia Florida, PT [facility]   9/7/2021  1:00 PM Yessenia Florida, PT [facility]   9/9/2021  1:00 PM Yessenia Florida, PT [facility]   9/13/2021  1:30 PM ECHO 36 [facility]   9/14/2021  1:15 PM Yessenia Florida, PT [facility]   9/16/2021  1:15 PM Yessenia Florida, PT [facility]   9/23/2021  1:30 PM Daniel Blount MD [facility]   10/7/2021 10:00 AM Chun Hernandez MD [facility]   10/12/2021  2:00 PM Chun Hernandez MD [facility]   5/17/2022 11:20 AM Cele Kirby NP [facility] PP

## 2021-08-12 ENCOUNTER — HOSPITAL ENCOUNTER (OUTPATIENT)
Dept: PHYSICAL THERAPY | Age: 71
Discharge: HOME | End: 2021-08-12
Payer: MEDICARE

## 2021-08-12 PROCEDURE — 97110 THERAPEUTIC EXERCISES: CPT

## 2021-08-12 PROCEDURE — 97140 MANUAL THERAPY 1/> REGIONS: CPT

## 2021-08-12 NOTE — PROGRESS NOTES
Gilbert Milesl  : 1950  [place] / Product Type: Medicare /  [address] at [facility] LUCIANA GAGE  [facility], [address], [place] [address].  [phone]   [phone]       OUTPATIENT PHYSICAL THERAPY: Daily Treatment Note 2021  Visit Count: 23     ICD-10: Treatment Diagnosis: neck pain M54.2, postural kyphosis M40.03, Pain in thoracic spine M54.6  Precautions/Allergies:   Bactrim [sulfamethoxazole-trimethoprim]   TREATMENT PLAN:  Effective Dates: 2021 to  10/31/2021 (90 days). Frequency/Duration: 2 times a week for 60 and then 1 x per week for 30 Day(s) MEDICAL/REFERRING DIAGNOSIS:  neck and back pain   DATE OF ONSET: chronic  REFERRING PHYSICIAN: Mikayla Whitmore*  MD Orders:  Eval and treat, include modalities of soft tissue, dry needling, and traction  Return MD Appointment: NA       Pre-treatment Symptoms/Complaints:  Pt states neck did well after last session. Neck pain is mild today. Pain: Initial:   2/10 Post Session:  No VAS but no c/o neck pain     Medications Last Reviewed:  21   TREATMENT:   Manual (15  min)   --  Manual neck traction in sitting  --  myofascial release to posterior neck and BARRY sternocleidomastoid muscle on stretch  -- grade 3 PA mob to mid to upper thoracic   Therapeutic Exercise: ( 23 minutes):   -- standing psoas stretch with open stance position with 1 foot on 8 in step ~ 30 sec ea side  --  Thoracic extension with manual overpressure in sitting x 10  -- R shoulder physio mobs for ER at neutral and 45 ABD 3\" x 10 ea as well as R shoulder adduction  -- AAROM for shoulder BARRY ER and forward elevation in sitting x 5 ea   -- sitting thoracic extension isometric 5'x10  -- standing isometric trunk flexion in stance position 30\"x2  HEP: continue updated HEP-- standing psoas stretch, sitting trunk rotation, chin tuck. , neck rotation and self massage to sternocleidomastoid muscle. [facility] Portal    Treatment/Session Summary:    · Response to Treatment:  Still had ringing in his ears but neck felt better. · Communication/Consultation:  None today  · Equipment provided today:  None today  · Recommendations/Intent for next treatment session: Next visit will continue focus on posture, postural strengthening, stretching. COntinue work on neck retraction with T1/2 overpressure/PA mob and neck traction and AP mob in tolerated position.      Total Treatment Billable Duration:  38 min  PT Patient Time In/Time Out  Time In: 1310  Time Out: Carmita Carrillo PT    Future Appointments   Date Time Provider Lorraine Coronel   8/17/2021  1:45 PM Keara Nikita, PT [facility]   8/19/2021  2:30 PM Keara Nikita, PT [facility]   8/24/2021  1:15 PM Keara Nikita, PT [facility]   8/26/2021  1:00 PM Keara Nikita, PT [facility]   8/31/2021  1:00 PM Keara Nikita, PT [facility]   9/2/2021  1:00 PM Keara Nikita, PT [facility]   9/7/2021  1:00 PM Keara Nikita, PT [facility]   9/9/2021  1:00 PM Keara Nikita, PT [facility]   9/13/2021  1:30 PM ECHO 36 [facility]   9/14/2021  1:15 PM Keara Nikita, PT [facility]   9/16/2021  1:15 PM Keara Nikita, PT [facility]   9/23/2021  1:30 PM Padmini Mitchell MD [facility]   10/7/2021 10:00 AM Andreina Ba MD [facility] EM[facility]   10/12/2021  2:00 PM Andreina Ba MD [facility]   5/17/2022 11:20 AM Ronnie Greer NP [facility] PP

## 2021-08-17 ENCOUNTER — HOSPITAL ENCOUNTER (OUTPATIENT)
Dept: PHYSICAL THERAPY | Age: 71
Discharge: HOME | End: 2021-08-17
Payer: MEDICARE

## 2021-08-17 PROCEDURE — 97140 MANUAL THERAPY 1/> REGIONS: CPT

## 2021-08-17 PROCEDURE — 97110 THERAPEUTIC EXERCISES: CPT

## 2021-08-17 NOTE — PROGRESS NOTES
Krishna Fontenot  : 1950  [place] / Product Type: Medicare /  [address] at [facility] LUCIANA GAGE  [address] [facility], [address], [address] [place] [address]  [phone]   [phone]       OUTPATIENT PHYSICAL THERAPY: Daily Treatment Note 2021  Visit Count: 24     ICD-10: Treatment Diagnosis: neck pain M54.2, postural kyphosis M40.03, Pain in thoracic spine M54.6  Precautions/Allergies:   Bactrim [sulfamethoxazole-trimethoprim]   TREATMENT PLAN:  Effective Dates: 2021 to  10/31/2021 (90 days). Frequency/Duration: 2 times a week for 60 and then 1 x per week for 30 Day(s) MEDICAL/REFERRING DIAGNOSIS:  neck and back pain   DATE OF ONSET: chronic  REFERRING PHYSICIAN: Jamil Durán*  MD Orders:  Eval and treat, include modalities of soft tissue, dry needling, and traction  Return MD Appointment: NA       Pre-treatment Symptoms/Complaints:  Pt states neck is sore today. Neck did well after last session for a few days. Worked out at gym last night and hurt after that. Did psoas stretching and walking       Pain: Initial:   2/10 Post Session:  No VAS but no c/o neck pain     Medications Last Reviewed:  21   TREATMENT:   Manual (15  min)   --  myofascial release to posterior neck and BARRY sternocleidomastoid muscle  -- grade 3 PA mob to mid to upper thoracic   Therapeutic Exercise: ( 25 minutes):   --  Thoracic extension with manual overpressure in sitting x 10  -- BARRY shoulder physio mobs for ER at 30 and 60 ABD, scaption, ADDuction 3\" x 10   -- pt education and practice of self  myofascial release with balls  -- sitting  Green band row with shoulder squeeze and ER 2x10 with cues for corrected posture. -- pt education of side lye L with proper pillow support for trial at home. HEP: continue updated HEP-- standing psoas stretch, sitting trunk rotation, chin tuck. , neck rotation and self massage to sternocleidomastoid muscle. [facility] Portal    Treatment/Session Summary:    · Response to Treatment: shoulder ER ROM is improving. Able to reach back of neck on L side for self massage on L side but needed elbow support on R side. · Communication/Consultation:  None today  · Equipment provided today:  None today  · Recommendations/Intent for next treatment session: Next visit will continue focus on posture, postural strengthening, stretching. COntinue work on neck retraction with T1/2 overpressure/PA mob and neck traction and AP mob in tolerated position.      Total Treatment Billable Duration:  40 min  PT Patient Time In/Time Out  Time In: 1310  Time Out: [address], PT    Future Appointments   Date Time Provider Lorraine Berna   8/19/2021  2:30 PM Henrine Alejo, PT [facility]   8/24/2021  1:15 PM Henrine Alejo, PT [facility]   8/26/2021  1:00 PM Henrine Alejo, PT [facility]   8/31/2021  1:00 PM Henrine Alejo, PT [facility]   9/2/2021  1:00 PM Henrine Alejo, PT [facility]   9/7/2021  1:00 PM Henrine Alejo, PT [facility]   9/9/2021  1:00 PM Henrine Alejo, PT [facility]   9/13/2021  1:30 PM ECHO 36 [facility]   9/14/2021  1:15 PM Henrine Alejo, PT [facility]   9/16/2021  1:15 PM Henrine Alejo, PT [facility]   9/23/2021  1:30 PM Michelle Brice MD [facility]   10/7/2021 10:00 AM Megan Carranza MD [facility] EMG   10/12/2021  2:00 PM Megan Carranza MD [facility] EMG   5/17/2022 11:20 AM Margareth Smith NP [facility] PP

## 2021-08-19 ENCOUNTER — HOSPITAL ENCOUNTER (OUTPATIENT)
Dept: PHYSICAL THERAPY | Age: 71
Discharge: HOME | End: 2021-08-19
Payer: MEDICARE

## 2021-08-19 PROCEDURE — 97110 THERAPEUTIC EXERCISES: CPT

## 2021-08-19 PROCEDURE — 97140 MANUAL THERAPY 1/> REGIONS: CPT

## 2021-08-19 NOTE — PROGRESS NOTES
Krishna Fontenot  : 1950  [place] / Product Type: Medicare /  [address] at [facility] LUCIANA GAGE  [address] [facility], [address], [place] [address].  [phone]   [phone]       OUTPATIENT PHYSICAL THERAPY: Daily Treatment Note 2021  Visit Count: 25     ICD-10: Treatment Diagnosis: neck pain M54.2, postural kyphosis M40.03, Pain in thoracic spine M54.6  Precautions/Allergies:   Bactrim [sulfamethoxazole-trimethoprim]   TREATMENT PLAN:  Effective Dates: 2021 to  10/31/2021 (90 days). Frequency/Duration: 2 times a week for 60 and then 1 x per week for 30 Day(s) MEDICAL/REFERRING DIAGNOSIS:  neck and back pain   DATE OF ONSET: chronic  REFERRING PHYSICIAN: Martha Durán*  MD Orders:  Eval and treat, include modalities of soft tissue, dry needling, and traction  Return MD Appointment: NA       Pre-treatment Symptoms/Complaints:  Pt states neck is sore today. Neck did well after last session for a few days. Worked out at gym last night and hurt after that.  Did psoas stretching and walking   Pain: Initial:   2/10 Post Session:  No VAS but no c/o neck pain     Medications Last Reviewed:  21   TREATMENT:   Manual (10  min)   --  myofascial release to posterior neck and BARRY sternocleidomastoid muscle  -- grade 3 PA mob to mid to upper thoracic   Therapeutic Exercise: ( 20 minutes):   -- Lumbar Protective Mechanism work in standing for trunk extension with sustained isotonics ~ 3-5 x ea side, 10 min  --  Thoracic extension with manual overpressure in sitting x 10  -- sitting trunk lean forward with resistance to trunk extension ~ 20 sec x 5  -- BARRY shoulder physio mobs for ER at 30 and 60 ABD, scaption 3\" x 5 ea  -- L shoulder flexion hold and resistance to HORZ ABD in sitting 5x   -- sitting  Yellow band row with shoulder squeeze and ER x10 with cues for corrected posture then D2 for shoulder 2x 5 ea side  HEP: continue updated HEP-- standing psoas stretch, sitting trunk rotation, chin tuck. , neck rotation and self massage to sternocleidomastoid muscle; yellow band D2 with ea arm. [employer] Portal    Treatment/Session Summary:    · Response to Treatment:  Pt is able to go into trunk extension a little easier today. · Communication/Consultation:  None today  · Equipment provided today:  None today  · Recommendations/Intent for next treatment session: Next visit will continue focus on posture, postural strengthening, stretching. COntinue work on neck retraction with T1/2 overpressure/PA mob and neck traction and AP mob in tolerated position.      Total Treatment Billable Duration:  40 min  PT Patient Time In/Time Out  Time In: 3196  Time Out: Jori 72, PT    Future Appointments   Date Time Provider Lorraine Coronel   8/24/2021  1:15 PM [place] Barks, PT [facility]   8/26/2021  1:00 PM [place] Barks, PT [facility]   8/31/2021  1:00 PM [place] Barks, PT [facility]   9/2/2021  1:00 PM Manju Barks, PT [facility]   9/7/2021  1:00 PM Manju Barks, PT [facility]   9/9/2021  1:00 PM Manju Barks, PT [facility]   9/13/2021  1:30 PM ECHO 36 [facility]   9/14/2021  1:15 PM [place] Barks, PT [facility]   9/16/2021  1:15 PM Manju Barks, PT [facility]   9/23/2021  1:30 PM Nicholas Coley MD [facility]   10/7/2021 10:00 AM Audley Peabody, MD [facility]   10/12/2021  2:00 PM Audley Peabody, MD [facility]   5/17/2022 11:20 AM Jennifer Delgado NP [facility]

## 2021-08-24 ENCOUNTER — HOSPITAL ENCOUNTER (OUTPATIENT)
Dept: PHYSICAL THERAPY | Age: 71
Discharge: HOME | End: 2021-08-24
Payer: MEDICARE

## 2021-08-24 PROCEDURE — 97110 THERAPEUTIC EXERCISES: CPT

## 2021-08-24 NOTE — PROGRESS NOTES
Lisa Patel  : 1950  [place] / Product Type: Medicare /  [address] at [facility] LUCIANA GAGE  [address] [place] [address], [address], [place] [address].  [phone]   [phone]       OUTPATIENT PHYSICAL THERAPY: Daily Treatment Note 2021  Visit Count: 26     ICD-10: Treatment Diagnosis: neck pain M54.2, postural kyphosis M40.03, Pain in thoracic spine M54.6  Precautions/Allergies:   Bactrim [sulfamethoxazole-trimethoprim]   TREATMENT PLAN:  Effective Dates: 2021 to  10/31/2021 (90 days). Frequency/Duration: 2 times a week for 60 and then 1 x per week for 30 Day(s) MEDICAL/REFERRING DIAGNOSIS:  neck and back pain   DATE OF ONSET: chronic  REFERRING PHYSICIAN: Frank Naik*  MD Orders:  Eval and treat, include modalities of soft tissue, dry needling, and traction  Return MD Appointment: NA       Pre-treatment Symptoms/Complaints:  Pt continues to work out in the pool. Was not able to try the sleeping position as we suggested. Having difficulty with  psoas stretching.    Pain: Initial:   2/10 Post Session:  No VAS but no c/o neck pain     Medications Last Reviewed:  21  ROM:   Neck rotation R = 60 degrees, L = 45 degrees   TREATMENT:   Manual (5  min)   -- C5 and C6 rotation L mobilization  --  myofascial release to posterior neck and BARRY sternocleidomastoid muscle  -- grade 3 PA mob to mid to upper thoracic   Therapeutic Exercise: ( 38 minutes):     -- Lumbar Protective Mechanism work in standing for trunk extension with sustained isotonics ~ 3-5 x ea side, 10 min  --  Thoracic extension with manual overpressure in sitting x 10  -- chin tuck x 10 with corrections as needed, then added red band for 30 sec x 2  -- sitting red band horz ABD 20x  -- sitting  Red band row with shoulder squeeze and ER x 20 with cues for corrected posture then D2 for shoulder 2x 5 ea side  -- sitting trunk lean forward with red band resistance to trunk extension (via shoulder elevation BARRY) ~ 10x  -- psoas stretch practice on stair case ~  5 min total with cueing  -- BARRY shoulder physio mobs for ER at 30 and 60 ABD, scaption 3\" x 5 ea  -- L shoulder flexion hold and resistance to HORZ ABD in sitting 5x     HEP: continue updated HEP as practiced today. [employer] Portal    Treatment/Session Summary:    · Response to Treatment:  Neck rotation L = 60 degrees after manual intervention. Improved understanidng of how to do psoas stretch and exercises. · Communication/Consultation:  None today  · Equipment provided today:  None today  · Recommendations/Intent for next treatment session: Next visit will continue focus on posture, postural strengthening, stretching. COntinue work on neck retraction with T1/2 overpressure/PA mob and neck traction and AP mob in tolerated position.      Total Treatment Billable Duration:  43 min  PT Patient Time In/Time Out  Time In: 4319  Time Out: 3068 [address],     Future Appointments   Date Time Provider Lorraine Coronel   8/26/2021  1:00 PM Anthony Guaman, PT [facility]   8/31/2021  1:00 PM Anthony Guaman, PT [facility]   9/2/2021  1:00 PM Anthony Guaman, PT [facility]   9/7/2021  1:00 PM Anthony Guaman, PT [facility]   9/9/2021  1:00 PM Anthony Guaman, PT [facility]   9/13/2021  1:30 PM ECHO 36 [facility]   9/14/2021  1:15 PM Anthony Guaman, PT [facility]   9/16/2021  1:15 PM Anthony Guaman, PT [facility]   9/23/2021  1:30 PM Meme Mejia MD [facility]   10/7/2021 10:00 AM Verner Hitt, MD [facility]   10/12/2021  2:00 PM Verner Hitt, MD [facility]   5/17/2022 11:20 AM Goldie Sauer NP [facility] PP

## 2021-08-26 ENCOUNTER — APPOINTMENT (OUTPATIENT)
Dept: PHYSICAL THERAPY | Age: 71
End: 2021-08-26
Payer: MEDICARE

## 2021-08-31 ENCOUNTER — HOSPITAL ENCOUNTER (OUTPATIENT)
Dept: PHYSICAL THERAPY | Age: 71
Discharge: HOME | End: 2021-08-31
Payer: MEDICARE

## 2021-08-31 PROCEDURE — 97140 MANUAL THERAPY 1/> REGIONS: CPT

## 2021-08-31 PROCEDURE — 97110 THERAPEUTIC EXERCISES: CPT

## 2021-08-31 NOTE — PROGRESS NOTES
Benjamin Green  : 1950  [place] / Product Type: Medicare /  [address] at [facility] LUCIANA GAGE  [address] [facility], [address], [address] [facility]  [phone]   [phone]       OUTPATIENT PHYSICAL THERAPY: Daily Treatment Note 2021  Visit Count: 27     ICD-10: Treatment Diagnosis: neck pain M54.2, postural kyphosis M40.03, Pain in thoracic spine M54.6  Precautions/Allergies:   Bactrim [sulfamethoxazole-trimethoprim]   TREATMENT PLAN:  Effective Dates: 2021 to  10/31/2021 (90 days). Frequency/Duration: 2 times a week for 60 and then 1 x per week for 30 Day(s) MEDICAL/REFERRING DIAGNOSIS:  neck and back pain   DATE OF ONSET: chronic  REFERRING PHYSICIAN: Nell Boyer*  MD Orders:  Eval and treat, include modalities of soft tissue, dry needling, and traction  Return MD Appointment: NA       Pre-treatment Symptoms/Complaints:  Pt states he is doing \"fair\". He tried doing arm exercises but had onset of BARRY shoulder weakness the next morning. Had questions about HEP. Pain: Initial:   no VAS  Post Session:  No VAS but no c/o neck pain     Medications Last Reviewed:  21   TREATMENT:   Manual (8  min)   --  myofascial release to posterior neck and BARRY upper trap  Therapeutic Exercise: ( 30 minutes):   -- chin tuck x 10 with corrections as needed, then added yellow band for 30 sec x 2  -- trunk rotation with gaze fixed 5x ea way  -- neck rotation L with belt assist 10x  -- sitting trunk lean forward with yellow band resistance to trunk extension (via shoulder elevation BARRY) ~ 10x  -- sitting yellow band ER x 10  -- standing open stance yellow band shoulder extension 2 x10    HEP: HEP as practiced today including standing psoas stretcch  [employer] Portal    Treatment/Session Summary:    · Response to Treatment:  Pt stated that neck felt fatigued with exercises, but no c/o arm pain.   Improved return demonstration of exercises today. · Communication/Consultation:  None today  · Equipment provided today:  None today  · Recommendations/Intent for next treatment session: Next visit will continue focus on posture, postural strengthening. MD next visit.      Total Treatment Billable Duration:38 min  PT Patient Time In/Time Out  Time In: 2051  Time Out: Vel Rodríguez 2174, PT    Future Appointments   Date Time Provider Lorraine Coronel   9/2/2021  1:00 PM Steven Wellington, PT [facility]   9/7/2021  1:00 PM Steven Wellington, PT [facility]   9/9/2021  1:00 PM Steven Wellington, PT [facility]   9/13/2021  1:30 PM ECHO 36 [facility]   9/14/2021  1:15 PM Steven Wellington, PT [facility]   9/16/2021  1:15 PM Steven Wellington, PT [facility]   9/23/2021  1:30 PM Jillian Sanchez MD [facility]   10/7/2021 10:00 AM Eliza Dai MD [facility] EMG   10/12/2021  2:00 PM Eliza Dai MD [facility] EMG   5/17/2022 11:20 AM Luzma Alvarado NP [facility] PP

## 2021-09-02 ENCOUNTER — HOSPITAL ENCOUNTER (OUTPATIENT)
Dept: PHYSICAL THERAPY | Age: 71
Discharge: HOME | End: 2021-09-02
Payer: MEDICARE

## 2021-09-02 PROCEDURE — 97110 THERAPEUTIC EXERCISES: CPT

## 2021-09-02 NOTE — PROGRESS NOTES
Thais Gary  : 1950  [place]  [place] [address] [place] [address] at [facility] LUC[place]NA HAYNES[address],  [place].  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 Frequency/Duration: 2 times a week for 60 and then 1 x per week for 30 Day(s) MEDICAL/REFERRING DIAGNOSIS:  neck and back pain   DATE OF ONSET: chronic   REFERRING PHYSICIAN: Monico GIBBONS MD Orders: Eval and treat, include modalities of soft tissue, dry needling, and traction  Return MD Appointment: NA     ASSESSMENT 21):  Mr. Magui Leo presented with complaints of neck pain and decreased posture that have been limiting his ability to perform ADLs. He presents with thoraco-lumbar flexion acquired scoliosis that has affected his thoracic/cervcial spine. He continues with reports of improvement in neck pain with doing myofascial release and manual cervical traction. We continue to focus on  his posture with strengthening his back/cervical extensors with exercises and stretching to psoas, manual stretching and mobilization for back. He is unable to fully straighten his back but is tolerating standing up straighter for short periods which is progress for him and feels like he got accomplish this task more easily with this intervenciton. Pt has also made progress in pain levels and thoracic and cervical rotation ROM. He has severe loss of motion in R >L shoulder with previous diagnosis of BARRY RTC tear.   He would benefit from ongoing PT to continue to improve neck and thoracic AROM and improve BARRY shoulder mobility as he uses his hand and arms to stand at times and to do exercises for neck and posture. Pt would like to see neck rotation R continue to improve for driving and to no longer have episodes of shoulder numbness when he uses his arms for activities, as well as tolerate standing for longer periods. PROBLEM LIST (Impacting functional limitations):  1. Decreased Strength  2. Decreased Ambulation Ability/Technique  3. Increased Pain  4. Decreased Flexibility/Joint Mobility   5. Decreased posture  INTERVENTIONS PLANNED: (Treatment may consist of any combination of the following)  1. Manual Therapy  2. Therapeutic Exercise/Strengthening   3. Aquatic therapy   4. Modalities as needed for pain  5. Therapeutic activities      GOALS: (Goals have been discussed and agreed upon with patient.)  Short-Term Functional Goals: Time Frame: 30 days  1. Pt will increase cervical ROM to 50 deg bilateral for improved movement for driving. MET 8/5/21  2. Pt will be able to lie on his side or back for at least 30 minutes for decreased pain for sleeping in bed. ONGOING 6/10/21  3. Pt will be able to demonstrate neutral spine in sitting for improved posture. Progressed 8/5/21 - unable to get cervical spine better then -25 degrees to neutral, or thoracic spine to extend beyond - 20 degrees. 4. Pt will be independent with HEP. MET 6/10/21  Discharge Goals: Time Frame: 90 days  1. Pt will increase cervical ROM to 60 deg bilateral for improved mobility. MET but not consistent to the L. 9/2/21  2. Pt will report he is able to sleep in the bed with no increase in pain for improved sleep. ONGOING 9/2/21  3. Pt will increase LE strength to 5/5 for improved gait. ONGOING 9/2/21  4. Pt will demonstrate neutral spine in standing with no verbal cueing. PROGRESSED 9/2/21 for short duration  5. Pt will be able to ambulate with upright trunk with least resistive assistive device for decreased stress on his neck during gait. UNABLE but does show improvement from IE 9/2/21  New DIscharge Goals  1. BARRY shoulder ER AROM at least 25 degrees at 45 ABD for improved use with activity. 2.   Independent with discharge level HEP. OUTCOME MEASURE:   Tool Used: Neck Disability Index (NDI)  Score:  Initial: 11/50 8/50 (Date: 8/5/21 )    Most Recent: 11/50 9/2/21    Interpretation of Score: The Neck Disability Index is a revised form of the Oswestry Low Back Pain Index and is designed to measure the activities of daily living in person's with neck pain. Each section is scored on a 0-5 scale, 5 representing the greatest disability. The scores of each section are added together for a total score of 50. MEDICAL NECESSITY:   · Patient is expected to demonstrate progress in strength and posture to improve mobility for ADLs. REASON FOR SERVICES/OTHER COMMENTS:  · Patient continues to require skilled intervention due to decreased ROM, decreased posture and gait that is limiting his ability to perform ADLs. .  Total Duration: 50 minutes  PT Patient Time In/Time Out  Time In: 1306  Time Out: 1405    Rehabilitation Potential For Stated Goals: Good to fair    Regarding Zane Le therapy, I certify that the treatment plan above will be carried out by a therapist or under their direction. Thank you for this referral,  Bong Marcus, PT                PAIN/SUBJECTIVE:   Initial:   2-3/10 Post Session:  2/10   HISTORY:      Ambulatory/Rehab Services H2 Model Falls Risk Assessment   Risk Factors:       (1)  Gender [Male] Ability to Rise from Chair:       (1)  Pushes up, successful in one attempt   Falls Prevention Plan:       No modifications necessary   Total: (5 or greater = High Risk): 2   ©2010 [facility] of [email or web address]. All Rights Reserved. [facility] Patent #[id number].  Federal Law prohibits the replication, distribution or use without written permission from [facility] [employer]   Current Medications:       Current Outpatient Medications:     glucose blood VI test strips (blood glucose test) strip, by Does Not Apply route See Admin Instructions. Ascenia Contour next test strips. Check FS every day, DM2, E11.9, Disp: 100 Strip, Rfl: 5    cinnamon bark (Cinnamon) 500 mg cap, Take  by mouth., Disp: , Rfl:     omega 3-dha-epa-fish oil (Fish OiL) 100-160-1,000 mg cap, Take  by mouth., Disp: , Rfl:     cholecalciferol, vitamin D3, (Vitamin D3) 50 mcg (2,000 unit) tab, Take  by mouth., Disp: , Rfl:     TURMERIC PO, Take  by mouth., Disp: , Rfl:     insulin glargine-lixisenatide (Soliqua 100/33) 100 unit-33 mcg/mL inpn, 60 units sub-q daily  Indications: type 2 diabetes mellitus, Disp: 3 mL, Rfl: 5    pantoprazole (PROTONIX) 40 mg tablet, Take 1 Tablet by mouth daily. , Disp: 90 Tablet, Rfl: 1    montelukast (SINGULAIR) 10 mg tablet, Take 1 Tablet by mouth nightly. Indications: controller medication for asthma, Disp: 90 Tablet, Rfl: 1    lisinopriL (PRINIVIL, ZESTRIL) 10 mg tablet, Take 1 Tablet by mouth daily. Indications: high blood pressure, Disp: 90 Tablet, Rfl: 1    ezetimibe (ZETIA) 10 mg tablet, Take 1 Tablet by mouth nightly. Indications: high cholesterol, Disp: 90 Tablet, Rfl: 1    doxazosin (CARDURA) 4 mg tablet, Take 1 Tablet by mouth daily. Indications: enlarged prostate with urination problem, Disp: 90 Tablet, Rfl: 1    celecoxib (CeleBREX) 200 mg capsule, Take 1 Capsule by mouth daily. Indications: joint damage causing pain and loss of function, Disp: 90 Capsule, Rfl: 1    atorvastatin (LIPITOR) 20 mg tablet, Take 1 Tablet by mouth nightly. Indications: high cholesterol, Disp: 90 Tablet, Rfl: 1    apixaban (Eliquis) 5 mg tablet, Take 1 Tablet by mouth two (2) times a day. Indications: treatment to prevent blood clots in chronic atrial fibrillation, Disp: 180 Tablet, Rfl: 1    dapagliflozin (Farxiga) 10 mg tab tablet, Take 1 Tablet by mouth daily.  Indications: type 2 diabetes mellitus, Disp: 90 Tablet, Rfl: 1    fluticasone furoate-vilanteroL (Breo Ellipta) 200-25 mcg/dose inhaler, Take 1 Puff by inhalation daily. , Disp: 3 Inhaler, Rfl: 1    metoprolol tartrate (LOPRESSOR) 50 mg tablet, Take 1 Tablet by mouth two (2) times a day. Indications: ventricular rate control in atrial fibrillation, Disp: 180 Tablet, Rfl: 1    lancets 28 gauge misc, Check fs every day, DM2, E11.9, Disp: 100 Lancet, Rfl: 1    Insulin Needles, Disposable, (BD Jocelynn 2nd Gen Pen Needle) 32 gauge x 5/32\" ndle, by Does Not Apply route., Disp: , Rfl:     acetaminophen (TYLENOL) 500 mg tablet, Take 500-1,000 mg by mouth every six (6) hours as needed for Pain., Disp: , Rfl:     diphenhydrAMINE (BENADRYL) 50 mg capsule, Take 100 mg by mouth nightly as needed for Sleep., Disp: , Rfl:     colesevelam (WELCHOL) 625 mg tablet, Take 625-3,750 mg by mouth as needed for Diarrhea. Patient taking 2 tabs following diarrheal bowel movement up to 3 times. Maximum daily dosage: 6 tabs, Disp: , Rfl:     fluticasone propionate (FLONASE ALLERGY RELIEF) 50 mcg/actuation nasal spray, 2 Sprays by Both Nostrils route daily as needed for Allergies. Indications: inflammation of the nose due to an allergy, Disp: , Rfl:     albuterol (PROVENTIL HFA, VENTOLIN HFA, PROAIR HFA) 90 mcg/actuation inhaler, Take 1 Puff by inhalation every four (4) hours as needed for Wheezing., Disp: 1 Inhaler, Rfl: 5    ANTI-FUNGAL 2 % topical powder, APPLY TO ABDOMEN 2 TIMES PER DAY, Disp: , Rfl: 11    Blood-Glucose Meter monitoring kit, Check fs every day; DM2, E11/9, Contour glucometer, Disp: 1 Kit, Rfl: 0    albuterol (PROVENTIL VENTOLIN) 2.5 mg /3 mL (0.083 %) nebulizer solution, 3 mL by Nebulization route once for 1 dose. (Patient taking differently: 2.5 mg by Nebulization route daily as needed for Wheezing. Patient states he hasn't used in many years), Disp: 24 Each, Rfl: 11   Date Last Reviewed:  9/2/21   EXAMINATION:   Observation/Orthostatic Postural Assessment: kyphosis at cervical/thoracic junction in standing and sitting.  Stands with trunk in a flexed position that mostly originates at thoracic spine. . Unable to get head over pelvis but is working with modifying his posture to open stance position with knees slightly bent to upright posture more easily. He is able to hold this posture for only short periods right now ~ 30 sec  Palpation: thoracic PA mob is rigid at ~ T10, T11   ROM:   Neck rotation R = 60 degrees, L = 58 degrees; , Flexion = 50 degrees; extension 20 degrees  AROM Right shoulder  Left shoulder   Forward elevation 100  Can place and hold at 105 scaption 120   Abduction     External rotation 20 25   Internal rotation Fn - unable to touch R buttock Fn: To L 5      PROM RIght shoulder  Left shoulder   Flexion 110 120     LUMBAR:  extension = negative ~ 10 degrees, thoracic extension = negative ~ 40 degrees (unable to get to neutral). Strength:    Right shoulder  Left shoulder   Forward elevation 4 in available ROM 4+ in available ROM   Abduction     External rotation 4+ 4+   Internal rotation     Horz ABD 4- 4+      Special Tests: Lumbar Protective Mechanism for trunk flexion = 2/5   Neurological Screen: n/t . Functional Mobility:  Sit to/from Stand:independent. Bed Mobility: n/t. Walking on level ground: ambulates with cane and trunk and neck in flexed position, wide base of support. Have worked with him with RW but pt prefers use of cane for easier transitions.    Balance:  n/t

## 2021-09-02 NOTE — PROGRESS NOTES
Eva Garay  : 1950  [place] / Product Type: Medicare /  [address] at [facility] LUCIANA GAGE  [address] [facility], [address], [place] [address].  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 Frequency/Duration: 2 times a week for 60 and then 1 x per week for 30 Day(s) MEDICAL/REFERRING DIAGNOSIS:  neck and back pain   DATE OF ONSET: chronic  REFERRING PHYSICIAN: Malik Medina*  MD Orders:  Eval and treat, include modalities of soft tissue, dry needling, and traction  Return MD Appointment: NA       Pre-treatment Symptoms/Complaints:  Pt states his arm did Ok since last session. Last night arms fatigued from pool PT but not doing numb. Pain: Initial:   no VAS  Post Session:  No VAS but no c/o neck pain     Medications Last Reviewed:  21   TREATMENT:   Manual (23  min)   -- PA mob for lumbar and thoracic in neutral and with rotation R/L in standing in open stance position ~ 8 min  -- lower cervical AP with pt doing active chin tuck  Therapeutic Exercise: ( 5 minutes):   -- standing trunk extension by leaning into wall and pushing hips forward ~ 10x    HEP: updated HEP with addition of frequent trunk extension as practiced today. [employer] Portal    Treatment/Session Summary:    · Response to Treatment:  Pt stated that he could stand up with a little more ease after today's intervension. · Communication/Consultation:  None today  · Equipment provided today:  None today  · Recommendations/Intent for next treatment session: Next visit will continue focus on posture, postural strengthening-- especially trunk extension. Total Treatment Billable Duration: 28 min  PT Patient Time In/Time Out  Time In: 4346  Time Out: LASHELL Verma    Future Appointments   Date Time Provider Lorraine Coronel   9/7/2021  1:00 PM [facility]   9/9/2021  1:00 PM Ethel Mcgrath, PT [facility]   9/13/2021  1:30 PM ECHO 39 [facility]   9/14/2021  1:15 PM Ethel Mcgrath, PT [facility]   9/16/2021  1:15 PM Ethel Mcgrath, PT [facility]   9/23/2021  1:30 PM Judith Helton MD [facility]   10/7/2021 10:00 AM Tosha Pepper MD [facility] EMG   10/12/2021  2:00 PM Tosha Pepper MD [facility] EMG   5/17/2022 11:20 AM Shola Mills NP [facility] PS[facility] PP

## 2021-09-07 ENCOUNTER — HOSPITAL ENCOUNTER (OUTPATIENT)
Dept: PHYSICAL THERAPY | Age: 71
Discharge: HOME | End: 2021-09-07
Payer: MEDICARE

## 2021-09-09 ENCOUNTER — HOSPITAL ENCOUNTER (OUTPATIENT)
Dept: PHYSICAL THERAPY | Age: 71
Discharge: HOME | End: 2021-09-09
Payer: MEDICARE

## 2021-09-09 PROCEDURE — 97110 THERAPEUTIC EXERCISES: CPT

## 2021-09-09 PROCEDURE — 97140 MANUAL THERAPY 1/> REGIONS: CPT

## 2021-09-09 NOTE — PROGRESS NOTES
Shona Jocelyn  : 1950  [place]  Secondary: [address],  [place].  [phone]   [phone]      Pt cancelled today's session.      Howard Isaacs, PT MSPT

## 2021-09-09 NOTE — PROGRESS NOTES
Lisa Patel  : 1950  [place] / Product Type: Medicare /  [address] at [facility] [place]  [address] [facility], [address], [place] [address].  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 Frequency/Duration: 2 times a week for 60 and then 1 x per week for 30 Day(s) MEDICAL/REFERRING DIAGNOSIS:  neck and back pain   DATE OF ONSET: chronic  REFERRING PHYSICIAN: Ari Naik*  MD Orders:  Eval and treat, include modalities of soft tissue, dry needling, and traction  Return MD Appointment: NA       Pre-treatment Symptoms/Complaints:  Pt states his arm did Ok since last session. Last night arms fatigued from pool PT but not doing numb. Pain: Initial:   no VAS  Post Session:  No VAS but no c/o neck pain     Medications Last Reviewed:  21   TREATMENT:   Manual (13 min)   -- PA mob for lumbar and thoracic in standing in open stance position ~ 8 min  -- lower cervical AP with pt doing active chin tuck  Therapeutic Exercise: ( 15 minutes):   -- open stance standing psoas stretch facing wall  With focus on improving technique~ 3 min ea side,  and then with opposite shoulder wall flexion 10x  -- standing trunk extension by leaning into wall and pushing hips forward ~ 10 sec x 10    HEP: updated HEP with addition of frequent trunk extension as practiced today. [employer] Portal    Treatment/Session Summary:    · Response to Treatment:  Pt with improvement in stretching psoas, but still very difficult for him.    · Communication/Consultation:  None today  · Equipment provided today:  None today  · Recommendations/Intent for next treatment session: Next visit will continue focus on posture, postural strengthening-- especially trunk extension.      Total Treatment Billable Duration: 23 min  PT Patient Time In/Time Out  Time In: 1315  Time Out: [address], PT    Future Appointments   Date Time Provider Lorraine Berna   9/13/2021  1:30 PM E[facility]   9/14/2021  1:15 PM Calli Baez, PT AGUSTINAORPTRETA [facility]   9/16/2021  1:15 PM Calli Baez, PT SFORPGLEN [facility]   9/23/2021  1:30 PM Serafin Asif MD [facility] UCDG UCD   10/7/2021 10:00 AM Drake Meadows MD [facility] EMG EMG   10/12/2021  2:00 PM Drake Meadows MD [facility] EMG EMG   5/17/2022 11:20 AM [facility]

## 2021-09-14 ENCOUNTER — HOSPITAL ENCOUNTER (OUTPATIENT)
Dept: PHYSICAL THERAPY | Age: 71
Discharge: HOME | End: 2021-09-14
Payer: MEDICARE

## 2021-09-14 NOTE — ROUTINE PROCESS
Romulo Recinos  : 1950  [place]  Secondary: [address] at [facility]  [address] [facility],  [place].  [phone]   [phone]      Pt canceled today's visit.   Wife needed urgent transport to MD.     Rachel Anaya, PT

## 2021-09-16 ENCOUNTER — HOSPITAL ENCOUNTER (OUTPATIENT)
Dept: PHYSICAL THERAPY | Age: 71
Discharge: HOME | End: 2021-09-16
Payer: MEDICARE

## 2021-09-16 PROCEDURE — 97110 THERAPEUTIC EXERCISES: CPT

## 2021-09-16 PROCEDURE — 97140 MANUAL THERAPY 1/> REGIONS: CPT

## 2021-09-16 NOTE — PROGRESS NOTES
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 Frequency/Duration: 2 times a week for 60 and then 1 x per week for 30 Day(s) MEDICAL/REFERRING DIAGNOSIS:  neck and back pain   DATE OF ONSET: chronic  REFERRING PHYSICIAN: Pamela Macedo*  MD Orders:  Eval and treat, include modalities of soft tissue, dry needling, and traction  Return MD Appointment: NA       Pre-treatment Symptoms/Complaints:  Pt states his neck and shoulders are hurting today and thinks it is related to rainy weather. R shoulder hurting more possibly because of R arm wall slides. Pt planning to go see Dr. Tevin White about side pain in thorax area with sleeping on side but is waiting until his wife gets through her therapy.      Pain: Initial:    3-4/10 Post Session:  No VAS but no c/o neck pain     Medications Last Reviewed:  21  Observation:  Pt walking with antalgic gait today   TREATMENT:   Manual (11 min)   -- PA mob for lumbar and thoracic in standing in open stance position ~ 3x 2 min ea time  -- lower cervical AP with pt doing active chin tuck  -- R shoulder posterior depression  Therapeutic Exercise: ( 18 minutes):   -- open stance standing psoas stretch facing wall  with opposite shoulder wall HORZ ABD below 90 degrees 10x  -- BARRY upper trap and lev scap stretch   -- standing trunk extension by leaning into wall and pushing hips forward ~ 10 sec x 10    HEP: updated HEP with addition of frequent trunk extension as practiced today. [employer] Portal    Treatment/Session Summary:    · Response to Treatment:  Pt stated no shoulder pain with changing to shoulder HORZ ABD  · Communication/Consultation:  None today  · Equipment provided today:  None today  · Recommendations/Intent for next treatment session: Next visit will continue focus on posture, postural strengthening-- especially trunk extension.      Total Treatment Billable Duration: 29 min  PT Patient Time In/Time Out  Time In: 1315  Time Out: LASHELL Verma    Future Appointments   Date Time Provider Lorraine Coronel   9/21/2021  2:30 PM Genetta Holguin, PT [facility]   9/23/2021  1:30 PM Case Morel MD SSA UCDG UCD   9/23/2021  3:15 PM Genetta Holguin, PT [facility]   9/28/2021  2:45 PM Genetta Holguin, PT [facility]   9/30/2021  1:45 PM Genetta Holguin, PT [facility]   10/5/2021  1:45 PM Genetta Holguin, PT [facility]   10/7/2021 10:00 AM Eladio Dubon MD SSA EMG EMG   10/7/2021  1:45 PM Genetta Holguin, PT [facility]   10/12/2021  2:00 PM Eladio Dubon MD SSA EMG EMG   5/17/2022 11:20 AM Marixa Moseley NP SSA PSCD PP

## 2021-09-21 ENCOUNTER — APPOINTMENT (OUTPATIENT)
Dept: PHYSICAL THERAPY | Age: 71
End: 2021-09-21
Payer: MEDICARE

## 2021-09-23 ENCOUNTER — HOSPITAL ENCOUNTER (OUTPATIENT)
Dept: PHYSICAL THERAPY | Age: 71
Discharge: HOME | End: 2021-09-23
Payer: MEDICARE

## 2021-09-23 PROCEDURE — 97140 MANUAL THERAPY 1/> REGIONS: CPT

## 2021-09-23 PROCEDURE — 97110 THERAPEUTIC EXERCISES: CPT

## 2021-09-23 NOTE — PROGRESS NOTES
Jose Alfredo Purcell  : 1950  [place] / Product Type: Medicare /  [address] at [facility] [place]  [address] [facility], [address], [place] [address].  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 Frequency/Duration: 2 times a week for 60 and then 1 x per week for 30 Day(s) MEDICAL/REFERRING DIAGNOSIS:  neck and back pain   DATE OF ONSET: chronic  REFERRING PHYSICIAN: Susan Maradiaga*  MD Orders:  Eval and treat, include modalities of soft tissue, dry needling, and traction  Return MD Appointment: NA       Pre-treatment Symptoms/Complaints: Pt states that he wants to return to doing seated bicycle to lower A1C. Pain: Initial:    3-4/ Post Session:  No VAS but no c/o neck pain     Medications Last Reviewed:  21  Observation:  Pt with less bent over posture today but still fairly significant. TREATMENT:   Manual (15 min)   -- PA mob for lumbar and thoracic in sitting and then in standing in open stance position   --  myofascial release to cervical  Therapeutic Exercise: ( 23 minutes):  -- seated bicycle lev 3 x 6 min   -- standing bent over unilateral hip extension 2x 10   -- open stance standing psoas stretch facing wall  with opposite shoulder wall HORZ ABD below 90 degrees 10x  -- BARRY upper trap and lev scap stretch 3 x ea  BARRY  -- walking backwards with focus on keeping trunk upright as possible - 2 steps back and then forward X 5 reps.  Then worked on holding open stance with trunk rotation OPP 5x ea side  -- standing trunk extension by leaning into wall and pushing hips forward  x 10  HEP: updated HEP   [facility] Portal    Treatment/Session Summary:    · Response to Treatment:  Pt tolerating upgrades to exercises with working on trunk extension. Pt seems to have mild improvement in ability to straighten curved spine. · Communication/Consultation:  None today  · Equipment provided today:  None today  · Recommendations/Intent for next treatment session: Next visit will continue focus on posture, postural strengthening-- trunk extension and rotation with theraband resistance.   .     Total Treatment Billable Duration: 38 min  PT Patient Time In/Time Out  Time In: 7286  Time Out: [address] [facility] [address], PT    Future Appointments   Date Time Provider Lorraine Coronel   9/28/2021  2:45 PM Chestine , PT [facility]   9/30/2021  1:45 PM Chestine , [facility]   10/5/2021  1:45 PM Chestine , PT [facility]   10/7/2021 10:00 AM Radha Pride MD SSA EMG EMG   10/7/2021  1:45 PM Chestine , PT SFORPTWD [facility]   10/12/2021  2:00 PM Radha Pride MD SSA EMG EMG   4/4/2022 11:45 AM Briana Lomeli MD SSA UCDG UCD   5/17/2022 11:20 AM Angella Peck NP SSA PSCD PP

## 2021-09-28 ENCOUNTER — HOSPITAL ENCOUNTER (OUTPATIENT)
Dept: PHYSICAL THERAPY | Age: 71
End: 2021-09-28
Payer: MEDICARE

## 2021-09-28 NOTE — HPI: RASH (INTERTRIGO)
28-Sep-2021
How Severe Is It?: moderate
Is This A New Presentation, Or A Follow-Up?: Follow Up Intertrigo

## 2021-09-30 ENCOUNTER — HOSPITAL ENCOUNTER (OUTPATIENT)
Dept: PHYSICAL THERAPY | Age: 71
End: 2021-09-30
Payer: MEDICARE

## 2021-10-05 ENCOUNTER — APPOINTMENT (OUTPATIENT)
Dept: PHYSICAL THERAPY | Age: 71
End: 2021-10-05
Payer: MEDICARE

## 2021-10-07 ENCOUNTER — HOSPITAL ENCOUNTER (OUTPATIENT)
Dept: PHYSICAL THERAPY | Age: 71
Discharge: HOME | End: 2021-10-07
Payer: MEDICARE

## 2021-10-07 PROCEDURE — 97110 THERAPEUTIC EXERCISES: CPT

## 2021-10-07 PROCEDURE — 97035 APP MDLTY 1+ULTRASOUND EA 15: CPT

## 2021-10-07 NOTE — PROGRESS NOTES
Mike Zimmerman  : 1950  [place] / Product Type: Medicare /  [address]  at [facility] [address]  [address] [facility], [address], [place] [address].  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 Frequency/Duration: 2 times a week for 60 and then 1 x per week for 30 Day(s) MEDICAL/REFERRING DIAGNOSIS:  neck and back pain   DATE OF ONSET: chronic  REFERRING PHYSICIAN: Aime Perez*  MD Orders:  Scott Schroeder and treat, include modalities of soft tissue, dry needling, and traction  Return MD Appointment: NA       Pre-treatment Symptoms/Complaints: Pt has not been coming to PT because he woke up on a  morning and had severe R lateral ankle pain. Pain is keeping him awake at night and makes walking difficult. He had foot and ankle xray and did not show any fracture. He was told it was an ankle sprain. Pain: Initial:    No VAS today Post Session:  No VAS but no increase in pain reported. Medications Last Reviewed:  10/7/21  Observation:  Moderate R lateral ankle swelling. Decreased ankle ROM in all planes, especially inversion and PF. TREATMENT:   Modalities (8 min) 50% pulsed US to lateral R ankle with 1.5 w/cm2, 1.o depth  Manual (0 min)  Not today  Therapeutic Exercise: ( 30 minutes):  -- sitting ankle DF/PF/ EVR/INV modified as needed 10x ea way  -- green band ankle pumps R 20x 2  -- sitting ankle DF stretch  -- kinesio taping for L lateral ankle swelling with basket weave method. Pt instructed to wear tape for 48 hours or if any redness or itching of skin occurred. Pt verbally agreed.   HEP: assigned HEP for ankle as above and also included ankle alphabet. Hand out given to pt. [employer] Portal    Treatment/Session Summary:    · Response to Treatment:  Pt not able to do standing exercises because of R ankle sprain. Initiated some exercises for ankle to allow him to get back to standing exercises for neck and back. · Communication/Consultation:  None today  · Equipment provided today:  None today  · Recommendations/Intent for next treatment session: Next visit will continue focus on posture, postural strengthening-- trunk extension and rotation with theraband resistance.   .     Total Treatment Billable Duration: 38 min  PT Patient Time In/Time Out  Time In: 3568  Time Out: [address], PT    Future Appointments   Date Time Provider Lorraine Coronel   10/11/2021  1:00 PM Qi Perez, PT [facility]   10/14/2021  1:00 PM Rohan Villalba, PT [facility]   10/19/2021  2:30 PM Rohan Mungo, PT [facility]   10/21/2021  2:30 PM Rohan Deejay, PT [facility]   10/26/2021  1:15 PM Rohan Mungo, PT [facility]   10/28/2021  1:00 PM Rohan Munlouise, PT [facility]   11/2/2021  1:00 PM Rohan Munlouise, PT [facility]   11/4/2021  1:00 PM Rohan Villalba, PT [facility]   4/4/2022 11:45 AM Laurence Bolton MD [facility] UCDG UCD   5/17/2022 11:20 AM Nik Heck NP [facility]SA PSCD PP

## 2021-10-07 NOTE — PROGRESS NOTES
Stevie Heather  : 1950  [place]  [place] [address] [place] [address] at [facility] LUCIANA GAGE  [address] [place] [address],  Chet Vázquez.  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 Frequency/Duration: 2 times a week for 60 and then 1 x per week for 30 Day(s)  Pt started PT on 21 and has completed 32 visits. MEDICAL/REFERRING DIAGNOSIS:  neck and back pain   DATE OF ONSET: chronic   REFERRING PHYSICIAN: Andrew GIBBONS MD Orders: Eval and treat, include modalities of soft tissue, dry needling, and traction  Return MD Appointment: NA     ASSESSMENT 10/7/21:  Mr. Dina Mares presented with complaints of neck pain and decreased posture that have been limiting his ability to perform ADLs, including walking. He presents with thoraco-lumbar flexion acquired scoliosis that has affected his thoracic/cervcial spine. He has reports of improvement in neck pain with doing myofascial release and manual cervical traction. He recently sustained a L ankle sprain that has made walking painful, which means he in sitting more often again. It is important to get him back to walking and doing his standing exercises again as this is part of his overall progress. We continue to focus on  his posture with strengthening his back/cervical extensors with exercises and stretching to psoas, manual stretching and mobilization for back but will address ankle for helping decrease swelling, improve function so he can return to his normal exercises and return to walking.   He is unable to fully straighten his back but is tolerating standing up straighter for short periods which is progress for him and feels like he got accomplish this task more easily with this intervenciton. Pt has also made progress in pain levels and thoracic and cervical rotation ROM. He has severe loss of motion in R >L shoulder with previous diagnosis of BARRY RTC tear. He would benefit from ongoing PT to continue to improve neck and thoracic AROM and improve BARRY shoulder mobility as he uses his hand and arms to stand at times and to do exercises for neck and posture. Pt would like to see neck rotation R continue to improve for driving and to no longer have episodes of shoulder numbness when he uses his arms for activities, as well as tolerate standing for longer periods. PROBLEM LIST (Impacting functional limitations):  1. Decreased Strength  2. Decreased Ambulation Ability/Technique  3. Increased Pain  4. Decreased Flexibility/Joint Mobility   5. Decreased posture  INTERVENTIONS PLANNED: (Treatment may consist of any combination of the following)  1. Manual Therapy  2. Therapeutic Exercise/Strengthening   3. Aquatic therapy   4. Modalities as needed for pain  5. Therapeutic activities      GOALS: (Goals have been discussed and agreed upon with patient.)  Short-Term Functional Goals: Time Frame: 30 days  1. Pt will increase cervical ROM to 50 deg bilateral for improved movement for driving. MET 8/5/21  2. Pt will be able to lie on his side or back for at least 30 minutes for decreased pain for sleeping in bed. ONGOING 10/7/21  3. Pt will be able to demonstrate neutral spine in sitting for improved posture. Progressed 10/7/21 - unable to get cervical spine better then -25 degrees to neutral, or thoracic spine to extend beyond - 20 degrees. 4. Pt will be independent with HEP. MET 6/10/21  Discharge Goals: Time Frame: 90 days  1. Pt will increase cervical ROM to 60 deg bilateral for improved mobility. MET but not consistent to the L. 10/7/21  2.  Pt will report he is able to sleep in the bed with no increase in pain for improved sleep. ONGOING 10/7/21  3. Pt will increase LE strength to 5/5 for improved gait. ONGOING 10/7/21  4. Pt will demonstrate neutral spine in standing with no verbal cueing. PROGRESSED 9/2/21 for short duration  5. Pt will be able to ambulate with upright trunk with least resistive assistive device for decreased stress on his neck during gait. UNABLE but does show improvement from IE 9/2/21  New DIscharge Goals  1. BARRY shoulder ER AROM at least 25 degrees at 45 ABD for improved use with activity. 2.   Independent with discharge level HEP. OUTCOME MEASURE:   Tool Used: Neck Disability Index (NDI)  Score:  Initial: 11/50 8/50 (Date: 8/5/21 )    Most Recent: 11/50 9/2/21    Interpretation of Score: The Neck Disability Index is a revised form of the Oswestry Low Back Pain Index and is designed to measure the activities of daily living in person's with neck pain. Each section is scored on a 0-5 scale, 5 representing the greatest disability. The scores of each section are added together for a total score of 50. MEDICAL NECESSITY:   · Patient is expected to demonstrate progress in strength and posture to improve mobility for ADLs. REASON FOR SERVICES/OTHER COMMENTS:  · Patient continues to require skilled intervention due to decreased ROM, decreased posture and gait that is limiting his ability to perform ADLs. .  Total Duration: 50 minutes  PT Patient Time In/Time Out  Time In: 1353  Time Out: 1435    Rehabilitation Potential For Stated Goals: Good to fair    Regarding Mike Lawson therapy, I certify that the treatment plan above will be carried out by a therapist or under their direction.   Thank you for this referral,  Monica Nunes, PT                PAIN/SUBJECTIVE:   Initial:   2-3/10 Post Session:  2/10   HISTORY:      Ambulatory/Rehab Services H2 Model Falls Risk Assessment   Risk Factors:       (1)  Gender [Male] Ability to Rise from Chair:       (1)  Pushes up, successful in one attempt   Falls Prevention Plan:       No modifications necessary   Total: (5 or greater = High Risk): 2   ©2010 [facility] [employer]. All Rights Reserved. Samira States Patent #[id number]. Federal Law prohibits the replication, distribution or use without written permission from [facility] [address] [employer]   Current Medications:       Current Outpatient Medications:     glucose blood VI test strips (blood glucose test) strip, by Does Not Apply route See Admin Instructions. Ascenia Contour next test strips. Check FS every day, DM2, E11.9, Disp: 100 Strip, Rfl: 5    omega 3-dha-epa-fish oil (Fish OiL) 100-160-1,000 mg cap, Take  by mouth., Disp: , Rfl:     cholecalciferol, vitamin D3, (Vitamin D3) 50 mcg (2,000 unit) tab, Take  by mouth., Disp: , Rfl:     TURMERIC PO, Take  by mouth., Disp: , Rfl:     insulin glargine-lixisenatide (Soliqua 100/33) 100 unit-33 mcg/mL inpn, 60 units sub-q daily  Indications: type 2 diabetes mellitus, Disp: 3 mL, Rfl: 5    pantoprazole (PROTONIX) 40 mg tablet, Take 1 Tablet by mouth daily. , Disp: 90 Tablet, Rfl: 1    montelukast (SINGULAIR) 10 mg tablet, Take 1 Tablet by mouth nightly. Indications: controller medication for asthma, Disp: 90 Tablet, Rfl: 1    lisinopriL (PRINIVIL, ZESTRIL) 10 mg tablet, Take 1 Tablet by mouth daily. Indications: high blood pressure, Disp: 90 Tablet, Rfl: 1    ezetimibe (ZETIA) 10 mg tablet, Take 1 Tablet by mouth nightly. Indications: high cholesterol, Disp: 90 Tablet, Rfl: 1    doxazosin (CARDURA) 4 mg tablet, Take 1 Tablet by mouth daily. Indications: enlarged prostate with urination problem, Disp: 90 Tablet, Rfl: 1    celecoxib (CeleBREX) 200 mg capsule, Take 1 Capsule by mouth daily. Indications: joint damage causing pain and loss of function, Disp: 90 Capsule, Rfl: 1    atorvastatin (LIPITOR) 20 mg tablet, Take 1 Tablet by mouth nightly.  Indications: high cholesterol, Disp: 90 Tablet, Rfl: 1    apixaban (Eliquis) 5 mg tablet, Take 1 Tablet by mouth two (2) times a day. Indications: treatment to prevent blood clots in chronic atrial fibrillation, Disp: 180 Tablet, Rfl: 1    dapagliflozin (Farxiga) 10 mg tab tablet, Take 1 Tablet by mouth daily. Indications: type 2 diabetes mellitus, Disp: 90 Tablet, Rfl: 1    fluticasone furoate-vilanteroL (Breo Ellipta) 200-25 mcg/dose inhaler, Take 1 Puff by inhalation daily. , Disp: 3 Inhaler, Rfl: 1    metoprolol tartrate (LOPRESSOR) 50 mg tablet, Take 1 Tablet by mouth two (2) times a day. Indications: ventricular rate control in atrial fibrillation, Disp: 180 Tablet, Rfl: 1    lancets 28 gauge misc, Check fs every day, DM2, E11.9, Disp: 100 Lancet, Rfl: 1    Insulin Needles, Disposable, (BD Jocelynn 2nd Gen Pen Needle) 32 gauge x 5/32\" ndle, by Does Not Apply route., Disp: , Rfl:     acetaminophen (TYLENOL) 500 mg tablet, Take 500-1,000 mg by mouth every six (6) hours as needed for Pain., Disp: , Rfl:     diphenhydrAMINE (BENADRYL) 50 mg capsule, Take 100 mg by mouth nightly as needed for Sleep., Disp: , Rfl:     colesevelam (WELCHOL) 625 mg tablet, Take 625-3,750 mg by mouth as needed for Diarrhea. Patient taking 2 tabs following diarrheal bowel movement up to 3 times. Maximum daily dosage: 6 tabs, Disp: , Rfl:     fluticasone propionate (FLONASE ALLERGY RELIEF) 50 mcg/actuation nasal spray, 2 Sprays by Both Nostrils route daily as needed for Allergies.  Indications: inflammation of the nose due to an allergy, Disp: , Rfl:     albuterol (PROVENTIL HFA, VENTOLIN HFA, PROAIR HFA) 90 mcg/actuation inhaler, Take 1 Puff by inhalation every four (4) hours as needed for Wheezing., Disp: 1 Inhaler, Rfl: 5    ANTI-FUNGAL 2 % topical powder, APPLY TO ABDOMEN 2 TIMES PER DAY, Disp: , Rfl: 11    Blood-Glucose Meter monitoring kit, Check fs every day; DM2, E11/9, Contour glucometer, Disp: 1 Kit, Rfl: 0    albuterol (PROVENTIL VENTOLIN) 2.5 mg /3 mL (0.083 %) nebulizer solution, 3 mL by Nebulization route once for 1 dose. (Patient taking differently: 2.5 mg by Nebulization route daily as needed for Wheezing. Patient states he hasn't used in many years), Disp: 24 Each, Rfl: 11   Date Last Reviewed:  9/2/21   EXAMINATION:   Observation/Orthostatic Postural Assessment: Pt with moderate swelling to R lateral ankle that also shows some mild bruising. kyphosis at cervical/thoracic junction in standing and sitting. Stands with trunk in a flexed position that mostly originates at thoracic spine. . Unable to get head over pelvis but is working with modifying his posture to open stance position with knees slightly bent to upright posture more easily. He is able to hold this posture for only short periods right now ~ 30 sec  Palpation: tender to calaneo-fib and talar-fib ligaments, but not too much at peroneal tendon. thoracic PA mob is rigid at ~ T10, T11   ROM:   Neck rotation R = 60 degrees, L = 58 degrees; , Flexion = 50 degrees; extension 20 degrees  AROM Right shoulder  Left shoulder   Forward elevation 100  Can place and hold at 105 scaption 120   Abduction     External rotation 20 25   Internal rotation Fn - unable to touch R buttock Fn: To L 5      PROM RIght shoulder  Left shoulder   Flexion 110 120     LUMBAR:  extension = negative ~ 10 degrees, thoracic extension = negative ~ 40 degrees (unable to get to neutral). Strength:    Right shoulder  Left shoulder   Forward elevation 4 in available ROM 4+ in available ROM   Abduction     External rotation 4+ 4+   Internal rotation     Horz ABD 4- 4+      Special Tests: Lumbar Protective Mechanism for trunk flexion = 2/5   Neurological Screen: n/t . Functional Mobility:  Sit to/from Stand:independent. Bed Mobility: n/t. Walking on level ground: ambulates with cane and trunk and neck in flexed position, wide base of support. Have worked with him with RW but pt prefers use of cane for easier transitions.    Balance:  n/t

## 2021-10-11 ENCOUNTER — HOSPITAL ENCOUNTER (OUTPATIENT)
Dept: PHYSICAL THERAPY | Age: 71
Discharge: HOME | End: 2021-10-11
Payer: MEDICARE

## 2021-10-11 PROCEDURE — 97140 MANUAL THERAPY 1/> REGIONS: CPT

## 2021-10-11 NOTE — PROGRESS NOTES
Karina Calvert  : 1950  [place] / Product Type: Medicare /  [address] at [facility] LUCIANA GAGE  [address] [place] [address], [address], [place] [address].  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 Frequency/Duration: 2 times a week for 60 and then 1 x per week for 30 Day(s) MEDICAL/REFERRING DIAGNOSIS:  neck and back pain   DATE OF ONSET: chronic  REFERRING PHYSICIAN: Maycol Loyd*  MD Orders:  Eval and treat, include modalities of soft tissue, dry needling, and traction  Return MD Appointment: NA       Pre-treatment Symptoms/Complaints: Patient reports that his ankle continues to be very sore. Reports compliance with HEP. Pain: Initial: Pain Intensity 1: 7    Post Session:  No rating at end of session   Medications Last Reviewed:  10/11/21  Observation:  Moderate R lateral ankle swelling. Still with decreased ankle ROM in all planes. TREATMENT:      Manual (25 min)  - for mobility - grade 2 to 4- physio mobs R ankle PF/DF/ INV and EV. Grade 2 to 4- subtalar mobilizations. HEP: continue current ankle HEP  MedBridge Portal    Treatment/Session Summary:    · Response to Treatment:  Patient continues to have pain in R ankle which prevents standing exercises. He was running late today so appointment was shorter than usual. Focused on ankle ROM with patient to do exercises at home. · Communication/Consultation:  None today  · Equipment provided today:  None today  · Recommendations/Intent for next treatment session: Next visit will continue focus on ankle rehab as a precursor to standing exercises for his back.      Total Treatment Billable Duration: 25 min  PT Patient Time In/Time Out  Time In: 1314  Time Out: [address] Kirti Adams, PT    Future Appointments   Date Time Provider Lorraine Coronel   10/14/2021  1:00 PM Susana Soni, PT [facility]   10/19/2021  2:30 PM Susana Soni, PT [facility]   10/21/2021  2:30 PM Susana Soni, PT [facility]   10/26/2021  1:15 PM Susana Soni, PT [facility]   10/28/2021  1:00 PM Susana Soni, PT [facility]   11/2/2021  1:00 PM Susana Soni, PT [facility]   11/4/2021  1:00 PM Susana Soni, PT [facility]   4/4/2022 11:45 AM Hilda Olvera MD [facility]   5/17/2022 11:20 AM Luna Landrum NP [facility]

## 2021-10-14 ENCOUNTER — HOSPITAL ENCOUNTER (OUTPATIENT)
Dept: PHYSICAL THERAPY | Age: 71
Discharge: HOME | End: 2021-10-14
Payer: MEDICARE

## 2021-10-14 NOTE — PROGRESS NOTES
Barbara Toro  : 1950  [place]  [place] [address] [place] [address] at [address], [address] [place] [address]  [phone]   [phone]      Pt not able to come today- going out of town.    Lyn Brewer, PT MSPT

## 2021-10-19 ENCOUNTER — HOSPITAL ENCOUNTER (OUTPATIENT)
Dept: PHYSICAL THERAPY | Age: 71
Discharge: HOME | End: 2021-10-19
Payer: MEDICARE

## 2021-10-19 PROCEDURE — 97140 MANUAL THERAPY 1/> REGIONS: CPT

## 2021-10-19 PROCEDURE — 97110 THERAPEUTIC EXERCISES: CPT

## 2021-10-19 PROCEDURE — 97035 APP MDLTY 1+ULTRASOUND EA 15: CPT

## 2021-10-19 NOTE — PROGRESS NOTES
Hollis Yamila  : 1950  [place] / Product Type: Medicare /  [address]  at [facility] LUCIANA GAGE  [address] [facility], [address], [place] [address].  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 Frequency/Duration: 2 times a week for 60 and then 1 x per week for 30 Day(s) MEDICAL/REFERRING DIAGNOSIS:  neck and back pain   DATE OF ONSET: chronic  REFERRING PHYSICIAN: Sarah Benavides*  MD Orders:  Eval and treat, include modalities of soft tissue, dry needling, and traction  Return MD Appointment: NA       Pre-treatment Symptoms/Complaints: Patient reports that his ankle continues to be bad. Pain: Initial:     6/10 Post Session:  No rating at end of session   Medications Last Reviewed:  10/11/21  Observation:  Continues to have decreased ankle ROM in all planes, especially DF and inv  Tender medial achilles tendon and hyper tender to lateral posterior ankle. TREATMENT:    Modalities: (8 min) 20% pulsed US to lateral posterior ankle at 1.0 depth and 1.5 w/cm2  Manual (15 min)  - for mobility - grade 2 to 4- physio mobs R ankle PF/DF/ INV and EV. Grade 2 to 4- subtalar mobilizations. Therapeutic Exercises: (20 min)  -- ankle circles with min A 10x  -- sitting ankle DF stretch  -- sitting ankle evr/inv x 10 ea  With heel on ground  -- sitting ankle PF x 10  -- Leuko taping for medial AT support and then kinesio taping with Y strip for AT   HEP: continue current ankle HEP  MedBridge Portal    Treatment/Session Summary:    · Response to Treatment: pt with improvement in ankle ROM and some improved tolerance to gait.   Pt encouraged to speak with MD who diagnosed ankle sprain to make sure this is not gout and obtain PT order for ankle. Pt also with possible Achilles tendon strain. · Communication/Consultation:  None today  · Equipment provided today:  None today  · Recommendations/Intent for next treatment session: Next visit will continue focus on ankle rehab as a precursor to standing exercises for his back.      Total Treatment Billable Duration: 43 min  PT Patient Time In/Time Out  Time In: 1440  Time Out: [address] Taylor PT    Future Appointments   Date Time Provider Lorraine Coronel   10/21/2021  2:30 PM Lydia Brice, PT [facility]   10/26/2021  1:15 PM Lydia Brice, PT [facility]   10/28/2021  1:00 PM Lydia Brice, PT [facility]   11/2/2021  1:00 PM Lydia Brice, PT [facility]   11/4/2021  1:00 PM Lydia Brice PT [facility]   4/4/2022 11:45 AM Adán Love MD [facility]   5/17/2022 11:20 AM BATSHEVA Morales PS[facility] PP

## 2021-10-21 ENCOUNTER — HOSPITAL ENCOUNTER (OUTPATIENT)
Dept: PHYSICAL THERAPY | Age: 71
Discharge: HOME | End: 2021-10-21
Payer: MEDICARE

## 2021-10-21 PROCEDURE — 97140 MANUAL THERAPY 1/> REGIONS: CPT

## 2021-10-21 PROCEDURE — 97110 THERAPEUTIC EXERCISES: CPT

## 2021-10-21 NOTE — PROGRESS NOTES
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 Frequency/Duration: 2 times a week for 60 and then 1 x per week for 30 Day(s) MEDICAL/REFERRING DIAGNOSIS:  neck and back pain   DATE OF ONSET: chronic  REFERRING PHYSICIAN: Camacho Maradiaga*  MD Orders:  Eval and treat, include modalities of soft tissue, dry needling, and traction  Return MD Appointment: NA       Pre-treatment Symptoms/Complaints: Patient reports that his ankle is about the same. Has some new big toe pain. Wanted to go back to MD to clear himself to make sure he does not have gout but not able to do it yesterday or today. Will focus on doing that tomorrow or soon. Pain: Initial:     No VAS, but states pain levels are high. Post Session:  No rating at end of session. Stated ankle felt better. Medications Last Reviewed:  10/21/21  Observation:  Tender to multiple places all around ankle. Seemed to walk with less gait antaligia then at last visit. TREATMENT:    Modalities: (0 min)not today  Manual (23 min)  - for mobility  - grade 2 to 4- physio mobs R ankle PF/DF/ INV and EV.   -- Grade 2 to 4- subtalar mobilizations.    -- talus posterior glide with pt ankle DF stretch  -- transverse friction massage to posterior tib fib ligament    Therapeutic Exercises: (15 min)  -- removal of Leuko and kinesio taping  -- sitting ankle DF stretch  x10  -- sitting ankle evr x 10 ea  With heel on ground and added blue band resist  -- sitting ankle PF x 10 and then PF + EVR x 10, PF x INV x 10 with blue band resist  --sitting ankle DF x 10 with blue band resist  HEP: continue current ankle HEP  MedBridge Portal    Treatment/Session Summary:    · Response to Treatment: pt with improvement in ankle ROM from previous session and showing some improvement in gait but continues with swelling lateral and medial ankle with reports of extreme pain. Instructed pt not to return to PT until gout diagnosis has been cleared. · Communication/Consultation:  None today  · Equipment provided today:  None today  · Recommendations/Intent for next treatment session: Next visit will continue focus on ankle rehab as a precursor to standing exercises for his back.      Total Treatment Billable Duration: 38 min  PT Patient Time In/Time Out  Time In: 1692  Time Out: [address] David Riggins, LASHELL    Future Appointments   Date Time Provider Lorraine Coronel   10/26/2021  1:15 PM Zepeda Ebbing, PT [facility]   10/28/2021  1:00 PM Zepeda Ebbing, PT [facility]   11/2/2021  1:00 PM Zepeda Ebbing, PT [facility]   11/4/2021  1:00 PM Zepeda Ebbing, PT [facility]   4/4/2022 11:45 AM Aristeo Antoine MD [facility]   5/17/2022 11:20 AM Fabi Cleveland NP [facility]

## 2021-10-26 ENCOUNTER — HOSPITAL ENCOUNTER (OUTPATIENT)
Dept: PHYSICAL THERAPY | Age: 71
Discharge: HOME | End: 2021-10-26
Payer: MEDICARE

## 2021-10-26 PROCEDURE — 97110 THERAPEUTIC EXERCISES: CPT

## 2021-10-26 PROCEDURE — 97161 PT EVAL LOW COMPLEX 20 MIN: CPT

## 2021-10-26 NOTE — PROGRESS NOTES
Zia Moore  : 1950  [place] / Product Type: Medicare /  [address] at [facility] LUCIANA GAGE  [address] [place] [address], [address], [place] [address].  [phone]   [phone]       OUTPATIENT PHYSICAL THERAPY: Daily Treatment Note 10/28/2021  Visit Count: 1  ICD-10: Treatment Diagnosis: M25.571R ankle pain,  M25.671 R ankle stiffness, M25.471 swelling of R ankle joint  Precautions/Allergies:   Bactrim [sulfamethoxazole-trimethoprim]   TREATMENT PLAN  Effective Dates: 10/26/2021 TO 2021 (30 days). Frequency/Duration: 2 times a week for 30 Day(s) MEDICAL/REFERRING DIAGNOSIS: Acute gout of R ankle, unspecified cause (M10.9), Swelling of ankle joint, right (M25.471)  DATE OF ONSET: 2021  REFERRING PHYSICIAN: Ghislaine Graves MD Orders: eval and treat  Return MD Appointment: unknown                Pre-treatment Symptoms/Complaints:  Difficulty walking. Pain in Lateral> medial ankle. Achilles tendon pain. Swelling and pain improved some after taking prednisone but only for 1 day. Pain: Initial:   6-8/10 Post Session:  Ankle felt better but no VAS today at session end   Medications Last Reviewed:  10/26/21    Updated Objective Findings:   See evaluation note from today     TREATMENT:   Therapeutic Exercises; (10 min)  -- ankle ROM:  Sitting ankle DF stretch  sitting ankle PF, sitting ankle inversion and eversion ~ 10x ea  -- ankle strengthening: seated yellow band ankle eversion and inversion and then standing on walker with weight shift front to back with R LE back. HEP: as above. Pt given hand out  [email or web address] Portal  Treatment/Session Summary:    · Response to Treatment:  pt able to walk out of the clinic with improved DF during R LE stance and improved gait.  .  · Communication/Consultation:  None today  · Equipment provided today:  None today  · Recommendations/Intent for next treatment session: Next visit will focus on progressing ankle strength and ROM as well as work towards more standing exercises. .Pt needs to be able to get back to walking.      Total Treatment Billable Duration:  10 min  PT Patient Time In/Time Out  Time In: 5596  Time Out: [address] [facility], PT    Future Appointments   Date Time Provider Lorraine Robbinsi   11/2/2021  1:00 PM Jason Fitzpatrick, PT [facility]   11/3/2021  1:00 PM Wesly Ray MD [facility]   11/4/2021  1:00 PM Jason Fitzpatrick, PT [facility]   4/4/2022 11:45 AM Rachna Cummings MD [facility]   5/17/2022 11:20 AM Venancio Crowell, BATSHEVA [facility] PP

## 2021-10-26 NOTE — THERAPY EVALUATION
Sean Later  : 1950  [place] / Product Type: Medicare /  [address] at [facility] LUCIANA GAGE  [address] [facility], [address] [place] [address],[address], [facility] [address].  [phone]   [phone]       OUTPATIENT PHYSICAL THERAPY:Initial Assessment 10/26/2021     ICD-10: Treatment Diagnosis: M25.571R ankle pain,  M25.671 R ankle stiffness, M25.471 swelling of R ankle joint  Precautions/Allergies:   Bactrim [sulfamethoxazole-trimethoprim]   TREATMENT PLAN  Effective Dates: 10/26/2021 TO 2021 (30 days). Frequency/Duration: 2 times a week for 30 Day(s) MEDICAL/REFERRING DIAGNOSIS: Acute gout of R ankle, unspecified cause (M10.9), Swelling of ankle joint, right (M25.471)  DATE OF ONSET: 2021  REFERRING PHYSICIAN: Roderick Nieto MD Orders: eval and treat  Return MD Appointment: unknown     INITIAL ASSESSMENT:  Sean Later presents with R ankle swelling with insidious onset- unknown cause. Pain, decreased ROM, and weakness of R ankle are limiting basic mobility, especially with walking. Pt has already been in PT for back condition and was progressing with ability to stand and walk but this episode of R ankle swelling has greatly hindered his progress. He will benefit from PT for guided rehab to promote normalized return of motion, strength, and function in order for safe return to walking which he needs for improving health. PROBLEM LIST (Impacting functional limitations):  1. Decreased spine extension mobility ( pt with forward bent posture). 2. Pain R ankle  3. Decreased ROM R ankle  4. Decreased functional strength R LE   5. Decreased gait and balance skills INTERVENTIONS PLANNED:  1. Modalities PRN, including ultrasound, estim, and iontophoresis  2. Soft tissue and joint mobilization for ROM and flexibility  3. Stretching, progressive resistive exercises and HEP for return to functional activities.    4. Back Education and Training for body mechanics with activities of daily living  5. If needed, aquatic therapy. GOALS: (Goals have been discussed and agreed upon with patient.)   Short-Term Functional Goals: Time Frame: 4 weeks  1. Report no more than minimal, intermittent 5/10 pain R ankle with basic walking. 2. R ankle PROM is greater than or equal to 5 degrees for ankle DF and 20 degrees for inversion. 3. R ankle eversion and PF 5/5 for improved stability with static and dynamic balance, walking, and progressing into strengthening phase. 4. Walking without limp on level surfaces and up stairs. Discharge Goals: Time Frame: 8 weeks  1. No significant pain R ankle  with all normalized daily home and work activities, and score greater than 55 on FAAM  2. Able to walk at least 30 minutes with min to no ankle pain with least AD. 3. Able to return to doing previous exercise program prior to this R ankle injury. 4. Independent with [facility] for advanced R strengthening. OUTCOME MEASURE:   Tool Used: PT/OT FOOT AND ANKLE ABILITY MEASURE  Score:  Initial: 40/84 Most Recent: X (Date: -- )   Interpretation of Score: For the \"Activities of Daily Living\", there are 21 questions each scored on a 5 point scale with 0 representing \"Unable to do\" and 4 representing \"No difficulty\". The lower the score, the greater the functional disability. 84/84 represents no disability. Minimal detectable change is 5.7 points. With the addition of the 8 questions in the \"Sports Subscale,\" there are 29 questions, each scored on a 5 point scale with 0 representing \"Unable to do\" and 4 representing \"No difficulty\". The lower the score, the greater the functional disability. 116/116 represents no disability. Minimal detectable change is 12.3 points. MEDICAL NECESSITY:   · Patient is expected to demonstrate progress in strength, range of motion and balance to improve safety during walking.   REASON FOR SERVICES/OTHER COMMENTS:  · Patient continues to require skilled intervention due to pain, stiff and weak ankle inhibiting ability to walk and affecting balance. .  Total Duration:       Rehabilitation Potential For Stated Goals: Good  Regarding Dorie Jovel therapy, I certify that the treatment plan above will be carried out by a therapist or under their direction. Thank you for this referral,    Howard Isaacs, PT       Referring Physician Signature: Ignacio Ramos            PAIN/SUBJECTIVE:   Initial:   6-8/10 Post Session:  No increase in pain   HISTORY:   History of Injury/Illness (Reason for Referral):  Pt was woke up with ankle pain and swelling morning after having been to watch a foot ball game. Does not recall tripping or ankle giving way. Pain is on lateral> medial ankle. Took round of prednisone which decreased pain and swelling but pain is back today. He has been cleared for gout currently and has had xray of ankle that was negative. He has PMHX of R TKR and has severe back degeneration with forward scoliotic curve. Past Medical History/Comorbidities:   Mr. Aaron Pedraza  has a past medical history of Adverse effect of anesthesia, Asthma (dx childhood), Atrial fibrillation ([address] Cervical spondylosis (6/20/2013), Chronic back pain (2013), Chronic pain, Colon polyps, COPD (chronic obstructive pulmonary disease) ([address] (8/17/2016), Coronary atherosclerosis of native coronary vessel (8/17/2016), Diabetes ([address] (dx 5/14), Diverticulosis, Edema (8/17/2016), Former smoker, GERD (gastroesophageal reflux disease), High cholesterol, History of kidney stones, Hypertension, Obesity (BMI 30-39.9), Status post total right knee replacement (7/17/2017), and Unspecified sleep apnea.     Mr. Aaron Pedraza  has a past surgical history that includes pr cystoscopy,insert ureteral stent; hx colonoscopy (10/6/14); hx colonoscopy (11/20/14); hx lumbar laminectomy (2009); hx retinal detachment repair (Bilateral); hx cataract removal; hx other surgical (20 yrs ago); hx total colectomy (2015); hx heart catheterization (2012); and hx knee replacement (Right). Social History/Living Environment:   Lives with wife. Prior Level of Function/Work/Activity:  Was having problems with standing and walking prior to this episode and being seen for neck pain. Had been progressing to longer periods of standing and walking. Personal Factors: Other factors that influence how disability is experienced by the patient:  Pt with severe back degeneration and scoliosis that makes walking difficult. Ambulatory/Rehab Services H2 Model Falls Risk Assessment   Risk Factors:       (1)  Gender [Male] Ability to Rise from Chair:       (1)  Pushes up, successful in one attempt   Falls Prevention Plan:       Physical Limitations to Exercise (specify):  will need supervision in standihng       Mobility Assistance Device (specify):  will need to stay with RW initially   Total: (5 or greater = High Risk): 2   ©2010 [facility] . [facility] Patent #[id number]. Federal Law prohibits the replication, distribution or use without written permission from [facility]   Current Medications:       Current Outpatient Medications:     glucose blood VI test strips (blood glucose test) strip, by Does Not Apply route See Admin Instructions. Ascenia Contour next test strips. Check FS every day, DM2, E11.9, Disp: 100 Strip, Rfl: 5    omega 3-dha-epa-fish oil (Fish OiL) 100-160-1,000 mg cap, Take  by mouth., Disp: , Rfl:     cholecalciferol, vitamin D3, (Vitamin D3) 50 mcg (2,000 unit) tab, Take  by mouth., Disp: , Rfl:     TURMERIC PO, Take  by mouth., Disp: , Rfl:     insulin glargine-lixisenatide (Soliqua 100/33) 100 unit-33 mcg/mL inpn, 60 units sub-q daily  Indications: type 2 diabetes mellitus, Disp: 3 mL, Rfl: 5    pantoprazole (PROTONIX) 40 mg tablet, Take 1 Tablet by mouth daily. , Disp: 90 Tablet, Rfl: 1    montelukast (SINGULAIR) 10 mg tablet, Take 1 Tablet by mouth nightly. Indications: controller medication for asthma, Disp: 90 Tablet, Rfl: 1    lisinopriL (PRINIVIL, ZESTRIL) 10 mg tablet, Take 1 Tablet by mouth daily. Indications: high blood pressure, Disp: 90 Tablet, Rfl: 1    ezetimibe (ZETIA) 10 mg tablet, Take 1 Tablet by mouth nightly. Indications: high cholesterol, Disp: 90 Tablet, Rfl: 1    doxazosin (CARDURA) 4 mg tablet, Take 1 Tablet by mouth daily. Indications: enlarged prostate with urination problem, Disp: 90 Tablet, Rfl: 1    celecoxib (CeleBREX) 200 mg capsule, Take 1 Capsule by mouth daily. Indications: joint damage causing pain and loss of function, Disp: 90 Capsule, Rfl: 1    atorvastatin (LIPITOR) 20 mg tablet, Take 1 Tablet by mouth nightly. Indications: high cholesterol, Disp: 90 Tablet, Rfl: 1    apixaban (Eliquis) 5 mg tablet, Take 1 Tablet by mouth two (2) times a day. Indications: treatment to prevent blood clots in chronic atrial fibrillation, Disp: 180 Tablet, Rfl: 1    dapagliflozin (Farxiga) 10 mg tab tablet, Take 1 Tablet by mouth daily. Indications: type 2 diabetes mellitus, Disp: 90 Tablet, Rfl: 1    fluticasone furoate-vilanteroL (Breo Ellipta) 200-25 mcg/dose inhaler, Take 1 Puff by inhalation daily. , Disp: 3 Inhaler, Rfl: 1    metoprolol tartrate (LOPRESSOR) 50 mg tablet, Take 1 Tablet by mouth two (2) times a day.  Indications: ventricular rate control in atrial fibrillation, Disp: 180 Tablet, Rfl: 1    lancets 28 gauge misc, Check fs every day, DM2, E11.9, Disp: 100 Lancet, Rfl: 1    Insulin Needles, Disposable, (BD Jocelynn 2nd Gen Pen Needle) 32 gauge x 5/32\" ndle, by Does Not Apply route., Disp: , Rfl:     acetaminophen (TYLENOL) 500 mg tablet, Take 500-1,000 mg by mouth every six (6) hours as needed for Pain., Disp: , Rfl:     diphenhydrAMINE (BENADRYL) 50 mg capsule, Take 100 mg by mouth nightly as needed for Sleep., Disp: , Rfl:     colesevelam (WELCHOL) 625 mg tablet, Take 625-3,750 mg by mouth as needed for Diarrhea. Patient taking 2 tabs following diarrheal bowel movement up to 3 times. Maximum daily dosage: 6 tabs, Disp: , Rfl:     fluticasone propionate (FLONASE ALLERGY RELIEF) 50 mcg/actuation nasal spray, 2 Sprays by Both Nostrils route daily as needed for Allergies. Indications: inflammation of the nose due to an allergy, Disp: , Rfl:     albuterol (PROVENTIL HFA, VENTOLIN HFA, PROAIR HFA) 90 mcg/actuation inhaler, Take 1 Puff by inhalation every four (4) hours as needed for Wheezing., Disp: 1 Inhaler, Rfl: 5    ANTI-FUNGAL 2 % topical powder, APPLY TO ABDOMEN 2 TIMES PER DAY, Disp: , Rfl: 11    Blood-Glucose Meter monitoring kit, Check fs every day; DM2, E11/9, Contour glucometer, Disp: 1 Kit, Rfl: 0    albuterol (PROVENTIL VENTOLIN) 2.5 mg /3 mL (0.083 %) nebulizer solution, 3 mL by Nebulization route once for 1 dose. (Patient taking differently: 2.5 mg by Nebulization route daily as needed for Wheezing. Patient states he hasn't used in many years), Disp: 24 Each, Rfl: 11   Date Last Reviewed:  10/26/21   Number of Personal Factors/Comorbidities that affect the Plan of Care: 1-2: MODERATE COMPLEXITY   EXAMINATION:     Observation/Orthostatic Postural Assessment:   Abnormalities: Ankle swelling noted to lateral> medial ankle with redness of skin. Standing alignment: Bent over posture with significant thoracic kyphosis   Walking: walks with step to R gait with RW.      Supine: unable to lie supine  Palpation: very tender to general ankle; point tender to R posterior tib-fib ligament, achilles tendon medial/ lateral and at distal deltoid ligament  -- stiff calcaneus medial/lateral glide and decreased talus posterior glide  ROM:   ANKLE Right ankle Left ankle   Dorsiflexion 0    Plantarflexion 20    Eversion 5    Inversion 10             Strength:   ANKLE Right ankle Left ankle   Dorsiflexion 5    Plantarflexion 3    Eversion 4    Inversion 4-                       Special Tests: Muscle Length Tests: R gastroc flexiblity = -3 degrees   Neurological Screen: [place] neuro screen is intact  Functional Mobility:  Sit to/from Stand: pushes off with BARRY hands  Bed Mobility: unable to lie supine but is independent with compensation for getting to positions he can tolerate. Body Structures Involved:  1. Joints  2. Muscles  3. Ligaments Body Functions Affected:  1. Movement Related Activities and Participation Affected:  1. General Tasks and Demands  2. Mobility  3. Domestic Life  4.  Community, Social and [place]   Number of elements (examined above) that affect the Plan of Care: 4+: HIGH COMPLEXITY   CLINICAL PRESENTATION:   Presentation: Evolving clinical presentation with changing clinical characteristics: MODERATE COMPLEXITY   CLINICAL DECISION MAKING:   Use of outcome tool(s) and clinical judgement create a POC that gives a: Questionable prediction of patient's progress: MODERATE COMPLEXITY

## 2021-10-28 ENCOUNTER — HOSPITAL ENCOUNTER (OUTPATIENT)
Dept: PHYSICAL THERAPY | Age: 71
Discharge: HOME | End: 2021-10-28
Payer: MEDICARE

## 2021-10-28 PROCEDURE — 97110 THERAPEUTIC EXERCISES: CPT

## 2021-10-28 PROCEDURE — 97140 MANUAL THERAPY 1/> REGIONS: CPT

## 2021-10-28 NOTE — PROGRESS NOTES
Clifford Sierra  : 1950  [place] / Product Type: Medicare /  [address] at [facility] [address]IANA GAGE  [address] [facility], [address], [place] [address].  [phone]   [phone]       OUTPATIENT PHYSICAL THERAPY: Daily Treatment Note 10/29/2021  Visit Count: 2             Pre-treatment Symptoms/Complaints:  Patient reports that his ankle feels better at times but not consistent. DId not find a place to put theraband for ankle strenthening. .  Pain: Initial:   6/10 Post Session:  No VAS but no reports of increase in pain. Medications Last Reviewed:  10/26/21  Updated Objective Findings:   Continues to use a step through gait. Juliann Ply too short for his height. Presents with metatarsal drop. TREATMENT:   Therapeutic Exercises; (30 min) for ankle ROM and improving quality of gait. Also adjusted walker to be higher during session. -- ankle ROM:    Sitting ankle DF stretch 15 sec x 10 , then with overpressure to talus   sitting ankle PF x 20 with hands on knees for MR  -- ankle strengthening: seated yellow band ankle eversion and inversion  10x 2 ea way  -- standing on walker with weight shift front to back with R LE back 10 x and then with towel under inner foot x 10  -- standing psoas stretch= 30 sec BARRY  -- walking practice ~ 20 ft x 3  Manual : (10 min)  -- gastroc stretching 10 sec x 10  -- calcaneus med/lateral glide  HEP: to do initial HEP + walking and psoas stretch  [facility] Portal  Treatment/Session Summary:    · Response to Treatment:  Able to step through onto L foot better. Assigned walking as part of HEP. · Communication/Consultation:  None today  · Equipment provided today:  None today  · Recommendations/Intent for next treatment session: Next visit will focus on progressing ankle strength and ROM as well as work towards more standing exercises. Start step ups and step down sideways . Pt needs to be able to get back to walking.      Total Treatment Billable Duration:  40 min  PT Patient Time In/Time Out  Time In: 1302  Time Out: Vel Rodríguez 2174, PT    Future Appointments   Date Time Provider Lorraine Coronel   11/2/2021  1:00 PM Kush Clark, PT [facility]   11/3/2021  1:00 PM Lesvia Ray MD [facility] POA   11/4/2021  1:00 PM Kush Clark, PT [facility]   4/4/2022 11:45 AM Yari Calvin MD [facility]   5/17/2022 11:20 AM Elijah English NP [facility]

## 2021-11-02 ENCOUNTER — HOSPITAL ENCOUNTER (OUTPATIENT)
Dept: PHYSICAL THERAPY | Age: 71
Discharge: HOME | End: 2021-11-02
Payer: MEDICARE

## 2021-11-02 NOTE — PROGRESS NOTES
Brad Govea  : 1950  [place]  Secondary: [address] at [address],  [address]  [phone]   [phone]      Pt did not come for today's session.     Cheikh Black, PT MSPT

## 2021-11-04 ENCOUNTER — HOSPITAL ENCOUNTER (OUTPATIENT)
Dept: PHYSICAL THERAPY | Age: 71
Discharge: HOME | End: 2021-11-04
Payer: MEDICARE

## 2021-11-09 ENCOUNTER — HOSPITAL ENCOUNTER (OUTPATIENT)
Dept: PHYSICAL THERAPY | Age: 71
Discharge: HOME | End: 2021-11-09
Payer: MEDICARE

## 2021-11-09 PROCEDURE — 97140 MANUAL THERAPY 1/> REGIONS: CPT

## 2021-11-09 PROCEDURE — 97110 THERAPEUTIC EXERCISES: CPT

## 2021-11-09 NOTE — PROGRESS NOTES
Sean Later  : 1950  [place]  [place] [address] [facility] at [facility] LUCIANA GAGE  [address] [facility], [address] [place] [address],[address], [place] [address].  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 Frequency/Duration:1- 2 times a week for 60 days. MEDICAL/REFERRING DIAGNOSIS:  neck and back pain   DATE OF ONSET: chronic   REFERRING PHYSICIAN: Kye GIBBONS MD Orders: Eval and treat, include modalities of soft tissue, dry needling, and traction  Return MD Appointment: NA     ASSESSMENT 21):  Mr. John Hager had made progress with neck pain and ability to walk further distances and stand longer periods but last week was diagnosed with a ankle hairline fracture and after having had weeks of ankle swelling and initial xray not being diagnosed with this. He was placed in a CAM boot last week. He states his ankle pain is significantly better but he is not walking or being active because of having the CAM boot and ankle injury. This has caused decline in his progress in standing and walking as noted in the decrease in his functional score of NDI. So we are now working on doing core and cardio work in sitting and standing (as able) to hopefully keep him gaining on function and so that he is not too debilitated once boot comes off. Pt was walking with cane but now has to use RW. He also has shoulder dysfunction and pain with motion which also affects what activities we can choose for him. He has severe loss of motion in R >L shoulder with previous diagnosis of ABRRY RTC tear.   He would benefit from ongoing PT to continue to improve neck and thoracic AROM and improve BARRY shoulder mobility as he uses his hand and arms to stand at times and to do exercises for neck and posture. PROBLEM LIST (Impacting functional limitations):  1. Decreased Strength  2. Decreased Ambulation Ability/Technique  3. Increased Pain  4. Decreased Flexibility/Joint Mobility   5. Decreased posture  INTERVENTIONS PLANNED: (Treatment may consist of any combination of the following)  1. Manual Therapy  2. Therapeutic Exercise/Strengthening   3. Aquatic therapy   4. Modalities as needed for pain  5. Therapeutic activities      GOALS: (Goals have been discussed and agreed upon with patient.)  Short-Term Functional Goals: Time Frame: 30 days  1. Pt will increase cervical ROM to 50 deg bilateral for improved movement for driving. MET 8/5/21  2. Pt will be able to lie on his side or back for at least 30 minutes for decreased pain for sleeping in bed. ONGOING 6/10/21  3. Pt will be able to demonstrate neutral spine in sitting for improved posture. Progressed 8/5/21 - unable to get cervical spine better then -25 degrees to neutral, or thoracic spine to extend beyond - 20 degrees. 4. Pt will be independent with HEP. MET 6/10/21  Discharge Goals: Time Frame: 90 days  1. Pt will increase cervical ROM to 60 deg bilateral for improved mobility. MET but not consistent to the L. 9/2/21  2. Pt will report he is able to sleep in the bed with no increase in pain for improved sleep. ONGOING 9/2/21  3. Pt will increase LE strength to 5/5 for improved gait. Regression 11/9/21  4. Pt will demonstrate neutral spine in standing with no verbal cueing. Regression 11/9/21  5. Pt will be able to ambulate with upright trunk with least resistive assistive device for decreased stress on his neck during gait. Regression 11/9/21  New DIscharge Goals  1. BARRY shoulder ER AROM at least 25 degrees at 45 ABD for improved use with activity. 2.   Independent with discharge level HEP.      OUTCOME MEASURE:   Tool Used: Neck Disability Index (NDI)  Score:  Initial: 11/50 8/50 (Date: 8/5/21 )     11/50 9/2/21 Most Recent: 17 11/9/21   Interpretation of Score: The Neck Disability Index is a revised form of the Oswestry Low Back Pain Index and is designed to measure the activities of daily living in person's with neck pain. Each section is scored on a 0-5 scale, 5 representing the greatest disability. The scores of each section are added together for a total score of 50. MEDICAL NECESSITY:   · Patient is expected to demonstrate progress in strength and posture to improve mobility for ADLs. REASON FOR SERVICES/OTHER COMMENTS:  · Patient continues to require skilled intervention due to decreased ROM, decreased posture and gait that is limiting his ability to perform ADLs. .  Total Duration: 50 minutes  PT Patient Time In/Time Out  Time In: 1403  Time Out: 1450    Rehabilitation Potential For Stated Goals: Good to fair    Regarding [place] therapy, I certify that the treatment plan above will be carried out by a therapist or under their direction. Thank you for this referral,  Rosalva Memory, PT     Referring Physician Signature: Hortencia JENKINS* _______________________________ Date _____________                 PAIN/SUBJECTIVE:   Initial:   3-4/10 Post Session:  3/10   HISTORY:      Ambulatory/Rehab Services H2 Model Falls Risk Assessment   Risk Factors:       (1)  Gender [Male] Ability to Rise from Chair:       (1)  Pushes up, successful in one attempt   Falls Prevention Plan:       No modifications necessary   Total: (5 or greater = High Risk): 2   ©2010 [facility]. [facility] Patent #[id number]. Federal Law prohibits the replication, distribution or use without written permission from [facility] [employer]   Current Medications:       Current Outpatient Medications:     meloxicam (MOBIC) 15 mg tablet, Take 1 Tablet by mouth daily. , Disp: 30 Tablet, Rfl: 1    glucose blood VI test strips (blood glucose test) strip, by Does Not Apply route See Admin Instructions. Ascenia Contour next test strips. Check FS every day, DM2, E11.9, Disp: 100 Strip, Rfl: 5    omega 3-dha-epa-fish oil (Fish OiL) 100-160-1,000 mg cap, Take  by mouth., Disp: , Rfl:     cholecalciferol, vitamin D3, (Vitamin D3) 50 mcg (2,000 unit) tab, Take  by mouth., Disp: , Rfl:     TURMERIC PO, Take  by mouth., Disp: , Rfl:     insulin glargine-lixisenatide (Soliqua 100/33) 100 unit-33 mcg/mL inpn, 60 units sub-q daily  Indications: type 2 diabetes mellitus, Disp: 3 mL, Rfl: 5    pantoprazole (PROTONIX) 40 mg tablet, Take 1 Tablet by mouth daily. , Disp: 90 Tablet, Rfl: 1    montelukast (SINGULAIR) 10 mg tablet, Take 1 Tablet by mouth nightly. Indications: controller medication for asthma, Disp: 90 Tablet, Rfl: 1    lisinopriL (PRINIVIL, ZESTRIL) 10 mg tablet, Take 1 Tablet by mouth daily. Indications: high blood pressure, Disp: 90 Tablet, Rfl: 1    ezetimibe (ZETIA) 10 mg tablet, Take 1 Tablet by mouth nightly. Indications: high cholesterol, Disp: 90 Tablet, Rfl: 1    doxazosin (CARDURA) 4 mg tablet, Take 1 Tablet by mouth daily. Indications: enlarged prostate with urination problem, Disp: 90 Tablet, Rfl: 1    celecoxib (CeleBREX) 200 mg capsule, Take 1 Capsule by mouth daily. Indications: joint damage causing pain and loss of function, Disp: 90 Capsule, Rfl: 1    atorvastatin (LIPITOR) 20 mg tablet, Take 1 Tablet by mouth nightly. Indications: high cholesterol, Disp: 90 Tablet, Rfl: 1    apixaban (Eliquis) 5 mg tablet, Take 1 Tablet by mouth two (2) times a day. Indications: treatment to prevent blood clots in chronic atrial fibrillation, Disp: 180 Tablet, Rfl: 1    dapagliflozin (Farxiga) 10 mg tab tablet, Take 1 Tablet by mouth daily. Indications: type 2 diabetes mellitus, Disp: 90 Tablet, Rfl: 1    fluticasone furoate-vilanteroL (Breo Ellipta) 200-25 mcg/dose inhaler, Take 1 Puff by inhalation daily. , Disp: 3 Inhaler, Rfl: 1    metoprolol tartrate (LOPRESSOR) 50 mg tablet, Take 1 Tablet by mouth two (2) times a day. Indications: ventricular rate control in atrial fibrillation, Disp: 180 Tablet, Rfl: 1    lancets 28 gauge misc, Check fs every day, DM2, E11.9, Disp: 100 Lancet, Rfl: 1    Insulin Needles, Disposable, (BD Jocelynn 2nd Gen Pen Needle) 32 gauge x 5/32\" ndle, by Does Not Apply route., Disp: , Rfl:     acetaminophen (TYLENOL) 500 mg tablet, Take 500-1,000 mg by mouth every six (6) hours as needed for Pain., Disp: , Rfl:     diphenhydrAMINE (BENADRYL) 50 mg capsule, Take 100 mg by mouth nightly as needed for Sleep., Disp: , Rfl:     colesevelam (WELCHOL) 625 mg tablet, Take 625-3,750 mg by mouth as needed for Diarrhea. Patient taking 2 tabs following diarrheal bowel movement up to 3 times. Maximum daily dosage: 6 tabs, Disp: , Rfl:     fluticasone propionate (FLONASE ALLERGY RELIEF) 50 mcg/actuation nasal spray, 2 Sprays by Both Nostrils route daily as needed for Allergies. Indications: inflammation of the nose due to an allergy, Disp: , Rfl:     albuterol (PROVENTIL HFA, VENTOLIN HFA, PROAIR HFA) 90 mcg/actuation inhaler, Take 1 Puff by inhalation every four (4) hours as needed for Wheezing., Disp: 1 Inhaler, Rfl: 5    ANTI-FUNGAL 2 % topical powder, APPLY TO ABDOMEN 2 TIMES PER DAY, Disp: , Rfl: 11    Blood-Glucose Meter monitoring kit, Check fs every day; DM2, E11/9, Contour glucometer, Disp: 1 Kit, Rfl: 0    albuterol (PROVENTIL VENTOLIN) 2.5 mg /3 mL (0.083 %) nebulizer solution, 3 mL by Nebulization route once for 1 dose. (Patient taking differently: 2.5 mg by Nebulization route daily as needed for Wheezing. Patient states he hasn't used in many years), Disp: 24 Each, Rfl: 11   Date Last Reviewed:  9/2/21   EXAMINATION:   Observation/Orthostatic Postural Assessment: kyphosis at cervical/thoracic junction in standing and sitting.  Stands with trunk in a flexed position that mostly originates at thoracic spine. . Unable to get head over pelvis but is working with modifying his posture to open stance position with knees slightly bent to upright posture more easily. He is able to hold this posture for only short periods  Palpation: thoracic PA mob is rigid at ~ T10, T11   ROM:   Neck rotation R = 60 degrees, L = 58 degrees-- after stretching  AROM Right shoulder  Left shoulder   Forward elevation 100  Can place and hold at 105 scaption 105 (decreased by 15 degrees)   Abduction     External rotation 15  20   Internal rotation Fn - unable to touch R buttock Fn: To L 5      PROM RIght shoulder  Left shoulder   Flexion 110 120     LUMBAR:  extension = negative ~ 10 degrees, thoracic extension = negative ~ 40 degrees (unable to get to neutral). Strength:    Right shoulder  Left shoulder   Forward elevation 4 in available ROM 4+ in available ROM   Abduction     External rotation 4+ 4+   Internal rotation     Horz ABD 4 4+      Special Tests: Lumbar Protective Mechanism for trunk flexion = 2/5   Neurological Screen: n/t . Functional Mobility:  Sit to/from Stand:independent. Bed Mobility: n/t. Walking on level ground: ambulates with RW and CAM boot on R LE and trunk and neck in flexed position, wide base of support.    Balance:  n/t

## 2021-11-09 NOTE — PROGRESS NOTES
Gabby Valentine  : 1950  [place] / Product Type: Medicare /  [address]  at [facility] LUCIANA GAGE  [address] [facility], [address], [place] [address].  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 Frequency/Duration: 1-2 times a week for 60 days MEDICAL/REFERRING DIAGNOSIS:  neck and back pain   DATE OF ONSET: chronic  REFERRING PHYSICIAN: Vance Bonner*  MD Orders:  Eval and treat, include modalities of soft tissue, dry needling, and traction  Return MD Appointment: NA       Pre-treatment Symptoms/Complaints: Patient reports that his ankle is feeling much better since a boot was placed on his LE by Dr. Claudia Kim. He was placed in boot on 21. He is to stay in boot x 6 weeks. He states that his stamina is going down hill since not being able to go to pool and do exercises and he is worried about his overall worsening condition. He is having difficulty with sleep for a variety of other reasons but not because of ankle pain. Pain: Initial:      Post Session:  No rating at end of session. Stated ankle felt better. Medications Last Reviewed:  21  Observation:  Pt arrives with CAM boot on. See recertification. TREATMENT:    Modalities: (0 min)not today  Manual (8 min)  - for mobility  -- sitting and standing thoracic extension PA  -- neck distraction and posterior neck  myofascial release   Therapeutic Exercises: (30 min) for improved trunk motion, strength and cardio. did 30 sec reps with 30 sec breaks for cardio with some exercises.   -- standing up stretching   -- shoulder row in standing 30 sec   -- shoulder flexion alternating 30 sec   -- shoulder horz ABD 30 sec   -- alternating LAQ 30 sec   -- push out + with blue band x 30 sec  -- trunk rotation with unilateral LAQ in sitting - 15x ea way  HEP: continue current ankle HEP   MedBridge Portal    Treatment/Session Summary:    · Response to Treatment: pt is unable to move around as much with CAM boot on and benefits from doing some gentle cardio exercises with caution to BARRY shoulder dysfunction/arthritis/weakness and working on posture. · Communication/Consultation:  None today  · Equipment provided today:  None today  · Recommendations/Intent for next treatment session: Next visit will continue focus on gentle cardio with continued spine and neck stretch and strengthening.      Total Treatment Billable Duration: 38 min  PT Patient Time In/Time Out  Time In: 1125  Time Out: [address] [place]    Future Appointments   Date Time Provider Lorraine Coronel   11/11/2021  1:45 PM [facility]   12/15/2021  2:40 PM Richi Ray MD POAG POA   4/4/2022 11:45 AM Mario Brown MD SSA UCDG UCD   5/17/2022 11:20 AM Nidia Zabala NP SSA PSCD PP

## 2021-11-11 ENCOUNTER — HOSPITAL ENCOUNTER (OUTPATIENT)
Dept: PHYSICAL THERAPY | Age: 71
Discharge: HOME | End: 2021-11-11
Payer: MEDICARE

## 2021-11-11 PROCEDURE — 97110 THERAPEUTIC EXERCISES: CPT

## 2021-11-11 PROCEDURE — 97140 MANUAL THERAPY 1/> REGIONS: CPT

## 2021-11-11 NOTE — PROGRESS NOTES
Clifford Sierra  : 1950  [place] / Product Type: Medicare /  [address] at [facility] LUCIANA GAGE  [address] [place] [address], [address], [place] [address].  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 Frequency/Duration: 1-2 times a week for 60 days MEDICAL/REFERRING DIAGNOSIS:  neck and back pain   DATE OF ONSET: chronic  REFERRING PHYSICIAN: Junior Manzano  MD Orders:  Eval and treat, include modalities of soft tissue, dry needling, and traction  Return MD Appointment: NA       Pre-treatment Symptoms/Complaints: arm did ok with some soreness after last session. Pain: Initial:      Post Session:  No rating at end of session. Stated ankle felt better. Medications Last Reviewed:  21  Observation: no new updates   TREATMENT:    Modalities: (0 min)not today  Manual (15 min)  - for mobility  -- standing thoracic extension PA  -- C7/T1 mob with motion for cervical rotaiton   Therapeutic Exercises: (20 min)  -- standing up stretching   -- BARRY shoulder AAROM for flexion and then AROM for ER in neutral 10x ea  -- trunk rotation with unilateral LAQ in sitting - 15x ea way  -- neck rotation with overpressure 10x ea way  HEP: continue current ankle HEP   MedBridge Portal    Treatment/Session Summary:    · Response to Treatment: today ended up focusing on shoulder stretching for pt to focus on for HEP since his shoulder AROM has decreased since he has had ankle injury.     · Communication/Consultation:  None today  · Equipment provided today:  None today  · Recommendations/Intent for next treatment session: Next visit will continue focus on gentle Wife Chet Hermosillo.     Electronically signed by Kimberly Mata RN on 3/19/2021 at 12:17 PM cardio with continued spine and neck stretch and strengthening.      Total Treatment Billable Duration: 35 min  PT Patient Time In/Time Out  Time In: 1350  Time Out: [address] FAJARDO, PT    Future Appointments   Date Time Provider Lorraine Berna   12/15/2021  2:40 PM Arvind Lindsey MD [facility]   4/4/2022 11:45 AM Adán Love MD [facility] UCD   5/17/2022 11:20 AM Deepti Vernon NP [facility] PSCD PP

## 2021-11-16 ENCOUNTER — HOSPITAL ENCOUNTER (OUTPATIENT)
Dept: PHYSICAL THERAPY | Age: 71
Discharge: HOME | End: 2021-11-16
Payer: MEDICARE

## 2021-11-16 PROCEDURE — 97110 THERAPEUTIC EXERCISES: CPT

## 2021-11-16 PROCEDURE — 97140 MANUAL THERAPY 1/> REGIONS: CPT

## 2021-11-16 NOTE — PROGRESS NOTES
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 Frequency/Duration: 1-2 times a week for 60 days MEDICAL/REFERRING DIAGNOSIS:  neck and back pain   DATE OF ONSET: chronic  REFERRING PHYSICIAN: Camacho Maradiaga*  MD Orders:  Eval and treat, include modalities of soft tissue, dry needling, and traction  Return MD Appointment: NA       Pre-treatment Symptoms/Complaints: Arms have done OK with exercises we have done. Will start with new PCP this Thursday. Pain: Initial:      Post Session:  No rating at end of session. Stated ankle felt better.     Medications Last Reviewed:  21  Observation: no new updates   TREATMENT:    Modalities: (0 min)not today  Manual (15 min)  - for mobility  -- sitting and standing thoracic extension PA  -- cervical manual traction  --  myofascial release posterior cervical muslces   Therapeutic Exercises: (25 min) to improve core strength and endurance, shoulder ROM and strength  MR= manual resistance  -- UBE 3.0 x 6 min  -- BARRY shoulder AAROM for flexion 10x   -- 30 sec on, 30 sec off: ROW, alternate shoulder flexion 3#, alternating LAQ, stand up/sit down, HORZ ABD then ROW again ~ 6 to 7 min  -- trunk rotation with unilateral LAQ in sitting - 15x ea way  -- neck rotation with overpressure 10x ea way  -- MR to neck extension and then added MR to shoulder HORZ ABD- 1 arm at a timex 2 reps  -- Lumbar Protective Mechanism work for trunk flexion in sitting with sustained isotonics  HEP: continue current ankle HEP   MedBridge Portal    Treatment/Session Summary:    · Response to Treatment: good return demonstration of exercises for improved endurance and cardio. Pt picking up some weights today to do these at home. · Communication/Consultation:  None today  · Equipment provided today:  None today  · Recommendations/Intent for next treatment session: Next visit will continue focus on gentle cardio with continued spine and neck stretch and strengthening.      Total Treatment Billable Duration: 40 min  PT Patient Time In/Time Out  Time In: 0978  Time Out: [address] [facility]    Future Appointments   Date Time Provider Lorraine Coronel   11/18/2021 11:15 AM Onofre Olguin, PT VIOLET [facility]   11/30/2021  1:45 PM LASHELL Garcia [facility]   12/15/2021  2:40 PM Adriana Ray MD POAG POA   4/4/2022 11:45 AM MD ELVI Stinson UCD   5/17/2022 11:20 AM BATSHEVA Henao PSCD PP

## 2021-11-18 ENCOUNTER — HOSPITAL ENCOUNTER (OUTPATIENT)
Dept: PHYSICAL THERAPY | Age: 71
Discharge: HOME | End: 2021-11-18
Payer: MEDICARE

## 2021-11-18 PROCEDURE — 97140 MANUAL THERAPY 1/> REGIONS: CPT

## 2021-11-18 PROCEDURE — 97110 THERAPEUTIC EXERCISES: CPT

## 2021-11-18 NOTE — PROGRESS NOTES
Héctor Morrison  : 1950  [place] / Product Type: Medicare /  [address] at [facility] LUCIANA GAGE  [address] [facility], [address], [place] [address].  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 Frequency/Duration: 1-2 times a week for 60 days MEDICAL/REFERRING DIAGNOSIS:  neck and back pain   DATE OF ONSET: chronic  REFERRING PHYSICIAN: Quintin Styles*  MD Orders:  Eval and treat, include modalities of soft tissue, dry needling, and traction  Return MD Appointment: NA       Pre-treatment Symptoms/Complaints: neck some sore today but ok today.    Pain: Initial:     0/10 Post Session:  0/10   Medications Last Reviewed:  21  Observation: R shoulder scaption AROM = 115, L = 125   TREATMENT:    Modalities: (0 min)not today  Manual (15 min)  - for mobility  -- sitting and standing thoracic extension PA  -- cervical manual traction  --  myofascial release posterior cervical muslces   Therapeutic Exercises: (25 min) to improve core strength and endurance, shoulder ROM and strength  MR= manual resistance  -- UBE 3.0 x 6 min  -- 30 sec on, 30 sec off: ROW 3#, alternate shoulder flexion 3#, alternating LAQ with trunk rotation and UE 3# punch out , stand up/sit down, HORZ ABD then repeated all exercises~ 10 min  -- BARRY shoulder AAROM for flexion 10x   -- trunk rotation with unilateral LAQ in sitting - 15x ea way  -- neck rotation with overpressure 10x ea way  -- MR to neck extension with sustained holds 3 reps and then added MR to ER  and shoulder HORZ ABD- 1 arm at a time 5  Reps ea for ea exercised  -- Lumbar Protective Mechanism work for trunk flexion in sitting with sustained isotonics  HEP: continue current ankle HEP   MedBridge Portal    Treatment/Session Summary:    · Response to Treatment: good return demonstration of exercises for improved endurance and cardio. Pt picking up some weights today to do these at home. · Communication/Consultation:  None today  · Equipment provided today:  None today  · Recommendations/Intent for next treatment session: Next visit will continue focus on gentle cardio with continued spine and neck stretch and strengthening.      Total Treatment Billable Duration: 40 min  PT Patient Time In/Time Out  Time In: 1115  Time Out: [address] [facility], PT    Future Appointments   Date Time Provider Lorraine Coronel   12/2/2021  1:45 PM Shayla Norris, PT [facility]   12/7/2021  1:45 PM Shayla Norris, PT [facility]   12/9/2021  2:30 PM Shayla Norris, PT [facility]   12/14/2021  2:00 PM Shayla Norris, PT [facility]   12/15/2021  2:40 PM Tasia Ray MD POAG POA   12/16/2021  1:45 PM Shayla Norris, PT [facility]   12/21/2021  1:45 PM Shayla Norris, PT [facility]   12/23/2021  2:30 PM Shayla Norris, PT [facility]   12/28/2021  2:00 PM Shayla Norris, PT [facility]   12/30/2021  1:45 PM Shayla Norris, PT [facility]   4/4/2022 11:45 AM Giovanny Ramires MD [facility] UCD   5/17/2022 11:20 AM Randal Spring NP SSA PSCD PP

## 2021-11-23 ENCOUNTER — HOSPITAL ENCOUNTER (OUTPATIENT)
Dept: PHYSICAL THERAPY | Age: 71
Discharge: HOME | End: 2021-11-23
Payer: MEDICARE

## 2021-11-23 NOTE — PROGRESS NOTES
Romulo Recinos  : 1950  [place]  Secondary: [address],  [place].  [phone]   [phone]      Pt cancelled today's session.      Rachel Anaya, PT MSPT

## 2021-11-30 ENCOUNTER — HOSPITAL ENCOUNTER (OUTPATIENT)
Dept: PHYSICAL THERAPY | Age: 71
Discharge: HOME | End: 2021-11-30
Payer: MEDICARE

## 2021-11-30 PROCEDURE — 97140 MANUAL THERAPY 1/> REGIONS: CPT

## 2021-11-30 PROCEDURE — 97110 THERAPEUTIC EXERCISES: CPT

## 2021-11-30 NOTE — PROGRESS NOTES
Hollis Driscoll  : 1950  [place] / Product Type: Medicare /  [address]  at [facility] LUCIANA GAGE  1101 [facility], [address] [place] [address],[address], [address].  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 Frequency/Duration: 1-2 times a week for 60 days MEDICAL/REFERRING DIAGNOSIS:  neck and back pain   DATE OF ONSET: chronic  REFERRING PHYSICIAN: Sarah Benavides*  MD Orders:  Eval and treat, include modalities of soft tissue, dry needling, and traction  Return MD Appointment: NA       Pre-treatment Symptoms/Complaints: neck has been doing fair. Mild pain today.    Pain: Initial:     No VAS Post Session:  No VAS   Medications Last Reviewed:  21  Observation: no updates   TREATMENT:    Modalities: (0 min)not today  Manual (10 min)  - for mobility  -- sitting and standing thoracic extension PA  -- cervical manual traction  --  myofascial release posterior cervical muslces   Therapeutic Exercises: (28 min) to improve core strength and endurance, shoulder ROM and strength  MR= manual resistance  -- trunk rotation with unilateral LAQ in sitting - 15x ea way  -- 30 sec on, 30 sec off: ROW 3#, alternate shoulder flexion 3#, alternating LAQ with trunk rotation and UE 3# punch out , stand up/sit down, HORZ ABD then repeated all exercises 1 round and then did 3rd round of just UE exercises-- 13  Min total  -- BARRY shoulder AAROM for flexion 10x  -- UBE 3.0 x 6 min  -- neck rotation with overpressure 10x ea way  HEP: continue current ankle HEP   MedBridge Portal    Treatment/Session Summary:    · Response to Treatment: pt able to do more reps without fatiguing as easily. · Communication/Consultation:  None today  · Equipment provided today:  None today  · Recommendations/Intent for next treatment session: Next visit will continue focus on gentle cardio with continued spine and neck stretch and strengthening.      Total Treatment Billable Duration: 38 min  PT Patient Time In/Time Out  Time In: 1353  Time Out: [address] FAJARDO, PT    Future Appointments   Date Time Provider Lorraine Berna   12/2/2021  1:45 PM Susana Soni, PT [facility]   12/7/2021  1:45 PM Susana Soni, PT [facility]   12/9/2021  2:30 PM Susana Soni, PT [facility]   12/14/2021  2:00 PM Susana Soni, PT [facility]   12/15/2021  2:40 PM Donna Ray MD [facility]   12/16/2021  1:45 PM Susana Soni, PT [facility]   12/21/2021  1:45 PM Susana Soni, PT [facility]   12/23/2021  2:30 PM Susana Soni, PT [facility]   12/28/2021  2:00 PM Susana Soni, PT [facility]   12/30/2021  1:45 PM Susana Soni, PT [facility]   4/4/2022 11:45 AM Hilda Olvera MD [facility]   5/17/2022 11:20 AM BATSHEVA Haile PSCD PP

## 2021-12-02 ENCOUNTER — HOSPITAL ENCOUNTER (OUTPATIENT)
Dept: PHYSICAL THERAPY | Age: 71
Discharge: HOME | End: 2021-12-02
Payer: MEDICARE

## 2021-12-02 NOTE — PROGRESS NOTES
Tierra Pugh  : 1950  [place]  [place] [address] [place] [address] at [address],  [address]  [phone]   [phone]      Pt cancelled today's session- not feeling well.     Jo Ann Guzman, PT MSPT

## 2021-12-07 ENCOUNTER — HOSPITAL ENCOUNTER (OUTPATIENT)
Dept: PHYSICAL THERAPY | Age: 71
Discharge: HOME | End: 2021-12-07
Payer: MEDICARE

## 2021-12-07 NOTE — PROGRESS NOTES
Rishi Washington  : 1950  [place]  Secondary: [address] at [address], [address] [place] [address]  [phone]   [phone]      Pt cancelled today's session- still not feeling well.     Gris Beauchamp, PT MSPT

## 2021-12-09 ENCOUNTER — HOSPITAL ENCOUNTER (OUTPATIENT)
Dept: PHYSICAL THERAPY | Age: 71
Discharge: HOME | End: 2021-12-09
Payer: MEDICARE

## 2021-12-09 NOTE — PROGRESS NOTES
Meenakshi Little  : 1950  [place]  Secondary: [address] at [address],  [address]  [phone]   [phone]      Pt cancelled today's session- still not feeling well.     Mathew Andino, PT MSPT

## 2021-12-14 ENCOUNTER — HOSPITAL ENCOUNTER (OUTPATIENT)
Dept: PHYSICAL THERAPY | Age: 71
Discharge: HOME | End: 2021-12-14
Payer: MEDICARE

## 2021-12-14 PROCEDURE — 97110 THERAPEUTIC EXERCISES: CPT

## 2021-12-14 PROCEDURE — 97140 MANUAL THERAPY 1/> REGIONS: CPT

## 2021-12-14 NOTE — PROGRESS NOTES
Art Situ  : 1950  [place] / Product Type: Medicare /  [address] at [facility] LUCIANA GAGE  [facility], [address], [place] [address].  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 Frequency/Duration: 1-2 times a week for 60 days MEDICAL/REFERRING DIAGNOSIS:  neck and back pain   DATE OF ONSET: chronic  REFERRING PHYSICIAN: Camacho Maradiaga*  MD Orders:  Eval and treat, include modalities of soft tissue, dry needling, and traction  Return MD Appointment: NA       Pre-treatment Symptoms/Complaints: pt unable to come to PT the last 2 weeks because of bronchiitis. Will be going to MD to hopefully get boot removed tomorrow.     Pain: Initial:     No VAS Post Session:  No VAS   Medications Last Reviewed:  21 Pt has been on round of antibiotics  Observation: walks into clinic with cane in L hand   TREATMENT:    Modalities: (0 min)not today  Manual (10 min)  - for mobility  -- sitting  thoracic extension PA   --  myofascial release posterior cervical muscles and upper trap   Therapeutic Exercises: (30 min) to improve core strength and endurance, shoulder ROM and strength  MR= manual resistance  -- -- UBE 3.0 x 6 min  -- shoulder flexion and ABD to HORZ ABD assisted stretching 5x ea- BARRY  -- trunk rotation 15x ea way  -- trunk rotation with unilateral LAQ in sitting - 15x ea way  -- 30 sec on, 30 sec off: ROW 3#, alternate shoulder flexion 3#, alternating LAQ with trunk rotation and UE 3# punch out , stand up/sit down, HORZ ABD then repeated all exercises 1 round- 10  Min total  -- neck rotation with overpressure 5x ea way  HEP: continue current ankle HEP   MedBridge Portal    Treatment/Session Summary:    · Response to Treatment: Pt returns after being away with an illness. Able to do at least most of his previous HEP. Hoping to return to standing exercise if boot is removed. · Communication/Consultation:  None today  · Equipment provided today:  None today  · Recommendations/Intent for next treatment session: Next visit will continue focus on gentle cardio with continued spine and neck stretch and strengthening.      Total Treatment Billable Duration: 40 min  PT Patient Time In/Time Out  Time In: 4666  Time Out: 3215 [facility], PT    Future Appointments   Date Time Provider Lorraine Coronel   12/15/2021  2:40 PM Peter Maddox MD [facility]   12/16/2021  1:45 PM Lang Chan, PT [facility]   12/21/2021  1:45 PM Langgerardo Chan, PT [facility]   12/23/2021  2:30 PM Langgerardo Chan, PT [facility]   12/28/2021  2:00 PM Lang Chan, PT [facility]   4/4/2022 11:45 AM Aquilino Kilpatrick MD [facility]CDG UCD   5/17/2022 11:20 AM Carrillo Colin NP [facility] PSCD PP

## 2021-12-16 ENCOUNTER — HOSPITAL ENCOUNTER (OUTPATIENT)
Dept: PHYSICAL THERAPY | Age: 71
Discharge: HOME | End: 2021-12-16
Payer: MEDICARE

## 2021-12-16 PROCEDURE — 97110 THERAPEUTIC EXERCISES: CPT

## 2021-12-16 NOTE — PROGRESS NOTES
Lety Arroyo  : 1950  [place] / Product Type: Medicare /  [address] at [facility] LUCIANA GAGE  1101 [facility], [address], [place] [address].  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 Frequency/Duration: 1-2 times a week for 60 days MEDICAL/REFERRING DIAGNOSIS:  neck and back pain   DATE OF ONSET: chronic  REFERRING PHYSICIAN: Abdifatah Garcia*  MD Orders:  Eval and treat, include modalities of soft tissue, dry needling, and traction  Return MD Appointment: NA       Pre-treatment Symptoms/Complaints: pt states he was ok'd by MD to take off boot and will not surgery. He will start pool therapy in January. Pain: Initial:     No VAS Post Session:  No VAS   Medications Last Reviewed:  21   Observation: walks into clinic with cane in L hand and no longer wearing boot.     TREATMENT:    Modalities: (0 min)not today  Manual (4 min)  - for mobility  --  myofascial release posterior cervical muscles and upper trap   Therapeutic Exercises: (40 min) to improve core strength and endurance, shoulder ROM and strength  MR= manual resistance  -- -- UBE 2.0 x 6 min  -- shoulder flexion and ABD to HORZ ABD assisted stretching 5x ea- BARRY  -- sitting R ankle DF stretch  -- trunk rotation 10x ea way  -- stand up to sit down with BARRY shoulder flexion 10x   -- standing psoas stretch with hands on wall with over pressure to thoracic   -- trunk rotation with unilateral LAQ in sitting - 15x ea way  -- 30 sec on, 30 sec off: ROW 3#, alternate shoulder flexion 3#, alternating LAQ with trunk rotation and UE 3# punch out , stand up/sit down, HORZ ABD then repeated all exercises 1 round- 10  Min total  -- neck rotation with assisted stretch  HEP: continue current ankle HEP   MedBridge Portal    Treatment/Session Summary:    · Response to Treatment: Pt very resistant to doing standing exercises yet. Perhaps will be ready next week after walking more on R LE after boot being removed. · Communication/Consultation:  None today  · Equipment provided today:  None today  · Recommendations/Intent for next treatment session: Next visit will continue focus on spine and neck stretch and strengthening.      Total Treatment Billable Duration: 40 min  PT Patient Time In/Time Out  Time In: 1350  Time Out: 3700 [facility], PT    Future Appointments   Date Time Provider Lorraine Coronel   12/21/2021  1:45 PM Jason Fitzpatrick, PT [facility]   12/23/2021  2:30 PM Jason Fitzpatrick, PT [facility]   12/28/2021  2:00 PM Jason Fitzpatrick, PT [facility]   4/4/2022 11:45 AM MD ELVI Allen UCDG UCD   5/17/2022 11:20 AM Venancio Crowell NP [facility] PSCD PP

## 2021-12-21 ENCOUNTER — HOSPITAL ENCOUNTER (OUTPATIENT)
Dept: PHYSICAL THERAPY | Age: 71
Discharge: HOME | End: 2021-12-21
Payer: MEDICARE

## 2021-12-21 PROCEDURE — 97110 THERAPEUTIC EXERCISES: CPT

## 2021-12-21 NOTE — PROGRESS NOTES
Joel Masters  : 1950  [place] / Product Type: Medicare /  [address] at [facility] LUCIANA GAGE  [address] [place] [address], [address], [place] [address].  [phone]   [phone]       OUTPATIENT PHYSICAL THERAPY: Daily Treatment Note 21  Visit Count: 3      ICD-10 Treatment Diagnosis: M25.571 R ankle pain, M25.671 R ankle stiffness  Precautions/Allergies:   Bactrim [sulfamethoxazole-trimethoprim]   TREATMENT PLAN:  Effective Dates: 21 TO 3/19/2022 (90 days). Frequency/Duration: 1-2 times a week for 90 days MEDICAL/REFERRING DIAGNOSIS:  Stress fracture of right ankle, initial encounter [I10.156J]  Right ankle pain, unspecified chronicity [M25.571]   DATE OF ONSET: chronic  REFERRING PHYSICIAN: Kane Peng MD MD Orders:  Eval and treat, include modalities of soft tissue, dry needling, and traction  Return MD Appointment: NA       Pre-treatment Symptoms/Complaints: R foot hurts at times with more walking. Boot removed on 12/15/21. Pain: Initial:     Achilles tendon and side of foot 3/10  Post Session:  No VAS   Medications Last Reviewed:  21  Observation: no updates   TREATMENT:    Modalities: (0 min)not today  Manual (0 min)  - for mobility   Therapeutic Exercises: (38 min) to improve core strength and endurance, shoulder ROM and strength  MR= manual resistance  -- standing psoas stretch 30 sec ea side  -- trunk rotation with unilateral LAQ in sitting - 15x ea way  -- UBE 3.0 x 6 min  -- standing psoas and achilles tendon BARRY sides on steps with rails -- ~ 5 min total  -- ankle inversion, eversion and DF ROM with overpressure and then with gentle resistance x 10 ea way  -- sit to stand x 10  -- sit to stand with alternating 1 step forward 10x  -- kinesio tape for R achilles tendon with y strip for support. Pt instructed to wear tape for 48 hours or if any redness or itching of skin occurred. Pt verbally agreed.   HEP: continue current ankle HEP   MedBridge Portal    Treatment/Session Summary:    · Response to Treatment: pt with stiffness to ankle inversion that improved by session end. Tolerated psoas and achilles stretch best on stairs. · Communication/Consultation:  None today  · Equipment provided today:  None today  · Recommendations/Intent for next treatment session: Next visit will continue focus on gentle ankle strengthening and improving overall posture, quality of gait.      Total Treatment Billable Duration: 38 min  PT Patient Time In/Time Out  Time In: 3780  Time Out: 506 [facility], PT    Future Appointments   Date Time Provider Lorraine Coronel   1/18/2022  1:45 PM Aldean Barnacle, PT [facility]   1/20/2022  1:45 PM Aldean Barnacle, PT [facility]   1/25/2022  2:00 PM Aldean Barnacle, PT [facility]   1/27/2022  1:45 PM Aldean Barnacle, PT [facility]   2/1/2022  1:45 PM Aldean Barnacle, PT [facility]   2/3/2022  1:45 PM Aldean Barnacle, PT [facility]   4/4/2022 11:45 AM Jerri Sidhu MD [facility] UCDG UCD   5/17/2022 11:20 AM Nitin Cabrera NP [facility] PSCD PP

## 2021-12-28 ENCOUNTER — HOSPITAL ENCOUNTER (OUTPATIENT)
Dept: PHYSICAL THERAPY | Age: 71
Discharge: HOME | End: 2021-12-28
Payer: MEDICARE

## 2021-12-28 PROCEDURE — 97110 THERAPEUTIC EXERCISES: CPT

## 2021-12-28 NOTE — PROGRESS NOTES
Danya Ren  : 1950  [place] / Product Type: Medicare /  [address] at [facility] LUCIANA GAGE  [address] [place] [address], [address], [place] [address].  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 Frequency/Duration: 1-2 times a week for 60 days MEDICAL/REFERRING DIAGNOSIS:  neck and back pain   DATE OF ONSET: chronic  REFERRING PHYSICIAN: Aga Varghese*  MD Orders:  Eval and treat, include modalities of soft tissue, dry needling, and traction  Return MD Appointment: NA       Pre-treatment Symptoms/Complaints: R ankle hurting more today after  Walking more on it recently. He was unable to come last visit because he was hospitalized after having difficulty breathing with diagnosis of lung inflammation. Pain: Initial:     Achilles tendon and side of foot 3/10  Post Session:  No VAS   Medications Last Reviewed:  21  Observation: no updates   TREATMENT:    Modalities: (0 min)not today  Manual (0 min)  - for mobility   Therapeutic Exercises: (38 min) to improve core strength and endurance, shoulder ROM and strength  MR= manual resistance  -- UBE 3.0 x 6 min  -- ankle inversion, eversion and DF ROM with overpressure and then with gentle resistance x 10 ea way  -- rocker board is 1/2 sitting L only for front to back 20x  -- BAPS board level 4 20 x ea way with mod assiste  -- sit to stand x 15  -- stand with alternating 1 step forward 10 to 15x ea. --  Stand R gastroc stretch  HEP: continue current ankle HEP   MedBridge Portal    Treatment/Session Summary:    · Responsd with e to Treatment: Had much difficulty with Level 4 BAPS.   May need Level 2 next visit. · Communication/Consultation:  None today  · Equipment provided today:  None today  · Recommendations/Intent for next treatment session: Next visit will continue focus on ankle ROM, strengthening and improving overall posture, quality of gait. Continue with BAPS but change to level 2. Check ROM of L ankle as well.      Total Treatment Billable Duration: 38 min  PT Patient Time In/Time Out  Time In: 9783  Time Out: [address] [facility], LASHELL    Future Appointments   Date Time Provider Lorraine Coronel   1/4/2022  1:45 PM Marval Rhiannon, PT [facility]   1/6/2022  1:45 PM Marval Rhiannon, PT [facility]   1/11/2022  2:00 PM Marval Rhiannon, PT [facility]   1/13/2022  1:45 PM Marval Rhiannon, PT [facility]   1/18/2022  1:45 PM Marval Rhiannon, PT [facility]   1/20/2022  1:45 PM Marval Rhiannon, PT [facility]   1/25/2022  2:00 PM Marval Rhiannon, PT [facility]   1/27/2022  1:45 PM Marval Rhiannon, PT [facility]   2/1/2022  1:45 PM Marval Rhiannon, PT [facility]   2/3/2022  1:45 PM Marval Rhiannon, PT [facility]   4/4/2022 11:45 AM Gigi Almanza MD [facility]   5/17/2022 11:20 AM Uma Ellington NP [facility] PS[facility] PP

## 2021-12-30 ENCOUNTER — APPOINTMENT (OUTPATIENT)
Dept: PHYSICAL THERAPY | Age: 71
End: 2021-12-30
Payer: MEDICARE

## 2022-01-04 ENCOUNTER — HOSPITAL ENCOUNTER (OUTPATIENT)
Dept: PHYSICAL THERAPY | Age: 72
Discharge: HOME | End: 2022-01-04
Payer: MEDICARE

## 2022-01-04 PROCEDURE — 97110 THERAPEUTIC EXERCISES: CPT

## 2022-01-04 NOTE — PROGRESS NOTES
Eric Ignacio  : 1950  [place] / Product Type: Medicare /  [address]  at [facility] LUCIANA GAGE  [address] [place] [address], [address], [place] [address].  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 Frequency/Duration: 1-2 times a week for 60 days MEDICAL/REFERRING DIAGNOSIS:  neck and back pain   DATE OF ONSET: chronic  REFERRING PHYSICIAN: Leona Traore*  MD Orders:  Eval and treat, include modalities of soft tissue, dry needling, and traction  Return MD Appointment: NA       Pre-treatment Symptoms/Complaints: R ankle hurting more lately. May try to wear boot again for a short while. THinks he is just walking too much too early. Pain: Initial:     6/10 Post Session:  No VAS   Medications Last Reviewed:  22  Observation:  L ankle eversion  And PF very stiff. Ever ~ 20 degrees. TREATMENT:    Modalities: (0 min)not today  Manual (0 min)  - for mobility:   Therapeutic Exercises: (40 min) to improve core strength and endurance, shoulder ROM and strength  MR= manual resistance  -- UBE 3.0 x 6 min  --ankle eversion , inversion, PF stretching BARRY   --  gentle resistance to R ankle EVR 10x  -- yellow band PF with knee bent 20x and straight 20x  -- yellow ankle evr, inv and DF 5x ea way  -- BAPS board level 2, 10 x ea way   -- sitting ankle PF/DF with mild resistance 10x  HEP: continue current ankle HEP   MedBridge Portal    Treatment/Session Summary:    · Responsd with e to Treatment: DId better with  Level 2 BAPS.   Worked on non standing exercises today because of ongoing pain in R ankle  · Communication/Consultation:  None today  · Equipment provided today:  None today  · Recommendations/Intent for next treatment session: Next visit will continue focus on ankle ROM, strengthening and improving overall posture, quality of gait. Continue with BAPS.     Total Treatment Billable Duration: 40 min  PT Patient Time In/Time Out  Time In: 1530  Time Out: [address], PT    Future Appointments   Date Time Provider Lorraine Coronel   1/6/2022  1:45 PM Saul Pratt, PT [facility]   1/11/2022  2:00 PM Saul Pratt, PT [facility]   1/13/2022  1:45 PM Saul Pratt, PT [facility]   1/18/2022  1:45 PM Saul Pratt, PT [facility]   1/20/2022  1:45 PM Saul Pratt, PT [facility]   1/25/2022  2:00 PM Saul Pratt, PT [facility]   1/27/2022  1:45 PM Saul Pratt, PT [facility]   2/1/2022  1:45 PM Saul Pratt, PT [facility]   2/3/2022  1:45 PM Saul Pratt, PT [facility]   4/4/2022 11:45 AM Kathie Walls MD [facility]   5/17/2022 11:20 AM Heather Miles NP [facility] PP

## 2022-01-06 ENCOUNTER — HOSPITAL ENCOUNTER (OUTPATIENT)
Dept: PHYSICAL THERAPY | Age: 72
Discharge: HOME | End: 2022-01-06
Payer: MEDICARE

## 2022-01-06 NOTE — PROGRESS NOTES
Mario Redo  : 1950  [place]  [place] [address] [place] [address] at [address], [address] [place] [address]  [phone]   [phone]      Pt cancelled today's session.      Coreen Cruz, PT MSPT

## 2022-01-11 ENCOUNTER — HOSPITAL ENCOUNTER (OUTPATIENT)
Dept: PHYSICAL THERAPY | Age: 72
Discharge: HOME | End: 2022-01-11
Payer: MEDICARE

## 2022-01-11 PROCEDURE — 97110 THERAPEUTIC EXERCISES: CPT

## 2022-01-11 NOTE — PROGRESS NOTES
Adelia Ballesteros  : 1950  [place] / Product Type: Medicare /  [address]  at [facility] LUCIANA GAGE  1101 [facility], [address] [place] [address],[address], [facility] [address].  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 Frequency/Duration: 1-2 times a week for 60 days MEDICAL/REFERRING DIAGNOSIS:  neck and back pain   DATE OF ONSET: chronic  REFERRING PHYSICIAN: Carole Garcia*  MD Orders:  Eval and treat, include modalities of soft tissue, dry needling, and traction  Return MD Appointment: NA       Pre-treatment Symptoms/Complaints: Pt could not come to last session because of conflict his wife had. He is choosing to wear his CAM boot to take weight off his foot since it is still hurting at times. Pain: Initial:     0/10 with CAM boot on Post Session:  Stated no pain at session end. Medications Last Reviewed:  22  Observation:  Pt comes into clinic with CAM boot on   TREATMENT:    Modalities: (0 min)not today  Manual (0 min)  - for mobility:   Therapeutic Exercises: (40 min) to improve core strength and endurance, shoulder ROM and strength  MR= manual resistance  --ankle eversion , inversion, PF stretching BARRY   -- sitting ankle PF 20x  -- yellow ankle evr, inv and DF 20x ea way  -- sit to stand 10x  -- weight shift front 20x, weight shift back 20x  -- sitting arch doming 10sec x 5  -- standing holding onto walker: marches 20x, alternating hip ext 20x, alternating hip abd 20x and then R gastroc stretching.   HEP: pt encouraged to try short sessions of standing exercises ~ 5 min, 3x daily and walk short distances and increase as his ankle tolerates more activity. [employer] Portal    Treatment/Session Summary:    · Response to Treatment: Pt tolerated short sessions of WB today and demonstrates improvement in ROM after stretching. · Communication/Consultation:  None today  · Equipment provided today:  None today  · Recommendations/Intent for next treatment session: Next visit will continue focus on ankle ROM, strengthening and improving overall posture, quality of gait. Trial of green band walking if tolerated or trial of pool PT if pt accepts this plan.      Total Treatment Billable Duration: 40 min  PT Patient Time In/Time Out  Time In: 1410  Time Out: LASHELL Garner    Future Appointments   Date Time Provider Lorraine Coronel   1/13/2022  1:45 PM Faythe Rhymes, PT [facility]   1/18/2022  1:45 PM Faythe Rhymes, PT [facility]   1/20/2022  1:45 PM Faythe Rhymes, PT [facility]   1/25/2022  2:00 PM Faythe Rhymes, PT [facility]   1/27/2022  1:45 PM Faythe Rhymes, PT [facility]   2/1/2022  1:45 PM Faythe Rhymes, PT [facility]   2/3/2022  1:45 PM Faythe Rhymes, PT [facility]   4/4/2022 11:45 AM Deidra Zimmerman MD [facility]   5/17/2022 11:20 AM Sherly Rojas NP [facility] PSCD PP

## 2022-01-11 NOTE — THERAPY RECERTIFICATION
Brad Govea  : 1950  [place] / Product Type: Medicare /  [address] [place]  at [facility] LUCIANA GAGE  1101 [facility], [address] [place] [address],[address], [facility] [address].  [phone]   [phone]       OUTPATIENT PHYSICAL [address]    ICD-10: Treatment Diagnosis: M25.571 R ankle pain,  M25.671 R ankle stiffness, M25.471 swelling of R ankle joint  Precautions/Allergies:   Bactrim [sulfamethoxazole-trimethoprim]   TREATMENT PLAN  Effective Dates: 21 TO 3/19/2022 (90 days). Frequency/Duration: 1-2 times a week for 90 Day(s) MEDICAL/REFERRING DIAGNOSIS:M84.371A Stress fracture of fight ankle, initial encounter, M25.571 R ankle pain  DATE OF ONSET: 2021  REFERRING PHYSICIAN: Araceli Horton MD MD Orders: eval and treat  Return MD Appointment: unknown      ASSESSMENT (21):  Brad Govea was placed in a CAM boot on 21 after seeing Dr. Daniel Lo. He returns for continuation of ankle therapy today on 21 after being told by Dr. Daniel Lo he is able to take the boot off. He presents with continued R ankle swelling, stiffness and not tolerating walking normal distances. He states ankle feels much better since having had to wear the CAM boot for 6 weeks. He will benefit from PT for guided rehab to promote normalized return of motion, strength, and function in order for safe return to walking which he needs for improving health. PROBLEM LIST (Impacting functional limitations):  1. Decreased spine extension mobility ( pt with forward bent posture). 2. Pain R ankle  3. Decreased ROM R ankle  4. Decreased functional strength R LE   5. Decreased gait and balance skills INTERVENTIONS PLANNED:  1. Modalities PRN, including ultrasound, estim, and iontophoresis  2. Soft tissue and joint mobilization for ROM and flexibility  3. Stretching, progressive resistive exercises and HEP for return to functional activities.    4. Back Education and Training for body mechanics with activities of daily living  5. If needed, aquatic therapy. GOALS: (Goals have been discussed and agreed upon with patient.) ALL GOALS ARE ONGOING since pt had CAM BOOT on until recently. Short-Term Functional Goals: Time Frame: 4 weeks   1. Report no more than minimal, intermittent 5/10 pain R ankle with basic walking. 2. R ankle PROM is greater than or equal to 5 degrees for ankle DF and 20 degrees for inversion. 3. R ankle eversion and PF 5/5 for improved stability with static and dynamic balance, walking, and progressing into strengthening phase. 4. Walking without limp on level surfaces and up stairs. Discharge Goals: Time Frame: 8 weeks  1. No significant pain R ankle  with all normalized daily home and work activities, and score greater than 55 on FAAM  2. Able to walk at least 30 minutes with min to no ankle pain with least AD. 3. Able to return to doing previous exercise program prior to this R ankle injury. 4. Independent with HEP for advanced R strengthening. OUTCOME MEASURE:   Tool Used: PT/OT FOOT AND ANKLE ABILITY MEASURE  Score:  Initial: 40/84 Most Recent: X (Date: -- )  Not updated   Interpretation of Score: For the \"Activities of Daily Living\", there are 21 questions each scored on a 5 point scale with 0 representing \"Unable to do\" and 4 representing \"No difficulty\". The lower the score, the greater the functional disability. 84/84 represents no disability. Minimal detectable change is 5.7 points. With the addition of the 8 questions in the \"Sports Subscale,\" there are 29 questions, each scored on a 5 point scale with 0 representing \"Unable to do\" and 4 representing \"No difficulty\". The lower the score, the greater the functional disability. 116/116 represents no disability. Minimal detectable change is 12.3 points.     MEDICAL NECESSITY:   · Patient is expected to demonstrate progress in strength, range of motion and balance to improve safety during walking. REASON FOR SERVICES/OTHER COMMENTS:  · Patient continues to require skilled intervention due to pain, stiff and weak ankle inhibiting ability to walk and affecting balance. .  Total Duration:  PT Patient Time In/Time Out  Time In: 1352  Time Out: 6849    Regarding [place] therapy, I certify that the treatment plan above will be carried out by a therapist or under their direction. Thank you for this referral,  Howard Isaacs, PT     Referring Physician Signature: Tyrell Fernández MD _______________________________ Date _____________                PAIN/SUBJECTIVE:   Initial:   mild pain today/10 Post Session:  No increase in pain   HISTORY:       Ambulatory/Rehab Services H2 Model Falls Risk Assessment   Risk Factors:       (1)  Gender [Male] Ability to Rise from Chair:       (1)  Pushes up, successful in one attempt   Falls Prevention Plan:       Physical Limitations to Exercise (specify):  will need supervision in standihng       Mobility Assistance Device (specify):  will need to stay with RW initially   Total: (5 or greater = High Risk): 2   ©2010 [facility] [address] [place] [address] States Patent #[id number]. Federal Law prohibits the replication, distribution or use without written permission from [facility]   Current Medications:       Current Outpatient Medications:     meloxicam (MOBIC) 15 mg tablet, Take 1 Tablet by mouth daily. , Disp: 30 Tablet, Rfl: 1    glucose blood VI test strips (blood glucose test) strip, by Does Not Apply route See Admin Instructions. Ascenia Contour next test strips.   Check FS every day, DM2, E11.9, Disp: 100 Strip, Rfl: 5    omega 3-dha-epa-fish oil (Fish OiL) 100-160-1,000 mg cap, Take  by mouth., Disp: , Rfl:     cholecalciferol, vitamin D3, (Vitamin D3) 50 mcg (2,000 unit) tab, Take  by mouth., Disp: , Rfl:     TURMERIC PO, Take  by mouth., Disp: , Rfl:     insulin glargine-lixisenatide (Soliqua 100/33) 100 unit-33 mcg/mL inpn, 60 units sub-q daily  Indications: type 2 diabetes mellitus, Disp: 3 mL, Rfl: 5    pantoprazole (PROTONIX) 40 mg tablet, Take 1 Tablet by mouth daily. , Disp: 90 Tablet, Rfl: 1    montelukast (SINGULAIR) 10 mg tablet, Take 1 Tablet by mouth nightly. Indications: controller medication for asthma, Disp: 90 Tablet, Rfl: 1    lisinopriL (PRINIVIL, ZESTRIL) 10 mg tablet, Take 1 Tablet by mouth daily. Indications: high blood pressure, Disp: 90 Tablet, Rfl: 1    ezetimibe (ZETIA) 10 mg tablet, Take 1 Tablet by mouth nightly. Indications: high cholesterol, Disp: 90 Tablet, Rfl: 1    doxazosin (CARDURA) 4 mg tablet, Take 1 Tablet by mouth daily. Indications: enlarged prostate with urination problem, Disp: 90 Tablet, Rfl: 1    celecoxib (CeleBREX) 200 mg capsule, Take 1 Capsule by mouth daily. Indications: joint damage causing pain and loss of function, Disp: 90 Capsule, Rfl: 1    atorvastatin (LIPITOR) 20 mg tablet, Take 1 Tablet by mouth nightly. Indications: high cholesterol, Disp: 90 Tablet, Rfl: 1    apixaban (Eliquis) 5 mg tablet, Take 1 Tablet by mouth two (2) times a day. Indications: treatment to prevent blood clots in chronic atrial fibrillation, Disp: 180 Tablet, Rfl: 1    dapagliflozin (Farxiga) 10 mg tab tablet, Take 1 Tablet by mouth daily. Indications: type 2 diabetes mellitus, Disp: 90 Tablet, Rfl: 1    fluticasone furoate-vilanteroL (Breo Ellipta) 200-25 mcg/dose inhaler, Take 1 Puff by inhalation daily. , Disp: 3 Inhaler, Rfl: 1    metoprolol tartrate (LOPRESSOR) 50 mg tablet, Take 1 Tablet by mouth two (2) times a day.  Indications: ventricular rate control in atrial fibrillation, Disp: 180 Tablet, Rfl: 1    lancets 28 gauge misc, Check fs every day, DM2, E11.9, Disp: 100 Lancet, Rfl: 1    Insulin Needles, Disposable, (BD Jocelynn 2nd Gen Pen Needle) 32 gauge x 5/32\" ndle, by Does Not Apply route., Disp: , Rfl:     acetaminophen (TYLENOL) 500 mg tablet, Take 500-1,000 mg by mouth every six (6) hours as needed for Pain., Disp: , Rfl:     diphenhydrAMINE (BENADRYL) 50 mg capsule, Take 100 mg by mouth nightly as needed for Sleep., Disp: , Rfl:     colesevelam (WELCHOL) 625 mg tablet, Take 625-3,750 mg by mouth as needed for Diarrhea. Patient taking 2 tabs following diarrheal bowel movement up to 3 times. Maximum daily dosage: 6 tabs, Disp: , Rfl:     fluticasone propionate (FLONASE ALLERGY RELIEF) 50 mcg/actuation nasal spray, 2 Sprays by Both Nostrils route daily as needed for Allergies. Indications: inflammation of the nose due to an allergy, Disp: , Rfl:     albuterol (PROVENTIL HFA, VENTOLIN HFA, PROAIR HFA) 90 mcg/actuation inhaler, Take 1 Puff by inhalation every four (4) hours as needed for Wheezing., Disp: 1 Inhaler, Rfl: 5    ANTI-FUNGAL 2 % topical powder, APPLY TO ABDOMEN 2 TIMES PER DAY, Disp: , Rfl: 11    Blood-Glucose Meter monitoring kit, Check fs every day; DM2, E11/9, Contour glucometer, Disp: 1 Kit, Rfl: 0    albuterol (PROVENTIL VENTOLIN) 2.5 mg /3 mL (0.083 %) nebulizer solution, 3 mL by Nebulization route once for 1 dose. (Patient taking differently: 2.5 mg by Nebulization route daily as needed for Wheezing. Patient states he hasn't used in many years), Disp: 24 Each, Rfl: 11   EXAMINATION:     Observation/Orthostatic Postural Assessment:   Abnormalities: continued ankle swelling noted to lateral> medial ankle    Standing alignment: Bent over posture with significant thoracic kyphosis   Walking: walks with step to R gait with RW.      Supine: unable to lie supine  Palpation: less point tender to ankle  ROM:   ANKLE Right ankle Left ankle   Dorsiflexion 0    Plantarflexion 20    Eversion 5    Inversion 5             Strength:   ANKLE Right ankle Left ankle   Dorsiflexion 5    Plantarflexion 3    Eversion 3+    Inversion 4-                       Special Tests: Muscle Length Tests: R gastroc flexiblity = -3 degrees   Neurological Screen: [place] neuro screen is intact  Functional Mobility:  Sit to/from Stand: pushes off with BARRY hands  Bed Mobility: unable to lie supine but is independent with compensation for getting to positions he can tolerate.

## 2022-01-11 NOTE — PROGRESS NOTES
Deanna Washington  : 1950  [place] / Product Type: Medicare /  [address] [place]  at [address] [place] [address] Rd  1101 [facility], [address], [place] [address].  [phone]   [phone]       OUTPATIENT PHYSICAL THERAPY: Daily Treatment Note 21  Visit Count: 4     ICD-10: Treatment Diagnosis: M25.571 R ankle pain, M25.671 R ankle stiffness  Precautions/Allergies:   Bactrim [sulfamethoxazole-trimethoprim]   TREATMENT PLAN:  Effective Dates: 21 TO 3/19/2022 (90 days). Frequency/Duration: 1-2 times a week for 60 days MEDICAL/REFERRING DIAGNOSIS: M84.371A  Stress fracture R ankle, M25.571 R ankle pain  DATE OF ONSET: chronic  REFERRING PHYSICIAN: Gilmer Stallings MD MD Orders:  Eval and treat, include modalities of soft tissue, dry needling, and traction  Return MD Appointment: NA       Pre-treatment Symptoms/Complaints: R ankle hurting more today after  Walking more on it recently. He was unable to come last visit because he was hospitalized after having difficulty breathing with diagnosis of lung inflammation. Pain: Initial:     Achilles tendon and side of foot 3/10  Post Session:  No VAS   Medications Last Reviewed:  21  Observation: no updates   TREATMENT:    Modalities: (0 min)not today  Manual (0 min)  - for mobility   Therapeutic Exercises: (38 min) to improve core strength and endurance, shoulder ROM and strength  MR= manual resistance  -- UBE 3.0 x 6 min  -- ankle inversion, eversion and DF ROM with overpressure and then with gentle resistance x 10 ea way  -- rocker board is 1/2 sitting L only for front to back 20x  -- BAPS board level 4 20 x ea way with mod assiste  -- sit to stand x 15  -- stand with alternating 1 step forward 10 to 15x ea. --  Stand R gastroc stretch  HEP: continue current ankle HEP   [employer] Portal    Treatment/Session Summary:    · Responsd with e to Treatment: Had much difficulty with Level 4 BAPS. May need Level 2 next visit. · Communication/Consultation:  None today  · Equipment provided today:  None today  · Recommendations/Intent for next treatment session: Next visit will continue focus on ankle ROM, strengthening and improving overall posture, quality of gait. Continue with BAPS but change to level 2. Check ROM of L ankle as well.      Total Treatment Billable Duration: 38 min  PT Patient Time In/Time Out  Time In: 1649  Time Out: [address], [place]HE[place]    Future Appointments   Date Time Provider Lorraine Coronel   1/13/2022  1:45 PM Carlton Alley, PT [facility]   1/18/2022  1:45 PM Carlton Alley, PT [facility]   1/20/2022  1:45 PM Carlton Alley, PT [facility]   1/25/2022  2:00 PM Carlton Alley, PT [facility]   1/27/2022  1:45 PM Carlton Alley, PT [facility]   2/1/2022  1:45 PM Carlton Alley, PT [facility]   2/3/2022  1:45 PM Carlton Alley, PT [facility]   4/4/2022 11:45 AM Samina Chavez MD [facility]   5/17/2022 11:20 AM Philly Arita NP [facility]

## 2022-01-11 NOTE — PROGRESS NOTES
Deanna Washington  : 1950  [place] / Product Type: Medicare /  [address] [place]  at [facility] LUCIANA GAGE  [address] [place] [address], [address], [place] [address].  [phone]   [phone]       OUTPATIENT PHYSICAL THERAPY: Daily Treatment Note 22  Visit Count: 5     ICD-10: Treatment Diagnosis: M25.571 R ankle pain, M25.671 R ankle stiffness  Precautions/Allergies:   Bactrim [sulfamethoxazole-trimethoprim]   TREATMENT PLAN:  Effective Dates: 21 to 22 (60 days). Frequency/Duration: 1-2 times a week for 60 days MEDICAL/REFERRING DIAGNOSIS:  M84.271A stress fracture of R ankle, initial encounter, M25.571 R ankle pain  DATE OF ONSET: chronic  REFERRING PHYSICIAN: Gilmer Stallinsg MD MD Orders:  Eval and treat, include modalities of soft tissue, dry needling, and traction  Return MD Appointment: NA       Pre-treatment Symptoms/Complaints: R ankle hurting more lately. May try to wear boot again for a short while. THinks he is just walking too much too early. Pain: Initial:     6/10 Post Session:  No VAS   Medications Last Reviewed:  22  Observation:  L ankle eversion  And PF very stiff. Ever ~ 20 degrees. TREATMENT:    Modalities: (0 min)not today  Manual (0 min)  - for mobility:   Therapeutic Exercises: (40 min) to improve core strength and endurance, shoulder ROM and strength  MR= manual resistance  -- UBE 3.0 x 6 min  --ankle eversion , inversion, PF stretching BARRY   --  gentle resistance to R ankle EVR 10x  -- yellow band PF with knee bent 20x and straight 20x  -- yellow ankle evr, inv and DF 5x ea way  -- BAPS board level 2, 10 x ea way   -- sitting ankle PF/DF with mild resistance 10x  HEP: continue current ankle HEP   Med[employer] Portal    Treatment/Session Summary:    · Responsd with e to Treatment: DId better with  Level 2 BAPS.   Worked on non standing exercises today because of ongoing pain in R ankle  · Communication/Consultation: None today  · Equipment provided today:  None today  · Recommendations/Intent for next treatment session: Next visit will continue focus on ankle ROM, strengthening and improving overall posture, quality of gait. Continue with BAPS.     Total Treatment Billable Duration: 40 min  PT Patient Time In/Time Out  Time In: 1530  Time Out: [address], PT    Future Appointments   Date Time Provider Lorraine Coronel   1/18/2022  1:45 PM Rickey Martha, PT [facility]   1/20/2022  1:45 PM Rickey Martha, PT [facility]   1/25/2022  2:00 PM Rickey Martha, PT [facility]   1/27/2022  1:45 PM Rickey Martha, PT [facility]   2/1/2022  1:45 PM Rickey Martha, PT [facility]   2/3/2022  1:45 PM Rickey Martha, PT [facility]   4/4/2022 11:45 AM Pam Quezada MD [facility]   5/17/2022 11:20 AM Deepak Whitt NP [facility] PP

## 2022-01-11 NOTE — PROGRESS NOTES
Jose Arriaga  : 1950  [place] / Product Type: Medicare /  [address]  at [facility] LUCIANA GAGE  [address] [place] [address], [address], [place] [address].  [phone]   [phone]       OUTPATIENT PHYSICAL THERAPY: Daily Treatment Note 22  Visit Count: 6     ICD-10: Treatment Diagnosis: M25.571 R ankle pain, M25.671 R ankle stiffness  Precautions/Allergies:   Bactrim [sulfamethoxazole-trimethoprim]   TREATMENT PLAN:  Effective Dates: 21 TO 3/19/2022 (90 days). Frequency/Duration: 1-2 times a week for 90 Day(s) MEDICAL/REFERRING DIAGNOSIS:  M84.371A stress fracture of R ankle, initial encounter, M25.571 R ankle pain  DATE OF ONSET: chronic  REFERRING PHYSICIAN: Carrie Velarde MD MD Orders:  Eval and treat, include modalities of soft tissue, dry needling, and traction  Return MD Appointment: NA       Pre-treatment Symptoms/Complaints: Pt could not come to last session because of conflict his wife had. He is choosing to wear his CAM boot to take weight off his foot since it is still hurting at times. Pain: Initial:     0/10 with CAM boot on Post Session:  Stated no pain at session end. Medications Last Reviewed:  22  Observation:  Pt comes into clinic with CAM boot on   TREATMENT:    Modalities: (0 min)not today  Manual (0 min)  - for mobility:   Therapeutic Exercises: (40 min) to improve core strength and endurance, shoulder ROM and strength  MR= manual resistance  --ankle eversion , inversion, PF stretching BARRY   -- sitting ankle PF 20x  -- yellow ankle evr, inv and DF 20x ea way  -- sit to stand 10x  -- weight shift front 20x, weight shift back 20x  -- sitting arch doming 10sec x 5  -- standing holding onto walker: marches 20x, alternating hip ext 20x, alternating hip abd 20x and then R gastroc stretching.   HEP: pt encouraged to try short sessions of standing exercises ~ 5 min, 3x daily and walk short distances and increase as his ankle tolerates more activity. [employer] Portal    Treatment/Session Summary:    · Response to Treatment: Pt tolerated short sessions of WB today and demonstrates improvement in ROM after stretching. · Communication/Consultation:  None today  · Equipment provided today:  None today  · Recommendations/Intent for next treatment session: Next visit will continue focus on ankle ROM, strengthening and improving overall posture, quality of gait. Trial of green band walking if tolerated or trial of pool PT if pt accepts this plan.      Total Treatment Billable Duration: 40 min  PT Patient Time In/Time Out  Time In: 1410  Time Out: LASHELL Garner    Future Appointments   Date Time Provider Lorraine Coronel   1/13/2022  1:45 PM Barry Royals, PT [facility]   1/18/2022  1:45 PM Barry Royals, PT [facility]   1/20/2022  1:45 PM Barry Royals, PT [facility]   1/25/2022  2:00 PM Barry Royals, PT [facility]   1/27/2022  1:45 PM Barry Royals, PT [facility]   2/1/2022  1:45 PM Barry Royals, PT [facility]   2/3/2022  1:45 PM Barry Royals, PT [facility]   4/4/2022 11:45 AM López Gustafson MD [facility]   5/17/2022 11:20 AM Fadia Medellin NP [facility] PP

## 2022-01-13 ENCOUNTER — HOSPITAL ENCOUNTER (OUTPATIENT)
Dept: PHYSICAL THERAPY | Age: 72
Discharge: HOME | End: 2022-01-13
Payer: MEDICARE

## 2022-01-13 PROCEDURE — 97110 THERAPEUTIC EXERCISES: CPT

## 2022-01-13 NOTE — PROGRESS NOTES
Eric Ignacio  : 1950  [place] / Product Type: Medicare /  [address] at [facility] LUCIANA GAGE  1101 [facility], [address], [address] [facility]  [phone]   [phone]       OUTPATIENT PHYSICAL THERAPY: Daily Treatment Note 2022  Visit Count: 7     ICD-10: Treatment Diagnosis: M25.571 R ankle pain, M25.671 R ankle stiffness  Precautions/Allergies:   Bactrim [sulfamethoxazole-trimethoprim]   TREATMENT PLAN:  Effective Dates: 21 TO 3/19/2022 (90 days). Frequency/Duration:1- 2 times a week for 90 Day(s) MEDICAL/REFERRING DIAGNOSIS:  M84. 37A stress fracture of R ankle, initial encounter, M25.571 R ankle pain  DATE OF ONSET: chronic  REFERRING PHYSICIAN: Jennifer Fontaine.   MD Orders:  Eval and treat, include modalities of soft tissue, dry needling, and traction  Return MD Appointment: NA       Pre-treatment Symptoms/Complaints: Pt states he is able to do short amount of walking without ankle getting too painful. Pain: Initial:     0/10 with CAM boot on  Post Session:  No VAS, no pain increase mentioned   Medications Last Reviewed:  22  Observation:  No updates   TREATMENT:    Modalities: (0 min)not today  Manual (0 min)  - for mobility:   Therapeutic Exercises: (40 min) to improve core strength and endurance, shoulder ROM and strength. Helped pt don CAM boot at session end.    MR= manual resistance  --ankle eversion , inversion, PF stretching BARRY   -- yellow ankle evr,and PF 20x ea way  -- standing R achilles and psoas stretch  -- sit to stand 10x  -- standing holding onto walker:  weight shift front 20x, weight shift side 20x  -- standing arch doming 10sec x 5  -- standing holding onto walker:  hip ext 10 x ea side, hip abd 10x2 ea side, PF 10x  -- step up:  One hand on rail L foot touch and return 10x, BARRY hands on rail R foot up and down 5x  -- walking with green band crossed over R to L shoulder 100 ft x2 with RW  HEP: pt encouraged to continue short sessions of standing exercises and increase as his ankle tolerates more activity, as well as to start back to low level walking pool program.   [employer] Portal    Treatment/Session Summary:    · Response to Treatment: Pt tolerated WB done in clinic today as long as he was holding onto walker. · Communication/Consultation:  None today  · Equipment provided today:  None today  · Recommendations/Intent for next treatment session: Next visit will continue focus on ankle ROM, strength and improving overall posture, quality of gait.     Total Treatment Billable Duration: 40 min  PT Patient Time In/Time Out  Time In: 1350  Time Out: [address], PT    Future Appointments   Date Time Provider Lorraine Coronel   1/18/2022  1:45 PM Jaleesa Bath, PT [facility]   1/20/2022  1:45 PM Jaleesa Bath, PT [facility]   1/25/2022  2:00 PM Jaleesa Bath, PT [facility]   1/27/2022  1:45 PM Jaleesa Bath, PT [facility]   2/1/2022  1:45 PM Jaleesa Bath, PT [facility]   2/3/2022  1:45 PM Jaleesa Bath, PT [facility]   4/4/2022 11:45 AM Nicole Link MD [facility]   5/17/2022 11:20 AM Dilshad Gould NP [facility] PP

## 2022-01-18 ENCOUNTER — HOSPITAL ENCOUNTER (OUTPATIENT)
Dept: PHYSICAL THERAPY | Age: 72
End: 2022-01-18
Payer: MEDICARE

## 2022-01-20 ENCOUNTER — HOSPITAL ENCOUNTER (OUTPATIENT)
Dept: PHYSICAL THERAPY | Age: 72
Discharge: HOME | End: 2022-01-20
Payer: MEDICARE

## 2022-01-20 PROCEDURE — 97110 THERAPEUTIC EXERCISES: CPT

## 2022-01-20 NOTE — PROGRESS NOTES
Lilibeth Mccracken  : 1950  [place] / Product Type: Medicare /  [address] at [facility] LUCIANA GAGE  [address] [place] [address], [address], [place] [address].  [phone]   [phone]       OUTPATIENT PHYSICAL THERAPY: Daily Treatment Note 2022  Visit Count: 8     ICD-10: Treatment Diagnosis: M25.571 R ankle pain, M25.671 R ankle stiffness  Precautions/Allergies:   Bactrim [sulfamethoxazole-trimethoprim]   TREATMENT PLAN:  Effective Dates: 21 TO 3/19/2022 (90 days). Frequency/Duration: 1-2 times a week for 90 Day(s) MEDICAL/REFERRING DIAGNOSIS:  M84.371A stress fracture of R ankle, initial encounter, M25.571 R ankle pain  DATE OF ONSET: chronic  REFERRING PHYSICIAN: Tulio Yarbrough.  MD Orders:  Eval and treat, include modalities of soft tissue, dry needling, and traction  Return MD Appointment: NA       Pre-treatment Symptoms/Complaints: Pt frustrated that he can not walk on his R LE short distances without walker- mostly because of R ankle feeling like it may give way but also because of pain. Pt plans to see MD tomorrow. Pain: Initial:     0/10 with CAM boot on Post Session:  Stated no pain at session end. Medications Last Reviewed:  22  Observation:  Pt comes into clinic with CAM boot on still and using RW.     TREATMENT:    Modalities: (0 min)not today  Manual (0 min)  - for mobility:   Therapeutic Exercises: (40 min) to improve core strength and endurance, shoulder ROM and strength  MR= manual resistance  --ankle eversion , inversion, PF, DF stretching BARRY  HOLDING onto walker in standing   -- weight shift front 20x, weight shift back 20x  -- R achilles tendon/ R psoas stretching 30 sec x 3  -- tandem stance 30 sec total BARRY sides  SITTING with plinth lifted high too increase weight into R LE  -- rocker front back with R only 20x  HOLDING onto stable surface in standing  -- Rocker front back with R foot only 20x, L foor on 6 in step  -- Stepping up onto 4 in step R foot x 10  -- on foam: marching in place 10 going slowly  Walking with RW: 100 feet total with 1 turn. HEP: pt encouraged to continue marching in place slowly, achilles tendon stretch, hip Ext with holding onto walker 2x daily; yellow ankle band work for BARRY ankles, and walking with RW 2 min 5x daily and increase amount of time walking as able. Told pt to walk with RW only, not without-- for safety. Pt verbally agreed. [employer] [facility]    Treatment/Session Summary:    · Response to Treatment: Pt stated some mild increase in Foot/ankle pain by session end, but seemed to tolerate session well. Pt likely with ankle swelling that is still affecting proprioception/strength. May benefit from return to elastic band for ankle to increase feed back, decrease swelling. · Communication/Consultation:  None today  · Equipment provided today:  None today  · Recommendations/Intent for next treatment session: Next visit will continue focus on ankle ROM, strengthening and improving overall posture, quality of gait. Trial of green band walking if tolerated or trial of pool PT. Depends on visit with Dr. Ivette Doherty.      Total Treatment Billable Duration: 40 min  PT Patient Time In/Time Out  Time In: 1241  Time Out: 4658 [facility], PT    Future Appointments   Date Time Provider Lorraine Coronel   1/21/2022 10:00 AM Toney Franco MD [facility]   1/25/2022  2:00 PM Juana Rush, PT [facility]   1/27/2022  1:45 PM Juana Rush, PT [facility]   2/1/2022  1:45 PM Juana Rush, PT [facility]   2/3/2022  1:45 PM Juana Rush, PT [facility]   4/4/2022 11:45 AM Tonya Hidalgo MD [facility]   5/17/2022 11:20 AM Victoria Royal NP [facility] PP

## 2022-01-20 NOTE — PROGRESS NOTES
Danya Ren  : 1950  [place]  Secondary: Polo Olivarez [address] [place] [address] at [facility] LUCIANA GAGE  [address] [place] [address], [address].  [phone]   [phone]      MD COMMUNICATION    Assessment 22:  Pt continues with c/o difficulty walking but only when he does not have RW. He is tolerating short distances of walking in clinic without CAM boot as well as exercises for balance and strength with no CAM boot. R ankle ROM stiffens up with wearing the CAM boot but improves with doing stretches. He is stiff to BARRY subtalar joint as well as ankle PF and DF, partially because he is unable to get normalized motion with walking because of inability to stand up straight/loss of hip extension AROM during push off phase of gait. Below are his ankle ROM and strength measurements. Pt should continue to have strength and balance gains if he continues PT. We will wait for your recommendations on how you would like him to move forward. Thank you for this referral! Jean Zuluagaies    ROM:                    RLE Assessment  RLE Assessment (WDL): Within defined limits  RLE PROM  R Hip Extension: 5  R Ankle Dorsiflexion: 8  R Ankle Plantar Flexion: 40  R Ankle Eversion: 10  R Ankle Inversion: 15              Strength:           RLE Strength  R Hip Extension: 4-  R Knee Extension: 4  R Ankle Dorsiflexion: 4  R Ankle Plantar Flexion: 4-  R Ankle Eversion: 4  R Ankle Inversion: 4        Balance:  Unable to balance in tandem independently but can hold ~ 5 sec on both sides. Needs to use UE for safety for transfers and RW for safety with walking.

## 2022-01-25 ENCOUNTER — HOSPITAL ENCOUNTER (OUTPATIENT)
Dept: PHYSICAL THERAPY | Age: 72
Discharge: HOME | End: 2022-01-25
Payer: MEDICARE

## 2022-01-25 PROCEDURE — 97113 AQUATIC THERAPY/EXERCISES: CPT

## 2022-01-25 NOTE — PROGRESS NOTES
Jay Mcclellan  : 1950  [place] / Product Type: Medicare /  [address]  at [facility] LUCIANA GAGE  [address] [place] [address], [address], [place] [address].  [phone]   [phone]       OUTPATIENT PHYSICAL THERAPY: Daily Treatment Note 2022  Visit Count: 43     ICD-10: Treatment Diagnosis: M25.571 R ankle pain, M25.671 R ankle stiffness  Precautions/Allergies:   Bactrim [sulfamethoxazole-trimethoprim]   TREATMENT PLAN:  Effective Dates: 21 TO 3/19/2022 (90 days). Frequency/Duration: 1-2 times a week for 90 Day(s) MEDICAL/REFERRING DIAGNOSIS:  M84.371A stress fracture of R ankle, initial encounter, M25.571 R ankle pain  DATE OF ONSET: chronic  REFERRING PHYSICIAN: Pepe Almanza.  MD Orders:  Eval and treat, include modalities of soft tissue, dry needling, and traction  Return MD Appointment: NA       Pre-treatment Symptoms/Complaints: Pt stated that Dr. Terry Louis recommended an ankle brace and that he was not a candidate for surgery. Ankle hurt a lot after walking 30 minutes in pool     Pain: Initial:     0/10  Post Session:  Stated no pain at session end. Medications Last Reviewed:  22  Observation:  Pt comes into clinic with CAM boot on still and using RW. TREATMENT:   Aquatic Therapy(30 min) to improve core strength and endurance, shoulder ROM and strength   Date:  22 Date:   Date:     Activity/Exercise Parameters Parameters Parameters   Ankle stretches On seat  Ankle evr/inv 10x  Ankle DF 5x ea      4.5 \"      Standing psoas, achilles stretch 30 sec      Hip ABD Big 10x  Small and fast  10x2     Hip EXT Big 10x  Small and fast  10x2     Heel raises 20x     Step up next      Walking 4.5 ft     forward 5 circles     backward 5 laps      3.5 ft     Open stance for balance Shoulder ext  With paddles  L foot back 10x  R foot back 10x                           HEP: pt to do updated HEP for land.    [facility] Portal    Treatment/Session Summary:    · Response to Treatment: Pt stated mild pain in ankle at session end. Encouraged pt to exercise more like we did today when he is doing his pool program.   · Communication/Consultation:  None today  · Equipment provided today:  None today  · Recommendations/Intent for next treatment session: Next visit will continue focus on ankle ROM, strengthening and improving overall posture, quality of gait. Trial of green band walking if tolerated or trial of pool PT.       Total Treatment Billable Duration: 30 min  PT Patient Time In/Time Out  Time In: 1405  Time Out: [address], PT    Future Appointments   Date Time Provider Lorraine Coronel   1/27/2022  1:45 PM Zulay Mandujano, PT [facility]   2/1/2022  1:45 PM [facility]   2/3/2022  1:45 PM Zulay Mandujano, PT [facility]   4/4/2022 11:45 AM Miguel Jones MD [facility]   4/25/2022  1:20 PM Payal Goodman MD [facility] POAI POA   5/17/2022 11:20 AM Agustin Law NP [facility] PSCD PP

## 2022-01-27 ENCOUNTER — HOSPITAL ENCOUNTER (OUTPATIENT)
Dept: PHYSICAL THERAPY | Age: 72
Discharge: HOME | End: 2022-01-27
Payer: MEDICARE

## 2022-01-27 PROCEDURE — 97113 AQUATIC THERAPY/EXERCISES: CPT

## 2022-01-27 NOTE — PROGRESS NOTES
Brandon Thompson  : 1950  [place] / Product Type: Medicare /  [address]  at [facility] LUCIANA GAGE  [address] [place] [address], [address] [facility], [place] [address].  [phone]   [phone]       OUTPATIENT PHYSICAL THERAPY: Daily Treatment Note 2022  Visit Count: 10     ICD-10: Treatment Diagnosis: M25.571 R ankle pain, M25.671 R ankle stiffness  Precautions/Allergies:   Bactrim [sulfamethoxazole-trimethoprim]   TREATMENT PLAN:  Effective Dates: 21 TO 3/19/2022 (90 days). Frequency/Duration: 1-2 times a week for 90 Day(s) MEDICAL/REFERRING DIAGNOSIS:  M84.371A stress fracture of R ankle, initial encounter, M25.571 R ankle pain  DATE OF ONSET: chronic  REFERRING PHYSICIAN: Jose Juan Mancia.  MD Orders:  Eval and treat, include modalities of soft tissue, dry needling, and traction  Return MD Appointment: NA       Pre-treatment Symptoms/Complaints: did have some ankle pain after session, but not as bad as he had after walking prior to last session. Rested yesterday. Today pain is better. Pain: Initial:     3/10 R foot Post Session:  Stated no pain at session end. Medications Last Reviewed:  22  Observation:  Continues with R ankle moderate swelling and reddish skin at medial side of ankle that is shiny.     TREATMENT:   Aquatic Therapy(30 min) to improve core strength and endurance, ankle ROM and strength   Date:  22 Date:  22 Date:     Activity/Exercise Parameters Parameters Parameters   Ankle stretches On seat  Ankle evr/inv 10x  Ankle DF 5x ea On seat  Ankle evr/inv 10x  Ankle DF 5x ea     4.5 \"  4.5\"    Standing psoas, achilles stretch 30 sec  30 sec ea side    Hip ABD Big 10x  Small and fast  10x2 Big 10x  Small and fast  10x2    Hip EXT Big 10x  Small and fast  10x2 Big 10x  Small and fast  10x2    Heel raises 20x 20x    Step up next      Walking 4.5 ft Walking 4.5 ft    forward 5 circles 5 circles    backward 5 laps 5 laps marching  5 laps    Side step  2 laps                 3.5 ft 3.5 ft    Open stance for balance Shoulder ext  With paddles  L foot back 10x  R foot back 10x Shoulder ext  With paddles  L foot back 10x  R foot back 10x                                                HEP: pt to do updated HEP for land. Discussed pt wearing ankle/foot elastic brace or a brace his has at home for now. Pt to continue using RW as well. [employer]    Treatment/Session Summary:    · Response to Treatment: Pt moved a little faster today. · Communication/Consultation:  None today  · Equipment provided today:  None today  · Recommendations/Intent for next treatment session: Next visit will continue focus on ankle ROM, strengthening and improving overall posture, quality of gait.     Total Treatment Billable Duration: 30 min  PT Patient Time In/Time Out  Time In: 1355  Time Out: 5261 [facility], PT    Future Appointments   Date Time Provider Lorraine Coronel   2/1/2022  1:45 PM Erica Ford PT [facility]   2/3/2022  1:45 PM Erica Ford PT [facility]   4/4/2022 11:45 AM Greyson Hernandez MD [facility]   4/25/2022  1:20 PM MD ELVI Lopez POAI POA   5/17/2022 11:20 AM Jennifer De La Cruz NP [facility] PP

## 2022-01-27 NOTE — PROGRESS NOTES
Josseline Link  : 1950  [place] / Product Type: Medicare /  [address]  at [facility] LUCIANA GAGE  [address] [facility], [address] [place] [address],[address], [facility] [address].  [phone]   [phone]       OUTPATIENT PHYSICAL THERAPY:Progress Report 22   ICD-10: Treatment Diagnosis: M25.571 R ankle pain,  M25.671 R ankle stiffness, M25.471 swelling of R ankle joint  Precautions/Allergies:   Bactrim [sulfamethoxazole-trimethoprim]   TREATMENT PLAN  Effective Dates: 21 TO 3/19/2022 (90 days). Frequency/Duration: 1-2 times a week for 90 Day(s)  Pt started PT for this episode with this treating MD on 21 and has completed 6 visits. MEDICAL/REFERRING DIAGNOSIS:M84.371A Stress fracture of fight ankle, initial encounter, M25.571 R ankle pain  DATE OF ONSET: 2021  REFERRING PHYSICIAN: [place] Womack MD MD Orders: eval and treat  Return MD Appointment: unknown      ASSESSMENT (22): Josseline Link  presents with continued R ankle swelling, stiffness and not tolerating walking normal distances. He has made progress in ankle ROM and tolerates short distance walking on RW in the clinic. However, he has returned to using his CAM boot at home after over use of his foot and c/o pain in foot with walking. He has been trying to walk without walker and we emphasized the importance of using a walker at this stage of his rehab. We may go to a trial of aquatic PT if pain does not resolve with use of RW. FAAM score answered as if he was not walking with a RW. His initial FAAM score was based on walking with a  CAM boot on with RW. He will benefit from PT for guided rehab to promote normalized return of motion, strength, and function in order for safe return to walking which he needs for improving health. GOALS: (Goals have been discussed and agreed upon with patient. Short-Term Functional Goals: Time Frame: 4 weeks   1.  Report no more than minimal, intermittent 5/10 pain R ankle with basic walking. MET with walking with a RW. Ongoing without CAM boot. 1/11/22  2. R ankle PROM is greater than or equal to 5 degrees for ankle DF and 20 degrees for inversion. PROGRESSED 1/11/22   3. R ankle eversion and PF 5/5 for improved stability with static and dynamic balance, walking, and progressing into strengthening phase. PROGRESSED 1/11/22   4. Walking without limp on level surfaces and up stairs. PROGRESSED 1/11/22   Discharge Goals: Time Frame: 8 weeks  1. No significant pain R ankle  with all normalized daily home and work activities, and score greater than 55 on FAAM. ONGOING 1/11/22  2. Able to walk at least 30 minutes with min to no ankle pain with least AD. PROGRESSED 1/11/22  3. Able to return to doing previous exercise program prior to this R ankle injury. ONGOING 1/11/22  4. Independent with HEP for advanced R strengthening. ONGOING 1/11/22    OUTCOME MEASURE:   Tool Used: PT/OT FOOT AND ANKLE ABILITY MEASURE  Score:  Initial: 40/84 Most Recent: 30 1/11/22   Interpretation of Score: For the \"Activities of Daily Living\", there are 21 questions each scored on a 5 point scale with 0 representing \"Unable to do\" and 4 representing \"No difficulty\". The lower the score, the greater the functional disability. 84/84 represents no disability. Minimal detectable change is 5.7 points. With the addition of the 8 questions in the \"Sports Subscale,\" there are 29 questions, each scored on a 5 point scale with 0 representing \"Unable to do\" and 4 representing \"No difficulty\". The lower the score, the greater the functional disability. 116/116 represents no disability. Minimal detectable change is 12.3 points.     Tool Used: Lower Extremity Functional Scale (LEFS)  Score:  Initial: 50/80  1/11/22 Most Recent: X/80 (Date: -- )   Interpretation of Score: 20 questions each scored on a 5 point scale with 0 representing \"extreme difficulty or unable to perform\" and 4 representing \"no difficulty\". The lower the score, the greater the functional disability. 80/80 represents no disability. Minimal detectable change is 9 points. Score 80 79-63 62-48 47-32 31-16 15-1 0   Modifier CH CI CJ CK CL CM CN       MEDICAL NECESSITY:   · Patient is expected to demonstrate progress in strength, range of motion and balance to improve safety during walking. REASON FOR SERVICES/OTHER COMMENTS:  · Patient continues to require skilled intervention due to pain, stiff and weak ankle inhibiting ability to walk and affecting balance. .  Total Duration:  PT Patient Time In/Time Out  Time In: 1410  Time Out: 1455                  PAIN/SUBJECTIVE:   Initial:   2-3/10 Post Session:  No increase in pain   HISTORY:       Ambulatory/Rehab Services H2 Model Falls Risk Assessment   Risk Factors:       (1)  Gender [Male] Ability to Rise from Chair:       (1)  Pushes up, successful in one attempt   Falls Prevention Plan:       Physical Limitations to Exercise (specify):  will need supervision in standihng       Mobility Assistance Device (specify):  will need to stay with RW initially   Total: (5 or greater = High Risk): 2   ©2010 [facility] [address] [place] [address] [employer] Patent #[id number]. Federal Law prohibits the replication, distribution or use without written permission from [facility]   Current Medications:       Current Outpatient Medications:     meloxicam (MOBIC) 15 mg tablet, Take 1 Tablet by mouth daily. , Disp: 30 Tablet, Rfl: 1    glucose blood VI test strips (blood glucose test) strip, by Does Not Apply route See Admin Instructions. Asc[employer] Contour next test strips.   Check FS every day, DM2, E11.9, Disp: 100 Strip, Rfl: 5    omega 3-dha-epa-fish oil (Fish OiL) 100-160-1,000 mg cap, Take  by mouth., Disp: , Rfl:     cholecalciferol, vitamin D3, (Vitamin D3) 50 mcg (2,000 unit) tab, Take  by mouth., Disp: , Rfl:     TURMERIC PO, Take  by mouth., Disp: , Rfl:   insulin glargine-lixisenatide (Soliqua 100/33) 100 unit-33 mcg/mL inpn, 60 units sub-q daily  Indications: type 2 diabetes mellitus, Disp: 3 mL, Rfl: 5    pantoprazole (PROTONIX) 40 mg tablet, Take 1 Tablet by mouth daily. , Disp: 90 Tablet, Rfl: 1    montelukast (SINGULAIR) 10 mg tablet, Take 1 Tablet by mouth nightly. Indications: controller medication for asthma, Disp: 90 Tablet, Rfl: 1    lisinopriL (PRINIVIL, ZESTRIL) 10 mg tablet, Take 1 Tablet by mouth daily. Indications: high blood pressure, Disp: 90 Tablet, Rfl: 1    ezetimibe (ZETIA) 10 mg tablet, Take 1 Tablet by mouth nightly. Indications: high cholesterol, Disp: 90 Tablet, Rfl: 1    doxazosin (CARDURA) 4 mg tablet, Take 1 Tablet by mouth daily. Indications: enlarged prostate with urination problem, Disp: 90 Tablet, Rfl: 1    celecoxib (CeleBREX) 200 mg capsule, Take 1 Capsule by mouth daily. Indications: joint damage causing pain and loss of function, Disp: 90 Capsule, Rfl: 1    atorvastatin (LIPITOR) 20 mg tablet, Take 1 Tablet by mouth nightly. Indications: high cholesterol, Disp: 90 Tablet, Rfl: 1    apixaban (Eliquis) 5 mg tablet, Take 1 Tablet by mouth two (2) times a day. Indications: treatment to prevent blood clots in chronic atrial fibrillation, Disp: 180 Tablet, Rfl: 1    dapagliflozin (Farxiga) 10 mg tab tablet, Take 1 Tablet by mouth daily. Indications: type 2 diabetes mellitus, Disp: 90 Tablet, Rfl: 1    fluticasone furoate-vilanteroL (Breo Ellipta) 200-25 mcg/dose inhaler, Take 1 Puff by inhalation daily. , Disp: 3 Inhaler, Rfl: 1    metoprolol tartrate (LOPRESSOR) 50 mg tablet, Take 1 Tablet by mouth two (2) times a day.  Indications: ventricular rate control in atrial fibrillation, Disp: 180 Tablet, Rfl: 1    lancets 28 gauge misc, Check fs every day, DM2, E11.9, Disp: 100 Lancet, Rfl: 1    Insulin Needles, Disposable, (BD Jocelynn 2nd Gen Pen Needle) 32 gauge x 5/32\" ndle, by Does Not Apply route., Disp: , Rfl:    acetaminophen (TYLENOL) 500 mg tablet, Take 500-1,000 mg by mouth every six (6) hours as needed for Pain., Disp: , Rfl:     diphenhydrAMINE (BENADRYL) 50 mg capsule, Take 100 mg by mouth nightly as needed for Sleep., Disp: , Rfl:     colesevelam (WELCHOL) 625 mg tablet, Take 625-3,750 mg by mouth as needed for Diarrhea. Patient taking 2 tabs following diarrheal bowel movement up to 3 times. Maximum daily dosage: 6 tabs, Disp: , Rfl:     fluticasone propionate (FLONASE ALLERGY RELIEF) 50 mcg/actuation nasal spray, 2 Sprays by Both Nostrils route daily as needed for Allergies. Indications: inflammation of the nose due to an allergy, Disp: , Rfl:     albuterol (PROVENTIL HFA, VENTOLIN HFA, PROAIR HFA) 90 mcg/actuation inhaler, Take 1 Puff by inhalation every four (4) hours as needed for Wheezing., Disp: 1 Inhaler, Rfl: 5    ANTI-FUNGAL 2 % topical powder, APPLY TO ABDOMEN 2 TIMES PER DAY, Disp: , Rfl: 11    Blood-Glucose Meter monitoring kit, Check fs every day; DM2, E11/9, Contour glucometer, Disp: 1 Kit, Rfl: 0    albuterol (PROVENTIL VENTOLIN) 2.5 mg /3 mL (0.083 %) nebulizer solution, 3 mL by Nebulization route once for 1 dose. (Patient taking differently: 2.5 mg by Nebulization route daily as needed for Wheezing. Patient states he hasn't used in many years), Disp: 24 Each, Rfl: 11   Date last reviewed 1/11/22   EXAMINATION:     Observation/Orthostatic Postural Assessment:   Abnormalities: continued ankle swelling noted with redness and shiny skin at R medial ankle    Standing alignment: Bent over posture with significant thoracic kyphosis   Walking: walks with step to R gait with RW. Has returned to wearing a CAM boot when walking because of pain.      Supine: unable to lie supine  Palpation: less point tender to ankle  ROM:    RLE Assessment  RLE Assessment (WDL): Within defined limits  RLE PROM  R Hip Extension: 5  R Ankle Dorsiflexion: 8  R Ankle Plantar Flexion: 40  R Ankle Eversion: 10  R Ankle Inversion: 15                  Strength:    RLE Strength  R Hip Extension: 4-  R Knee Extension: 4  R Ankle Dorsiflexion: 4  R Ankle Plantar Flexion: 4-  R Ankle Eversion: 4  R Ankle Inversion: 4                         Special Tests: Muscle Length Tests: R gastroc flexiblity = -3 degrees   Neurological Screen: [place] neuro screen is intact  Functional Mobility:  Sit to/from Stand: pushes off with BARRY hands  Bed Mobility: unable to lie supine but is independent with compensation for getting to positions he can tolerate.

## 2022-02-01 ENCOUNTER — HOSPITAL ENCOUNTER (OUTPATIENT)
Dept: PHYSICAL THERAPY | Age: 72
Discharge: HOME | End: 2022-02-01
Payer: MEDICARE

## 2022-02-01 PROCEDURE — 97113 AQUATIC THERAPY/EXERCISES: CPT

## 2022-02-01 NOTE — PROGRESS NOTES
Concepción Nghia  : 1950  [place] / Product Type: Medicare /  [address]  at [facility] LUCIANA GAGE  [address] [place] [address], [address], [place] [address].  [phone]   [phone]       OUTPATIENT PHYSICAL THERAPY: Daily Treatment Note 2022  Visit Count: 11     ICD-10: Treatment Diagnosis: M25.571 R ankle pain, M25.671 R ankle stiffness  Precautions/Allergies:   Bactrim [sulfamethoxazole-trimethoprim]   TREATMENT PLAN:  Effective Dates: 21 TO 3/19/2022 (90 days). Frequency/Duration: 1-2 times a week for 90 Day(s) MEDICAL/REFERRING DIAGNOSIS:  M84.371A stress fracture of R ankle, initial encounter, M25.571 R ankle pain  DATE OF ONSET: chronic  REFERRING PHYSICIAN: Ovidio Schumacher.  MD Orders:  Eval and treat, include modalities of soft tissue, dry needling, and traction  Return MD Appointment: NA       Pre-treatment Symptoms/Complaints: still having pain at 5th met. He states he wears CAM boot when leaving here. He has requested a second opinion at [facility]. He does not know if he will be seen yet. Pain: Initial:     0/10 with CAM boot ; 3/10 in pool. Post Session:  Stated no pain at session end. Medications Last Reviewed:  22  Observation:  Continues to wear CAM boot when he arrives.     TREATMENT:   Aquatic Therapy(25 min) to improve core strength and endurance, ankle ROM and strength   Date:  22 Date:  22 Date:  22   Activity/Exercise Parameters Parameters Parameters   Ankle stretches On seat  Ankle evr/inv 10x  Ankle DF 5x ea On seat  Ankle evr/inv 10x  Ankle DF 5x ea On seat  Ankle evr/inv 10x  Ankle DF 5x ea    4.5 \"  4.5\" 4.5\"   Standing psoas, achilles stretch 30 sec  30 sec ea side 30 sec ea side   Hip ABD Big 10x  Small and fast  10x2 Big 10x  Small and fast  10x2 Big 10x  Small and fast  10x   Hip EXT Big 10x  Small and fast  10x2 Big 10x  Small and fast  10x2 Big 10x  Small and fast  10x   Heel raises 20x 20x    Step up next      Walking 4.5 ft Walking 4.5 ft Walking 4.5 ft   forward 5 circles 5 circles 3 circles   backward 5 laps 5 laps    marching  5 laps    Side step  2 laps                 3.5 ft 3.5 ft 4.5 ft   Open stance for balance Shoulder ext  With paddles  L foot back 10x  R foot back 10x Shoulder ext  With paddles  L foot back 10x  R foot back 10x Shoulder ext  No  paddles  L foot back 10x  R foot back 10x    With trunk rotation  20x ea side   Deep water   7ft      Bicycle 3 min  Scissors 2 min                                   HEP: pt to do updated HEP for land. Discussed pt wearing ankle/foot elastic brace or a brace his has at home for now. Pt to continue using RW as well. [employer] Portal    Treatment/Session Summary:    · Response to Treatment: did not do as much walking or any heel raise because of reports of foot pain even in water. At session end, palpated foot and pt had mild point tender pain at superior posterior 5th met- very close to cuboid and was tender at cuboid area. May have dropped cuboid syndrome. Will address this at next visit. · Communication/Consultation:  None today  · Equipment provided today:  None today  · Recommendations/Intent for next treatment session: Next visit will continue focus on ankle ROM, strengthening. Evaluated for cuboid syndrome.      Total Treatment Billable Duration: 25 min  PT Patient Time In/Time Out  Time In: 1355  Time Out: [address] Fraust[address]    Future Appointments   Date Time Provider Lorraine Coronel   2/3/2022  1:45 PM Shon Concepcion [facility]   4/4/2022 11:45 AM Jhony Beauchamp MD [facility] UCD   4/25/2022  1:20 PM Stella Singh MD [facility] POAI POA   5/17/2022 11:20 AM Denise Arriaga NP [facility] PSCD PP

## 2022-02-03 ENCOUNTER — HOSPITAL ENCOUNTER (OUTPATIENT)
Dept: PHYSICAL THERAPY | Age: 72
Discharge: HOME | End: 2022-02-03
Payer: MEDICARE

## 2022-02-03 PROCEDURE — 97140 MANUAL THERAPY 1/> REGIONS: CPT

## 2022-02-03 NOTE — PROGRESS NOTES
Mario Redo  : 1950  [place] / Product Type: Medicare /  [address]  at [facility] LUCIANA GAGE  [address] [facility], [address], [place].  [phone]   [phone]       OUTPATIENT PHYSICAL THERAPY: Daily Treatment Note 2/3/2022  Visit Count: 12     ICD-10: Treatment Diagnosis: M25.571 R ankle pain, M25.671 R ankle stiffness  Precautions/Allergies:   Bactrim [sulfamethoxazole-trimethoprim]   TREATMENT PLAN:  Effective Dates: 21 TO 3/19/2022 (90 days). Frequency/Duration: 1-2 times a week for 90 Day(s) MEDICAL/REFERRING DIAGNOSIS:  M84.371A stress fracture of R ankle, initial encounter, M25.571 R ankle pain  DATE OF ONSET: chronic  REFERRING PHYSICIAN: Roxana Potts.  MD Orders:  Eval and treat, include modalities of soft tissue, dry needling, and traction  Return MD Appointment: NA       Pre-treatment Symptoms/Complaints: still having pain at 5th met and just above that. Pain is mostly at night when CAM boot is not on. Pain: Initial:     0/10 with CAM boot- increased pain at night. Post Session:  Stated no pain at session end. Medications Last Reviewed:  22  Observation:  Continues to wear CAM boot when he arrives. Palpation:  Tender to anterior talar fibula ligament. Moderately tender to superior posterior 5th met- had to tell if it is more on the side or on top. ROM:  Very stiff to R ankle inversion ~ 10 degrees of AROM. ~ 20 degrees PROM by session end. TREATMENT:   Manual:  (30 min) To improve motion, strength, decrease pain  -- transverse friction massage and stretch to R Anterior talo-fib ligament  --  myofascial release for R peroneals  -- cuboid AP mob  -- R ankle eversion and DF physio mobs grade 2 to 4  -- subtalar lateral glide  Kinesio taping:   At medial ankle: 5 strips for ankle swelling, correction for R achilles tendon, U tape at medial ankle for support and peroneal tendon support with [employer] strip. Pt instructed to wear tape for 48 hours or if any redness or itching of skin occurred. Pt verbally agreed. Aquatic Therapy(0 min)NOT TODAY    to improve core strength and endurance, ankle ROM and strength   Date:  1/25/22 Date:  1/27/22 Date:  2/1/22   Activity/Exercise Parameters Parameters Parameters   Ankle stretches On seat  Ankle evr/inv 10x  Ankle DF 5x ea On seat  Ankle evr/inv 10x  Ankle DF 5x ea On seat  Ankle evr/inv 10x  Ankle DF 5x ea    4.5 \"  4.5\" 4.5\"   Standing psoas, achilles stretch 30 sec  30 sec ea side 30 sec ea side   Hip ABD Big 10x  Small and fast  10x2 Big 10x  Small and fast  10x2 Big 10x  Small and fast  10x   Hip EXT Big 10x  Small and fast  10x2 Big 10x  Small and fast  10x2 Big 10x  Small and fast  10x   Heel raises 20x 20x    Step up next      Walking 4.5 ft Walking 4.5 ft Walking 4.5 ft   forward 5 circles 5 circles 3 circles   backward 5 laps 5 laps    marching  5 laps    Side step  2 laps                 3.5 ft 3.5 ft 4.5 ft   Open stance for balance Shoulder ext  With paddles  L foot back 10x  R foot back 10x Shoulder ext  With paddles  L foot back 10x  R foot back 10x Shoulder ext  No  paddles  L foot back 10x  R foot back 10x    With trunk rotation  20x ea side   Deep water   7ft      Bicycle 3 min  Scissors 2 min                                   HEP: pt to do updated HEP for land. Discussed pt wearing ankle/foot elastic brace or a brace his has at home for now. Pt to continue using RW as well. 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 Will proceed with aquatic therapy with caution to not increase arthritic pain and with waiting on follow up opinion for foot pain.    · Communication/Consultation:  None today  · Equipment provided today: None today  · Recommendations/Intent for next treatment session: Next visit will continue focus on ankle ROM, strengthening.       Total Treatment Billable Duration: 30 min  PT Patient Time In/Time Out  Time In: 1355  Time Out: [address]    Future Appointments   Date Time Provider Lorraine Coronel   2/8/2022  2:45 PM Devora Salm, PT [facility]   2/10/2022  1:45 PM Devora Salm, PT [facility]   2/15/2022  1:45 PM Devora Salm, PT [facility]   2/17/2022  1:45 PM Devora Salm, PT [facility]   2/22/2022  2:00 PM Devora Salm, PT [facility]   2/24/2022  1:45 PM Devora Salm, PT [facility]   3/1/2022  1:45 PM Devora Salm, PT [facility]   3/3/2022  1:45 PM Devora Salm, PT [facility]   3/8/2022  2:00 PM Devora Salm, PT [facility]   3/10/2022  1:45 PM Devora Salm, PT [facility]   3/15/2022  1:45 PM Devora Salm, PT [facility]   3/17/2022  1:45 PM Devora Salm, PT [facility]   3/22/2022  2:00 PM Devora Salm, PT [facility]   3/24/2022  1:45 PM Devora Salm, PT [facility]   3/29/2022  1:45 PM Devora Salm, PT [facility]   3/31/2022  1:45 PM Devora Salm, PT [facility]   4/4/2022 11:45 AM Alcon Jones MD [facility]   4/25/2022  1:20 PM Tulio Yarbrough MD [facility] PO[facility] POA   5/17/2022 11:20 AM Shantelle Stringer NP [facility] PSCD PP

## 2022-02-08 ENCOUNTER — HOSPITAL ENCOUNTER (OUTPATIENT)
Dept: PHYSICAL THERAPY | Age: 72
Discharge: HOME | End: 2022-02-08
Payer: MEDICARE

## 2022-02-08 PROCEDURE — 97113 AQUATIC THERAPY/EXERCISES: CPT

## 2022-02-08 NOTE — PROGRESS NOTES
Mario Redo  : 1950  [place] / Product Type: Medicare /  [address] at [facility] LUCIANA GAGE  [address] [facility], [address], [place] [address].  [phone]   [phone]       OUTPATIENT PHYSICAL THERAPY: Daily Treatment Note 2/10/2022  Visit Count: 13     ICD-10: Treatment Diagnosis: M25.571 R ankle pain, M25.671 R ankle stiffness  Precautions/Allergies:   Bactrim [sulfamethoxazole-trimethoprim]   TREATMENT PLAN:  Effective Dates: 21 TO 3/19/2022 (90 days). Frequency/Duration: 1-2 times a week for 90 Day(s) MEDICAL/REFERRING DIAGNOSIS:  M84.371A stress fracture of R ankle, initial encounter, M25.571 R ankle pain  DATE OF ONSET: chronic  REFERRING PHYSICIAN: Roxana Potts.  MD Orders:  Eval and treat, include modalities of soft tissue, dry needling, and traction  Return MD Appointment: NA       Pre-treatment Symptoms/Complaints: had less pain in foot at night. Thinking tape may have helped. still having pain at 5th met and just above that. Pain is mostly at night when CAM boot is not on. Pain: Initial:     6/10 without CAM boot- walking from parking lot into clinic. Post Session: no VAS. No report of increase in foot pain. Medications Last Reviewed:  22  Observation:  Did not wear CAM boot when he arrived for ease of changing for pool. Palpation:  At 2/3/22 Tender to anterior talar fibula ligament. Moderately tender to superior posterior 5th met- had to tell if it is more on the side or on top. ROM:  At 2/3/22 Very stiff to R ankle inversion ~ 10 degrees of AROM. ~ 20 degrees PROM by session end. TREATMENT:   Manual:  (5 min) to support ankle  Kinesio taping:   correction for R achilles tendon, U tape at medial ankle for support and peroneal tendon support with Y strip. Pt instructed to wear tape for 48 hours or if any redness or itching of skin occurred. Pt verbally agreed.     Aquatic Therapy(30 min)    to improve core strength and endurance, ankle ROM and strength   Date:  1/25/22 Date:  1/27/22 Date:  2/1/22 Date  2/822   Activity/Exercise Parameters Parameters Parameters    Ankle stretches On seat  Ankle evr/inv 10x  Ankle DF 5x ea On seat  Ankle evr/inv 10x  Ankle DF 5x ea On seat  Ankle evr/inv 10x  Ankle DF 5x ea On seat  Ankle evr/inv 10x  Ankle DF 5x ea  ~ 5 min    4.5 \"  4.5\" 4.5\" 4.5\"   Standing psoas, achilles stretch 30 sec  30 sec ea side 30 sec ea side 30 sec ea side   Hip ABD Big 10x  Small and fast  10x2 Big 10x  Small and fast  10x2 Big 10x  Small and fast  10x Big 10x  Small and fast  10x   Hip EXT Big 10x  Small and fast  10x2 Big 10x  Small and fast  10x2 Big 10x  Small and fast  10x Big 10x  Small and fast  10x   Heel raises 20x 20x     Side step    5 steps x 5 ea way   March in place    With tap  20 x alternating   Step up    4.5 ft  10x ea          Step up next       Walking 4.5 ft Walking 4.5 ft Walking 4.5 ft    forward 5 circles 5 circles 3 circles    backward 5 laps 5 laps     marching  5 laps       2 laps             3.5 ft 3.5 ft 4.5 ft 4.5 ft   Open stance for balance Shoulder ext  With paddles  L foot back 10x  R foot back 10x Shoulder ext  With paddles  L foot back 10x  R foot back 10x Shoulder ext  No  paddles  L foot back 10x  R foot back 10x    With trunk rotation  20x ea side Shoulder ext  No  paddles  L foot back 10x  R foot back 10x   Deep water   7ft       Bicycle 3 min  Scissors 2 min                                                                     HEP: pt to do updated HEP for land. Discussed pt wearing ankle/foot elastic brace or a brace his has at home for now. Pt to continue using RW as well. [facility] Portal    Treatment/Session Summary:    · Response to Treatment:  Pt seemed to tolerate exercises in water. Will assess long term response to today's session.   · Communication/Consultation:  None today  · Equipment provided today:  None today  · Recommendations/Intent for next treatment session: Next visit will continue focus on ankle ROM, strengthening.       Total Treatment Billable Duration: 35 min  PT Patient Time In/Time Out  Time In: 9212  Time Out: [address], LASHELL    Future Appointments   Date Time Provider Lorraine Coronel   2/15/2022  1:45 PM Robbert [place], PT [facility]   2/17/2022  1:45 PM Robbert [place], PT [facility]   2/22/2022  2:00 PM Robbert [place], PT [facility]   2/24/2022  1:45 PM Robbert [place], PT [facility]   3/1/2022  1:45 PM Robbert [place], PT [facility]   3/3/2022  1:45 PM Robbert Ksenia, PT [facility]   3/8/2022  2:00 PM Robbert Ksenia, PT [facility]   3/10/2022  1:45 PM Robbert Ksenia, PT [facility]   3/15/2022  1:45 PM Robbert Green Castle, PT [facility]   3/17/2022  1:45 PM Robbert Ksenia, PT [facility]   3/22/2022  2:00 PM Robbert Green Castle, PT [facility]   3/24/2022  1:45 PM Robbert Green Castle, PT [facility]   3/29/2022  1:45 PM Robbert Ksenia, PT [facility]   3/31/2022  1:45 PM Robbert Ksenia, PT [facility]   4/4/2022 11:45 AM Antonieta Kiser MD [facility]   4/25/2022  1:20 PM Steph Perez MD [facility] PO[facility] POA   5/17/2022 11:20 AM Viral Quevedo NP [facility] PSCD PP

## 2022-02-10 ENCOUNTER — HOSPITAL ENCOUNTER (OUTPATIENT)
Dept: PHYSICAL THERAPY | Age: 72
Discharge: HOME | End: 2022-02-10
Payer: MEDICARE

## 2022-02-10 PROCEDURE — 97113 AQUATIC THERAPY/EXERCISES: CPT

## 2022-02-10 NOTE — PROGRESS NOTES
Meenakshi Little  : 1950  [place] / Product Type: Medicare /  [address] at [facility] LUCIANA GAGE  [address] [facility], [address], [facility] [place].  [phone]   [phone]       OUTPATIENT PHYSICAL THERAPY: Daily Treatment Note 2/10/2022  Visit Count: 14     ICD-10: Treatment Diagnosis: M25.571 R ankle pain, M25.671 R ankle stiffness  Precautions/Allergies:   Bactrim [sulfamethoxazole-trimethoprim]   TREATMENT PLAN:  Effective Dates: 21 TO 3/19/2022 (90 days). Frequency/Duration: 1-2 times a week for 90 Day(s) MEDICAL/REFERRING DIAGNOSIS:  M84.371A stress fracture of R ankle, initial encounter, M25.571 R ankle pain  DATE OF ONSET: chronic  REFERRING PHYSICIAN: Juan Mcfadden.  MD Orders:  Eval and treat, include modalities of soft tissue, dry needling, and traction  Return MD Appointment: NA       Pre-treatment Symptoms/Complaints: pain had gotten up to a 6-7/10 after leaving and was painful that evening but wore CAM boot again the next day and pain was better. Taping seems helpful to night foot pain. Had less pain with walking into clinic without CAM boot on. Pain: Initial:     3/10 without CAM boot- walking from parking lot into clinic. Post Session: no VAS. No report of increase in foot pain. Medications Last Reviewed:  2/10/22  Observation:  Did not wear CAM boot again when he arrived for ease of changing for pool. Palpation:  At 2/3/22 Tender to anterior talar fibula ligament. Moderately tender to superior posterior 5th met- had to tell if it is more on the side or on top. ROM:  At 2/3/22 Very stiff to R ankle inversion ~ 10 degrees of AROM. ~ 20 degrees PROM by session end.     TREATMENT:   Manual:  (0 min)  Aquatic Therapy(40 min)    to improve core strength and endurance, ankle ROM and strength  Kinesio taping at session end: U tape at medial ankle for support and peroneal tendon support with Y strip and I strip at anterior talo-fib ligament. Pt instructed to wear tape for 48 hours or if any redness or itching of skin occurred. Pt verbally agreed.    Date:  1/25/22 Date:  1/27/22 Date:  2/1/22 Date  2/822 Date  2/10/22   Activity/Exercise Parameters Parameters Parameters     Ankle stretches On seat  Ankle evr/inv 10x  Ankle DF 5x ea On seat  Ankle evr/inv 10x  Ankle DF 5x ea On seat  Ankle evr/inv 10x  Ankle DF 5x ea On seat  Ankle evr/inv 10x  Ankle DF 5x ea  ~ 5 min On seat  Ankle evr/inv 10x  Ankle DF 5x ea  ~ 5 min    4.5 \"  4.5\" 4.5\" 4.5\" 4.5\"   Standing psoas, achilles stretch 30 sec  30 sec ea side 30 sec ea side 30 sec ea side 30 sec x 2 ea side   Hip ABD Big 10x  Small and fast  10x2 Big 10x  Small and fast  10x2 Big 10x  Small and fast  10x Big 10x  Small and fast  10x Big 10x  Small and fast  10x   Hip EXT Big 10x  Small and fast  10x2 Big 10x  Small and fast  10x2 Big 10x  Small and fast  10x Big 10x  Small and fast  10x Big 10x  Small and fast  10x   Heel raises 20x 20x      Side step     5 steps x 5 ea way   March in place    With tap  20 x alternating With tap  10 x alternating   Step up    4.5 ft  10x ea 4.5 ft  5x ea           Step up next        Walking 4.5 ft Walking 4.5 ft Walking 4.5 ft  Walking 4.5 ft   forward 5 circles 5 circles 3 circles     backward 5 laps 5 laps      marching  5 laps        2 laps               3.5 ft 3.5 ft 4.5 ft 4.5 ft 4.5 ft   Open stance for balance Shoulder ext  With paddles  L foot back 10x  R foot back 10x Shoulder ext  With paddles  L foot back 10x  R foot back 10x Shoulder ext  No  paddles  L foot back 10x  R foot back 10x    With trunk rotation  20x ea side Shoulder ext  No  paddles  L foot back 10x  R foot back 10x Shoulder ext  With paddles  L foot back 10x  R foot back 10x   Deep water   7ft  7 ft      Bicycle 3 min  Scissors 2 min  30 sec fast, 30 sec rest  Bicycle 2x  Scissors 3x,  Cross country ski 3x HEP: pt to do updated HEP for land. Discussed pt wearing ankle/foot elastic brace or a brace his has at home for now. Pt to continue using RW as well. [employer] Portal    Treatment/Session Summary:    · Response to Treatment:  Pt seemed to tolerate exercises in water. Will assess long term response to today's session. · Communication/Consultation:  None today  · Equipment provided today:  None today  · Recommendations/Intent for next treatment session: Next visit will continue focus on ankle ROM, strengthening.       Total Treatment Billable Duration: 40 min  PT Patient Time In/Time Out  Time In: 1348  Time Out: [address] FAJARDO PT    Future Appointments   Date Time Provider Lorraine Coronel   2/15/2022  1:45 PM Radha Madelin, PT [facility]   2/17/2022  1:45 PM Radha Madelin, PT [facility]   2/22/2022  2:00 PM Radha Madelin, PT [facility]   2/24/2022  1:45 PM Radha Madelin, PT [facility]   3/1/2022  1:45 PM Radha Madelin, PT [facility]   3/3/2022  1:45 PM Radha Madelin, PT [facility]   3/8/2022  2:00 PM Radha Madelin, PT [facility]   3/10/2022  1:45 PM Radha Madelin, PT [facility]   3/15/2022  1:45 PM Radha Madelin, PT [facility]   3/17/2022  1:45 PM Radha Madelin, PT [facility]   3/22/2022  2:00 PM Radha Madelin, PT [facility]   3/24/2022  1:45 PM Radha Madelin, PT [facility]   3/29/2022  1:45 PM Radha Madelin, PT [facility]   3/31/2022  1:45 PM Radha Madelin, PT [facility]   4/4/2022 11:45 AM Shantal Henao MD [facility] UCDG UCD   4/25/2022  1:20 PM Fatemeh Talley MD [facility] POAI POA   5/17/2022 11:20 AM BATSHEVA Augustin PSCD PP

## 2022-02-15 ENCOUNTER — HOSPITAL ENCOUNTER (OUTPATIENT)
Dept: PHYSICAL THERAPY | Age: 72
Discharge: HOME | End: 2022-02-15
Payer: MEDICARE

## 2022-02-15 PROCEDURE — 97113 AQUATIC THERAPY/EXERCISES: CPT

## 2022-02-15 NOTE — PROGRESS NOTES
Miki South  : 1950  [place] / Product Type: Medicare /  [address]  at [facility] LUCIANA GAGE  [address] [place] [address], [address], [place] [address].  [phone]   [phone]       OUTPATIENT PHYSICAL THERAPY: Daily Treatment Note 2/15/2022  Visit Count: 15     ICD-10: Treatment Diagnosis: M25.571 R ankle pain, M25.671 R ankle stiffness  Precautions/Allergies:   Bactrim [sulfamethoxazole-trimethoprim]   TREATMENT PLAN:  Effective Dates: 21 TO 3/19/2022 (90 days). Frequency/Duration: 1-2 times a week for 90 Day(s) MEDICAL/REFERRING DIAGNOSIS:  M84.371A stress fracture of R ankle, initial encounter, M25.571 R ankle pain  DATE OF ONSET: chronic  REFERRING PHYSICIAN: Fatemeh Talley.  MD Orders:  Eval and treat, include modalities of soft tissue, dry needling, and traction  Return MD Appointment: NA       Pre-treatment Symptoms/Complaints: today ankle pain is near 8/10. Tried to do a little more on it because I am tired of doing nothing and there are things that need to be done at our house. Pain: Initial:     8/10 without CAM boot- walking from parking lot into clinic. Post Session: no VAS. No report of increase in foot pain. We did not walk do any WB exercises on foot today. Medications Last Reviewed:  2/15/22  Observation:  Did not wear CAM boot again when he arrived for ease of changing for pool. Palpation:  At 2/3/22 Tender to anterior talar fibula ligament. Moderately tender to superior posterior 5th met- had to tell if it is more on the side or on top. ROM:  At 2/3/22 Very stiff to R ankle inversion ~ 10 degrees of AROM. ~ 20 degrees PROM by session end.     TREATMENT:   Manual:  (0 min)  Aquatic Therapy(23 min)    to improve core strength and endurance, ankle ROM and strength  Kinesio taping at session end: Kinesio taping at peroneal tendon and achilles tendon support with Y strip and Leuko taping at anterior talo-fib ligament. Pt instructed to wear tape for 48 hours or if any redness or itching of skin occurred. Pt verbally agreed.    Date:  1/25/22 Date:  1/27/22 Date:  2/1/22 Date  2/822 Date  2/10/22 Date  2/15/22   Activity/Exercise Parameters Parameters Parameters      Ankle stretches On seat  Ankle evr/inv 10x  Ankle DF 5x ea On seat  Ankle evr/inv 10x  Ankle DF 5x ea On seat  Ankle evr/inv 10x  Ankle DF 5x ea On seat  Ankle evr/inv 10x  Ankle DF 5x ea  ~ 5 min On seat  Ankle evr/inv 10x  Ankle DF 5x ea  ~ 5 min On seat  Ankle evr/inv 10x  Ankle DF 5x ea  ~ 5 min    4.5 \"  4.5\" 4.5\" 4.5\" 4.5\"    Standing psoas, achilles stretch 30 sec  30 sec ea side 30 sec ea side 30 sec ea side 30 sec x 2 ea side    Hip ABD Big 10x  Small and fast  10x2 Big 10x  Small and fast  10x2 Big 10x  Small and fast  10x Big 10x  Small and fast  10x Big 10x  Small and fast  10x    Hip EXT Big 10x  Small and fast  10x2 Big 10x  Small and fast  10x2 Big 10x  Small and fast  10x Big 10x  Small and fast  10x Big 10x  Small and fast  10x    Heel raises 20x 20x       Side step     5 steps x 5 ea way    March in place    With tap  20 x alternating With tap  10 x alternating    Step up    4.5 ft  10x ea 4.5 ft  5x ea             Step up next         Walking 4.5 ft Walking 4.5 ft Walking 4.5 ft  Walking 4.5 ft    forward 5 circles 5 circles 3 circles      backward 5 laps 5 laps       marching  5 laps         2 laps                 3.5 ft 3.5 ft 4.5 ft 4.5 ft 4.5 ft    Open stance for balance Shoulder ext  With paddles  L foot back 10x  R foot back 10x Shoulder ext  With paddles  L foot back 10x  R foot back 10x Shoulder ext  No  paddles  L foot back 10x  R foot back 10x    With trunk rotation  20x ea side Shoulder ext  No  paddles  L foot back 10x  R foot back 10x Shoulder ext  With paddles  L foot back 10x  R foot back 10x    Deep water   7ft  7 ft 7 ft      Bicycle 3 min  Scissors 2 min  30 sec fast, 30 sec rest  Bicycle 2x  Scissors 3x,  [place] country ski 3x 30 sec fast, 20 sec rest  Bicycle 5x  Scissors 5x,  Cross country ski 5x  Knees to chest 5x                                                                                      HEP: pt to do updated HEP for land. Continued to discuss pt wearing ankle/foot elastic brace or a brace made for ankle sprains. Pt to continue using RW as well. [employer] Portal    Treatment/Session Summary:    · Response to Treatment:  Pt's ankle pain seems to be increasing. Concerned he has ligamentous strain that is significant. · Communication/Consultation:  None today  · Equipment provided today:  None today  · Recommendations/Intent for next treatment session: Next visit will continue focus on ankle ROM, strengthening. Pt may need to just work on cardio on his own in pool and with weights for now until we get feed back from second opinion. Will consider this at next visit.      Total Treatment Billable Duration: 23 min  PT Patient Time In/Time Out  Time In: 1400  Time Out: [address], PT    Future Appointments   Date Time Provider Lorraine Coronel   2/17/2022  1:45 PM Yuly Overlie, PT [facility]   2/22/2022  2:00 PM Yuly Overlie, PT [facility]   2/24/2022  1:45 PM Yuly Overlie, PT [facility]   3/1/2022  1:45 PM Yuly Overlie, PT [facility]   3/3/2022  1:45 PM Yuly Overlie, PT [facility]   3/8/2022  2:00 PM Yuly Overlie, PT [facility]   3/10/2022  1:45 PM Yuly Overlie, PT [facility]   3/15/2022  1:45 PM Yuly Overlie, PT [facility]   3/17/2022  1:45 PM Yuly Overlie, PT [facility]   3/22/2022  2:00 PM Yuly Overlie, PT [facility]   3/24/2022  1:45 PM Yuly Overlie, PT [facility]   3/29/2022  1:45 PM Yuly Overlie, PT [facility]   3/31/2022  1:45 PM Yuly Overlie, PT [facility]   4/4/2022 11:45 AM Elmer Spring MD [facility]   4/25/2022 1:20 PM MD ELVI Bai   5/17/2022 11:20 AM BATSHEVA Martinez [facility]

## 2022-02-17 ENCOUNTER — HOSPITAL ENCOUNTER (OUTPATIENT)
Dept: PHYSICAL THERAPY | Age: 72
Discharge: HOME | End: 2022-02-17
Payer: MEDICARE

## 2022-02-17 PROCEDURE — 97113 AQUATIC THERAPY/EXERCISES: CPT

## 2022-02-17 NOTE — PROGRESS NOTES
Brandon Thompson  : 1950  [place] / Product Type: Medicare /  [address] at [facility] LUCIANA GAGE  [address] [place] [address], [address], [place] [address].  [phone]   [phone]       OUTPATIENT PHYSICAL THERAPY: Daily Treatment Note 2022  Visit Count: 16     ICD-10: Treatment Diagnosis: M25.571 R ankle pain, M25.671 R ankle stiffness  Precautions/Allergies:   Bactrim [sulfamethoxazole-trimethoprim]   TREATMENT PLAN:  Effective Dates: 21 TO 3/19/2022 (90 days). Frequency/Duration: 1-2 times a week for 90 Day(s) MEDICAL/REFERRING DIAGNOSIS:  M84.371A stress fracture of R ankle, initial encounter, M25.571 R ankle pain  DATE OF ONSET: chronic  REFERRING PHYSICIAN: Jose Juan Mancia.  MD Orders:  Eval and treat, include modalities of soft tissue, dry needling, and traction  Return MD Appointment: NA       Pre-treatment Symptoms/Complaints: today ankle pain is better than last session. Taping on doing non WB work has been helpful. He has had other family situations to attend to and has not yet been able to get an ankle brace. Pain: Initial:     6-8/10 without CAM boot- walking from parking lot into clinic. Post Session: no VAS. No report of increase in foot pain. We did not walk do any WB exercises on foot again today. Medications Last Reviewed:  2/15/22  Observation:     Palpation:  At 2/3/22 Tender to anterior talar fibula ligament. Moderately tender to superior posterior 5th met- had to tell if it is more on the side or on top. ROM:  At 2/3/22 Very stiff to R ankle inversion ~ 10 degrees of AROM. ~ 20 degrees PROM by session end. TREATMENT:   Manual:  (0 min)  Aquatic Therapy(23 min)    to improve core strength and endurance, ankle ROM and strength  Kinesio taping at session end: Kinesio taping at peroneal tendon and achilles tendon support with Y strip and Leuko taping at anterior talo-fib ligament.     Pt instructed to wear tape for 48 hours or if any redness or itching of skin occurred. Pt verbally agreed. Date  2/17/22   Activity/Exercise    Ankle stretches On seat  Ankle evr   ~ 2 min       Standing psoas, achilles stretch    Hip ABD    Hip EXT    Heel raises    Side step    March in place    Step up        Step up        forward    backward    marching                Open stance for balance    Deep water 7 ft    30 sec fast, 20 sec rest  Bicycle 5x  Scissors 5x,  Cross country ski 5x  Knees to chest 5x                                         HEP: pt to do updated HEP for land. Continued to discuss pt wearing ankle/foot elastic brace or a brace made for ankle sprains. Pt to continue using RW as well. [employer] Portal    Treatment/Session Summary:    · Response to Treatment:  Pt is independent with above program and with weights we did for his arms from previous HEP to work on his stamina and not go backwards in his back/neck progress. · Communication/Consultation:  None today  · Equipment provided today:  None today  · Recommendations/Intent for next treatment session: discharge to [facility] for now while pt is trying to get a second opinion on his ankle.      Total Treatment Billable Duration: 23 min       Mark Novoa, PT    Future Appointments   Date Time Provider Lorraine Coronel   2/22/2022  7:00 PM [facility]   4/4/2022 11:45 AM Ben Reese MD [facility]   4/25/2022  1:20 PM MD ELVI Wharton [facility]   5/17/2022 11:20 AM Ana Louie NP [facility] PP

## 2022-02-22 ENCOUNTER — APPOINTMENT (OUTPATIENT)
Dept: PHYSICAL THERAPY | Age: 72
End: 2022-02-22
Payer: MEDICARE

## 2022-02-22 NOTE — PROGRESS NOTES
Miki South  : 1950  [place] / Product Type: Medicare /  [address]  at [facility] LUCIANA GAGE  [address] [place] [address], [address], [place] [address].  [phone]   [phone]       OUTPATIENT PHYSICAL THERAPY:Discharge    ICD-10: Treatment Diagnosis: M25.571 R ankle pain,  M25.671 R ankle stiffness, M25.471 swelling of R ankle joint  Precautions/Allergies:   Bactrim [sulfamethoxazole-trimethoprim]   TREATMENT PLAN  Effective Dates: 21 TO 3/19/2022 (90 days). Frequency/Duration: 1-2 times a week for 90 Day(s)  Pt started PT for this episode with this treating MD on 21 and has completed 16 visits. MEDICAL/REFERRING DIAGNOSIS:M84.371A Stress fracture of fight ankle, initial encounter, M25.571 R ankle pain  DATE OF ONSET: 2021  REFERRING PHYSICIAN: Fatemeh Talley MD MD Orders: eval and treat  Return MD Appointment: unknown      ASSESSMENT (22): Miki South  presents with continued R ankle swelling, stiffness and not tolerating walking normal distances. He returned to using CAM boot at home since he was having increase in ankle pain with WB activities. He has not yet purchased an ankle brace as discussed at last visit with Dr. Elissa Terrazas, but is planning to do so. He made some progress with ankle ROM but was not improving ability to do WB exercises and was having pain in foot even with doing WB exercises while in the pool. He is independent with a pool program in the [place] so that he can continue some form of exercise. He is being discharged from PT at this time since he is not making progress. He will either get a second opinion regarding his ankle or return to Dr. Cecille Renae office has made progress in ankle ROM and tolerates short distance walking on RW in the clinic. GOALS: (Goals have been discussed and agreed upon with patient. Short-Term Functional Goals: Time Frame: 4 weeks   1.  Report no more than minimal, intermittent 5/10 pain R ankle with basic walking. MET with walking with a RW. Ongoing without CAM boot. 2/17/22  2. R ankle PROM is greater than or equal to 5 degrees for ankle DF and 20 degrees for inversion. MET 2/17/22 but sometimes still has difficulty with inversion  3. R ankle eversion and PF 5/5 for improved stability with static and dynamic balance, walking, and progressing into strengthening phase. PROGRESSED 2/17/22  4. Walking without limp on level surfaces and up stairs. Not met 2/17/22  Discharge Goals: Time Frame: 8 weeks  1. No significant pain R ankle  with all normalized daily home and work activities, and score greater than 55 on FAAM. Not met 2/17/22  2. Able to walk at least 30 minutes with min to no ankle pain with least AD. Not met 2/17/22  3. Able to return to doing previous exercise program prior to this R ankle injury. Not met 2/17/22  4. Independent with [facility] for advanced R strengthening. OUTCOME MEASURE:   Tool Used: PT/OT FOOT AND ANKLE ABILITY MEASURE  Score:  Initial: 40/84  30 date 1/11/22 Most Recent:38/84 date 2/17/22   Interpretation of Score: For the \"Activities of Daily Living\", there are 21 questions each scored on a 5 point scale with 0 representing \"Unable to do\" and 4 representing \"No difficulty\". The lower the score, the greater the functional disability. 84/84 represents no disability. Minimal detectable change is 5.7 points. With the addition of the 8 questions in the \"Sports Subscale,\" there are 29 questions, each scored on a 5 point scale with 0 representing \"Unable to do\" and 4 representing \"No difficulty\". The lower the score, the greater the functional disability. 116/116 represents no disability. Minimal detectable change is 12.3 points.     Tool Used: Lower Extremity Functional Scale (LEFS)  Score:  Initial: 50/80  1/11/22 Most Recent: X/80 (Date: -- )   Interpretation of Score: 20 questions each scored on a 5 point scale with 0 representing \"extreme difficulty or unable to perform\" and 4 representing \"no difficulty\". The lower the score, the greater the functional disability. 80/80 represents no disability. Minimal detectable change is 9 points. Score 80 79-63 62-48 47-32 31-16 15-1 0   Modifier CH CI CJ CK CL CM CN       MEDICAL NECESSITY:   · Patient is expected to demonstrate progress in strength, range of motion and balance to improve safety during walking. REASON FOR SERVICES/OTHER COMMENTS:  · Patient continues to require skilled intervention due to pain, stiff and weak ankle inhibiting ability to walk and affecting balance. .  Total Duration:  PT Patient Time In/Time Out  Time In: 1400  Time Out: 1430                  PAIN/SUBJECTIVE:   Initial:   2-3/10 Post Session:  No increase in pain   HISTORY:       Ambulatory/Rehab Services H2 Model Falls Risk Assessment   Risk Factors:       (1)  Gender [Male] Ability to Rise from Chair:       (1)  Pushes up, successful in one attempt   Falls Prevention Plan:       Physical Limitations to Exercise (specify):  will need supervision in standihng       Mobility Assistance Device (specify):  will need to stay with RW initially   Total: (5 or greater = High Risk): 2   ©2010 [facility] . [facility] Patent #[id number]. Federal Law prohibits the replication, distribution or use without written permission from [facility]   Current Medications:       Current Outpatient Medications:     meloxicam (MOBIC) 15 mg tablet, Take 1 Tablet by mouth daily. , Disp: 30 Tablet, Rfl: 1    glucose blood VI test strips (blood glucose test) strip, by Does Not Apply route See Admin Instructions. Ascenia Contour next test strips.   Check FS every day, DM2, E11.9, Disp: 100 Strip, Rfl: 5    omega 3-dha-epa-fish oil (Fish OiL) 100-160-1,000 mg cap, Take  by mouth., Disp: , Rfl:     cholecalciferol, vitamin D3, (Vitamin D3) 50 mcg (2,000 unit) tab, Take  by mouth., Disp: , Rfl:     TURMERIC PO, Take  by mouth., Disp: , Rfl:     insulin glargine-lixisenatide (Soliqua 100/33) 100 unit-33 mcg/mL inpn, 60 units sub-q daily  Indications: type 2 diabetes mellitus, Disp: 3 mL, Rfl: 5    pantoprazole (PROTONIX) 40 mg tablet, Take 1 Tablet by mouth daily. , Disp: 90 Tablet, Rfl: 1    montelukast (SINGULAIR) 10 mg tablet, Take 1 Tablet by mouth nightly. Indications: controller medication for asthma, Disp: 90 Tablet, Rfl: 1    lisinopriL (PRINIVIL, ZESTRIL) 10 mg tablet, Take 1 Tablet by mouth daily. Indications: high blood pressure, Disp: 90 Tablet, Rfl: 1    ezetimibe (ZETIA) 10 mg tablet, Take 1 Tablet by mouth nightly. Indications: high cholesterol, Disp: 90 Tablet, Rfl: 1    doxazosin (CARDURA) 4 mg tablet, Take 1 Tablet by mouth daily. Indications: enlarged prostate with urination problem, Disp: 90 Tablet, Rfl: 1    celecoxib (CeleBREX) 200 mg capsule, Take 1 Capsule by mouth daily. Indications: joint damage causing pain and loss of function, Disp: 90 Capsule, Rfl: 1    atorvastatin (LIPITOR) 20 mg tablet, Take 1 Tablet by mouth nightly. Indications: high cholesterol, Disp: 90 Tablet, Rfl: 1    apixaban (Eliquis) 5 mg tablet, Take 1 Tablet by mouth two (2) times a day. Indications: treatment to prevent blood clots in chronic atrial fibrillation, Disp: 180 Tablet, Rfl: 1    dapagliflozin (Farxiga) 10 mg tab tablet, Take 1 Tablet by mouth daily. Indications: type 2 diabetes mellitus, Disp: 90 Tablet, Rfl: 1    fluticasone furoate-vilanteroL (Breo Ellipta) 200-25 mcg/dose inhaler, Take 1 Puff by inhalation daily. , Disp: 3 Inhaler, Rfl: 1    metoprolol tartrate (LOPRESSOR) 50 mg tablet, Take 1 Tablet by mouth two (2) times a day.  Indications: ventricular rate control in atrial fibrillation, Disp: 180 Tablet, Rfl: 1    lancets 28 gauge misc, Check fs every day, DM2, E11.9, Disp: 100 Lancet, Rfl: 1    Insulin Needles, Disposable, (BD Jocelynn 2nd Gen Pen Needle) 32 gauge x 5/32\" ndle, by Does Not Apply route., Disp: , Rfl:     acetaminophen (TYLENOL) 500 mg tablet, Take 500-1,000 mg by mouth every six (6) hours as needed for Pain., Disp: , Rfl:     diphenhydrAMINE (BENADRYL) 50 mg capsule, Take 100 mg by mouth nightly as needed for Sleep., Disp: , Rfl:     colesevelam (WELCHOL) 625 mg tablet, Take 625-3,750 mg by mouth as needed for Diarrhea. Patient taking 2 tabs following diarrheal bowel movement up to 3 times. Maximum daily dosage: 6 tabs, Disp: , Rfl:     fluticasone propionate (FLONASE ALLERGY RELIEF) 50 mcg/actuation nasal spray, 2 Sprays by Both Nostrils route daily as needed for Allergies. Indications: inflammation of the nose due to an allergy, Disp: , Rfl:     albuterol (PROVENTIL HFA, VENTOLIN HFA, PROAIR HFA) 90 mcg/actuation inhaler, Take 1 Puff by inhalation every four (4) hours as needed for Wheezing., Disp: 1 Inhaler, Rfl: 5    ANTI-FUNGAL 2 % topical powder, APPLY TO ABDOMEN 2 TIMES PER DAY, Disp: , Rfl: 11    Blood-Glucose Meter monitoring kit, Check fs every day; DM2, E11/9, Contour glucometer, Disp: 1 Kit, Rfl: 0    albuterol (PROVENTIL VENTOLIN) 2.5 mg /3 mL (0.083 %) nebulizer solution, 3 mL by Nebulization route once for 1 dose. (Patient taking differently: 2.5 mg by Nebulization route daily as needed for Wheezing. Patient states he hasn't used in many years), Disp: 24 Each, Rfl: 11   Date last reviewed 2/17/22   EXAMINATION:     Observation/Orthostatic Postural Assessment:   Abnormalities: continued ankle swelling noted with redness and shiny skin at R medial ankle    Standing alignment: Bent over posture with significant thoracic kyphosis   Walking: walks with step to R gait with RW. Has returned to wearing a CAM boot when walking because of pain.      Supine: unable to lie supine  Palpation: point tender to R anterior talo-fibula ligament  ROM:    RLE Assessment  RLE Assessment (WDL): Within defined limits  RLE PROM  R Hip Extension: 5  R Ankle Dorsiflexion: 10  R Ankle Plantar Flexion: 40  R Ankle Eversion: 10  R Ankle Inversion: 20                  Strength:    RLE Strength  R Hip Extension: 4-  R Knee Extension: 4  R Ankle Dorsiflexion: 4  R Ankle Plantar Flexion: 4-  R Ankle Eversion: 4  R Ankle Inversion: 4                         Special Tests: Muscle Length Tests: R gastroc flexiblity = -3 degrees   Neurological Screen: [place] neuro screen is intact  Functional Mobility:  Sit to/from Stand: pushes off with BARRY hands  Bed Mobility: unable to lie supine but is independent with compensation for getting to positions he can tolerate.

## 2022-02-24 ENCOUNTER — APPOINTMENT (OUTPATIENT)
Dept: PHYSICAL THERAPY | Age: 72
End: 2022-02-24
Payer: MEDICARE

## 2022-03-01 ENCOUNTER — APPOINTMENT (OUTPATIENT)
Dept: PHYSICAL THERAPY | Age: 72
End: 2022-03-01

## 2022-03-03 ENCOUNTER — APPOINTMENT (OUTPATIENT)
Dept: PHYSICAL THERAPY | Age: 72
End: 2022-03-03

## 2022-03-08 ENCOUNTER — APPOINTMENT (OUTPATIENT)
Dept: PHYSICAL THERAPY | Age: 72
End: 2022-03-08

## 2022-03-10 ENCOUNTER — APPOINTMENT (OUTPATIENT)
Dept: PHYSICAL THERAPY | Age: 72
End: 2022-03-10

## 2022-03-15 ENCOUNTER — APPOINTMENT (OUTPATIENT)
Dept: PHYSICAL THERAPY | Age: 72
End: 2022-03-15

## 2022-03-17 ENCOUNTER — APPOINTMENT (OUTPATIENT)
Dept: PHYSICAL THERAPY | Age: 72
End: 2022-03-17

## 2022-03-17 ENCOUNTER — APPOINTMENT (RX ONLY)
Dept: URBAN - METROPOLITAN AREA CLINIC 329 | Facility: CLINIC | Age: 72
Setting detail: DERMATOLOGY
End: 2022-03-17

## 2022-03-17 DIAGNOSIS — D485 NEOPLASM OF UNCERTAIN BEHAVIOR OF SKIN: ICD-10-CM

## 2022-03-17 DIAGNOSIS — D22 MELANOCYTIC NEVI: ICD-10-CM

## 2022-03-17 DIAGNOSIS — Z85.828 PERSONAL HISTORY OF OTHER MALIGNANT NEOPLASM OF SKIN: ICD-10-CM

## 2022-03-17 DIAGNOSIS — L57.8 OTHER SKIN CHANGES DUE TO CHRONIC EXPOSURE TO NONIONIZING RADIATION: ICD-10-CM

## 2022-03-17 PROBLEM — D48.5 NEOPLASM OF UNCERTAIN BEHAVIOR OF SKIN: Status: ACTIVE | Noted: 2022-03-17

## 2022-03-17 PROBLEM — D22.4 MELANOCYTIC NEVI OF SCALP AND NECK: Status: ACTIVE | Noted: 2022-03-17

## 2022-03-17 PROBLEM — D22.5 MELANOCYTIC NEVI OF TRUNK: Status: ACTIVE | Noted: 2022-03-17

## 2022-03-17 PROBLEM — D22.39 MELANOCYTIC NEVI OF OTHER PARTS OF FACE: Status: ACTIVE | Noted: 2022-03-17

## 2022-03-17 PROCEDURE — ? FULL BODY SKIN EXAM - DECLINED

## 2022-03-17 PROCEDURE — ? BIOPSY BY SHAVE METHOD

## 2022-03-17 PROCEDURE — 11102 TANGNTL BX SKIN SINGLE LES: CPT

## 2022-03-17 PROCEDURE — ? COUNSELING

## 2022-03-17 PROCEDURE — 99213 OFFICE O/P EST LOW 20 MIN: CPT | Mod: 25

## 2022-03-17 PROCEDURE — ? ADDITIONAL NOTES

## 2022-03-17 ASSESSMENT — LOCATION DETAILED DESCRIPTION DERM
LOCATION DETAILED: RIGHT INFERIOR LATERAL NECK
LOCATION DETAILED: LEFT MEDIAL SUPERIOR CHEST
LOCATION DETAILED: LEFT CENTRAL EYEBROW
LOCATION DETAILED: LEFT SUPERIOR UPPER BACK
LOCATION DETAILED: RIGHT INFERIOR LATERAL NECK
LOCATION DETAILED: INFERIOR MID FOREHEAD
LOCATION DETAILED: LEFT SUPERIOR LATERAL UPPER BACK
LOCATION DETAILED: LEFT MEDIAL FOREHEAD
LOCATION DETAILED: LEFT MID-UPPER BACK
LOCATION DETAILED: LEFT CLAVICULAR SKIN
LOCATION DETAILED: LEFT SUPERIOR ANTERIOR NECK
LOCATION DETAILED: LEFT MEDIAL SUPERIOR CHEST

## 2022-03-17 ASSESSMENT — LOCATION ZONE DERM
LOCATION ZONE: NECK
LOCATION ZONE: TRUNK
LOCATION ZONE: TRUNK
LOCATION ZONE: NECK
LOCATION ZONE: FACE

## 2022-03-17 ASSESSMENT — LOCATION SIMPLE DESCRIPTION DERM
LOCATION SIMPLE: INFERIOR FOREHEAD
LOCATION SIMPLE: LEFT EYEBROW
LOCATION SIMPLE: LEFT FOREHEAD
LOCATION SIMPLE: LEFT UPPER BACK
LOCATION SIMPLE: RIGHT ANTERIOR NECK
LOCATION SIMPLE: LEFT ANTERIOR NECK
LOCATION SIMPLE: RIGHT ANTERIOR NECK
LOCATION SIMPLE: LEFT CLAVICULAR SKIN
LOCATION SIMPLE: LEFT UPPER BACK
LOCATION SIMPLE: CHEST
LOCATION SIMPLE: CHEST

## 2022-03-17 NOTE — PROCEDURE: BIOPSY BY SHAVE METHOD
Detail Level: Detailed
Depth Of Biopsy: dermis
Was A Bandage Applied: Yes
Size Of Lesion In Cm: 1
X Size Of Lesion In Cm: 0
Biopsy Type: H and E
Biopsy Method: Dermablade
Anesthesia Type: 1% lidocaine with epinephrine
Anesthesia Volume In Cc (Will Not Render If 0): 0.5
Hemostasis: Drysol
Wound Care: Petrolatum
Dressing: bandage
Destruction After The Procedure: No
Type Of Destruction Used: Curettage
Curettage Text: The wound bed was treated with curettage after the biopsy was performed.
Cryotherapy Text: The wound bed was treated with cryotherapy after the biopsy was performed.
Electrodesiccation Text: The wound bed was treated with electrodesiccation after the biopsy was performed.
Electrodesiccation And Curettage Text: The wound bed was treated with electrodesiccation and curettage after the biopsy was performed.
Silver Nitrate Text: The wound bed was treated with silver nitrate after the biopsy was performed.
Lab: 6
Lab Facility: [id number]
Consent was obtained and risks were reviewed including but not limited to scarring, infection, bleeding, scabbing, incomplete removal, nerve damage and allergy to anesthesia.
Post-Care Instructions: I reviewed with the patient in detail post-care instructions. Patient is to keep the biopsy site dry overnight, and then apply bacitracin twice daily until healed. Patient may apply hydrogen peroxide soaks to remove any crusting.
Notification Instructions: Patient will be notified of biopsy results. However, patient instructed to call the office if not contacted within 2 weeks.
Billing Type: Third-Party Bill
Information: Selecting Yes will display possible errors in your note based on the variables you have selected. This validation is only offered as a suggestion for you. PLEASE NOTE THAT THE VALIDATION TEXT WILL BE REMOVED WHEN YOU FINALIZE YOUR NOTE. IF YOU WANT TO FAX A PRELIMINARY NOTE YOU WILL NEED TO TOGGLE THIS TO 'NO' IF YOU DO NOT WANT IT IN YOUR FAXED NOTE.

## 2022-03-18 PROBLEM — M17.9 OA (OSTEOARTHRITIS) OF KNEE: Status: ACTIVE | Noted: 2017-05-15

## 2022-03-18 PROBLEM — D12.6 TUBULOVILLOUS ADENOMA OF COLON: Status: ACTIVE | Noted: 2018-05-21

## 2022-03-18 PROBLEM — Z96.651 STATUS POST TOTAL RIGHT KNEE REPLACEMENT: Status: ACTIVE | Noted: 2017-07-17

## 2022-03-18 PROBLEM — Z00.00 MEDICARE ANNUAL WELLNESS VISIT, SUBSEQUENT: Status: ACTIVE | Noted: 2017-08-07

## 2022-03-18 PROBLEM — M54.2 CERVICAL PAIN (NECK): Status: ACTIVE | Noted: 2021-04-08

## 2022-03-18 PROBLEM — K52.9 CHRONIC DIARRHEA: Status: ACTIVE | Noted: 2020-06-15

## 2022-03-19 PROBLEM — Z71.89 ADVANCE CARE PLANNING: Status: ACTIVE | Noted: 2020-04-14

## 2022-03-19 PROBLEM — I87.8 VENOUS STASIS: Status: ACTIVE | Noted: 2018-08-22

## 2022-03-19 PROBLEM — I87.332 CHRONIC VENOUS HYPERTENSION WITH ULCER AND INFLAMMATION INVOLVING LEFT SIDE (HCC): Status: ACTIVE | Noted: 2019-03-31

## 2022-03-19 PROBLEM — E66.01 OBESITY, MORBID (HCC): Status: ACTIVE | Noted: 2018-08-22

## 2022-03-19 PROBLEM — L97.929 CHRONIC VENOUS HYPERTENSION WITH ULCER AND INFLAMMATION INVOLVING LEFT SIDE (HCC): Status: ACTIVE | Noted: 2019-03-31

## 2022-03-19 PROBLEM — Z87.891 HISTORY OF SMOKING 30 OR MORE PACK YEARS: Status: ACTIVE | Noted: 2017-08-08

## 2022-03-19 PROBLEM — Z96.642 STATUS POST LEFT HIP REPLACEMENT: Status: ACTIVE | Noted: 2020-06-29

## 2022-03-22 ENCOUNTER — APPOINTMENT (OUTPATIENT)
Dept: PHYSICAL THERAPY | Age: 72
End: 2022-03-22

## 2022-03-24 ENCOUNTER — APPOINTMENT (OUTPATIENT)
Dept: PHYSICAL THERAPY | Age: 72
End: 2022-03-24

## 2022-03-29 ENCOUNTER — APPOINTMENT (OUTPATIENT)
Dept: PHYSICAL THERAPY | Age: 72
End: 2022-03-29

## 2022-03-31 ENCOUNTER — APPOINTMENT (OUTPATIENT)
Dept: PHYSICAL THERAPY | Age: 72
End: 2022-03-31

## 2022-05-10 ENCOUNTER — APPOINTMENT (RX ONLY)
Dept: URBAN - METROPOLITAN AREA CLINIC 329 | Facility: CLINIC | Age: 72
Setting detail: DERMATOLOGY
End: 2022-05-10

## 2022-05-10 PROBLEM — D23.4 OTHER BENIGN NEOPLASM OF SKIN OF SCALP AND NECK: Status: ACTIVE | Noted: 2022-05-10

## 2022-05-10 PROCEDURE — 11422 EXC H-F-NK-SP B9+MARG 1.1-2: CPT

## 2022-05-10 PROCEDURE — ? EXCISION

## 2022-05-10 PROCEDURE — 12042 INTMD RPR N-HF/GENIT2.6-7.5: CPT

## 2022-05-10 NOTE — PROCEDURE: EXCISION
Medical Necessity Information: It is in your best interest to select a reason for this procedure from the list below. All of these items fulfill various CMS LCD requirements except lesion extends to a margin.
Include Z78.9 (Other Specified Conditions Influencing Health Status) As An Associated Diagnosis?: No
Medical Necessity Clause: This procedure was medically necessary because the lesion that was treated was:
Lab Facility: [id number]
Body Location Override (Optional - Billing Will Still Be Based On Selected Body Map Location If Applicable): right inferior lateral neck
Size Of Lesion In Cm: 1
Size Of Margin In Cm: 0.2
Eye Clamp Note Details: An eye clamp was used during the procedure.
Excision Method: Fusiform
Saucerization Depth: dermis and superficial adipose tissue
Repair Type: Intermediate
Suturegard Retention Suture: 2-0 Nylon
Retention Suture Bite Size: 3 mm
Length To Time In Minutes Device Was In Place: 10
Intermediate / Complex Repair - Final Wound Length In Cm: 4
Undermining Type: Entire Wound
Debridement Text: The wound edges were debrided prior to proceeding with the closure to facilitate wound healing.
Helical Rim Text: The closure involved the helical rim.
Vermilion Border Text: The closure involved the vermilion border.
Nostril Rim Text: The closure involved the nostril rim.
Retention Suture Text: Retention sutures were placed to support the closure and prevent dehiscence.
Suture Removal: 14 days
Epidermal Closure Graft Donor Site (Optional): simple interrupted
Graft Donor Site Bandage (Optional-Leave Blank If You Don't Want In Note): Steri-strips and a pressure bandage were applied to the donor site.
Detail Level: Detailed
Excision Depth: adipose tissue
Scalpel Size: 15 blade
Anesthesia Type: 1% lidocaine with epinephrine
Additional Anesthesia Volume In Cc: 6
Hemostasis: Electrocautery
Estimated Blood Loss (Cc): minimal
Deep Sutures: 5-0 Vicryl
Epidermal Sutures: 4-0 Ethilon
Wound Care: Petrolatum
Dressing: dry sterile dressing
Suturegard Intro: Intraoperative tissue expansion was performed, utilizing the SUTUREGARD device, in order to reduce wound tension.
Suturegard Body: The suture ends were repeatedly re-tightened and re-clamped to achieve the desired tissue expansion.
Hemigard Intro: Due to skin fragility and wound tension, it was decided to use HEMIGARD adhesive retention suture devices to permit a linear closure. The skin was cleaned and dried for a 6cm distance away from the wound. Excessive hair, if present, was removed to allow for adhesion.
Hemigard Postcare Instructions: The HEMIGARD strips are to remain completely dry for at least 5-7 days.
Positioning (Leave Blank If You Do Not Want): The patient was placed in a comfortable position exposing the surgical site.
Complex Repair Preamble Text (Leave Blank If You Do Not Want): Extensive wide undermining was performed.
Intermediate Repair Preamble Text (Leave Blank If You Do Not Want): Undermining was performed with blunt dissection.
Fusiform Excision Additional Text (Leave Blank If You Do Not Want): The margin was drawn around the clinically apparent lesion.  A fusiform shape was then drawn on the skin incorporating the lesion and margins.  Incisions were then made along these lines to the appropriate tissue plane and the lesion was extirpated.
Eliptical Excision Additional Text (Leave Blank If You Do Not Want): The margin was drawn around the clinically apparent lesion.  An elliptical shape was then drawn on the skin incorporating the lesion and margins.  Incisions were then made along these lines to the appropriate tissue plane and the lesion was extirpated.
Saucerization Excision Additional Text (Leave Blank If You Do Not Want): The margin was drawn around the clinically apparent lesion.  Incisions were then made along these lines, in a tangential fashion, to the appropriate tissue plane and the lesion was extirpated.
Slit Excision Additional Text (Leave Blank If You Do Not Want): A linear line was drawn on the skin overlying the lesion. An incision was made slowly until the lesion was visualized.  Once visualized, the lesion was removed with blunt dissection.
Excisional Biopsy Additional Text (Leave Blank If You Do Not Want): The margin was drawn around the clinically apparent lesion. An elliptical shape was then drawn on the skin incorporating the lesion and margins.  Incisions were then made along these lines to the appropriate tissue plane and the lesion was extirpated.
Perilesional Excision Additional Text (Leave Blank If You Do Not Want): The margin was drawn around the clinically apparent lesion. Incisions were then made along these lines to the appropriate tissue plane and the lesion was extirpated.
Repair Performed By Another Provider Text (Leave Blank If You Do Not Want): After the tissue was excised the defect was repaired by another provider.
No Repair - Repaired With Adjacent Surgical Defect Text (Leave Blank If You Do Not Want): After the excision the defect was repaired concurrently with another surgical defect which was in close approximation.
Adjacent Tissue Transfer Text: The defect edges were debeveled with a #15 scalpel blade.  Given the location of the defect and the proximity to free margins an adjacent tissue transfer was deemed most appropriate.  Using a sterile surgical marker, an appropriate flap was drawn incorporating the defect and placing the expected incisions within the relaxed skin tension lines where possible.    The area thus outlined was incised deep to adipose tissue with a #15 scalpel blade.  The skin margins were undermined to an appropriate distance in all directions utilizing iris scissors.
Advancement Flap (Single) Text: The defect edges were debeveled with a #15 scalpel blade.  Given the location of the defect and the proximity to free margins a single advancement flap was deemed most appropriate.  Using a sterile surgical marker, an appropriate advancement flap was drawn incorporating the defect and placing the expected incisions within the relaxed skin tension lines where possible.    The area thus outlined was incised deep to adipose tissue with a #15 scalpel blade.  The skin margins were undermined to an appropriate distance in all directions utilizing iris scissors.
Advancement Flap (Double) Text: The defect edges were debeveled with a #15 scalpel blade.  Given the location of the defect and the proximity to free margins a double advancement flap was deemed most appropriate.  Using a sterile surgical marker, the appropriate advancement flaps were drawn incorporating the defect and placing the expected incisions within the relaxed skin tension lines where possible.    The area thus outlined was incised deep to adipose tissue with a #15 scalpel blade.  The skin margins were undermined to an appropriate distance in all directions utilizing iris scissors.
Burow's Advancement Flap Text: The defect edges were debeveled with a #15 scalpel blade.  Given the location of the defect and the proximity to free margins a Burow's advancement flap was deemed most appropriate.  Using a sterile surgical marker, the appropriate advancement flap was drawn incorporating the defect and placing the expected incisions within the relaxed skin tension lines where possible.    The area thus outlined was incised deep to adipose tissue with a #15 scalpel blade.  The skin margins were undermined to an appropriate distance in all directions utilizing iris scissors.
Chonodrocutaneous Helical Advancement Flap Text: The defect edges were debeveled with a #15 scalpel blade.  Given the location of the defect and the proximity to free margins a chondrocutaneous helical advancement flap was deemed most appropriate.  Using a sterile surgical marker, the appropriate advancement flap was drawn incorporating the defect and placing the expected incisions within the relaxed skin tension lines where possible.    The area thus outlined was incised deep to adipose tissue with a #15 scalpel blade.  The skin margins were undermined to an appropriate distance in all directions utilizing iris scissors.
Crescentic Advancement Flap Text: The defect edges were debeveled with a #15 scalpel blade.  Given the location of the defect and the proximity to free margins a crescentic advancement flap was deemed most appropriate.  Using a sterile surgical marker, the appropriate advancement flap was drawn incorporating the defect and placing the expected incisions within the relaxed skin tension lines where possible.    The area thus outlined was incised deep to adipose tissue with a #15 scalpel blade.  The skin margins were undermined to an appropriate distance in all directions utilizing iris scissors.
A-T Advancement Flap Text: The defect edges were debeveled with a #15 scalpel blade.  Given the location of the defect, shape of the defect and the proximity to free margins an A-T advancement flap was deemed most appropriate.  Using a sterile surgical marker, an appropriate advancement flap was drawn incorporating the defect and placing the expected incisions within the relaxed skin tension lines where possible.    The area thus outlined was incised deep to adipose tissue with a #15 scalpel blade.  The skin margins were undermined to an appropriate distance in all directions utilizing iris scissors.
O-T Advancement Flap Text: The defect edges were debeveled with a #15 scalpel blade.  Given the location of the defect, shape of the defect and the proximity to free margins an O-T advancement flap was deemed most appropriate.  Using a sterile surgical marker, an appropriate advancement flap was drawn incorporating the defect and placing the expected incisions within the relaxed skin tension lines where possible.    The area thus outlined was incised deep to adipose tissue with a #15 scalpel blade.  The skin margins were undermined to an appropriate distance in all directions utilizing iris scissors.
O-L Flap Text: The defect edges were debeveled with a #15 scalpel blade.  Given the location of the defect, shape of the defect and the proximity to free margins an O-L flap was deemed most appropriate.  Using a sterile surgical marker, an appropriate advancement flap was drawn incorporating the defect and placing the expected incisions within the relaxed skin tension lines where possible.    The area thus outlined was incised deep to adipose tissue with a #15 scalpel blade.  The skin margins were undermined to an appropriate distance in all directions utilizing iris scissors.
O-Z Flap Text: The defect edges were debeveled with a #15 scalpel blade.  Given the location of the defect, shape of the defect and the proximity to free margins an O-Z flap was deemed most appropriate.  Using a sterile surgical marker, an appropriate transposition flap was drawn incorporating the defect and placing the expected incisions within the relaxed skin tension lines where possible. The area thus outlined was incised deep to adipose tissue with a #15 scalpel blade.  The skin margins were undermined to an appropriate distance in all directions utilizing iris scissors.
Double O-Z Flap Text: The defect edges were debeveled with a #15 scalpel blade.  Given the location of the defect, shape of the defect and the proximity to free margins a Double O-Z flap was deemed most appropriate.  Using a sterile surgical marker, an appropriate transposition flap was drawn incorporating the defect and placing the expected incisions within the relaxed skin tension lines where possible. The area thus outlined was incised deep to adipose tissue with a #15 scalpel blade.  The skin margins were undermined to an appropriate distance in all directions utilizing iris scissors.
V-Y Flap Text: The defect edges were debeveled with a #15 scalpel blade.  Given the location of the defect, shape of the defect and the proximity to free margins a V-Y flap was deemed most appropriate.  Using a sterile surgical marker, an appropriate advancement flap was drawn incorporating the defect and placing the expected incisions within the relaxed skin tension lines where possible.    The area thus outlined was incised deep to adipose tissue with a #15 scalpel blade.  The skin margins were undermined to an appropriate distance in all directions utilizing iris scissors.
Advancement-Rotation Flap Text: The defect edges were debeveled with a #15 scalpel blade.  Given the location of the defect, shape of the defect and the proximity to free margins an advancement-rotation flap was deemed most appropriate.  Using a sterile surgical marker, an appropriate flap was drawn incorporating the defect and placing the expected incisions within the relaxed skin tension lines where possible. The area thus outlined was incised deep to adipose tissue with a #15 scalpel blade.  The skin margins were undermined to an appropriate distance in all directions utilizing iris scissors.
Mercedes Flap Text: The defect edges were debeveled with a #15 scalpel blade.  Given the location of the defect, shape of the defect and the proximity to free margins a Mercedes flap was deemed most appropriate.  Using a sterile surgical marker, an appropriate advancement flap was drawn incorporating the defect and placing the expected incisions within the relaxed skin tension lines where possible. The area thus outlined was incised deep to adipose tissue with a #15 scalpel blade.  The skin margins were undermined to an appropriate distance in all directions utilizing iris scissors.
Modified Advancement Flap Text: The defect edges were debeveled with a #15 scalpel blade.  Given the location of the defect, shape of the defect and the proximity to free margins a modified advancement flap was deemed most appropriate.  Using a sterile surgical marker, an appropriate advancement flap was drawn incorporating the defect and placing the expected incisions within the relaxed skin tension lines where possible.    The area thus outlined was incised deep to adipose tissue with a #15 scalpel blade.  The skin margins were undermined to an appropriate distance in all directions utilizing iris scissors.
Mucosal Advancement Flap Text: Given the location of the defect, shape of the defect and the proximity to free margins a mucosal advancement flap was deemed most appropriate. Incisions were made with a 15 blade scalpel in the appropriate fashion along the cutaneous vermilion border and the mucosal lip. The remaining actinically damaged mucosal tissue was excised.  The mucosal advancement flap was then elevated to the gingival sulcus with care taken to preserve the neurovascular structures and advanced into the primary defect. Care was taken to ensure that precise realignment of the vermilion border was achieved.
Peng Advancement Flap Text: The defect edges were debeveled with a #15 scalpel blade.  Given the location of the defect, shape of the defect and the proximity to free margins a Peng advancement flap was deemed most appropriate.  Using a sterile surgical marker, an appropriate advancement flap was drawn incorporating the defect and placing the expected incisions within the relaxed skin tension lines where possible. The area thus outlined was incised deep to adipose tissue with a #15 scalpel blade.  The skin margins were undermined to an appropriate distance in all directions utilizing iris scissors.
Hatchet Flap Text: The defect edges were debeveled with a #15 scalpel blade.  Given the location of the defect, shape of the defect and the proximity to free margins a hatchet flap was deemed most appropriate.  Using a sterile surgical marker, an appropriate hatchet flap was drawn incorporating the defect and placing the expected incisions within the relaxed skin tension lines where possible.    The area thus outlined was incised deep to adipose tissue with a #15 scalpel blade.  The skin margins were undermined to an appropriate distance in all directions utilizing iris scissors.
Rotation Flap Text: The defect edges were debeveled with a #15 scalpel blade.  Given the location of the defect, shape of the defect and the proximity to free margins a rotation flap was deemed most appropriate.  Using a sterile surgical marker, an appropriate rotation flap was drawn incorporating the defect and placing the expected incisions within the relaxed skin tension lines where possible.    The area thus outlined was incised deep to adipose tissue with a #15 scalpel blade.  The skin margins were undermined to an appropriate distance in all directions utilizing iris scissors.
Spiral Flap Text: The defect edges were debeveled with a #15 scalpel blade.  Given the location of the defect, shape of the defect and the proximity to free margins a spiral flap was deemed most appropriate.  Using a sterile surgical marker, an appropriate rotation flap was drawn incorporating the defect and placing the expected incisions within the relaxed skin tension lines where possible. The area thus outlined was incised deep to adipose tissue with a #15 scalpel blade.  The skin margins were undermined to an appropriate distance in all directions utilizing iris scissors.
Staged Advancement Flap Text: The defect edges were debeveled with a #15 scalpel blade.  Given the location of the defect, shape of the defect and the proximity to free margins a staged advancement flap was deemed most appropriate.  Using a sterile surgical marker, an appropriate advancement flap was drawn incorporating the defect and placing the expected incisions within the relaxed skin tension lines where possible. The area thus outlined was incised deep to adipose tissue with a #15 scalpel blade.  The skin margins were undermined to an appropriate distance in all directions utilizing iris scissors.
Star Wedge Flap Text: The defect edges were debeveled with a #15 scalpel blade.  Given the location of the defect, shape of the defect and the proximity to free margins a star wedge flap was deemed most appropriate.  Using a sterile surgical marker, an appropriate rotation flap was drawn incorporating the defect and placing the expected incisions within the relaxed skin tension lines where possible. The area thus outlined was incised deep to adipose tissue with a #15 scalpel blade.  The skin margins were undermined to an appropriate distance in all directions utilizing iris scissors.
Transposition Flap Text: The defect edges were debeveled with a #15 scalpel blade.  Given the location of the defect and the proximity to free margins a transposition flap was deemed most appropriate.  Using a sterile surgical marker, an appropriate transposition flap was drawn incorporating the defect.    The area thus outlined was incised deep to adipose tissue with a #15 scalpel blade.  The skin margins were undermined to an appropriate distance in all directions utilizing iris scissors.
Muscle Hinge Flap Text: The defect edges were debeveled with a #15 scalpel blade.  Given the size, depth and location of the defect and the proximity to free margins a muscle hinge flap was deemed most appropriate.  Using a sterile surgical marker, an appropriate hinge flap was drawn incorporating the defect. The area thus outlined was incised with a #15 scalpel blade.  The skin margins were undermined to an appropriate distance in all directions utilizing iris scissors.
Mustarde Flap Text: The defect edges were debeveled with a #15 scalpel blade.  Given the size, depth and location of the defect and the proximity to free margins a Mustarde flap was deemed most appropriate.  Using a sterile surgical marker, an appropriate flap was drawn incorporating the defect. The area thus outlined was incised with a #15 scalpel blade.  The skin margins were undermined to an appropriate distance in all directions utilizing iris scissors.
Nasal Turnover Hinge Flap Text: The defect edges were debeveled with a #15 scalpel blade.  Given the size, depth, location of the defect and the defect being full thickness a nasal turnover hinge flap was deemed most appropriate.  Using a sterile surgical marker, an appropriate hinge flap was drawn incorporating the defect. The area thus outlined was incised with a #15 scalpel blade. The flap was designed to recreate the nasal mucosal lining and the alar rim. The skin margins were undermined to an appropriate distance in all directions utilizing iris scissors.
Nasalis-Muscle-Based Myocutaneous Island Pedicle Flap Text: Using a #15 blade, an incision was made around the donor flap to the level of the nasalis muscle. Wide lateral undermining was then performed in both the subcutaneous plane above the nasalis muscle, and in a submuscular plane just above periosteum. This allowed the formation of a free nasalis muscle axial pedicle (based on the angular artery) which was still attached to the actual cutaneous flap, increasing its mobility and vascular viability. Hemostasis was obtained with pinpoint electrocoagulation. The flap was mobilized into position and the pivotal anchor points positioned and stabilized with buried interrupted sutures. Subcutaneous and dermal tissues were closed in a multilayered fashion with sutures. Tissue redundancies were excised, and the epidermal edges were apposed without significant tension and sutured with sutures.
Orbicularis Oris Muscle Flap Text: The defect edges were debeveled with a #15 scalpel blade.  Given that the defect affected the competency of the oral sphincter an orbicularis oris muscle flap was deemed most appropriate to restore this competency and normal muscle function.  Using a sterile surgical marker, an appropriate flap was drawn incorporating the defect. The area thus outlined was incised with a #15 scalpel blade.
Melolabial Transposition Flap Text: The defect edges were debeveled with a #15 scalpel blade.  Given the location of the defect and the proximity to free margins a melolabial flap was deemed most appropriate.  Using a sterile surgical marker, an appropriate melolabial transposition flap was drawn incorporating the defect.    The area thus outlined was incised deep to adipose tissue with a #15 scalpel blade.  The skin margins were undermined to an appropriate distance in all directions utilizing iris scissors.
Rhombic Flap Text: The defect edges were debeveled with a #15 scalpel blade.  Given the location of the defect and the proximity to free margins a rhombic flap was deemed most appropriate.  Using a sterile surgical marker, an appropriate rhombic flap was drawn incorporating the defect.    The area thus outlined was incised deep to adipose tissue with a #15 scalpel blade.  The skin margins were undermined to an appropriate distance in all directions utilizing iris scissors.
Rhomboid Transposition Flap Text: The defect edges were debeveled with a #15 scalpel blade.  Given the location of the defect and the proximity to free margins a rhomboid transposition flap was deemed most appropriate.  Using a sterile surgical marker, an appropriate rhomboid flap was drawn incorporating the defect.    The area thus outlined was incised deep to adipose tissue with a #15 scalpel blade.  The skin margins were undermined to an appropriate distance in all directions utilizing iris scissors.
Bi-Rhombic Flap Text: The defect edges were debeveled with a #15 scalpel blade.  Given the location of the defect and the proximity to free margins a bi-rhombic flap was deemed most appropriate.  Using a sterile surgical marker, an appropriate rhombic flap was drawn incorporating the defect. The area thus outlined was incised deep to adipose tissue with a #15 scalpel blade.  The skin margins were undermined to an appropriate distance in all directions utilizing iris scissors.
Helical Rim Advancement Flap Text: The defect edges were debeveled with a #15 blade scalpel.  Given the location of the defect and the proximity to free margins (helical rim) a double helical rim advancement flap was deemed most appropriate.  Using a sterile surgical marker, the appropriate advancement flaps were drawn incorporating the defect and placing the expected incisions between the helical rim and antihelix where possible.  The area thus outlined was incised through and through with a #15 scalpel blade.  With a skin hook and iris scissors, the flaps were gently and sharply undermined and freed up.
Bilateral Helical Rim Advancement Flap Text: The defect edges were debeveled with a #15 blade scalpel.  Given the location of the defect and the proximity to free margins (helical rim) a bilateral helical rim advancement flap was deemed most appropriate.  Using a sterile surgical marker, the appropriate advancement flaps were drawn incorporating the defect and placing the expected incisions between the helical rim and antihelix where possible.  The area thus outlined was incised through and through with a #15 scalpel blade.  With a skin hook and iris scissors, the flaps were gently and sharply undermined and freed up.
Ear Star Wedge Flap Text: The defect edges were debeveled with a #15 blade scalpel.  Given the location of the defect and the proximity to free margins (helical rim) an ear star wedge flap was deemed most appropriate.  Using a sterile surgical marker, the appropriate flap was drawn incorporating the defect and placing the expected incisions between the helical rim and antihelix where possible.  The area thus outlined was incised through and through with a #15 scalpel blade.
Banner Transposition Flap Text: The defect edges were debeveled with a #15 scalpel blade.  Given the location of the defect and the proximity to free margins a Banner transposition flap was deemed most appropriate.  Using a sterile surgical marker, an appropriate flap drawn around the defect. The area thus outlined was incised deep to adipose tissue with a #15 scalpel blade.  The skin margins were undermined to an appropriate distance in all directions utilizing iris scissors.
Bilobed Flap Text: The defect edges were debeveled with a #15 scalpel blade.  Given the location of the defect and the proximity to free margins a bilobe flap was deemed most appropriate.  Using a sterile surgical marker, an appropriate bilobe flap drawn around the defect.    The area thus outlined was incised deep to adipose tissue with a #15 scalpel blade.  The skin margins were undermined to an appropriate distance in all directions utilizing iris scissors.
Bilobed Transposition Flap Text: The defect edges were debeveled with a #15 scalpel blade.  Given the location of the defect and the proximity to free margins a bilobed transposition flap was deemed most appropriate.  Using a sterile surgical marker, an appropriate bilobe flap drawn around the defect.    The area thus outlined was incised deep to adipose tissue with a #15 scalpel blade.  The skin margins were undermined to an appropriate distance in all directions utilizing iris scissors.
Trilobed Flap Text: The defect edges were debeveled with a #15 scalpel blade.  Given the location of the defect and the proximity to free margins a trilobed flap was deemed most appropriate.  Using a sterile surgical marker, an appropriate trilobed flap drawn around the defect.    The area thus outlined was incised deep to adipose tissue with a #15 scalpel blade.  The skin margins were undermined to an appropriate distance in all directions utilizing iris scissors.
Dorsal Nasal Flap Text: The defect edges were debeveled with a #15 scalpel blade.  Given the location of the defect and the proximity to free margins a dorsal nasal flap was deemed most appropriate.  Using a sterile surgical marker, an appropriate dorsal nasal flap was drawn around the defect.    The area thus outlined was incised deep to adipose tissue with a #15 scalpel blade.  The skin margins were undermined to an appropriate distance in all directions utilizing iris scissors.
Island Pedicle Flap Text: The defect edges were debeveled with a #15 scalpel blade.  Given the location of the defect, shape of the defect and the proximity to free margins an island pedicle advancement flap was deemed most appropriate.  Using a sterile surgical marker, an appropriate advancement flap was drawn incorporating the defect, outlining the appropriate donor tissue and placing the expected incisions within the relaxed skin tension lines where possible.    The area thus outlined was incised deep to adipose tissue with a #15 scalpel blade.  The skin margins were undermined to an appropriate distance in all directions around the primary defect and laterally outward around the island pedicle utilizing iris scissors.  There was minimal undermining beneath the pedicle flap.
Island Pedicle Flap With Canthal Suspension Text: The defect edges were debeveled with a #15 scalpel blade.  Given the location of the defect, shape of the defect and the proximity to free margins an island pedicle advancement flap was deemed most appropriate.  Using a sterile surgical marker, an appropriate advancement flap was drawn incorporating the defect, outlining the appropriate donor tissue and placing the expected incisions within the relaxed skin tension lines where possible. The area thus outlined was incised deep to adipose tissue with a #15 scalpel blade.  The skin margins were undermined to an appropriate distance in all directions around the primary defect and laterally outward around the island pedicle utilizing iris scissors.  There was minimal undermining beneath the pedicle flap. A suspension suture was placed in the canthal tendon to prevent tension and prevent ectropion.
Alar Island Pedicle Flap Text: The defect edges were debeveled with a #15 scalpel blade.  Given the location of the defect, shape of the defect and the proximity to the alar rim an island pedicle advancement flap was deemed most appropriate.  Using a sterile surgical marker, an appropriate advancement flap was drawn incorporating the defect, outlining the appropriate donor tissue and placing the expected incisions within the nasal ala running parallel to the alar rim. The area thus outlined was incised with a #15 scalpel blade.  The skin margins were undermined minimally to an appropriate distance in all directions around the primary defect and laterally outward around the island pedicle utilizing iris scissors.  There was minimal undermining beneath the pedicle flap.
Double Island Pedicle Flap Text: The defect edges were debeveled with a #15 scalpel blade.  Given the location of the defect, shape of the defect and the proximity to free margins a double island pedicle advancement flap was deemed most appropriate.  Using a sterile surgical marker, an appropriate advancement flap was drawn incorporating the defect, outlining the appropriate donor tissue and placing the expected incisions within the relaxed skin tension lines where possible.    The area thus outlined was incised deep to adipose tissue with a #15 scalpel blade.  The skin margins were undermined to an appropriate distance in all directions around the primary defect and laterally outward around the island pedicle utilizing iris scissors.  There was minimal undermining beneath the pedicle flap.
Island Pedicle Flap-Requiring Vessel Identification Text: The defect edges were debeveled with a #15 scalpel blade.  Given the location of the defect, shape of the defect and the proximity to free margins an island pedicle advancement flap was deemed most appropriate.  Using a sterile surgical marker, an appropriate advancement flap was drawn, based on the axial vessel mentioned above, incorporating the defect, outlining the appropriate donor tissue and placing the expected incisions within the relaxed skin tension lines where possible.    The area thus outlined was incised deep to adipose tissue with a #15 scalpel blade.  The skin margins were undermined to an appropriate distance in all directions around the primary defect and laterally outward around the island pedicle utilizing iris scissors.  There was minimal undermining beneath the pedicle flap.
Keystone Flap Text: The defect edges were debeveled with a #15 scalpel blade.  Given the location of the defect, shape of the defect a keystone flap was deemed most appropriate.  Using a sterile surgical marker, an appropriate keystone flap was drawn incorporating the defect, outlining the appropriate donor tissue and placing the expected incisions within the relaxed skin tension lines where possible. The area thus outlined was incised deep to adipose tissue with a #15 scalpel blade.  The skin margins were undermined to an appropriate distance in all directions around the primary defect and laterally outward around the flap utilizing iris scissors.
O-T Plasty Text: The defect edges were debeveled with a #15 scalpel blade.  Given the location of the defect, shape of the defect and the proximity to free margins an O-T plasty was deemed most appropriate.  Using a sterile surgical marker, an appropriate O-T plasty was drawn incorporating the defect and placing the expected incisions within the relaxed skin tension lines where possible.    The area thus outlined was incised deep to adipose tissue with a #15 scalpel blade.  The skin margins were undermined to an appropriate distance in all directions utilizing iris scissors.
O-Z Plasty Text: The defect edges were debeveled with a #15 scalpel blade.  Given the location of the defect, shape of the defect and the proximity to free margins an O-Z plasty (double transposition flap) was deemed most appropriate.  Using a sterile surgical marker, the appropriate transposition flaps were drawn incorporating the defect and placing the expected incisions within the relaxed skin tension lines where possible.    The area thus outlined was incised deep to adipose tissue with a #15 scalpel blade.  The skin margins were undermined to an appropriate distance in all directions utilizing iris scissors.  Hemostasis was achieved with electrocautery.  The flaps were then transposed into place, one clockwise and the other counterclockwise, and anchored with interrupted buried subcutaneous sutures.
Double O-Z Plasty Text: The defect edges were debeveled with a #15 scalpel blade.  Given the location of the defect, shape of the defect and the proximity to free margins a Double O-Z plasty (double transposition flap) was deemed most appropriate.  Using a sterile surgical marker, the appropriate transposition flaps were drawn incorporating the defect and placing the expected incisions within the relaxed skin tension lines where possible. The area thus outlined was incised deep to adipose tissue with a #15 scalpel blade.  The skin margins were undermined to an appropriate distance in all directions utilizing iris scissors.  Hemostasis was achieved with electrocautery.  The flaps were then transposed into place, one clockwise and the other counterclockwise, and anchored with interrupted buried subcutaneous sutures.
V-Y Plasty Text: The defect edges were debeveled with a #15 scalpel blade.  Given the location of the defect, shape of the defect and the proximity to free margins an V-Y advancement flap was deemed most appropriate.  Using a sterile surgical marker, an appropriate advancement flap was drawn incorporating the defect and placing the expected incisions within the relaxed skin tension lines where possible.    The area thus outlined was incised deep to adipose tissue with a #15 scalpel blade.  The skin margins were undermined to an appropriate distance in all directions utilizing iris scissors.
H Plasty Text: Given the location of the defect, shape of the defect and the proximity to free margins a H-plasty was deemed most appropriate for repair.  Using a sterile surgical marker, the appropriate advancement arms of the H-plasty were drawn incorporating the defect and placing the expected incisions within the relaxed skin tension lines where possible. The area thus outlined was incised deep to adipose tissue with a #15 scalpel blade. The skin margins were undermined to an appropriate distance in all directions utilizing iris scissors.  The opposing advancement arms were then advanced into place in opposite direction and anchored with interrupted buried subcutaneous sutures.
W Plasty Text: The lesion was extirpated to the level of the fat with a #15 scalpel blade.  Given the location of the defect, shape of the defect and the proximity to free margins a W-plasty was deemed most appropriate for repair.  Using a sterile surgical marker, the appropriate transposition arms of the W-plasty were drawn incorporating the defect and placing the expected incisions within the relaxed skin tension lines where possible.    The area thus outlined was incised deep to adipose tissue with a #15 scalpel blade.  The skin margins were undermined to an appropriate distance in all directions utilizing iris scissors.  The opposing transposition arms were then transposed into place in opposite direction and anchored with interrupted buried subcutaneous sutures.
Z Plasty Text: The lesion was extirpated to the level of the fat with a #15 scalpel blade.  Given the location of the defect, shape of the defect and the proximity to free margins a Z-plasty was deemed most appropriate for repair.  Using a sterile surgical marker, the appropriate transposition arms of the Z-plasty were drawn incorporating the defect and placing the expected incisions within the relaxed skin tension lines where possible.    The area thus outlined was incised deep to adipose tissue with a #15 scalpel blade.  The skin margins were undermined to an appropriate distance in all directions utilizing iris scissors.  The opposing transposition arms were then transposed into place in opposite direction and anchored with interrupted buried subcutaneous sutures.
Zygomaticofacial Flap Text: Given the location of the defect, shape of the defect and the proximity to free margins a zygomaticofacial flap was deemed most appropriate for repair.  Using a sterile surgical marker, the appropriate flap was drawn incorporating the defect and placing the expected incisions within the relaxed skin tension lines where possible. The area thus outlined was incised deep to adipose tissue with a #15 scalpel blade with preservation of a vascular pedicle.  The skin margins were undermined to an appropriate distance in all directions utilizing iris scissors.  The flap was then placed into the defect and anchored with interrupted buried subcutaneous sutures.
Cheek Interpolation Flap Text: A decision was made to reconstruct the defect utilizing an interpolation axial flap and a staged reconstruction.  A telfa template was made of the defect.  This telfa template was then used to outline the Cheek Interpolation flap.  The donor area for the pedicle flap was then injected with anesthesia.  The flap was excised through the skin and subcutaneous tissue down to the layer of the underlying musculature.  The interpolation flap was carefully excised within this deep plane to maintain its blood supply.  The edges of the donor site were undermined.   The donor site was closed in a primary fashion.  The pedicle was then rotated into position and sutured.  Once the tube was sutured into place, adequate blood supply was confirmed with blanching and refill.  The pedicle was then wrapped with xeroform gauze and dressed appropriately with a telfa and gauze bandage to ensure continued blood supply and protect the attached pedicle.
Cheek-To-Nose Interpolation Flap Text: A decision was made to reconstruct the defect utilizing an interpolation axial flap and a staged reconstruction.  A telfa template was made of the defect.  This telfa template was then used to outline the Cheek-To-Nose Interpolation flap.  The donor area for the pedicle flap was then injected with anesthesia.  The flap was excised through the skin and subcutaneous tissue down to the layer of the underlying musculature.  The interpolation flap was carefully excised within this deep plane to maintain its blood supply.  The edges of the donor site were undermined.   The donor site was closed in a primary fashion.  The pedicle was then rotated into position and sutured.  Once the tube was sutured into place, adequate blood supply was confirmed with blanching and refill.  The pedicle was then wrapped with xeroform gauze and dressed appropriately with a telfa and gauze bandage to ensure continued blood supply and protect the attached pedicle.
Interpolation Flap Text: A decision was made to reconstruct the defect utilizing an interpolation axial flap and a staged reconstruction.  A telfa template was made of the defect.  This telfa template was then used to outline the interpolation flap.  The donor area for the pedicle flap was then injected with anesthesia.  The flap was excised through the skin and subcutaneous tissue down to the layer of the underlying musculature.  The interpolation flap was carefully excised within this deep plane to maintain its blood supply.  The edges of the donor site were undermined.   The donor site was closed in a primary fashion.  The pedicle was then rotated into position and sutured.  Once the tube was sutured into place, adequate blood supply was confirmed with blanching and refill.  The pedicle was then wrapped with xeroform gauze and dressed appropriately with a telfa and gauze bandage to ensure continued blood supply and protect the attached pedicle.
Melolabial Interpolation Flap Text: A decision was made to reconstruct the defect utilizing an interpolation axial flap and a staged reconstruction.  A telfa template was made of the defect.  This telfa template was then used to outline the melolabial interpolation flap.  The donor area for the pedicle flap was then injected with anesthesia.  The flap was excised through the skin and subcutaneous tissue down to the layer of the underlying musculature.  The pedicle flap was carefully excised within this deep plane to maintain its blood supply.  The edges of the donor site were undermined.   The donor site was closed in a primary fashion.  The pedicle was then rotated into position and sutured.  Once the tube was sutured into place, adequate blood supply was confirmed with blanching and refill.  The pedicle was then wrapped with xeroform gauze and dressed appropriately with a telfa and gauze bandage to ensure continued blood supply and protect the attached pedicle.
Mastoid Interpolation Flap Text: A decision was made to reconstruct the defect utilizing an interpolation axial flap and a staged reconstruction.  A telfa template was made of the defect.  This telfa template was then used to outline the mastoid interpolation flap.  The donor area for the pedicle flap was then injected with anesthesia.  The flap was excised through the skin and subcutaneous tissue down to the layer of the underlying musculature.  The pedicle flap was carefully excised within this deep plane to maintain its blood supply.  The edges of the donor site were undermined.   The donor site was closed in a primary fashion.  The pedicle was then rotated into position and sutured.  Once the tube was sutured into place, adequate blood supply was confirmed with blanching and refill.  The pedicle was then wrapped with xeroform gauze and dressed appropriately with a telfa and gauze bandage to ensure continued blood supply and protect the attached pedicle.
Posterior Auricular Interpolation Flap Text: A decision was made to reconstruct the defect utilizing an interpolation axial flap and a staged reconstruction.  A telfa template was made of the defect.  This telfa template was then used to outline the posterior auricular interpolation flap.  The donor area for the pedicle flap was then injected with anesthesia.  The flap was excised through the skin and subcutaneous tissue down to the layer of the underlying musculature.  The pedicle flap was carefully excised within this deep plane to maintain its blood supply.  The edges of the donor site were undermined.   The donor site was closed in a primary fashion.  The pedicle was then rotated into position and sutured.  Once the tube was sutured into place, adequate blood supply was confirmed with blanching and refill.  The pedicle was then wrapped with xeroform gauze and dressed appropriately with a telfa and gauze bandage to ensure continued blood supply and protect the attached pedicle.
Paramedian Forehead Flap Text: A decision was made to reconstruct the defect utilizing an interpolation axial flap and a staged reconstruction.  A telfa template was made of the defect.  This telfa template was then used to outline the paramedian forehead pedicle flap.  The donor area for the pedicle flap was then injected with anesthesia.  The flap was excised through the skin and subcutaneous tissue down to the layer of the underlying musculature.  The pedicle flap was carefully excised within this deep plane to maintain its blood supply.  The edges of the donor site were undermined.   The donor site was closed in a primary fashion.  The pedicle was then rotated into position and sutured.  Once the tube was sutured into place, adequate blood supply was confirmed with blanching and refill.  The pedicle was then wrapped with xeroform gauze and dressed appropriately with a telfa and gauze bandage to ensure continued blood supply and protect the attached pedicle.
Lip Wedge Excision Repair Text: Given the location of the defect and the proximity to free margins a full thickness wedge repair was deemed most appropriate.  Using a sterile surgical marker, the appropriate repair was drawn incorporating the defect and placing the expected incisions perpendicular to the vermilion border.  The vermilion border was also meticulously outlined to ensure appropriate reapproximation during the repair.  The area thus outlined was incised through and through with a #15 scalpel blade.  The muscularis and dermis were reaproximated with deep sutures following hemostasis. Care was taken to realign the vermilion border before proceeding with the superficial closure.  Once the vermilion was realigned the superfical and mucosal closure was finished.
Ftsg Text: The defect edges were debeveled with a #15 scalpel blade.  Given the location of the defect, shape of the defect and the proximity to free margins a full thickness skin graft was deemed most appropriate.  Using a sterile surgical marker, the primary defect shape was transferred to the donor site. The area thus outlined was incised deep to adipose tissue with a #15 scalpel blade.  The harvested graft was then trimmed of adipose tissue until only dermis and epidermis was left.  The skin margins of the secondary defect were undermined to an appropriate distance in all directions utilizing iris scissors.  The secondary defect was closed with interrupted buried subcutaneous sutures.  The skin edges were then re-apposed with running  sutures.  The skin graft was then placed in the primary defect and oriented appropriately.
Split-Thickness Skin Graft Text: The defect edges were debeveled with a #15 scalpel blade.  Given the location of the defect, shape of the defect and the proximity to free margins a split thickness skin graft was deemed most appropriate.  Using a sterile surgical marker, the primary defect shape was transferred to the donor site. The split thickness graft was then harvested.  The skin graft was then placed in the primary defect and oriented appropriately.
Burow's Graft Text: The defect edges were debeveled with a #15 scalpel blade.  Given the location of the defect, shape of the defect, the proximity to free margins and the presence of a standing cone deformity a Burow's skin graft was deemed most appropriate. The standing cone was removed and this tissue was then trimmed to the shape of the primary defect. The adipose tissue was also removed until only dermis and epidermis were left.  The skin margins of the secondary defect were undermined to an appropriate distance in all directions utilizing iris scissors.  The secondary defect was closed with interrupted buried subcutaneous sutures.  The skin edges were then re-apposed with running  sutures.  The skin graft was then placed in the primary defect and oriented appropriately.
Cartilage Graft Text: The defect edges were debeveled with a #15 scalpel blade.  Given the location of the defect, shape of the defect, the fact the defect involved a full thickness cartilage defect a cartilage graft was deemed most appropriate.  An appropriate donor site was identified, cleansed, and anesthetized. The cartilage graft was then harvested and transferred to the recipient site, oriented appropriately and then sutured into place.  The secondary defect was then repaired using a primary closure.
Composite Graft Text: The defect edges were debeveled with a #15 scalpel blade.  Given the location of the defect, shape of the defect, the proximity to free margins and the fact the defect was full thickness a composite graft was deemed most appropriate.  The defect was outline and then transferred to the donor site.  A full thickness graft was then excised from the donor site. The graft was then placed in the primary defect, oriented appropriately and then sutured into place.  The secondary defect was then repaired using a primary closure.
Epidermal Autograft Text: The defect edges were debeveled with a #15 scalpel blade.  Given the location of the defect, shape of the defect and the proximity to free margins an epidermal autograft was deemed most appropriate.  Using a sterile surgical marker, the primary defect shape was transferred to the donor site. The epidermal graft was then harvested.  The skin graft was then placed in the primary defect and oriented appropriately.
Dermal Autograft Text: The defect edges were debeveled with a #15 scalpel blade.  Given the location of the defect, shape of the defect and the proximity to free margins a dermal autograft was deemed most appropriate.  Using a sterile surgical marker, the primary defect shape was transferred to the donor site. The area thus outlined was incised deep to adipose tissue with a #15 scalpel blade.  The harvested graft was then trimmed of adipose and epidermal tissue until only dermis was left.  The skin graft was then placed in the primary defect and oriented appropriately.
Skin Substitute Text: The defect edges were debeveled with a #15 scalpel blade.  Given the location of the defect, shape of the defect and the proximity to free margins a skin substitute graft was deemed most appropriate.  The graft material was trimmed to fit the size of the defect. The graft was then placed in the primary defect and oriented appropriately.
Tissue Cultured Epidermal Autograft Text: The defect edges were debeveled with a #15 scalpel blade.  Given the location of the defect, shape of the defect and the proximity to free margins a tissue cultured epidermal autograft was deemed most appropriate.  The graft was then trimmed to fit the size of the defect.  The graft was then placed in the primary defect and oriented appropriately.
Xenograft Text: The defect edges were debeveled with a #15 scalpel blade.  Given the location of the defect, shape of the defect and the proximity to free margins a xenograft was deemed most appropriate.  The graft was then trimmed to fit the size of the defect.  The graft was then placed in the primary defect and oriented appropriately.
Purse String (Intermediate) Text: Given the location of the defect and the characteristics of the surrounding skin a purse string intermediate closure was deemed most appropriate.  Undermining was performed circumferentially around the surgical defect.  A purse string suture was then placed and tightened.
Purse String (Simple) Text: Given the location of the defect and the characteristics of the surrounding skin a purse string simple closure was deemed most appropriate.  Undermining was performed circumferentially around the surgical defect.  A purse string suture was then placed and tightened.
Complex Repair And Single Advancement Flap Text: The defect edges were debeveled with a #15 scalpel blade.  The primary defect was closed partially with a complex linear closure.  Given the location of the remaining defect, shape of the defect and the proximity to free margins a single advancement flap was deemed most appropriate for complete closure of the defect.  Using a sterile surgical marker, an appropriate advancement flap was drawn incorporating the defect and placing the expected incisions within the relaxed skin tension lines where possible.    The area thus outlined was incised deep to adipose tissue with a #15 scalpel blade.  The skin margins were undermined to an appropriate distance in all directions utilizing iris scissors.
Complex Repair And Double Advancement Flap Text: The defect edges were debeveled with a #15 scalpel blade.  The primary defect was closed partially with a complex linear closure.  Given the location of the remaining defect, shape of the defect and the proximity to free margins a double advancement flap was deemed most appropriate for complete closure of the defect.  Using a sterile surgical marker, an appropriate advancement flap was drawn incorporating the defect and placing the expected incisions within the relaxed skin tension lines where possible.    The area thus outlined was incised deep to adipose tissue with a #15 scalpel blade.  The skin margins were undermined to an appropriate distance in all directions utilizing iris scissors.
Complex Repair And Modified Advancement Flap Text: The defect edges were debeveled with a #15 scalpel blade.  The primary defect was closed partially with a complex linear closure.  Given the location of the remaining defect, shape of the defect and the proximity to free margins a modified advancement flap was deemed most appropriate for complete closure of the defect.  Using a sterile surgical marker, an appropriate advancement flap was drawn incorporating the defect and placing the expected incisions within the relaxed skin tension lines where possible.    The area thus outlined was incised deep to adipose tissue with a #15 scalpel blade.  The skin margins were undermined to an appropriate distance in all directions utilizing iris scissors.
Complex Repair And A-T Advancement Flap Text: The defect edges were debeveled with a #15 scalpel blade.  The primary defect was closed partially with a complex linear closure.  Given the location of the remaining defect, shape of the defect and the proximity to free margins an A-T advancement flap was deemed most appropriate for complete closure of the defect.  Using a sterile surgical marker, an appropriate advancement flap was drawn incorporating the defect and placing the expected incisions within the relaxed skin tension lines where possible.    The area thus outlined was incised deep to adipose tissue with a #15 scalpel blade.  The skin margins were undermined to an appropriate distance in all directions utilizing iris scissors.
Complex Repair And O-T Advancement Flap Text: The defect edges were debeveled with a #15 scalpel blade.  The primary defect was closed partially with a complex linear closure.  Given the location of the remaining defect, shape of the defect and the proximity to free margins an O-T advancement flap was deemed most appropriate for complete closure of the defect.  Using a sterile surgical marker, an appropriate advancement flap was drawn incorporating the defect and placing the expected incisions within the relaxed skin tension lines where possible.    The area thus outlined was incised deep to adipose tissue with a #15 scalpel blade.  The skin margins were undermined to an appropriate distance in all directions utilizing iris scissors.
Complex Repair And O-L Flap Text: The defect edges were debeveled with a #15 scalpel blade.  The primary defect was closed partially with a complex linear closure.  Given the location of the remaining defect, shape of the defect and the proximity to free margins an O-L flap was deemed most appropriate for complete closure of the defect.  Using a sterile surgical marker, an appropriate flap was drawn incorporating the defect and placing the expected incisions within the relaxed skin tension lines where possible.    The area thus outlined was incised deep to adipose tissue with a #15 scalpel blade.  The skin margins were undermined to an appropriate distance in all directions utilizing iris scissors.
Complex Repair And Bilobe Flap Text: The defect edges were debeveled with a #15 scalpel blade.  The primary defect was closed partially with a complex linear closure.  Given the location of the remaining defect, shape of the defect and the proximity to free margins a bilobe flap was deemed most appropriate for complete closure of the defect.  Using a sterile surgical marker, an appropriate advancement flap was drawn incorporating the defect and placing the expected incisions within the relaxed skin tension lines where possible.    The area thus outlined was incised deep to adipose tissue with a #15 scalpel blade.  The skin margins were undermined to an appropriate distance in all directions utilizing iris scissors.
Complex Repair And Melolabial Flap Text: The defect edges were debeveled with a #15 scalpel blade.  The primary defect was closed partially with a complex linear closure.  Given the location of the remaining defect, shape of the defect and the proximity to free margins a melolabial flap was deemed most appropriate for complete closure of the defect.  Using a sterile surgical marker, an appropriate advancement flap was drawn incorporating the defect and placing the expected incisions within the relaxed skin tension lines where possible.    The area thus outlined was incised deep to adipose tissue with a #15 scalpel blade.  The skin margins were undermined to an appropriate distance in all directions utilizing iris scissors.
Complex Repair And Rotation Flap Text: The defect edges were debeveled with a #15 scalpel blade.  The primary defect was closed partially with a complex linear closure.  Given the location of the remaining defect, shape of the defect and the proximity to free margins a rotation flap was deemed most appropriate for complete closure of the defect.  Using a sterile surgical marker, an appropriate advancement flap was drawn incorporating the defect and placing the expected incisions within the relaxed skin tension lines where possible.    The area thus outlined was incised deep to adipose tissue with a #15 scalpel blade.  The skin margins were undermined to an appropriate distance in all directions utilizing iris scissors.
Complex Repair And Rhombic Flap Text: The defect edges were debeveled with a #15 scalpel blade.  The primary defect was closed partially with a complex linear closure.  Given the location of the remaining defect, shape of the defect and the proximity to free margins a rhombic flap was deemed most appropriate for complete closure of the defect.  Using a sterile surgical marker, an appropriate advancement flap was drawn incorporating the defect and placing the expected incisions within the relaxed skin tension lines where possible.    The area thus outlined was incised deep to adipose tissue with a #15 scalpel blade.  The skin margins were undermined to an appropriate distance in all directions utilizing iris scissors.
Complex Repair And Transposition Flap Text: The defect edges were debeveled with a #15 scalpel blade.  The primary defect was closed partially with a complex linear closure.  Given the location of the remaining defect, shape of the defect and the proximity to free margins a transposition flap was deemed most appropriate for complete closure of the defect.  Using a sterile surgical marker, an appropriate advancement flap was drawn incorporating the defect and placing the expected incisions within the relaxed skin tension lines where possible.    The area thus outlined was incised deep to adipose tissue with a #15 scalpel blade.  The skin margins were undermined to an appropriate distance in all directions utilizing iris scissors.
Complex Repair And V-Y Plasty Text: The defect edges were debeveled with a #15 scalpel blade.  The primary defect was closed partially with a complex linear closure.  Given the location of the remaining defect, shape of the defect and the proximity to free margins a V-Y plasty was deemed most appropriate for complete closure of the defect.  Using a sterile surgical marker, an appropriate advancement flap was drawn incorporating the defect and placing the expected incisions within the relaxed skin tension lines where possible.    The area thus outlined was incised deep to adipose tissue with a #15 scalpel blade.  The skin margins were undermined to an appropriate distance in all directions utilizing iris scissors.
Complex Repair And M Plasty Text: The defect edges were debeveled with a #15 scalpel blade.  The primary defect was closed partially with a complex linear closure.  Given the location of the remaining defect, shape of the defect and the proximity to free margins an M plasty was deemed most appropriate for complete closure of the defect.  Using a sterile surgical marker, an appropriate advancement flap was drawn incorporating the defect and placing the expected incisions within the relaxed skin tension lines where possible.    The area thus outlined was incised deep to adipose tissue with a #15 scalpel blade.  The skin margins were undermined to an appropriate distance in all directions utilizing iris scissors.
Complex Repair And Double M Plasty Text: The defect edges were debeveled with a #15 scalpel blade.  The primary defect was closed partially with a complex linear closure.  Given the location of the remaining defect, shape of the defect and the proximity to free margins a double M plasty was deemed most appropriate for complete closure of the defect.  Using a sterile surgical marker, an appropriate advancement flap was drawn incorporating the defect and placing the expected incisions within the relaxed skin tension lines where possible.    The area thus outlined was incised deep to adipose tissue with a #15 scalpel blade.  The skin margins were undermined to an appropriate distance in all directions utilizing iris scissors.
Complex Repair And W Plasty Text: The defect edges were debeveled with a #15 scalpel blade.  The primary defect was closed partially with a complex linear closure.  Given the location of the remaining defect, shape of the defect and the proximity to free margins a W plasty was deemed most appropriate for complete closure of the defect.  Using a sterile surgical marker, an appropriate advancement flap was drawn incorporating the defect and placing the expected incisions within the relaxed skin tension lines where possible.    The area thus outlined was incised deep to adipose tissue with a #15 scalpel blade.  The skin margins were undermined to an appropriate distance in all directions utilizing iris scissors.
Complex Repair And Z Plasty Text: The defect edges were debeveled with a #15 scalpel blade.  The primary defect was closed partially with a complex linear closure.  Given the location of the remaining defect, shape of the defect and the proximity to free margins a Z plasty was deemed most appropriate for complete closure of the defect.  Using a sterile surgical marker, an appropriate advancement flap was drawn incorporating the defect and placing the expected incisions within the relaxed skin tension lines where possible.    The area thus outlined was incised deep to adipose tissue with a #15 scalpel blade.  The skin margins were undermined to an appropriate distance in all directions utilizing iris scissors.
Complex Repair And Dorsal Nasal Flap Text: The defect edges were debeveled with a #15 scalpel blade.  The primary defect was closed partially with a complex linear closure.  Given the location of the remaining defect, shape of the defect and the proximity to free margins a dorsal nasal flap was deemed most appropriate for complete closure of the defect.  Using a sterile surgical marker, an appropriate flap was drawn incorporating the defect and placing the expected incisions within the relaxed skin tension lines where possible.    The area thus outlined was incised deep to adipose tissue with a #15 scalpel blade.  The skin margins were undermined to an appropriate distance in all directions utilizing iris scissors.
Complex Repair And Ftsg Text: The defect edges were debeveled with a #15 scalpel blade.  The primary defect was closed partially with a complex linear closure.  Given the location of the defect, shape of the defect and the proximity to free margins a full thickness skin graft was deemed most appropriate to repair the remaining defect.  The graft was trimmed to fit the size of the remaining defect.  The graft was then placed in the primary defect, oriented appropriately, and sutured into place.
Complex Repair And Burow's Graft Text: The defect edges were debeveled with a #15 scalpel blade.  The primary defect was closed partially with a complex linear closure.  Given the location of the defect, shape of the defect, the proximity to free margins and the presence of a standing cone deformity a Burow's graft was deemed most appropriate to repair the remaining defect.  The graft was trimmed to fit the size of the remaining defect.  The graft was then placed in the primary defect, oriented appropriately, and sutured into place.
Complex Repair And Split-Thickness Skin Graft Text: The defect edges were debeveled with a #15 scalpel blade.  The primary defect was closed partially with a complex linear closure.  Given the location of the defect, shape of the defect and the proximity to free margins a split thickness skin graft was deemed most appropriate to repair the remaining defect.  The graft was trimmed to fit the size of the remaining defect.  The graft was then placed in the primary defect, oriented appropriately, and sutured into place.
Complex Repair And Epidermal Autograft Text: The defect edges were debeveled with a #15 scalpel blade.  The primary defect was closed partially with a complex linear closure.  Given the location of the defect, shape of the defect and the proximity to free margins an epidermal autograft was deemed most appropriate to repair the remaining defect.  The graft was trimmed to fit the size of the remaining defect.  The graft was then placed in the primary defect, oriented appropriately, and sutured into place.
Complex Repair And Dermal Autograft Text: The defect edges were debeveled with a #15 scalpel blade.  The primary defect was closed partially with a complex linear closure.  Given the location of the defect, shape of the defect and the proximity to free margins an dermal autograft was deemed most appropriate to repair the remaining defect.  The graft was trimmed to fit the size of the remaining defect.  The graft was then placed in the primary defect, oriented appropriately, and sutured into place.
Complex Repair And Tissue Cultured Epidermal Autograft Text: The defect edges were debeveled with a #15 scalpel blade.  The primary defect was closed partially with a complex linear closure.  Given the location of the defect, shape of the defect and the proximity to free margins an tissue cultured epidermal autograft was deemed most appropriate to repair the remaining defect.  The graft was trimmed to fit the size of the remaining defect.  The graft was then placed in the primary defect, oriented appropriately, and sutured into place.
Complex Repair And Xenograft Text: The defect edges were debeveled with a #15 scalpel blade.  The primary defect was closed partially with a complex linear closure.  Given the location of the defect, shape of the defect and the proximity to free margins a xenograft was deemed most appropriate to repair the remaining defect.  The graft was trimmed to fit the size of the remaining defect.  The graft was then placed in the primary defect, oriented appropriately, and sutured into place.
Complex Repair And Skin Substitute Graft Text: The defect edges were debeveled with a #15 scalpel blade.  The primary defect was closed partially with a complex linear closure.  Given the location of the remaining defect, shape of the defect and the proximity to free margins a skin substitute graft was deemed most appropriate to repair the remaining defect.  The graft was trimmed to fit the size of the remaining defect.  The graft was then placed in the primary defect, oriented appropriately, and sutured into place.
Path Notes (To The Dermatopathologist): Please check margins.
Consent was obtained from the patient. The risks and benefits to therapy were discussed in detail. Specifically, the risks of infection, scarring, bleeding, prolonged wound healing, incomplete removal, allergy to anesthesia, nerve injury and recurrence were addressed. Prior to the procedure, the treatment site was clearly identified and confirmed by the patient. All components of Universal Protocol/PAUSE Rule completed.
Render Post-Care Instructions In Note?: yes
Post-Care Instructions: I reviewed with the patient in detail post-care instructions. Patient is not to engage in any heavy lifting, exercise, or swimming for the next 14 days. Should the patient develop any fevers, chills, bleeding, severe pain patient will contact the office immediately.
Home Suture Removal Text: Patient was provided a home suture removal kit and will remove their sutures at home.  If they have any questions or difficulties they will call the office.
Where Do You Want The Question To Include Opioid Counseling Located?: Case Summary Tab
Billing Type: Third-Party Bill
Information: Selecting Yes will display possible errors in your note based on the variables you have selected. This validation is only offered as a suggestion for you. PLEASE NOTE THAT THE VALIDATION TEXT WILL BE REMOVED WHEN YOU FINALIZE YOUR NOTE. IF YOU WANT TO FAX A PRELIMINARY NOTE YOU WILL NEED TO TOGGLE THIS TO 'NO' IF YOU DO NOT WANT IT IN YOUR FAXED NOTE.

## 2022-05-10 NOTE — HPI: PROCEDURE (SKIN SURGERY)
Has The Growth Been Previously Biopsied?: has been previously biopsied
Additional History: Accession [id number]

## 2022-05-23 ENCOUNTER — APPOINTMENT (RX ONLY)
Dept: URBAN - METROPOLITAN AREA CLINIC 329 | Facility: CLINIC | Age: 72
Setting detail: DERMATOLOGY
End: 2022-05-23

## 2022-05-23 PROBLEM — D23.4 OTHER BENIGN NEOPLASM OF SKIN OF SCALP AND NECK: Status: ACTIVE | Noted: 2022-05-23

## 2022-05-23 PROCEDURE — ? SUTURE REMOVAL (GLOBAL PERIOD)

## 2022-05-23 PROCEDURE — 99024 POSTOP FOLLOW-UP VISIT: CPT

## 2022-05-23 NOTE — PROCEDURE: SUTURE REMOVAL (GLOBAL PERIOD)
Detail Level: Detailed
Add 09088 Cpt? (Important Note: In 2017 The Use Of 43238 Is Being Tracked By Cms To Determine Future Global Period Reimbursement For Global Periods): yes

## 2022-07-07 ENCOUNTER — HOSPITAL ENCOUNTER (OUTPATIENT)
Dept: PHYSICAL THERAPY | Age: 72
Setting detail: RECURRING SERIES
Discharge: HOME | End: 2022-07-10
Payer: MEDICARE

## 2022-07-07 PROCEDURE — 97161 PT EVAL LOW COMPLEX 20 MIN: CPT

## 2022-07-07 ASSESSMENT — PAIN SCALES - GENERAL: PAINLEVEL_OUTOF10: 0

## 2022-07-07 NOTE — THERAPY EVALUATION
Adia Martinez  : 1950  Primary: Medicare Part A And B  Secondary: [address] @ [address] [address]  Phone: [phone]  Fax: [phone] Plan Frequency: 2    Plan of Care/Certification Expiration Date: 10/07/22      PT Visit Info: Total # of Visits to Date: 1      OUTPATIENT PHYSICAL THERAPY:OP NOTE TYPE: Initial Assessment 2022               Episode  Appt Desk         Treatment Diagnosis:  Generalized Muscle Weakness (M62.81)  Difficulty in walking, Not elsewhere classified (R26.2)  Pain in right foot [M79.671]  Medical/Referring Diagnosis:  Pain in right foot [I81.686]  Referring Physician:  Maribel Spaulding, *  MD Orders:  PT Eval and Treat   Return MD Appt:  unknown  Date of Onset:  Onset Date: 21     Allergies:  Sulfamethoxazole-trimethoprim  Restrictions/Precautions:    Restrictions/Precautions: Fall Risk  No data recorded   Medications Last Reviewed:  2022     SUBJECTIVE   History of Injury/Illness (Reason for Referral):  Pt stepped into a hole while walking in 2021 and had onset of debilitating R ankle/foot pain the day after. He has been unable to get an MRI because of not being able to tolerate lying supine. Xray of foot showed fracture in ankle. He continued to have pain with walking and was walking with a CAM boot. He now as R ankle/foot brace for decreased pressure to foot. He currently is concerned about lack of endurance with motion. His MD is looking at his testosterone levels as part of the problem. He has gotten an air cast to wear in pool and has now started back to pool program on his own. His goal is to continue his pool program 3x a week and wants help with doing land exercises that will improve his endurance/stamina.  He states that his pain in his R foot is much better than it was when he was here for PT previously for this episode,  He states that he can walk very short distances without leg brace but pain comes back with short distances. Patient Stated Goal(s):  His goal is to continue his pool program 3x a week and wants help with doing land exercises that will improve his endurance/stamina. Initial:     0/10 Post Session:      0/10  Past Medical History/Comorbidities:   Mr. Ann Appiah  has a past medical history of Adverse effect of anesthesia, Asthma, Atrial fibrillation ([address] Cervical spondylosis, Chronic back pain, Chronic pain, Colon polyps, COPD (chronic obstructive pulmonary disease) ([address] Coronary atherosclerosis of native coronary vessel, Diabetes ([address] Diverticulosis, Edema, Former smoker, GERD (gastroesophageal reflux disease), High cholesterol, History of kidney stones, Hypertension, Obesity (BMI 30-39.9), Status post total right knee replacement, and Unspecified sleep apnea. Mr. Ann Appiah  has a past surgical history that includes total colectomy (2015); Cardiac catheterization (2012); Total knee arthroplasty (Right); Cataract removal; other surgical history (20 yrs ago); lumbar laminectomy (2009); Colonoscopy (11/20/14); Colonoscopy (10/6/14); cystoscopy,insert ureteral stent; Retinal detachment surgery (Bilateral); and joint replacement. Social History/Living Environment:   Lives With: Spouse  Type of Home: House  Home Layout: Two level; Able to Live on Main level with bedroom/bathroom  Home Access: Level entry  Bathroom Shower/Tub: Tub/Shower unit; Shower chair without back     Prior Level of Function/Work/Activity:   Prior level of function: able to walk moderate distanes without foot pain  Prior level of function: mod I  Current level of function: Has to wear R LE brace and walker for ambulation for 5 minutes. Without walker and brace can only walk house hold distances but not normal distances.   Occupation: Retired  Receives Help From: Family  No data [id number] recorded   Learning:   Does the patient/guardian have any barriers to learning?: Physical  Will there be a co-learner?: No  What is the preferred language of the patient/guardian?: English  How does the patient/guardian prefer to learn new concepts?: Listening; Reading; Demonstration; Pictures/Videos     Fall Risk Scale: Total Score: 30  [place] Fall Risk: Medium (25-44)         OBJECTIVE     Observation/Orthostatic Postural Assessment:   Arrives with wearing a R LE leg/ankle brace and walks with RW in a forward bent position. Walks with R LE in external rotated position with decreased push through of R foot. Skin integrity: generalized redness to R ankle  Swelling/Effusion: moderate ankle effusion on medial and lateral sides     Palpation: Tender to R lateral ankle at calcaneal fib ligament,   ROM:  AROM RLE (degrees)  RLE AROM: Exceptions  R Ankle Dorsiflexion 0-20: 0  R Ankle Plantar Flexion 0-45: 30  R Ankle Forefoot Inversion 0-40: 0  AROM LLE (degrees)  LLE AROM : Exceptions  L Ankle Dorsiflexion 0-20: 3  L Ankle Forefoot Inversion 0-40: 21   GENOVEVA hallus ext ~ 45 degrees  Also has loss of motion at GENOVEVA shoulder flexion: R ~ 80 degrees, L ~ 110 degrees AROM  Also has significant loss of trunk extension. Sits and stands in forward bent position and has acquired kyphotic scoliosis  Strength:  Strength RLE  Strength RLE: Exception  R Hip Flexion: 4-/5  R Ankle Dorsiflexion: 4+/5  R Ankle Plantar flexion: 3+/5  R Ankle Inversion: 4/5  R Ankle Eversion: 4/5  Special Tests: Muscle Length Tests: stiff to GENOVEVA psoas and gastroc  Joint Accessory Mobility testing: stiff to calcaneus and subtalar joints  Neurological Screen: [place] neuro screen is intact to myotomes. Functional Mobility:  Sit to/from Stand,  Bed Mobility and independent with compensation for posture and pain as needed.   Floor transfer: unable    Walking on level ground: walks with RW and R LE ankle brace- R hip in ER position with poor push off at R foot  Balance:  Can not formally test but does ok with transitions and weight changes with gait. Unable to do single leg stance because of back and R ankle injury. ASSESSMENT   Initial Assessment:  Mr. Alexx Herrera is a previous pt here last year for spine pain and then R ankle injury after stepping into a hole. He returns this year after obtaining a R ankle brace. He was not a candidate for surgery for his ankle fracture because of his age and co-morbidities- per pt. He presents with significant loss of motion to R ankle AROM and generalized loss of core and ankle strength, decreased endurance. He is at risk for decreased health secondary his inability to move as well with having co-morbidities including diabetes type 2. He is very motivated to improve and regain some of the strength he had prior to his ankle injury. Problem List: (Impacting functional limitations): Body Structures, Functions, Activity Limitations Requiring Skilled Therapeutic Intervention: Decreased functional mobility ; Decreased ADL status; Decreased ROM; Decreased strength; Decreased endurance; Decreased balance; Increased pain     Therapy Prognosis:   Therapy Prognosis: Good     Assessment Complexity:   Low Complexity  PLAN   Effective Dates: 7/7/22 TO Plan of Care/Certification Expiration Date: 10/07/22     Frequency/Duration: Plan Frequency: 2     Interventions Planned (Treatment may consist of any combination of the following):    No data recorded   Goals: (Goals have been discussed and agreed upon with patient.)  Short-Term Functional Goals: Time Frame: 4 weeks  1. R ankle eversion AROM at least 7 degrees  2. R ankle isometric strength 5/5 for all motions  3. Pt independent with initial HEP  Discharge Goals: Time Frame: 12 weeks  1. Able to walk with walker and ankle brace at least 20 min. With min to no foot/ankle pain. 2. Pt reports tolerance to walking, general ADL's improves at least 50% per pt report. 3. Pt independent with discharge HEP. Outcome Measure:    Tool Used: FOOT AND ANKLE ABILITY MEASURE  Score:  Initial: 51 Most Recent: X (Date: -- )   Interpretation of Score: For the \"Activities of Daily Living\", there are 21 questions each scored on a 5 point scale with 0 representing \"Unable to do\" and 4 representing \"No difficulty\". The lower the score, the greater the functional disability. 84/84 represents no disability. Minimal detectable change is 5.7 points. With the addition of the 8 questions in the \"Sports Subscale,\" there are 29 questions, each scored on a 5 point scale with 0 representing \"Unable to do\" and 4 representing \"No difficulty\". The lower the score, the greater the functional disability. 116/116 represents no disability. Minimal detectable change is 12.3 points. Medical Necessity:    Skilled intervention continues to be required due to pt with significant loss of ability to ambulate, decreased ankle AROM and loss of strength in ankle and core. .  Reason For Services/Other Comments:   Patient continues to require skilled intervention due to cotninued loss of ankle ROM, strength and decreased endurance. .  Total Duration:       Regarding Clayton Loera therapy, I certify that the treatment plan above will be carried out by a therapist or under their direction.   Thank you for this referral,  Betty Ballesteros, PT     Referring Physician Signature: Julieta Briggs., * _______________________________ Date _____________        Post Session Pain  Charge Capture  PT Visit Info  POC Link  Treatment Note Link  MD Guidelines  Nad[place]

## 2022-07-07 NOTE — PROGRESS NOTES
Karely Padilla  : 1950  Primary: Medicare Part A And B  Secondary: [address]  @ [address] [address]  Phone: [phone]  Fax: [phone] Plan Frequency: 2    Plan of Care/Certification Expiration Date: 10/07/22      PT Visit Info: Total # of Visits to Date: 1      OUTPATIENT PHYSICAL THERAPY:OP NOTE TYPE: Treatment Note 2022       Episode  }Appt Desk              Treatment Diagnosis:  Generalized Muscle Weakness (M62.81)  Difficulty in walking, Not elsewhere classified (R26.2)  Pain in right foot [M79.671]  Medical/Referring Diagnosis:  Pain in right foot [B31.573]  Referring Physician:  Geovani Negrete, *  MD Orders:  PT Eval and Treat   Date of Onset:  Onset Date: 21     Allergies:   Sulfamethoxazole-trimethoprim  Restrictions/Precautions:  Restrictions/Precautions: Fall Risk  No data recorded   Interventions Planned (Treatment may consist of any combination of the following):    Current Treatment Recommendations: Strengthening; ROM; Balance training; Manual Therapy - Joint Manipulation; Manual Therapy - Soft Tissue Mobilization; Home exercise program; Pain management; Aquatics; Therapeutic activities     Subjective Comments:  loss of endurance  Initial:}    010Post Session:        0/10  Medications Last Reviewed:  2022  Updated Objective Findings:  See evaluation note from today  Treatment   Therapeutic Exercises:( 5  Min)  Reviewed endurance program assigned to pt last year. Pt practiced as needed as follows: elbow flexion with low weight, trunk rotation with punch out with low weight, sit to stand, shoulder swing small ROM with low weight and LAQ. All done in sitting and 30 sec fast, 30 sec rest.     Treatment/Session Summary:    · Treatment Assessment:  GOod return demonstration of HEP.   · Communication/Consultation:  None today  · Equipment provided today: None  · Recommendations/Intent for next treatment session: Next visit will focus on gentle ankle ROM, stretching, hip stretching in side lye and progress endurance HEP as able.     Total Treatment Billable Duration:  5 minutes        Rowena Huizar, PT       Charge Capture  }Post Session Pain  PT Visit Info  [employer] Portal  MD Guidelines  Scanned Media  Benefits  MyChart    Future Appointments   Date Time Provider Romie Ivory   7/12/2022  2:00 PM Zaria Kramer PTA [facility]   7/14/2022  1:45 PM Zaria Kramer PTA [facility]   7/19/2022  3:15 PM Rowena Huizar, PT [facility]   7/21/2022  2:30 PM Rowena Huizar, PT [facility]   7/26/2022  2:45 PM Rowena Huizar, PT [facility]   7/28/2022  1:45 PM Rowena Huizar, PT [facility]   10/17/2022  2:00 PM Ellis Ruiz MD [facility]

## 2022-07-12 ENCOUNTER — HOSPITAL ENCOUNTER (OUTPATIENT)
Dept: PHYSICAL THERAPY | Age: 72
Setting detail: RECURRING SERIES
Discharge: HOME | End: 2022-07-15
Payer: MEDICARE

## 2022-07-12 PROCEDURE — 97110 THERAPEUTIC EXERCISES: CPT

## 2022-07-12 PROCEDURE — 97140 MANUAL THERAPY 1/> REGIONS: CPT

## 2022-07-12 NOTE — PROGRESS NOTES
Cheryl Carcamo  : 1950  Primary: Medicare Part A And B  Secondary: [address] [address]  Phone: [phone]  Fax: [phone] Plan Frequency: 2    Plan of Care/Certification Expiration Date: 10/07/22      PT Visit Info: Total # of Visits to Date: 2      OUTPATIENT PHYSICAL THERAPY:OP NOTE TYPE: Treatment Note 2022       Episode  }Appt Desk              Treatment Diagnosis:  Generalized Muscle Weakness (M62.81)  Difficulty in walking, Not elsewhere classified (R26.2)  Pain in right foot [M79.671]  Medical/Referring Diagnosis:  Pain in right foot [Z79.114]  Referring Physician:  Rogers Dawson, *  MD Orders:  PT Eval and Treat   Date of Onset:  Onset Date: 21     Allergies:   Sulfamethoxazole-trimethoprim  Restrictions/Precautions:  Restrictions/Precautions: Fall Risk  No data recorded   Interventions Planned (Treatment may consist of any combination of the following):    Current Treatment Recommendations: Strengthening; ROM; Balance training; Manual Therapy - Joint Manipulation; Manual Therapy - Soft Tissue Mobilization; Home exercise program; Pain management; Aquatics; Therapeutic activities     Subjective Comments:  Pt reports mild R shoulder pain but he states that he walked laps in his pool last week. Initial:}     (Mild pain)/10Post Session:        0/10  Medications Last Reviewed:  2022  Updated Objective Findings:  Pt ambulates with a rolling walker with a flexed thoracic spine and flexed hips. He ambulates with the R LE externally rotated. He relies heavily on his UEs for support on the RW. Treatment   MANUAL THERAPY: (10 minutes): Soft tissue mobilization and PROM to the R ankle was utilized and necessary because of the patient's restricted joint motion. Gentle PROM performed to the R ankle into DF, PF, EV, inversion.  Pt was also educated on propping the R ankle above the heart for 10 minutes at the end of the day. Discussed using ice for pain and edema for 10 minutes. THERAPEUTIC EXERCISE: (30 minutes):    Exercises per grid below to improve mobility, strength and balance. Required moderate visual and verbal cues to promote proper body mechanics. Progressed resistance, repetitions and complexity of movement as indicated. -sit to stand from successively lower mat table 3x10 with UE support  -gait training in clinic, 50 ft with cues to extend thoracic spine and internally rotate the R hip and foot. Pt used rolling walker on even surfaces, SBA.  -standing posture and balance in front of full length mirror, x 3 min. Working on thoracic extension and hip extension. -standing hip extension at bar x10 B  -standing hip abduction at bar x10 B    -UBE level 3 for 10 min on own       Treatment/Session Summary:    · Treatment Assessment:  Pt's B hip flexors are tight bilaterally, making full thoracic and lumbar extension difficult. He took 2 rest breaks during standing activities. Pt is able to invert the R hip and foot with verbal cues. · Communication/Consultation:  None today  · Equipment provided today:  None  · Recommendations/Intent for next treatment session: Next visit will focus on gentle ankle ROM, stretching, hip stretching in side lye and progress endurance HEP as able.     Total Treatment Billable Duration:  40 minutes   Time In: 1400  Time Out: 1450    Casi Freedman PTA       Charge Capture  }Post Session Pain  PT Visit Info  [employer] Portal  MD Guidelines  Scanned Media  Benefits  MyChart    Future Appointments   Date Time Provider Romie Dodson   7/14/2022  1:45 PM Casi Freedman PTA [facility]   7/19/2022  3:15 PM Delos Pickerel, PT [facility]   7/21/2022  2:30 PM Delos Pickerel, PT [facility]   7/26/2022  2:45 PM Delos Pickerel, PT [facility]   7/28/2022  1:45 PM Delos Pickerel, PT [facility]   10/17/2022  2:00 PM Karli Taylor Farheen Patton MD [facility]

## 2022-07-14 ENCOUNTER — HOSPITAL ENCOUNTER (OUTPATIENT)
Dept: PHYSICAL THERAPY | Age: 72
Setting detail: RECURRING SERIES
Discharge: HOME | End: 2022-07-17
Payer: MEDICARE

## 2022-07-14 PROCEDURE — 97110 THERAPEUTIC EXERCISES: CPT

## 2022-07-14 NOTE — PROGRESS NOTES
Rui Floyder  : 1950  Primary: Medicare Part A And B  Secondary: [address] @ [address] [address]  Phone: [phone]  Fax: [phone] Plan Frequency: 2    Plan of Care/Certification Expiration Date: 10/07/22      PT Visit Info: Total # of Visits to Date: 3      OUTPATIENT PHYSICAL THERAPY:OP NOTE TYPE: Treatment Note 2022       Episode  }Appt Desk              Treatment Diagnosis:  Generalized Muscle Weakness (M62.81)  Difficulty in walking, Not elsewhere classified (R26.2)  Pain in right foot [M79.671]  Medical/Referring Diagnosis:  Pain in right foot [N01.855]  Referring Physician:  Alexx Gordillo, *  MD Orders:  PT Eval and Treat   Date of Onset:  Onset Date: 21     Allergies:   Sulfamethoxazole-trimethoprim  Restrictions/Precautions:  Restrictions/Precautions: Fall Risk  No data recorded   Interventions Planned (Treatment may consist of any combination of the following):    Current Treatment Recommendations: Strengthening; ROM; Balance training; Manual Therapy - Joint Manipulation; Manual Therapy - Soft Tissue Mobilization; Home exercise program; Pain management; Aquatics; Therapeutic activities     Subjective Comments:  Pt reports the same level of ankle pain  Initial:}     (mild to moderate pain)/10Post Session:        no increase  Medications Last Reviewed:  2022  Updated Objective Findings:  Pt ambulates with a rolling walker with a flexed thoracic spine and flexed hips. He ambulates with the R LE externally rotated. He relies heavily on his UEs for support on the RW. Treatment   MANUAL THERAPY: (0 minutes): none today  Soft tissue mobilization and PROM to the R ankle was utilized and necessary because of the patient's restricted joint motion. Gentle PROM performed to the R ankle into DF, PF, EV, inversion.  Pt was also educated on propping the R ankle above the heart for 10 minutes at the end of the day. Discussed using ice for pain and edema for 10 minutes. THERAPEUTIC EXERCISE: (40 minutes):    Exercises per grid below to improve mobility, strength and balance. Required moderate visual and verbal cues to promote proper body mechanics. Progressed resistance, repetitions and complexity of movement as indicated. -sit to stand from successively lower mat table 3x10 with UE support- set of 10 throughout treatment  -standing scapula retraction 2x10 green  -standing shoulder extension 2x10 green  -standing hip extension at bar x10 B  -standing hip abduction at bar x10 B  -UBE level 3 for 5 min on own   -seated LAQ 2# 2x15 B  -seated shoulder ER with green band x30  -seated D2 with green band x15 B  -seated lumbar extension into stability ball 2x10      Treatment/Session Summary:    · Treatment Assessment:  Pt reported mild fatigue but no increased pain. He required frequent verbal and tactile cues to extend the thoracic spine during standing activities. His tolerance for standing depends on how painful the back is. · Communication/Consultation:  None today  · Equipment provided today:  None  · Recommendations/Intent for next treatment session: Next visit will focus on gentle ankle ROM, stretching, hip stretching in side lye and progress endurance HEP as able.     Total Treatment Billable Duration:  40 minutes   Time In: 7084  Time Out: 0230    Willem Mckeon PTA       Charge Capture  }Post Session Pain  PT Visit Info  [employer] Portal  MD Guidelines  Scanned Media  Benefits  MyChart    Future Appointments   Date Time Provider Romie Dodson   7/19/2022  3:15 PM [facility]   7/21/2022  2:30 PM SEAN Salazar [facility]   7/26/2022  2:45 PM [facility]   7/28/2022  1:45 PM SEAN Salazar [facility]   10/17/2022  2:00 PM Jaren Jeong MD [facility]

## 2022-07-19 ENCOUNTER — HOSPITAL ENCOUNTER (OUTPATIENT)
Dept: PHYSICAL THERAPY | Age: 72
Setting detail: RECURRING SERIES
Discharge: HOME | End: 2022-07-22
Payer: MEDICARE

## 2022-07-19 PROCEDURE — 97110 THERAPEUTIC EXERCISES: CPT

## 2022-07-19 NOTE — PROGRESS NOTES
Jaya Paul  : 1950  Primary: Medicare Part A [place] B  Secondary: [address] @ [address] [address]  Phone: [phone]  Fax: [phone] Plan Frequency: 2    Plan of Care/Certification Expiration Date: 10/07/22      PT Visit Info: Total # of Visits to Date: 4      OUTPATIENT PHYSICAL THERAPY:OP NOTE TYPE: Treatment Note 2022       Episode  }Appt Desk              Treatment Diagnosis:  Generalized Muscle Weakness (M62.81)  Difficulty in walking, Not elsewhere classified (R26.2)  Pain in right foot [M79.671]  Medical/Referring Diagnosis:  Pain in right foot [B83.968]  Referring Physician:  Gi Robins MD Orders:  PT Eval and Treat   Date of Onset:  Onset Date: 21     Allergies:   Sulfamethoxazole-trimethoprim  Restrictions/Precautions:  Restrictions/Precautions: Fall Risk  No data recorded   Interventions Planned (Treatment may consist of any combination of the following):    Current Treatment Recommendations: Strengthening; ROM; Balance training; Manual Therapy - Joint Manipulation; Manual Therapy - Soft Tissue Mobilization; Home exercise program; Pain management; Aquatics; Therapeutic activities     Subjective Comments:  states that the ankle still gets swollen and this causes him to put  Initial:}     /10Post Session:        no increase  Medications Last Reviewed:  2022  Updated Objective Findings:    From 22: Pt ambulates with a rolling walker with a flexed thoracic spine and flexed hips. He ambulates with the R LE externally rotated. He relies heavily on his UEs for support on the RW. Treatment   MANUAL THERAPY: (0 minutes): none today  Soft tissue mobilization and PROM to the R ankle was utilized and necessary because of the patient's restricted joint motion. Gentle PROM performed to the R ankle into DF, PF, EV, inversion.  Pt was also educated on propping the R ankle above the heart for 10 minutes at the end of the day. Discussed using ice for pain and edema for 10 minutes. THERAPEUTIC EXERCISE: (40 minutes):    Exercises per grid below to improve mobility, strength and balance. Required moderate visual and verbal cues to promote proper body mechanics. Progressed resistance, repetitions and complexity of movement as indicated. -- started with walking with much less hip ER position for R LE and discussed this. Did following exercises with increased speed 30 sec , then rest 30 sec between ea exercise:sit to stand , green shoulder HORZ ABD, LAQ, hip flexion, bicep curl 5# for strength and endurance    from successively lower mat table 2x10 with UE support- set of 10 throughout treatment        -standing shoulder extension 2x10 green  -standing hip extension at bar x10 B  -standing hip abduction at bar 2x10 B  -UBE level 3 for 5 min  -seated LAQ 2.5# 2x15 B  -seated shoulder ER with green band 2x10  -seated lumbar extension into stability ball x10      Treatment/Session Summary:    Treatment Assessment:  pt with improved ability to keep ankle in less everted posiiton when he focuses. However, he states than when he has pain he uses that position. did well with exercises. Communication/Consultation:  None today  Equipment provided today:  None  Recommendations/Intent for next treatment session: Next visit will focus on gentle ankle ROM, stretching, hip stretching in standing and progress endurance HEP as able.     Total Treatment Billable Duration:  40 minutes   Time In: 0459  Time Out: Romie Francis PT       Charge Capture  }Post Session Pain  PT Visit Info  [employer] Portal  MD Guidelines  Scanned Media  Benefits  MyChart    Future Appointments   Date Time Provider Romie Dodson   7/21/2022  2:30 PM [facility]   7/26/2022  2:45 PM [facility]   7/28/2022  1:45 PM Kim Corbin, PT [facility]   10/17/2022 2:00 PM Melissa Moctezuma MD [facility]

## 2022-07-21 ENCOUNTER — HOSPITAL ENCOUNTER (OUTPATIENT)
Dept: PHYSICAL THERAPY | Age: 72
Setting detail: RECURRING SERIES
Discharge: HOME | End: 2022-07-24
Payer: MEDICARE

## 2022-07-21 PROCEDURE — 97110 THERAPEUTIC EXERCISES: CPT

## 2022-07-21 NOTE — PROGRESS NOTES
Alicia Caballero  : 1950  Primary: Medicare Part A And B  Secondary: [address] [address]  Phone: [phone]  Fax: [phone] Plan Frequency: 2    Plan of Care/Certification Expiration Date: 10/07/22      PT Visit Info: Total # of Visits to Date: 4      OUTPATIENT PHYSICAL THERAPY:OP NOTE TYPE: Treatment Note 2022       Episode  }Appt Desk              Treatment Diagnosis:  Generalized Muscle Weakness (M62.81)  Difficulty in walking, Not elsewhere classified (R26.2)  Pain in right foot [M79.671]  Medical/Referring Diagnosis:  Pain in right foot [M57.734]  Referring Physician:  Marcio Aviles *  MD Orders:  PT Eval and Treat   Date of Onset:  Onset Date: 21     Allergies:   Sulfamethoxazole-trimethoprim  Restrictions/Precautions:  Restrictions/Precautions: Fall Risk  No data recorded   Interventions Planned (Treatment may consist of any combination of the following):    Current Treatment Recommendations: Strengthening; ROM; Balance training; Manual Therapy - Joint Manipulation; Manual Therapy - Soft Tissue Mobilization; Home exercise program; Pain management; Aquatics; Therapeutic activities     Subjective Comments:     Initial:}     /10Post Session:        no increase  Medications Last Reviewed:  2022  Updated Objective Findings:    From 22: Pt ambulates with a rolling walker with a flexed thoracic spine and flexed hips. He ambulates with the R LE externally rotated. He relies heavily on his UEs for support on the RW. Treatment   MANUAL THERAPY: (0 minutes): none today  Soft tissue mobilization and PROM to the R ankle was utilized and necessary because of the patient's restricted joint motion. Gentle PROM performed to the R ankle into DF, PF, EV, inversion. Pt was also educated on propping the R ankle above the heart for 10 minutes at the end of the day.  Discussed using ice for pain and edema for 10 minutes. THERAPEUTIC EXERCISE: (40 minutes):    Exercises per grid below to improve mobility, strength and balance. Required moderate visual and verbal cues to promote proper body mechanics. Progressed resistance, repetitions and complexity of movement as indicated. -- started with walking with much less hip ER position for R LE and discussed this. Did following exercises with increased speed 30 sec , then rest 30 sec between ea exercise:sit to stand , green shoulder HORZ ABD, LAQ 5#, hip flexion5#, bicep curl 7# for strength and endurance        -- LAQ 3# 10x3, 5# 20x ea side  from successively lower mat table 2x10 with UE support- set of 10 throughout treatment  -standing shoulder extension cable 3# 10x then-10# 10x  standing hip extension at bar 5# x10 B  -standing hip abduction at bar 5# 2x10 B  -stand take step forward with opp arm lift  -seated shoulder ER with green band 2x10  -seated lumbar extension into stability ball x10  \-UBE level 3 for 10 min  Treatment/Session Summary:    Treatment Assessment:  improved endurance today. Did well with standing exercises but needed to sit down right afterwards. Communication/Consultation:  None today  Equipment provided today:  None  Recommendations/Intent for next treatment session: Next visit will focus on gentle ankle ROM, stretching, hip stretching in standing and progress endurance HEP as able.     Total Treatment Billable Duration:  40 minutes   Time In: 5221  Time Out: Ophelia 9905, PT       Charge Capture  }Post Session Pain  PT Visit Info  [facility] Portal  MD Guidelines  Scanned Media  Benefits  MyChart    Future Appointments   Date Time Provider Romie Ivory   7/26/2022  2:45 PM Vincent Umanzor, PT [facility]   7/28/2022  1:45 PM Vincent Umanzor, PT [facility]   10/17/2022  2:00 PM eJssica Rankin MD [facility]

## 2022-07-26 ENCOUNTER — HOSPITAL ENCOUNTER (OUTPATIENT)
Dept: PHYSICAL THERAPY | Age: 72
Setting detail: RECURRING SERIES
Discharge: HOME | End: 2022-07-29
Payer: MEDICARE

## 2022-07-26 PROCEDURE — 97110 THERAPEUTIC EXERCISES: CPT

## 2022-07-26 NOTE — PROGRESS NOTES
Ashley Liao  : 1950  Primary: Medicare Part A And B  Secondary: [address] @ [address] [address]  Phone: [phone]  Fax: [phone] Plan Frequency: 2    Plan of Care/Certification Expiration Date: 10/07/22      PT Visit Info: Total # of Visits to Date: 5      OUTPATIENT PHYSICAL THERAPY:OP NOTE TYPE: Treatment Note 2022       Episode  }Appt Desk              Treatment Diagnosis:  Generalized Muscle Weakness (M62.81)  Difficulty in walking, Not elsewhere classified (R26.2)  Pain in right foot [M79.671]  Medical/Referring Diagnosis:  Pain in right foot [Z14.224]  Referring Physician:  Minna Toledo., *  MD Orders:  PT Eval and Treat   Date of Onset:  Onset Date: 21     Allergies:   Sulfamethoxazole-trimethoprim  Restrictions/Precautions:  Restrictions/Precautions: Fall Risk  No data recorded   Interventions Planned (Treatment may consist of any combination of the following):    Current Treatment Recommendations: Strengthening; ROM; Balance training; Manual Therapy - Joint Manipulation; Manual Therapy - Soft Tissue Mobilization; Home exercise program; Pain management; Aquatics; Therapeutic activities     Subjective Comments:  ankle pain has not increased with exercises done in PT sessions  Initial:}     /10Post Session:        no increase  Medications Last Reviewed:  2022  Updated Objective Findings:    From 22: Pt ambulates with a rolling walker with a flexed thoracic spine and flexed hips. He ambulates with the R LE externally rotated. He relies heavily on his UEs for support on the RW. Treatment   MANUAL THERAPY: (0 minutes): none today  Soft tissue mobilization and PROM to the R ankle was utilized and necessary because of the patient's restricted joint motion. Gentle PROM performed to the R ankle into DF, PF, EV, inversion.  Pt was also educated on propping the R ankle above SFO   8/18/2022  2:00 PM Taffy Lofty, PT [facility]   8/23/2022  3:30 PM Taffy Lofty, PT [facility]   8/25/2022  2:00 PM Taffy Lofty, PT [facility]   8/30/2022  2:30 PM Taffy Lofty, PT [facility]   10/17/2022  2:00 PM Adriana Chen MD [facility]

## 2022-07-28 ENCOUNTER — HOSPITAL ENCOUNTER (OUTPATIENT)
Dept: PHYSICAL THERAPY | Age: 72
Setting detail: RECURRING SERIES
Discharge: HOME | End: 2022-07-31
Payer: MEDICARE

## 2022-07-28 PROCEDURE — 97110 THERAPEUTIC EXERCISES: CPT

## 2022-07-28 NOTE — PROGRESS NOTES
Shaaron Lanes  : 1950  Primary: Medicare Part A And B  Secondary: [address]  @ [address] [address]  Phone: [phone]  Fax: [phone] Plan Frequency: 2    Plan of Care/Certification Expiration Date: 10/07/22      PT Visit Info: Total # of Visits to Date: 6      OUTPATIENT PHYSICAL THERAPY:OP NOTE TYPE: Treatment Note 2022       Episode  }Appt Desk              Treatment Diagnosis:  Generalized Muscle Weakness (M62.81)  Difficulty in walking, Not elsewhere classified (R26.2)  Pain in right foot [M79.671]  Medical/Referring Diagnosis:  Pain in right foot [R21.925]  Referring Physician:  Ranjan Land., *  MD Orders:  PT Eval and Treat   Date of Onset:  Onset Date: 21     Allergies:   Sulfamethoxazole-trimethoprim  Restrictions/Precautions:  Restrictions/Precautions: Fall Risk  No data recorded   Interventions Planned (Treatment may consist of any combination of the following):    Current Treatment Recommendations: Strengthening; ROM; Balance training; Manual Therapy - Joint Manipulation; Manual Therapy - Soft Tissue Mobilization; Home exercise program; Pain management; Aquatics; Therapeutic activities     Subjective Comments:  ankle is not getting any worse with exercises in clinic,  Initial:}     /10Post Session:        no increase  Medications Last Reviewed:  2022  Updated Objective Findings:  pt with continued stiffness to all directions. Treatment   MANUAL THERAPY: (0 minutes): none today    THERAPEUTIC EXERCISE: (40 minutes):    Exercises per grid below to improve mobility, strength and balance. Required moderate visual and verbal cues to promote proper body mechanics. Progressed resistance, repetitions and complexity of movement as indicated.   -- ankle motion with self over pressure for DF/PF and EVR  --UBE lev 4 x 6 min  Did following exercises with increased speed 30 sec , then rest 30 sec between ea exercise:sit to stand , green shoulder HORZ ABD,  knee ext 5#  with opp arm raise, bicep curl 7#, stand arm swing 7#, sit to stand again for strength and endurance  -seated shoulder ER with green band 20  -seated lumbar extension into resistance ~ 20x  standing hip extension at bar 5# 10x2 B  -standing hip abduction at bar 5# 2x10 B  -stand take step forward with opp arm lift 5x ea side, 1 hand on rail      Treatment/Session Summary:    Treatment Assessment:  able to go faster with some exercises now  Communication/Consultation:  None today  Equipment provided today:  None  Recommendations/Intent for next treatment session: Next visit will focus on gentle ankle ROM, stretching, hip stretching in standing and progress endurance HEP as able.     Total Treatment Billable Duration:  40 minutes   Time In: 8756  Time Out: 1433    Gemma Hearing, PT       Charge Capture  }Post Session Pain  PT Visit Info  [facility] Portal  MD Guidelines  Scanned Media  Benefits  MyChart    Future Appointments   Date Time Provider Romie Dodson   8/2/2022  2:30 PM Gemma Hearing, PT [facility]   8/4/2022  2:00 PM Gemma Hearing, PT [facility]   8/9/2022  2:30 PM Gemma Hearing, PT [facility]   8/11/2022  2:00 PM Gemma Hearing, PT [facility]   8/16/2022  4:15 PM Gemma Hearing, PT [facility]   8/18/2022  2:00 PM Gemma Hearing, PT [facility]   8/23/2022  3:30 PM Gemma Hearing, PT [facility]   8/25/2022  2:00 PM [facility] Hearing, PT [facility]   8/30/2022  2:30 PM Gemma Hearing, PT [facility]   10/17/2022  2:00 PM Hipolito Paz MD [facility]

## 2022-08-02 ENCOUNTER — HOSPITAL ENCOUNTER (OUTPATIENT)
Dept: PHYSICAL THERAPY | Age: 72
Setting detail: RECURRING SERIES
Discharge: HOME | End: 2022-08-05
Payer: MEDICARE

## 2022-08-02 PROCEDURE — 97110 THERAPEUTIC EXERCISES: CPT

## 2022-08-02 NOTE — PROGRESS NOTES
Ella Garcia  : 1950  Primary: Medicare Part A And B  Secondary: [address] @ [address] [address]  Phone: [phone]  Fax: [phone] Plan Frequency: 2    Plan of Care/Certification Expiration Date: 10/07/22      PT Visit Info: Total # of Visits to Date: 7      OUTPATIENT PHYSICAL THERAPY:OP NOTE TYPE: Treatment Note 2022       Episode  }Appt Desk              Treatment Diagnosis:  Generalized Muscle Weakness (M62.81)  Difficulty in walking, Not elsewhere classified (R26.2)  Pain in right foot [M79.671]  Medical/Referring Diagnosis:  Pain in right foot [G83.072]  Referring Physician:  Diana Gomez., *  MD Orders:  PT Eval and Treat   Date of Onset:  Onset Date: 21     Allergies:   Sulfamethoxazole-trimethoprim  Restrictions/Precautions:  Restrictions/Precautions: Fall Risk  No data recorded   Interventions Planned (Treatment may consist of any combination of the following):    Current Treatment Recommendations: Strengthening; ROM; Balance training; Manual Therapy - Joint Manipulation; Manual Therapy - Soft Tissue Mobilization; Home exercise program; Pain management; Aquatics; Therapeutic activities     Subjective Comments:  states a little improvement in endurance. Initial:}     /10Post Session:        no increase  Medications Last Reviewed:  2022  Updated Objective Findings:  pt with continued stiffness to all directions. Treatment   MANUAL THERAPY: (0 minutes): none today    THERAPEUTIC EXERCISE: (40 minutes):    Exercises per grid below to improve mobility, strength and balance. Required moderate visual and verbal cues to promote proper body mechanics. Progressed resistance, repetitions and complexity of movement as indicated.   --UBE lev 2 x 6 min  -- sitting hip lift to scoot and front and back 3x  -- sitting  chops with 4 G ball, smaller ROM 10x ea way  -- toe tap onto in step

## 2022-08-04 ENCOUNTER — HOSPITAL ENCOUNTER (OUTPATIENT)
Dept: PHYSICAL THERAPY | Age: 72
Setting detail: RECURRING SERIES
Discharge: HOME | End: 2022-08-07
Payer: MEDICARE

## 2022-08-04 PROCEDURE — 97110 THERAPEUTIC EXERCISES: CPT

## 2022-08-04 ASSESSMENT — PAIN SCALES - GENERAL: PAINLEVEL_OUTOF10: 0

## 2022-08-04 NOTE — PROGRESS NOTES
3  --standing hip extension at elevated mat 5# 10x3 B  --standing hip abduction at elevated mat 5# 10x3 B  --LAQ 5# 20x ea   -- wall slide against thera ball 10x  -- hamstring curl 5# 10x2   --standing bicep curl 7# 10x  -seated shoulder ER with green band 20  -seated lumbar extension into resistance ~ 20x    Treatment/Session Summary:    Treatment Assessment:  did more standing exercises today which is a challenge to pt already, so this should help build endurance and overall strength. Pt still needs to sit frequently throughout session but seemed to tolerate longer times of standing. Communication/Consultation:  None today  Equipment provided today:  None  Recommendations/Intent for next treatment session: Next visit will focus on gentle ankle ROM, stretching, hip stretching in standing and progress endurance HEP as able.     Total Treatment Billable Duration:  40 minutes   Time In: 1400  Time Out: 1448    Willard Fulling, PT       Charge Capture  }Post Session Pain  PT Visit Info  [facility] Portal  MD Guidelines  Scanned Media  Benefits  [employer]    Future Appointments   Date Time Provider Romie Dodson   8/9/2022  1:45 PM [facility]   8/11/2022  2:00 PM Willard Fulling, PT [facility]   8/16/2022  4:15 PM Willard Fulling, PT [facility]   8/18/2022  2:00 PM Willard Fulling, PT [facility]   8/23/2022  3:30 PM Willard Fulling, PT [facility]   8/25/2022  2:00 PM Willard Fulling, PT [facility]   8/30/2022  2:30 PM Willard Fulling, PT [facility]   10/17/2022  2:00 PM Drew Sears MD [facility]

## 2022-08-08 ENCOUNTER — TELEPHONE (OUTPATIENT)
Dept: CARDIOLOGY CLINIC | Age: 72
End: 2022-08-08

## 2022-08-08 NOTE — TELEPHONE ENCOUNTER
Patient called requesting to  samples of farxiga and Eliquis at the [facility] office. Please call.

## 2022-08-08 NOTE — TELEPHONE ENCOUNTER
Drug Samples Documentation:    Drug: Eliquis  Strength: 5mg   Quantity: 1 bottle of 60 tablets  Expiration Date: 05/2023  Lot Number: [id number]    Drug Samples Documentation:    Drug: Pink Sharlene  Strength: 10mg   Quantity: 3 Boxes  Expiration Date: 11/2024  Lot Number: [id number]    Called patient and let him know samples are ready at  in [place]

## 2022-08-09 ENCOUNTER — HOSPITAL ENCOUNTER (OUTPATIENT)
Dept: PHYSICAL THERAPY | Age: 72
Setting detail: RECURRING SERIES
Discharge: HOME | End: 2022-08-12
Payer: MEDICARE

## 2022-08-09 PROCEDURE — 97110 THERAPEUTIC EXERCISES: CPT

## 2022-08-09 ASSESSMENT — PAIN SCALES - GENERAL: PAINLEVEL_OUTOF10: 4

## 2022-08-09 NOTE — THERAPY EVALUATION
Cristina Oakley  : 1950  Primary: Medicare Part A And B  Secondary: [address] @ [address] [address]  Phone: [phone]  Fax: [phone] Plan Frequency: 2    Plan of Care/Certification Expiration Date: 10/07/22      PT Visit Info: Total # of Visits to Date: 9      OUTPATIENT PHYSICAL THERAPY:OP NOTE TYPE: Progress Report 2022               Episode  Appt Desk         Treatment Diagnosis:  Generalized Muscle Weakness (M62.81)  Difficulty in walking, Not elsewhere classified (R26.2)  Pain in right foot [M79.671]  Medical/Referring Diagnosis:  Pain in right foot [G22.282]  Referring Physician:  Morelia Patterson., *  MD Orders:  PT Eval and Treat   Return MD Appt:  unknown  Date of Onset:  Onset Date: 21     Allergies:  Sulfamethoxazole-trimethoprim  Restrictions/Precautions:    Restrictions/Precautions: Fall Risk  No data recorded   Medications Last Reviewed:  2022     SUBJECTIVE     Patient Stated Goal(s):  His goal is to  be able to walk with cane rather than a RW. Initial:     4/10 Post Session:      0/10    Fall Risk Scale: Total Score: 30  [place] Fall Risk: Medium (25-44)         OBJECTIVE     Observation/Orthostatic Postural Assessment:   Arrives with wearing a R LE leg/ankle brace and walks with RW in a forward bent position. Walks with R LE in external rotated position with decreased push through of R foot. Swelling/Effusion: continues moderate ankle effusion on medial and lateral sides     Palpation: not today  ROM:      GENOVEVA hallus ext ~ 45 degrees  Also has loss of motion at GENOVEVA shoulder flexion: R ~ 90 degrees, L ~ 120 degrees AROM  R ankle eversion 2 degrees  Also has significant loss of trunk extension.  Sits and stands in forward bent position and has acquired kyphotic scoliosis  Strength:     Special Tests: Muscle Length Tests: stiff to GENOVEVA psoas and gastroc  Joint patient.)  Short-Term Functional Goals: Time Frame: 4 weeks  R ankle eversion AROM at least 7 degrees  R ankle isometric strength 5/5 for all motions  Pt independent with initial HEP  Discharge Goals: Time Frame: 12 weeks  Able to walk with walker and ankle brace at least 20 min. With min to no foot/ankle pain. ONGOING  Pt reports tolerance to walking, general ADL's improves at least 50% per pt report. ONGOING   Pt independent with discharge HEP. ONGOING         Outcome Measure: Tool Used: FOOT AND ANKLE ABILITY MEASURE  Score:  Initial: 51 Most Recent: 36 (Date: 8/9/22)   Interpretation of Score: For the \"Activities of Daily Living\", there are 21 questions each scored on a 5 point scale with 0 representing \"Unable to do\" and 4 representing \"No difficulty\". The lower the score, the greater the functional disability. 84/84 represents no disability. Minimal detectable change is 5.7 points. With the addition of the 8 questions in the \"Sports Subscale,\" there are 29 questions, each scored on a 5 point scale with 0 representing \"Unable to do\" and 4 representing \"No difficulty\". The lower the score, the greater the functional disability. 116/116 represents no disability. Minimal detectable change is 12.3 points. Medical Necessity:   Skilled intervention continues to be required due to pt with significant loss of ability to ambulate, decreased ankle AROM and loss of strength in ankle and core. .  Reason For Services/Other Comments:  Patient continues to require skilled intervention due to cotninued loss of ankle ROM, strength and decreased endurance. .  Total Duration:  Time In: 1350  Time Out: 1435    R    Post Session Pain  Charge Capture  PT Visit Info  POC Link  Treatment Note Link  MD Guidelines  Nadia

## 2022-08-09 NOTE — PROGRESS NOTES
Syed Fish  : 1950  Primary: Medicare Part A And B  Secondary: [address] @ [address] [address]  Phone: [phone]  Fax: [phone] Plan Frequency: 2    Plan of Care/Certification Expiration Date: 10/07/22      PT Visit Info: Total # of Visits to Date: 9      OUTPATIENT PHYSICAL THERAPY:OP NOTE TYPE: Progress Report 2022               Episode  Appt Desk         Treatment Diagnosis:  Generalized Muscle Weakness (M62.81)  Difficulty in walking, Not elsewhere classified (R26.2)  Pain in right foot [M79.671]  Medical/Referring Diagnosis:  Pain in right foot [Q50.676]  Referring Physician:  Gi Mckeon MD Orders:  PT Eval and Treat   Return MD Appt:  unknown  Date of Onset:  Onset Date: 21     Allergies:  Sulfamethoxazole-trimethoprim  Restrictions/Precautions:    Restrictions/Precautions: Fall Risk  No data recorded   Medications Last Reviewed:  2022     SUBJECTIVE     Patient Stated Goal(s):  His goal is to  be able to walk with cane rather than a RW. Initial:     4/10 Post Session:      0/10    Fall Risk Scale: Total Score: 30  [place] Fall Risk: Medium (25-44)         OBJECTIVE     Observation/Orthostatic Postural Assessment:   Arrives with wearing a R LE leg/ankle brace and walks with RW in a forward bent position. Walks with R LE in external rotated position with decreased push through of R foot. Swelling/Effusion: continues moderate ankle effusion on medial and lateral sides     Palpation: not today  ROM:      GENOVEVA hallus ext ~ 45 degrees  Also has loss of motion at GENOVEVA shoulder flexion: R ~ 90 degrees, L ~ 120 degrees AROM  R ankle eversion 2 degrees  Also has significant loss of trunk extension.  Sits and stands in forward bent position and has acquired kyphotic scoliosis  Strength:     Special Tests: Muscle Length Tests: stiff to GENOVEVA psoas and gastroc  Joint Accessory Mobility testing: stiff to calcaneus and subtalar joints  Neurological Screen: [place] neuro screen is intact to myotomes. Functional Mobility:  Sit to/from Stand,  Bed Mobility and independent with compensation for posture and pain as needed. Floor transfer: unable    Walking on level ground: walks with RW and R LE ankle brace- R hip in ER position with poor push off at R foot  Balance:  Can not formally test but does ok with transitions and weight changes with gait. Unable to do single leg stance because of back and R ankle injury. ASSESSMENT   Assessment:  Mr. Kianna Paez has been able to do increasing time with standing exercises which helps his stamina and overall strength. Today he had a set back to this because of onset of ankle pain but should be able to return to standing work. He shows improvement in overall ankle/foot function as shown on functional score below. He is at risk for decreased health secondary his inability to move as well with having co-morbidities including diabetes type 2. He is very motivated to improve and regain some of the strength he had prior to his ankle injury. Problem List: (Impacting functional limitations): Body Structures, Functions, Activity Limitations Requiring Skilled Therapeutic Intervention: Decreased functional mobility ; Decreased ADL status; Decreased ROM; Decreased strength; Decreased endurance; Decreased balance; Increased pain       PLAN   Effective Dates: 7/7/22 TO Plan of Care/Certification Expiration Date: 10/07/22     Frequency/Duration: Plan Frequency: 2     Interventions Planned (Treatment may consist of any combination of the following):    Current Treatment Recommendations: Strengthening; ROM; Balance training; Manual Therapy - Joint Manipulation; Manual Therapy - Soft Tissue Mobilization; Home exercise program; Pain management; Aquatics;  Therapeutic activities     Goals: (Goals have been discussed and agreed upon with patient.)  Short-Term Functional Goals: Time Frame: 4 weeks  R ankle eversion AROM at least 7 degrees  R ankle isometric strength 5/5 for all motions  Pt independent with initial HEP  Discharge Goals: Time Frame: 12 weeks  Able to walk with walker and ankle brace at least 20 min. With min to no foot/ankle pain. ONGOING  Pt reports tolerance to walking, general ADL's improves at least 50% per pt report. ONGOING   Pt independent with discharge HEP. ONGOING         Outcome Measure: Tool Used: FOOT AND ANKLE ABILITY MEASURE  Score:  Initial: 51 Most Recent: 36 (Date: 8/9/22)   Interpretation of Score: For the \"Activities of Daily Living\", there are 21 questions each scored on a 5 point scale with 0 representing \"Unable to do\" and 4 representing \"No difficulty\". The lower the score, the greater the functional disability. 84/84 represents no disability. Minimal detectable change is 5.7 points. With the addition of the 8 questions in the \"Sports Subscale,\" there are 29 questions, each scored on a 5 point scale with 0 representing \"Unable to do\" and 4 representing \"No difficulty\". The lower the score, the greater the functional disability. 116/116 represents no disability. Minimal detectable change is 12.3 points. Medical Necessity:   Skilled intervention continues to be required due to pt with significant loss of ability to ambulate, decreased ankle AROM and loss of strength in ankle and core. .  Reason For Services/Other Comments:  Patient continues to require skilled intervention due to cotninued loss of ankle ROM, strength and decreased endurance. .  Total Duration:  Time In: 1350  Time Out: 1435    R    Post Session Pain  Charge Capture  PT Visit Info  POC Link  Treatment Note Link  MD Guidelines  Nadia

## 2022-08-09 NOTE — PROGRESS NOTES
Lisa Sanders  : 1950  Primary: Medicare Part A And B  Secondary: [address] @ [address] [address]  Phone: [phone]  Fax: [phone] Plan Frequency: 2    Plan of Care/Certification Expiration Date: 10/07/22      PT Visit Info: Total # of Visits to Date: 9      OUTPATIENT PHYSICAL THERAPY:OP NOTE TYPE: Treatment Note 2022       Episode  }Appt Desk              Treatment Diagnosis:  Generalized Muscle Weakness (M62.81)  Difficulty in walking, Not elsewhere classified (R26.2)  Pain in right foot [M79.671]  Medical/Referring Diagnosis:  Pain in right foot [B79.055]  Referring Physician:  Maddie David *  MD Orders:  PT Eval and Treat   Date of Onset:  Onset Date: 21     Allergies:   Sulfamethoxazole-trimethoprim  Restrictions/Precautions:  Restrictions/Precautions: Fall Risk  No data recorded   Interventions Planned (Treatment may consist of any combination of the following):    Current Treatment Recommendations: Strengthening; ROM; Balance training; Manual Therapy - Joint Manipulation; Manual Therapy - Soft Tissue Mobilization; Home exercise program; Pain management; Aquatics; Therapeutic activities     Subjective Comments:  states that he had ankle pain last night and almost did not come to PT. Request to not do exercises that put weight on ankle. Pt states that he walked more than normal on  and did heavy work out in pool. Initial:}    4/10Post Session:        no increase  Medications Last Reviewed:  2022  Updated Objective Findings:  see progress report  Treatment   MANUAL THERAPY: (0 minutes): none today    THERAPEUTIC EXERCISE: (23 minutes):    Exercises per grid below to improve mobility, strength and balance. Required moderate visual and verbal cues to promote proper body mechanics. Progressed resistance, repetitions and complexity of movement as indicated.   --UBE lev 3

## 2022-08-11 ENCOUNTER — APPOINTMENT (OUTPATIENT)
Dept: PHYSICAL THERAPY | Age: 72
End: 2022-08-11
Payer: MEDICARE

## 2022-08-16 ENCOUNTER — HOSPITAL ENCOUNTER (OUTPATIENT)
Dept: PHYSICAL THERAPY | Age: 72
Setting detail: RECURRING SERIES
Discharge: HOME | End: 2022-08-19
Payer: MEDICARE

## 2022-08-16 PROCEDURE — 97110 THERAPEUTIC EXERCISES: CPT

## 2022-08-16 NOTE — PROGRESS NOTES
Alicia Caballero  : 1950  Primary: Medicare Part A And B  Secondary: [address] @ [address] [address]  Phone: [phone]  Fax: [phone] Plan Frequency: 2    Plan of Care/Certification Expiration Date: 10/07/22      PT Visit Info: Total # of Visits to Date: 6      OUTPATIENT PHYSICAL THERAPY:OP NOTE TYPE: Treatment Note 2022       Episode  }Appt Desk              Treatment Diagnosis:  Generalized Muscle Weakness (M62.81)  Difficulty in walking, Not elsewhere classified (R26.2)  Pain in right foot [M79.671]  Medical/Referring Diagnosis:  Pain in right foot [M79.671]  Referring Physician:  Gi Corbett MD Orders:  PT Eval and Treat   Date of Onset:  Onset Date: 21     Allergies:   Sulfamethoxazole-trimethoprim  Restrictions/Precautions:  Restrictions/Precautions: Fall Risk  No data recorded   Interventions Planned (Treatment may consist of any combination of the following):    Current Treatment Recommendations: Strengthening; ROM; Balance training; Manual Therapy - Joint Manipulation; Manual Therapy - Soft Tissue Mobilization; Home exercise program; Pain management; Aquatics; Therapeutic activities     Subjective Comments:  ankles are not hurting today. Initial:}     /10Post Session:        no increase  Medications Last Reviewed:  2022  Updated Objective Findings:  see progress report  Treatment   MANUAL THERAPY: (0 minutes): none today    THERAPEUTIC EXERCISE: (40 minutes):    Exercises per grid below to improve mobility, strength and balance. Required moderate visual and verbal cues to promote proper body mechanics. Progressed resistance, repetitions and complexity of movement as indicated. --UBE lev 2 x 6 min  - sit to stand 10x3 - through out session.  On third set included GENOVEVA arm elevation  -- sitting  chops with 3 G ball, smaller ROM 10x3 ea way  -- seated shoulder ER with green band and HORZ ABD 20x ea  ----standing bicep curl 7# 30   ---LAQ 50# 10x2 on cable  -- standing bent over hip extension 10x 2 ea side  --standing scaption 2# 10x, 4# 10x   -- standing hip ABD 10x ea side- hands on tabl  Treatment/Session Summary:    Treatment Assessment:  difficult finding exercises for this pt secondary to limitations with R foot and GENOVEVA shoulders. However, he tries to work through limitations. Did not stand up as much today but did well with combinaion of sitting and standing. Pt returns today after taking break because of onset of R ankle pain after dong more standing activities. Communication/Consultation:  None today  Equipment provided today:  None  Recommendations/Intent for next treatment session: Next visit will focus on gentle ankle ROM, stretching, hip stretching in standing and progress endurance HEP as able.     Total Treatment Billable Duration:  40 minutes   Time In: 8833  Time Out: [address], PT       Charge Capture  }Post Session Pain  PT Visit Info  [employer] Portal  MD Guidelines  Scanned Media  Benefits  MyChart    Future Appointments   Date Time Provider Romie Dodson   8/18/2022  2:00 PM [facility]   8/23/2022  3:30 PM Veronica Pillai PT [facility]   8/25/2022  2:00 PM [facility]   8/30/2022  2:30 PM Veronica Pillai PT [facility]   10/17/2022  2:00 PM Andrzej Simpson MD [facility]

## 2022-08-18 ENCOUNTER — HOSPITAL ENCOUNTER (OUTPATIENT)
Dept: PHYSICAL THERAPY | Age: 72
Setting detail: RECURRING SERIES
Discharge: HOME | End: 2022-08-21
Payer: MEDICARE

## 2022-08-18 PROCEDURE — 97110 THERAPEUTIC EXERCISES: CPT

## 2022-08-18 NOTE — PROGRESS NOTES
Arthur Philipp  : 1950  Primary: Medicare Part A And B  Secondary: [address] @ [address] [address]  Phone: [phone]  Fax: [phone] Plan Frequency: 2    Plan of Care/Certification Expiration Date: 10/07/22      PT Visit Info: Total # of Visits to Date: 15      OUTPATIENT PHYSICAL THERAPY:OP NOTE TYPE: Treatment Note 2022       Episode  }Appt Desk              Treatment Diagnosis:  Generalized Muscle Weakness (M62.81)  Difficulty in walking, Not elsewhere classified (R26.2)  Pain in right foot [M79.671]  Medical/Referring Diagnosis:  Pain in right foot [M79.671]  Referring Physician:  Jae Jordan *  MD Orders:  PT Eval and Treat   Date of Onset:  Onset Date: 21     Allergies:   Sulfamethoxazole-trimethoprim  Restrictions/Precautions:  Restrictions/Precautions: Fall Risk  No data recorded   Interventions Planned (Treatment may consist of any combination of the following):    Current Treatment Recommendations: Strengthening; ROM; Balance training; Manual Therapy - Joint Manipulation; Manual Therapy - Soft Tissue Mobilization; Home exercise program; Pain management; Aquatics; Therapeutic activities     Subjective Comments:  no c/o ankle pain today  Initial:}     /10Post Session:        Klever no pain reported  Medications Last Reviewed:  2022  Updated Objective Findings:  see progress report  Treatment   MANUAL THERAPY: (0 minutes): none today    THERAPEUTIC EXERCISE: (40 minutes):    Exercises per grid below to improve mobility, strength and balance. Required moderate visual and verbal cues to promote proper body mechanics. Progressed resistance, repetitions and complexity of movement as indicated. --UBE lev 2 x 68min  - sit to stand 10x3 - through out session.  On third set included GENOVEVA arm elevation  -- standing hip flexion, abduction, extension with foot on slider and blue band below knees, hands on walker or other support as needed 10x ea way  -- sitting  chops with 4 G ball, smaller ROM 10x2 ea way with opp leg in hip ABDucted position against blue band  -- seated shoulder ER with green band and HORZ ABD 20x ea  ----standing bicep curl 8# 30 x    -- standing bent over hip extension and hip ABD against blue band below knees 10x ea side  ---LAQ 50# 10x2 on cable  --standing scaption 2# 10x, 4# 10x   Treatment/Session Summary:    Treatment Assessment:  able to do more standing activities today   Communication/Consultation:  None today  Equipment provided today:  None  Recommendations/Intent for next treatment session: Next visit will focus on gentle ankle ROM, stretching, hip stretching in standing and progress endurance HEP as able.     Total Treatment Billable Duration:  40 minutes   Time In: 1400  Time Out: [address] [facility], PT       Charge Capture  }Post Session Pain  PT Visit Info  [employer] Portal  MD Guidelines  Scanned Media  Benefits  MyChart    Future Appointments   Date Time Provider Romie Dodson   8/23/2022  3:30 PM Wes Arayaole, PT [facility]   8/25/2022  2:00 PM Wes Chuathbaluk, PT [facility]   8/30/2022  2:30 PM Wes Chu[place]bal[place], PT [facility]   10/17/2022  2:00 PM Garrett Pack MD [facility]

## 2022-08-23 ENCOUNTER — HOSPITAL ENCOUNTER (OUTPATIENT)
Dept: PHYSICAL THERAPY | Age: 72
Setting detail: RECURRING SERIES
End: 2022-08-23
Payer: MEDICARE

## 2022-08-25 ENCOUNTER — HOSPITAL ENCOUNTER (OUTPATIENT)
Dept: PHYSICAL THERAPY | Age: 72
Setting detail: RECURRING SERIES
Discharge: HOME | End: 2022-08-28
Payer: MEDICARE

## 2022-08-25 PROCEDURE — 97110 THERAPEUTIC EXERCISES: CPT

## 2022-08-25 ASSESSMENT — PAIN SCALES - GENERAL: PAINLEVEL_OUTOF10: 3

## 2022-08-25 NOTE — PROGRESS NOTES
Alicia Caballero  : 1950  Primary: Medicare Part A And B  Secondary: [address] @ [address] [address]  Phone: [phone]  Fax: [phone] Plan Frequency: 2    Plan of Care/Certification Expiration Date: 10/07/22      PT Visit Info: Total # of Visits to Date: 15      OUTPATIENT PHYSICAL THERAPY:OP NOTE TYPE: Treatment Note 2022       Episode  }Appt Desk              Treatment Diagnosis:  Generalized Muscle Weakness (M62.81)  Difficulty in walking, Not elsewhere classified (R26.2)  Pain in right foot [M79.671]  Medical/Referring Diagnosis:  Pain in right foot [M79.671]  Referring Physician:  Marcio Aviles, *  MD Orders:  PT Eval and Treat   Date of Onset:  Onset Date: 21     Allergies:   Sulfamethoxazole-trimethoprim  Restrictions/Precautions:  Restrictions/Precautions: Fall Risk  No data recorded   Interventions Planned (Treatment may consist of any combination of the following):    Current Treatment Recommendations: Strengthening; ROM; Balance training; Manual Therapy - Joint Manipulation; Manual Therapy - Soft Tissue Mobilization; Home exercise program; Pain management; Aquatics; Therapeutic activities     Subjective Comments:  ankle and shoulder hurting some  Initial:}    3/10Post Session:        Klever no pain reported  Medications Last Reviewed:  2022  Updated Objective Findings:    Treatment   MANUAL THERAPY: (0 minutes): none today    THERAPEUTIC EXERCISE: (40 minutes):    Exercises per grid below to improve mobility, strength and balance. Required moderate visual and verbal cues to promote proper body mechanics. Progressed resistance, repetitions and complexity of movement as indicated.   --UBE lev 2 x 6 min  - sit to stand 10x 2  -- standing hip flexion, abduction, extension with foot on slider and blue band below knees, hands on walker or other support as needed 10x ea way -- only worked with R foot sliding while standing on L LE today  -- sitting  chops with 4 G ball, smaller ROM 10x2 ea way   -- seated shoulder ER with green band and HORZ ABD 20x ea  ----sitting on high table bicep curl 8# 10 x 3  -- leaning back with PPT 10x 2  ---LAQ 50# 10x, 60#10x2 on cable  --high sitting: scaption  4# 10x 2 GENOVEVA sides  Treatment/Session Summary:    Treatment Assessment:  did less standing work today because of ankle pain. Pt also has sore and weak shoulders with bad RTC quality so hard to do too many UE exercises. Communication/Consultation:  None today  Equipment provided today:  None  Recommendations/Intent for next treatment session: Next visit will focus on  hip stretching in standing and progress endurance HEP as able. DO more standing exercises as tolerated     Total Treatment Billable Duration:  40 minutes   Time In: 7723  Time Out: 1445    Alka Gil, PT       Charge Capture  }Post Session Pain  PT Visit Info  [employer] Portal  MD Guidelines  Scanned Media  Benefits  MyChart    Future Appointments   Date Time Provider Romie Dodson   8/30/2022  2:30 PM Alka Gil, PT [facility]   10/17/2022  2:00 PM Kandis Cobb MD [facility]

## 2022-08-30 ENCOUNTER — HOSPITAL ENCOUNTER (OUTPATIENT)
Dept: PHYSICAL THERAPY | Age: 72
Setting detail: RECURRING SERIES
Discharge: HOME | End: 2022-09-02
Payer: MEDICARE

## 2022-08-30 PROCEDURE — 97110 THERAPEUTIC EXERCISES: CPT

## 2022-08-30 NOTE — PROGRESS NOTES
Stacy   : 1950  Primary: Medicare Part A And B  Secondary: [address] @ [address] [address]  Phone: [phone]  Fax: [phone] Plan Frequency: 2    Plan of Care/Certification Expiration Date: 10/07/22      PT Visit Info: Total # of Visits to Date: 15      OUTPATIENT PHYSICAL THERAPY:OP NOTE TYPE: Treatment Note 2022       Episode  }Appt Desk              Treatment Diagnosis:  Generalized Muscle Weakness (M62.81)  Difficulty in walking, Not elsewhere classified (R26.2)  Pain in right foot [M79.671]  Medical/Referring Diagnosis:  Pain in right foot [M79.671]  Referring Physician:  Vijay Cr, *  MD Orders:  PT Eval and Treat   Date of Onset:  Onset Date: 21     Allergies:   Sulfamethoxazole-trimethoprim  Restrictions/Precautions:  Restrictions/Precautions: Fall Risk  No data recorded   Interventions Planned (Treatment may consist of any combination of the following):    Current Treatment Recommendations: Strengthening; ROM; Balance training; Manual Therapy - Joint Manipulation; Manual Therapy - Soft Tissue Mobilization; Home exercise program; Pain management; Aquatics; Therapeutic activities     Subjective Comments:  not much ankle pain  Initial:}     /10Post Session:        falguni Ernst did not c/o ankle pain during session but will monitor after session. Medications Last Reviewed:  2022  Updated Objective Findings:  no updates  Treatment   MANUAL THERAPY: (0 minutes): none today    THERAPEUTIC EXERCISE: (42 minutes):    Exercises per grid below to improve mobility, strength and balance. Required moderate visual and verbal cues to promote proper body mechanics. Progressed resistance, repetitions and complexity of movement as indicated.   --UBE lev 2 x 7 min  -- following exercises done for 30 sec with ~ 30 sec rest in between: hip hinge with 20#, sit to stand with 15#, hip extension 5# in propped standing 3 sets each. Also tried push ups in plank position on theraball on the chair and chops in sitting with 4G ball but cardio stress not high enough for last 2.   -- propped HORZ ABD R 3# x 20, L 5# x 20  ---LAQ 50# 10x, 60#10x2 on cable  --Lumbar Protective Mechanism sustained holds for flexion in high seat sitting and then progressed to pushing walker 20 feet  Treatment/Session Summary:    Treatment Assessment:  cardio exercises for power seemed to be at more appropriate level today. WIll need to continue finding weight exercises he can do at gym for strength. Communication/Consultation:  None today  Equipment provided today:  None  Recommendations/Intent for next treatment session: Next visit will focus on  hip stretching in standing and progress endurance HEP as able. DO more standing exercises as tolerated     Total Treatment Billable Duration:  42 minutes   Time In: 4066  Time Out: 1517    Rachel Holloway, PT       Charge Capture  }Post Session Pain  PT Visit Info  [employer] Portal  MD Guidelines  Scanned Media  Benefits  MyChart    Future Appointments   Date Time Provider Romie Dodson   10/17/2022  2:00 PM Shira Brasher MD [facility]

## 2022-09-01 ENCOUNTER — HOSPITAL ENCOUNTER (OUTPATIENT)
Dept: PHYSICAL THERAPY | Age: 72
Setting detail: RECURRING SERIES
End: 2022-09-01
Payer: MEDICARE

## 2022-09-02 ENCOUNTER — APPOINTMENT (RX ONLY)
Dept: URBAN - METROPOLITAN AREA CLINIC 329 | Facility: CLINIC | Age: 72
Setting detail: DERMATOLOGY
End: 2022-09-02

## 2022-09-02 DIAGNOSIS — L30.4 ERYTHEMA INTERTRIGO: ICD-10-CM | Status: INADEQUATELY CONTROLLED

## 2022-09-02 DIAGNOSIS — L21.8 OTHER SEBORRHEIC DERMATITIS: ICD-10-CM | Status: INADEQUATELY CONTROLLED

## 2022-09-02 PROCEDURE — ? COUNSELING

## 2022-09-02 PROCEDURE — ? FULL BODY SKIN EXAM

## 2022-09-02 PROCEDURE — 99214 OFFICE O/P EST MOD 30 MIN: CPT

## 2022-09-02 PROCEDURE — ? PRESCRIPTION

## 2022-09-02 PROCEDURE — ? ADDITIONAL NOTES

## 2022-09-02 RX ORDER — MOMETASONE FUROATE 1 MG/G
OINTMENT TOPICAL
Qty: 45 | Refills: 6 | Status: ERX | COMMUNITY
Start: 2022-09-02

## 2022-09-02 RX ORDER — NYSTATIN CREAM 100000 [USP'U]/G
CREAM TOPICAL
Qty: 60 | Refills: 3 | Status: ERX | COMMUNITY
Start: 2022-09-02

## 2022-09-02 RX ORDER — FLUCONAZOLE 100 MG/1
TABLET ORAL
Qty: 10 | Refills: 0 | Status: ERX | COMMUNITY
Start: 2022-09-02

## 2022-09-02 RX ADMIN — NYSTATIN CREAM: 100000 CREAM TOPICAL at 00:00

## 2022-09-02 RX ADMIN — FLUCONAZOLE: 100 TABLET ORAL at 00:00

## 2022-09-02 RX ADMIN — MOMETASONE FUROATE: 1 OINTMENT TOPICAL at 00:00

## 2022-09-02 ASSESSMENT — LOCATION DETAILED DESCRIPTION DERM
LOCATION DETAILED: RIGHT RIB CAGE
LOCATION DETAILED: DORSAL CORONA OF GLANS
LOCATION DETAILED: RIGHT DORSAL SHAFT OF PENIS
LOCATION DETAILED: RIGHT DORSAL SHAFT OF PENIS
LOCATION DETAILED: DORSAL CORONA OF GLANS
LOCATION DETAILED: XIPHOID
LOCATION DETAILED: LEFT LATERAL INFERIOR CHEST
LOCATION DETAILED: LEFT RIB CAGE

## 2022-09-02 ASSESSMENT — LOCATION ZONE DERM
LOCATION ZONE: PENIS
LOCATION ZONE: TRUNK
LOCATION ZONE: TRUNK
LOCATION ZONE: PENIS

## 2022-09-02 ASSESSMENT — LOCATION SIMPLE DESCRIPTION DERM
LOCATION SIMPLE: ABDOMEN
LOCATION SIMPLE: PENIS
LOCATION SIMPLE: ABDOMEN
LOCATION SIMPLE: PENIS
LOCATION SIMPLE: CHEST

## 2022-09-02 ASSESSMENT — BSA RASH: BSA RASH: 8

## 2022-09-02 NOTE — HPI: PRURITUS
How Did Your Itching Occur?: sudden in onset (over a period of weeks to a few months)
How Severe Is Your Itching?: severe
Additional History: Patient has some itching on the penis. He has been prescribed nystatin no improvement. He was also prescribed clotrimazole and betamethasone mix with some help.

## 2022-09-06 ENCOUNTER — HOSPITAL ENCOUNTER (OUTPATIENT)
Dept: PHYSICAL THERAPY | Age: 72
Setting detail: RECURRING SERIES
Discharge: HOME | End: 2022-09-09
Payer: MEDICARE

## 2022-09-06 PROCEDURE — 97110 THERAPEUTIC EXERCISES: CPT

## 2022-09-06 ASSESSMENT — PAIN SCALES - GENERAL: PAINLEVEL_OUTOF10: 0

## 2022-09-06 NOTE — PROGRESS NOTES
Patt Conley  : 1950  Primary: Medicare Part A And B  Secondary: [address] @ [address] [address]  Phone: [phone]  Fax: [phone] Plan Frequency: 2    Plan of Care/Certification Expiration Date: 10/07/22      PT Visit Info: Total # of Visits to Date: 13      OUTPATIENT PHYSICAL THERAPY:OP NOTE TYPE: Treatment Note 2022       Episode  }Appt Desk              Treatment Diagnosis:  Generalized Muscle Weakness (M62.81)  Difficulty in walking, Not elsewhere classified (R26.2)  Pain in right foot [M79.671]  Medical/Referring Diagnosis:  Pain in right foot [N26.008]  Referring Physician:  Sherle Olszewski., *  MD Orders:  PT Eval and Treat   Date of Onset:  Onset Date: 21     Allergies:   Sulfamethoxazole-trimethoprim  Restrictions/Precautions:  Restrictions/Precautions: Fall Risk  No data recorded   Interventions Planned (Treatment may consist of any combination of the following):    Current Treatment Recommendations: Strengthening; ROM; Balance training; Manual Therapy - Joint Manipulation; Manual Therapy - Soft Tissue Mobilization; Home exercise program; Pain management; Aquatics; Therapeutic activities     Subjective Comments:  No reports of pain today. Initial:}    0/10Post Session:        no VAS. Pt was sweating and appeared fatigued. Medications Last Reviewed:  2022  Updated Objective Findings:  no updates  Treatment   MANUAL THERAPY: (0 minutes): none today    THERAPEUTIC EXERCISE: (30 minutes):    Exercises per grid below to improve mobility, strength and balance. Required moderate visual and verbal cues to promote proper body mechanics. Progressed resistance, repetitions and complexity of movement as indicated.   --UBE level 3 x 5 min  -- following exercises done for 30 sec with ~ 30 sec rest in between (3 sets each): hip hinge with 15#, sit to stand with 15#, hip flexion- B toe tap to 6 inch box with UE at walker (1 min x 3 with rest breaks). -- standing shoulder extension 2x10 B 10# at cable column  --standing hip 3 way (flexion, ext, abduction) x15 each with UE support  --walking around clinic with rolling walker x100 ft on even surfaces at the end of treatment  Treatment/Session Summary:    Treatment Assessment:  Pt put forth good effort but appeared fatigue at the end of treatment. He requires frequent verbal cues to extend the thoracic spine. Communication/Consultation:  None today  Equipment provided today:  None  Recommendations/Intent for next treatment session: Next visit will focus on  hip stretching in standing and progress endurance HEP as able. DO more standing exercises as tolerated     Total Treatment Billable Duration:  30 minutes (pt was 15 min late today)  Time In: 1530  Time Out: 1600    Alisha Eye, PTA       Charge Capture  }Post Session Pain  PT Visit Info  [employer] Portal  MD Guidelines  Scanned Media  Benefits  MyChart    Future Appointments   Date Time Provider Romie Dodson   9/8/2022  2:30 PM Alisha Eye, PTA [facility]   9/13/2022  2:30 PM Alisha Eye, PTA [facility]   9/15/2022  2:30 PM Alisha Eye, PTA [facility]   9/20/2022  3:15 PM Niels [place], PT [facility]   9/22/2022  2:30 PM Alisha Eye, PTA [facility]   9/27/2022  2:30 PM Niels [place], PT [facility]   9/29/2022  2:30 PM Alisha Eye, PTA [facility]   10/17/2022  2:00 PM Vincent Hernandez MD [facility]

## 2022-09-08 ENCOUNTER — HOSPITAL ENCOUNTER (OUTPATIENT)
Dept: PHYSICAL THERAPY | Age: 72
Setting detail: RECURRING SERIES
Discharge: HOME | End: 2022-09-11
Payer: MEDICARE

## 2022-09-08 PROCEDURE — 97110 THERAPEUTIC EXERCISES: CPT

## 2022-09-08 ASSESSMENT — PAIN SCALES - GENERAL: PAINLEVEL_OUTOF10: 0

## 2022-09-08 NOTE — PROGRESS NOTES
Kusum Hylton  : 1950  Primary: Medicare Part A And B  Secondary: [address] @ [address] [address]  Phone: [phone]  Fax: [phone] Plan Frequency: 2    Plan of Care/Certification Expiration Date: 10/07/22      PT Visit Info: Total # of Visits to Date: 12      OUTPATIENT PHYSICAL THERAPY:OP NOTE TYPE: Treatment Note 2022       Episode  }Appt Desk              Treatment Diagnosis:  Generalized Muscle Weakness (M62.81)  Difficulty in walking, Not elsewhere classified (R26.2)  Pain in right foot [M79.671]  Medical/Referring Diagnosis:  Pain in right foot [M79.671]  Referring Physician:  Patricio Ga, *  MD Orders:  PT Eval and Treat   Date of Onset:  Onset Date: 21     Allergies:   Sulfamethoxazole-trimethoprim  Restrictions/Precautions:  Restrictions/Precautions: Fall Risk  No data recorded   Interventions Planned (Treatment may consist of any combination of the following):    Current Treatment Recommendations: Strengthening; ROM; Balance training; Manual Therapy - Joint Manipulation; Manual Therapy - Soft Tissue Mobilization; Home exercise program; Pain management; Aquatics; Therapeutic activities     Subjective Comments:  Pt states \"I'm doing ok. \"  Initial:}    0/10Post Session:        010  Medications Last Reviewed:  2022  Updated Objective Findings:  no updates  Treatment   MANUAL THERAPY: (0 minutes): none today    THERAPEUTIC EXERCISE: (45 minutes):    Exercises per grid below to improve mobility, strength and balance. Required moderate visual and verbal cues to promote proper body mechanics. Progressed resistance, repetitions and complexity of movement as indicated. --UBE level 2x 6 min.  Interval training- 1 min normal pace and 1 min maintaining at least 60 rpm  -- following exercises done for 30 sec with ~ 30 sec rest in between (3 sets each): hip hinge with 15#, hip flexion- B toe tap to 6 inch box with UE support 30 sec x 30 sec rest.  -- sit to stand with 4# dumbbells, biceps curls to fatigue- twice   --modified tandem stance 30 sec hold x 2 with walker for support  --standing rows with green band 3x10 with mat table behind pt for support  -- standing shoulder extension (alternating Ue's ) quick for 30 sec with green band. Pt was unable to maintain his balance without support from the walker. Treatment/Session Summary:    Treatment Assessment:  Pt reported fatigue after treatment but has difficulty with many standing activities due to poor balance. He also requires frequent verbal cues to extend the thoracic spine and gaze forward. He states that he wants to transition to a quad cane but the therapist expressed concern that this would not be safe since he is unable to complete many exercises without the walker. Communication/Consultation:  None today  Equipment provided today:  None  Recommendations/Intent for next treatment session: Next visit will focus on  hip stretching in standing and progress endurance HEP as able. DO more standing exercises as tolerated     Total Treatment Billable Duration:  45 minutes  Time In: 1430  Time Out: 1515    Marcelle Jorge PTA       Charge Capture  }Post Session Pain  PT Visit Info  [employer] Portal  MD Guidelines  Scanned Media  Benefits  MyChart    Future Appointments   Date Time Provider Romie Dodson   9/13/2022  2:30 PM Marcelle Jorge PTA [facility]   9/15/2022  [facility]30 [facility]   9/20/2022  3:15 PM [facility]   9/22/2022  2:30 PM MANDA Ndiaye [facility]   9/27/2022  2:30 PM Adriana Ayala, PT [facility]   9/29/2022  2:30 PM MANDA Ndiaye [facility]   10/17/2022  2:00 PM Mercedes Rivera MD [facility]

## 2022-09-13 ENCOUNTER — HOSPITAL ENCOUNTER (OUTPATIENT)
Dept: PHYSICAL THERAPY | Age: 72
Setting detail: RECURRING SERIES
Discharge: HOME | End: 2022-09-16
Payer: MEDICARE

## 2022-09-13 PROCEDURE — 97110 THERAPEUTIC EXERCISES: CPT

## 2022-09-13 ASSESSMENT — PAIN SCALES - GENERAL: PAINLEVEL_OUTOF10: 0

## 2022-09-13 NOTE — PROGRESS NOTES
Lisa Sanders  : 1950  Primary: Medicare Part A And B  Secondary: [address] @ [address] [address]  Phone: [phone]  Fax: [phone] Plan Frequency: 2    Plan of Care/Certification Expiration Date: 10/07/22      PT Visit Info: Total # of Visits to Date: 16      OUTPATIENT PHYSICAL THERAPY:OP NOTE TYPE: Treatment Note 2022       Episode  }Appt Desk              Treatment Diagnosis:  Generalized Muscle Weakness (M62.81)  Difficulty in walking, Not elsewhere classified (R26.2)  Pain in right foot [M79.671]  Medical/Referring Diagnosis:  Pain in right foot [M79.671]  Referring Physician:  Gi Wadsworth MD Orders:  PT Eval and Treat   Date of Onset:  Onset Date: 21     Allergies:   Sulfamethoxazole-trimethoprim  Restrictions/Precautions:  Restrictions/Precautions: Fall Risk  No data recorded   Interventions Planned (Treatment may consist of any combination of the following):    Current Treatment Recommendations: Strengthening; ROM; Balance training; Manual Therapy - Joint Manipulation; Manual Therapy - Soft Tissue Mobilization; Home exercise program; Pain management; Aquatics; Therapeutic activities     Subjective Comments:  Pt reports no pain or fatigue. Initial:}    0/10Post Session:        0/10  Medications Last Reviewed:  2022  Updated Objective Findings:  no updates  Treatment   MANUAL THERAPY: (0 minutes): none today    THERAPEUTIC EXERCISE: (45 minutes):    Exercises per grid below to improve mobility, strength and balance. Required moderate visual and verbal cues to promote proper body mechanics. Progressed resistance, repetitions and complexity of movement as indicated. --UBE level 2x 5 min.  Interval training- 1 min normal pace and 1 min maintaining at least 65 rpm  -- following exercises done for 30 sec with ~ 30 sec rest in between: hip hinge with 15# (3 trials), hip flexion- B toe tap to 6 inch box with UE support 30 sec x 30 sec rest, 4 trials. -- sit to stand with 10# dumbbell x20 x2  --ball toss to rebound board 30 tosses x 2 (with basketball)  --gait training at the end of treatment. Pt ambulated the entire clinic for 5 min on even surfaces with the rolling walker. He required frequent verbal cues to extend the spine and gaze forward. Treatment/Session Summary:    Treatment Assessment:  Pt required more encouragement to complete standing exercises. He fatigued easily with sit to stands and taps to the step. He requires UE support for standing exercises and lacks balance with step ups. Communication/Consultation:  None today  Equipment provided today:  None  Recommendations/Intent for next treatment session: Next visit will focus on  hip stretching in standing and progress endurance HEP as able.  DO more standing exercises as tolerated     Total Treatment Billable Duration:  45 minutes  Time In: 1430  Time Out: 1515    Anny Deleon PTA       Charge Capture  }Post Session Pain  PT Visit Info  [employer] Portal  MD Guidelines  Scanned Media  Benefits  MyChart    Future Appointments   Date Time Provider Romie Dodson   9/15/2022  7:00 PM Anny Deleon PTA [facility]   9/20/2022  3:15 PM iKm Corbin, PT [facility]   9/22/2022  2:30 PM [facility]   9/27/2022  2:30 PM [facility], [facility]   9/29/2022  2:30 PM Anny Deleon PTA [facility]   10/17/2022  2:00 PM Judi Garcia MD [facility]

## 2022-09-14 ENCOUNTER — APPOINTMENT (RX ONLY)
Dept: URBAN - METROPOLITAN AREA CLINIC 329 | Facility: CLINIC | Age: 72
Setting detail: DERMATOLOGY
End: 2022-09-14

## 2022-09-14 DIAGNOSIS — L30.4 ERYTHEMA INTERTRIGO: ICD-10-CM | Status: IMPROVED

## 2022-09-14 DIAGNOSIS — L21.8 OTHER SEBORRHEIC DERMATITIS: ICD-10-CM | Status: IMPROVED

## 2022-09-14 PROCEDURE — ? COUNSELING

## 2022-09-14 PROCEDURE — ? ADDITIONAL NOTES

## 2022-09-14 PROCEDURE — ? PRESCRIPTION

## 2022-09-14 PROCEDURE — ? PRESCRIPTION MEDICATION MANAGEMENT

## 2022-09-14 PROCEDURE — ? FULL BODY SKIN EXAM

## 2022-09-14 PROCEDURE — 99214 OFFICE O/P EST MOD 30 MIN: CPT

## 2022-09-14 RX ORDER — MOMETASONE FUROATE 1 MG/G
OINTMENT TOPICAL
Qty: 45 | Refills: 6 | Status: ERX

## 2022-09-14 RX ORDER — NYSTATIN CREAM 100000 [USP'U]/G
CREAM TOPICAL
Qty: 60 | Refills: 5 | Status: ERX

## 2022-09-14 ASSESSMENT — LOCATION ZONE DERM
LOCATION ZONE: PENIS
LOCATION ZONE: TRUNK
LOCATION ZONE: PENIS
LOCATION ZONE: TRUNK

## 2022-09-14 ASSESSMENT — LOCATION SIMPLE DESCRIPTION DERM
LOCATION SIMPLE: ABDOMEN
LOCATION SIMPLE: PENIS
LOCATION SIMPLE: PENIS
LOCATION SIMPLE: CHEST
LOCATION SIMPLE: ABDOMEN

## 2022-09-14 ASSESSMENT — LOCATION DETAILED DESCRIPTION DERM
LOCATION DETAILED: XIPHOID
LOCATION DETAILED: DORSAL CORONA OF GLANS
LOCATION DETAILED: LEFT LATERAL INFERIOR CHEST
LOCATION DETAILED: RIGHT DORSAL SHAFT OF PENIS
LOCATION DETAILED: LEFT RIB CAGE
LOCATION DETAILED: DORSAL CORONA OF GLANS
LOCATION DETAILED: RIGHT DORSAL SHAFT OF PENIS
LOCATION DETAILED: RIGHT RIB CAGE

## 2022-09-14 NOTE — PROCEDURE: PRESCRIPTION MEDICATION MANAGEMENT
Detail Level: Zone
Continue Regimen: Mometasone ointment\\nNystatin cream
Render In Strict Bullet Format?: No
Plan: Patient completed Diflucan

## 2022-09-15 ENCOUNTER — HOSPITAL ENCOUNTER (OUTPATIENT)
Dept: PHYSICAL THERAPY | Age: 72
Setting detail: RECURRING SERIES
End: 2022-09-15
Payer: MEDICARE

## 2022-09-20 ENCOUNTER — HOSPITAL ENCOUNTER (OUTPATIENT)
Dept: PHYSICAL THERAPY | Age: 72
Setting detail: RECURRING SERIES
End: 2022-09-20
Payer: MEDICARE

## 2022-09-22 ENCOUNTER — HOSPITAL ENCOUNTER (OUTPATIENT)
Dept: PHYSICAL THERAPY | Age: 72
Setting detail: RECURRING SERIES
End: 2022-09-22
Payer: MEDICARE

## 2022-09-27 ENCOUNTER — HOSPITAL ENCOUNTER (OUTPATIENT)
Dept: PHYSICAL THERAPY | Age: 72
Setting detail: RECURRING SERIES
Discharge: HOME | End: 2022-09-30
Payer: MEDICARE

## 2022-09-27 PROCEDURE — 97110 THERAPEUTIC EXERCISES: CPT

## 2022-09-27 ASSESSMENT — PAIN SCALES - GENERAL: PAINLEVEL_OUTOF10: 0

## 2022-09-27 NOTE — THERAPY RECERTIFICATION
position and has acquired kyphotic scoliosis  Strength: Lumbar Protective Mechanism for trunk flexion = 3/4, extension = 1/5  GENOVEVA hip extension = 4-/5, ABDuction = 4-/5     Special Tests: Muscle Length Tests: stiff to GENOVEVA psoas and gastroc  Joint Accessory Mobility testing: stiff to calcaneus and subtalar joints  Neurological Screen: [place] neuro screen is intact to myotomes. Functional Mobility:  Sit to/from Stand,  Bed Mobility and independent with compensation for posture and pain as needed. Floor transfer: unable    Walking on level ground: walks with RW and R LE ankle brace- R hip in ER position with poor push off at R foot  Balance:  Can not formally test but does ok with transitions and weight changes with gait. Unable to do single leg stance because of back and R ankle injury. ASSESSMENT   Assessment:  Mr. Sim Wise has been able to do increasing time with standing exercises which helps his stamina and overall strength. He shows continued improvement in overall ankle/foot function as shown on functional score below We believe his gaining strength and endurance that is beginning to carry over into function in his life. He can now walk in the grocery with cart for longer time, but is still getting pain afterwards. He is at risk for decreased health secondary his inability to move as well with having co-morbidities including diabetes type 2. He is very motivated to improve and regain some of the strength he had prior to his ankle injury. Problem List: (Impacting functional limitations): Body Structures, Functions, Activity Limitations Requiring Skilled Therapeutic Intervention: Decreased functional mobility ; Decreased ADL status; Decreased ROM; Decreased strength; Decreased endurance; Decreased balance;  Increased pain       PLAN   Effective Dates: 9/27/22 TO Plan of Care/Certification Expiration Date: 11/27/22     Frequency/Duration: Plan Frequency: 2     Interventions Planned (Treatment may consist of any combination of the following):    Current Treatment Recommendations: Strengthening; ROM; Balance training; Manual Therapy - Joint Manipulation; Manual Therapy - Soft Tissue Mobilization; Home exercise program; Pain management; Aquatics; Therapeutic activities     Goals: (Goals have been discussed and agreed upon with patient.)  Short-Term Functional Goals: Time Frame: 4 weeks  R ankle eversion AROM at least 7 degrees. ONGOING  R ankle isometric strength 5/5 for all motions. MET   Pt independent with initial HEP. MET  Discharge Goals: Time Frame: 12 weeks  Able to walk with walker and ankle brace at least 20 min. With min to no foot/ankle pain. PROGRESSED. 9/27/22- able to do but has post activity pain. Pt reports tolerance to walking, general ADL's improves at least 50% per pt report. PROGRESSED 9/27/22  Pt independent with discharge HEP. ONGOING         Outcome Measure: Tool Used: FOOT AND ANKLE ABILITY MEASURE  Score:  Initial: 51  36 (Date: 8/9/22) Most Recent:  27 (date 9/27/22)   Interpretation of Score: For the \"Activities of Daily Living\", there are 21 questions each scored on a 5 point scale with 0 representing \"Unable to do\" and 4 representing \"No difficulty\". The lower the score, the greater the functional disability. 84/84 represents no disability. Minimal detectable change is 5.7 points. With the addition of the 8 questions in the \"Sports Subscale,\" there are 29 questions, each scored on a 5 point scale with 0 representing \"Unable to do\" and 4 representing \"No difficulty\". The lower the score, the greater the functional disability. 116/116 represents no disability. Minimal detectable change is 12.3 points. Medical Necessity:   Skilled intervention continues to be required due to pt with significant loss of ability to ambulate, decreased ankle AROM and loss of strength in ankle and core.  .  Reason For Services/Other Comments:  Patient continues to require skilled intervention due to cotninued loss of ankle ROM, strength and decreased endurance. .  Total Duration:  Time In: 1430  Time Out: 1517        Post Session Pain  Charge Capture  PT Visit Info  POC Link  Treatment Note Link  MD Guidelines  Sandraharjasmyne

## 2022-09-27 NOTE — PROGRESS NOTES
Johann Murphy  : 1950  Primary: Medicare Part A And B  Secondary: [address]  @ [address] [address]  Phone: [phone]  Fax: [phone] Plan Frequency: 2    Plan of Care/Certification Expiration Date: 22      PT Visit Info: Total # of Visits to Date: 25      OUTPATIENT PHYSICAL THERAPY:OP NOTE TYPE: Treatment Note 2022       Episode  }Appt Desk              Treatment Diagnosis:  Generalized Muscle Weakness (M62.81)  Difficulty in walking, Not elsewhere classified (R26.2)  Pain in right foot [M79.671]  Medical/Referring Diagnosis:  Pain in right foot [M79.671]  Referring Physician:  Lisandra Hunter, *  MD Orders:  PT Eval and Treat   Date of Onset:  Onset Date: 21     Allergies:   Sulfamethoxazole-trimethoprim  Restrictions/Precautions:  Restrictions/Precautions: Fall Risk  No data recorded   Interventions Planned (Treatment may consist of any combination of the following):    Current Treatment Recommendations: Strengthening; ROM; Balance training; Manual Therapy - Joint Manipulation; Manual Therapy - Soft Tissue Mobilization; Home exercise program; Pain management; Aquatics; Therapeutic activities     Subjective Comments:  Exercixes have not irritated R ankle. WHen I do several walking type activities (like shopping or pool work), my ankle gets sore. Initial:}    0/10Post Session:        0/10 in ankles  Medications Last Reviewed:  2022  Updated Objective Findings:  see recertification  Treatment   MANUAL THERAPY: (0 minutes): none today    THERAPEUTIC EXERCISE: (36  minutes):    Exercises per grid below to improve mobility, strength and balance. Required moderate visual and verbal cues to promote proper body mechanics. Progressed cardio with strengthening program    --UBE level 2x 6min.   -- following exercises done for 30 sec with ~ 30 sec rest in between 3 reps: sit to stand 15#, modified dead lift 20# . Sit bent forward HORZ ABD 3#   --B toe tap to 6 inch box with UE support 30 sec x 30 sec rest, 3 reps  -- trunk extension in seated hip hinge position- 3# weight ea hand 10x  Treatment/Session Summary:    Treatment Assessment:  pt worked up to 9 min of cardio and is tolerating more activities in standing. Communication/Consultation:  None today  Equipment provided today:  None  Recommendations/Intent for next treatment session: Next visit will focus on  hip stretching in standing and progress endurance HEP as able. Work up to 15 min of cardio with strenthening, continue pure strengthening and work towards walking with cane, per pt goal.     Total Treatment Billable Duration:  36 minutes  Time In: 1430  Time Out: [facility], PT       Charge Capture  }Post Session Pain  PT Visit [address] [facility] Portal  MD [address]    Future Appointments   Date Time Provider Romie Dodson   9/29/2022  2:30 PM Deri Fleeting, PTA [facility]   10/4/2022  3:15 PM Deri Fleeting, PTA [facility]   10/6/2022  3:15 PM Halina Roles, PT [facility]   10/11/2022  1:00 PM Halina Roles, PT [facility]   10/13/2022  1:00 PM Halina Roles, PT [facility]   10/17/2022  2:00 PM Laura Mcmillan MD [facility]   10/18/2022  1:00 PM Deri Fleeting, PTA [facility]   10/20/2022  1:00 PM Halina Roles, PT [facility]   10/25/2022  3:15 PM Halina Roles, PT [facility]   10/27/2022  1:00 PM Halina Roles, PT [facility]   11/1/2022  1:00 PM Halina Roles, PT [facility]   11/3/2022  1:00 PM Halina Roles, PT [facility]

## 2022-09-29 ENCOUNTER — HOSPITAL ENCOUNTER (OUTPATIENT)
Dept: PHYSICAL THERAPY | Age: 72
Setting detail: RECURRING SERIES
Discharge: HOME | End: 2022-10-02
Payer: MEDICARE

## 2022-09-29 PROCEDURE — 97110 THERAPEUTIC EXERCISES: CPT

## 2022-09-29 ASSESSMENT — PAIN SCALES - GENERAL: PAINLEVEL_OUTOF10: 0

## 2022-09-29 NOTE — PROGRESS NOTES
Felipe Palomo  : 1950  Primary: Medicare Part A And B  Secondary: [address] @ [address] [address]  Phone: [phone]  Fax: [phone] Plan Frequency: 2    Plan of Care/Certification Expiration Date: 22      PT Visit Info: Total # of Visits to Date: 23      OUTPATIENT PHYSICAL THERAPY:OP NOTE TYPE: Treatment Note 2022       Episode  }Appt Desk              Treatment Diagnosis:  Generalized Muscle Weakness (M62.81)  Difficulty in walking, Not elsewhere classified (R26.2)  Pain in right foot [M79.671]  Medical/Referring Diagnosis:  Pain in right foot [M79.671]  Referring Physician:  Kevan Hawkins, *  MD Orders:  PT Eval and Treat   Date of Onset:  Onset Date: 21     Allergies:   Sulfamethoxazole-trimethoprim  Restrictions/Precautions:  Restrictions/Precautions: Fall Risk  No data recorded   Interventions Planned (Treatment may consist of any combination of the following):    Current Treatment Recommendations: Strengthening; ROM; Balance training; Manual Therapy - Joint Manipulation; Manual Therapy - Soft Tissue Mobilization; Home exercise program; Pain management; Aquatics; Therapeutic activities     Subjective Comments:  Pt states \"I'm doing fair to middling. \"  Initial:}    0/10Post Session:        010   Medications Last Reviewed:  2022  Updated Objective Findings:  see recertification  Treatment   MANUAL THERAPY: (0 minutes): none today    THERAPEUTIC EXERCISE: (40 minutes):    Exercises per grid below to improve mobility, strength and balance. Required moderate visual and verbal cues to promote proper body mechanics. Progressed cardio with strengthening program    --UBE level 2x 6min. Trying to maintain 60 rpm.  -- following exercises done for 30 sec with ~ 30 sec rest in between 3 reps: sit to stand 15#, modified dead lift 15# .  Sit bent forward HORZ ABD 3# alternating with biceps curls 3#  --B toe tap to 8 inch box with UE support 30 sec x 30 sec rest, 3 reps  --forward step ups 6 inch x10 each LE  -- trunk extension in seated hip hinge position- 3# weight ea hand 10x  --gait training around clinic on even surfaces to increase LE strength and standing tolerance. With supervision, rolling walker for 2 minutes. Treatment/Session Summary:    Treatment Assessment:  Pt put forth good effort and reported mild fatigue at the end of treatment. He did not report increased ankle pain with standing or walking. Step ups are challenging due to LE weakness and difficulty with balance. Communication/Consultation:  None today  Equipment provided today:  None  Recommendations/Intent for next treatment session: Next visit will focus on  hip stretching in standing and progress endurance HEP as able.  Work up to 15 min of cardio with strenthening, continue pure strengthening and work towards walking with cane, per pt goal.     Total Treatment Billable Duration:  40 minutes  Time In: 1430  Time Out: [address], PTA       Charge Capture  }Post Session Pain  PT Visit [facility] Portal  MD [address]    Future Appointments   Date Time Provider Romie Dodson   10/4/2022  3:15 PM Reva Garcia PTA [facility]   10/6/2022  3:15 PM Earma Rout, PT [facility]   10/11/2022  1:00 PM Earma Rout, PT [facility]   10/13/2022  1:00 PM Earma Rout, PT [facility]   10/17/2022  2:00 PM Lieutenant Chyna MD [facility]   10/18/2022  1:00 PM Reva Garcia, PTA [facility]   10/20/2022  1:00 PM Earma Rout, PT [facility]   10/25/2022  3:15 PM Earma Rout, PT [facility]   10/27/2022  1:00 PM Earma Rout, PT [facility]   11/1/2022  1:00 PM Earma Rout, PT [facility]   11/3/2022  1:00 PM Earma Rout, PT [facility]

## 2022-10-04 ENCOUNTER — HOSPITAL ENCOUNTER (OUTPATIENT)
Dept: PHYSICAL THERAPY | Age: 72
Setting detail: RECURRING SERIES
End: 2022-10-04
Payer: MEDICARE

## 2022-10-06 ENCOUNTER — HOSPITAL ENCOUNTER (OUTPATIENT)
Dept: PHYSICAL THERAPY | Age: 72
Setting detail: RECURRING SERIES
Discharge: HOME | End: 2022-10-09
Payer: MEDICARE

## 2022-10-06 PROCEDURE — 97110 THERAPEUTIC EXERCISES: CPT

## 2022-10-06 NOTE — PROGRESS NOTES
Jacqueline Huang  : 1950  Primary: Medicare Part A And B  Secondary: [address] @ [address] [address]  Phone: [phone]  Fax: [phone] Plan Frequency: 2    Plan of Care/Certification Expiration Date: 22      PT Visit Info: Total # of Visits to Date: 23      OUTPATIENT PHYSICAL THERAPY:OP NOTE TYPE: Treatment Note 10/6/2022       Episode  }Appt Desk              Treatment Diagnosis:  Generalized Muscle Weakness (M62.81)  Difficulty in walking, Not elsewhere classified (R26.2)  Pain in right foot [M79.671]  Medical/Referring Diagnosis:  Pain in right foot [M79.671]  Referring Physician:  Marco Antonio Morrison, *  MD Orders:  PT Eval and Treat   Date of Onset:  Onset Date: 21     Allergies:   Sulfamethoxazole-trimethoprim  Restrictions/Precautions:  Restrictions/Precautions: Fall Risk  No data recorded   Interventions Planned (Treatment may consist of any combination of the following):    Current Treatment Recommendations: Strengthening; ROM; Balance training; Manual Therapy - Joint Manipulation; Manual Therapy - Soft Tissue Mobilization; Home exercise program; Pain management; Aquatics; Therapeutic activities     Subjective Comments:     Initial:}     /10Post Session:        0/10 in ankles  Medications Last Reviewed:  10/6/2022  Updated Objective Findings:    Treatment   MANUAL THERAPY: (0 minutes): none today    THERAPEUTIC EXERCISE: (35  minutes):    Exercises per grid below to improve mobility, strength and balance. Required moderate visual and verbal cues to promote proper body mechanics. Progressed cardio with strengthening program    --UBE level 2x 6min. -- following exercises done for 40 sec with ~ 30 sec rest in between 4 reps: sit to stand 15#, modified dead lift 20# .  Sit bent forward HORZ ABD 3#   --B toe tap to 6 inch box with UE support 40 sec x 30 sec rest, 2 reps  -- trunk extension in seated hip hinge position- 3# weight ea hand-- next  -- standing with UE support- hip extension 15x ea  Treatment/Session Summary:    Treatment Assessment:  Was able to do at least 12 to 13 min of cardio today that was at least a medium level for him. Communication/Consultation:  None today  Equipment provided today:  None  Recommendations/Intent for next treatment session: Next visit will focus on  hip stretching in standing and progress endurance HEP as able. Work up to 15 min of cardio with strenthening, continue pure strengthening and work towards walking with cane, per pt goal.     Total Treatment Billable Duration:  35 minutes  Time In: 1618  Time Out: [address], PT       Charge Capture  }Post Session Pain  PT Visit [address] [facility] Portal  MD [address]    Future Appointments   Date Time Provider Romie Dodson   10/11/2022  1:00 PM Brooke Primrose, PT [facility]   10/13/2022  1:00 PM [place] Primrose, PT [facility]   10/17/2022  2:00 PM Gregg Kanner, MD [facility]   10/18/2022  1:00 PM Rosalie Moreno, PTA [facility]   10/20/2022  1:00 PM Brooke Primrose, PT [facility]   10/25/2022  3:15 PM [place] Primrose, PT [facility]   10/27/2022  1:00 PM [place] Primrose, PT [facility]   11/1/2022  1:00 PM [place] Primrose, PT [facility]   11/3/2022  1:00 PM Brooke Primrose, PT [facility]

## 2022-10-11 ENCOUNTER — HOSPITAL ENCOUNTER (OUTPATIENT)
Dept: PHYSICAL THERAPY | Age: 72
Setting detail: RECURRING SERIES
End: 2022-10-11
Payer: MEDICARE

## 2022-10-11 NOTE — PROGRESS NOTES
Jennifer Uriel  : 1950  Primary: Medicare Part A And B  Secondary: [address] at [facility]  [address] [facility], [address] [place] [address],[address], [place] [address].  [phone]   [phone]        Pt called in sick today.     Johann Salmon, PT MSPT

## 2022-10-13 ENCOUNTER — APPOINTMENT (OUTPATIENT)
Dept: PHYSICAL THERAPY | Age: 72
End: 2022-10-13
Payer: MEDICARE

## 2022-10-18 ENCOUNTER — HOSPITAL ENCOUNTER (OUTPATIENT)
Dept: PHYSICAL THERAPY | Age: 72
Setting detail: RECURRING SERIES
Discharge: HOME | End: 2022-10-21
Payer: MEDICARE

## 2022-10-18 PROCEDURE — 97110 THERAPEUTIC EXERCISES: CPT

## 2022-10-18 NOTE — PROGRESS NOTES
Love Sesay  : 1950  Primary: Medicare Part A And B  Secondary: [address] @ [address] [address]  Phone: [phone]  Fax: [phone] Plan Frequency: 2    Plan of Care/Certification Expiration Date: 22      PT Visit Info: Total # of Visits to Date: 24      OUTPATIENT PHYSICAL THERAPY:OP NOTE TYPE: Treatment Note 10/18/2022       Episode  }Appt Desk              Treatment Diagnosis:  Generalized Muscle Weakness (M62.81)  Difficulty in walking, Not elsewhere classified (R26.2)  Pain in right foot [M79.671]  Medical/Referring Diagnosis:  Pain in right foot [M79.671]  Referring Physician:  Coni Higgins, *  MD Orders:  PT Eval and Treat   Date of Onset:  Onset Date: 21     Allergies:   Sulfamethoxazole-trimethoprim  Restrictions/Precautions:  Restrictions/Precautions: Fall Risk  No data recorded   Interventions Planned (Treatment may consist of any combination of the following):    Current Treatment Recommendations: Strengthening; ROM; Balance training; Manual Therapy - Joint Manipulation; Manual Therapy - Soft Tissue Mobilization; Home exercise program; Pain management; Aquatics; Therapeutic activities     Subjective Comments:  Pt states that he was stung by 8 yellow jackets when he was taking wood from the wood pile at his cabin. He descended the cabin steps, went under the house and walked on uneven ground. He did not fall. Initial:}     010Post Session:        010   Medications Last Reviewed:  10/18/2022  Updated Objective Findings:    Treatment   MANUAL THERAPY: (0 minutes): none today    THERAPEUTIC EXERCISE: (40 minutes):    Exercises per grid below to improve mobility, strength and balance. Required moderate visual and verbal cues to promote proper body mechanics. Progressed cardio with strengthening program    --UBE level 2x 6 min.   -- following exercises done for 40 sec with ~ 30 sec rest in between 4 reps: sit to stand 15#  --B toe tap to 6 inch box with UE support 40 sec x 30 sec rest, 4 reps  -- sled push with 20# and cues to push through the LE's. 30 ft x 4 reps, forward and back  -- standing with UE support- hip extension 15x ea  Treatment/Session Summary:    Treatment Assessment:  Pt took one rest break at the end of treatment but was able to transition more easily from activity to activity. Pt is reliant on the UE's for balance and the LE's are weak. Communication/Consultation:  None today  Equipment provided today:  None  Recommendations/Intent for next treatment session: Next visit will focus on  hip stretching in standing and progress endurance HEP as able. Work up to 15 min of cardio with strenthening, continue pure strengthening and work towards walking with cane, per pt goal.     Total Treatment Billable Duration:  40 minutes  Time In: 1300  Time Out: [address], PTA       Charge Capture  }Post Session Pain  PT Visit Info  [facility] Portal  MD [address]    Future Appointments   Date Time Provider Romie Dodson   10/20/2022  1:45 PM Duy Kelley PT [facility]   10/20/2022  3:30 PM Isreal Herrera MD [facility]   10/25/2022  3:15 PM [facility]   10/27/2022  1:00 PM SEAN AnglinO   11/1/2022  1:00 PM Duy Kelley PT [facility]   11/3/2022  1:00 PM SEAN [facility]inTADEEL [facility]

## 2022-10-20 ENCOUNTER — HOSPITAL ENCOUNTER (OUTPATIENT)
Dept: PHYSICAL THERAPY | Age: 72
Setting detail: RECURRING SERIES
Discharge: HOME | End: 2022-10-23
Payer: MEDICARE

## 2022-10-20 ENCOUNTER — OFFICE VISIT (OUTPATIENT)
Dept: CARDIOLOGY CLINIC | Age: 72
End: 2022-10-20
Payer: MEDICARE

## 2022-10-20 VITALS
SYSTOLIC BLOOD PRESSURE: 134 MMHG | BODY MASS INDEX: 35.64 KG/M2 | HEART RATE: 88 BPM | DIASTOLIC BLOOD PRESSURE: 74 MMHG | HEIGHT: 78 IN | WEIGHT: 308 LBS

## 2022-10-20 DIAGNOSIS — E66.01 OBESITY, MORBID (HCC): ICD-10-CM

## 2022-10-20 DIAGNOSIS — E78.00 PURE HYPERCHOLESTEROLEMIA: ICD-10-CM

## 2022-10-20 DIAGNOSIS — I25.118 ATHEROSCLEROSIS OF NATIVE CORONARY ARTERY OF NATIVE HEART WITH STABLE ANGINA PECTORIS (HCC): ICD-10-CM

## 2022-10-20 DIAGNOSIS — E11.29 TYPE 2 DIABETES MELLITUS WITH MICROALBUMINURIA, WITH LONG-TERM CURRENT USE OF INSULIN (HCC): ICD-10-CM

## 2022-10-20 DIAGNOSIS — I48.0 PAROXYSMAL ATRIAL FIBRILLATION (HCC): Primary | ICD-10-CM

## 2022-10-20 DIAGNOSIS — R80.9 TYPE 2 DIABETES MELLITUS WITH MICROALBUMINURIA, WITH LONG-TERM CURRENT USE OF INSULIN (HCC): ICD-10-CM

## 2022-10-20 DIAGNOSIS — I10 ESSENTIAL HYPERTENSION: ICD-10-CM

## 2022-10-20 DIAGNOSIS — Z79.4 TYPE 2 DIABETES MELLITUS WITH MICROALBUMINURIA, WITH LONG-TERM CURRENT USE OF INSULIN (HCC): ICD-10-CM

## 2022-10-20 DIAGNOSIS — G47.33 OSA (OBSTRUCTIVE SLEEP APNEA): ICD-10-CM

## 2022-10-20 PROCEDURE — 3046F HEMOGLOBIN A1C LEVEL >9.0%: CPT | Performed by: INTERNAL MEDICINE

## 2022-10-20 PROCEDURE — 3017F COLORECTAL CA SCREEN DOC REV: CPT | Performed by: INTERNAL MEDICINE

## 2022-10-20 PROCEDURE — G8428 CUR MEDS NOT DOCUMENT: HCPCS | Performed by: INTERNAL MEDICINE

## 2022-10-20 PROCEDURE — G8419 CALC BMI OUT NRM PARAM NOF/U: HCPCS | Performed by: INTERNAL MEDICINE

## 2022-10-20 PROCEDURE — 1036F TOBACCO NON-USER: CPT | Performed by: INTERNAL MEDICINE

## 2022-10-20 PROCEDURE — 1123F ACP DISCUSS/DSCN MKR DOCD: CPT | Performed by: INTERNAL MEDICINE

## 2022-10-20 PROCEDURE — 2022F DILAT RTA XM EVC RTNOPTHY: CPT | Performed by: INTERNAL MEDICINE

## 2022-10-20 PROCEDURE — G8484 FLU IMMUNIZE NO ADMIN: HCPCS | Performed by: INTERNAL MEDICINE

## 2022-10-20 PROCEDURE — 97110 THERAPEUTIC EXERCISES: CPT

## 2022-10-20 PROCEDURE — 99214 OFFICE O/P EST MOD 30 MIN: CPT | Performed by: INTERNAL MEDICINE

## 2022-10-20 RX ORDER — MOMETASONE FUROATE 1 MG/G
OINTMENT TOPICAL
COMMUNITY
Start: 2022-09-25

## 2022-10-20 RX ORDER — NYSTATIN 100000 U/G
CREAM TOPICAL
COMMUNITY
Start: 2022-09-29

## 2022-10-20 ASSESSMENT — ENCOUNTER SYMPTOMS
SHORTNESS OF BREATH: 0
BACK PAIN: 1
COUGH: 0
ABDOMINAL PAIN: 0

## 2022-10-20 NOTE — PROGRESS NOTES
Gabi Figueroa  : 1950  Primary: Medicare Part A And B  Secondary: [address] @ [address] [address]  Phone: [phone]  Fax: [phone] Plan Frequency: 2    Plan of Care/Certification Expiration Date: 22      PT Visit Info: Total # of Visits to Date: 25      OUTPATIENT PHYSICAL THERAPY:OP NOTE TYPE: Treatment Note 10/20/2022       Episode  }Appt Desk              Treatment Diagnosis:  Generalized Muscle Weakness (M62.81)  Difficulty in walking, Not elsewhere classified (R26.2)  Pain in right foot [M79.671]  Medical/Referring Diagnosis:  Pain in right foot [M79.671]  Referring Physician:  Daniel Marrero, *  MD Orders:  PT Eval and Treat   Date of Onset:  Onset Date: 21     Allergies:   Sulfamethoxazole-trimethoprim  Restrictions/Precautions:  Restrictions/Precautions: Fall Risk  No data recorded   Interventions Planned (Treatment may consist of any combination of the following):    Current Treatment Recommendations: Strengthening; ROM; Balance training; Manual Therapy - Joint Manipulation; Manual Therapy - Soft Tissue Mobilization; Home exercise program; Pain management; Aquatics; Therapeutic activities     Subjective Comments:     Pt states that he has a set back in his progress because of having respiratory infection and not being able to exercise as well. Initial:}     010Post Session:        010   Medications Last Reviewed:  10/20/2022  Updated Objective Findings:  no updates  Treatment   MANUAL THERAPY: (0 minutes): none today    THERAPEUTIC EXERCISE: (40 minutes):    Exercises per grid below to improve mobility, strength and balance. Required moderate visual and verbal cues to promote proper body mechanics.   Progressed cardio with strengthening program  --UBE level 3 x 6 min.  -walking ~ 50 ft with poles for practice   -- following exercises done for 40 sec with ~ 30 sec rest in between 4 reps: sit to stand 15#B toe tap to 6 inch box with UE  support   -- sled push with 20# and cues to push through the LE's. 35 ft x 45 reps, forward and back  -- standing with UE support- hip extension: next  Treatment/Session Summary:    Treatment Assessment:  had sufficient balance with walking with poles but stated it felt awkward. Would benefit from continued work with this for occasions that walker is difficult to use. Communication/Consultation:  None today  Equipment provided today:  None  Recommendations/Intent for next treatment session: Next visit will focus on  hip stretching in standing and progress endurance HEP as able. Work up to 15 min of cardio with strenthening, continue pure strengthening and work towards walking with poles    Total Treatment Billable Duration:  40 minutes  Time In: 1350  Time Out: [address], PT       Charge Capture  }Post Session Pain  PT Visit Info  [address] [facility] Portal  MD [address]    Future Appointments   Date Time Provider Romie Dodson   10/20/2022  3:30 PM Vincenzo Arias MD [facility] AMB   10/25/2022  3:15 PM Luh Kan, PT GUILLERMINA AZARO   10/27/2022  1:00 PM Luh Kan PT GUILLERMINA BURRIS   11/1/2022  1:00 PM Luh Kan, PT EFRENTADEEL BURRIS   11/3/2022  1:00 PM Luh Kan, PT GUILLERMINA AZARO

## 2022-10-20 NOTE — PROGRESS NOTES
[facility] CARDIOLOGY  [address], [address]  [facility], [address] [place] [address]      10/20/22      NAME:  Eboni Lott  : 1950  MRN: [id number]      SUBJECTIVE:   Eboni Lott is a 71 y.o. male seen for a follow up visit regarding the following:     Chief Complaint   Patient presents with    Coronary Artery Disease    Hypertension    Irregular Heart Beat       HPI:   Patient with chronic right rib pain and mild CAD by cath 2012. He has paroxysmal atrial fibrillation and stable on Eliquis and metoprolol. Overall he has done well. No CP and no recurrence of atrial fibrillation. Has moderate venous stasis this has improved. Past Medical History, Past Surgical History, Family history, Social History, and Medications were all reviewed with the patient today and updated as necessary.      Current Outpatient Medications   Medication Sig Dispense Refill    mometasone (ELOCON) 0.1 % ointment APPLY TO AFFECTED AREA AT BEDTIME      nystatin (MYCOSTATIN) 175345 UNIT/GM cream APPLY TWICE DAILY MIXED WITH MOMETASONE      TURMERIC PO Take by mouth      acetaminophen (TYLENOL) 500 MG tablet Take 500-1,000 mg by mouth every 6 hours as needed      albuterol sulfate  (90 Base) MCG/ACT inhaler Inhale 1 puff into the lungs every 4 hours as needed      albuterol (PROVENTIL) (2.5 MG/3ML) 0.083% nebulizer solution Inhale 2.5 mg into the lungs once      apixaban (ELIQUIS) 5 MG TABS tablet Take 5 mg by mouth 2 times daily      atorvastatin (LIPITOR) 20 MG tablet Take 20 mg by mouth      celecoxib (CELEBREX) 200 MG capsule Take 200 mg by mouth daily      Cholecalciferol 50 MCG ( UT) TABS Take by mouth      colesevelam (WELCHOL) 625 MG tablet Take 625-3,750 mg by mouth as needed      dapagliflozin (FARXIGA) 10 MG tablet Take 10 mg by mouth daily      doxazosin (CARDURA) 4 MG tablet Take 4 mg by mouth daily      ezetimibe (ZETIA) 10 MG tablet Take 10 mg by mouth      fluticasone (FLONASE) 50 MCG/ACT nasal spray 2 sprays by Nasal route daily as needed      Insulin Glargine-Lixisenatide (SOLIQUA) 100-33 UNT-MCG/ML SOPN 60 units sub-q daily Indications: type 2 diabetes mellitus      lisinopril (PRINIVIL;ZESTRIL) 10 MG tablet Take 10 mg by mouth daily      metoprolol tartrate (LOPRESSOR) 50 MG tablet Take 50 mg by mouth 2 times daily      miconazole (MICOTIN) 2 % powder APPLY TO ABDOMEN 2 TIMES PER DAY      montelukast (SINGULAIR) 10 MG tablet Take 10 mg by mouth      pantoprazole (PROTONIX) 40 MG tablet Take 40 mg by mouth daily      diphenhydrAMINE (BENADRYL) 50 MG capsule Take 100 mg by mouth (Patient not taking: Reported on 10/20/2022)      Fluticasone furoate-vilanterol (BREO ELLIPTA) 200-25 MCG/INH AEPB inhaler Inhale 1 puff into the lungs daily (Patient not taking: Reported on 10/20/2022)      meloxicam (MOBIC) 15 MG tablet Take 15 mg by mouth daily (Patient not taking: Reported on 10/20/2022)       No current facility-administered medications for this visit. Patient Active Problem List    Diagnosis Date Noted    Cervical pain (neck) 04/08/2021     Priority: Low    Status post left hip replacement 06/29/2020     Priority: Low    Chronic diarrhea 06/15/2020     Priority: Low     [facility]        Advance care planning 04/14/2020     Priority: Low     4-14-20        Chronic venous hypertension with ulcer and inflammation involving left side ([facility] [address] 03/31/2019     Priority: Low    Obesity, morbid ([facility] [address] 08/22/2018     Priority: Low    Venous stasis 08/22/2018     Priority: Low    Tubulovillous adenoma of colon 05/21/2018     Priority: Low     2014Jo-Ann MD, hx of complicated colonoscopies secondary to tortuous   colon, poor preps, inflamed diverticulae. Had partial colectomy.  Consult   March '19 colonoscopy completed- twotubulovillous adenomas, recc repeat   2022        History of smoking 30 or more pack years 08/08/2017     Priority: Low    Medicare annual wellness visit, subsequent 08/07/2017     Priority: Low     7-20-21        Status post total right knee replacement 07/17/2017     Priority: Low    OA (osteoarthritis) of knee 05/15/2017     Priority: Low     Dr. Anu Correa for tkr. COPD (chronic obstructive pulmonary disease) ([facility] 08/17/2016     Priority: Low    Coronary atherosclerosis of native coronary vessel 08/17/2016     Priority: Low     08/2012: Mild non-obstructive CAD        Edema 08/17/2016     Priority: Low    Paroxysmal atrial fibrillation ([facility] 08/17/2016     Priority: Low     Dr. Genesis Clemens cardiology q 6mo. Diverticular stricture ([facility] 01/28/2015     Priority: Low    Andropause 08/18/2014     Priority: Low    Type 2 diabetes mellitus with microalbuminuria, with long-term current use of insulin ([facility] 06/23/2014     Priority: Low    Esophageal reflux 10/09/2013     Priority: Low    Diverticulosis 06/20/2013     Priority: Low    Cervical spondylosis 06/20/2013     Priority: Low    Chronic back pain 06/20/2013     Priority: Low     Luisito LAUREN, PT, Neurosurgeon        Benign prostatic hyperplasia 06/20/2013     Priority: Low     10-6-20        FADUMO (obstructive sleep apnea) 03/11/2013     Priority: Low     Followed by sleep center. Extrinsic asthma 03/11/2013     Priority: Low    Hyperlipemia 03/11/2013     Priority: Low    Essential hypertension 03/11/2013     Priority: Low      Family History   Problem Relation Age of Onset    Malig Hypertherm Neg Hx     Pseudochol.  Deficiency Neg Hx     Delayed Awakening Neg Hx     Post-op Nausea/Vomiting Neg Hx     Emergence Delirium Neg Hx     Post-op Cognitive Dysfunction Neg Hx     Inflam Bowel Dis Son     Parkinsonism Mother     Suicide Brother     Heart Disease Brother 72        STENT HEART    Heart Disease Father         BOWEL OBSTRUCTION    Heart Attack Father 80        MI     Social History     Tobacco Use    Smoking status: Former     Packs/day: 1.50     Types: Cigarettes     Quit date: 4/2/2012     Years since quitting: 10.5    Smokeless tobacco: Never   Substance Use Topics    Alcohol use: Yes     Alcohol/week: 3.0 standard drinks       Review Of Symptoms    Review of Systems   Constitutional: Negative for fever and malaise/fatigue. HENT:  Negative for nosebleeds. Cardiovascular:  Negative for chest pain, dyspnea on exertion and palpitations. Respiratory:  Negative for cough and shortness of breath. Musculoskeletal:  Positive for back pain and muscle weakness. Negative for muscle cramps and myalgias. Gastrointestinal:  Negative for abdominal pain. Neurological:  Negative for dizziness. Physical Exam  Blood pressure 134/74, pulse 88, height 6' 7\" (2.007 m), weight (!) 308 lb (139.7 kg). Physical Exam  HENT:      Head: Normocephalic and atraumatic. Eyes:      Extraocular Movements: Extraocular movements intact. Cardiovascular:      Rate and Rhythm: Normal rate and regular rhythm. Heart sounds: No murmur heard. Pulmonary:      Effort: Pulmonary effort is normal.      Breath sounds: Normal breath sounds. Abdominal:      Palpations: Abdomen is soft. Musculoskeletal:         General: Normal range of motion. Skin:     General: Skin is warm and dry. Neurological:      General: No focal deficit present. Mental Status: He is oriented to person, place, and time. Medical problems, medical history and test results were reviewed with the patient today.       Lab Results   Component Value Date    CHOL 121 04/12/2021    CHOL 112 03/11/2020    CHOL 103 05/28/2019     Lab Results   Component Value Date    TRIG 342 (H) 04/12/2021    TRIG 275 (H) 03/11/2020    TRIG 135 05/28/2019     Lab Results   Component Value Date    HDL 31 (L) 04/12/2021    HDL 32 (L) 03/11/2020    HDL 36 (L) 05/28/2019     Lab Results   Component Value Date    LDLCALC 39 04/12/2021    LDLCALC 25 03/11/2020    LDLCALC 40 05/28/2019     Lab Results   Component Value Date    LABVLDL 55 (H) 03/11/2020    LABVLDL 27 05/28/2019    VLDL 51 (H) 04/12/2021     No results found for: [facility]     Lab Results   Component Value Date    LABA1C 8.6 (H) 07/13/2021     Lab Results   Component Value Date     07/13/2021        Lab Results   Component Value Date     07/13/2021    K 4.9 07/13/2021     07/13/2021    CO2 23 07/13/2021    BUN 18 07/13/2021    CREATININE 0.76 07/13/2021    GLUCOSE 205 (H) 07/13/2021    CALCIUM 9.6 07/13/2021    PROT 7.9 07/24/2020    LABALBU 3.8 07/24/2020    BILITOT 0.3 07/24/2020    ALKPHOS 210 (H) 07/24/2020    AST 18 07/24/2020    ALT 22 07/24/2020    GFRAA 108 07/13/2021    AGRATIO 0.9 (L) 07/24/2020    GLOB 4.1 (H) 07/24/2020       Lab Results   Component Value Date/Time     07/13/2021 10:31 AM    K 4.9 07/13/2021 10:31 AM     07/13/2021 10:31 AM    CO2 23 07/13/2021 10:31 AM    BUN 18 07/13/2021 10:31 AM    CREATININE 0.76 07/13/2021 10:31 AM    GLUCOSE 205 07/13/2021 10:31 AM    CALCIUM 9.6 07/13/2021 10:31 AM        Lab Results   Component Value Date    WBC 7.3 07/24/2020    HGB 14.0 07/24/2020    HCT 42.9 07/24/2020    MCV 90.9 07/24/2020     07/24/2020             ASSESSMENT:    Bradly Oliveira was seen today for follow-up. Diagnoses and all orders for this visit:    Paroxysmal atrial fibrillation ([address] - This has been recurrent and on metoprolol and Eliquis. No recurrence. He was seen by Dr Jammie Whitaker and is comfortable with medical therapy for now. Remains in SR. Atherosclerosis of native coronary artery of native heart without angina pectoris - Minimal by cardiac catheterization. Essential hypertension - on metoprolol and lisinopril. Mixed hyperlipidemia - managed per PCP - excellent control. Continue Zetia/Atorvastatin 20mg daily. 04/2021:  LDL 39. Needs new labs soon.       Morbid obesity due to excess calories ([address] - Discussed need for weight loss as this would markedly benefit him    Diabetes mellitus type 2 in obese [facility]) - 10/2022:  HgbA1c 10.4%. Needs to focus on diet and weight loss. May need to consider referral to Endocrinology. FADUMO on CPAP - This is stable    Venous insuffiencey - Keep feet elevated and consider support stockings       Problem List Items Addressed This Visit          Circulatory    Coronary atherosclerosis of native coronary vessel    Relevant Orders    Transthoracic echocardiogram (TTE) complete with contrast, bubble, strain, and 3D PRN    Essential hypertension    Paroxysmal atrial fibrillation ([address] - Primary    Relevant Orders    Transthoracic echocardiogram (TTE) complete with contrast, bubble, strain, and 3D PRN       Endocrine    Type 2 diabetes mellitus with microalbuminuria, with long-term current use of insulin (HCC)       Respiratory    FADUMO (obstructive sleep apnea)       Other    Obesity, morbid ([address]    Hyperlipemia       There are no discontinued medications. Patient has been instructed and agrees to call our office with any issues or other concerns related to their cardiac condition(s) and/or complaint(s). Return in about 6 months (around 4/20/2023).        Diego Thomas MD  10/20/2022

## 2022-10-25 ENCOUNTER — HOSPITAL ENCOUNTER (OUTPATIENT)
Dept: PHYSICAL THERAPY | Age: 72
Setting detail: RECURRING SERIES
Discharge: HOME | End: 2022-10-28
Payer: MEDICARE

## 2022-10-25 PROCEDURE — 97110 THERAPEUTIC EXERCISES: CPT

## 2022-10-25 NOTE — PROGRESS NOTES
Greer Fernandez  : 1950  Primary: Medicare Part A And B  Secondary: [address] @ [address] [address]  Phone: [phone]  Fax: [phone] Plan Frequency: 2    Plan of Care/Certification Expiration Date: 22      PT Visit Info: Total # of Visits to Date:       OUTPATIENT PHYSICAL THERAPY:OP NOTE TYPE: Treatment Note 10/25/2022       Episode  }Appt Desk              Treatment Diagnosis:  Generalized Muscle Weakness (M62.81)  Difficulty in walking, Not elsewhere classified (R26.2)  Pain in right foot [M79.671]  Medical/Referring Diagnosis:  Pain in right foot [M79.671]  Referring Physician:  Gi Amos MD Orders:  PT Eval and Treat   Date of Onset:  Onset Date: 21     Allergies:   Sulfamethoxazole-trimethoprim  Restrictions/Precautions:  Restrictions/Precautions: Fall Risk  No data recorded   Interventions Planned (Treatment may consist of any combination of the following):    Current Treatment Recommendations: Strengthening; ROM; Balance training; Manual Therapy - Joint Manipulation; Manual Therapy - Soft Tissue Mobilization; Home exercise program; Pain management; Aquatics; Therapeutic activities     Subjective Comments:        Initial:}     010Post Session:        010   Medications Last Reviewed:  10/25/2022  Updated Objective Findings:  no updates  Treatment   MANUAL THERAPY: (0 minutes): none today    THERAPEUTIC EXERCISE: (33 minutes):    Exercises per grid below to improve mobility, strength and balance. Required moderate visual and verbal cues to promote proper body mechanics. Progressed cardio with strengthening program  --UBE level 3 x 6 min.   -- following exercises done for 40 sec with ~ 20-30 sec rest in between 4 reps: sit to stand 15#B toe tap to 6 inch box with UE  support   -- standing with UE support-cable #10 with hip flexion and return to open stance 10x2 ea side then walking backwards with 7# 5 feet x2 ea side  Treatment/Session Summary:    Treatment Assessment:  endurance improving. need to work up to 15 min continual cardio/strengthening exercies. Communication/Consultation:  None today  Equipment provided today:  None  Recommendations/Intent for next treatment session: Next visit will focus on  hip stretching in standing and progress endurance HEP as able. Work up to 15 min of cardio with strenthening, continue pure strengthening and work towards walking with poles    Total Treatment Billable Duration:  33 minutes  Time In: 7279  Time Out: [address], PT       Charge Capture  }Post Session Pain  PT Visit [address] [facility] Portal  MD [address]    Future Appointments   Date Time Provider Romie Dodson   10/27/2022  1:00 PM Caterina Ocasio, PT [facility]   11/1/2022  1:00 PM [facility]   11/3/2022  1:00 PM Caterina Peralta, PT [facility]   4/20/2023 11:30 AM [facility] ECHO [facility] DOLLY [facility] AM[facility]   4/26/2023 11:45 AM MD DOLLY Tillman [facility] AMB

## 2022-10-27 ENCOUNTER — APPOINTMENT (OUTPATIENT)
Dept: PHYSICAL THERAPY | Age: 72
End: 2022-10-27
Payer: MEDICARE

## 2022-11-01 ENCOUNTER — APPOINTMENT (OUTPATIENT)
Dept: PHYSICAL THERAPY | Age: 72
End: 2022-11-01
Payer: MEDICARE

## 2022-11-03 ENCOUNTER — HOSPITAL ENCOUNTER (OUTPATIENT)
Dept: PHYSICAL THERAPY | Age: 72
Setting detail: RECURRING SERIES
Discharge: HOME | End: 2022-11-06
Payer: MEDICARE

## 2022-11-03 PROCEDURE — 97110 THERAPEUTIC EXERCISES: CPT

## 2022-11-03 NOTE — PROGRESS NOTES
Corwin Mosqueda  : 1950  Primary: Medicare Part A And B  Secondary: [address] @ [address] [address]  Phone: [phone]  Fax: [phone] Plan Frequency: 2    Plan of Care/Certification Expiration Date: 22      PT Visit Info: Total # of Visits to Date: 25      OUTPATIENT PHYSICAL THERAPY:OP NOTE TYPE: Treatment Note 11/3/2022       Episode  }Appt Desk              Treatment Diagnosis:  Generalized Muscle Weakness (M62.81)  Difficulty in walking, Not elsewhere classified (R26.2)  Pain in right foot [M79.671]  Medical/Referring Diagnosis:  Pain in right foot [M79.671]  Referring Physician:  Juliane Escobar, *  MD Orders:  PT Eval and Treat   Date of Onset:  Onset Date: 21     Allergies:   Sulfamethoxazole-trimethoprim  Restrictions/Precautions:  Restrictions/Precautions: Fall Risk  No data recorded   Interventions Planned (Treatment may consist of any combination of the following):    Current Treatment Recommendations: Strengthening; ROM; Balance training; Manual Therapy - Joint Manipulation; Manual Therapy - Soft Tissue Mobilization; Home exercise program; Pain management; Aquatics; Therapeutic activities     Subjective Comments:        Initial:}     0/10Post Session:        010   Medications Last Reviewed:  11/3/2022  Updated Objective Findings:  see progress report  Treatment   MANUAL THERAPY: (0 minutes): none today    THERAPEUTIC EXERCISE: (30 minutes):    Exercises per grid below to improve mobility, strength and balance. Required moderate visual and verbal cues to promote proper body mechanics. Progressed cardio with strengthening program  --UBE level 3 x 6 min.   -- LAQ on machine:  55# 8xx, 65# 10x2, 70 10x  -- knee flexion on machine 55# 10x3  -- following exercises done for 30 sec  x 2: sit to stand with 20# weight  -- standing with UE support-cable #10 with hip flexion and return to open stance 10x2 ea side then walking backwards with 7# 5 feet -- not today- do next  Treatment/Session Summary:    Treatment Assessment:  pt seemed to like changes to gym program but did not have time to do enough exercises today. Communication/Consultation:  None today  Equipment provided today:  None  Recommendations/Intent for next treatment session: Next visit will focus on  hip stretching in standing and working on gym equipment for discharge program as well as walking for endurance.      Total Treatment Billable Duration:  30 minutes  Time In: 1301  Time Out: [address] [facility], PT       Charge Capture  }Post Session Pain  PT Visit Info  [facility]  MD Guidelines  Scanned Media  Benefits  MyChart    Future Appointments   Date Time Provider Romie Dodson   11/17/2022  3:15 PM Luh Kan, PT [facility]   4/20/2023 11:30 AM [facility] E[facility] AMB   4/26/2023 11:45 AM Vincenzo Arias MD MAGDALENO L AMB

## 2022-11-03 NOTE — PROGRESS NOTES
Chelsy Proctor  : 1950  Primary: Medicare Part A And B  Secondary: [address] [address]  Phone: [phone]  Fax: [phone] Plan Frequency: 2    Plan of Care/Certification Expiration Date: 22      PT Visit Info: Total # of Visits to Date: 24      OUTPATIENT PHYSICAL THERAPY:OP NOTE TYPE: Progress Report 11/3/2022               Episode  Appt Desk         Treatment Diagnosis:  Generalized Muscle Weakness (M62.81)  Difficulty in walking, Not elsewhere classified (R26.2)  Pain in right foot [M79.671]  Medical/Referring Diagnosis:  Pain in right foot [M79.671]  Referring Physician:  Faheem Young, *  MD Orders:  PT Eval and Treat   Return MD Appt:  unknown  Date of Onset:  Onset Date: 21     Allergies:  Sulfamethoxazole-trimethoprim  Restrictions/Precautions:    Restrictions/Precautions: Fall Risk  No data recorded   Medications Last Reviewed:  11/3/2022     SUBJECTIVE   NO longer having ankle pain with current activity level except occasionally and then have to rest.   Patient Stated Goal(s):  his new goal is to increase endurance and length to walking with RW  Initial:      0/10 Post Session:      0/10    Fall Risk Scale: [place] Total Score: 30  Montelongo Fall Risk: Medium (25-44)         OBJECTIVE     Observation/Orthostatic Postural Assessment:   He is tolerating standing exercises (use to fatigue at 1 min and now is at about 7 min before he has to sit down) for longer periods and continues to increase the amount of time he is doing his cardio program. He has also increased his walking distance-- able to walk in grocery store for ~ 10 minutes if he has to but prefers to use motorized carts. Swelling/Effusion: still has mild to moderate ankle effusion on medial and lateral sides     Palpation: not today  ROM:      R ankle: measurements no new measurements today.   GENOVEVA hallus ext ~ 45 degrees  Also has loss of motion still at GENOVEVA shoulder flexion: R ~ 90 degrees, L ~ 120 degrees AROM    Continues with significant loss of trunk extension. Sits and stands in forward bent position and has acquired kyphotic scoliosis  Strength: Lumbar Protective Mechanism for trunk flexion = 3/4, extension = 1/5  GENOVEVA hip extension = 4-/5, ABDuction = 4-/5     Special Tests: Muscle Length Tests: stiff to GENOVEVA psoas and gastroc  Joint Accessory Mobility testing: stiff to calcaneus and subtalar joints  Neurological Screen: [place] neuro screen is intact to myotomes. Functional Mobility:  Sit to/from Stand,  Bed Mobility and independent with compensation for posture and pain as needed. Floor transfer: unable    Walking on level ground: no change but can walker longer distances. Balance:  Can not formally test but does ok with transitions and weight changes with gait. Unable to do single leg stance because of back and R ankle injury. ASSESSMENT   Assessment:  Mr. Guy Argueta continues to be able to do increasing time with standing exercises which helps his stamina and overall strength. We are currently working on a discharge HEP and have realized that this pt would be most motivated with a discharge strengthening program if it was geared towards including gym equipment. So we will be working with him on how to safely do gym program exercises along with free weight exercises we have already been training him on. He is at risk for decreased health secondary his inability to move as well with having co-morbidities including diabetes type 2. He is very motivated to improve and regain some of the strength he had prior to his ankle injury. Problem List: (Impacting functional limitations): Body Structures, Functions, Activity Limitations Requiring Skilled Therapeutic Intervention: Decreased functional mobility ; Decreased ADL status; Decreased ROM;  Decreased strength; Decreased endurance; Decreased balance; Increased pain       PLAN   Effective Dates: 9/27/22 TO Plan of Care/Certification Expiration Date: 11/27/22     Frequency/Duration: Plan Frequency: 2     Interventions Planned (Treatment may consist of any combination of the following):    Current Treatment Recommendations: Strengthening; ROM; Balance training; Manual Therapy - Joint Manipulation; Manual Therapy - Soft Tissue Mobilization; Home exercise program; Pain management; Aquatics; Therapeutic activities     Goals: (Goals have been discussed and agreed upon with patient.)  Short-Term Functional Goals: Time Frame: 4 weeks  R ankle eversion AROM at least 7 degrees. PROGRESSED to 5 degrees. R ankle isometric strength 5/5 for all motions. MET   Pt independent with initial HEP. MET  Discharge Goals: Time Frame: 12 weeks  Able to walk with walker and ankle brace at least 20 min. With min to no foot/ankle pain. PROGRESSED to 10 min 11/3/22  Pt reports tolerance to walking, general ADL's improves at least 50% per pt report. PROGRESSED 11/3/22  Pt independent with discharge HEP. ONGOING 11/3/22         Outcome Measure: Tool Used: FOOT AND ANKLE ABILITY MEASURE  Score:  Initial: 51  36 (Date: 8/9/22)   27 (date 9/27/22) Most Recent:  50/115  Date 11/3/22   Interpretation of Score: For the \"Activities of Daily Living\", there are 21 questions each scored on a 5 point scale with 0 representing \"Unable to do\" and 4 representing \"No difficulty\". The lower the score, the greater the functional disability. 84/84 represents no disability. Minimal detectable change is 5.7 points. With the addition of the 8 questions in the \"Sports Subscale,\" there are 29 questions, each scored on a 5 point scale with 0 representing \"Unable to do\" and 4 representing \"No difficulty\". The lower the score, the greater the functional disability. 116/116 represents no disability. Minimal detectable change is 12.3 points.       Medical Necessity:   Skilled intervention continues to be required due to pt with significant loss of ability to ambulate, decreased ankle AROM and loss of strength in ankle and core. .  Reason For Services/Other Comments:  Patient continues to require skilled intervention due to cotninued loss of ankle ROM, strength and decreased endurance. .  Total Duration:  Time In: 1301  Time Out: 1350        Post Session Pain  Charge Capture  PT Visit Info  POC Link  Treatment Note Link  MD Guidelines  [place]

## 2022-11-10 ENCOUNTER — HOSPITAL ENCOUNTER (OUTPATIENT)
Dept: PHYSICAL THERAPY | Age: 72
Setting detail: RECURRING SERIES
Discharge: HOME | End: 2022-11-13
Payer: MEDICARE

## 2022-11-10 PROCEDURE — 97110 THERAPEUTIC EXERCISES: CPT

## 2022-11-10 NOTE — PROGRESS NOTES
Johann Murphy  : 1950  Primary: Medicare Part A And B  Secondary: [address] @ [address] [address]  Phone: [phone]  Fax: [phone] Plan Frequency: 2    Plan of Care/Certification Expiration Date: 22      PT Visit Info: Total # of Visits to Date:       OUTPATIENT PHYSICAL THERAPY:OP NOTE TYPE: Treatment Note 11/10/2022       Episode  }Appt Desk              Treatment Diagnosis:  Generalized Muscle Weakness (M62.81)  Difficulty in walking, Not elsewhere classified (R26.2)  Pain in right foot [M79.671]  Medical/Referring Diagnosis:  Pain in right foot [M79.671]  Referring Physician:  Lisandra Hunter *  MD Orders:  PT Eval and Treat   Date of Onset:  Onset Date: 21     Allergies:   Sulfamethoxazole-trimethoprim  Restrictions/Precautions:  Restrictions/Precautions: Fall Risk  No data recorded   Interventions Planned (Treatment may consist of any combination of the following):    Current Treatment Recommendations: Strengthening; ROM; Balance training; Manual Therapy - Joint Manipulation; Manual Therapy - Soft Tissue Mobilization; Home exercise program; Pain management; Aquatics; Therapeutic activities     Subjective Comments:   no new c/o     Initial:}     010Post Session:        0/10   Medications Last Reviewed:  11/10/2022  Updated Objective Findings:  see progress report  Treatment   MANUAL THERAPY: (0 minutes): none today    THERAPEUTIC EXERCISE: (30 minutes):    Exercises per grid below to improve mobility, strength and balance. Required moderate visual and verbal cues to promote proper body mechanics. Progressed cardio with strengthening program  --UBE level 2 x 6 min.   -- LAQ on machine: 65# 10x, 70 #10x2  -- knee flexion on machine 55# 10x, 65# 10x2  -- following exercises done for 30 sec  x 2: sit to stand with 20# weight  -- standing with RW-cable #3 with hip flexion and return to open stance 10x2 ea side then walking backwards with 7# 5 feet x 5  -- with 4# weight:  R hip extension to open stance and return, UE support except for during open stance. 10x2 ea side   Treatment/Session Summary:    Treatment Assessment:  progressed gym program and working on independence with this. Communication/Consultation:  None today  Equipment provided today:  None  Recommendations/Intent for next treatment session: Next visit will continue focus on  hip stretching in standing and working on gym equipment for discharge program as well as walking for endurance.      Total Treatment Billable Duration:  40 minutes  Time In: 8519  Time Out: 1115    Halina Roles, PT       Charge Capture  }Post Session Pain  PT Visit Info  [facility] Portal  MD Guidelines  Scanned Media  Benefits  MyChart    Future Appointments   Date Time Provider Romie Dodson   11/17/2022  3:15 PM Halina Roles, PT [facility]   12/1/2022 11:15 AM Halina Roles, PT [facility]   12/6/2022  9:45 AM Halina Roles, PT [facility]   12/8/2022  9:45 AM Halina Roles, PT [facility]   12/13/2022  9:45 AM Halina Roles, PT [facility]   4/20/2023 11:30 AM [facility] ECHO 36 [facility] AMB   4/26/2023 11:45 AM Laura Mcmillan MD [facility] AMB

## 2022-11-17 ENCOUNTER — APPOINTMENT (OUTPATIENT)
Dept: PHYSICAL THERAPY | Age: 72
End: 2022-11-17
Payer: MEDICARE

## 2022-12-01 ENCOUNTER — HOSPITAL ENCOUNTER (OUTPATIENT)
Dept: PHYSICAL THERAPY | Age: 72
Setting detail: RECURRING SERIES
Discharge: HOME | End: 2022-12-04
Payer: MEDICARE

## 2022-12-01 PROCEDURE — 97110 THERAPEUTIC EXERCISES: CPT

## 2022-12-01 ASSESSMENT — PAIN SCALES - GENERAL: PAINLEVEL_OUTOF10: 0

## 2022-12-01 NOTE — PROGRESS NOTES
Casey Fitzpatrick  : 1950  Primary: Medicare Part A And B  Secondary: [address] @ [address] [address]  Phone: [phone]  Fax: [phone] Plan Frequency: 2    Plan of Care/Certification Expiration Date: 22      PT Visit Info: Total # of Visits to Date: 32      OUTPATIENT PHYSICAL THERAPY:OP NOTE TYPE: Treatment Note 2022       Episode  }Appt Desk              Treatment Diagnosis:  Generalized Muscle Weakness (M62.81)  Difficulty in walking, Not elsewhere classified (R26.2)  Pain in right foot [M79.671]  Medical/Referring Diagnosis:  Pain in right foot [M79.671]  Referring Physician:  Jessica Espino., *  MD Orders:  PT Eval and Treat   Date of Onset:  Onset Date: 21     Allergies:   Sulfamethoxazole-trimethoprim  Restrictions/Precautions:  Restrictions/Precautions: Fall Risk  No data recorded   Interventions Planned (Treatment may consist of any combination of the following):    Current Treatment Recommendations: Strengthening; ROM; Balance training; Manual Therapy - Joint Manipulation; Manual Therapy - Soft Tissue Mobilization; Home exercise program; Pain management; Aquatics; Therapeutic activities     Subjective Comments:    Pt reports no pain today. Initial:}    0/10Post Session:        0/10   Medications Last Reviewed:  2022  Updated Objective Findings:    Treatment     THERAPEUTIC EXERCISE: (40 minutes):    Exercises per grid below to improve mobility, strength and balance. Required moderate visual and verbal cues to promote proper body mechanics.   Progressed cardio with strengthening program.    --UBE level 6 x 5 min with low intensity/high intensity  -- LAQ on machine: single leg x10 ea 30# and B LE 60# 2x1-  -- knee flexion on machine: 65# 3x10 B LE  -- sit to stand with 12# and no UE support 3x10 (from mat table)  -- seated dead lift 20# 3x10  Treatment/Session Summary: Treatment Assessment:  Pt took 2 rest breaks for water. He was sweating profusely but had difficulty getting in and out of the weight machines. He continues to ambulate slowly with a flexed posture. Communication/Consultation:  None today  Equipment provided today:  None  Recommendations/Intent for next treatment session: Next visit will continue focus on  hip stretching in standing and working on gym equipment for discharge program as well as walking for endurance.      Total Treatment Billable Duration:  40 minutes  Time In: 0176  Time Out: 1430    Albina Corey PTA       Charge Capture  }Post Session Pain  PT Visit Info  [facility] Portal  MD Guidelines  Scanned Media  Benefits  MyChart    Future Appointments   Date Time Provider Romie Dodson   12/6/2022  9:45 AM Alba Squires, PT [facility]   12/8/2022  9:45 AM Alba Squires PT [facility]   12/13/2022  9:45 AM Alba Squires PT [facility]   4/20/2023 11:30 AM [facility] ECHO 36 [facility] AMB   4/26/2023 11:45 AM Amina Tucker MD [facility] AM[facility]

## 2022-12-06 ENCOUNTER — HOSPITAL ENCOUNTER (OUTPATIENT)
Dept: PHYSICAL THERAPY | Age: 72
Setting detail: RECURRING SERIES
Discharge: HOME | End: 2022-12-09
Payer: MEDICARE

## 2022-12-06 PROCEDURE — 97110 THERAPEUTIC EXERCISES: CPT

## 2022-12-06 NOTE — PROGRESS NOTES
Sol Riggins  : 1950  Primary: Medicare Part A [place] B  Secondary: [address] @ [address] [address]  Phone: [phone]  Fax: [phone] Plan Frequency: 2    Plan of Care/Certification Expiration Date: 22      PT Visit Info: Total # of Visits to Date: 32      OUTPATIENT PHYSICAL [id number] NOTE TYPE: Recertification and Discharge Summary 2022  Please back date to 22. Recertification is for last 2 visits. Episode  Appt Desk         Treatment Diagnosis:  Generalized Muscle Weakness (M62.81)  Difficulty in walking, Not elsewhere classified (R26.2)  Pain in right foot [M79.671]  Medical/Referring Diagnosis:  Pain in right foot [P88.287]  Referring Physician:  Samantha Mcgraw, *  MD Orders:  PT Eval and Treat   Return MD Appt:  unknown  Date of Onset:  Onset Date: 21     Allergies:  Sulfamethoxazole-trimethoprim  Restrictions/Precautions:    Restrictions/Precautions: Fall Risk  No data recorded   Medications Last Reviewed:  2022     SUBJECTIVE   NO longer having ankle pain with most activities except occasionally. Endurance is OK but c/o neck/upper back and shoulder pain with walking. Patient Stated Goal(s):  his new goal is to increase endurance and length to walking with RW  Initial:      0/10 Post Session:      0/10    Fall Risk Scale: [place] Total Score: 30  [place] Fall Risk: Medium (25-44)         OBJECTIVE     Observation/Orthostatic Postural Assessment: Pt walks with RW with spine in significant flexed position/ head leaving forward (acquired kyphotic scoliosis and increased weight into GENOVEVA shoulders. Wears the ankle brace and this causes his R LE to be in everted position at push off R.    Swelling/Effusion: still has mild to moderate ankle effusion on medial and lateral sides     Palpation: not today  ROM:      R ankle: measurements no new measurements today.  GENOVEVA hallus ext ~ 45 degrees  Also has loss of motion still at GENOVEVA shoulder flexion: R ~ 90 degrees, L ~ 120 degrees AROM         Continues with significant loss of trunk extension. Sits and stands in forward bent position and has acquired kyphotic scoliosis  Strength: Lumbar Protective Mechanism for trunk flexion = 3/4, extension = 1/5  GENOVEVA hip extension = 4-/5, ABDuction = 4-/5     Special Tests: Muscle Length Tests: stiff to GENOVEVA psoas and gastroc  Joint Accessory Mobility testing: stiff to calcaneus and subtalar joints  Neurological Screen: [place] neuro screen is intact to myotomes. Functional Mobility:  Sit to/from Stand,  Bed Mobility and independent with compensation for posture and pain as needed. Floor transfer: unable    Walking on level ground: no change but can walker longer distances. Balance:  Can not formally test but does ok with transitions and weight changes with gait. Unable to do single leg stance because of back and R ankle injury. ASSESSMENT   Assessment:  Mr. Dilshad Correia has improved stamina per pt report with walking but is limited secondary to pain in neck and shoulders with walking since he has an acquired kyphotic scoliosis. We have been working on a discharge HEP that was geared towards including gym equipment since this seemed to motivate more over a cardio program.  He is already doing pool therapy as part of his cardio program.  He is now independent with his HEP and can continue this on his own at his gym. This note is to certify his visit on 12/1/22 and today prior to discharging him today. Problem List: (Impacting functional limitations): Body Structures, Functions, Activity Limitations Requiring Skilled Therapeutic Intervention: Decreased functional mobility ; Decreased ADL status; Decreased ROM; Decreased strength; Decreased endurance; Decreased balance;  Increased pain       PLAN   Effective Dates: 12/1/22 TO Plan of Care/Certification Expiration Date: 12/06/22     Frequency/Duration: Plan Frequency: 2     Interventions Planned (Treatment may consist of any combination of the following):    Current Treatment Recommendations: Strengthening; ROM; Balance training; Manual Therapy - Joint Manipulation; Manual Therapy - Soft Tissue Mobilization; Home exercise program; Pain management; Aquatics; Therapeutic activities     Goals: (Goals have been discussed and agreed upon with patient.)  Short-Term Functional Goals: Time Frame: 4 weeks  R ankle eversion AROM at least 7 degrees. PROGRESSED to 5 degrees. R ankle isometric strength 5/5 for all motions. MET   Pt independent with initial HEP. MET  Discharge Goals: Time Frame: 12 weeks  Able to walk with walker and ankle brace at least 20 min. With min to no foot/ankle pain. PROGRESSED to 10 min 11/3/22  Pt reports tolerance to walking, general ADL's improves at least 50% per pt report. PROGRESSED 12/1/22  Pt independent with discharge HEP. MET 12/6/22         Outcome Measure: Tool Used: FOOT AND ANKLE ABILITY MEASURE  Score:  Initial: 51  36 (Date: 8/9/22) Most Recent:  50/115  Date 11/3/22   Interpretation of Score: For the \"Activities of Daily Living\", there are 21 questions each scored on a 5 point scale with 0 representing \"Unable to do\" and 4 representing \"No difficulty\". The lower the score, the greater the functional disability. 84/84 represents no disability. Minimal detectable change is 5.7 points. With the addition of the 8 questions in the \"Sports Subscale,\" there are 29 questions, each scored on a 5 point scale with 0 representing \"Unable to do\" and 4 representing \"No difficulty\". The lower the score, the greater the functional disability. 116/116 represents no disability. Minimal detectable change is 12.3 points. Medical Necessity:   Skilled intervention continues to be required due to pt with significant loss of ability to ambulate, decreased ankle AROM and loss of strength in ankle and core. .  Reason For Services/Other Comments:  Patient continues to require skilled intervention due to cotninued loss of ankle ROM, strength and decreased endurance. .Need for skilled guidance in directing him through a HEP for his gym. Total Duration:  Time In: 1450  Time Out: 7595    Regarding Antoniette Rank therapy, I certify that the treatment plan above will be carried out by a therapist or under their direction.   Thank you for this referral,  Willey Primrose, PT     Referring Physician Signature: Marco Antonio Mar., * _______________________________ Date _____________       Post Session Pain  Charge Capture  PT Visit Info  POC Link  Treatment Note Link  MD Guidelines  MyChart

## 2022-12-06 NOTE — PROGRESS NOTES
Sherri Foots  : 1950  Primary: Medicare Part A And B  Secondary: [address] @ [address] [address]  Phone: [phone]  Fax: [phone] Plan Frequency: 2    Plan of Care/Certification Expiration Date: 22      PT Visit Info: Total # of Visits to Date: 32      OUTPATIENT PHYSICAL THERAPY:OP NOTE TYPE: Treatment Note 2022       Episode  }Appt Desk              Treatment Diagnosis:  Generalized Muscle Weakness (M62.81)  Difficulty in walking, Not elsewhere classified (R26.2)  Pain in right foot [M79.671]  Medical/Referring Diagnosis:  Pain in right foot [M79.671]  Referring Physician:  Leeanna Roger *  MD Orders:  PT Eval and Treat   Date of Onset:  Onset Date: 21     Allergies:   Sulfamethoxazole-trimethoprim  Restrictions/Precautions:  Restrictions/Precautions: Fall Risk  No data recorded   Interventions Planned (Treatment may consist of any combination of the following):    Current Treatment Recommendations: Strengthening; ROM; Balance training; Manual Therapy - Joint Manipulation; Manual Therapy - Soft Tissue Mobilization; Home exercise program; Pain management; Aquatics; Therapeutic activities     Subjective Comments:    no ankle pain. Initial:}     /10Post Session:        0/10   Medications Last Reviewed:  2022  Updated Objective Findings:    Treatment     THERAPEUTIC EXERCISE: (40 minutes):    Exercises per grid below to improve mobility, strength and balance. Required moderate visual and verbal cues to promote proper body mechanics.   Progressed cardio with strengthening program.    --walking continuous with RW for 3 min and 20 sec - had to stop because of shoulder and neck pain/not lack of endurance  -- worked on considering alternate walker for pt- loftstrand walker  -- neck stretches -- chin tuck 10 sec x 3, trunk rotation  10 sec x 5 and side bend about 10 sec x 2  -- sit to stand with 12# and no UE support 2x10 (from mat table)  -- standing with UE support: hip extension 10x ea, hip abd to hip flexion 10x ea  -- stretching of upper back and psoas: 60 sec ea side  Treatment/Session Summary:    Treatment Assessment:  Pt seemed to understand all exercises he has been assisnged for HEP. Communication/Consultation:  None today  Equipment provided today:  None  Recommendations/Intent for next treatment session: Next visit will continue focus on  hip stretching in standing and working on gym equipment for discharge program as well as walking for endurance.      Total Treatment Billable Duration:  40 minutes  Time In: 1450  Time Out: [address] [place], PT       Charge Capture  }Post Session Pain  PT Visit Info  [facility] Portal  MD Guidelines  Scanned Media  Benefits  MyChart    Future Appointments   Date Time Provider Romie Dodson   4/20/2023 11:30 AM [facility] ECHO 36 [facility] AMB   4/26/2023 11:45 AM Azucena De La Torre MD [facility] AM[facility]

## 2022-12-08 ENCOUNTER — APPOINTMENT (OUTPATIENT)
Dept: PHYSICAL THERAPY | Age: 72
End: 2022-12-08
Payer: MEDICARE

## 2022-12-13 ENCOUNTER — APPOINTMENT (OUTPATIENT)
Dept: PHYSICAL THERAPY | Age: 72
End: 2022-12-13
Payer: MEDICARE

## 2022-12-29 ENCOUNTER — TELEPHONE (OUTPATIENT)
Dept: CARDIOLOGY CLINIC | Age: 72
End: 2022-12-29

## 2022-12-29 NOTE — TELEPHONE ENCOUNTER
Patient is just wondering if his wife can be seen by Jorge Byrne. Patients wife went to an urgent doctor and got told she has CHF, so he was just wondering if  could see her. Please call.

## 2022-12-29 NOTE — TELEPHONE ENCOUNTER
Called patient and informed him that Dr. Melvin Mcdonald doesn't have a consult opening until May 2023. Patient may have to see another provider until then.

## 2023-03-23 ENCOUNTER — RX ONLY (OUTPATIENT)
Age: 73
Setting detail: RX ONLY
End: 2023-03-23

## 2023-03-23 ENCOUNTER — APPOINTMENT (RX ONLY)
Dept: URBAN - METROPOLITAN AREA CLINIC 329 | Facility: CLINIC | Age: 73
Setting detail: DERMATOLOGY
End: 2023-03-23

## 2023-03-23 DIAGNOSIS — L82.1 OTHER SEBORRHEIC KERATOSIS: ICD-10-CM

## 2023-03-23 DIAGNOSIS — L30.4 ERYTHEMA INTERTRIGO: ICD-10-CM | Status: RESOLVING

## 2023-03-23 DIAGNOSIS — L21.8 OTHER SEBORRHEIC DERMATITIS: ICD-10-CM | Status: WELL CONTROLLED

## 2023-03-23 PROCEDURE — 99214 OFFICE O/P EST MOD 30 MIN: CPT

## 2023-03-23 PROCEDURE — ? COUNSELING

## 2023-03-23 PROCEDURE — ? PRESCRIPTION MEDICATION MANAGEMENT

## 2023-03-23 PROCEDURE — ? FULL BODY SKIN EXAM - DECLINED

## 2023-03-23 RX ORDER — NYSTATIN CREAM 100000 [USP'U]/G
CREAM TOPICAL
Qty: 60 | Refills: 5 | Status: ERX

## 2023-03-23 RX ORDER — MOMETASONE FUROATE 1 MG/G
OINTMENT TOPICAL
Qty: 45 | Refills: 6 | Status: ERX

## 2023-03-23 ASSESSMENT — LOCATION SIMPLE DESCRIPTION DERM
LOCATION SIMPLE: LEFT UPPER BACK
LOCATION SIMPLE: RIGHT UPPER BACK
LOCATION SIMPLE: ABDOMEN
LOCATION SIMPLE: LEFT UPPER BACK
LOCATION SIMPLE: CHEST
LOCATION SIMPLE: RIGHT UPPER BACK
LOCATION SIMPLE: CHEST
LOCATION SIMPLE: ABDOMEN
LOCATION SIMPLE: PENIS
LOCATION SIMPLE: LEFT UPPER ARM
LOCATION SIMPLE: PENIS

## 2023-03-23 ASSESSMENT — LOCATION DETAILED DESCRIPTION DERM
LOCATION DETAILED: LEFT INFERIOR UPPER BACK
LOCATION DETAILED: RIGHT RIB CAGE
LOCATION DETAILED: EPIGASTRIC SKIN
LOCATION DETAILED: PERIUMBILICAL SKIN
LOCATION DETAILED: LEFT PROXIMAL POSTERIOR UPPER ARM
LOCATION DETAILED: LEFT ANTERIOR DISTAL UPPER ARM
LOCATION DETAILED: RIGHT SUPERIOR UPPER BACK
LOCATION DETAILED: RIGHT DORSAL SHAFT OF PENIS
LOCATION DETAILED: RIGHT DORSAL SHAFT OF PENIS
LOCATION DETAILED: RIGHT INFERIOR UPPER BACK
LOCATION DETAILED: LEFT MID-UPPER BACK
LOCATION DETAILED: LEFT MEDIAL INFERIOR CHEST
LOCATION DETAILED: LEFT LATERAL INFERIOR CHEST
LOCATION DETAILED: RIGHT MEDIAL INFERIOR CHEST
LOCATION DETAILED: DORSAL CORONA OF GLANS
LOCATION DETAILED: LEFT RIB CAGE
LOCATION DETAILED: XIPHOID
LOCATION DETAILED: DORSAL CORONA OF GLANS

## 2023-03-23 ASSESSMENT — PAIN INTENSITY VAS: HOW INTENSE IS YOUR PAIN 0 BEING NO PAIN, 10 BEING THE MOST SEVERE PAIN POSSIBLE?: 1/10 PAIN

## 2023-03-23 ASSESSMENT — LOCATION ZONE DERM
LOCATION ZONE: TRUNK
LOCATION ZONE: TRUNK
LOCATION ZONE: PENIS
LOCATION ZONE: ARM
LOCATION ZONE: PENIS

## 2023-03-23 ASSESSMENT — SEVERITY ASSESSMENT: SEVERITY: MILD

## 2023-03-23 ASSESSMENT — BSA RASH: BSA RASH: 15

## 2023-03-23 NOTE — HPI: RASH (INTERTRIGO)
How Severe Is It?: mild
Is This A New Presentation, Or A Follow-Up?: Follow Up Intertrigo
Additional History: Patient states flared areas are doing better. He is mixing nystatin and Mometasone ointment together.

## 2023-03-23 NOTE — PROCEDURE: PRESCRIPTION MEDICATION MANAGEMENT
Render In Strict Bullet Format?: No
Plan: Patient completed Diflucan
Detail Level: Zone
Plan: Bath areas with tablespoon of vinegar to a cup of water \\nthree times a week
Continue Regimen: Mix Mometasone and nystatin together use once daily

## 2023-03-30 ENCOUNTER — TELEPHONE (OUTPATIENT)
Dept: CARDIOLOGY CLINIC | Age: 73
End: 2023-03-30

## 2023-04-26 ENCOUNTER — OFFICE VISIT (OUTPATIENT)
Dept: CARDIOLOGY CLINIC | Age: 73
End: 2023-04-26
Payer: MEDICARE

## 2023-04-26 VITALS
HEIGHT: 78 IN | SYSTOLIC BLOOD PRESSURE: 150 MMHG | BODY MASS INDEX: 35.75 KG/M2 | WEIGHT: 309 LBS | HEART RATE: 100 BPM | DIASTOLIC BLOOD PRESSURE: 70 MMHG

## 2023-04-26 DIAGNOSIS — E66.01 OBESITY, MORBID (HCC): ICD-10-CM

## 2023-04-26 DIAGNOSIS — E78.00 PURE HYPERCHOLESTEROLEMIA: ICD-10-CM

## 2023-04-26 DIAGNOSIS — I48.0 PAROXYSMAL ATRIAL FIBRILLATION (HCC): Primary | ICD-10-CM

## 2023-04-26 DIAGNOSIS — R80.9 TYPE 2 DIABETES MELLITUS WITH MICROALBUMINURIA, WITH LONG-TERM CURRENT USE OF INSULIN (HCC): ICD-10-CM

## 2023-04-26 DIAGNOSIS — Z79.4 TYPE 2 DIABETES MELLITUS WITH MICROALBUMINURIA, WITH LONG-TERM CURRENT USE OF INSULIN (HCC): ICD-10-CM

## 2023-04-26 DIAGNOSIS — E11.29 TYPE 2 DIABETES MELLITUS WITH MICROALBUMINURIA, WITH LONG-TERM CURRENT USE OF INSULIN (HCC): ICD-10-CM

## 2023-04-26 DIAGNOSIS — I10 ESSENTIAL HYPERTENSION: ICD-10-CM

## 2023-04-26 DIAGNOSIS — I87.8 VENOUS STASIS: ICD-10-CM

## 2023-04-26 DIAGNOSIS — I25.10 ATHEROSCLEROSIS OF NATIVE CORONARY ARTERY OF NATIVE HEART WITHOUT ANGINA PECTORIS: ICD-10-CM

## 2023-04-26 DIAGNOSIS — I87.332 CHRONIC VENOUS HYPERTENSION WITH ULCER AND INFLAMMATION INVOLVING LEFT SIDE (HCC): ICD-10-CM

## 2023-04-26 PROCEDURE — G8427 DOCREV CUR MEDS BY ELIG CLIN: HCPCS | Performed by: INTERNAL MEDICINE

## 2023-04-26 PROCEDURE — 3078F DIAST BP <80 MM HG: CPT | Performed by: INTERNAL MEDICINE

## 2023-04-26 PROCEDURE — 2022F DILAT RTA XM EVC RTNOPTHY: CPT | Performed by: INTERNAL MEDICINE

## 2023-04-26 PROCEDURE — 3046F HEMOGLOBIN A1C LEVEL >9.0%: CPT | Performed by: INTERNAL MEDICINE

## 2023-04-26 PROCEDURE — 1036F TOBACCO NON-USER: CPT | Performed by: INTERNAL MEDICINE

## 2023-04-26 PROCEDURE — 1123F ACP DISCUSS/DSCN MKR DOCD: CPT | Performed by: INTERNAL MEDICINE

## 2023-04-26 PROCEDURE — 3077F SYST BP >= 140 MM HG: CPT | Performed by: INTERNAL MEDICINE

## 2023-04-26 PROCEDURE — 3017F COLORECTAL CA SCREEN DOC REV: CPT | Performed by: INTERNAL MEDICINE

## 2023-04-26 PROCEDURE — G8417 CALC BMI ABV UP PARAM F/U: HCPCS | Performed by: INTERNAL MEDICINE

## 2023-04-26 PROCEDURE — 99214 OFFICE O/P EST MOD 30 MIN: CPT | Performed by: INTERNAL MEDICINE

## 2023-04-26 PROCEDURE — 93000 ELECTROCARDIOGRAM COMPLETE: CPT | Performed by: INTERNAL MEDICINE

## 2023-04-26 RX ORDER — METOPROLOL TARTRATE 50 MG/1
50 TABLET, FILM COATED ORAL 2 TIMES DAILY
Qty: 180 TABLET | Refills: 3 | Status: SHIPPED | OUTPATIENT
Start: 2023-04-26

## 2023-04-26 ASSESSMENT — ENCOUNTER SYMPTOMS
COUGH: 0
SHORTNESS OF BREATH: 0
ABDOMINAL PAIN: 0
BACK PAIN: 1

## 2023-04-26 NOTE — PROGRESS NOTES
[facility] CARDIOLOGY  [address]  [place] [address]      23      NAME:  Jimmie Olson  : 1950  MRN: [id number]      SUBJECTIVE:   Jimmie Olson is a 71 y.o. male seen for a follow up visit regarding the following:     Chief Complaint   Patient presents with    Coronary Artery Disease    Hypertension    Irregular Heart Beat       HPI:   Patient with chronic right rib pain and mild CAD by cath 2012. He has paroxysmal atrial fibrillation and stable on Eliquis and metoprolol. Overall he has done well. No CP and no recurrence of atrial fibrillation. Has moderate venous stasis this has improved. Past Medical History, Past Surgical History, Family history, Social History, and Medications were all reviewed with the patient today and updated as necessary.      Current Outpatient Medications   Medication Sig Dispense Refill    metoprolol tartrate (LOPRESSOR) 50 MG tablet Take 1 tablet by mouth 2 times daily 180 tablet 3    mometasone (ELOCON) 0.1 % ointment APPLY TO AFFECTED AREA AT BEDTIME      nystatin (MYCOSTATIN) 791459 UNIT/GM cream APPLY TWICE DAILY MIXED WITH MOMETASONE      acetaminophen (TYLENOL) 500 MG tablet Take 1-2 tablets by mouth every 6 hours as needed      albuterol sulfate  (90 Base) MCG/ACT inhaler Inhale 1 puff into the lungs every 4 hours as needed      albuterol (PROVENTIL) (2.5 MG/3ML) 0.083% nebulizer solution Inhale 3 mLs into the lungs once      apixaban (ELIQUIS) 5 MG TABS tablet Take 1 tablet by mouth 2 times daily      atorvastatin (LIPITOR) 20 MG tablet Take 1 tablet by mouth      celecoxib (CELEBREX) 200 MG capsule Take 1 capsule by mouth daily      Cholecalciferol 50 MCG (2000) TABS Take by mouth      colesevelam (WELCHOL) 625 MG tablet Take 1-6 tablets by mouth as needed      dapagliflozin (FARXIGA) 10 MG tablet Take 1 tablet by mouth daily      diphenhydrAMINE (BENADRYL) 50 MG capsule Take 2 capsules by mouth

## 2023-10-12 ENCOUNTER — TELEPHONE (OUTPATIENT)
Dept: ORTHOPEDIC SURGERY | Age: 73
End: 2023-10-12

## 2023-10-12 NOTE — TELEPHONE ENCOUNTER
Patient's wife [facility] called and wants an appointment today for the patient to get an injection in ankle. She states that he is in severe pain. She asks for a return call.

## 2023-10-13 ENCOUNTER — OFFICE VISIT (OUTPATIENT)
Dept: ORTHOPEDIC SURGERY | Age: 73
End: 2023-10-13

## 2023-10-13 DIAGNOSIS — E11.610 CHARCOT FOOT DUE TO DIABETES MELLITUS (HCC): ICD-10-CM

## 2023-10-13 DIAGNOSIS — M19.079 PRIMARY OSTEOARTHRITIS, UNSPECIFIED ANKLE AND FOOT: Primary | ICD-10-CM

## 2023-10-13 DIAGNOSIS — M19.079 ANKLE ARTHRITIS: ICD-10-CM

## 2023-10-13 DIAGNOSIS — E11.610 CHARCOT FOOT DUE TO DIABETES MELLITUS (HCC): Primary | ICD-10-CM

## 2023-10-13 DIAGNOSIS — M19.079 PRIMARY OSTEOARTHRITIS, UNSPECIFIED ANKLE AND FOOT: ICD-10-CM

## 2023-10-13 RX ORDER — METHYLPREDNISOLONE ACETATE 40 MG/ML
40 INJECTION, SUSPENSION INTRA-ARTICULAR; INTRALESIONAL; INTRAMUSCULAR; SOFT TISSUE ONCE
Status: SHIPPED | OUTPATIENT
Start: 2023-10-13

## 2023-10-13 NOTE — PROGRESS NOTES
Name: Shelby Frias  YOB: 1950  Gender: male  MRN: [id number]    Summary:   Right ankle arthritis with probable Charcot arthropathy       CC: Follow-up (Right ankle pain  x-rays taken in the office)       HPI: Shelby Frias is a 71 y.o. male who presents with Follow-up (Right ankle pain  x-rays taken in the office)  . This patient presents back to the office today with increasing complaints of pain in his right ankle. He has a double upright brace that is used and is somewhat been using a walker boot lately but the walker boot is really sore somewhat in the medial aspect of the ankle which is now resolving. He is really using a walker full-time now. Apparently has never been back to see Dr. Miriam Whitman in [place] nor gone to [facility] as we discussed last time regarding a potential fusion of the ankle. History was obtained by Patient and his wife    ROS/Meds/PSH/PMH/FH/SH: I personally reviewed the patients standard intake form. Below are the pertinents    Tobacco:  reports that he quit smoking about 11 years ago. His smoking use included cigarettes. He smoked an average of 1.5 packs per day. He has never used smokeless tobacco.  Diabetes: Diabetic - Insulin dependent      Physical Examination:  Exam of the right foot and ankle shows swelling and tenderness to palpation globally in the ankle with crepitus. I cannot palpate strong pulses. He has diminished sensation at his baseline secondary to neuropathy. Imaging:   I independently interpreted XR taken today  Right ankle XR: AP, Lateral, Oblique views     ICD-10-CM    1. Primary osteoarthritis, unspecified ankle and foot  M19.079 methylPREDNISolone acetate (DEPO-MEDROL) injection 40 mg     DRAIN/INJECT INTERMEDIATE JOINT/BURSA     XR ANKLE RIGHT (MIN 3 VIEWS)      2. Ankle arthritis  M19.079       3.  Charcot foot due to diabetes mellitus ([address])  E11.610          Report: AP, lateral, oblique x-ray of the right ankle

## 2023-11-02 ENCOUNTER — OFFICE VISIT (OUTPATIENT)
Age: 73
End: 2023-11-02
Payer: MEDICARE

## 2023-11-02 VITALS
WEIGHT: 306 LBS | HEIGHT: 78 IN | BODY MASS INDEX: 35.4 KG/M2 | SYSTOLIC BLOOD PRESSURE: 112 MMHG | DIASTOLIC BLOOD PRESSURE: 62 MMHG | HEART RATE: 72 BPM

## 2023-11-02 DIAGNOSIS — I87.8 VENOUS STASIS: ICD-10-CM

## 2023-11-02 DIAGNOSIS — E66.01 OBESITY, MORBID (HCC): ICD-10-CM

## 2023-11-02 DIAGNOSIS — R80.9 TYPE 2 DIABETES MELLITUS WITH MICROALBUMINURIA, WITH LONG-TERM CURRENT USE OF INSULIN (HCC): ICD-10-CM

## 2023-11-02 DIAGNOSIS — I48.0 PAROXYSMAL ATRIAL FIBRILLATION (HCC): Primary | ICD-10-CM

## 2023-11-02 DIAGNOSIS — E78.00 PURE HYPERCHOLESTEROLEMIA: ICD-10-CM

## 2023-11-02 DIAGNOSIS — I10 ESSENTIAL HYPERTENSION: ICD-10-CM

## 2023-11-02 DIAGNOSIS — G47.33 OSA (OBSTRUCTIVE SLEEP APNEA): ICD-10-CM

## 2023-11-02 DIAGNOSIS — E11.29 TYPE 2 DIABETES MELLITUS WITH MICROALBUMINURIA, WITH LONG-TERM CURRENT USE OF INSULIN (HCC): ICD-10-CM

## 2023-11-02 DIAGNOSIS — Z79.4 TYPE 2 DIABETES MELLITUS WITH MICROALBUMINURIA, WITH LONG-TERM CURRENT USE OF INSULIN (HCC): ICD-10-CM

## 2023-11-02 DIAGNOSIS — I25.10 ATHEROSCLEROSIS OF NATIVE CORONARY ARTERY OF NATIVE HEART WITHOUT ANGINA PECTORIS: ICD-10-CM

## 2023-11-02 PROBLEM — I87.332 CHRONIC VENOUS HYPERTENSION WITH ULCER AND INFLAMMATION INVOLVING LEFT SIDE (HCC): Status: RESOLVED | Noted: 2019-03-31 | Resolved: 2023-11-02

## 2023-11-02 PROCEDURE — 3078F DIAST BP <80 MM HG: CPT | Performed by: INTERNAL MEDICINE

## 2023-11-02 PROCEDURE — G8417 CALC BMI ABV UP PARAM F/U: HCPCS | Performed by: INTERNAL MEDICINE

## 2023-11-02 PROCEDURE — 99214 OFFICE O/P EST MOD 30 MIN: CPT | Performed by: INTERNAL MEDICINE

## 2023-11-02 PROCEDURE — 3074F SYST BP LT 130 MM HG: CPT | Performed by: INTERNAL MEDICINE

## 2023-11-02 PROCEDURE — G8484 FLU IMMUNIZE NO ADMIN: HCPCS | Performed by: INTERNAL MEDICINE

## 2023-11-02 PROCEDURE — 3017F COLORECTAL CA SCREEN DOC REV: CPT | Performed by: INTERNAL MEDICINE

## 2023-11-02 PROCEDURE — 1123F ACP DISCUSS/DSCN MKR DOCD: CPT | Performed by: INTERNAL MEDICINE

## 2023-11-02 PROCEDURE — 2022F DILAT RTA XM EVC RTNOPTHY: CPT | Performed by: INTERNAL MEDICINE

## 2023-11-02 PROCEDURE — 3046F HEMOGLOBIN A1C LEVEL >9.0%: CPT | Performed by: INTERNAL MEDICINE

## 2023-11-02 PROCEDURE — G8427 DOCREV CUR MEDS BY ELIG CLIN: HCPCS | Performed by: INTERNAL MEDICINE

## 2023-11-02 PROCEDURE — 1036F TOBACCO NON-USER: CPT | Performed by: INTERNAL MEDICINE

## 2023-11-02 RX ORDER — DULAGLUTIDE 3 MG/.5ML
3 INJECTION, SOLUTION SUBCUTANEOUS WEEKLY
COMMUNITY
Start: 2022-02-24

## 2023-11-02 ASSESSMENT — ENCOUNTER SYMPTOMS
ABDOMINAL PAIN: 0
BACK PAIN: 1
COUGH: 0
SHORTNESS OF BREATH: 0

## 2023-11-02 NOTE — PROGRESS NOTES
Constitutional: Negative for fever and malaise/fatigue. HENT:  Negative for nosebleeds. Cardiovascular:  Negative for chest pain, dyspnea on exertion and palpitations. Respiratory:  Negative for cough and shortness of breath. Musculoskeletal:  Positive for back pain and muscle weakness. Negative for muscle cramps and myalgias. Gastrointestinal:  Negative for abdominal pain. Neurological:  Negative for dizziness. Physical Exam  Blood pressure 112/62, pulse 72, height 1.981 m (6' 6\"), weight (!) 138.8 kg (306 lb). Physical Exam  HENT:      Head: Normocephalic and atraumatic. Eyes:      Extraocular Movements: Extraocular movements intact. Cardiovascular:      Rate and Rhythm: Normal rate and regular rhythm. Heart sounds: No murmur heard. Pulmonary:      Effort: Pulmonary effort is normal.      Breath sounds: Normal breath sounds. Abdominal:      Palpations: Abdomen is soft. Musculoskeletal:         General: Normal range of motion. Skin:     General: Skin is warm and dry. Neurological:      General: No focal deficit present. Mental Status: He is oriented to person, place, and time. Medical problems, medical history and test results were reviewed with the patient today.       Lab Results   Component Value Date    CHOL 121 04/12/2021    CHOL 112 03/11/2020    CHOL 103 05/28/2019     Lab Results   Component Value Date    TRIG 342 (H) 04/12/2021    TRIG 275 (H) 03/11/2020    TRIG 135 05/28/2019     Lab Results   Component Value Date    HDL 31 (L) 04/12/2021    HDL 32 (L) 03/11/2020    HDL 36 (L) 05/28/2019     Lab Results   Component Value Date    LDLCALC 39 04/12/2021    LDLCALC 25 03/11/2020    LDLCALC 40 05/28/2019     Lab Results   Component Value Date    LABVLDL 55 (H) 03/11/2020    LABVLDL 27 05/28/2019    VLDL 51 (H) 04/12/2021     No results found for: \"CHOLHDLRATIO\"     Lab Results   Component Value Date    LABA1C 8.6 (H) 07/13/2021     Lab Results   Component

## 2023-12-01 ENCOUNTER — TELEPHONE (OUTPATIENT)
Dept: ORTHOPEDIC SURGERY | Age: 73
End: 2023-12-01

## 2023-12-01 DIAGNOSIS — M19.079 ANKLE ARTHRITIS: ICD-10-CM

## 2023-12-01 DIAGNOSIS — M19.079 PRIMARY OSTEOARTHRITIS, UNSPECIFIED ANKLE AND FOOT: Primary | ICD-10-CM

## 2023-12-01 DIAGNOSIS — E11.610 CHARCOT FOOT DUE TO DIABETES MELLITUS (HCC): ICD-10-CM

## 2023-12-01 NOTE — TELEPHONE ENCOUNTER
He needs another inj of steroids. He was up most of the night crying in pain. They almost went to the ER. He wants an inj in his ankle today. He also has never heard from pain mgmt about an appt. She would like someone to call her. He even took ibuprofen during the night and he is not supposed to take that.

## 2023-12-01 NOTE — TELEPHONE ENCOUNTER
Spoke to Mrs. Kianna Perales and informed her I faxed Mr. Claudell Holding records to [facility]. I also gave her the telephone number and instructed her to call on Monday morning to see how quickly they can get an appointment. Mrs. Kianna Perales would also like me to ask Dr. Chen Perea if he can have another injection. I told her I would call her on Monday.

## 2023-12-04 ENCOUNTER — APPOINTMENT (RX ONLY)
Dept: URBAN - METROPOLITAN AREA CLINIC 329 | Facility: CLINIC | Age: 73
Setting detail: DERMATOLOGY
End: 2023-12-04

## 2023-12-04 DIAGNOSIS — D18.0 HEMANGIOMA: ICD-10-CM

## 2023-12-04 DIAGNOSIS — L81.4 OTHER MELANIN HYPERPIGMENTATION: ICD-10-CM

## 2023-12-04 DIAGNOSIS — D22 MELANOCYTIC NEVI: ICD-10-CM

## 2023-12-04 DIAGNOSIS — Z71.89 OTHER SPECIFIED COUNSELING: ICD-10-CM

## 2023-12-04 DIAGNOSIS — Z85.828 PERSONAL HISTORY OF OTHER MALIGNANT NEOPLASM OF SKIN: ICD-10-CM

## 2023-12-04 DIAGNOSIS — L30.4 ERYTHEMA INTERTRIGO: ICD-10-CM

## 2023-12-04 DIAGNOSIS — Z12.83 ENCOUNTER FOR SCREENING FOR MALIGNANT NEOPLASM OF SKIN: ICD-10-CM

## 2023-12-04 DIAGNOSIS — L82.1 OTHER SEBORRHEIC KERATOSIS: ICD-10-CM

## 2023-12-04 DIAGNOSIS — L57.8 OTHER SKIN CHANGES DUE TO CHRONIC EXPOSURE TO NONIONIZING RADIATION: ICD-10-CM | Status: STABLE

## 2023-12-04 PROBLEM — D22.5 MELANOCYTIC NEVI OF TRUNK: Status: ACTIVE | Noted: 2023-12-04

## 2023-12-04 PROBLEM — D22.61 MELANOCYTIC NEVI OF RIGHT UPPER LIMB, INCLUDING SHOULDER: Status: ACTIVE | Noted: 2023-12-04

## 2023-12-04 PROBLEM — D22.71 MELANOCYTIC NEVI OF RIGHT LOWER LIMB, INCLUDING HIP: Status: ACTIVE | Noted: 2023-12-04

## 2023-12-04 PROBLEM — D18.01 HEMANGIOMA OF SKIN AND SUBCUTANEOUS TISSUE: Status: ACTIVE | Noted: 2023-12-04

## 2023-12-04 PROCEDURE — ? COUNSELING

## 2023-12-04 PROCEDURE — ? FULL BODY SKIN EXAM

## 2023-12-04 PROCEDURE — ? TREATMENT REGIMEN

## 2023-12-04 PROCEDURE — ? SUNSCREEN RECOMMENDATIONS

## 2023-12-04 PROCEDURE — ? ADDITIONAL NOTES

## 2023-12-04 PROCEDURE — ? DERMATOSCOPIC EVALUATION

## 2023-12-04 PROCEDURE — 99214 OFFICE O/P EST MOD 30 MIN: CPT

## 2023-12-04 PROCEDURE — ? PRESCRIPTION MEDICATION MANAGEMENT

## 2023-12-04 PROCEDURE — ? PRESCRIPTION

## 2023-12-04 RX ORDER — AMMONIUM LACTATE 12 G/100G
LOTION TOPICAL
Qty: 1 | Refills: 12 | Status: ERX | COMMUNITY
Start: 2023-12-04

## 2023-12-04 RX ADMIN — AMMONIUM LACTATE: 12 LOTION TOPICAL at 00:00

## 2023-12-04 ASSESSMENT — LOCATION DETAILED DESCRIPTION DERM
LOCATION DETAILED: MIDDLE STERNUM
LOCATION DETAILED: RIGHT DISTAL POSTERIOR THIGH
LOCATION DETAILED: RIGHT PROXIMAL DORSAL FOREARM
LOCATION DETAILED: XIPHOID
LOCATION DETAILED: RIGHT RIB CAGE
LOCATION DETAILED: RIGHT INFERIOR MEDIAL UPPER BACK
LOCATION DETAILED: RIGHT ANTERIOR PROXIMAL UPPER ARM
LOCATION DETAILED: EPIGASTRIC SKIN
LOCATION DETAILED: RIGHT SUPERIOR MEDIAL UPPER BACK
LOCATION DETAILED: LEFT LATERAL INFERIOR CHEST
LOCATION DETAILED: RIGHT DISTAL PRETIBIAL REGION
LOCATION DETAILED: 2ND WEBSPACE LEFT FOOT
LOCATION DETAILED: LEFT RIB CAGE
LOCATION DETAILED: RIGHT PROXIMAL POSTERIOR UPPER ARM
LOCATION DETAILED: LEFT INFERIOR ANTERIOR NECK
LOCATION DETAILED: LEFT MEDIAL UPPER BACK
LOCATION DETAILED: LEFT DISTAL PRETIBIAL REGION
LOCATION DETAILED: RIGHT ANTERIOR DISTAL THIGH
LOCATION DETAILED: RIGHT POSTERIOR ANKLE

## 2023-12-04 ASSESSMENT — LOCATION SIMPLE DESCRIPTION DERM
LOCATION SIMPLE: LEFT FOOT
LOCATION SIMPLE: RIGHT POSTERIOR THIGH
LOCATION SIMPLE: RIGHT POSTERIOR UPPER ARM
LOCATION SIMPLE: ABDOMEN
LOCATION SIMPLE: CHEST
LOCATION SIMPLE: RIGHT UPPER BACK
LOCATION SIMPLE: LEFT UPPER BACK
LOCATION SIMPLE: RIGHT THIGH
LOCATION SIMPLE: ABDOMEN
LOCATION SIMPLE: RIGHT UPPER ARM
LOCATION SIMPLE: LEFT ANTERIOR NECK
LOCATION SIMPLE: RIGHT PRETIBIAL REGION
LOCATION SIMPLE: LEFT PRETIBIAL REGION
LOCATION SIMPLE: RIGHT ANKLE
LOCATION SIMPLE: RIGHT FOREARM

## 2023-12-04 ASSESSMENT — LOCATION ZONE DERM
LOCATION ZONE: TRUNK
LOCATION ZONE: ARM
LOCATION ZONE: NECK
LOCATION ZONE: FEET
LOCATION ZONE: TRUNK
LOCATION ZONE: LEG

## 2023-12-04 NOTE — HPI: SKIN LESION
What Type Of Note Output Would You Prefer (Optional)?: Bullet Format
How Severe Is Your Skin Lesion?: mild
Has Your Skin Lesion Been Treated?: not been treated
Is This A New Presentation, Or A Follow-Up?: Skin Lesion
Additional History: Patient denies anything new or changing to his knowledge

## 2023-12-04 NOTE — PROCEDURE: PRESCRIPTION MEDICATION MANAGEMENT
Render In Strict Bullet Format?: No
Initiate Treatment: ammonium lactate 12 % lotion \\nApply to the affected areas on feet
Detail Level: Zone

## 2023-12-11 ENCOUNTER — OFFICE VISIT (OUTPATIENT)
Dept: ORTHOPEDIC SURGERY | Age: 73
End: 2023-12-11

## 2023-12-11 DIAGNOSIS — M14.671 CHARCOT ANKLE, RIGHT: ICD-10-CM

## 2023-12-11 DIAGNOSIS — M19.079 ANKLE ARTHRITIS: ICD-10-CM

## 2023-12-11 DIAGNOSIS — M84.371A STRESS FRACTURE, RIGHT ANKLE, INITIAL ENCOUNTER FOR FRACTURE: ICD-10-CM

## 2023-12-11 DIAGNOSIS — M19.079 PRIMARY OSTEOARTHRITIS, UNSPECIFIED ANKLE AND FOOT: ICD-10-CM

## 2023-12-11 DIAGNOSIS — M19.079 ANKLE ARTHRITIS: Primary | ICD-10-CM

## 2023-12-11 DIAGNOSIS — E11.610 CHARCOT FOOT DUE TO DIABETES MELLITUS (HCC): Primary | ICD-10-CM

## 2023-12-11 NOTE — PROGRESS NOTES
(MIN 3 VIEWS)      2. Charcot ankle, right  M14.675          Report: AP, lateral, oblique x-ray of the right ankle demonstrates Charcot hindfoot arthropathy with almost complete distraction of the talus    Impression: Charcot hindfoot arthropathy with almost complete distraction of the talus   Yenny Haddad III, MD           Assessment:   Right unstable Charcot at hide Charcot hindfoot arthropathy    Treatment Plan:   4 This is a chronic illness/condition with exacerbation and progression  Treatment at this time: Minor Procedure: Injection done today: The patient understands the risks and complications associated with injection. After sterile prep of the area, the Right ankle joint was injected with 3 cc. of Xylocaine and 3 cc. of steroid. . 40mg Depo Medrol was the steroid used. Patient tolerated it well. I discussed the risk of infection and skin blanching. I told the be patient be careful about the symptoms of hyperglycemia such as GI distress, polyuria, excessive thirst and lethargy. If these symptoms occur they should present to an primary care doctor or urgent facility  Studies ordered: NO XR needed @ Next Visit    Weight-bearing status: WBAT        Return to work/work restrictions: none  No medications given    We have had a long discussion today about his findings. We have spent about 30 minutes together in the office discussing this. In my opinion at this point his ankle is not reconstructable and he continues to have persistent neuritic pain. He is having falls even despite maximal double upright bracing and maximal adjustments. Based on this fact I think he would best be best served with a below-knee amputation. We discussed the process for this up to 3 months nonweightbearing to allow the wound to heal.  We also discussed the significant mortality rate in this patient population mobility amputation secondary to his medical status.   They have again asked me what to do in terms of making a decision

## 2023-12-15 DIAGNOSIS — M19.079 ANKLE ARTHRITIS: Primary | ICD-10-CM

## 2023-12-15 DIAGNOSIS — M14.671 CHARCOT ANKLE, RIGHT: ICD-10-CM

## 2023-12-15 DIAGNOSIS — M84.371A STRESS FRACTURE, RIGHT ANKLE, INITIAL ENCOUNTER FOR FRACTURE: ICD-10-CM

## 2023-12-15 DIAGNOSIS — E11.610 CHARCOT FOOT DUE TO DIABETES MELLITUS (HCC): ICD-10-CM

## 2023-12-15 DIAGNOSIS — M19.079 PRIMARY OSTEOARTHRITIS, UNSPECIFIED ANKLE AND FOOT: ICD-10-CM

## 2023-12-22 ENCOUNTER — HOSPITAL ENCOUNTER (INPATIENT)
Age: 73
LOS: 11 days | Discharge: NURSING, REHAB OR LONG-TERM CARE | DRG: 549 | End: 2024-01-03
Attending: EMERGENCY MEDICINE | Admitting: FAMILY MEDICINE
Payer: MEDICARE

## 2023-12-22 ENCOUNTER — APPOINTMENT (OUTPATIENT)
Dept: GENERAL RADIOLOGY | Age: 73
DRG: 549 | End: 2023-12-22
Payer: MEDICARE

## 2023-12-22 DIAGNOSIS — R09.02 HYPOXIA: Primary | ICD-10-CM

## 2023-12-22 DIAGNOSIS — M00.9 SEPTIC ARTHRITIS, DUE TO UNSPECIFIED ORGANISM, SEPTIC ARTHRITIS OF UNSPECIFIED LOCATION (HCC): ICD-10-CM

## 2023-12-22 DIAGNOSIS — M25.572 ACUTE LEFT ANKLE PAIN: ICD-10-CM

## 2023-12-22 DIAGNOSIS — D72.829 LEUKOCYTOSIS, UNSPECIFIED TYPE: ICD-10-CM

## 2023-12-22 PROBLEM — M14.671 CHARCOT ANKLE, RIGHT: Status: ACTIVE | Noted: 2023-12-22

## 2023-12-22 PROBLEM — R53.1 GENERALIZED WEAKNESS: Status: ACTIVE | Noted: 2023-12-22
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
## 2023-12-22 LAB
ALBUMIN SERPL-MCNC: 2.9 G/DL (ref 3.2–4.6)
ALBUMIN/GLOB SERPL: 0.6 (ref 0.4–1.6)
ALP SERPL-CCNC: 109 U/L (ref 50–136)
ALT SERPL-CCNC: 8 U/L (ref 12–65)
ANION GAP SERPL CALC-SCNC: 6 MMOL/L (ref 2–11)
APPEARANCE UR: CLEAR
AST SERPL-CCNC: 7 U/L (ref 15–37)
BACTERIA URNS QL MICRO: NEGATIVE /HPF
BASOPHILS # BLD: 0 K/UL (ref 0–0.2)
BASOPHILS NFR BLD: 0 % (ref 0–2)
BILIRUB SERPL-MCNC: 0.6 MG/DL (ref 0.2–1.1)
BILIRUB UR QL: NEGATIVE
BUN SERPL-MCNC: 20 MG/DL (ref 8–23)
CALCIUM SERPL-MCNC: 9.4 MG/DL (ref 8.3–10.4)
CASTS URNS QL MICRO: ABNORMAL /LPF
CHLORIDE SERPL-SCNC: 109 MMOL/L (ref 103–113)
CO2 SERPL-SCNC: 25 MMOL/L (ref 21–32)
COLOR UR: ABNORMAL
CREAT SERPL-MCNC: 0.91 MG/DL (ref 0.8–1.5)
CRP SERPL-MCNC: 16.5 MG/DL (ref 0–0.9)
DIFFERENTIAL METHOD BLD: ABNORMAL
EOSINOPHIL # BLD: 0.1 K/UL (ref 0–0.8)
EOSINOPHIL NFR BLD: 0 % (ref 0.5–7.8)
EPI CELLS #/AREA URNS HPF: ABNORMAL /HPF
ERYTHROCYTE [DISTWIDTH] IN BLOOD BY AUTOMATED COUNT: 17.8 % (ref 11.9–14.6)
FLUAV RNA SPEC QL NAA+PROBE: NOT DETECTED
FLUBV RNA SPEC QL NAA+PROBE: NOT DETECTED
GLOBULIN SER CALC-MCNC: 4.8 G/DL (ref 2.8–4.5)
GLUCOSE BLD STRIP.AUTO-MCNC: 230 MG/DL (ref 65–100)
GLUCOSE BLD STRIP.AUTO-MCNC: 237 MG/DL (ref 65–100)
GLUCOSE BLD STRIP.AUTO-MCNC: 303 MG/DL (ref 65–100)
GLUCOSE BLD STRIP.AUTO-MCNC: 370 MG/DL (ref 65–100)
GLUCOSE SERPL-MCNC: 238 MG/DL (ref 65–100)
GLUCOSE UR STRIP.AUTO-MCNC: >1000 MG/DL
HCT VFR BLD AUTO: 39.3 % (ref 41.1–50.3)
HGB BLD-MCNC: 12.1 G/DL (ref 13.6–17.2)
HGB UR QL STRIP: ABNORMAL
IMM GRANULOCYTES # BLD AUTO: 0.2 K/UL (ref 0–0.5)
IMM GRANULOCYTES NFR BLD AUTO: 1 % (ref 0–5)
KETONES UR QL STRIP.AUTO: ABNORMAL MG/DL
LACTATE SERPL-SCNC: 1.2 MMOL/L (ref 0.4–2)
LEUKOCYTE ESTERASE UR QL STRIP.AUTO: NEGATIVE
LYMPHOCYTES # BLD: 1.3 K/UL (ref 0.5–4.6)
LYMPHOCYTES NFR BLD: 9 % (ref 13–44)
MCH RBC QN AUTO: 25.6 PG (ref 26.1–32.9)
MCHC RBC AUTO-ENTMCNC: 30.8 G/DL (ref 31.4–35)
MCV RBC AUTO: 83.1 FL (ref 82–102)
MONOCYTES # BLD: 1.3 K/UL (ref 0.1–1.3)
MONOCYTES NFR BLD: 9 % (ref 4–12)
NEUTS SEG # BLD: 11.9 K/UL (ref 1.7–8.2)
NEUTS SEG NFR BLD: 80 % (ref 43–78)
NITRITE UR QL STRIP.AUTO: NEGATIVE
NRBC # BLD: 0 K/UL (ref 0–0.2)
PH UR STRIP: 5.5 (ref 5–9)
PLATELET # BLD AUTO: 267 K/UL (ref 150–450)
PMV BLD AUTO: 9.4 FL (ref 9.4–12.3)
POTASSIUM SERPL-SCNC: 4.1 MMOL/L (ref 3.5–5.1)
PROCALCITONIN SERPL-MCNC: 0.13 NG/ML (ref 0–0.49)
PROT SERPL-MCNC: 7.7 G/DL (ref 6.3–8.2)
PROT UR STRIP-MCNC: 100 MG/DL
RBC # BLD AUTO: 4.73 M/UL (ref 4.23–5.6)
RBC #/AREA URNS HPF: ABNORMAL /HPF
RSV RNA NPH QL NAA+PROBE: NOT DETECTED
SARS-COV-2 RDRP RESP QL NAA+PROBE: NOT DETECTED
SERVICE CMNT-IMP: ABNORMAL
SODIUM SERPL-SCNC: 140 MMOL/L (ref 136–146)
SOURCE: NORMAL
SP GR UR REFRACTOMETRY: >1.035 (ref 1–1.02)
URATE SERPL-MCNC: 5.8 MG/DL (ref 2.6–6)
UROBILINOGEN UR QL STRIP.AUTO: 0.2 EU/DL (ref 0.2–1)
WBC # BLD AUTO: 14.8 K/UL (ref 4.3–11.1)
WBC URNS QL MICRO: ABNORMAL /HPF

## 2023-12-22 PROCEDURE — 87040 BLOOD CULTURE FOR BACTERIA: CPT

## 2023-12-22 PROCEDURE — 80053 COMPREHEN METABOLIC PANEL: CPT

## 2023-12-22 PROCEDURE — 2500000003 HC RX 250 WO HCPCS: Performed by: NURSE PRACTITIONER

## 2023-12-22 PROCEDURE — 6370000000 HC RX 637 (ALT 250 FOR IP): Performed by: NURSE PRACTITIONER

## 2023-12-22 PROCEDURE — 96366 THER/PROPH/DIAG IV INF ADDON: CPT

## 2023-12-22 PROCEDURE — 94760 N-INVAS EAR/PLS OXIMETRY 1: CPT

## 2023-12-22 PROCEDURE — 99285 EMERGENCY DEPT VISIT HI MDM: CPT

## 2023-12-22 PROCEDURE — 96375 TX/PRO/DX INJ NEW DRUG ADDON: CPT

## 2023-12-22 PROCEDURE — G0378 HOSPITAL OBSERVATION PER HR: HCPCS

## 2023-12-22 PROCEDURE — 86140 C-REACTIVE PROTEIN: CPT

## 2023-12-22 PROCEDURE — 2580000003 HC RX 258: Performed by: NURSE PRACTITIONER

## 2023-12-22 PROCEDURE — 73610 X-RAY EXAM OF ANKLE: CPT

## 2023-12-22 PROCEDURE — 81001 URINALYSIS AUTO W/SCOPE: CPT

## 2023-12-22 PROCEDURE — 85025 COMPLETE CBC W/AUTO DIFF WBC: CPT

## 2023-12-22 PROCEDURE — 84145 PROCALCITONIN (PCT): CPT

## 2023-12-22 PROCEDURE — 97161 PT EVAL LOW COMPLEX 20 MIN: CPT

## 2023-12-22 PROCEDURE — 94640 AIRWAY INHALATION TREATMENT: CPT

## 2023-12-22 PROCEDURE — 96367 TX/PROPH/DG ADDL SEQ IV INF: CPT

## 2023-12-22 PROCEDURE — 83605 ASSAY OF LACTIC ACID: CPT

## 2023-12-22 PROCEDURE — 97165 OT EVAL LOW COMPLEX 30 MIN: CPT

## 2023-12-22 PROCEDURE — 6360000002 HC RX W HCPCS: Performed by: EMERGENCY MEDICINE

## 2023-12-22 PROCEDURE — 97530 THERAPEUTIC ACTIVITIES: CPT

## 2023-12-22 PROCEDURE — 6360000002 HC RX W HCPCS: Performed by: NURSE PRACTITIONER

## 2023-12-22 PROCEDURE — 87634 RSV DNA/RNA AMP PROBE: CPT

## 2023-12-22 PROCEDURE — 82962 GLUCOSE BLOOD TEST: CPT

## 2023-12-22 PROCEDURE — 87635 SARS-COV-2 COVID-19 AMP PRB: CPT

## 2023-12-22 PROCEDURE — 2580000003 HC RX 258: Performed by: EMERGENCY MEDICINE

## 2023-12-22 PROCEDURE — 84550 ASSAY OF BLOOD/URIC ACID: CPT

## 2023-12-22 PROCEDURE — 87502 INFLUENZA DNA AMP PROBE: CPT

## 2023-12-22 PROCEDURE — 96365 THER/PROPH/DIAG IV INF INIT: CPT

## 2023-12-22 PROCEDURE — 71045 X-RAY EXAM CHEST 1 VIEW: CPT

## 2023-12-22 RX ORDER — OXYCODONE HYDROCHLORIDE 5 MG/1
5 TABLET ORAL EVERY 6 HOURS PRN
Status: DISPENSED | OUTPATIENT
Start: 2023-12-22 | End: 2023-12-25

## 2023-12-22 RX ORDER — INSULIN GLARGINE 100 [IU]/ML
60 INJECTION, SOLUTION SUBCUTANEOUS NIGHTLY
Status: DISCONTINUED | OUTPATIENT
Start: 2023-12-22 | End: 2023-12-22

## 2023-12-22 RX ORDER — MAGNESIUM SULFATE IN WATER 40 MG/ML
2000 INJECTION, SOLUTION INTRAVENOUS PRN
Status: DISCONTINUED | OUTPATIENT
Start: 2023-12-22 | End: 2023-12-30 | Stop reason: HOSPADM

## 2023-12-22 RX ORDER — METOPROLOL TARTRATE 50 MG/1
50 TABLET, FILM COATED ORAL 2 TIMES DAILY
Status: DISCONTINUED | OUTPATIENT
Start: 2023-12-22 | End: 2023-12-30 | Stop reason: HOSPADM

## 2023-12-22 RX ORDER — KETOROLAC TROMETHAMINE 15 MG/ML
15 INJECTION, SOLUTION INTRAMUSCULAR; INTRAVENOUS EVERY 6 HOURS
Status: COMPLETED | OUTPATIENT
Start: 2023-12-22 | End: 2023-12-24

## 2023-12-22 RX ORDER — ATORVASTATIN CALCIUM 40 MG/1
20 TABLET, FILM COATED ORAL NIGHTLY
Status: DISCONTINUED | OUTPATIENT
Start: 2023-12-22 | End: 2023-12-30 | Stop reason: HOSPADM

## 2023-12-22 RX ORDER — 0.9 % SODIUM CHLORIDE 0.9 %
1000 INTRAVENOUS SOLUTION INTRAVENOUS
Status: COMPLETED | OUTPATIENT
Start: 2023-12-22 | End: 2023-12-22

## 2023-12-22 RX ORDER — INSULIN LISPRO 100 [IU]/ML
0-4 INJECTION, SOLUTION INTRAVENOUS; SUBCUTANEOUS
Status: DISCONTINUED | OUTPATIENT
Start: 2023-12-22 | End: 2023-12-28

## 2023-12-22 RX ORDER — POLYETHYLENE GLYCOL 3350 17 G/17G
17 POWDER, FOR SOLUTION ORAL DAILY PRN
Status: DISCONTINUED | OUTPATIENT
Start: 2023-12-22 | End: 2023-12-30 | Stop reason: HOSPADM

## 2023-12-22 RX ORDER — GUAIFENESIN 600 MG/1
600 TABLET, EXTENDED RELEASE ORAL 2 TIMES DAILY
Status: DISCONTINUED | OUTPATIENT
Start: 2023-12-22 | End: 2023-12-30 | Stop reason: HOSPADM

## 2023-12-22 RX ORDER — DOXAZOSIN MESYLATE 1 MG/1
2 TABLET ORAL DAILY
Status: DISCONTINUED | OUTPATIENT
Start: 2023-12-22 | End: 2023-12-30 | Stop reason: HOSPADM

## 2023-12-22 RX ORDER — ALBUTEROL SULFATE 2.5 MG/3ML
2.5 SOLUTION RESPIRATORY (INHALATION) EVERY 6 HOURS PRN
Status: DISCONTINUED | OUTPATIENT
Start: 2023-12-22 | End: 2023-12-30 | Stop reason: HOSPADM

## 2023-12-22 RX ORDER — LISINOPRIL 5 MG/1
10 TABLET ORAL DAILY
Status: DISCONTINUED | OUTPATIENT
Start: 2023-12-22 | End: 2023-12-30 | Stop reason: HOSPADM

## 2023-12-22 RX ORDER — DEXAMETHASONE SODIUM PHOSPHATE 10 MG/ML
4 INJECTION INTRAMUSCULAR; INTRAVENOUS EVERY 8 HOURS
Status: DISCONTINUED | OUTPATIENT
Start: 2023-12-22 | End: 2023-12-23

## 2023-12-22 RX ORDER — SODIUM CHLORIDE, SODIUM LACTATE, POTASSIUM CHLORIDE, CALCIUM CHLORIDE 600; 310; 30; 20 MG/100ML; MG/100ML; MG/100ML; MG/100ML
INJECTION, SOLUTION INTRAVENOUS CONTINUOUS
Status: DISCONTINUED | OUTPATIENT
Start: 2023-12-22 | End: 2023-12-23

## 2023-12-22 RX ORDER — ACETAMINOPHEN 650 MG/1
650 SUPPOSITORY RECTAL EVERY 6 HOURS PRN
Status: DISCONTINUED | OUTPATIENT
Start: 2023-12-22 | End: 2023-12-30 | Stop reason: HOSPADM

## 2023-12-22 RX ORDER — GABAPENTIN 300 MG/1
300 CAPSULE ORAL 2 TIMES DAILY
Status: DISCONTINUED | OUTPATIENT
Start: 2023-12-22 | End: 2023-12-30 | Stop reason: HOSPADM

## 2023-12-22 RX ORDER — ONDANSETRON 4 MG/1
4 TABLET, ORALLY DISINTEGRATING ORAL EVERY 8 HOURS PRN
Status: DISCONTINUED | OUTPATIENT
Start: 2023-12-22 | End: 2023-12-30 | Stop reason: HOSPADM

## 2023-12-22 RX ORDER — FAMOTIDINE 20 MG/1
10 TABLET, FILM COATED ORAL DAILY PRN
Status: DISCONTINUED | OUTPATIENT
Start: 2023-12-22 | End: 2023-12-30 | Stop reason: HOSPADM

## 2023-12-22 RX ORDER — MAGNESIUM HYDROXIDE/ALUMINUM HYDROXICE/SIMETHICONE 120; 1200; 1200 MG/30ML; MG/30ML; MG/30ML
30 SUSPENSION ORAL EVERY 6 HOURS PRN
Status: DISCONTINUED | OUTPATIENT
Start: 2023-12-22 | End: 2023-12-30 | Stop reason: HOSPADM

## 2023-12-22 RX ORDER — INSULIN GLARGINE 100 [IU]/ML
30 INJECTION, SOLUTION SUBCUTANEOUS 2 TIMES DAILY
Status: DISCONTINUED | OUTPATIENT
Start: 2023-12-22 | End: 2023-12-27

## 2023-12-22 RX ORDER — ALBUTEROL SULFATE 90 UG/1
1 AEROSOL, METERED RESPIRATORY (INHALATION) EVERY 4 HOURS PRN
Status: DISCONTINUED | OUTPATIENT
Start: 2023-12-22 | End: 2023-12-30 | Stop reason: HOSPADM

## 2023-12-22 RX ORDER — DOXAZOSIN 2 MG/1
2 TABLET ORAL NIGHTLY
COMMUNITY

## 2023-12-22 RX ORDER — ONDANSETRON 2 MG/ML
4 INJECTION INTRAMUSCULAR; INTRAVENOUS EVERY 6 HOURS PRN
Status: DISCONTINUED | OUTPATIENT
Start: 2023-12-22 | End: 2023-12-30 | Stop reason: HOSPADM

## 2023-12-22 RX ORDER — IBUPROFEN 600 MG/1
1 TABLET ORAL PRN
Status: DISCONTINUED | OUTPATIENT
Start: 2023-12-22 | End: 2023-12-30 | Stop reason: HOSPADM

## 2023-12-22 RX ORDER — POTASSIUM CHLORIDE 20 MEQ/1
40 TABLET, EXTENDED RELEASE ORAL PRN
Status: DISCONTINUED | OUTPATIENT
Start: 2023-12-22 | End: 2023-12-30 | Stop reason: HOSPADM

## 2023-12-22 RX ORDER — ACETAMINOPHEN 325 MG/1
650 TABLET ORAL EVERY 6 HOURS PRN
Status: DISCONTINUED | OUTPATIENT
Start: 2023-12-22 | End: 2023-12-30 | Stop reason: HOSPADM

## 2023-12-22 RX ORDER — INSULIN LISPRO 100 [IU]/ML
0-4 INJECTION, SOLUTION INTRAVENOUS; SUBCUTANEOUS NIGHTLY
Status: DISCONTINUED | OUTPATIENT
Start: 2023-12-22 | End: 2023-12-28

## 2023-12-22 RX ORDER — CELECOXIB 200 MG/1
200 CAPSULE ORAL 2 TIMES DAILY
Status: DISCONTINUED | OUTPATIENT
Start: 2023-12-22 | End: 2023-12-30 | Stop reason: HOSPADM

## 2023-12-22 RX ORDER — PANTOPRAZOLE SODIUM 40 MG/1
40 TABLET, DELAYED RELEASE ORAL DAILY
Status: DISCONTINUED | OUTPATIENT
Start: 2023-12-22 | End: 2023-12-30 | Stop reason: HOSPADM

## 2023-12-22 RX ORDER — SODIUM CHLORIDE 0.9 % (FLUSH) 0.9 %
5-40 SYRINGE (ML) INJECTION EVERY 12 HOURS SCHEDULED
Status: DISCONTINUED | OUTPATIENT
Start: 2023-12-22 | End: 2023-12-30 | Stop reason: HOSPADM

## 2023-12-22 RX ORDER — MONTELUKAST SODIUM 10 MG/1
10 TABLET ORAL NIGHTLY
Status: DISCONTINUED | OUTPATIENT
Start: 2023-12-22 | End: 2023-12-30 | Stop reason: HOSPADM

## 2023-12-22 RX ORDER — POTASSIUM CHLORIDE 7.45 MG/ML
10 INJECTION INTRAVENOUS PRN
Status: DISCONTINUED | OUTPATIENT
Start: 2023-12-22 | End: 2023-12-30 | Stop reason: HOSPADM

## 2023-12-22 RX ORDER — SODIUM CHLORIDE 0.9 % (FLUSH) 0.9 %
5-40 SYRINGE (ML) INJECTION PRN
Status: DISCONTINUED | OUTPATIENT
Start: 2023-12-22 | End: 2023-12-30 | Stop reason: HOSPADM

## 2023-12-22 RX ORDER — DEXTROSE MONOHYDRATE 100 MG/ML
INJECTION, SOLUTION INTRAVENOUS CONTINUOUS PRN
Status: DISCONTINUED | OUTPATIENT
Start: 2023-12-22 | End: 2023-12-30 | Stop reason: HOSPADM

## 2023-12-22 RX ORDER — SODIUM CHLORIDE 9 MG/ML
INJECTION, SOLUTION INTRAVENOUS PRN
Status: DISCONTINUED | OUTPATIENT
Start: 2023-12-22 | End: 2023-12-30 | Stop reason: HOSPADM

## 2023-12-22 RX ORDER — FLUTICASONE PROPIONATE 50 MCG
2 SPRAY, SUSPENSION (ML) NASAL DAILY PRN
Status: DISCONTINUED | OUTPATIENT
Start: 2023-12-22 | End: 2023-12-30 | Stop reason: HOSPADM

## 2023-12-22 RX ORDER — EZETIMIBE 10 MG/1
10 TABLET ORAL NIGHTLY
Status: DISCONTINUED | OUTPATIENT
Start: 2023-12-22 | End: 2023-12-30 | Stop reason: HOSPADM

## 2023-12-22 RX ORDER — BISACODYL 10 MG
10 SUPPOSITORY, RECTAL RECTAL DAILY PRN
Status: DISCONTINUED | OUTPATIENT
Start: 2023-12-22 | End: 2023-12-30 | Stop reason: HOSPADM

## 2023-12-22 RX ADMIN — GUAIFENESIN 600 MG: 600 TABLET, EXTENDED RELEASE ORAL at 09:31

## 2023-12-22 RX ADMIN — INSULIN LISPRO 4 UNITS: 100 INJECTION, SOLUTION INTRAVENOUS; SUBCUTANEOUS at 22:05

## 2023-12-22 RX ADMIN — KETOROLAC TROMETHAMINE 15 MG: 15 INJECTION, SOLUTION INTRAMUSCULAR; INTRAVENOUS at 16:59

## 2023-12-22 RX ADMIN — SODIUM CHLORIDE, POTASSIUM CHLORIDE, SODIUM LACTATE AND CALCIUM CHLORIDE: 600; 310; 30; 20 INJECTION, SOLUTION INTRAVENOUS at 09:30

## 2023-12-22 RX ADMIN — PIPERACILLIN AND TAZOBACTAM 4500 MG: 4; .5 INJECTION, POWDER, LYOPHILIZED, FOR SOLUTION INTRAVENOUS at 03:36

## 2023-12-22 RX ADMIN — APIXABAN 5 MG: 5 TABLET, FILM COATED ORAL at 22:02

## 2023-12-22 RX ADMIN — GUAIFENESIN 600 MG: 600 TABLET, EXTENDED RELEASE ORAL at 22:02

## 2023-12-22 RX ADMIN — MOMETASONE FUROATE AND FORMOTEROL FUMARATE DIHYDRATE 2 PUFF: 200; 5 AEROSOL RESPIRATORY (INHALATION) at 20:09

## 2023-12-22 RX ADMIN — INSULIN LISPRO 1 UNITS: 100 INJECTION, SOLUTION INTRAVENOUS; SUBCUTANEOUS at 16:58

## 2023-12-22 RX ADMIN — CELECOXIB 200 MG: 200 CAPSULE ORAL at 14:38

## 2023-12-22 RX ADMIN — KETOROLAC TROMETHAMINE 15 MG: 15 INJECTION, SOLUTION INTRAMUSCULAR; INTRAVENOUS at 22:03

## 2023-12-22 RX ADMIN — KETOROLAC TROMETHAMINE 15 MG: 15 INJECTION, SOLUTION INTRAMUSCULAR; INTRAVENOUS at 09:29

## 2023-12-22 RX ADMIN — DEXAMETHASONE SODIUM PHOSPHATE 4 MG: 10 INJECTION INTRAMUSCULAR; INTRAVENOUS at 17:29

## 2023-12-22 RX ADMIN — Medication 2500 MG: at 04:27

## 2023-12-22 RX ADMIN — SODIUM CHLORIDE, PRESERVATIVE FREE 10 ML: 5 INJECTION INTRAVENOUS at 21:14

## 2023-12-22 RX ADMIN — EZETIMIBE 10 MG: 10 TABLET ORAL at 22:04

## 2023-12-22 RX ADMIN — SODIUM CHLORIDE 1000 ML: 9 INJECTION, SOLUTION INTRAVENOUS at 03:16

## 2023-12-22 RX ADMIN — MONTELUKAST 10 MG: 10 TABLET, FILM COATED ORAL at 22:02

## 2023-12-22 RX ADMIN — INSULIN LISPRO 3 UNITS: 100 INJECTION, SOLUTION INTRAVENOUS; SUBCUTANEOUS at 12:32

## 2023-12-22 RX ADMIN — METOPROLOL TARTRATE 50 MG: 50 TABLET ORAL at 22:08

## 2023-12-22 RX ADMIN — DEXAMETHASONE SODIUM PHOSPHATE 4 MG: 10 INJECTION INTRAMUSCULAR; INTRAVENOUS at 09:29

## 2023-12-22 RX ADMIN — METOPROLOL TARTRATE 50 MG: 50 TABLET ORAL at 15:06

## 2023-12-22 RX ADMIN — PANTOPRAZOLE SODIUM 40 MG: 40 TABLET, DELAYED RELEASE ORAL at 15:07

## 2023-12-22 RX ADMIN — DOXAZOSIN 2 MG: 1 TABLET ORAL at 14:38

## 2023-12-22 RX ADMIN — INSULIN GLARGINE 30 UNITS: 100 INJECTION, SOLUTION SUBCUTANEOUS at 22:05

## 2023-12-22 RX ADMIN — EMPAGLIFLOZIN 10 MG: 10 TABLET, FILM COATED ORAL at 14:38

## 2023-12-22 RX ADMIN — TUBERCULIN PURIFIED PROTEIN DERIVATIVE 5 UNITS: 5 INJECTION, SOLUTION INTRADERMAL at 14:54

## 2023-12-22 RX ADMIN — CELECOXIB 200 MG: 200 CAPSULE ORAL at 22:04

## 2023-12-22 RX ADMIN — GABAPENTIN 300 MG: 300 CAPSULE ORAL at 14:38

## 2023-12-22 RX ADMIN — GABAPENTIN 300 MG: 300 CAPSULE ORAL at 22:02

## 2023-12-22 RX ADMIN — ATORVASTATIN CALCIUM 20 MG: 40 TABLET, FILM COATED ORAL at 22:04

## 2023-12-22 ASSESSMENT — PAIN DESCRIPTION - LOCATION
LOCATION: FOOT;SACRUM
LOCATION: ANKLE
LOCATION: ANKLE
LOCATION: FOOT
LOCATION: FOOT

## 2023-12-22 ASSESSMENT — PAIN DESCRIPTION - DESCRIPTORS
DESCRIPTORS: ACHING
DESCRIPTORS: SHARP
DESCRIPTORS: ACHING

## 2023-12-22 ASSESSMENT — PAIN SCALES - GENERAL
PAINLEVEL_OUTOF10: 3
PAINLEVEL_OUTOF10: 0
PAINLEVEL_OUTOF10: 6
PAINLEVEL_OUTOF10: 5
PAINLEVEL_OUTOF10: 5

## 2023-12-22 ASSESSMENT — PAIN DESCRIPTION - ORIENTATION
ORIENTATION: LEFT
ORIENTATION: LEFT;MID

## 2023-12-22 ASSESSMENT — PAIN DESCRIPTION - PAIN TYPE: TYPE: ACUTE PAIN;CHRONIC PAIN

## 2023-12-22 ASSESSMENT — PAIN - FUNCTIONAL ASSESSMENT
PAIN_FUNCTIONAL_ASSESSMENT: 0-10
PAIN_FUNCTIONAL_ASSESSMENT: PREVENTS OR INTERFERES SOME ACTIVE ACTIVITIES AND ADLS

## 2023-12-22 ASSESSMENT — PAIN DESCRIPTION - ONSET: ONSET: OTHER (COMMENT)

## 2023-12-22 ASSESSMENT — PAIN DESCRIPTION - FREQUENCY: FREQUENCY: OTHER (COMMENT)

## 2023-12-22 NOTE — ED PROVIDER NOTES
Emergency Department Provider Note       PCP: Marco Michele Jr., MD   Age: 73 y.o.   Sex: male     DISPOSITION Decision To Admit 12/22/2023 06:38:19 AM       ICD-10-CM    1. Hypoxia  R09.02       2. Acute left ankle pain  M25.572       3. Leukocytosis, unspecified type  D72.829           Medical Decision Making     Complexity of Problems Addressed:  1 or more acute illnesses that pose a threat to life or bodily function.     Data Reviewed and Analyzed:  I independently ordered and reviewed each unique test.  I reviewed external records: provider visit note from PCP.  I reviewed external records: provider visit note from outside specialist.   The patients assessment required an independent historian: EMS, spouse.  The reason they were needed is important historical information not provided by the patient.    I interpreted the X-rays chest x-ray reveals no acute process send ankle x-ray reveals no evidence of acute fracture or dislocation..    Discussion of management or test interpretation.  73-year-old  male with history of HTN, COPD/asthma, diabetes, CKD, GERD, chronic right foot pain secondary to Charcot joint, and chronic back pain presents to the emergency department with his wife of complaint of left foot pain starting in the night last night, worse with palpation and walking, progressive weakness with shaking chills, occasional hemoptysis, and fatigue.  Patient was seen approximately 6 days ago at urgent care for URI symptoms and diagnosed with a bronchitis and placed on a Z-Jimmy as well as prednisone which he finished yesterday.  This evening while the patient was attempting to transfer after urinating he missed the bed and fell on the floor.  He was too weak to get up and so EMS was called.  Denies prior history of gout.  Distant history of smoking.  On presentation, the patient required 2 L per EMS to bring his O2 sats above 90% but lungs sound fairly clear to auscultation.  Patient's abdomen

## 2023-12-22 NOTE — DIABETES MGMT
Patient admitted with generalized weakness. Admitting blood glucose 238. Last HbA1c 8.8 in October 2023. Blood glucose currently 303, patient received Humalog 3 units, Jardiance 10mg and Decadron 4mg this AM. Creatinine 0.91. GFR >60. Reviewed patient current regimen: Humalog correctional insulin, Jardiance 10mg daily, Decadron 4mg BID and Lantus 60 units nightly. Patient may benefit from a reduction or split in basal insulin dosing to reduce risk of hypoglycemia. Provider updated via [employer] regarding recommendations and patient glycemic control.    Patient seen for assessment regarding diabetes management. Patient has a past medical history of COPD, type 2 diabetes, HTN, charcot ankle right, osteoarthritis, FADUMO. Patient states they have been living with diabetes for 5 years and voices a positive family history of diabetes. Patient states they do have a working glucometer with supplies at home. Per patient they typically check blood glucose levels 1-3 times a day. Patient just received freestyle lorie in mail. Discussed how to use this and option of free outpatient CGM training if desired. Per patient their blood glucose levels have been running 200s at home. Patient states they are currently taking Toujeo 66 units, Jardiance, and correctional insulin at home for management of diabetes. Patient voices that they have not experienced hypoglycemia in the past. Educated regarding hypoglycemia signs, symptoms, and treatment. Discussed target blood glucose goals. Reviewed effects of steroids on glycemic control. Discussed relationship between infection and hyperglycemia. Discussed skin care and prevention of diabetic foot ulcers as patient voices \"some neuropathy.\" Reviewed importance of good glycemic control on wound care. Encouraged compliance with discharge regimen. Encouraged patient to continue to work on lifestyle modifications and to follow up with endocrinology for further titration of regimen. Patient

## 2023-12-22 NOTE — ED TRIAGE NOTES
Pt brought in via EMS from home. Pt reports he slid out of his recliner when he was trying to get up to go to his bedside commode. Pt reports chronic weakness in both legs. Pt states he didn't hit his head and no LOC. Pt states he has a pressure sore on his bottom that has been going on for about 10 years.     Pt was on zithromax and prednisone for a URI.     BGL: 309   93-94% on 2L nasal cannula

## 2023-12-22 NOTE — H&P
Hospitalist History and Physical   Admit Date:  2023  1:13 AM   Name:  Macho Tong   Age:  73 y.o.  Sex:  male  :  1950   MRN:  [id number]   Room:  01/    Presenting Complaint: Fall     Reason(s) for Admission: Generalized weakness [R53.1]     History of Present Illness:   Macho Tong is a 73 y.o. male with medical history of HTN, COPD/asthma, ex-smoker, DM on insulin, GERD, CKD, chronic back pain, rt foot charcot in brace, afib, sees urology, nephrology, pain management, ortho, oncology, endocrinology and a PCP.   Pt saw his orthopedic, Dr. Beckwith on  for continued right ankle pain and instability causing falls secondary to his Charcot syndrome, despite double upright bracing.  Patient is wbat.  Now beginning to have left ankle pain as well.  Most likely from overuse.  He was offered a second opinion at [facility].  Injection done in the office including Xylocaine and Depo-Medrol. (According to Ortho note patient was told that the injection could cause elevated blood sugars and lethargy). Pain management has him on gabapentin and amitriptyline for neuropathic pain.  Wife states that this is getting worse, and he fell again at home overnight.  He got up to urinate and fell in the bathroom.  She is having a hard time helping him out.  EMS brought to the ER on oxygen.  I do not have an oxygen level from them.  It was diagnosed as hypoxia but no sats lower than 90% in the ER reported and was already on 2 L on arrival.    He recently had an upper respiratory infection treated by his primary care physician, as bronchitis.  Was having gray sputum, cough, congestion from that.  He completed a Z-Jimmy and oral prednisone.  He states that those symptoms have almost resolved.    Patient also seen in the last few days by his urologist for continued incontinence.  He is on Cardura and Jovita begrone. Gemtessa was added and bladder training discussed, neck step would be to undergo a  or effusions.  The heart size is normal.  The bony thorax is intact.      No radiographic evidence of acute cardiopulmonary disease       Echocardiogram:  06/26/23    TRANSTHORACIC ECHOCARDIOGRAM (TTE) COMPLETE (CONTRAST/BUBBLE/3D PRN) 06/26/2023  5:11 PM (Final)    Interpretation Summary    Left Ventricle: Mildly reduced left ventricular systolic function with a visually estimated EF of 45 - 50%. Left ventricle size is normal. Mildly increased wall thickness. Mild global hypokinesis present. Grade I diastolic dysfunction with normal LAP.    Signed by: Fernando Langley MD on 6/26/2023  5:11 PM        Orders Placed This Encounter   Medications    sodium chloride 0.9 % bolus 1,000 mL    piperacillin-tazobactam (ZOSYN) 4,500 mg in sodium chloride 0.9 % 100 mL IVPB (mini-bag)     Order Specific Question:   Antimicrobial Indications     Answer:   Sepsis of Unknown Etiology     Order Specific Question:   Sepsis duration of therapy     Answer:   7 days    vancomycin (VANCOCIN) 2500 mg in sodium chloride 0.9 % 500 mL IVPB     Order Specific Question:   Antimicrobial Indications     Answer:   Sepsis of Unknown Etiology     Order Specific Question:   Sepsis duration of therapy     Answer:   7 days    lactated ringers IV soln infusion    insulin lispro (HUMALOG) injection vial 0-4 Units    insulin lispro (HUMALOG) injection vial 0-4 Units    glucose chewable tablet 16 g    OR Linked Order Group     dextrose bolus 10% 125 mL     dextrose bolus 10% 250 mL    Glucagon Emergency KIT 1 mg    dextrose 10 % infusion    guaiFENesin (MUCINEX) extended release tablet 600 mg         Signed:  CHASE Santos - CNP    Part of this note may have been written by using a voice dictation software.  The note has been proof read but may still contain some grammatical/other typographical errors.

## 2023-12-22 NOTE — PROGRESS NOTES
TRANSFER - IN REPORT:    Verbal report received from Bashir on Macho Tong  being received from ED for routine progression of patient care      Report consisted of patient's Situation, Background, Assessment and   Recommendations(SBAR).     Information from the following report(s) Nurse Handoff Report, ED Encounter Summary, ED SBAR, and Adult Overview was reviewed with the receiving nurse.    Opportunity for questions and clarification was provided.      Assessment completed upon patient's arrival to unit and care assumed.

## 2023-12-22 NOTE — PROGRESS NOTES
ACUTE PHYSICAL THERAPY GOALS:   (Developed with and agreed upon by patient and/or caregiver.)  STG:  (1.)Mr. Tong will move from supine to sit and sit to supine  with STAND BY ASSIST within 7 treatment day(s).    (2.)Mr. Tong will transfer from bed to chair and chair to bed with CONTACT GUARD ASSIST using the least restrictive device within 7 treatment day(s).    (3.)Mr. Tong will ambulate with CONTACT GUARD ASSIST for 5 feet with the least restrictive device within 7 treatment day(s).   (4)Mr. Zepeda will go up one step with RW min assist in 7 days.  (5)Mr. Zepeda will perform HEP with cues and assistance to increase safety on his feet in 7 days.  ________________________________________________________________________________________________      PHYSICAL THERAPY Initial Assessment and PM  (Link to Caseload Tracking: PT Visit Days : 1  Acknowledge Orders  Time In/Out  PT Charge Capture  Rehab Caseload Tracker    Macho Tong is a 73 y.o. male   PRIMARY DIAGNOSIS: Generalized weakness  Hypoxia [R09.02]  Generalized weakness [R53.1]  Acute left ankle pain [M25.572]  Leukocytosis, unspecified type [D72.829]       Reason for Referral: Generalized Muscle Weakness (M62.81)  Other abnormalities of gait and mobility (R26.89)  Observation: Payor: MEDICARE / Plan: MEDICARE PART A AND B / Product Type: *No Product type* /     ASSESSMENT:     REHAB RECOMMENDATIONS:   Recommendation to date pending progress:  Setting:  Short-term Rehab    Equipment:    To Be Determined     ASSESSMENT:  Mr. Tong presents with decreased functional mobility limited by left ankle/foot pain.  He normally uses a RW with gait and a brace for his right foot/ankle due to chronic charcot ankle/foot. He is independent otherwise normally but does use a urinal at night and sleeps in his lift chair. Yesterday, his left ankle/foot began hurting and now can't walk or take care of himself.  X-ray negative, WBAT.  Today, he reports continued  pain in left foot/ankle.  It is swollen.  He can't move his ankle much at all.  It is very sensitive to touch.  He has a lot of difficulty moving left LE due to pain.  His right foot/ankle has brace on it and overall strength is limited.  He sat on side of bed with using bed rail and extra time and extra time to scoot to the edge CGA.  He has pain with any weight bearing left foot.  He was able to stand with RW and mod assist and bed high for about 15 seconds.  Bed linens adjusted.  He had to sit to due pain. He could not walk at all or shift weight.  He needed min assist to get feet back in bed.  His wife assisted with urinal to help him.  He partially rolled with bed rail min assist to place brief.  He needs STR unless his left foot pain eases so he can walk.  Will follow.     [facility]™ “6 Clicks” Basic Mobility Inpatient Short Form  AM-PAC Basic Mobility - Inpatient   How much help is needed turning from your back to your side while in a flat bed without using bedrails?: A Lot  How much help is needed moving from lying on your back to sitting on the side of a flat bed without using bedrails?: A Lot  How much help is needed moving to and from a bed to a chair?: Total  How much help is needed standing up from a chair using your arms?: A Lot  How much help is needed walking in hospital room?: Total  How much help is needed climbing 3-5 steps with a railing?: Total  AM-PAC Inpatient Mobility Raw Score : 9  AM-PAC Inpatient T-Scale Score : 30.55  Mobility Inpatient CMS 0-100% Score: 81.38  Mobility Inpatient CMS G-Code Modifier : CM    SUBJECTIVE:   Mr. Tong states, \"foot hurts\"     Social/Functional Lives With: Spouse  Type of Home: House  Home Layout: Able to Live on Main level with bedroom/bathroom  Home Access: Stairs to enter without rails  Entrance Stairs - Number of Steps: 1  Bathroom Shower/Tub: Tub/Shower unit  Bathroom Equipment: 3-in-1 commode  Home Equipment: Walker, rolling,

## 2023-12-22 NOTE — ED NOTES
TRANSFER - OUT REPORT:    Verbal report given to Veronica CENTENO on Macho Tong  being transferred to [facility] for routine progression of patient care       Report consisted of patient's Situation, Background, Assessment and   Recommendations(SBAR).     Information from the following report(s) Nurse Handoff Report, ED Encounter Summary, ED SBAR, Adult Overview, Intake/Output, MAR, Recent Results, Quality Measures, and Neuro Assessment was reviewed with the receiving nurse.    [place] Fall Assessment:    Presents to emergency department  because of falls (Syncope, seizure, or loss of consciousness): Yes  Age > 70: Yes  Altered Mental Status, Intoxication with alcohol or substance confusion (Disorientation, impaired judgment, poor safety awaremess, or inability to follow instructions): No  Impaired Mobility: Ambulates or transfers with assistive devices or assistance; Unable to ambulate or transer.: No  Nursing Judgement: Yes          Lines:   Peripheral IV 12/22/23 Left Antecubital (Active)        Opportunity for questions and clarification was provided.      Patient transported with:  Tech and LR 75mL/hr

## 2023-12-22 NOTE — ACP (ADVANCE CARE PLANNING)
[facility] Hospitalist Service  At the heart of better care     Advance Care Planning   Admit Date:  2023  1:13 AM   Name:  Macho Tong   Age:  73 y.o.  Sex:  male  :  1950   MRN:  [id number]   Room:  [place] [address]    Macho Tong is able to make his own decisions:   Yes    If pt unable to make decisions, POA/surrogate decision maker:  Spouse - Mikaela    Other people present:   Spouse    Patient / surrogate decision-maker directed code status:  Full Code    Other ACP topics discussed, if applicable:   None    Patient or surrogate consented to discussion of the current conditions, workup, management plans, prognosis, and the risk for further deterioration.  Time spent: 5 minutes in direct discussion.      Signed:  CHASE Santos - MITCH

## 2023-12-22 NOTE — PROGRESS NOTES
ACUTE OCCUPATIONAL THERAPY GOALS:   (Developed with and agreed upon by patient and/or caregiver.)  1. Patient will perform grooming with supervision/set-up seated edge of bed.   2. Patient will perform Upper body dressing with supervision/set-up, edge of bed or supported sitting.   3. Patient will perform upper and lower body bathing with moderate assistance .  4. Patient will perform toilet transfers with moderate assistance, bed to [facility].  5. Patient will participate in 45 + minutes of ADL/ therapeutic exercise/therapeutic activity with min rest breaks to increase activity tolerance for self care.    Goals to be achieved in 7 days.     OCCUPATIONAL THERAPY Initial Assessment, Daily Note, and PM       OT Visit Days: 1  Acknowledge Orders  Time  OT Charge Capture  Rehab Caseload Tracker      Macho Tong is a 73 y.o. male   PRIMARY DIAGNOSIS: Generalized weakness  Hypoxia [R09.02]  Generalized weakness [R53.1]  Acute left ankle pain [M25.572]  Leukocytosis, unspecified type [D72.829]       Reason for Referral: Generalized Muscle Weakness (M62.81)  Difficulty in walking, Not elsewhere classified (R26.2)  Observation: Payor: MEDICARE / Plan: MEDICARE PART A AND B / Product Type: *No Product type* /     ASSESSMENT:     REHAB RECOMMENDATIONS:   Recommendation to date pending progress:  Setting:  Short-term Rehab    Equipment:    To Be Determined     ASSESSMENT:  Mr. Tong was admitted with above diagnosis. Pt came to the ED after falling at home due to left ankle pain. Pt is seen in room with wife and daughter present. Pt was at baseline until his left ankle started to hurt yesterday. At baseline, pt sleeps in a recliner (lift chair), uses a RW and is independent with basic self care. Pt wears a brace on his right foot due to chronic charcot foot. Pt is likely at his baseline for strength and activity tolerance but due to left ankle pain is unable to stand without a great deal of assistance and is unable to  INTERVENTIONS PLANNED:  (Benefits and precautions of occupational therapy have been discussed with the patient.)  Self Care Training  Therapeutic Activity  Therapeutic Exercise/HEP  Education         TREATMENT:     EVALUATION: LOW COMPLEXITY: (Untimed Charge)    TREATMENT:   Therapeutic Activity (50 Minutes): Patient participated in therapeutic activities including bed mobility training and sitting tolerance activity with minimal assistance, verbal cues, education, and adaptive equipment in order to increase safety awareness, increase activity tolerance, and prepare for functional activity.         AFTER TREATMENT PRECAUTIONS: Bed, Bed/Chair Locked, Call light within reach, Needs within reach, RN notified, and Visitors at bedside    INTERDISCIPLINARY COLLABORATION:  RN/ PCT, PT/ PTA, and OT/ DODGE    EDUCATION:  Education Given To: Patient;Family  Education Provided: Plan of Care;Role of Therapy;Family Education;Equipment;Fall Prevention Strategies  Education Outcome: Demonstrated understanding;Verbalized understanding    TOTAL TREATMENT DURATION AND TIME:  Time In: 1645  Time Out: 1735  Minutes: 50    Kiko Garcia, OT

## 2023-12-22 NOTE — PLAN OF CARE
Problem: Discharge Planning  Goal: Discharge to home or other facility with appropriate resources  Outcome: Progressing     Problem: Skin/Tissue Integrity  Goal: Absence of new skin breakdown  Description: 1.  Monitor for areas of redness and/or skin breakdown  2.  Assess vascular access sites hourly  3.  Every 4-6 hours minimum:  Change oxygen saturation probe site  4.  Every 4-6 hours:  If on nasal continuous positive airway pressure, respiratory therapy assess nares and determine need for appliance change or resting period.  Outcome: Progressing     Problem: Pain  Goal: Verbalizes/displays adequate comfort level or baseline comfort level  Outcome: Progressing  Flowsheets (Taken 12/22/2023 1029)  Verbalizes/displays adequate comfort level or baseline comfort level:   Encourage patient to monitor pain and request assistance   Administer analgesics based on type and severity of pain and evaluate response   Implement non-pharmacological measures as appropriate and evaluate response     Problem: Safety - Adult  Goal: Free from fall injury  Outcome: Progressing     Problem: ABCDS Injury Assessment  Goal: Absence of physical injury  Outcome: Progressing

## 2023-12-23 PROBLEM — R26.89 INABILITY TO BEAR WEIGHT: Status: ACTIVE | Noted: 2023-12-23

## 2023-12-23 LAB
ANION GAP SERPL CALC-SCNC: 5 MMOL/L (ref 2–11)
BASOPHILS # BLD: 0 K/UL (ref 0–0.2)
BASOPHILS NFR BLD: 0 % (ref 0–2)
BUN SERPL-MCNC: 26 MG/DL (ref 8–23)
CALCIUM SERPL-MCNC: 9.7 MG/DL (ref 8.3–10.4)
CHLORIDE SERPL-SCNC: 111 MMOL/L (ref 103–113)
CO2 SERPL-SCNC: 24 MMOL/L (ref 21–32)
CREAT SERPL-MCNC: 0.66 MG/DL (ref 0.8–1.5)
CRP SERPL-MCNC: 21.7 MG/DL (ref 0–0.9)
DIFFERENTIAL METHOD BLD: ABNORMAL
EOSINOPHIL # BLD: 0 K/UL (ref 0–0.8)
EOSINOPHIL NFR BLD: 0 % (ref 0.5–7.8)
ERYTHROCYTE [DISTWIDTH] IN BLOOD BY AUTOMATED COUNT: 17.2 % (ref 11.9–14.6)
GLUCOSE BLD STRIP.AUTO-MCNC: 220 MG/DL (ref 65–100)
GLUCOSE BLD STRIP.AUTO-MCNC: 273 MG/DL (ref 65–100)
GLUCOSE BLD STRIP.AUTO-MCNC: 290 MG/DL (ref 65–100)
GLUCOSE BLD STRIP.AUTO-MCNC: 327 MG/DL (ref 65–100)
GLUCOSE SERPL-MCNC: 226 MG/DL (ref 65–100)
HCT VFR BLD AUTO: 34.1 % (ref 41.1–50.3)
HGB BLD-MCNC: 10.3 G/DL (ref 13.6–17.2)
IMM GRANULOCYTES # BLD AUTO: 0.1 K/UL (ref 0–0.5)
IMM GRANULOCYTES NFR BLD AUTO: 1 % (ref 0–5)
LYMPHOCYTES # BLD: 0.9 K/UL (ref 0.5–4.6)
LYMPHOCYTES NFR BLD: 7 % (ref 13–44)
MCH RBC QN AUTO: 25.3 PG (ref 26.1–32.9)
MCHC RBC AUTO-ENTMCNC: 30.2 G/DL (ref 31.4–35)
MCV RBC AUTO: 83.8 FL (ref 82–102)
MM INDURATION, POC: 0 MM (ref 0–5)
MONOCYTES # BLD: 0.8 K/UL (ref 0.1–1.3)
MONOCYTES NFR BLD: 6 % (ref 4–12)
NEUTS SEG # BLD: 12.1 K/UL (ref 1.7–8.2)
NEUTS SEG NFR BLD: 86 % (ref 43–78)
NRBC # BLD: 0 K/UL (ref 0–0.2)
PLATELET # BLD AUTO: 254 K/UL (ref 150–450)
PMV BLD AUTO: 9.6 FL (ref 9.4–12.3)
POTASSIUM SERPL-SCNC: 4.2 MMOL/L (ref 3.5–5.1)
PPD, POC: NEGATIVE
PROCALCITONIN SERPL-MCNC: 0.07 NG/ML (ref 0–0.49)
RBC # BLD AUTO: 4.07 M/UL (ref 4.23–5.6)
SERVICE CMNT-IMP: ABNORMAL
SODIUM SERPL-SCNC: 140 MMOL/L (ref 136–146)
WBC # BLD AUTO: 14 K/UL (ref 4.3–11.1)

## 2023-12-23 PROCEDURE — 6370000000 HC RX 637 (ALT 250 FOR IP): Performed by: NURSE PRACTITIONER

## 2023-12-23 PROCEDURE — 80048 BASIC METABOLIC PNL TOTAL CA: CPT

## 2023-12-23 PROCEDURE — 94640 AIRWAY INHALATION TREATMENT: CPT

## 2023-12-23 PROCEDURE — 6360000002 HC RX W HCPCS: Performed by: NURSE PRACTITIONER

## 2023-12-23 PROCEDURE — 2580000003 HC RX 258: Performed by: NURSE PRACTITIONER

## 2023-12-23 PROCEDURE — 36415 COLL VENOUS BLD VENIPUNCTURE: CPT

## 2023-12-23 PROCEDURE — 86140 C-REACTIVE PROTEIN: CPT

## 2023-12-23 PROCEDURE — 84145 PROCALCITONIN (PCT): CPT

## 2023-12-23 PROCEDURE — 85025 COMPLETE CBC W/AUTO DIFF WBC: CPT

## 2023-12-23 PROCEDURE — 6360000002 HC RX W HCPCS: Performed by: PHYSICIAN ASSISTANT

## 2023-12-23 PROCEDURE — 82962 GLUCOSE BLOOD TEST: CPT

## 2023-12-23 PROCEDURE — 94760 N-INVAS EAR/PLS OXIMETRY 1: CPT

## 2023-12-23 PROCEDURE — 1100000000 HC RM PRIVATE

## 2023-12-23 RX ORDER — DEXAMETHASONE SODIUM PHOSPHATE 4 MG/ML
4 INJECTION, SOLUTION INTRA-ARTICULAR; INTRALESIONAL; INTRAMUSCULAR; INTRAVENOUS; SOFT TISSUE EVERY 12 HOURS
Status: DISCONTINUED | OUTPATIENT
Start: 2023-12-23 | End: 2023-12-24

## 2023-12-23 RX ADMIN — GABAPENTIN 300 MG: 300 CAPSULE ORAL at 08:25

## 2023-12-23 RX ADMIN — KETOROLAC TROMETHAMINE 15 MG: 15 INJECTION, SOLUTION INTRAMUSCULAR; INTRAVENOUS at 20:58

## 2023-12-23 RX ADMIN — INSULIN LISPRO 1 UNITS: 100 INJECTION, SOLUTION INTRAVENOUS; SUBCUTANEOUS at 08:34

## 2023-12-23 RX ADMIN — MONTELUKAST 10 MG: 10 TABLET, FILM COATED ORAL at 21:32

## 2023-12-23 RX ADMIN — INSULIN LISPRO 2 UNITS: 100 INJECTION, SOLUTION INTRAVENOUS; SUBCUTANEOUS at 17:35

## 2023-12-23 RX ADMIN — PANTOPRAZOLE SODIUM 40 MG: 40 TABLET, DELAYED RELEASE ORAL at 08:26

## 2023-12-23 RX ADMIN — METOPROLOL TARTRATE 50 MG: 50 TABLET ORAL at 20:59

## 2023-12-23 RX ADMIN — INSULIN LISPRO 3 UNITS: 100 INJECTION, SOLUTION INTRAVENOUS; SUBCUTANEOUS at 12:30

## 2023-12-23 RX ADMIN — LISINOPRIL 10 MG: 5 TABLET ORAL at 08:26

## 2023-12-23 RX ADMIN — DOXAZOSIN 2 MG: 1 TABLET ORAL at 08:26

## 2023-12-23 RX ADMIN — EMPAGLIFLOZIN 10 MG: 10 TABLET, FILM COATED ORAL at 08:26

## 2023-12-23 RX ADMIN — GABAPENTIN 300 MG: 300 CAPSULE ORAL at 21:00

## 2023-12-23 RX ADMIN — SODIUM CHLORIDE, POTASSIUM CHLORIDE, SODIUM LACTATE AND CALCIUM CHLORIDE: 600; 310; 30; 20 INJECTION, SOLUTION INTRAVENOUS at 01:54

## 2023-12-23 RX ADMIN — SODIUM CHLORIDE, POTASSIUM CHLORIDE, SODIUM LACTATE AND CALCIUM CHLORIDE: 600; 310; 30; 20 INJECTION, SOLUTION INTRAVENOUS at 15:27

## 2023-12-23 RX ADMIN — APIXABAN 5 MG: 5 TABLET, FILM COATED ORAL at 08:26

## 2023-12-23 RX ADMIN — CELECOXIB 200 MG: 200 CAPSULE ORAL at 20:59

## 2023-12-23 RX ADMIN — GUAIFENESIN 600 MG: 600 TABLET, EXTENDED RELEASE ORAL at 21:00

## 2023-12-23 RX ADMIN — ATORVASTATIN CALCIUM 20 MG: 40 TABLET, FILM COATED ORAL at 20:59

## 2023-12-23 RX ADMIN — KETOROLAC TROMETHAMINE 15 MG: 15 INJECTION, SOLUTION INTRAMUSCULAR; INTRAVENOUS at 17:36

## 2023-12-23 RX ADMIN — METOPROLOL TARTRATE 50 MG: 50 TABLET ORAL at 08:27

## 2023-12-23 RX ADMIN — SODIUM CHLORIDE, PRESERVATIVE FREE 10 ML: 5 INJECTION INTRAVENOUS at 08:26

## 2023-12-23 RX ADMIN — EZETIMIBE 10 MG: 10 TABLET ORAL at 20:59

## 2023-12-23 RX ADMIN — GUAIFENESIN 600 MG: 600 TABLET, EXTENDED RELEASE ORAL at 08:26

## 2023-12-23 RX ADMIN — KETOROLAC TROMETHAMINE 15 MG: 15 INJECTION, SOLUTION INTRAMUSCULAR; INTRAVENOUS at 13:00

## 2023-12-23 RX ADMIN — KETOROLAC TROMETHAMINE 15 MG: 15 INJECTION, SOLUTION INTRAMUSCULAR; INTRAVENOUS at 05:04

## 2023-12-23 RX ADMIN — INSULIN GLARGINE 30 UNITS: 100 INJECTION, SOLUTION SUBCUTANEOUS at 08:35

## 2023-12-23 RX ADMIN — MOMETASONE FUROATE AND FORMOTEROL FUMARATE DIHYDRATE 2 PUFF: 200; 5 AEROSOL RESPIRATORY (INHALATION) at 20:45

## 2023-12-23 RX ADMIN — DEXAMETHASONE SODIUM PHOSPHATE 4 MG: 10 INJECTION INTRAMUSCULAR; INTRAVENOUS at 01:53

## 2023-12-23 RX ADMIN — INSULIN GLARGINE 30 UNITS: 100 INJECTION, SOLUTION SUBCUTANEOUS at 20:59

## 2023-12-23 RX ADMIN — SODIUM CHLORIDE, PRESERVATIVE FREE 10 ML: 5 INJECTION INTRAVENOUS at 21:02

## 2023-12-23 RX ADMIN — DEXAMETHASONE SODIUM PHOSPHATE 4 MG: 4 INJECTION, SOLUTION INTRAMUSCULAR; INTRAVENOUS at 13:17

## 2023-12-23 RX ADMIN — APIXABAN 5 MG: 5 TABLET, FILM COATED ORAL at 20:59

## 2023-12-23 RX ADMIN — CELECOXIB 200 MG: 200 CAPSULE ORAL at 08:27

## 2023-12-23 ASSESSMENT — PAIN SCALES - GENERAL: PAINLEVEL_OUTOF10: 0

## 2023-12-23 NOTE — PLAN OF CARE
Problem: Discharge Planning  Goal: Discharge to home or other facility with appropriate resources  12/23/2023 0644 by Camila Demarco RN  Outcome: Progressing     Problem: Pain  Goal: Verbalizes/displays adequate comfort level or baseline comfort level  12/23/2023 0644 by Camila Demarco RN  Outcome: Progressing     Problem: Skin/Tissue Integrity  Goal: Absence of new skin breakdown  12/23/2023 0644 by Camila Demarco RN  Outcome: Progressing     Problem: Safety - Adult  Goal: Free from fall injury  12/23/2023 0644 by Camila Demarco RN  Outcome: Progressing     Problem: ABCDS Injury Assessment  Goal: Absence of physical injury  12/23/2023 0644 by Camila Demarco RN  Outcome: Progressing    Problem: Musculoskeletal - Adult  Goal: Return mobility to safest level of function  Outcome: Progressing

## 2023-12-23 NOTE — PLAN OF CARE
Problem: Discharge Planning  Goal: Discharge to home or other facility with appropriate resources  12/23/2023 1137 by Yrn MORAES RN  Outcome: Progressing  12/23/2023 0644 by Camila Demarco RN  Outcome: Progressing     Problem: Pain  Goal: Verbalizes/displays adequate comfort level or baseline comfort level  12/23/2023 1137 by Yrn MORAES RN  Outcome: Progressing  12/23/2023 0644 by Camila Demarco RN  Outcome: Progressing     Problem: Skin/Tissue Integrity  Goal: Absence of new skin breakdown  Description: 1.  Monitor for areas of redness and/or skin breakdown  2.  Assess vascular access sites hourly  3.  Every 4-6 hours minimum:  Change oxygen saturation probe site  4.  Every 4-6 hours:  If on nasal continuous positive airway pressure, respiratory therapy assess nares and determine need for appliance change or resting period.  12/23/2023 1137 by Yrn MORAES RN  Outcome: Progressing  12/23/2023 0644 by Camila Demarco RN  Outcome: Progressing     Problem: Safety - Adult  Goal: Free from fall injury  12/23/2023 1137 by Yrn MORAES RN  Outcome: Progressing  12/23/2023 0644 by Camila Demarco RN  Outcome: Progressing     Problem: ABCDS Injury Assessment  Goal: Absence of physical injury  12/23/2023 1137 by Yrn MORAES RN  Outcome: Progressing  12/23/2023 0644 by Camila Demarco RN  Outcome: Progressing     Problem: Musculoskeletal - Adult  Goal: Return mobility to safest level of function  12/23/2023 1137 by Yrn MORAES RN  Outcome: Progressing  12/23/2023 0644 by Camila Demarco RN  Outcome: Progressing

## 2023-12-23 NOTE — CARE COORDINATION
Pt chart reviewed for discharge planning. CM met with pt at bedside, verified demographic information/ health insurance. Pt lives with his spouse . PCP was confirmed, last seen 12/16/2023. Pt reports that he normally manages ADL's with spouse support as needed. Pt has bee recommended for SNF rehab placement.  He has previously been to [facility] and requests a referral back as his first choice.  Referrals also placed to [facility] and [facility] as alternate options.  CM will follow pt plan of care and assist further with supportive care referrals as needed.  Please consult case management if additional needs arise.      12/23/23 9113   Service Assessment   Patient Orientation Alert and Oriented   Cognition Alert   History Provided By Patient   Primary Caregiver Spouse   Support Systems Spouse/Significant Other   Patient's Healthcare Decision Maker is: Named in Scanned ACP Document   PCP Verified by CM Yes   Last Visit to PCP Within last 3 months  (12/16/2023)   Prior Functional Level Independent in ADLs/IADLs   Current Functional Level Assistance with the following:;Mobility   Can patient return to prior living arrangement Other (see comment)  (rehab recommended)   Ability to make needs known: Good   Family able to assist with home care needs: Yes   Would you like for me to discuss the discharge plan with any other family members/significant others, and if so, who? No   Financial Resources Medicare   Community Resources None   CM/SW Referral Other (see comment)  (SNF rehab needed)   Social/Functional History   Lives With Spouse   Type of Home House   Home Layout Able to Live on Main level with bedroom/bathroom   Home Access Stairs to enter without rails   Entrance Stairs - Number of Steps 1   Bathroom Shower/Tub Tub/Shower unit   Bathroom Equipment 3-in-1 commode   Home Equipment Walker, rolling;Wheelchair-manual;Lift chair   Discharge Planning   Type of Residence House   Living Arrangements

## 2023-12-23 NOTE — PROGRESS NOTES
Hospitalist Progress Note   Admit Date:  2023  1:13 AM   Name:  Macho Tong   Age:  73 y.o.  Sex:  male  :  1950   MRN:  [id number]   Room:  [facility]/    Presenting/Chief Complaint: Fall     Reason(s) for Admission: Hypoxia [R09.02]  Generalized weakness [R53.1]  Acute left ankle pain [M25.572]  Leukocytosis, unspecified type [D72.829]     Hospital Course:   Macho Tong is a 73 y.o. male with medical history of HTN, COPD/asthma, ex-smoker, DM on insulin, GERD, CKD, chronic back pain, Right foot charcot in brace, afib, presented to the ER on  with c/o increasing L ankle pain and inability to bear weight.     Pt saw his orthopedic, Dr. Beckwith on  for continued right ankle pain and instability causing falls secondary to his Charcot syndrome, despite double upright bracing.  Now beginning to have left ankle pain as well.  Most likely from overuse.  He was offered a second opinion at [facility].  Injection done in the office including Xylocaine and Depo-Medrol. Pain management has him on gabapentin and amitriptyline for neuropathic pain.  Wife states that this is getting worse, and he fell again at home overnight.  He got up to urinate and fell in the bathroom.  She is having a hard time helping him out.  EMS brought to the ER on oxygen.   He recently had an upper respiratory infection treated by his primary care physician, as bronchitis.   He completed a Z-Jimmy and oral prednisone 1-2 days PTA.  He states that those symptoms have almost resolved.      White cell count in the emergency room 14.8, procalcitonin negative at 0.13, lactic acid negative at 1.2, creatinine 0.91 with normal LFTs.  Uric acid 5.8.  Does not have a history of gout.  Highest temperature in the emergency room 99.  CRP is elevated at 16.5 and white cell count elevated at 14.8.  Chest x-ray is negative.  Flu negative, COVID negative. Blood cultures pending.    Both ankles were also x-rayed, mild

## 2023-12-23 NOTE — PROGRESS NOTES
Physical / Occupational Therapy Note:    Attempted to see patient this AM for OT/PT therapy treatment  session.  Pt. was just up to [facility] and didn't want to get back up now.  Left ankle still hurting but steroids are helping. Given ice pack.  Will follow and re-attempt as schedule permits/patient available. Thank you.    RENEE ANTUNEZ, PT    Rehab Caseload Tracker

## 2023-12-24 LAB
ANION GAP SERPL CALC-SCNC: 6 MMOL/L (ref 2–11)
BASOPHILS # BLD: 0 K/UL (ref 0–0.2)
BASOPHILS NFR BLD: 0 % (ref 0–2)
BUN SERPL-MCNC: 33 MG/DL (ref 8–23)
CALCIUM SERPL-MCNC: 9.4 MG/DL (ref 8.3–10.4)
CHLORIDE SERPL-SCNC: 108 MMOL/L (ref 103–113)
CO2 SERPL-SCNC: 25 MMOL/L (ref 21–32)
CREAT SERPL-MCNC: 0.93 MG/DL (ref 0.8–1.5)
CRP SERPL-MCNC: 13 MG/DL (ref 0–0.9)
DIFFERENTIAL METHOD BLD: ABNORMAL
EOSINOPHIL # BLD: 0 K/UL (ref 0–0.8)
EOSINOPHIL NFR BLD: 0 % (ref 0.5–7.8)
ERYTHROCYTE [DISTWIDTH] IN BLOOD BY AUTOMATED COUNT: 17.2 % (ref 11.9–14.6)
GLUCOSE BLD STRIP.AUTO-MCNC: 239 MG/DL (ref 65–100)
GLUCOSE BLD STRIP.AUTO-MCNC: 282 MG/DL (ref 65–100)
GLUCOSE BLD STRIP.AUTO-MCNC: 350 MG/DL (ref 65–100)
GLUCOSE BLD STRIP.AUTO-MCNC: 368 MG/DL (ref 65–100)
GLUCOSE SERPL-MCNC: 283 MG/DL (ref 65–100)
HCT VFR BLD AUTO: 32.2 % (ref 41.1–50.3)
HGB BLD-MCNC: 9.7 G/DL (ref 13.6–17.2)
IMM GRANULOCYTES # BLD AUTO: 0.1 K/UL (ref 0–0.5)
IMM GRANULOCYTES NFR BLD AUTO: 1 % (ref 0–5)
LYMPHOCYTES # BLD: 1.1 K/UL (ref 0.5–4.6)
LYMPHOCYTES NFR BLD: 7 % (ref 13–44)
MCH RBC QN AUTO: 25.2 PG (ref 26.1–32.9)
MCHC RBC AUTO-ENTMCNC: 30.1 G/DL (ref 31.4–35)
MCV RBC AUTO: 83.6 FL (ref 82–102)
MM INDURATION, POC: 0 MM (ref 0–5)
MONOCYTES # BLD: 0.6 K/UL (ref 0.1–1.3)
MONOCYTES NFR BLD: 4 % (ref 4–12)
NEUTS SEG # BLD: 13.1 K/UL (ref 1.7–8.2)
NEUTS SEG NFR BLD: 87 % (ref 43–78)
NRBC # BLD: 0 K/UL (ref 0–0.2)
PLATELET # BLD AUTO: 251 K/UL (ref 150–450)
PMV BLD AUTO: 9.7 FL (ref 9.4–12.3)
POTASSIUM SERPL-SCNC: 4.5 MMOL/L (ref 3.5–5.1)
PPD, POC: NEGATIVE
RBC # BLD AUTO: 3.85 M/UL (ref 4.23–5.6)
SERVICE CMNT-IMP: ABNORMAL
SODIUM SERPL-SCNC: 139 MMOL/L (ref 136–146)
WBC # BLD AUTO: 15 K/UL (ref 4.3–11.1)

## 2023-12-24 PROCEDURE — 2580000003 HC RX 258: Performed by: NURSE PRACTITIONER

## 2023-12-24 PROCEDURE — 85025 COMPLETE CBC W/AUTO DIFF WBC: CPT

## 2023-12-24 PROCEDURE — 82962 GLUCOSE BLOOD TEST: CPT

## 2023-12-24 PROCEDURE — 6360000002 HC RX W HCPCS: Performed by: NURSE PRACTITIONER

## 2023-12-24 PROCEDURE — 36415 COLL VENOUS BLD VENIPUNCTURE: CPT

## 2023-12-24 PROCEDURE — 6360000002 HC RX W HCPCS: Performed by: PHYSICIAN ASSISTANT

## 2023-12-24 PROCEDURE — 1100000000 HC RM PRIVATE

## 2023-12-24 PROCEDURE — 94640 AIRWAY INHALATION TREATMENT: CPT

## 2023-12-24 PROCEDURE — 86140 C-REACTIVE PROTEIN: CPT

## 2023-12-24 PROCEDURE — 80048 BASIC METABOLIC PNL TOTAL CA: CPT

## 2023-12-24 PROCEDURE — 94760 N-INVAS EAR/PLS OXIMETRY 1: CPT

## 2023-12-24 PROCEDURE — 6370000000 HC RX 637 (ALT 250 FOR IP): Performed by: NURSE PRACTITIONER

## 2023-12-24 PROCEDURE — 99221 1ST HOSP IP/OBS SF/LOW 40: CPT | Performed by: PHYSICIAN ASSISTANT

## 2023-12-24 PROCEDURE — 6370000000 HC RX 637 (ALT 250 FOR IP): Performed by: INTERNAL MEDICINE

## 2023-12-24 RX ORDER — PREDNISONE 20 MG/1
40 TABLET ORAL DAILY
Status: DISCONTINUED | OUTPATIENT
Start: 2023-12-24 | End: 2023-12-30 | Stop reason: HOSPADM

## 2023-12-24 RX ORDER — ACETAMINOPHEN 325 MG/1
650 TABLET ORAL 3 TIMES DAILY
Status: DISCONTINUED | OUTPATIENT
Start: 2023-12-24 | End: 2023-12-30 | Stop reason: HOSPADM

## 2023-12-24 RX ADMIN — ACETAMINOPHEN 650 MG: 325 TABLET, FILM COATED ORAL at 15:44

## 2023-12-24 RX ADMIN — EZETIMIBE 10 MG: 10 TABLET ORAL at 19:59

## 2023-12-24 RX ADMIN — ACETAMINOPHEN 650 MG: 325 TABLET, FILM COATED ORAL at 20:02

## 2023-12-24 RX ADMIN — SODIUM CHLORIDE, PRESERVATIVE FREE 10 ML: 5 INJECTION INTRAVENOUS at 20:03

## 2023-12-24 RX ADMIN — INSULIN LISPRO 4 UNITS: 100 INJECTION, SOLUTION INTRAVENOUS; SUBCUTANEOUS at 12:01

## 2023-12-24 RX ADMIN — MONTELUKAST 10 MG: 10 TABLET, FILM COATED ORAL at 19:59

## 2023-12-24 RX ADMIN — ATORVASTATIN CALCIUM 20 MG: 40 TABLET, FILM COATED ORAL at 19:59

## 2023-12-24 RX ADMIN — SODIUM CHLORIDE, PRESERVATIVE FREE 10 ML: 5 INJECTION INTRAVENOUS at 08:38

## 2023-12-24 RX ADMIN — METOPROLOL TARTRATE 50 MG: 50 TABLET ORAL at 08:37

## 2023-12-24 RX ADMIN — DOXAZOSIN 2 MG: 1 TABLET ORAL at 08:37

## 2023-12-24 RX ADMIN — INSULIN GLARGINE 30 UNITS: 100 INJECTION, SOLUTION SUBCUTANEOUS at 20:50

## 2023-12-24 RX ADMIN — INSULIN LISPRO 4 UNITS: 100 INJECTION, SOLUTION INTRAVENOUS; SUBCUTANEOUS at 22:39

## 2023-12-24 RX ADMIN — DEXAMETHASONE SODIUM PHOSPHATE 4 MG: 4 INJECTION, SOLUTION INTRAMUSCULAR; INTRAVENOUS at 02:19

## 2023-12-24 RX ADMIN — INSULIN GLARGINE 30 UNITS: 100 INJECTION, SOLUTION SUBCUTANEOUS at 08:37

## 2023-12-24 RX ADMIN — MOMETASONE FUROATE AND FORMOTEROL FUMARATE DIHYDRATE 2 PUFF: 200; 5 AEROSOL RESPIRATORY (INHALATION) at 20:22

## 2023-12-24 RX ADMIN — INSULIN LISPRO 2 UNITS: 100 INJECTION, SOLUTION INTRAVENOUS; SUBCUTANEOUS at 17:37

## 2023-12-24 RX ADMIN — PANTOPRAZOLE SODIUM 40 MG: 40 TABLET, DELAYED RELEASE ORAL at 08:37

## 2023-12-24 RX ADMIN — APIXABAN 5 MG: 5 TABLET, FILM COATED ORAL at 08:36

## 2023-12-24 RX ADMIN — LISINOPRIL 10 MG: 5 TABLET ORAL at 08:36

## 2023-12-24 RX ADMIN — CELECOXIB 200 MG: 200 CAPSULE ORAL at 08:36

## 2023-12-24 RX ADMIN — APIXABAN 5 MG: 5 TABLET, FILM COATED ORAL at 19:59

## 2023-12-24 RX ADMIN — GUAIFENESIN 600 MG: 600 TABLET, EXTENDED RELEASE ORAL at 19:59

## 2023-12-24 RX ADMIN — KETOROLAC TROMETHAMINE 15 MG: 15 INJECTION, SOLUTION INTRAMUSCULAR; INTRAVENOUS at 02:19

## 2023-12-24 RX ADMIN — PREDNISONE 40 MG: 20 TABLET ORAL at 08:37

## 2023-12-24 RX ADMIN — OXYCODONE HYDROCHLORIDE 5 MG: 5 TABLET ORAL at 20:00

## 2023-12-24 RX ADMIN — GABAPENTIN 300 MG: 300 CAPSULE ORAL at 20:00

## 2023-12-24 RX ADMIN — MOMETASONE FUROATE AND FORMOTEROL FUMARATE DIHYDRATE 2 PUFF: 200; 5 AEROSOL RESPIRATORY (INHALATION) at 08:23

## 2023-12-24 RX ADMIN — ACETAMINOPHEN 650 MG: 325 TABLET, FILM COATED ORAL at 08:36

## 2023-12-24 RX ADMIN — GABAPENTIN 300 MG: 300 CAPSULE ORAL at 08:36

## 2023-12-24 RX ADMIN — CELECOXIB 200 MG: 200 CAPSULE ORAL at 19:59

## 2023-12-24 RX ADMIN — INSULIN LISPRO 1 UNITS: 100 INJECTION, SOLUTION INTRAVENOUS; SUBCUTANEOUS at 08:37

## 2023-12-24 RX ADMIN — GUAIFENESIN 600 MG: 600 TABLET, EXTENDED RELEASE ORAL at 08:37

## 2023-12-24 RX ADMIN — EMPAGLIFLOZIN 10 MG: 10 TABLET, FILM COATED ORAL at 08:37

## 2023-12-24 RX ADMIN — METOPROLOL TARTRATE 50 MG: 50 TABLET ORAL at 19:59

## 2023-12-24 ASSESSMENT — PAIN DESCRIPTION - LOCATION: LOCATION: FOOT

## 2023-12-24 ASSESSMENT — PAIN SCALES - GENERAL
PAINLEVEL_OUTOF10: 0
PAINLEVEL_OUTOF10: 6

## 2023-12-24 ASSESSMENT — PAIN DESCRIPTION - ORIENTATION: ORIENTATION: LEFT

## 2023-12-24 ASSESSMENT — PAIN DESCRIPTION - DESCRIPTORS: DESCRIPTORS: ACHING

## 2023-12-24 ASSESSMENT — PAIN - FUNCTIONAL ASSESSMENT: PAIN_FUNCTIONAL_ASSESSMENT: PREVENTS OR INTERFERES SOME ACTIVE ACTIVITIES AND ADLS

## 2023-12-24 NOTE — PLAN OF CARE
Problem: Respiratory - Adult  Goal: Achieves optimal ventilation and oxygenation  12/24/2023 0827 by Maci Stroud RCP  Outcome: Progressing  12/23/2023 2050 by Abida Huerta RCP  Outcome: Progressing  Flowsheets (Taken 12/23/2023 2050)  Achieves optimal ventilation and oxygenation:   Assess for changes in respiratory status   Position to facilitate oxygenation and minimize respiratory effort   Initiate smoking cessation protocol as indicated   Assess the need for suctioning and aspirate as needed   Respiratory therapy support as indicated   Assess for changes in mentation and behavior   Oxygen supplementation based on oxygen saturation or arterial blood gases   Encourage broncho-pulmonary hygiene including cough, deep breathe, incentive spirometry   Assess and instruct to report shortness of breath or any respiratory difficulty

## 2023-12-24 NOTE — CONSULTS
[facility]  Consultation Note    Patient ID:  Name: Macho Tong  MRN: [id number]  AGE: 73 y.o.  : 1950    Date of Consultation:  2023  Referring Physician:  Hospitalist - Karin Donnelly NP    Subjective: Pt complains of left ankle pain.  They presented to the [facility] on 2023 for fall. They were found to have hypoxia and some left ankle pain. They were admitted by the hospitalist. They localizes their pain to the left ankle. They had pain with ROM and Weightbearing but no pain at this time. They have no other orthopedic concerns at this time.     He states that he was on prednisone for a Upper resp infection and when he came off the prednisone had swelling and pain in this left ankle. His pain is resolved now that he is back on prednisone. His plan is to go to rehab on Tuesday.      Past Medical History Includes:   Past Medical History:   Diagnosis Date    Adverse effect of anesthesia     unable to have spinal anesthesia when had TKR    Asthma dx childhood    last attack during high school; advair at ; albuterol prn; neb prn     Atrial fibrillation (HCC)     2 epiosode of A-fib last about 6-8 months ago (noted on 17); on Eliquis     Cervical spondylosis 2013    Chronic back pain     RIGHT SIDE WITH UNCLEAR ETIOLOGY    Chronic kidney disease     Chronic pain     Colon polyps     COPD (chronic obstructive pulmonary disease) (HCC) 2016    Coronary atherosclerosis of native coronary vessel 2016: Mild non-obstructive CAD    Diabetes (HCC) dx     type 2; oral meds; no bs checks at home    Diverticulosis     Edema 2016    Former smoker     GERD (gastroesophageal reflux disease)      controlled with med    High cholesterol     History of kidney stones     Hypertension     controlled with med    Kidney stone     Obesity (BMI 30-39.9)     BMI 39    Status post total right knee replacement 2017    Unspecified

## 2023-12-24 NOTE — PLAN OF CARE
Problem: Respiratory - Adult  Goal: Achieves optimal ventilation and oxygenation  Outcome: Progressing  Flowsheets (Taken 12/23/2023 2050)  Achieves optimal ventilation and oxygenation:   Assess for changes in respiratory status   Position to facilitate oxygenation and minimize respiratory effort   Initiate smoking cessation protocol as indicated   Assess the need for suctioning and aspirate as needed   Respiratory therapy support as indicated   Assess for changes in mentation and behavior   Oxygen supplementation based on oxygen saturation or arterial blood gases   Encourage broncho-pulmonary hygiene including cough, deep breathe, incentive spirometry   Assess and instruct to report shortness of breath or any respiratory difficulty

## 2023-12-24 NOTE — PROGRESS NOTES
Hospitalist Progress Note   Admit Date:  2023  1:13 AM   Name:  Macho Tong   Age:  73 y.o.  Sex:  male  :  1950   MRN:  [id number]   Room:  [facility] to [facility]/    Presenting/Chief Complaint: Fall     Reason(s) for Admission: Hypoxia [R09.02]  Generalized weakness [R53.1]  Acute left ankle pain [M25.572]  Leukocytosis, unspecified type [D72.829]  Inability to bear weight [R26.89]     Hospital Course:   Macho Tong is a 73 y.o. male with medical history of HTN, COPD/asthma, ex-smoker, DM on insulin, GERD, CKD, chronic back pain, Right foot charcot in brace, afib, presented to the ER on  with c/o increasing L ankle pain and inability to bear weight.     Pt saw his orthopedic, Dr. Beckwith on  for continued right ankle pain and instability causing falls secondary to his Charcot syndrome, despite double upright bracing.  Now beginning to have left ankle pain as well.  Most likely from overuse.  He was offered a second opinion at [facility].  Injection done in the office including Xylocaine and Depo-Medrol. Pain management has him on gabapentin and amitriptyline for neuropathic pain.  Wife states that this is getting worse, and he fell again at home overnight.  He got up to urinate and fell in the bathroom.  She is having a hard time helping him out.  EMS brought to the ER on oxygen.   He recently had an upper respiratory infection treated by his primary care physician, as bronchitis.   He completed a Z-Jimmy and oral prednisone 1-2 days PTA.  He states that those symptoms have almost resolved.      White cell count in the emergency room 14.8, procalcitonin negative at 0.13, lactic acid negative at 1.2, creatinine 0.91 with normal LFTs.  Uric acid 5.8.  Does not have a history of gout.  Highest temperature in the emergency room 99.  CRP is elevated at 16.5 and white cell count elevated at 14.8.  Chest x-ray is negative.  Flu negative, COVID negative. Blood cultures pending.    Both ankles  were also x-rayed, mild degenerative changes of the left ankle with no evidence of acute fracture.  Reviewed this with ABHIJIT Lopez with Dr. Beckwith's office by phone.  Most likely this is overuse of the left due to his obesity and offputting from the Charcot syndrome on the right with bracing, which is probably causing his inflammation and pain to the left.  There is nothing indicating sepsis.  Antibiotics were given in the emergency room.  Patient will be admitted for observation, pain management, inflammation control, and work with physical/occupational therapy.      Subjective & 24hr Events:     Patient seen and examined at bedside. Left ankle pain is improving overall, range of motion has increased. No new symptoms. No fevers/chills, chest pain, or shortness of breath.     10 point ROS negative except for HPI above.   Assessment & Plan:     Principal Problem:    Generalized weakness/inability to bear weight    Charcot foot disease    Left ankle pain    Recent falls  - ortho signed off, does not seem to be septic arthritis  - needs outpatient follow-up with ortho   - switch Decadron to prednisone  - Celebrex 200 mg BID  - add scheduled Tylenol 500 mg TID  - already on gabapentin  - would avoid narcotics as much as possible   - PT/OT     Active Problems:    COPD (chronic obstructive pulmonary disease) ([facility])  Plan: Stable, no acute exacerbation      Type 2 diabetes mellitus with microalbuminuria, with long-term current use of insulin ([facility])  Plan: Cont Lantus and SSI  - hold Trulicity while in the hospital, can resume upon discharge      Essential hypertension  Plan: Cont lisinopril, metoprolol      Chronic back pain  - pain management as above    Anticipated Discharge Arrangements:   Patient medically stable for hospital discharge, awaiting SNF placement. Discussed with patient at bedside. All questions answered.     PT/OT evals and PPD ordered?  Yes  Diet:  ADULT DIET; Regular; 4 carb choices (60

## 2023-12-25 LAB
ANION GAP SERPL CALC-SCNC: 5 MMOL/L (ref 2–11)
BASOPHILS # BLD: 0 K/UL (ref 0–0.2)
BASOPHILS NFR BLD: 0 % (ref 0–2)
BUN SERPL-MCNC: 33 MG/DL (ref 8–23)
CALCIUM SERPL-MCNC: 9.7 MG/DL (ref 8.3–10.4)
CHLORIDE SERPL-SCNC: 110 MMOL/L (ref 103–113)
CO2 SERPL-SCNC: 26 MMOL/L (ref 21–32)
CREAT SERPL-MCNC: 0.89 MG/DL (ref 0.8–1.5)
DIFFERENTIAL METHOD BLD: ABNORMAL
EOSINOPHIL # BLD: 0.1 K/UL (ref 0–0.8)
EOSINOPHIL NFR BLD: 1 % (ref 0.5–7.8)
ERYTHROCYTE [DISTWIDTH] IN BLOOD BY AUTOMATED COUNT: 17.2 % (ref 11.9–14.6)
GLUCOSE BLD STRIP.AUTO-MCNC: 245 MG/DL (ref 65–100)
GLUCOSE BLD STRIP.AUTO-MCNC: 288 MG/DL (ref 65–100)
GLUCOSE BLD STRIP.AUTO-MCNC: 363 MG/DL (ref 65–100)
GLUCOSE BLD STRIP.AUTO-MCNC: 417 MG/DL (ref 65–100)
GLUCOSE SERPL-MCNC: 264 MG/DL (ref 65–100)
HCT VFR BLD AUTO: 32 % (ref 41.1–50.3)
HGB BLD-MCNC: 9.7 G/DL (ref 13.6–17.2)
IMM GRANULOCYTES # BLD AUTO: 0.1 K/UL (ref 0–0.5)
IMM GRANULOCYTES NFR BLD AUTO: 1 % (ref 0–5)
LYMPHOCYTES # BLD: 1.8 K/UL (ref 0.5–4.6)
LYMPHOCYTES NFR BLD: 17 % (ref 13–44)
MCH RBC QN AUTO: 25.4 PG (ref 26.1–32.9)
MCHC RBC AUTO-ENTMCNC: 30.3 G/DL (ref 31.4–35)
MCV RBC AUTO: 83.8 FL (ref 82–102)
MONOCYTES # BLD: 0.8 K/UL (ref 0.1–1.3)
MONOCYTES NFR BLD: 8 % (ref 4–12)
NEUTS SEG # BLD: 8.1 K/UL (ref 1.7–8.2)
NEUTS SEG NFR BLD: 74 % (ref 43–78)
NRBC # BLD: 0.02 K/UL (ref 0–0.2)
PLATELET # BLD AUTO: 248 K/UL (ref 150–450)
PMV BLD AUTO: 9.6 FL (ref 9.4–12.3)
POTASSIUM SERPL-SCNC: 3.9 MMOL/L (ref 3.5–5.1)
RBC # BLD AUTO: 3.82 M/UL (ref 4.23–5.6)
SERVICE CMNT-IMP: ABNORMAL
SODIUM SERPL-SCNC: 141 MMOL/L (ref 136–146)
WBC # BLD AUTO: 11 K/UL (ref 4.3–11.1)

## 2023-12-25 PROCEDURE — 94760 N-INVAS EAR/PLS OXIMETRY 1: CPT

## 2023-12-25 PROCEDURE — 1100000000 HC RM PRIVATE

## 2023-12-25 PROCEDURE — 94640 AIRWAY INHALATION TREATMENT: CPT

## 2023-12-25 PROCEDURE — 85025 COMPLETE CBC W/AUTO DIFF WBC: CPT

## 2023-12-25 PROCEDURE — 2580000003 HC RX 258: Performed by: NURSE PRACTITIONER

## 2023-12-25 PROCEDURE — 36415 COLL VENOUS BLD VENIPUNCTURE: CPT

## 2023-12-25 PROCEDURE — 6370000000 HC RX 637 (ALT 250 FOR IP): Performed by: NURSE PRACTITIONER

## 2023-12-25 PROCEDURE — 6370000000 HC RX 637 (ALT 250 FOR IP): Performed by: INTERNAL MEDICINE

## 2023-12-25 PROCEDURE — 80048 BASIC METABOLIC PNL TOTAL CA: CPT

## 2023-12-25 PROCEDURE — 82962 GLUCOSE BLOOD TEST: CPT

## 2023-12-25 RX ORDER — COLCHICINE 0.6 MG/1
1.2 TABLET ORAL ONCE
Status: COMPLETED | OUTPATIENT
Start: 2023-12-25 | End: 2023-12-25

## 2023-12-25 RX ORDER — COLCHICINE 0.6 MG/1
0.6 TABLET ORAL ONCE
Status: COMPLETED | OUTPATIENT
Start: 2023-12-25 | End: 2023-12-25

## 2023-12-25 RX ADMIN — PANTOPRAZOLE SODIUM 40 MG: 40 TABLET, DELAYED RELEASE ORAL at 08:40

## 2023-12-25 RX ADMIN — COLCHICINE 1.2 MG: 0.6 TABLET, FILM COATED ORAL at 09:17

## 2023-12-25 RX ADMIN — LISINOPRIL 10 MG: 5 TABLET ORAL at 08:40

## 2023-12-25 RX ADMIN — METOPROLOL TARTRATE 50 MG: 50 TABLET ORAL at 22:34

## 2023-12-25 RX ADMIN — EZETIMIBE 10 MG: 10 TABLET ORAL at 22:34

## 2023-12-25 RX ADMIN — COLCHICINE 0.6 MG: 0.6 TABLET, FILM COATED ORAL at 12:50

## 2023-12-25 RX ADMIN — DOXAZOSIN 2 MG: 1 TABLET ORAL at 08:40

## 2023-12-25 RX ADMIN — APIXABAN 5 MG: 5 TABLET, FILM COATED ORAL at 08:40

## 2023-12-25 RX ADMIN — GABAPENTIN 300 MG: 300 CAPSULE ORAL at 08:40

## 2023-12-25 RX ADMIN — CELECOXIB 200 MG: 200 CAPSULE ORAL at 22:34

## 2023-12-25 RX ADMIN — EMPAGLIFLOZIN 10 MG: 10 TABLET, FILM COATED ORAL at 08:40

## 2023-12-25 RX ADMIN — ACETAMINOPHEN 650 MG: 325 TABLET, FILM COATED ORAL at 08:40

## 2023-12-25 RX ADMIN — MOMETASONE FUROATE AND FORMOTEROL FUMARATE DIHYDRATE 2 PUFF: 200; 5 AEROSOL RESPIRATORY (INHALATION) at 20:13

## 2023-12-25 RX ADMIN — SODIUM CHLORIDE, PRESERVATIVE FREE 10 ML: 5 INJECTION INTRAVENOUS at 22:39

## 2023-12-25 RX ADMIN — METOPROLOL TARTRATE 50 MG: 50 TABLET ORAL at 08:40

## 2023-12-25 RX ADMIN — MOMETASONE FUROATE AND FORMOTEROL FUMARATE DIHYDRATE 2 PUFF: 200; 5 AEROSOL RESPIRATORY (INHALATION) at 08:04

## 2023-12-25 RX ADMIN — SODIUM CHLORIDE, PRESERVATIVE FREE 10 ML: 5 INJECTION INTRAVENOUS at 08:41

## 2023-12-25 RX ADMIN — INSULIN LISPRO 4 UNITS: 100 INJECTION, SOLUTION INTRAVENOUS; SUBCUTANEOUS at 22:39

## 2023-12-25 RX ADMIN — APIXABAN 5 MG: 5 TABLET, FILM COATED ORAL at 22:35

## 2023-12-25 RX ADMIN — INSULIN LISPRO 1 UNITS: 100 INJECTION, SOLUTION INTRAVENOUS; SUBCUTANEOUS at 08:39

## 2023-12-25 RX ADMIN — ACETAMINOPHEN 650 MG: 325 TABLET, FILM COATED ORAL at 15:10

## 2023-12-25 RX ADMIN — CELECOXIB 200 MG: 200 CAPSULE ORAL at 08:40

## 2023-12-25 RX ADMIN — GUAIFENESIN 600 MG: 600 TABLET, EXTENDED RELEASE ORAL at 22:36

## 2023-12-25 RX ADMIN — GUAIFENESIN 600 MG: 600 TABLET, EXTENDED RELEASE ORAL at 08:40

## 2023-12-25 RX ADMIN — INSULIN LISPRO 7 UNITS: 100 INJECTION, SOLUTION INTRAVENOUS; SUBCUTANEOUS at 17:57

## 2023-12-25 RX ADMIN — INSULIN GLARGINE 30 UNITS: 100 INJECTION, SOLUTION SUBCUTANEOUS at 22:38

## 2023-12-25 RX ADMIN — GABAPENTIN 300 MG: 300 CAPSULE ORAL at 22:36

## 2023-12-25 RX ADMIN — ACETAMINOPHEN 650 MG: 325 TABLET, FILM COATED ORAL at 22:33

## 2023-12-25 RX ADMIN — PREDNISONE 40 MG: 20 TABLET ORAL at 08:40

## 2023-12-25 RX ADMIN — ATORVASTATIN CALCIUM 20 MG: 40 TABLET, FILM COATED ORAL at 22:36

## 2023-12-25 RX ADMIN — INSULIN LISPRO 2 UNITS: 100 INJECTION, SOLUTION INTRAVENOUS; SUBCUTANEOUS at 11:56

## 2023-12-25 RX ADMIN — INSULIN GLARGINE 30 UNITS: 100 INJECTION, SOLUTION SUBCUTANEOUS at 08:39

## 2023-12-25 RX ADMIN — MONTELUKAST 10 MG: 10 TABLET, FILM COATED ORAL at 22:35

## 2023-12-25 ASSESSMENT — PAIN DESCRIPTION - ORIENTATION
ORIENTATION: LEFT
ORIENTATION: LEFT

## 2023-12-25 ASSESSMENT — PAIN DESCRIPTION - DESCRIPTORS
DESCRIPTORS: ACHING;DISCOMFORT
DESCRIPTORS: ACHING

## 2023-12-25 ASSESSMENT — PAIN SCALES - GENERAL
PAINLEVEL_OUTOF10: 4
PAINLEVEL_OUTOF10: 5

## 2023-12-25 ASSESSMENT — PAIN DESCRIPTION - PAIN TYPE
TYPE: ACUTE PAIN
TYPE: ACUTE PAIN

## 2023-12-25 ASSESSMENT — PAIN DESCRIPTION - LOCATION
LOCATION: FOOT;LEG
LOCATION: FOOT;LEG

## 2023-12-25 ASSESSMENT — PAIN - FUNCTIONAL ASSESSMENT: PAIN_FUNCTIONAL_ASSESSMENT: ACTIVITIES ARE NOT PREVENTED

## 2023-12-25 NOTE — PROGRESS NOTES
Hospitalist Progress Note   Admit Date:  2023  1:13 AM   Name:  Macho Tong   Age:  73 y.o.  Sex:  male  :  1950   MRN:  [id number]   Room:  [facility]/    Presenting/Chief Complaint: Fall     Reason(s) for Admission: Hypoxia [R09.02]  Generalized weakness [R53.1]  Acute left ankle pain [M25.572]  Leukocytosis, unspecified type [D72.829]  Inability to bear weight [R26.89]     Hospital Course:   Macho Tong is a 73 y.o. male with medical history of HTN, COPD/asthma, ex-smoker, DM on insulin, GERD, CKD, chronic back pain, Right foot charcot in brace, afib, presented to the ER on  with c/o increasing L ankle pain and inability to bear weight.     Pt saw his orthopedic, Dr. Beckwith on  for continued right ankle pain and instability causing falls secondary to his Charcot syndrome, despite double upright bracing.  Now beginning to have left ankle pain as well.  Most likely from overuse.  He was offered a second opinion at [facility].  Injection done in the office including Xylocaine and Depo-Medrol. Pain management has him on gabapentin and amitriptyline for neuropathic pain.  Wife states that this is getting worse, and he fell again at home overnight.  He got up to urinate and fell in the bathroom.  She is having a hard time helping him out.  EMS brought to the ER on oxygen.   He recently had an upper respiratory infection treated by his primary care physician, as bronchitis.   He completed a Z-Jimmy and oral prednisone 1-2 days PTA.  He states that those symptoms have almost resolved.      White cell count in the emergency room 14.8, procalcitonin negative at 0.13, lactic acid negative at 1.2, creatinine 0.91 with normal LFTs.  Uric acid 5.8.  Does not have a history of gout.  Highest temperature in the emergency room 99.  CRP is elevated at 16.5 and white cell count elevated at 14.8.  Chest x-ray is negative.  Flu negative, COVID negative. Blood cultures pending.    Both ankles

## 2023-12-25 NOTE — PLAN OF CARE
Problem: Respiratory - Adult  Goal: Achieves optimal ventilation and oxygenation  12/24/2023 2028 by Abida Huerta RCP  Outcome: Progressing  Flowsheets (Taken 12/24/2023 2028)  Achieves optimal ventilation and oxygenation:   Assess for changes in respiratory status   Position to facilitate oxygenation and minimize respiratory effort   Initiate smoking cessation protocol as indicated   Assess the need for suctioning and aspirate as needed   Respiratory therapy support as indicated   Assess for changes in mentation and behavior   Oxygen supplementation based on oxygen saturation or arterial blood gases   Encourage broncho-pulmonary hygiene including cough, deep breathe, incentive spirometry   Assess and instruct to report shortness of breath or any respiratory difficulty  12/24/2023 0827 by Maci Stroud, RCP  Outcome: Progressing

## 2023-12-26 ENCOUNTER — TELEPHONE (OUTPATIENT)
Dept: ORTHOPEDIC SURGERY | Age: 73
End: 2023-12-26

## 2023-12-26 LAB
FERRITIN SERPL-MCNC: 163 NG/ML (ref 8–388)
FOLATE SERPL-MCNC: 5.1 NG/ML (ref 3.1–17.5)
GLUCOSE BLD STRIP.AUTO-MCNC: 166 MG/DL (ref 65–100)
GLUCOSE BLD STRIP.AUTO-MCNC: 251 MG/DL (ref 65–100)
GLUCOSE BLD STRIP.AUTO-MCNC: 283 MG/DL (ref 65–100)
GLUCOSE BLD STRIP.AUTO-MCNC: 285 MG/DL (ref 65–100)
HAPTOGLOB SERPL-MCNC: 280 MG/DL (ref 30–200)
HGB RETIC QN AUTO: 26 PG (ref 29–35)
IMM RETICS NFR: 26.2 % (ref 2.3–13.4)
IRON SATN MFR SERPL: 10 %
IRON SERPL-MCNC: 25 UG/DL (ref 35–150)
LDH SERPL L TO P-CCNC: 137 U/L (ref 110–210)
RETICS # AUTO: 0.08 M/UL (ref 0.03–0.1)
RETICS/RBC NFR AUTO: 1.9 % (ref 0.3–2)
SERVICE CMNT-IMP: ABNORMAL
TIBC SERPL-MCNC: 240 UG/DL (ref 250–450)
VIT B12 SERPL-MCNC: 499 PG/ML (ref 193–986)

## 2023-12-26 PROCEDURE — 82962 GLUCOSE BLOOD TEST: CPT

## 2023-12-26 PROCEDURE — 36415 COLL VENOUS BLD VENIPUNCTURE: CPT

## 2023-12-26 PROCEDURE — 94761 N-INVAS EAR/PLS OXIMETRY MLT: CPT

## 2023-12-26 PROCEDURE — 94760 N-INVAS EAR/PLS OXIMETRY 1: CPT

## 2023-12-26 PROCEDURE — 2580000003 HC RX 258: Performed by: NURSE PRACTITIONER

## 2023-12-26 PROCEDURE — 83615 LACTATE (LD) (LDH) ENZYME: CPT

## 2023-12-26 PROCEDURE — 82728 ASSAY OF FERRITIN: CPT

## 2023-12-26 PROCEDURE — 83540 ASSAY OF IRON: CPT

## 2023-12-26 PROCEDURE — 82746 ASSAY OF FOLIC ACID SERUM: CPT

## 2023-12-26 PROCEDURE — 83010 ASSAY OF HAPTOGLOBIN QUANT: CPT

## 2023-12-26 PROCEDURE — 83550 IRON BINDING TEST: CPT

## 2023-12-26 PROCEDURE — 6370000000 HC RX 637 (ALT 250 FOR IP): Performed by: NURSE PRACTITIONER

## 2023-12-26 PROCEDURE — 82607 VITAMIN B-12: CPT

## 2023-12-26 PROCEDURE — 85046 RETICYTE/HGB CONCENTRATE: CPT

## 2023-12-26 PROCEDURE — 94640 AIRWAY INHALATION TREATMENT: CPT

## 2023-12-26 PROCEDURE — 6370000000 HC RX 637 (ALT 250 FOR IP): Performed by: INTERNAL MEDICINE

## 2023-12-26 PROCEDURE — 1100000000 HC RM PRIVATE

## 2023-12-26 RX ADMIN — GUAIFENESIN 600 MG: 600 TABLET, EXTENDED RELEASE ORAL at 09:11

## 2023-12-26 RX ADMIN — GABAPENTIN 300 MG: 300 CAPSULE ORAL at 21:31

## 2023-12-26 RX ADMIN — APIXABAN 5 MG: 5 TABLET, FILM COATED ORAL at 21:31

## 2023-12-26 RX ADMIN — ATORVASTATIN CALCIUM 20 MG: 40 TABLET, FILM COATED ORAL at 21:30

## 2023-12-26 RX ADMIN — ACETAMINOPHEN 650 MG: 325 TABLET, FILM COATED ORAL at 09:11

## 2023-12-26 RX ADMIN — INSULIN GLARGINE 30 UNITS: 100 INJECTION, SOLUTION SUBCUTANEOUS at 09:10

## 2023-12-26 RX ADMIN — GABAPENTIN 300 MG: 300 CAPSULE ORAL at 09:10

## 2023-12-26 RX ADMIN — APIXABAN 5 MG: 5 TABLET, FILM COATED ORAL at 09:11

## 2023-12-26 RX ADMIN — CELECOXIB 200 MG: 200 CAPSULE ORAL at 09:11

## 2023-12-26 RX ADMIN — INSULIN LISPRO 2 UNITS: 100 INJECTION, SOLUTION INTRAVENOUS; SUBCUTANEOUS at 16:06

## 2023-12-26 RX ADMIN — ACETAMINOPHEN 650 MG: 325 TABLET, FILM COATED ORAL at 21:31

## 2023-12-26 RX ADMIN — LISINOPRIL 10 MG: 5 TABLET ORAL at 09:11

## 2023-12-26 RX ADMIN — SODIUM CHLORIDE, PRESERVATIVE FREE 10 ML: 5 INJECTION INTRAVENOUS at 09:11

## 2023-12-26 RX ADMIN — INSULIN LISPRO 2 UNITS: 100 INJECTION, SOLUTION INTRAVENOUS; SUBCUTANEOUS at 12:24

## 2023-12-26 RX ADMIN — PANTOPRAZOLE SODIUM 40 MG: 40 TABLET, DELAYED RELEASE ORAL at 09:11

## 2023-12-26 RX ADMIN — CELECOXIB 200 MG: 200 CAPSULE ORAL at 21:32

## 2023-12-26 RX ADMIN — DOXAZOSIN 2 MG: 1 TABLET ORAL at 09:11

## 2023-12-26 RX ADMIN — GUAIFENESIN 600 MG: 600 TABLET, EXTENDED RELEASE ORAL at 21:31

## 2023-12-26 RX ADMIN — EMPAGLIFLOZIN 10 MG: 10 TABLET, FILM COATED ORAL at 09:11

## 2023-12-26 RX ADMIN — MOMETASONE FUROATE AND FORMOTEROL FUMARATE DIHYDRATE 2 PUFF: 200; 5 AEROSOL RESPIRATORY (INHALATION) at 08:37

## 2023-12-26 RX ADMIN — METOPROLOL TARTRATE 50 MG: 50 TABLET ORAL at 21:31

## 2023-12-26 RX ADMIN — INSULIN GLARGINE 30 UNITS: 100 INJECTION, SOLUTION SUBCUTANEOUS at 21:34

## 2023-12-26 RX ADMIN — MOMETASONE FUROATE AND FORMOTEROL FUMARATE DIHYDRATE 2 PUFF: 200; 5 AEROSOL RESPIRATORY (INHALATION) at 20:14

## 2023-12-26 RX ADMIN — ACETAMINOPHEN 650 MG: 325 TABLET, FILM COATED ORAL at 16:06

## 2023-12-26 RX ADMIN — MONTELUKAST 10 MG: 10 TABLET, FILM COATED ORAL at 21:31

## 2023-12-26 RX ADMIN — PREDNISONE 40 MG: 20 TABLET ORAL at 09:11

## 2023-12-26 RX ADMIN — METOPROLOL TARTRATE 50 MG: 50 TABLET ORAL at 09:11

## 2023-12-26 RX ADMIN — EZETIMIBE 10 MG: 10 TABLET ORAL at 21:31

## 2023-12-26 ASSESSMENT — PAIN SCALES - GENERAL: PAINLEVEL_OUTOF10: 4

## 2023-12-26 ASSESSMENT — PAIN DESCRIPTION - LOCATION: LOCATION: LEG

## 2023-12-26 ASSESSMENT — PAIN DESCRIPTION - ORIENTATION: ORIENTATION: LEFT

## 2023-12-26 ASSESSMENT — PAIN DESCRIPTION - DESCRIPTORS: DESCRIPTORS: ACHING

## 2023-12-26 ASSESSMENT — PAIN DESCRIPTION - PAIN TYPE: TYPE: ACUTE PAIN

## 2023-12-26 NOTE — PROGRESS NOTES
Progress Note    Patient: Macho Tong MRN: [id number]  SSN: [id number]    YOB: 1950  Age: 73 y.o.  Sex: male      Admit Date: 12/22/2023    LOS: 3 days     Subjective:     Patient is continuing to complain of left ankle pain    Objective:     Vitals:    12/25/23 2041 12/26/23 0143 12/26/23 0801 12/26/23 0838   BP: 138/62 (!) 143/82 (!) 149/62    Pulse: 59 55 53 81   Resp: 16 16 16 15   Temp: 98.1 °F (36.7 °C) 98.6 °F (37 °C) 98.4 °F (36.9 °C)    TempSrc: Oral Oral Oral    SpO2: 94% 93% 93% 94%   Weight:       Height:            Intake and Output:  Current Shift: 12/26 0701 - 12/26 1900  In: 240 [P.O.:240]  Out: 600 [Urine:600]  Last three shifts: 12/24 1901 - 12/26 0700  In: 1655 [P.O.:1655]  Out: 6800 [Urine:6800]    alert and oriented to person place time and situation  Skin - no open wounds or pressure sores  Motor and sensory function intact in LEFT LOWER extremity  Pulses palpable in LEFT LOWER extremity       Lab/Data Review:  Recent Labs     12/25/23  0624   HGB 9.7*   HCT 32.0*          Assessment:     Left ankle pain    Plan:     Patient has been seen by Dr. Beckwith as an outpatient is also been seen by the orthopedic staff over the holiday weekend.  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  His white count has normalized.  I think is very reasonable for the patient to be allowed to see Dr. Beckwith as an outpatient.  If the hospitalist team truly thinks he needs an inpatient orthopedic consult then he would need to be transferred downtown but I really do not think there is much of the need for this.  It sounds like he has a known diagnosis and he has very reasonable follow-up planned to see Dr. Beckwith after discharge and this is really who he needs to see.      Signed By: Orthopedic consult that he would have to be transferred downtown Timothy Villagran MD     December 26, 2023

## 2023-12-26 NOTE — PROGRESS NOTES
Hospitalist Progress Note   Admit Date:  2023  1:13 AM   Name:  Macho Tong   Age:  73 y.o.  Sex:  male  :  1950   MRN:  [id number]   Room:  [facility]/    Presenting/Chief Complaint: Fall     Reason(s) for Admission: Hypoxia [R09.02]  Generalized weakness [R53.1]  Acute left ankle pain [M25.572]  Leukocytosis, unspecified type [D72.829]  Inability to bear weight [R26.89]     Hospital Course:   Macho Tong is a 73 y.o. male with medical history of HTN, COPD/asthma, ex-smoker, DM on insulin, GERD, CKD, chronic back pain, Right foot charcot in brace, afib, presented to the ER on  with c/o increasing L ankle pain and inability to bear weight.     Pt saw his orthopedic, Dr. Beckwith on  for continued right ankle pain and instability causing falls secondary to his Charcot syndrome, despite double upright bracing.  Now beginning to have left ankle pain as well.  Most likely from overuse.  He was offered a second opinion at [facility].  Injection done in the office including Xylocaine and Depo-Medrol. Pain management has him on gabapentin and amitriptyline for neuropathic pain.  Wife states that this is getting worse, and he fell again at home overnight.  He got up to urinate and fell in the bathroom.  She is having a hard time helping him out.  EMS brought to the ER on oxygen.   He recently had an upper respiratory infection treated by his primary care physician, as bronchitis.   He completed a Z-Jimmy and oral prednisone 1-2 days PTA.  He states that those symptoms have almost resolved.      White cell count in the emergency room 14.8, procalcitonin negative at 0.13, lactic acid negative at 1.2, creatinine 0.91 with normal LFTs.  Uric acid 5.8.  Does not have a history of gout.  Highest temperature in the emergency room 99.  CRP is elevated at 16.5 and white cell count elevated at 14.8.  Chest x-ray is negative.  Flu negative, COVID negative. Blood cultures pending.    Both ankles

## 2023-12-26 NOTE — DIABETES MGMT
Patient admitted with generalized weakness. Blood glucose ranged 223-417 yesterday with patient receiving Lantus 60 units, Humalog 14 units, Jardiance 10 mg, and Prednisone 40 mg. Blood glucose this morning was 166. Reviewed patient current regimen: Lantus 30 units BID, Humalog correctional insulin, Jardiance 10 mg daily, and Prednisone 40 mg daily.  Patient would likely benefit from initiation of an intermediate acting insulin to help offset hyperglycemia during the day related to steroid therapy.  If starting an intermediate acting insulin, patient would likely benefit from a decrease in basal insulin to reduce the risk of morning hypoglycemia.  Provider updated via [employer] regarding recommendations and patient glycemic control.

## 2023-12-26 NOTE — PROGRESS NOTES
Physical Therapy Note:    Attempted to see patient this PM for physical therapy treatment  session. Patient states he is unable to bear weight through either leg, and swelling in left leg has not decreased. Pt provided with ice pack for left ankle. Will follow and re-attempt as schedule permits/patient available. Thank you,    SAMI CHAO, PT     Rehab Caseload Tracker

## 2023-12-26 NOTE — CARE COORDINATION
MD following lowering HGB but hoping for d/c by tomorrow./ Wed 12-27.   Long discussion with pt, dtr and tearful wife about d/c plans. Pt does not have any NH offers so I was obtaining some additional choices. Pt is adamant he wants a private room- [facility] (first Choice) only has semi-pvt rooms. (I spoke with Regina in Admissions)   I discussed acute rehab options as pt has some complexity to his recovery. They were receptive in making a referral to [facility] (wife had a bad experience at [facility] many yrs ago) I called and spoke to Marlyn who confirmed that they have beds available. Referral sent for their review.  [facility] was also discussed as a back up plan as they have pvt rooms only.

## 2023-12-26 NOTE — TELEPHONE ENCOUNTER
Please  let ISHAN  know  That pt  is  in  hospital  today  and  the  ER  called h I'm in. He  is  going to  rehab hopefully today  and  still in hospital at  this  time  9:38  He  said  hospital  will send  in  someone  from  ortho-  not  sure  if   ISHAN  is  aware    This is a new  issue  left  ankle  cannot  walk  swollen  sore  no  injury      They are  going to  try  treating  for  gout- although  his levels a re not high. ..

## 2023-12-26 NOTE — DIABETES MGMT
Patient seen for follow up diabetes education to review CGM use. Patient wife brought in freestyle lorie 2 sensor and . Patient educated regarding parts of freestyle lorie 2. Patient does not want to put sensor on at this time due to concern of possibly having ct scan. Reviewed use of CGM and benefits of CGM. Educated regarding basal bolus insulin as patient had questions regarding this. Assisted patient with installation of obie on phone. Patient would likely benefit from continued diabetes outpatient education. Patient provided with contact information to [facility] program. Encouraged compliance with discharge regimen. Encouraged patient to continue to work on lifestyle modifications and to follow up with endocrinology for further titration of regimen. Patient verbalized understanding and voices no further questions regarding diabetes management.

## 2023-12-26 NOTE — PLAN OF CARE
Problem: Discharge Planning  Goal: Discharge to home or other facility with appropriate resources  Outcome: Progressing     Problem: Pain  Goal: Verbalizes/displays adequate comfort level or baseline comfort level  Outcome: Progressing     Problem: Skin/Tissue Integrity  Goal: Absence of new skin breakdown  Description: 1.  Monitor for areas of redness and/or skin breakdown  2.  Assess vascular access sites hourly  3.  Every 4-6 hours minimum:  Change oxygen saturation probe site  4.  Every 4-6 hours:  If on nasal continuous positive airway pressure, respiratory therapy assess nares and determine need for appliance change or resting period.  Outcome: Progressing     Problem: Safety - Adult  Goal: Free from fall injury  Outcome: Progressing     Problem: ABCDS Injury Assessment  Goal: Absence of physical injury  Outcome: Progressing     Problem: Musculoskeletal - Adult  Goal: Return mobility to safest level of function  Outcome: Progressing     Problem: Respiratory - Adult  Goal: Achieves optimal ventilation and oxygenation  Outcome: Progressing     Problem: Metabolic/Fluid and Electrolytes - Adult  Goal: Glucose maintained within prescribed range  Outcome: Progressing     Problem: Neurosensory - Adult  Goal: Achieves maximal functionality and self care  Outcome: Progressing     Problem: Genitourinary - Adult  Goal: Absence of urinary retention  Outcome: Progressing     Problem: Infection - Adult  Goal: Absence of infection at discharge  Outcome: Progressing  Goal: Absence of infection during hospitalization  Outcome: Progressing     Problem: Gastrointestinal - Adult  Goal: Maintains adequate nutritional intake  Outcome: Progressing     Problem: Skin/Tissue Integrity - Adult  Goal: Incisions, wounds, or drain sites healing without S/S of infection  Outcome: Progressing

## 2023-12-27 ENCOUNTER — APPOINTMENT (OUTPATIENT)
Dept: ULTRASOUND IMAGING | Age: 73
DRG: 549 | End: 2023-12-27
Payer: MEDICARE

## 2023-12-27 PROBLEM — M25.572 LEFT ANKLE PAIN: Status: ACTIVE | Noted: 2023-12-27

## 2023-12-27 PROBLEM — D64.9 NORMOCYTIC ANEMIA: Status: ACTIVE | Noted: 2023-12-27

## 2023-12-27 PROBLEM — M10.9 ACUTE GOUT: Status: ACTIVE | Noted: 2023-12-27

## 2023-12-27 LAB
ANION GAP SERPL CALC-SCNC: 7 MMOL/L (ref 2–11)
APPEARANCE FLD: NORMAL
BACTERIA SPEC CULT: NORMAL
BACTERIA SPEC CULT: NORMAL
BUN SERPL-MCNC: 23 MG/DL (ref 8–23)
CALCIUM SERPL-MCNC: 9.1 MG/DL (ref 8.3–10.4)
CHLORIDE SERPL-SCNC: 107 MMOL/L (ref 103–113)
CO2 SERPL-SCNC: 26 MMOL/L (ref 21–32)
COLOR FLD: NORMAL
CREAT SERPL-MCNC: 0.87 MG/DL (ref 0.8–1.5)
ERYTHROCYTE [DISTWIDTH] IN BLOOD BY AUTOMATED COUNT: 17.5 % (ref 11.9–14.6)
GLUCOSE BLD STRIP.AUTO-MCNC: 186 MG/DL (ref 65–100)
GLUCOSE BLD STRIP.AUTO-MCNC: 276 MG/DL (ref 65–100)
GLUCOSE BLD STRIP.AUTO-MCNC: 306 MG/DL (ref 65–100)
GLUCOSE BLD STRIP.AUTO-MCNC: 306 MG/DL (ref 65–100)
GLUCOSE BLD STRIP.AUTO-MCNC: 315 MG/DL (ref 65–100)
GLUCOSE SERPL-MCNC: 289 MG/DL (ref 65–100)
HCT VFR BLD AUTO: 35.2 % (ref 41.1–50.3)
HGB BLD-MCNC: 10.7 G/DL (ref 13.6–17.2)
MACROPHAGES NFR BRONCH MANUAL: 6 %
MCH RBC QN AUTO: 25.4 PG (ref 26.1–32.9)
MCHC RBC AUTO-ENTMCNC: 30.4 G/DL (ref 31.4–35)
MCV RBC AUTO: 83.4 FL (ref 82–102)
NEUTROPHILS NFR BRONCH MANUAL: 94 %
NRBC # BLD: 0.03 K/UL (ref 0–0.2)
NUC CELL # FLD: NORMAL /CU MM
PLATELET # BLD AUTO: 259 K/UL (ref 150–450)
PMV BLD AUTO: 9.5 FL (ref 9.4–12.3)
POTASSIUM SERPL-SCNC: 4 MMOL/L (ref 3.5–5.1)
RBC # BLD AUTO: 4.22 M/UL (ref 4.23–5.6)
RBC # FLD: NORMAL /CU MM
SERVICE CMNT-IMP: ABNORMAL
SERVICE CMNT-IMP: NORMAL
SERVICE CMNT-IMP: NORMAL
SODIUM SERPL-SCNC: 140 MMOL/L (ref 136–146)
SPECIMEN SOURCE FLD: NORMAL
WBC # BLD AUTO: 11.9 K/UL (ref 4.3–11.1)

## 2023-12-27 PROCEDURE — 6370000000 HC RX 637 (ALT 250 FOR IP): Performed by: FAMILY MEDICINE

## 2023-12-27 PROCEDURE — 87070 CULTURE OTHR SPECIMN AEROBIC: CPT

## 2023-12-27 PROCEDURE — 6370000000 HC RX 637 (ALT 250 FOR IP): Performed by: INTERNAL MEDICINE

## 2023-12-27 PROCEDURE — 93971 EXTREMITY STUDY: CPT

## 2023-12-27 PROCEDURE — 87186 SC STD MICRODIL/AGAR DIL: CPT

## 2023-12-27 PROCEDURE — 36415 COLL VENOUS BLD VENIPUNCTURE: CPT

## 2023-12-27 PROCEDURE — 2580000003 HC RX 258: Performed by: NURSE PRACTITIONER

## 2023-12-27 PROCEDURE — 94760 N-INVAS EAR/PLS OXIMETRY 1: CPT

## 2023-12-27 PROCEDURE — 87077 CULTURE AEROBIC IDENTIFY: CPT

## 2023-12-27 PROCEDURE — 89060 EXAM SYNOVIAL FLUID CRYSTALS: CPT

## 2023-12-27 PROCEDURE — 80048 BASIC METABOLIC PNL TOTAL CA: CPT

## 2023-12-27 PROCEDURE — 99232 SBSQ HOSP IP/OBS MODERATE 35: CPT | Performed by: ORTHOPAEDIC SURGERY

## 2023-12-27 PROCEDURE — 89050 BODY FLUID CELL COUNT: CPT

## 2023-12-27 PROCEDURE — 6370000000 HC RX 637 (ALT 250 FOR IP): Performed by: NURSE PRACTITIONER

## 2023-12-27 PROCEDURE — 85027 COMPLETE CBC AUTOMATED: CPT

## 2023-12-27 PROCEDURE — 87205 SMEAR GRAM STAIN: CPT

## 2023-12-27 PROCEDURE — 93971 EXTREMITY STUDY: CPT | Performed by: RADIOLOGY

## 2023-12-27 PROCEDURE — 94761 N-INVAS EAR/PLS OXIMETRY MLT: CPT

## 2023-12-27 PROCEDURE — 1100000000 HC RM PRIVATE

## 2023-12-27 PROCEDURE — 94640 AIRWAY INHALATION TREATMENT: CPT

## 2023-12-27 PROCEDURE — 82962 GLUCOSE BLOOD TEST: CPT

## 2023-12-27 RX ORDER — INSULIN GLARGINE 100 [IU]/ML
25 INJECTION, SOLUTION SUBCUTANEOUS 2 TIMES DAILY
Status: DISCONTINUED | OUTPATIENT
Start: 2023-12-27 | End: 2023-12-28

## 2023-12-27 RX ORDER — FOLIC ACID 1 MG/1
1 TABLET ORAL DAILY
Status: DISCONTINUED | OUTPATIENT
Start: 2023-12-27 | End: 2023-12-30 | Stop reason: HOSPADM

## 2023-12-27 RX ORDER — FUROSEMIDE 20 MG/1
20 TABLET ORAL DAILY
Status: DISCONTINUED | OUTPATIENT
Start: 2023-12-27 | End: 2023-12-28

## 2023-12-27 RX ORDER — LANOLIN ALCOHOL/MO/W.PET/CERES
1000 CREAM (GRAM) TOPICAL DAILY
Status: DISCONTINUED | OUTPATIENT
Start: 2023-12-27 | End: 2023-12-30 | Stop reason: HOSPADM

## 2023-12-27 RX ADMIN — CYANOCOBALAMIN TAB 1000 MCG 1000 MCG: 1000 TAB at 08:15

## 2023-12-27 RX ADMIN — METOPROLOL TARTRATE 50 MG: 50 TABLET ORAL at 08:05

## 2023-12-27 RX ADMIN — ACETAMINOPHEN 650 MG: 325 TABLET, FILM COATED ORAL at 15:49

## 2023-12-27 RX ADMIN — METOPROLOL TARTRATE 50 MG: 50 TABLET ORAL at 21:29

## 2023-12-27 RX ADMIN — CELECOXIB 200 MG: 200 CAPSULE ORAL at 21:30

## 2023-12-27 RX ADMIN — SODIUM CHLORIDE, PRESERVATIVE FREE 10 ML: 5 INJECTION INTRAVENOUS at 08:05

## 2023-12-27 RX ADMIN — GUAIFENESIN 600 MG: 600 TABLET, EXTENDED RELEASE ORAL at 08:05

## 2023-12-27 RX ADMIN — GUAIFENESIN 600 MG: 600 TABLET, EXTENDED RELEASE ORAL at 21:29

## 2023-12-27 RX ADMIN — APIXABAN 5 MG: 5 TABLET, FILM COATED ORAL at 08:04

## 2023-12-27 RX ADMIN — ATORVASTATIN CALCIUM 20 MG: 40 TABLET, FILM COATED ORAL at 21:29

## 2023-12-27 RX ADMIN — INSULIN GLARGINE 30 UNITS: 100 INJECTION, SOLUTION SUBCUTANEOUS at 08:06

## 2023-12-27 RX ADMIN — MOMETASONE FUROATE AND FORMOTEROL FUMARATE DIHYDRATE 2 PUFF: 200; 5 AEROSOL RESPIRATORY (INHALATION) at 08:37

## 2023-12-27 RX ADMIN — CELECOXIB 200 MG: 200 CAPSULE ORAL at 08:05

## 2023-12-27 RX ADMIN — ACETAMINOPHEN 650 MG: 325 TABLET, FILM COATED ORAL at 21:28

## 2023-12-27 RX ADMIN — GABAPENTIN 300 MG: 300 CAPSULE ORAL at 08:05

## 2023-12-27 RX ADMIN — MOMETASONE FUROATE AND FORMOTEROL FUMARATE DIHYDRATE 2 PUFF: 200; 5 AEROSOL RESPIRATORY (INHALATION) at 22:10

## 2023-12-27 RX ADMIN — INSULIN LISPRO 4 UNITS: 100 INJECTION, SOLUTION INTRAVENOUS; SUBCUTANEOUS at 22:44

## 2023-12-27 RX ADMIN — INSULIN HUMAN 14 UNITS: 100 INJECTION, SUSPENSION SUBCUTANEOUS at 08:40

## 2023-12-27 RX ADMIN — PANTOPRAZOLE SODIUM 40 MG: 40 TABLET, DELAYED RELEASE ORAL at 08:04

## 2023-12-27 RX ADMIN — INSULIN GLARGINE 25 UNITS: 100 INJECTION, SOLUTION SUBCUTANEOUS at 21:33

## 2023-12-27 RX ADMIN — MONTELUKAST 10 MG: 10 TABLET, FILM COATED ORAL at 21:30

## 2023-12-27 RX ADMIN — ACETAMINOPHEN 650 MG: 325 TABLET, FILM COATED ORAL at 08:05

## 2023-12-27 RX ADMIN — FUROSEMIDE 20 MG: 20 TABLET ORAL at 15:49

## 2023-12-27 RX ADMIN — APIXABAN 5 MG: 5 TABLET, FILM COATED ORAL at 21:30

## 2023-12-27 RX ADMIN — PREDNISONE 40 MG: 20 TABLET ORAL at 08:05

## 2023-12-27 RX ADMIN — LISINOPRIL 10 MG: 5 TABLET ORAL at 08:04

## 2023-12-27 RX ADMIN — INSULIN LISPRO 2 UNITS: 100 INJECTION, SOLUTION INTRAVENOUS; SUBCUTANEOUS at 11:50

## 2023-12-27 RX ADMIN — INSULIN LISPRO 3 UNITS: 100 INJECTION, SOLUTION INTRAVENOUS; SUBCUTANEOUS at 16:58

## 2023-12-27 RX ADMIN — EZETIMIBE 10 MG: 10 TABLET ORAL at 21:29

## 2023-12-27 RX ADMIN — SODIUM CHLORIDE, PRESERVATIVE FREE 10 ML: 5 INJECTION INTRAVENOUS at 21:45

## 2023-12-27 RX ADMIN — GABAPENTIN 300 MG: 300 CAPSULE ORAL at 21:30

## 2023-12-27 RX ADMIN — EMPAGLIFLOZIN 10 MG: 10 TABLET, FILM COATED ORAL at 08:04

## 2023-12-27 RX ADMIN — FOLIC ACID 1 MG: 1 TABLET ORAL at 08:15

## 2023-12-27 RX ADMIN — DOXAZOSIN 2 MG: 1 TABLET ORAL at 08:04

## 2023-12-27 ASSESSMENT — PAIN DESCRIPTION - LOCATION: LOCATION: FOOT

## 2023-12-27 ASSESSMENT — PAIN DESCRIPTION - ORIENTATION: ORIENTATION: LEFT

## 2023-12-27 ASSESSMENT — PAIN DESCRIPTION - DESCRIPTORS: DESCRIPTORS: DULL;THROBBING

## 2023-12-27 ASSESSMENT — PAIN SCALES - GENERAL: PAINLEVEL_OUTOF10: 4

## 2023-12-27 NOTE — PROCEDURES
Left ankle aspiration performed today.  Sterilely prepped the ankle.  Needles placed through the anterior medial aspect into the ankle joint.  Blood was withdrawn.  No signs of pus or bacteria or clear fluid.    Sent off for cell count, crystals, cultures.

## 2023-12-27 NOTE — PROGRESS NOTES
with long-term current use of insulin (HCC)    Essential hypertension    Chronic back pain    Fall    Charcot ankle, right    Inability to bear weight  Resolved Problems:    * No resolved hospital problems. *      Objective:   Patient Vitals for the past 24 hrs:   Temp Pulse Resp BP SpO2   12/27/23 0759 98.1 °F (36.7 °C) 80 16 (!) 166/99 93 %   12/27/23 0155 99 °F (37.2 °C) 66 18 (!) 140/82 95 %   12/26/23 2015 -- 71 18 -- 95 %   12/26/23 1933 98.6 °F (37 °C) 70 18 138/82 95 %   12/26/23 1423 98.8 °F (37.1 °C) 64 18 136/74 92 %   12/26/23 0838 -- 81 15 -- 94 %       Oxygen Therapy  SpO2: 93 %  Pulse via Oximetry: 92 beats per minute  Pulse Oximeter Device Mode: Intermittent  Pulse Oximeter Device Location: Right, Finger  O2 Device: None (Room air)  O2 Flow Rate (L/min): 0 L/min    Estimated body mass index is 35.81 kg/m² as calculated from the following:    Height as of this encounter: 1.956 m (6' 5\").    Weight as of this encounter: 137 kg (302 lb).    Intake/Output Summary (Last 24 hours) at 12/27/2023 0801  Last data filed at 12/27/2023 0649  Gross per 24 hour   Intake 720 ml   Output 3750 ml   Net -3030 ml         Physical Exam:     General:    Well nourished.    Head:  Normocephalic, atraumatic  Eyes:  Sclerae appear normal.  Pupils equally round.  ENT:  Nares appear normal.  Moist oral mucosa  Neck:  No restricted ROM.  Trachea midline   CV:   RRR.  No m/r/g.  No jugular venous distension.  Lungs:   CTAB.  No wheezing, rhonchi, or rales.  Symmetric expansion.  Abdomen:   Soft, nontender, nondistended.  Extremities: RLE in brace. L ankle with erythema posterior ? Venous stasis dermatitis. Also with significant edema.  Skin:     Warm and dry.    Neuro:  CN II-XII grossly intact.    Psych:  Normal mood and affect.      I have personally reviewed labs and tests:  Recent Labs:  Recent Results (from the past 48 hour(s))   POCT Glucose    Collection Time: 12/25/23 11:20 AM   Result Value Ref Range    POC Glucose 288  (H) 65 - 100 mg/dL    Performed by: Jatin    POCT Glucose    Collection Time: 12/25/23  4:30 PM   Result Value Ref Range    POC Glucose 417 (H) 65 - 100 mg/dL    Performed by: Jatin    POCT Glucose    Collection Time: 12/25/23  8:45 PM   Result Value Ref Range    POC Glucose 363 (H) 65 - 100 mg/dL    Performed by: Yolanda    POCT Glucose    Collection Time: 12/26/23  8:02 AM   Result Value Ref Range    POC Glucose 166 (H) 65 - 100 mg/dL    Performed by: Esmer    Vitamin B12    Collection Time: 12/26/23  9:53 AM   Result Value Ref Range    Vitamin B-12 499 193 - 986 pg/mL   Ferritin    Collection Time: 12/26/23  9:53 AM   Result Value Ref Range    Ferritin 163 8 - 388 NG/ML   Transferrin Saturation    Collection Time: 12/26/23  9:53 AM   Result Value Ref Range    Iron 25 (L) 35 - 150 ug/dL    TIBC 240 (L) 250 - 450 ug/dL    TRANSFERRIN SATURATION 10 %   Folate    Collection Time: 12/26/23  9:53 AM   Result Value Ref Range    Folate 5.1 3.1 - 17.5 ng/mL   Haptoglobin    Collection Time: 12/26/23  9:53 AM   Result Value Ref Range    Haptoglobin 280 (H) 30 - 200 mg/dL   Lactate Dehydrogenase    Collection Time: 12/26/23  9:53 AM   Result Value Ref Range     110 - 210 U/L   Reticulocytes    Collection Time: 12/26/23  9:53 AM   Result Value Ref Range    Reticulocyte Count,Automated 1.9 0.3 - 2.0 %    Absolute Retic # 0.0777 0.026 - 0.095 M/ul    Immature Retic Fraction 26.2 (H) 2.3 - 13.4 %    Retic Hemoglobin conc. 26 (L) 29 - 35 pg   POCT Glucose    Collection Time: 12/26/23 11:19 AM   Result Value Ref Range    POC Glucose 251 (H) 65 - 100 mg/dL    Performed by: Esmer    POCT Glucose    Collection Time: 12/26/23  3:38 PM   Result Value Ref Range    POC Glucose 283 (H) 65 - 100 mg/dL    Performed by: Esmer    POCT Glucose    Collection Time: 12/26/23  8:44 PM   Result Value Ref Range    POC Glucose 285 (H) 65 - 100 mg/dL    Performed by:

## 2023-12-27 NOTE — PROGRESS NOTES
Physical Therapy Note:    Attempted to see patient this PM for physical therapy treatment  session. Patient states he is feeling better now that he has had his ankle aspirated, but he does not recommend having it. Pt deferring therapy, but states he would like to get in the shower. Upon therapy visit to offer shower, pt's hygiene had been handled by nursing. Will follow and re-attempt as schedule permits/patient available. Thank you,    SAMI CHAO, PT     Rehab Caseload Tracker

## 2023-12-27 NOTE — PROGRESS NOTES
[facility]   Progress Note    Patient: Macho Tong MRN: [id number]  SSN: [id number]    YOB: 1950  Age: 73 y.o.  Sex: male      Admit Date: 12/22/2023    LOS: 4 days     Plan of Care:   1: WB status - WBAT  2: Left ankle aspirated today.  Primarily blood withdrawn.  Suspect left ankle pain is inflammatory in nature - either arthritis versus gout versus transient synovitis due to patient's most recent upper respiratory infection.  --Low suspicion for infection however we will continue to watch.  3: Patient's labs did not indicate infection so antibiotics will not be started  4: Defer to Dr. Timothy Villagran plan of aspiration.         Subjective:     Patient continues left ankle pain.  After discussion with him he describes upper respiratory illness several weeks ago which resulted in coughing and blood.  He then developed increasing left ankle pain and some confusion.  Brought to the hospital and treated.  He states steroids improved his pain in his ankle however since he stopped steroids left ankle began hurting worse.  As it continues to hurt there is concerned about potential infection.  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  No palpable pulse or that the foot is warm and perfused.  Erythema slightly in the posterior aspect of the ankle with edema extending from the mid tibia down the toes.    Lab/Data Review:  Lab Results   Component Value Date  Patients: https://www.fda.gov/media/450333/download     Methodology: Isothermal Nucleic Acid Amplification         Blood Culture 2 [[id number]] Collected: 12/22/23 0249    Order Status: Completed Specimen: Blood Updated: 12/27/23 1049     Special Requests --        RIGHT  Antecubital       Culture NO GROWTH 5 DAYS       Blood Culture 1 [[id number]] Collected: 12/22/23 0248    Order Status: Completed Specimen: Blood Updated: 12/27/23 1049     Special Requests --        LEFT  Antecubital       Culture NO GROWTH 5 DAYS             Lab Results   Component Value Date    WBC 11.9 (H) 12/27/2023    HGB 10.7 (L) 12/27/2023    HCT 35.2 (L) 12/27/2023    MCV 83.4 12/27/2023     12/27/2023     Lab Results   Component Value Date    CRP 13.0 (H) 12/24/2023     No results found for: \"SEDRATE\"      Assessment:     Principal Problem:    Generalized weakness  Active Problems:    COPD (chronic obstructive pulmonary disease) (HCC)    FADUMO (obstructive sleep apnea)    Obesity, morbid (HCC)    Type 2 diabetes mellitus with microalbuminuria, with long-term current use of insulin (HCC)    Coronary atherosclerosis of native coronary vessel    Essential hypertension    Chronic back pain    Benign prostatic hyperplasia    Paroxysmal atrial fibrillation (HCC)    Fall    Charcot ankle, right    Inability to bear weight    Normocytic anemia    Acute gout    Left ankle pain  Resolved Problems:    * No resolved hospital problems. *      Plan:     I performed a minor procedure today.  I aspirated approximately 4 cc of blood and sent it off for cell count, crystals, and cultures.  We will follow this.  If it does come back infectious the decision would be an I&D if it does not come back infectious we will treat this as an inflammatory arthropathy.    Signed By: Tyrell Junior MD     December 27, 2023

## 2023-12-27 NOTE — PROGRESS NOTES
Nutrition LOS Note:       Malnutrition Assessment:  Malnutrition Status: No malnutrition  NFPE: Unremarkable  Nutrition Assessment  Food, Nutrition, and Pertinent History: Denies any change in appetite, intake or weight PTA. Pt reports he eats a lot more at home-mainly protein and non starchy vegetables. Limits potatoes, bread and other starchy foods.    Current Nutrition Therapies:  Diet: ADULT DIET; Regular; 4 carb choices (60 gm/meal)        Current Intake: For 5 recorded meals in 5 days  Average meal intake : %      Anthropometrics: Height: 195.6 cm (6' 5\"),  , Weight - Scale: (!) 137 kg (302 lb), Body mass index is 35.81 kg/m². BMI class of obesity class II     Estimated Macronutrient Needs:  Energy (kcal/d): 6554-0510 ( 15-18 kcal/kg) (using Current: 137kg,  date 12/22 )-stated  Protein (grams/d): 103-123 (20% of kcal )  Fluid : 1 ml/kcal    Nutrition Diagnosis:   Inadequate oral intake r/t  diet order,  as evidenced by  current doet provides less than estimated needs,      Goals: Intake to meet estimated needs by RD follow up    Nutrition Intervention:   Meals and snacks: Send double protein and non- starchy vegetable portions  Discharge nutrition plan: .Continue current diet    Monitoring and Evaluation:  Food and nutrient intake,      Narcisa Powers, MONIQUE,LD, [facility] [phone]

## 2023-12-27 NOTE — DIABETES MGMT
Patient admitted with generalized weakness. Blood glucose ranged 166-285 yesterday with patient receiving Lantus 60 units, Humalog 4 units, Jardiance 10 mg, and Prednisone 40 mg. Blood glucose this morning was 186. Creatinine 0.87. GFR > 60. Reviewed patient current regimen: Lantus 30 units BID, Humalog correctional insulin, Jardiance 10 mg daily, and Prednisone 40 mg daily.  Patient would likely benefit from initiation of an intermediate acting insulin to help offset hyperglycemia during the day related to steroid therapy.  If starting an intermediate acting insulin, patient would likely benefit from a decrease in basal insulin to reduce the risk of morning hypoglycemia. Provider updated via [email or web address] regarding recommendations and patient glycemic control.  New order received for NPH 0.1 unit/kg daily and decrease Lantus to 25 units BID.

## 2023-12-27 NOTE — CARE COORDINATION
Patient has been offered an inpatient rehab bed at [facility]; bed is available today if patient is stable. CM spoke w/ patient and spouse at bedside. They request to speak with MD before making a decision. Attending notified via [employer]. CM will follow.     Update 1330: Spoke w/ attending. Hold discharge today; possible dc tomorrow. CM updated [facility] liaison. Case management will follow.

## 2023-12-28 LAB
ANION GAP SERPL CALC-SCNC: 7 MMOL/L (ref 2–11)
BUN SERPL-MCNC: 29 MG/DL (ref 8–23)
CALCIUM SERPL-MCNC: 9.6 MG/DL (ref 8.3–10.4)
CHLORIDE SERPL-SCNC: 107 MMOL/L (ref 103–113)
CO2 SERPL-SCNC: 27 MMOL/L (ref 21–32)
CREAT SERPL-MCNC: 0.85 MG/DL (ref 0.8–1.5)
ERYTHROCYTE [DISTWIDTH] IN BLOOD BY AUTOMATED COUNT: 17.7 % (ref 11.9–14.6)
GLUCOSE BLD STRIP.AUTO-MCNC: 219 MG/DL (ref 65–100)
GLUCOSE BLD STRIP.AUTO-MCNC: 314 MG/DL (ref 65–100)
GLUCOSE BLD STRIP.AUTO-MCNC: 361 MG/DL (ref 65–100)
GLUCOSE BLD STRIP.AUTO-MCNC: 447 MG/DL (ref 65–100)
GLUCOSE SERPL-MCNC: 252 MG/DL (ref 65–100)
HCT VFR BLD AUTO: 34 % (ref 41.1–50.3)
HGB BLD-MCNC: 10.4 G/DL (ref 13.6–17.2)
MCH RBC QN AUTO: 25.3 PG (ref 26.1–32.9)
MCHC RBC AUTO-ENTMCNC: 30.6 G/DL (ref 31.4–35)
MCV RBC AUTO: 82.7 FL (ref 82–102)
NRBC # BLD: 0 K/UL (ref 0–0.2)
PLATELET # BLD AUTO: 254 K/UL (ref 150–450)
PMV BLD AUTO: 9.3 FL (ref 9.4–12.3)
POTASSIUM SERPL-SCNC: 3.8 MMOL/L (ref 3.5–5.1)
RBC # BLD AUTO: 4.11 M/UL (ref 4.23–5.6)
SERVICE CMNT-IMP: ABNORMAL
SODIUM SERPL-SCNC: 141 MMOL/L (ref 136–146)
WBC # BLD AUTO: 12.3 K/UL (ref 4.3–11.1)

## 2023-12-28 PROCEDURE — 1100000000 HC RM PRIVATE

## 2023-12-28 PROCEDURE — 6370000000 HC RX 637 (ALT 250 FOR IP): Performed by: FAMILY MEDICINE

## 2023-12-28 PROCEDURE — 97530 THERAPEUTIC ACTIVITIES: CPT

## 2023-12-28 PROCEDURE — 6370000000 HC RX 637 (ALT 250 FOR IP): Performed by: INTERNAL MEDICINE

## 2023-12-28 PROCEDURE — 82962 GLUCOSE BLOOD TEST: CPT

## 2023-12-28 PROCEDURE — 2580000003 HC RX 258: Performed by: NURSE PRACTITIONER

## 2023-12-28 PROCEDURE — 85027 COMPLETE CBC AUTOMATED: CPT

## 2023-12-28 PROCEDURE — 80048 BASIC METABOLIC PNL TOTAL CA: CPT

## 2023-12-28 PROCEDURE — 6370000000 HC RX 637 (ALT 250 FOR IP): Performed by: NURSE PRACTITIONER

## 2023-12-28 PROCEDURE — 94760 N-INVAS EAR/PLS OXIMETRY 1: CPT

## 2023-12-28 PROCEDURE — 36415 COLL VENOUS BLD VENIPUNCTURE: CPT

## 2023-12-28 PROCEDURE — 94640 AIRWAY INHALATION TREATMENT: CPT

## 2023-12-28 RX ORDER — INSULIN LISPRO 100 [IU]/ML
0-8 INJECTION, SOLUTION INTRAVENOUS; SUBCUTANEOUS
Status: DISCONTINUED | OUTPATIENT
Start: 2023-12-28 | End: 2023-12-30 | Stop reason: HOSPADM

## 2023-12-28 RX ORDER — FUROSEMIDE 20 MG/1
10 TABLET ORAL DAILY
Status: DISCONTINUED | OUTPATIENT
Start: 2023-12-29 | End: 2023-12-30 | Stop reason: HOSPADM

## 2023-12-28 RX ORDER — INSULIN LISPRO 100 [IU]/ML
0-4 INJECTION, SOLUTION INTRAVENOUS; SUBCUTANEOUS NIGHTLY
Status: DISCONTINUED | OUTPATIENT
Start: 2023-12-28 | End: 2023-12-30 | Stop reason: HOSPADM

## 2023-12-28 RX ORDER — INSULIN GLARGINE 100 [IU]/ML
27 INJECTION, SOLUTION SUBCUTANEOUS 2 TIMES DAILY
Status: DISCONTINUED | OUTPATIENT
Start: 2023-12-28 | End: 2023-12-30 | Stop reason: HOSPADM

## 2023-12-28 RX ADMIN — INSULIN LISPRO 8 UNITS: 100 INJECTION, SOLUTION INTRAVENOUS; SUBCUTANEOUS at 17:57

## 2023-12-28 RX ADMIN — LISINOPRIL 10 MG: 5 TABLET ORAL at 09:08

## 2023-12-28 RX ADMIN — GUAIFENESIN 600 MG: 600 TABLET, EXTENDED RELEASE ORAL at 09:08

## 2023-12-28 RX ADMIN — GUAIFENESIN 600 MG: 600 TABLET, EXTENDED RELEASE ORAL at 21:21

## 2023-12-28 RX ADMIN — CELECOXIB 200 MG: 200 CAPSULE ORAL at 09:08

## 2023-12-28 RX ADMIN — EZETIMIBE 10 MG: 10 TABLET ORAL at 21:20

## 2023-12-28 RX ADMIN — CELECOXIB 200 MG: 200 CAPSULE ORAL at 21:21

## 2023-12-28 RX ADMIN — APIXABAN 5 MG: 5 TABLET, FILM COATED ORAL at 21:21

## 2023-12-28 RX ADMIN — MOMETASONE FUROATE AND FORMOTEROL FUMARATE DIHYDRATE 2 PUFF: 200; 5 AEROSOL RESPIRATORY (INHALATION) at 20:58

## 2023-12-28 RX ADMIN — GABAPENTIN 300 MG: 300 CAPSULE ORAL at 09:06

## 2023-12-28 RX ADMIN — GABAPENTIN 300 MG: 300 CAPSULE ORAL at 21:20

## 2023-12-28 RX ADMIN — SODIUM CHLORIDE, PRESERVATIVE FREE 10 ML: 5 INJECTION INTRAVENOUS at 09:20

## 2023-12-28 RX ADMIN — MONTELUKAST 10 MG: 10 TABLET, FILM COATED ORAL at 21:21

## 2023-12-28 RX ADMIN — INSULIN HUMAN 14 UNITS: 100 INJECTION, SUSPENSION SUBCUTANEOUS at 09:17

## 2023-12-28 RX ADMIN — FOLIC ACID 1 MG: 1 TABLET ORAL at 09:08

## 2023-12-28 RX ADMIN — DOXAZOSIN 2 MG: 1 TABLET ORAL at 09:07

## 2023-12-28 RX ADMIN — SODIUM CHLORIDE, PRESERVATIVE FREE 10 ML: 5 INJECTION INTRAVENOUS at 21:23

## 2023-12-28 RX ADMIN — CYANOCOBALAMIN TAB 1000 MCG 1000 MCG: 1000 TAB at 09:07

## 2023-12-28 RX ADMIN — INSULIN LISPRO 1 UNITS: 100 INJECTION, SOLUTION INTRAVENOUS; SUBCUTANEOUS at 09:16

## 2023-12-28 RX ADMIN — INSULIN LISPRO 3 UNITS: 100 INJECTION, SOLUTION INTRAVENOUS; SUBCUTANEOUS at 12:31

## 2023-12-28 RX ADMIN — METOPROLOL TARTRATE 50 MG: 50 TABLET ORAL at 09:07

## 2023-12-28 RX ADMIN — INSULIN LISPRO 4 UNITS: 100 INJECTION, SOLUTION INTRAVENOUS; SUBCUTANEOUS at 21:22

## 2023-12-28 RX ADMIN — PANTOPRAZOLE SODIUM 40 MG: 40 TABLET, DELAYED RELEASE ORAL at 09:07

## 2023-12-28 RX ADMIN — INSULIN GLARGINE 27 UNITS: 100 INJECTION, SOLUTION SUBCUTANEOUS at 21:23

## 2023-12-28 RX ADMIN — ACETAMINOPHEN 650 MG: 325 TABLET, FILM COATED ORAL at 16:10

## 2023-12-28 RX ADMIN — FUROSEMIDE 20 MG: 20 TABLET ORAL at 09:09

## 2023-12-28 RX ADMIN — METOPROLOL TARTRATE 50 MG: 50 TABLET ORAL at 21:21

## 2023-12-28 RX ADMIN — ACETAMINOPHEN 650 MG: 325 TABLET, FILM COATED ORAL at 09:06

## 2023-12-28 RX ADMIN — ATORVASTATIN CALCIUM 20 MG: 40 TABLET, FILM COATED ORAL at 21:21

## 2023-12-28 RX ADMIN — APIXABAN 5 MG: 5 TABLET, FILM COATED ORAL at 09:08

## 2023-12-28 RX ADMIN — ACETAMINOPHEN 650 MG: 325 TABLET, FILM COATED ORAL at 21:20

## 2023-12-28 RX ADMIN — MOMETASONE FUROATE AND FORMOTEROL FUMARATE DIHYDRATE 2 PUFF: 200; 5 AEROSOL RESPIRATORY (INHALATION) at 07:35

## 2023-12-28 RX ADMIN — INSULIN GLARGINE 27 UNITS: 100 INJECTION, SOLUTION SUBCUTANEOUS at 10:36

## 2023-12-28 RX ADMIN — EMPAGLIFLOZIN 10 MG: 10 TABLET, FILM COATED ORAL at 09:09

## 2023-12-28 RX ADMIN — PREDNISONE 40 MG: 20 TABLET ORAL at 09:09

## 2023-12-28 ASSESSMENT — PAIN DESCRIPTION - ORIENTATION
ORIENTATION: LEFT
ORIENTATION: LEFT;RIGHT

## 2023-12-28 ASSESSMENT — PAIN DESCRIPTION - DESCRIPTORS: DESCRIPTORS: THROBBING

## 2023-12-28 ASSESSMENT — PAIN SCALES - GENERAL
PAINLEVEL_OUTOF10: 4
PAINLEVEL_OUTOF10: 4

## 2023-12-28 ASSESSMENT — PAIN DESCRIPTION - LOCATION
LOCATION: FOOT
LOCATION: FOOT

## 2023-12-28 NOTE — PROGRESS NOTES
ACUTE PHYSICAL THERAPY GOALS:   (Developed with and agreed upon by patient and/or caregiver.)  STG:  (1.)Mr. Tong will move from supine to sit and sit to supine  with STAND BY ASSIST within 7 treatment day(s).    (2.)Mr. Tong will transfer from bed to chair and chair to bed with CONTACT GUARD ASSIST using the least restrictive device within 7 treatment day(s).    (3.)Mr. Tong will ambulate with CONTACT GUARD ASSIST for 5 feet with the least restrictive device within 7 treatment day(s).   (4)Mr. Zepeda will go up one step with RW min assist in 7 days.  (5)Mr. Zepeda will perform HEP with cues and assistance to increase safety on his feet in 7 days.  ________________________________________________________________________________________________      PHYSICAL THERAPY Daily Note and PM  (Link to Caseload Tracking: PT Visit Days : 2  Acknowledge Orders  Time In/Out  PT Charge Capture  Rehab Caseload Tracker    Macho Tong is a 73 y.o. male   PRIMARY DIAGNOSIS: Generalized weakness  Hypoxia [R09.02]  Generalized weakness [R53.1]  Acute left ankle pain [M25.572]  Leukocytosis, unspecified type [D72.829]  Inability to bear weight [R26.89]       Reason for Referral: Generalized Muscle Weakness (M62.81)  Other abnormalities of gait and mobility (R26.89)  Inpatient: Payor: MEDICARE / Plan: MEDICARE PART A AND B / Product Type: *No Product type* /     ASSESSMENT:     REHAB RECOMMENDATIONS:   Recommendation to date pending progress:  Setting:  Short-term Rehab    Equipment:    To Be Determined     ASSESSMENT:  Mr. Tong presents with decreased functional mobility limited by left ankle/foot pain.  He normally uses a RW with gait and a brace for his right foot/ankle due to chronic charcot ankle/foot. He is independent otherwise normally but does use a urinal at night and sleeps in his lift chair. Yesterday, his left ankle/foot began hurting and now can't walk or take care of himself.  X-ray negative,

## 2023-12-28 NOTE — CARE COORDINATION
CM chart review; discussed in IDT rounds. Patient has bed at [facility]. Pending studies from ankle aspiration. Case management will follow.

## 2023-12-28 NOTE — PROGRESS NOTES
Hospitalist Progress Note   Admit Date:  2023  1:13 AM   Name:  Macho Tong   Age:  73 y.o.  Sex:  male  :  1950   MRN:  [id number]   Room:  [facility]/    Presenting/Chief Complaint: Fall     Reason(s) for Admission: Hypoxia [R09.02]  Generalized weakness [R53.1]  Acute left ankle pain [M25.572]  Leukocytosis, unspecified type [D72.829]  Inability to bear weight [R26.89]     Hospital Course:   73 y.o. male with medical history of HTN, COPD/asthma, ex-smoker, DM on insulin, GERD, CKD, chronic back pain, Right foot charcot in brace, afib, presented to the ER on  with c/o increasing L ankle pain and inability to bear weight.      Pt saw his orthopedic, Dr. Beckwith on  for continued right ankle pain and instability causing falls secondary to his Charcot syndrome, despite double upright bracing.  Now beginning to have left ankle pain as well.  Most likely from overuse.  He was offered a second opinion at [facility].  Injection done in the office including Xylocaine and Depo-Medrol. Pain management has him on gabapentin and amitriptyline for neuropathic pain.  Wife states that this is getting worse, and he fell again at home overnight.  He got up to urinate and fell in the bathroom.  She is having a hard time helping him out.  EMS brought to the ER on oxygen.   He recently had an upper respiratory infection treated by his primary care physician, as bronchitis.   He completed a Z-Jimmy and oral prednisone 1-2 days PTA.  He states that those symptoms have almost resolved.       White cell count in ER was 14.8, procalcitonin 0.13, lactic acid 1.2, creatinine 0.91 with normal LFTs.  Uric acid 5.8.  Does not have a history of gout.  Highest temperature in the emergency room 99.  CRP 16.5. Chest x-ray is negative.  Flu negative, COVID negative.  Antibiotics were given in the emergency room.       Patient was admitted with inability to bear weight with left ankle pain and history of right Charcot

## 2023-12-28 NOTE — PROGRESS NOTES
His aspirate results do not indicate ankle infection.  His laboratory value does not indicate infection.    He has responded well to steroids during his hospital stay.  His ankle pain is most likely due to arthritis which we know he has.   This is not an inpatient surgical need.    Guarding his ankle only further treatment that can be done is an MRI which the patient does not want to do.    He can follow-up with Dr. JETHRO Beckwith, outpatient schedule as this is his physician already taking care of his ankle pain.    -At this point orthopedics will sign off and he can follow-up on an outpatient visit.      Results       Procedure Component Value Units Date/Time    Culture, Body Fluid [[id number]] Collected: 12/27/23 1420    Order Status: Completed Specimen: Body Fluid from Synovial Fluid Updated: 12/28/23 0957     Special Requests LEFT ANKLE     Gram stain MANY WBCS SEEN PER LPF         NO DEFINITE ORGANISM SEEN        Culture       No growth after short period of incubation. Further results to follow after overnight incubation.          Influenza A/B, Molecular [[id number]] Collected: 12/22/23 0736    Order Status: Completed Specimen: Nasopharyngeal Updated: 12/22/23 0823     Influenza A, STEPHANY Not detected        Comment: Negative results do not preclude infection with influenza virus and should not be the sole basis of a patient treatment decision.        Influenza B, STEPHANY Not detected       Respiratory Syncytial Virus, Molecular [[id number]] Collected: 12/22/23 0736    Order Status: Completed Specimen: Blood Serum Updated: 12/22/23 0913     RSV by NAAT Not detected        Comment: NEGATIVE FOR THE PRESENCE OF RSV NUCLEIC ACID  Negative results do not preclude infection with RSV and should not be used as the sole basis for patient management decisions.  Methodology: Isothermal Nucleic Acid Amplification  Rapid Abbott ID Now  **Intended for use in patients <18 years of age and >59years of age**         COVID-19, Rapid

## 2023-12-28 NOTE — DIABETES MGMT
Patient admitted with generalized weakness. Blood glucose ranged 186-306 yesterday with patient receiving Lantus 55 units, Humalog 9 units, NPH 14 units, Jardiance 10 mg, and Prednisone 40 mg. Blood glucose this morning was 219. Creatinine 0.85. GFR > 60. Reviewed patient current regimen: Lantus 25 units BID, Humalog correctional insulin, NPH 14 units daily, Jardiance 10 mg daily, and Prednisone 40 mg daily.  Patient would likely benefit from an increase in basal insulin as fasting glucose is not goal.  Provider updated via [employer] regarding recommendations and patient glycemic control.  New order received for Lantus 27 units BID.

## 2023-12-29 ENCOUNTER — PREP FOR PROCEDURE (OUTPATIENT)
Dept: ORTHOPEDIC SURGERY | Age: 73
End: 2023-12-29

## 2023-12-29 ENCOUNTER — ANESTHESIA EVENT (OUTPATIENT)
Dept: SURGERY | Age: 73
End: 2023-12-29
Payer: MEDICARE

## 2023-12-29 DIAGNOSIS — M25.472 EFFUSION OF LEFT ANKLE: ICD-10-CM

## 2023-12-29 DIAGNOSIS — M19.079 ANKLE ARTHRITIS: Primary | ICD-10-CM

## 2023-12-29 DIAGNOSIS — M25.472 LEFT ANKLE EFFUSION: ICD-10-CM

## 2023-12-29 PROBLEM — M00.9 SEPTIC ARTHRITIS OF LEFT ANKLE (HCC): Status: ACTIVE | Noted: 2023-12-29

## 2023-12-29 LAB
ANION GAP SERPL CALC-SCNC: 5 MMOL/L (ref 2–11)
BUN SERPL-MCNC: 32 MG/DL (ref 8–23)
CALCIUM SERPL-MCNC: 9.7 MG/DL (ref 8.3–10.4)
CHLORIDE SERPL-SCNC: 107 MMOL/L (ref 103–113)
CO2 SERPL-SCNC: 28 MMOL/L (ref 21–32)
CREAT SERPL-MCNC: 0.67 MG/DL (ref 0.8–1.5)
ERYTHROCYTE [DISTWIDTH] IN BLOOD BY AUTOMATED COUNT: 17.4 % (ref 11.9–14.6)
GLUCOSE BLD STRIP.AUTO-MCNC: 172 MG/DL (ref 65–100)
GLUCOSE BLD STRIP.AUTO-MCNC: 294 MG/DL (ref 65–100)
GLUCOSE BLD STRIP.AUTO-MCNC: 306 MG/DL (ref 65–100)
GLUCOSE BLD STRIP.AUTO-MCNC: 331 MG/DL (ref 65–100)
GLUCOSE SERPL-MCNC: 230 MG/DL (ref 65–100)
HCT VFR BLD AUTO: 33.3 % (ref 41.1–50.3)
HGB BLD-MCNC: 10.2 G/DL (ref 13.6–17.2)
MCH RBC QN AUTO: 25.3 PG (ref 26.1–32.9)
MCHC RBC AUTO-ENTMCNC: 30.6 G/DL (ref 31.4–35)
MCV RBC AUTO: 82.6 FL (ref 82–102)
NRBC # BLD: 0 K/UL (ref 0–0.2)
PLATELET # BLD AUTO: 278 K/UL (ref 150–450)
PMV BLD AUTO: 9.3 FL (ref 9.4–12.3)
POTASSIUM SERPL-SCNC: 3.8 MMOL/L (ref 3.5–5.1)
RBC # BLD AUTO: 4.03 M/UL (ref 4.23–5.6)
SERVICE CMNT-IMP: ABNORMAL
SODIUM SERPL-SCNC: 140 MMOL/L (ref 136–146)
WBC # BLD AUTO: 13.7 K/UL (ref 4.3–11.1)

## 2023-12-29 PROCEDURE — 6370000000 HC RX 637 (ALT 250 FOR IP): Performed by: FAMILY MEDICINE

## 2023-12-29 PROCEDURE — 94760 N-INVAS EAR/PLS OXIMETRY 1: CPT

## 2023-12-29 PROCEDURE — 80048 BASIC METABOLIC PNL TOTAL CA: CPT

## 2023-12-29 PROCEDURE — 2580000003 HC RX 258: Performed by: NURSE PRACTITIONER

## 2023-12-29 PROCEDURE — 85027 COMPLETE CBC AUTOMATED: CPT

## 2023-12-29 PROCEDURE — 6370000000 HC RX 637 (ALT 250 FOR IP): Performed by: INTERNAL MEDICINE

## 2023-12-29 PROCEDURE — 6370000000 HC RX 637 (ALT 250 FOR IP): Performed by: NURSE PRACTITIONER

## 2023-12-29 PROCEDURE — 94640 AIRWAY INHALATION TREATMENT: CPT

## 2023-12-29 PROCEDURE — 36415 COLL VENOUS BLD VENIPUNCTURE: CPT

## 2023-12-29 PROCEDURE — 82962 GLUCOSE BLOOD TEST: CPT

## 2023-12-29 PROCEDURE — 2580000003 HC RX 258: Performed by: INTERNAL MEDICINE

## 2023-12-29 PROCEDURE — 6360000002 HC RX W HCPCS: Performed by: INTERNAL MEDICINE

## 2023-12-29 PROCEDURE — 1100000000 HC RM PRIVATE

## 2023-12-29 RX ORDER — LANOLIN ALCOHOL/MO/W.PET/CERES
1000 CREAM (GRAM) TOPICAL DAILY
OUTPATIENT
Start: 2023-12-30

## 2023-12-29 RX ORDER — FLUTICASONE PROPIONATE 50 MCG
2 SPRAY, SUSPENSION (ML) NASAL DAILY PRN
Status: CANCELLED | OUTPATIENT
Start: 2023-12-29

## 2023-12-29 RX ORDER — SODIUM CHLORIDE 9 MG/ML
INJECTION, SOLUTION INTRAVENOUS PRN
OUTPATIENT
Start: 2023-12-29

## 2023-12-29 RX ORDER — FAMOTIDINE 20 MG/1
10 TABLET, FILM COATED ORAL DAILY PRN
OUTPATIENT
Start: 2023-12-29

## 2023-12-29 RX ORDER — MONTELUKAST SODIUM 10 MG/1
10 TABLET ORAL NIGHTLY
Status: CANCELLED | OUTPATIENT
Start: 2023-12-29

## 2023-12-29 RX ORDER — METOPROLOL TARTRATE 50 MG/1
50 TABLET, FILM COATED ORAL 2 TIMES DAILY
Status: CANCELLED | OUTPATIENT
Start: 2023-12-29

## 2023-12-29 RX ORDER — PANTOPRAZOLE SODIUM 40 MG/1
40 TABLET, DELAYED RELEASE ORAL DAILY
Status: CANCELLED | OUTPATIENT
Start: 2023-12-30

## 2023-12-29 RX ORDER — MAGNESIUM HYDROXIDE/ALUMINUM HYDROXICE/SIMETHICONE 120; 1200; 1200 MG/30ML; MG/30ML; MG/30ML
30 SUSPENSION ORAL EVERY 6 HOURS PRN
OUTPATIENT
Start: 2023-12-29

## 2023-12-29 RX ORDER — ONDANSETRON 2 MG/ML
4 INJECTION INTRAMUSCULAR; INTRAVENOUS EVERY 6 HOURS PRN
OUTPATIENT
Start: 2023-12-29

## 2023-12-29 RX ORDER — POLYETHYLENE GLYCOL 3350 17 G/17G
17 POWDER, FOR SOLUTION ORAL DAILY PRN
OUTPATIENT
Start: 2023-12-29

## 2023-12-29 RX ORDER — ALBUTEROL SULFATE 2.5 MG/3ML
2.5 SOLUTION RESPIRATORY (INHALATION) EVERY 6 HOURS PRN
Status: CANCELLED | OUTPATIENT
Start: 2023-12-29

## 2023-12-29 RX ORDER — GUAIFENESIN 600 MG/1
600 TABLET, EXTENDED RELEASE ORAL 2 TIMES DAILY
OUTPATIENT
Start: 2023-12-29

## 2023-12-29 RX ORDER — DOXAZOSIN MESYLATE 1 MG/1
2 TABLET ORAL DAILY
OUTPATIENT
Start: 2023-12-30

## 2023-12-29 RX ORDER — INSULIN LISPRO 100 [IU]/ML
0-4 INJECTION, SOLUTION INTRAVENOUS; SUBCUTANEOUS NIGHTLY
OUTPATIENT
Start: 2023-12-29

## 2023-12-29 RX ORDER — INSULIN GLARGINE 100 [IU]/ML
27 INJECTION, SOLUTION SUBCUTANEOUS 2 TIMES DAILY
OUTPATIENT
Start: 2023-12-29

## 2023-12-29 RX ORDER — FUROSEMIDE 20 MG/1
10 TABLET ORAL DAILY
OUTPATIENT
Start: 2023-12-30

## 2023-12-29 RX ORDER — GABAPENTIN 300 MG/1
300 CAPSULE ORAL 2 TIMES DAILY
Status: CANCELLED | OUTPATIENT
Start: 2023-12-29

## 2023-12-29 RX ORDER — POTASSIUM CHLORIDE 7.45 MG/ML
10 INJECTION INTRAVENOUS PRN
OUTPATIENT
Start: 2023-12-29

## 2023-12-29 RX ORDER — ALBUTEROL SULFATE 90 UG/1
1 AEROSOL, METERED RESPIRATORY (INHALATION) EVERY 4 HOURS PRN
OUTPATIENT
Start: 2023-12-29

## 2023-12-29 RX ORDER — DEXTROSE MONOHYDRATE 100 MG/ML
INJECTION, SOLUTION INTRAVENOUS CONTINUOUS PRN
OUTPATIENT
Start: 2023-12-29

## 2023-12-29 RX ORDER — ONDANSETRON 4 MG/1
4 TABLET, ORALLY DISINTEGRATING ORAL EVERY 8 HOURS PRN
OUTPATIENT
Start: 2023-12-29

## 2023-12-29 RX ORDER — INSULIN LISPRO 100 [IU]/ML
0-8 INJECTION, SOLUTION INTRAVENOUS; SUBCUTANEOUS
OUTPATIENT
Start: 2023-12-29

## 2023-12-29 RX ORDER — ACETAMINOPHEN 650 MG/1
650 SUPPOSITORY RECTAL EVERY 6 HOURS PRN
OUTPATIENT
Start: 2023-12-29

## 2023-12-29 RX ORDER — FOLIC ACID 1 MG/1
1 TABLET ORAL DAILY
OUTPATIENT
Start: 2023-12-30

## 2023-12-29 RX ORDER — BISACODYL 10 MG
10 SUPPOSITORY, RECTAL RECTAL DAILY PRN
OUTPATIENT
Start: 2023-12-29

## 2023-12-29 RX ORDER — PREDNISONE 20 MG/1
40 TABLET ORAL DAILY
OUTPATIENT
Start: 2023-12-30 | End: 2023-12-31

## 2023-12-29 RX ORDER — EZETIMIBE 10 MG/1
10 TABLET ORAL NIGHTLY
Status: CANCELLED | OUTPATIENT
Start: 2023-12-29

## 2023-12-29 RX ORDER — ACETAMINOPHEN 325 MG/1
650 TABLET ORAL 3 TIMES DAILY
OUTPATIENT
Start: 2023-12-29

## 2023-12-29 RX ORDER — ACETAMINOPHEN 325 MG/1
650 TABLET ORAL EVERY 6 HOURS PRN
OUTPATIENT
Start: 2023-12-29

## 2023-12-29 RX ORDER — LISINOPRIL 5 MG/1
10 TABLET ORAL DAILY
Status: CANCELLED | OUTPATIENT
Start: 2023-12-30

## 2023-12-29 RX ORDER — CELECOXIB 200 MG/1
200 CAPSULE ORAL 2 TIMES DAILY
Status: CANCELLED | OUTPATIENT
Start: 2023-12-29

## 2023-12-29 RX ORDER — MAGNESIUM SULFATE IN WATER 40 MG/ML
2000 INJECTION, SOLUTION INTRAVENOUS PRN
OUTPATIENT
Start: 2023-12-29

## 2023-12-29 RX ORDER — POTASSIUM CHLORIDE 20 MEQ/1
40 TABLET, EXTENDED RELEASE ORAL PRN
OUTPATIENT
Start: 2023-12-29

## 2023-12-29 RX ORDER — SODIUM CHLORIDE 0.9 % (FLUSH) 0.9 %
5-40 SYRINGE (ML) INJECTION EVERY 12 HOURS SCHEDULED
OUTPATIENT
Start: 2023-12-29

## 2023-12-29 RX ORDER — ATORVASTATIN CALCIUM 10 MG/1
20 TABLET, FILM COATED ORAL NIGHTLY
Status: CANCELLED | OUTPATIENT
Start: 2023-12-29

## 2023-12-29 RX ORDER — SODIUM CHLORIDE 0.9 % (FLUSH) 0.9 %
5-40 SYRINGE (ML) INJECTION PRN
OUTPATIENT
Start: 2023-12-29

## 2023-12-29 RX ADMIN — ACETAMINOPHEN 650 MG: 325 TABLET, FILM COATED ORAL at 17:25

## 2023-12-29 RX ADMIN — SODIUM CHLORIDE, PRESERVATIVE FREE 10 ML: 5 INJECTION INTRAVENOUS at 08:34

## 2023-12-29 RX ADMIN — INSULIN HUMAN 27 UNITS: 100 INJECTION, SUSPENSION SUBCUTANEOUS at 08:32

## 2023-12-29 RX ADMIN — EZETIMIBE 10 MG: 10 TABLET ORAL at 21:43

## 2023-12-29 RX ADMIN — ATORVASTATIN CALCIUM 20 MG: 40 TABLET, FILM COATED ORAL at 21:44

## 2023-12-29 RX ADMIN — ACETAMINOPHEN 650 MG: 325 TABLET, FILM COATED ORAL at 08:34

## 2023-12-29 RX ADMIN — CYANOCOBALAMIN TAB 1000 MCG 1000 MCG: 1000 TAB at 08:36

## 2023-12-29 RX ADMIN — INSULIN GLARGINE 27 UNITS: 100 INJECTION, SOLUTION SUBCUTANEOUS at 08:33

## 2023-12-29 RX ADMIN — FUROSEMIDE 10 MG: 20 TABLET ORAL at 08:36

## 2023-12-29 RX ADMIN — CEFEPIME 2000 MG: 2 INJECTION, POWDER, FOR SOLUTION INTRAVENOUS at 21:55

## 2023-12-29 RX ADMIN — INSULIN LISPRO 6 UNITS: 100 INJECTION, SOLUTION INTRAVENOUS; SUBCUTANEOUS at 12:25

## 2023-12-29 RX ADMIN — CELECOXIB 200 MG: 200 CAPSULE ORAL at 08:35

## 2023-12-29 RX ADMIN — CEFEPIME 2000 MG: 2 INJECTION, POWDER, FOR SOLUTION INTRAVENOUS at 17:47

## 2023-12-29 RX ADMIN — GABAPENTIN 300 MG: 300 CAPSULE ORAL at 08:37

## 2023-12-29 RX ADMIN — METOPROLOL TARTRATE 50 MG: 50 TABLET ORAL at 21:43

## 2023-12-29 RX ADMIN — CELECOXIB 200 MG: 200 CAPSULE ORAL at 21:42

## 2023-12-29 RX ADMIN — APIXABAN 5 MG: 5 TABLET, FILM COATED ORAL at 08:36

## 2023-12-29 RX ADMIN — LISINOPRIL 10 MG: 5 TABLET ORAL at 08:36

## 2023-12-29 RX ADMIN — ACETAMINOPHEN 650 MG: 325 TABLET, FILM COATED ORAL at 21:45

## 2023-12-29 RX ADMIN — FOLIC ACID 1 MG: 1 TABLET ORAL at 08:36

## 2023-12-29 RX ADMIN — GUAIFENESIN 600 MG: 600 TABLET, EXTENDED RELEASE ORAL at 08:35

## 2023-12-29 RX ADMIN — SODIUM CHLORIDE, PRESERVATIVE FREE 10 ML: 5 INJECTION INTRAVENOUS at 21:49

## 2023-12-29 RX ADMIN — DOXAZOSIN 2 MG: 1 TABLET ORAL at 08:37

## 2023-12-29 RX ADMIN — MOMETASONE FUROATE AND FORMOTEROL FUMARATE DIHYDRATE 2 PUFF: 200; 5 AEROSOL RESPIRATORY (INHALATION) at 07:24

## 2023-12-29 RX ADMIN — PREDNISONE 40 MG: 20 TABLET ORAL at 08:35

## 2023-12-29 RX ADMIN — EMPAGLIFLOZIN 10 MG: 10 TABLET, FILM COATED ORAL at 08:35

## 2023-12-29 RX ADMIN — METOPROLOL TARTRATE 50 MG: 50 TABLET ORAL at 08:36

## 2023-12-29 RX ADMIN — SODIUM CHLORIDE 15 ML: 9 INJECTION, SOLUTION INTRAVENOUS at 17:41

## 2023-12-29 RX ADMIN — MOMETASONE FUROATE AND FORMOTEROL FUMARATE DIHYDRATE 2 PUFF: 200; 5 AEROSOL RESPIRATORY (INHALATION) at 21:49

## 2023-12-29 RX ADMIN — INSULIN GLARGINE 27 UNITS: 100 INJECTION, SOLUTION SUBCUTANEOUS at 21:50

## 2023-12-29 RX ADMIN — MONTELUKAST 10 MG: 10 TABLET, FILM COATED ORAL at 21:45

## 2023-12-29 RX ADMIN — INSULIN LISPRO 4 UNITS: 100 INJECTION, SOLUTION INTRAVENOUS; SUBCUTANEOUS at 17:48

## 2023-12-29 RX ADMIN — PANTOPRAZOLE SODIUM 40 MG: 40 TABLET, DELAYED RELEASE ORAL at 10:06

## 2023-12-29 RX ADMIN — GABAPENTIN 300 MG: 300 CAPSULE ORAL at 21:44

## 2023-12-29 RX ADMIN — GUAIFENESIN 600 MG: 600 TABLET, EXTENDED RELEASE ORAL at 21:45

## 2023-12-29 RX ADMIN — INSULIN LISPRO 4 UNITS: 100 INJECTION, SOLUTION INTRAVENOUS; SUBCUTANEOUS at 21:50

## 2023-12-29 ASSESSMENT — PAIN DESCRIPTION - DESCRIPTORS: DESCRIPTORS: THROBBING

## 2023-12-29 ASSESSMENT — PAIN SCALES - GENERAL
PAINLEVEL_OUTOF10: 3
PAINLEVEL_OUTOF10: 4

## 2023-12-29 ASSESSMENT — PAIN DESCRIPTION - ORIENTATION
ORIENTATION: LEFT
ORIENTATION: LEFT

## 2023-12-29 ASSESSMENT — PAIN DESCRIPTION - LOCATION
LOCATION: ANKLE
LOCATION: FOOT

## 2023-12-29 NOTE — PROGRESS NOTES
Occupational Therapy Note:    Patient discharging downtown for washout. Will hold until appropriate for OT re-evaluation.    Thank you,  Trang Helton, OTR/L     Rehab Caseload Tracker

## 2023-12-29 NOTE — PROGRESS NOTES
[facility]   Progress Note    Patient: Macho Tong MRN: [id number]  SSN: [id number]    YOB: 1950  Age: 73 y.o.  Sex: male      Admit Date: 12/22/2023    LOS: 5 days     Plan of Care:   1: WB status - WBAT  2: WBC is slightly above normal. Left Ankle Aspirate was mostly bloody, shows no bacteria on gram stain.  Cultures with NGTD.  We will follow cultures to make sure no late growth.   3: Suspect Inflammatory arthropathy and no infectious.   4: patient has severe aversion to MRI and does not want one.      Plan is to complete prednisone and then Indomethacin x 1 week.   If WBC continues to elevate or clinical picture worsens/no improvement he will need an MRI to ensure no extra articular infection.  I discussed this with patient and he understands and agrees to plan.     Per Ortho suspect patient will be safe to d/c to Rehab on Saturday.     Will need f/u with his normal foot and ankle surgery J Tang         Subjective:     Pain similar to yesterday    Objective:     Vitals:    12/28/23 0425 12/28/23 0851 12/28/23 1021 12/28/23 1821   BP: 135/83  122/71 128/79   Pulse: 69 71 76 82   Resp: 18 17 16 16   Temp: 98.2 °F (36.8 °C)  98.4 °F (36.9 °C) 97.5 °F (36.4 °C)   TempSrc: Oral  Oral Axillary   SpO2: 92% 93% 94% 93%   Weight:       Height:            Intake and Output:  Current Shift: No intake/output data recorded.  Last three shifts: 12/27 0701 - 12/28 1900  In: 245 [P.O.:240; I.V.:5]  Out: 5195 [Urine:5195]        Lab/Data Review:  Lab Results   Component Value Date    WBC 12.3 (H) 12/28/2023    HGB 10.4 (L) 12/28/2023    HCT 34.0 (L) 12/28/2023    MCV 82.7 12/28/2023     12/28/2023     Lab Results   Component Value Date/Time     12/28/2023 05:35 AM    K 3.8 12/28/2023 05:35 AM     12/28/2023 05:35 AM    CO2 27 12/28/2023 05:35 AM    BUN 29 12/28/2023 05:35 AM    CREATININE 0.85 12/28/2023 05:35 AM    GLUCOSE 252 12/28/2023 05:35 AM     CALCIUM 9.6 12/28/2023 05:35 AM      Results       Procedure Component Value Units Date/Time    Culture, Body Fluid [[id number]] Collected: 12/27/23 1420    Order Status: Completed Specimen: Body Fluid from Synovial Fluid Updated: 12/28/23 0957     Special Requests LEFT ANKLE     Gram stain MANY WBCS SEEN PER LPF         NO DEFINITE ORGANISM SEEN        Culture       No growth after short period of incubation. Further results to follow after overnight incubation.          Influenza A/B, Molecular [[id number]] Collected: 12/22/23 0736    Order Status: Completed Specimen: Nasopharyngeal Updated: 12/22/23 0823     Influenza A, STEPHANY Not detected        Comment: Negative results do not preclude infection with influenza virus and should not be the sole basis of a patient treatment decision.        Influenza B, STEPHANY Not detected       Respiratory Syncytial Virus, Molecular [[id number]] Collected: 12/22/23 0736    Order Status: Completed Specimen: Blood Serum Updated: 12/22/23 0913     RSV by NAAT Not detected        Comment: NEGATIVE FOR THE PRESENCE OF RSV NUCLEIC ACID  Negative results do not preclude infection with RSV and should not be used as the sole basis for patient management decisions.  Methodology: Isothermal Nucleic Acid Amplification  Rapid Abbott ID Now  **Intended for use in patients <18 years of age and >59years of age**         COVID-19, Rapid [[id number]] Collected: 12/22/23 0735    Order Status: Completed Specimen: Nasopharyngeal Updated: 12/22/23 0823     Source NASAL SWAB        SARS-CoV-2, Rapid Not detected        Comment:    The specimen is NEGATIVE for SARS-CoV-2, the novel coronavirus associated with COVID-19.  A negative result does not rule out COVID-19.     This test has been authorized by the FDA under an Emergency Use Authorization (EUA) for use by authorized laboratories.      Fact sheet for Healthcare Providers: https://www.fda.gov/media/237988/download  Fact sheet for Patients:

## 2023-12-29 NOTE — CONSULTS
MD Alejandra   Insulin Glargine w/ Trans Port 100 UNIT/ML SOPN Inject 56 Units into the skin 6/5/23   Alejandra Read MD   empagliflozin (JARDIANCE) 25 MG tablet Take 1 tablet by mouth daily 6/5/23   Alejandra Read MD   metoprolol tartrate (LOPRESSOR) 50 MG tablet Take 1 tablet by mouth 2 times daily 4/26/23   Reggie Agustin MD   mometasone (ELOCON) 0.1 % ointment APPLY TO AFFECTED AREA AT BEDTIME 9/25/22   Alejandra Read MD   nystatin (MYCOSTATIN) 465725 UNIT/GM cream APPLY TWICE DAILY MIXED WITH MOMETASONE 9/29/22   Alejandra Read MD   acetaminophen (TYLENOL) 500 MG tablet Take 1-2 tablets by mouth every 6 hours as needed    Automatic Reconciliation, Ar   albuterol sulfate  (90 Base) MCG/ACT inhaler Inhale 1 puff into the lungs every 4 hours as needed 11/27/18   Automatic Reconciliation, Ar   albuterol (PROVENTIL) (2.5 MG/3ML) 0.083% nebulizer solution Inhale 3 mLs into the lungs once  Patient not taking: Reported on 12/22/2023 8/1/16   Automatic Reconciliation, Ar   apixaban (ELIQUIS) 5 MG TABS tablet Take 1 tablet by mouth 2 times daily 7/20/21   Automatic Reconciliation, Ar   atorvastatin (LIPITOR) 20 MG tablet Take 2 tablets by mouth daily 7/20/21   Automatic Reconciliation, Ar   celecoxib (CELEBREX) 200 MG capsule Take 1 capsule by mouth 2 times daily 7/20/21   Automatic Reconciliation, Ar   Cholecalciferol 50 MCG (2000 UT) TABS Take by mouth    Automatic Reconciliation, Ar   diphenhydrAMINE (BENADRYL) 50 MG capsule Take 2 capsules by mouth    Automatic Reconciliation, Ar   ezetimibe (ZETIA) 10 MG tablet Take 1 tablet by mouth 7/20/21   Automatic Reconciliation, Ar   Fluticasone furoate-vilanterol (BREO ELLIPTA) 200-25 MCG/INH AEPB inhaler Inhale 1 puff into the lungs daily 7/20/21   Automatic Reconciliation, Ar   fluticasone (FLONASE) 50 MCG/ACT nasal spray 2 sprays by Nasal route daily as needed    Automatic Reconciliation, Ar   lisinopril (PRINIVIL;ZESTRIL) 10 MG     107 107   CO2 26 27 28       LFTS:  No results for input(s): \"ALT\", \"TP\", \"ALB\" in the last 72 hours.    Invalid input(s): \"TBILI\", \"SGOT\", \"AP\"    Microbiology:   Reviewed    Imaging:   Reviewed    Signed By: Ramon Beckman MD     December 29, 2023

## 2023-12-29 NOTE — H&P
[facility]  Consultation Note     Patient ID:  Name: Macho Tong  MRN: [id number]  AGE: 73 y.o.  : 1950     Date of Consultation:  2023  Referring Physician:  Hospitalist - Karin Donnelly NP     Subjective: Pt complains of left ankle pain.  They presented to the [facility] on 2023 for fall. They were found to have hypoxia and some left ankle pain. They were admitted by the hospitalist. They localizes their pain to the left ankle. They had pain with ROM and Weightbearing but no pain at this time. They have no other orthopedic concerns at this time.      He states that he was on prednisone for a Upper resp infection and when he came off the prednisone had swelling and pain in this left ankle. His pain is resolved now that he is back on prednisone. His plan is to go to rehab on Tuesday.       Past Medical History Includes:   Past Medical History        Past Medical History:   Diagnosis Date    Adverse effect of anesthesia       unable to have spinal anesthesia when had TKR    Asthma dx childhood     last attack during high school; advair at hs; albuterol prn; neb prn     Atrial fibrillation (HCC)       2 epiosode of A-fib last about 6-8 months ago (noted on 17); on Eliquis     Cervical spondylosis 2013    Chronic back pain      RIGHT SIDE WITH UNCLEAR ETIOLOGY    Chronic kidney disease      Chronic pain      Colon polyps      COPD (chronic obstructive pulmonary disease) (HCC) 2016    Coronary atherosclerosis of native coronary vessel 2016: Mild non-obstructive CAD    Diabetes (HCC) dx      type 2; oral meds; no bs checks at home    Diverticulosis      Edema 2016    Former smoker      GERD (gastroesophageal reflux disease)        controlled with med    High cholesterol      History of kidney stones      Hypertension       controlled with med    Kidney stone      Obesity (BMI 30-39.9)       BMI 39    Status post total

## 2023-12-29 NOTE — CARE COORDINATION
Discharge planning was discussed with the Interdisciplinary Team. Per the attending physician, the patient is a pending transfer to [facility].

## 2023-12-29 NOTE — ANESTHESIA PRE PROCEDURE
ROS     (-) CVA           GI/Hepatic/Renal: Neg GI/Hepatic/Renal ROS  (+) GERD:, renal disease: CRI, morbid obesity          Endo/Other: Negative Endo/Other ROS   (+) DiabetesType II DM, , .                  ROS comment: Gout   Abdominal:             Vascular: negative vascular ROS. Other Findings:           Anesthesia Plan      general     ASA 3     (GETA. Given his GLP use, DM, eating yesterday, will plan for airway protection and OGT. Nerve block in pacu as rescue if required.)  Induction: intravenous. Anesthetic plan and risks discussed with patient.                         Julius Madsen MD   12/29/2023

## 2023-12-29 NOTE — PROGRESS NOTES
Received call from pre-op (JACOBO Barrera) located at [facility] stating patient needs to be at hospital in the morning between 0630 and 0645. [facility] 12/[facility]/2023 at 0545.

## 2023-12-29 NOTE — PLAN OF CARE
Problem: Discharge Planning  Goal: Discharge to home or other facility with appropriate resources  12/28/2023 1908 by Jing Villafana, RN  Outcome: Progressing  12/28/2023 1117 by Hugh Cardona RN  Outcome: Progressing     Problem: Pain  Goal: Verbalizes/displays adequate comfort level or baseline comfort level  12/28/2023 1908 by Jing Villafana RN  Outcome: Progressing  12/28/2023 1117 by Hugh Cardona RN  Outcome: Progressing     Problem: Skin/Tissue Integrity  Goal: Absence of new skin breakdown  Description: 1.  Monitor for areas of redness and/or skin breakdown  2.  Assess vascular access sites hourly  3.  Every 4-6 hours minimum:  Change oxygen saturation probe site  4.  Every 4-6 hours:  If on nasal continuous positive airway pressure, respiratory therapy assess nares and determine need for appliance change or resting period.  12/28/2023 1117 by Hugh Cardona RN  Outcome: Progressing     Problem: Safety - Adult  Goal: Free from fall injury  12/28/2023 1117 by Hugh Cardona RN  Outcome: Progressing     Problem: ABCDS Injury Assessment  Goal: Absence of physical injury  12/28/2023 1117 by Hugh Cardona RN  Outcome: Progressing     Problem: Musculoskeletal - Adult  Goal: Return mobility to safest level of function  12/28/2023 1117 by Hugh Cardona RN  Outcome: Progressing     Problem: Respiratory - Adult  Goal: Achieves optimal ventilation and oxygenation  12/28/2023 1117 by Hugh Cardona RN  Outcome: Progressing     Problem: Metabolic/Fluid and Electrolytes - Adult  Goal: Glucose maintained within prescribed range  12/28/2023 1117 by Hugh Cardona RN  Outcome: Progressing     Problem: Neurosensory - Adult  Goal: Achieves maximal functionality and self care  12/28/2023 1117 by Hugh Cardona RN  Outcome: Progressing     Problem: Genitourinary - Adult  Goal: Absence of urinary retention  12/28/2023 1117 by Hugh Cardona  RN  Outcome: Progressing     Problem: Infection - Adult  Goal: Absence of infection at discharge  12/28/2023 1117 by Hugh Cardona RN  Outcome: Progressing  Goal: Absence of infection during hospitalization  12/28/2023 1117 by Hugh Cardona RN  Outcome: Progressing     Problem: Gastrointestinal - Adult  Goal: Maintains adequate nutritional intake  12/28/2023 1117 by Hugh Cardona, RN  Outcome: Progressing     Problem: Skin/Tissue Integrity - Adult  Goal: Incisions, wounds, or drain sites healing without S/S of infection  12/28/2023 1117 by Hugh Cardona, RN  Outcome: Progressing

## 2023-12-29 NOTE — PROGRESS NOTES
ankle.  Patient was started on colchicine then switched to prednisone for concerns of acute gout flare contributing to left ankle edema. Venous duplex of LLE was negative for DVT.   Both ankles were also x-rayed on admission showing mild degenerative changes of the left ankle with no evidence of acute fracture. Ortho was consulted. Ortho suspected  arthritis versus gout versus transient synovitis due to patient's most recent upper respiratory infection. Pt underwent L ankle joint aspiration on 12/27 with bloody fluid.  Synovial culture grew light GNR. Discussed with Ortho Dr. Villagran and was recommended for patient to be transferred to D for L ankle washout on 12/30.       PT/OT recommended SNF when medically ready.      Subjective & 24hr Events:     Pt stated L ankle same as yesterday. Not much improved. Aware of needs to transfer to [facility] for orthopedic procedure Otherwise no acute concerns.  Patient denies SOB, chest pain, abdominal pain, nausea, vomiting.      Assessment & Plan:     Inability to bear weight  Left ankle pain  ? Acute gout flare  Hx of Charcot ankle, right  Generalized weakness / frequent falls  Ortho suspected  arthritis versus gout versus transient synovitis due to patient's most recent upper respiratory infection.   XR left ankle shows mild degenerative changes in left ankle with no acute fracture  S/p L ankle joint aspiration on 12/27 with bloody fluid  Synovial studies  --culture: Light GNR  --crystals: pending  Blood cultures: NGTD  Continue prednisone to finish 7-day course EOT 12/30 and may need a tapering course  Elevate extremities, cont compression socks  Supportive care for pain--continue Celebrex, Tylenol, gabapentin  Cont Lasix 10 mg daily for now for edema  Ortho on board, appreciate recs--d/w Dr. Villagran 12/29 and was recommended for patient to be transferred to [facility] for L ankle washout on 12/30. NPO after midnight  Will consult ID, appreciate recs  Hold off on abx for now as pt not  vial 27 Units  0.2 Units/kg SubCUTAneous Daily    insulin glargine (LANTUS) injection vial 27 Units  27 Units SubCUTAneous BID    furosemide (LASIX) tablet 10 mg  10 mg Oral Daily    insulin lispro (HUMALOG) injection vial 0-8 Units  0-8 Units SubCUTAneous TID WC    insulin lispro (HUMALOG) injection vial 0-4 Units  0-4 Units SubCUTAneous Nightly    folic acid (FOLVITE) tablet 1 mg  1 mg Oral Daily    vitamin B-12 (CYANOCOBALAMIN) tablet 1,000 mcg  1,000 mcg Oral Daily    predniSONE (DELTASONE) tablet 40 mg  40 mg Oral Daily    acetaminophen (TYLENOL) tablet 650 mg  650 mg Oral TID    mometasone-formoterol (DULERA) 200-5 MCG/ACT inhaler 2 puff  2 puff Inhalation BID RT    albuterol (PROVENTIL) (2.5 MG/3ML) 0.083% nebulizer solution 2.5 mg  2.5 mg Nebulization Q6H PRN    albuterol sulfate HFA (PROVENTIL;VENTOLIN;PROAIR) 108 (90 Base) MCG/ACT inhaler 1 puff  1 puff Inhalation Q4H PRN    apixaban (ELIQUIS) tablet 5 mg  5 mg Oral BID    atorvastatin (LIPITOR) tablet 20 mg  20 mg Oral Nightly    celecoxib (CELEBREX) capsule 200 mg  200 mg Oral BID    doxazosin (CARDURA) tablet 2 mg  2 mg Oral Daily    empagliflozin (JARDIANCE) tablet 10 mg  10 mg Oral Daily    ezetimibe (ZETIA) tablet 10 mg  10 mg Oral Nightly    fluticasone (FLONASE) 50 MCG/ACT nasal spray 2 spray  2 spray Nasal Daily PRN    gabapentin (NEURONTIN) capsule 300 mg  300 mg Oral BID    lisinopril (PRINIVIL;ZESTRIL) tablet 10 mg  10 mg Oral Daily    metoprolol tartrate (LOPRESSOR) tablet 50 mg  50 mg Oral BID    montelukast (SINGULAIR) tablet 10 mg  10 mg Oral Nightly    pantoprazole (PROTONIX) tablet 40 mg  40 mg Oral Daily    sodium chloride flush 0.9 % injection 5-40 mL  5-40 mL IntraVENous 2 times per day    sodium chloride flush 0.9 % injection 5-40 mL  5-40 mL IntraVENous PRN    0.9 % sodium chloride infusion   IntraVENous PRN    potassium chloride (KLOR-CON M) extended release tablet 40 mEq  40 mEq Oral PRN    Or    potassium bicarb-citric acid

## 2023-12-29 NOTE — DISCHARGE SUMMARY
Placeholder. Please see prior progress note. Pt is being transferred to [facility] for procedure with Ortho.    Adri Conde DO

## 2023-12-29 NOTE — DIABETES MGMT
Patient admitted with generalized weakness. Blood glucose ranged 219-447 yesterday with patient receiving Lantus 54 units, Humalog 16 units, Jardiance 10 mg, NPH 14 units, and Prednisone 40 mg. Blood glucose this morning was 172. Creatinine 0.67. GFR > 60. Reviewed patient current regimen: Lantus 27 units BID, Humlaog correctional insulin, NPH 14 units daily, Jardiance 10 mg daily, and Prednisone 40 mg daily.  Patient would likely benefit from an increase in intermediate acting insulin to help offset hyperglycemia during the day related to steroid therapy.  Provider updated via [employer] regarding recommendations and patient glycemic control.  New order received for NPH 0.2 units/kg daily. As steroid therapy weaned patient insulin needs will likely decrease.

## 2023-12-29 NOTE — PROGRESS NOTES
Physical Therapy Hold Note    Pt currently on hold for physical therapy due to transferring downtown for washout.  Will plan to provide PT re-evaluation once pt is medically appropriate to resume PT services.    Alli Brown, PT

## 2023-12-29 NOTE — H&P (VIEW-ONLY)
[facility]  Consultation Note     Patient ID:  Name: Macho Tong  MRN: [id number]  AGE: 73 y.o.  : 1950     Date of Consultation:  2023  Referring Physician:  Hospitalist - Karin Donnelly NP     Subjective: Pt complains of left ankle pain.  They presented to the [facility] on 2023 for fall. They were found to have hypoxia and some left ankle pain. They were admitted by the hospitalist. They localizes their pain to the left ankle. They had pain with ROM and Weightbearing but no pain at this time. They have no other orthopedic concerns at this time.      He states that he was on prednisone for a Upper resp infection and when he came off the prednisone had swelling and pain in this left ankle. His pain is resolved now that he is back on prednisone. His plan is to go to rehab on Tuesday.       Past Medical History Includes:   Past Medical History        Past Medical History:   Diagnosis Date    Adverse effect of anesthesia       unable to have spinal anesthesia when had TKR    Asthma dx childhood     last attack during high school; advair at hs; albuterol prn; neb prn     Atrial fibrillation (HCC)       2 epiosode of A-fib last about 6-8 months ago (noted on 17); on Eliquis     Cervical spondylosis 2013    Chronic back pain      RIGHT SIDE WITH UNCLEAR ETIOLOGY    Chronic kidney disease      Chronic pain      Colon polyps      COPD (chronic obstructive pulmonary disease) (HCC) 2016    Coronary atherosclerosis of native coronary vessel 2016: Mild non-obstructive CAD    Diabetes (HCC) dx      type 2; oral meds; no bs checks at home    Diverticulosis      Edema 2016    Former smoker      GERD (gastroesophageal reflux disease)        controlled with med    High cholesterol      History of kidney stones      Hypertension       controlled with med    Kidney stone      Obesity (BMI 30-39.9)       BMI 39    Status post total  encouraged that pain is better with steroids and NSAIDs. We discussed that he can follow-up out patient with Dr. Karime Carroll team once respiratory symptoms are under control. Plan is for him to go to rehab on Tuesday. He can be discharged from an ortho stand point. WE will not continue to visit daily but please call is there is any change to left ankle pain. Patient continued to have left ankle pain and eventually Dr. Farheen Richardson performed an arthrocentesis on Wednesday 1227. This has come back with a gram-negative which it sounds like is just a little bit of growth. With his clinical picture this could be a contaminant but with the patient's history I think is reasonable to go ahead and proceed with drainage of the left ankle presumed septic arthritis.   Unfortunately he was fed breakfast on 12/29/2023 so the plan is to proceed on 12/30/2023 with lavage of the left ankle for septic arthritis

## 2023-12-30 ENCOUNTER — ANESTHESIA (OUTPATIENT)
Dept: SURGERY | Age: 73
End: 2023-12-30
Payer: MEDICARE

## 2023-12-30 ENCOUNTER — HOSPITAL ENCOUNTER (OUTPATIENT)
Age: 73
Setting detail: SURGERY ADMIT
Discharge: ANOTHER HOSPITAL | DRG: 549 | End: 2023-12-30
Attending: ORTHOPAEDIC SURGERY | Admitting: ORTHOPAEDIC SURGERY
Payer: MEDICARE

## 2023-12-30 VITALS
BODY MASS INDEX: 36.01 KG/M2 | WEIGHT: 305 LBS | TEMPERATURE: 97.9 F | HEART RATE: 72 BPM | OXYGEN SATURATION: 94 % | SYSTOLIC BLOOD PRESSURE: 115 MMHG | RESPIRATION RATE: 17 BRPM | HEIGHT: 77 IN | DIASTOLIC BLOOD PRESSURE: 60 MMHG

## 2023-12-30 PROBLEM — M00.9 SEPTIC ARTHRITIS (HCC): Status: ACTIVE | Noted: 2023-12-30

## 2023-12-30 LAB
BACTERIA SPEC CULT: ABNORMAL
GLUCOSE BLD STRIP.AUTO-MCNC: 196 MG/DL (ref 65–100)
GLUCOSE BLD STRIP.AUTO-MCNC: 236 MG/DL (ref 65–100)
GLUCOSE BLD STRIP.AUTO-MCNC: 358 MG/DL (ref 65–100)
GRAM STN SPEC: ABNORMAL
GRAM STN SPEC: ABNORMAL
SERVICE CMNT-IMP: ABNORMAL

## 2023-12-30 PROCEDURE — 2709999900 HC NON-CHARGEABLE SUPPLY: Performed by: ORTHOPAEDIC SURGERY

## 2023-12-30 PROCEDURE — 27610 EXPLORE/TREAT ANKLE JOINT: CPT | Performed by: ORTHOPAEDIC SURGERY

## 2023-12-30 PROCEDURE — 6370000000 HC RX 637 (ALT 250 FOR IP): Performed by: FAMILY MEDICINE

## 2023-12-30 PROCEDURE — 2500000003 HC RX 250 WO HCPCS: Performed by: ANESTHESIOLOGY

## 2023-12-30 PROCEDURE — 6370000000 HC RX 637 (ALT 250 FOR IP): Performed by: ANESTHESIOLOGY

## 2023-12-30 PROCEDURE — 3E1U38X IRRIGATION OF JOINTS USING IRRIGATING SUBSTANCE, PERCUTANEOUS APPROACH, DIAGNOSTIC: ICD-10-PCS | Performed by: ORTHOPAEDIC SURGERY

## 2023-12-30 PROCEDURE — 2580000003 HC RX 258: Performed by: INTERNAL MEDICINE

## 2023-12-30 PROCEDURE — 1100000000 HC RM PRIVATE

## 2023-12-30 PROCEDURE — 7100000001 HC PACU RECOVERY - ADDTL 15 MIN: Performed by: ORTHOPAEDIC SURGERY

## 2023-12-30 PROCEDURE — 3600000014 HC SURGERY LEVEL 4 ADDTL 15MIN: Performed by: ORTHOPAEDIC SURGERY

## 2023-12-30 PROCEDURE — 3700000000 HC ANESTHESIA ATTENDED CARE: Performed by: ORTHOPAEDIC SURGERY

## 2023-12-30 PROCEDURE — 2580000003 HC RX 258: Performed by: FAMILY MEDICINE

## 2023-12-30 PROCEDURE — 3600000004 HC SURGERY LEVEL 4 BASE: Performed by: ORTHOPAEDIC SURGERY

## 2023-12-30 PROCEDURE — 6360000002 HC RX W HCPCS: Performed by: INTERNAL MEDICINE

## 2023-12-30 PROCEDURE — 82962 GLUCOSE BLOOD TEST: CPT

## 2023-12-30 PROCEDURE — 1100000003 HC PRIVATE W/ TELEMETRY

## 2023-12-30 PROCEDURE — 7100000000 HC PACU RECOVERY - FIRST 15 MIN: Performed by: ORTHOPAEDIC SURGERY

## 2023-12-30 PROCEDURE — 0S9G3ZZ DRAINAGE OF LEFT ANKLE JOINT, PERCUTANEOUS APPROACH: ICD-10-PCS | Performed by: ORTHOPAEDIC SURGERY

## 2023-12-30 PROCEDURE — 0S9G3ZX DRAINAGE OF LEFT ANKLE JOINT, PERCUTANEOUS APPROACH, DIAGNOSTIC: ICD-10-PCS | Performed by: ORTHOPAEDIC SURGERY

## 2023-12-30 PROCEDURE — 6360000002 HC RX W HCPCS: Performed by: ANESTHESIOLOGY

## 2023-12-30 PROCEDURE — 2580000003 HC RX 258: Performed by: ANESTHESIOLOGY

## 2023-12-30 PROCEDURE — 6360000002 HC RX W HCPCS: Performed by: FAMILY MEDICINE

## 2023-12-30 PROCEDURE — 3700000001 HC ADD 15 MINUTES (ANESTHESIA): Performed by: ORTHOPAEDIC SURGERY

## 2023-12-30 RX ORDER — SODIUM CHLORIDE 0.9 % (FLUSH) 0.9 %
5-40 SYRINGE (ML) INJECTION EVERY 12 HOURS SCHEDULED
Status: DISCONTINUED | OUTPATIENT
Start: 2023-12-30 | End: 2023-12-30 | Stop reason: HOSPADM

## 2023-12-30 RX ORDER — DIPHENHYDRAMINE HCL 25 MG
25 CAPSULE ORAL EVERY 6 HOURS PRN
Status: DISCONTINUED | OUTPATIENT
Start: 2023-12-30 | End: 2024-01-03 | Stop reason: HOSPADM

## 2023-12-30 RX ORDER — SUCCINYLCHOLINE CHLORIDE 20 MG/ML
INJECTION INTRAMUSCULAR; INTRAVENOUS PRN
Status: DISCONTINUED | OUTPATIENT
Start: 2023-12-30 | End: 2023-12-30 | Stop reason: SDUPTHER

## 2023-12-30 RX ORDER — MIDAZOLAM HYDROCHLORIDE 2 MG/2ML
2 INJECTION, SOLUTION INTRAMUSCULAR; INTRAVENOUS
Status: DISCONTINUED | OUTPATIENT
Start: 2023-12-30 | End: 2023-12-30 | Stop reason: HOSPADM

## 2023-12-30 RX ORDER — HYDROMORPHONE HYDROCHLORIDE 2 MG/ML
0.5 INJECTION, SOLUTION INTRAMUSCULAR; INTRAVENOUS; SUBCUTANEOUS EVERY 10 MIN PRN
Status: CANCELLED | OUTPATIENT
Start: 2023-12-30

## 2023-12-30 RX ORDER — OXYCODONE HYDROCHLORIDE 5 MG/1
5 TABLET ORAL
Status: DISCONTINUED | OUTPATIENT
Start: 2023-12-30 | End: 2023-12-30

## 2023-12-30 RX ORDER — ONDANSETRON 2 MG/ML
4 INJECTION INTRAMUSCULAR; INTRAVENOUS
Status: CANCELLED | OUTPATIENT
Start: 2023-12-30 | End: 2023-12-31

## 2023-12-30 RX ORDER — POTASSIUM CHLORIDE 7.45 MG/ML
10 INJECTION INTRAVENOUS PRN
Status: DISCONTINUED | OUTPATIENT
Start: 2023-12-30 | End: 2024-01-03 | Stop reason: HOSPADM

## 2023-12-30 RX ORDER — ACETAMINOPHEN 500 MG
1000 TABLET ORAL ONCE
Status: COMPLETED | OUTPATIENT
Start: 2023-12-30 | End: 2023-12-30

## 2023-12-30 RX ORDER — DEXAMETHASONE SODIUM PHOSPHATE 10 MG/ML
INJECTION INTRAMUSCULAR; INTRAVENOUS PRN
Status: DISCONTINUED | OUTPATIENT
Start: 2023-12-30 | End: 2023-12-30 | Stop reason: SDUPTHER

## 2023-12-30 RX ORDER — ONDANSETRON 4 MG/1
4 TABLET, ORALLY DISINTEGRATING ORAL EVERY 8 HOURS PRN
Status: DISCONTINUED | OUTPATIENT
Start: 2023-12-30 | End: 2024-01-03 | Stop reason: HOSPADM

## 2023-12-30 RX ORDER — INSULIN LISPRO 100 [IU]/ML
0-4 INJECTION, SOLUTION INTRAVENOUS; SUBCUTANEOUS NIGHTLY
Status: DISCONTINUED | OUTPATIENT
Start: 2023-12-30 | End: 2024-01-03 | Stop reason: HOSPADM

## 2023-12-30 RX ORDER — HALOPERIDOL 5 MG/ML
1 INJECTION INTRAMUSCULAR
Status: DISCONTINUED | OUTPATIENT
Start: 2023-12-30 | End: 2023-12-30 | Stop reason: HOSPADM

## 2023-12-30 RX ORDER — ACETAMINOPHEN 650 MG/1
650 SUPPOSITORY RECTAL EVERY 6 HOURS PRN
Status: DISCONTINUED | OUTPATIENT
Start: 2023-12-30 | End: 2024-01-03 | Stop reason: HOSPADM

## 2023-12-30 RX ORDER — SODIUM CHLORIDE 0.9 % (FLUSH) 0.9 %
5-40 SYRINGE (ML) INJECTION EVERY 12 HOURS SCHEDULED
Status: DISCONTINUED | OUTPATIENT
Start: 2023-12-30 | End: 2024-01-03 | Stop reason: HOSPADM

## 2023-12-30 RX ORDER — IPRATROPIUM BROMIDE AND ALBUTEROL SULFATE 2.5; .5 MG/3ML; MG/3ML
1 SOLUTION RESPIRATORY (INHALATION)
Status: DISCONTINUED | OUTPATIENT
Start: 2023-12-30 | End: 2023-12-30

## 2023-12-30 RX ORDER — SODIUM CHLORIDE 0.9 % (FLUSH) 0.9 %
5-40 SYRINGE (ML) INJECTION PRN
Status: DISCONTINUED | OUTPATIENT
Start: 2023-12-30 | End: 2023-12-30 | Stop reason: HOSPADM

## 2023-12-30 RX ORDER — SODIUM CHLORIDE, SODIUM LACTATE, POTASSIUM CHLORIDE, CALCIUM CHLORIDE 600; 310; 30; 20 MG/100ML; MG/100ML; MG/100ML; MG/100ML
INJECTION, SOLUTION INTRAVENOUS CONTINUOUS
Status: DISCONTINUED | OUTPATIENT
Start: 2023-12-30 | End: 2023-12-30 | Stop reason: HOSPADM

## 2023-12-30 RX ORDER — FENTANYL CITRATE 50 UG/ML
50 INJECTION, SOLUTION INTRAMUSCULAR; INTRAVENOUS EVERY 5 MIN PRN
Status: CANCELLED | OUTPATIENT
Start: 2023-12-30

## 2023-12-30 RX ORDER — HYDROMORPHONE HYDROCHLORIDE 2 MG/ML
0.5 INJECTION, SOLUTION INTRAMUSCULAR; INTRAVENOUS; SUBCUTANEOUS EVERY 10 MIN PRN
Status: DISCONTINUED | OUTPATIENT
Start: 2023-12-30 | End: 2023-12-30 | Stop reason: HOSPADM

## 2023-12-30 RX ORDER — EZETIMIBE 10 MG/1
10 TABLET ORAL NIGHTLY
Status: DISCONTINUED | OUTPATIENT
Start: 2023-12-30 | End: 2024-01-03 | Stop reason: HOSPADM

## 2023-12-30 RX ORDER — HYDROMORPHONE HYDROCHLORIDE 1 MG/ML
0.5 INJECTION, SOLUTION INTRAMUSCULAR; INTRAVENOUS; SUBCUTANEOUS EVERY 10 MIN PRN
Status: DISCONTINUED | OUTPATIENT
Start: 2023-12-30 | End: 2023-12-30

## 2023-12-30 RX ORDER — SODIUM CHLORIDE 0.9 % (FLUSH) 0.9 %
5-40 SYRINGE (ML) INJECTION PRN
Status: DISCONTINUED | OUTPATIENT
Start: 2023-12-30 | End: 2023-12-30

## 2023-12-30 RX ORDER — ATORVASTATIN CALCIUM 10 MG/1
20 TABLET, FILM COATED ORAL NIGHTLY
Status: DISCONTINUED | OUTPATIENT
Start: 2023-12-30 | End: 2024-01-03 | Stop reason: HOSPADM

## 2023-12-30 RX ORDER — FENTANYL CITRATE 50 UG/ML
50 INJECTION, SOLUTION INTRAMUSCULAR; INTRAVENOUS EVERY 5 MIN PRN
Status: DISCONTINUED | OUTPATIENT
Start: 2023-12-30 | End: 2023-12-30

## 2023-12-30 RX ORDER — KETOROLAC TROMETHAMINE 30 MG/ML
INJECTION, SOLUTION INTRAMUSCULAR; INTRAVENOUS PRN
Status: DISCONTINUED | OUTPATIENT
Start: 2023-12-30 | End: 2023-12-30 | Stop reason: SDUPTHER

## 2023-12-30 RX ORDER — MORPHINE SULFATE 2 MG/ML
2 INJECTION, SOLUTION INTRAMUSCULAR; INTRAVENOUS EVERY 4 HOURS PRN
Status: DISCONTINUED | OUTPATIENT
Start: 2023-12-30 | End: 2024-01-03 | Stop reason: HOSPADM

## 2023-12-30 RX ORDER — OXYCODONE HYDROCHLORIDE 5 MG/1
5 TABLET ORAL
Status: COMPLETED | OUTPATIENT
Start: 2023-12-30 | End: 2023-12-30

## 2023-12-30 RX ORDER — LANOLIN ALCOHOL/MO/W.PET/CERES
1000 CREAM (GRAM) TOPICAL DAILY
Status: DISCONTINUED | OUTPATIENT
Start: 2023-12-31 | End: 2024-01-03 | Stop reason: HOSPADM

## 2023-12-30 RX ORDER — SODIUM CHLORIDE 0.9 % (FLUSH) 0.9 %
5-40 SYRINGE (ML) INJECTION PRN
Status: DISCONTINUED | OUTPATIENT
Start: 2023-12-30 | End: 2024-01-03 | Stop reason: HOSPADM

## 2023-12-30 RX ORDER — POTASSIUM CHLORIDE 20 MEQ/1
40 TABLET, EXTENDED RELEASE ORAL PRN
Status: DISCONTINUED | OUTPATIENT
Start: 2023-12-30 | End: 2024-01-03 | Stop reason: HOSPADM

## 2023-12-30 RX ORDER — LISINOPRIL 5 MG/1
10 TABLET ORAL DAILY
Status: DISCONTINUED | OUTPATIENT
Start: 2023-12-31 | End: 2024-01-03 | Stop reason: HOSPADM

## 2023-12-30 RX ORDER — CELECOXIB 200 MG/1
200 CAPSULE ORAL 2 TIMES DAILY
Status: DISCONTINUED | OUTPATIENT
Start: 2023-12-30 | End: 2024-01-03 | Stop reason: HOSPADM

## 2023-12-30 RX ORDER — FOLIC ACID 1 MG/1
1 TABLET ORAL DAILY
Status: DISCONTINUED | OUTPATIENT
Start: 2023-12-31 | End: 2024-01-03 | Stop reason: HOSPADM

## 2023-12-30 RX ORDER — GABAPENTIN 300 MG/1
300 CAPSULE ORAL 2 TIMES DAILY
Status: DISCONTINUED | OUTPATIENT
Start: 2023-12-30 | End: 2024-01-03 | Stop reason: HOSPADM

## 2023-12-30 RX ORDER — MAGNESIUM SULFATE IN WATER 40 MG/ML
2000 INJECTION, SOLUTION INTRAVENOUS PRN
Status: DISCONTINUED | OUTPATIENT
Start: 2023-12-30 | End: 2024-01-03 | Stop reason: HOSPADM

## 2023-12-30 RX ORDER — ONDANSETRON 2 MG/ML
4 INJECTION INTRAMUSCULAR; INTRAVENOUS EVERY 6 HOURS PRN
Status: DISCONTINUED | OUTPATIENT
Start: 2023-12-30 | End: 2024-01-03 | Stop reason: HOSPADM

## 2023-12-30 RX ORDER — PANTOPRAZOLE SODIUM 40 MG/1
40 TABLET, DELAYED RELEASE ORAL DAILY
Status: DISCONTINUED | OUTPATIENT
Start: 2023-12-31 | End: 2024-01-03 | Stop reason: HOSPADM

## 2023-12-30 RX ORDER — LIDOCAINE HYDROCHLORIDE 20 MG/ML
INJECTION, SOLUTION EPIDURAL; INFILTRATION; INTRACAUDAL; PERINEURAL PRN
Status: DISCONTINUED | OUTPATIENT
Start: 2023-12-30 | End: 2023-12-30 | Stop reason: SDUPTHER

## 2023-12-30 RX ORDER — SODIUM CHLORIDE 9 MG/ML
INJECTION, SOLUTION INTRAVENOUS PRN
Status: DISCONTINUED | OUTPATIENT
Start: 2023-12-30 | End: 2024-01-03 | Stop reason: HOSPADM

## 2023-12-30 RX ORDER — INSULIN LISPRO 100 [IU]/ML
0-8 INJECTION, SOLUTION INTRAVENOUS; SUBCUTANEOUS
Status: DISCONTINUED | OUTPATIENT
Start: 2023-12-30 | End: 2024-01-03 | Stop reason: HOSPADM

## 2023-12-30 RX ORDER — OXYCODONE HYDROCHLORIDE 5 MG/1
5 TABLET ORAL EVERY 4 HOURS PRN
Status: DISCONTINUED | OUTPATIENT
Start: 2023-12-30 | End: 2024-01-03 | Stop reason: HOSPADM

## 2023-12-30 RX ORDER — SODIUM CHLORIDE 0.9 % (FLUSH) 0.9 %
5-40 SYRINGE (ML) INJECTION EVERY 12 HOURS SCHEDULED
Status: CANCELLED | OUTPATIENT
Start: 2023-12-30

## 2023-12-30 RX ORDER — ACETAMINOPHEN 325 MG/1
650 TABLET ORAL EVERY 6 HOURS PRN
Status: DISCONTINUED | OUTPATIENT
Start: 2023-12-30 | End: 2024-01-03 | Stop reason: HOSPADM

## 2023-12-30 RX ORDER — ALBUTEROL SULFATE 2.5 MG/3ML
2.5 SOLUTION RESPIRATORY (INHALATION) EVERY 6 HOURS PRN
Status: DISCONTINUED | OUTPATIENT
Start: 2023-12-30 | End: 2024-01-03 | Stop reason: HOSPADM

## 2023-12-30 RX ORDER — IPRATROPIUM BROMIDE AND ALBUTEROL SULFATE 2.5; .5 MG/3ML; MG/3ML
1 SOLUTION RESPIRATORY (INHALATION)
Status: DISCONTINUED | OUTPATIENT
Start: 2023-12-30 | End: 2023-12-30 | Stop reason: HOSPADM

## 2023-12-30 RX ORDER — OXYCODONE HYDROCHLORIDE 5 MG/1
5 TABLET ORAL
Status: CANCELLED | OUTPATIENT
Start: 2023-12-30 | End: 2023-12-31

## 2023-12-30 RX ORDER — FENTANYL CITRATE 50 UG/ML
INJECTION, SOLUTION INTRAMUSCULAR; INTRAVENOUS PRN
Status: DISCONTINUED | OUTPATIENT
Start: 2023-12-30 | End: 2023-12-30 | Stop reason: SDUPTHER

## 2023-12-30 RX ORDER — MONTELUKAST SODIUM 10 MG/1
10 TABLET ORAL NIGHTLY
Status: DISCONTINUED | OUTPATIENT
Start: 2023-12-30 | End: 2024-01-03 | Stop reason: HOSPADM

## 2023-12-30 RX ORDER — HALOPERIDOL 5 MG/ML
1 INJECTION INTRAMUSCULAR
Status: DISCONTINUED | OUTPATIENT
Start: 2023-12-30 | End: 2023-12-30

## 2023-12-30 RX ORDER — ONDANSETRON 2 MG/ML
4 INJECTION INTRAMUSCULAR; INTRAVENOUS
Status: DISCONTINUED | OUTPATIENT
Start: 2023-12-30 | End: 2023-12-30

## 2023-12-30 RX ORDER — INSULIN GLARGINE 100 [IU]/ML
27 INJECTION, SOLUTION SUBCUTANEOUS NIGHTLY
Status: DISCONTINUED | OUTPATIENT
Start: 2023-12-30 | End: 2024-01-02

## 2023-12-30 RX ORDER — POLYETHYLENE GLYCOL 3350 17 G/17G
17 POWDER, FOR SOLUTION ORAL DAILY PRN
Status: DISCONTINUED | OUTPATIENT
Start: 2023-12-30 | End: 2024-01-03 | Stop reason: HOSPADM

## 2023-12-30 RX ORDER — PROPOFOL 10 MG/ML
INJECTION, EMULSION INTRAVENOUS PRN
Status: DISCONTINUED | OUTPATIENT
Start: 2023-12-30 | End: 2023-12-30 | Stop reason: SDUPTHER

## 2023-12-30 RX ORDER — LIDOCAINE HYDROCHLORIDE 10 MG/ML
1 INJECTION, SOLUTION INFILTRATION; PERINEURAL
Status: DISCONTINUED | OUTPATIENT
Start: 2023-12-30 | End: 2023-12-30 | Stop reason: HOSPADM

## 2023-12-30 RX ORDER — METOPROLOL TARTRATE 50 MG/1
50 TABLET, FILM COATED ORAL 2 TIMES DAILY
Status: DISCONTINUED | OUTPATIENT
Start: 2023-12-30 | End: 2024-01-03 | Stop reason: HOSPADM

## 2023-12-30 RX ORDER — ONDANSETRON 2 MG/ML
4 INJECTION INTRAMUSCULAR; INTRAVENOUS
Status: DISCONTINUED | OUTPATIENT
Start: 2023-12-30 | End: 2023-12-30 | Stop reason: HOSPADM

## 2023-12-30 RX ORDER — HALOPERIDOL 5 MG/ML
1 INJECTION INTRAMUSCULAR
Status: CANCELLED | OUTPATIENT
Start: 2023-12-30 | End: 2023-12-31

## 2023-12-30 RX ORDER — ROCURONIUM BROMIDE 10 MG/ML
INJECTION, SOLUTION INTRAVENOUS PRN
Status: DISCONTINUED | OUTPATIENT
Start: 2023-12-30 | End: 2023-12-30 | Stop reason: SDUPTHER

## 2023-12-30 RX ORDER — SODIUM CHLORIDE 0.9 % (FLUSH) 0.9 %
5-40 SYRINGE (ML) INJECTION EVERY 12 HOURS SCHEDULED
Status: DISCONTINUED | OUTPATIENT
Start: 2023-12-30 | End: 2023-12-30

## 2023-12-30 RX ORDER — DOXAZOSIN MESYLATE 1 MG/1
2 TABLET ORAL NIGHTLY
Status: DISCONTINUED | OUTPATIENT
Start: 2023-12-30 | End: 2024-01-03 | Stop reason: HOSPADM

## 2023-12-30 RX ORDER — CEFOXITIN 2 G/1
INJECTION, POWDER, FOR SOLUTION INTRAVENOUS PRN
Status: DISCONTINUED | OUTPATIENT
Start: 2023-12-30 | End: 2023-12-30 | Stop reason: SDUPTHER

## 2023-12-30 RX ORDER — FLUTICASONE PROPIONATE 50 MCG
2 SPRAY, SUSPENSION (ML) NASAL DAILY PRN
Status: DISCONTINUED | OUTPATIENT
Start: 2023-12-30 | End: 2024-01-03 | Stop reason: HOSPADM

## 2023-12-30 RX ORDER — SODIUM CHLORIDE 0.9 % (FLUSH) 0.9 %
5-40 SYRINGE (ML) INJECTION PRN
Status: CANCELLED | OUTPATIENT
Start: 2023-12-30

## 2023-12-30 RX ORDER — ONDANSETRON 2 MG/ML
INJECTION INTRAMUSCULAR; INTRAVENOUS PRN
Status: DISCONTINUED | OUTPATIENT
Start: 2023-12-30 | End: 2023-12-30 | Stop reason: SDUPTHER

## 2023-12-30 RX ORDER — FENTANYL CITRATE 50 UG/ML
50 INJECTION, SOLUTION INTRAMUSCULAR; INTRAVENOUS EVERY 5 MIN PRN
Status: DISCONTINUED | OUTPATIENT
Start: 2023-12-30 | End: 2023-12-30 | Stop reason: HOSPADM

## 2023-12-30 RX ORDER — IPRATROPIUM BROMIDE AND ALBUTEROL SULFATE 2.5; .5 MG/3ML; MG/3ML
1 SOLUTION RESPIRATORY (INHALATION)
Status: CANCELLED | OUTPATIENT
Start: 2023-12-30 | End: 2023-12-31

## 2023-12-30 RX ADMIN — CELECOXIB 200 MG: 200 CAPSULE ORAL at 21:01

## 2023-12-30 RX ADMIN — METOPROLOL TARTRATE 50 MG: 50 TABLET ORAL at 21:01

## 2023-12-30 RX ADMIN — INSULIN LISPRO 4 UNITS: 100 INJECTION, SOLUTION INTRAVENOUS; SUBCUTANEOUS at 13:15

## 2023-12-30 RX ADMIN — LIDOCAINE HYDROCHLORIDE 100 MG: 20 INJECTION, SOLUTION EPIDURAL; INFILTRATION; INTRACAUDAL; PERINEURAL at 08:11

## 2023-12-30 RX ADMIN — CEFEPIME 2000 MG: 2 INJECTION, POWDER, FOR SOLUTION INTRAVENOUS at 21:08

## 2023-12-30 RX ADMIN — CEFEPIME 2000 MG: 2 INJECTION, POWDER, FOR SOLUTION INTRAVENOUS at 13:40

## 2023-12-30 RX ADMIN — INSULIN LISPRO 8 UNITS: 100 INJECTION, SOLUTION INTRAVENOUS; SUBCUTANEOUS at 16:53

## 2023-12-30 RX ADMIN — INSULIN GLARGINE 27 UNITS: 100 INJECTION, SOLUTION SUBCUTANEOUS at 21:03

## 2023-12-30 RX ADMIN — ROCURONIUM BROMIDE 5 MG: 50 INJECTION, SOLUTION INTRAVENOUS at 08:11

## 2023-12-30 RX ADMIN — DEXAMETHASONE SODIUM PHOSPHATE 4 MG: 10 INJECTION INTRAMUSCULAR; INTRAVENOUS at 08:24

## 2023-12-30 RX ADMIN — FENTANYL CITRATE 100 MCG: 50 INJECTION, SOLUTION INTRAMUSCULAR; INTRAVENOUS at 08:11

## 2023-12-30 RX ADMIN — CEFOXITIN 3 MG: 2 INJECTION, POWDER, FOR SOLUTION INTRAVENOUS at 08:20

## 2023-12-30 RX ADMIN — KETOROLAC TROMETHAMINE 15 MG: 30 INJECTION, SOLUTION INTRAMUSCULAR; INTRAVENOUS at 08:24

## 2023-12-30 RX ADMIN — PROPOFOL 200 MG: 10 INJECTION, EMULSION INTRAVENOUS at 08:11

## 2023-12-30 RX ADMIN — SODIUM CHLORIDE, PRESERVATIVE FREE 10 ML: 5 INJECTION INTRAVENOUS at 21:03

## 2023-12-30 RX ADMIN — OXYCODONE HYDROCHLORIDE 5 MG: 5 TABLET ORAL at 09:01

## 2023-12-30 RX ADMIN — CEFEPIME 2000 MG: 2 INJECTION, POWDER, FOR SOLUTION INTRAVENOUS at 05:12

## 2023-12-30 RX ADMIN — PHENYLEPHRINE HYDROCHLORIDE 100 MCG: 10 INJECTION INTRAVENOUS at 08:12

## 2023-12-30 RX ADMIN — DOXAZOSIN 2 MG: 1 TABLET ORAL at 21:01

## 2023-12-30 RX ADMIN — Medication 180 MG: at 08:11

## 2023-12-30 RX ADMIN — SODIUM CHLORIDE, POTASSIUM CHLORIDE, SODIUM LACTATE AND CALCIUM CHLORIDE: 600; 310; 30; 20 INJECTION, SOLUTION INTRAVENOUS at 07:09

## 2023-12-30 RX ADMIN — ONDANSETRON 4 MG: 2 INJECTION INTRAMUSCULAR; INTRAVENOUS at 08:24

## 2023-12-30 RX ADMIN — GABAPENTIN 300 MG: 300 CAPSULE ORAL at 21:02

## 2023-12-30 RX ADMIN — MONTELUKAST 10 MG: 10 TABLET, FILM COATED ORAL at 21:02

## 2023-12-30 RX ADMIN — ATORVASTATIN CALCIUM 20 MG: 10 TABLET, FILM COATED ORAL at 21:02

## 2023-12-30 RX ADMIN — EZETIMIBE 10 MG: 10 TABLET ORAL at 23:42

## 2023-12-30 RX ADMIN — OXYCODONE 5 MG: 5 TABLET ORAL at 23:48

## 2023-12-30 RX ADMIN — ACETAMINOPHEN 1000 MG: 500 TABLET ORAL at 07:11

## 2023-12-30 ASSESSMENT — PAIN DESCRIPTION - DESCRIPTORS
DESCRIPTORS: SHOOTING
DESCRIPTORS: SHOOTING

## 2023-12-30 ASSESSMENT — PAIN - FUNCTIONAL ASSESSMENT
PAIN_FUNCTIONAL_ASSESSMENT: 0-10
PAIN_FUNCTIONAL_ASSESSMENT: NONE - DENIES PAIN
PAIN_FUNCTIONAL_ASSESSMENT: 0-10
PAIN_FUNCTIONAL_ASSESSMENT: NONE - DENIES PAIN
PAIN_FUNCTIONAL_ASSESSMENT: 0-10

## 2023-12-30 ASSESSMENT — PAIN DESCRIPTION - LOCATION
LOCATION: SACRUM
LOCATION: ANKLE
LOCATION: ANKLE

## 2023-12-30 ASSESSMENT — PAIN DESCRIPTION - ORIENTATION
ORIENTATION: LEFT
ORIENTATION: LEFT

## 2023-12-30 ASSESSMENT — PAIN SCALES - GENERAL
PAINLEVEL_OUTOF10: 3
PAINLEVEL_OUTOF10: 4
PAINLEVEL_OUTOF10: 5

## 2023-12-30 NOTE — PROGRESS NOTES
Hospitalist Progress Note   Admit Date:  2023  1:13 AM   Name:  Macho Tong   Age:  73 y.o.  Sex:  male  :  1950   MRN:  [id number]   Room:  [facility]/    Presenting/Chief Complaint: Fall     Reason(s) for Admission: Hypoxia [R09.02]  Generalized weakness [R53.1]  Acute left ankle pain [M25.572]  Leukocytosis, unspecified type [D72.829]  Inability to bear weight [R26.89]  Septic arthritis (HCC) [M00.9]     Hospital Course:   73 y.o. male with medical history of HTN, COPD/asthma, ex-smoker, DM on insulin, GERD, CKD, chronic back pain, Right foot charcot in brace, afib, presented to the ER on  with c/o increasing L ankle pain and inability to bear weight.      Pt saw his orthopedic, Dr. Beckwith on  for continued right ankle pain and instability causing falls secondary to his Charcot syndrome, despite double upright bracing.  Now beginning to have left ankle pain as well.  Most likely from overuse.  He was offered a second opinion at [facility].  Injection done in the office including Xylocaine and Depo-Medrol. Pain management has him on gabapentin and amitriptyline for neuropathic pain.  Wife states that this is getting worse, and he fell again at home overnight.  He got up to urinate and fell in the bathroom.  She is having a hard time helping him out.  EMS brought to the ER on oxygen.   He recently had an upper respiratory infection treated by his primary care physician, as bronchitis.   He completed a Z-Jimmy and oral prednisone 1-2 days PTA.  He states that those symptoms have almost resolved.       White cell count in ER was 14.8, procalcitonin 0.13, lactic acid 1.2, creatinine 0.91 with normal LFTs.  Uric acid 5.8.  Does not have a history of gout.  Highest temperature in the emergency room 99.  CRP 16.5. Chest x-ray is negative.  Flu negative, COVID negative.  Antibiotics were given in the emergency room.       Patient was admitted with inability to bear weight with left ankle

## 2023-12-30 NOTE — PROGRESS NOTES
PT/OT note:  Patient returned from [facility] for L ankle I&D.  Patient current on therapy caseload but will need clarification on weight bearing orders (assuming WBAT) before resuming therapy services.      Mica Red, PT

## 2023-12-30 NOTE — INTERVAL H&P NOTE
Update History & Physical    The Patient's History and Physical of 12/29/2023 was reviewed with the patient and I examined the patient. There was no change. The surgical site was confirmed by the patient and me. Plan:  The risk, benefits, expected outcome, and alternative to the recommended procedure have been discussed with the patient. Patient understands and wants to proceed with drainage of left ankle septic arthritis.   Electronically signed by Gonzalez Mckeon MD on 12/30/2023 at 7:15 AM

## 2023-12-30 NOTE — OP NOTE
Operative Report    Patient: Macho Tong MRN: [id number]  SSN: [id number]    YOB: 1950  Age: 73 y.o.  Sex: male       Date of Surgery: December 30, 2023     History:  Macho Tong is a 73 y.o. male who was admitted with significant pain in his left ankle.  He has a history of having a Charcot arthropathy on his right ankle.  His left 1 is sort of been his good side and then just got to the point where it was really bother him quite a bit.  He was admitted and seen by the orthopedic team.  The orthopedic team on-call thought he likely was just suffering from an exacerbation of his ankle arthritis as he did have pretty significant degenerative changes on his left ankle.  He did not appear to clinically have a septic arthritis.  He continued to try to mobilize continue to have pain and eventually this led to an aspiration of his left ankle.  His aspiration clinically was not consistent with any sort of concern for infectious etiology per Dr. Junior.  However then his culture started growing out a scant amount of growth of gram-negative gulshan.  As result of this we felt that we would likely be best to go ahead and treat this with formal lavage to make sure that there was not some infectious etiology that we were missing.  I explained this to the patient and his family they seem to understand and wish to proceed.      I talked to the patient and/or their representative and explained the exact nature the procedure.  I also went through a detailed list of the material risks associated with  the procedure which included risk of bleeding, infection, injury to nearby structures, worsening the situation, as well as the risks associate with anesthesia and finally death.  Also talked with him regarding the benefits and alternatives to the procedure.    Preoperative Diagnosis: Left ankle septic arthritis    Postoperative Diagnosis:   Left ankle presumed septic arthritis      Surgeon(s) and Role:      * Ivana Goyal MD - Primary    Anesthesia: Choice     Procedure: Open lavage of left ankle for septic arthritis    Procedure in Detail: After the successful duction of general anesthetic the left lower extremity was prepped draped usual sterile fashion. I then made a small incision over the anterolateral aspect of his ankle near the area of his joint line and then very carefully dissected bluntly down to the area of his tibiotalar joint. I used some dissecting scissors to open the joint capsule in this area and as I did there was a very small amount of just some bloody fairly clear fluid. This did not appear to be clinically worrisome for any infection I did try to aspirate a small amount of this with a syringe and sent this for culture just to be thorough. I could not adequately visualize the tibiotalar joint under direct vision and I used the pulse lavage to thoroughly irrigate the left ankle with approximately 3 L of normal saline. After thorough irrigation making sure that as I did this I took the ankle through full range of motion I then closed my small incision with 3 oh horizontal mattress sutures a bulky dressing was applied the patient was awakened taken cover him in stable condition      Estimated Blood Loss: 15 cc    Tourniquet Time: * No tourniquets in log *      Implants: * No implants in log *            Specimens: [unfilled]        Drains: None                Complications: None    Counts: Sponge and needle counts were correct times two.     Signed By:  Ivana Goyal MD     December 30, 2023

## 2023-12-30 NOTE — PROGRESS NOTES
TRANSFER - IN REPORT:    Verbal report received from Thelma Pritchard Ashwin Tong  being received from  P[facility], D  for routine progression of patient care      Report consisted of patient's Situation, Background, Assessment and   Recommendations(SBAR).     Information from the following report(s) Surgery Report was reviewed with the receiving nurse.    Opportunity for questions and clarification was provided.      Assessment completed upon patient's arrival to unit and care assumed.

## 2023-12-30 NOTE — PLAN OF CARE
Problem: Discharge Planning  Goal: Discharge to home or other facility with appropriate resources  Outcome: Progressing     Problem: Pain  Goal: Verbalizes/displays adequate comfort level or baseline comfort level  Outcome: Progressing     Problem: Skin/Tissue Integrity  Goal: Absence of new skin breakdown  Description: 1.  Monitor for areas of redness and/or skin breakdown  2.  Assess vascular access sites hourly  3.  Every 4-6 hours minimum:  Change oxygen saturation probe site  4.  Every 4-6 hours:  If on nasal continuous positive airway pressure, respiratory therapy assess nares and determine need for appliance change or resting period.  Outcome: Progressing     Problem: Safety - Adult  Goal: Free from fall injury  Outcome: Progressing     Problem: ABCDS Injury Assessment  Goal: Absence of physical injury  Outcome: Progressing     Problem: Musculoskeletal - Adult  Goal: Return mobility to safest level of function  Outcome: Progressing     Problem: Respiratory - Adult  Goal: Achieves optimal ventilation and oxygenation  12/30/2023 0543 by Epifanio Hamilton RN  Outcome: Progressing  12/29/2023 2337 by Estella Noel RCP  Outcome: Progressing  Flowsheets (Taken 12/24/2023 2028 by Abida Huerta RCP)  Achieves optimal ventilation and oxygenation:   Assess for changes in respiratory status   Position to facilitate oxygenation and minimize respiratory effort   Initiate smoking cessation protocol as indicated   Assess the need for suctioning and aspirate as needed   Respiratory therapy support as indicated   Assess for changes in mentation and behavior   Oxygen supplementation based on oxygen saturation or arterial blood gases   Encourage broncho-pulmonary hygiene including cough, deep breathe, incentive spirometry   Assess and instruct to report shortness of breath or any respiratory difficulty     Problem: Metabolic/Fluid and Electrolytes - Adult  Goal: Glucose maintained within prescribed range  Outcome:

## 2023-12-30 NOTE — PLAN OF CARE
Problem: Respiratory - Adult  Goal: Achieves optimal ventilation and oxygenation  Outcome: Progressing  Flowsheets (Taken 12/24/2023 2028 by Abida Huerta RCP)  Achieves optimal ventilation and oxygenation:   Assess for changes in respiratory status   Position to facilitate oxygenation and minimize respiratory effort   Initiate smoking cessation protocol as indicated   Assess the need for suctioning and aspirate as needed   Respiratory therapy support as indicated   Assess for changes in mentation and behavior   Oxygen supplementation based on oxygen saturation or arterial blood gases   Encourage broncho-pulmonary hygiene including cough, deep breathe, incentive spirometry   Assess and instruct to report shortness of breath or any respiratory difficulty

## 2023-12-31 LAB
ANION GAP SERPL CALC-SCNC: 3 MMOL/L (ref 2–11)
BASOPHILS # BLD: 0 K/UL (ref 0–0.2)
BASOPHILS NFR BLD: 0 % (ref 0–2)
BUN SERPL-MCNC: 28 MG/DL (ref 8–23)
CALCIUM SERPL-MCNC: 9.2 MG/DL (ref 8.3–10.4)
CHLORIDE SERPL-SCNC: 107 MMOL/L (ref 103–113)
CO2 SERPL-SCNC: 27 MMOL/L (ref 21–32)
CREAT SERPL-MCNC: 0.7 MG/DL (ref 0.8–1.5)
DIFFERENTIAL METHOD BLD: ABNORMAL
EOSINOPHIL # BLD: 0.1 K/UL (ref 0–0.8)
EOSINOPHIL NFR BLD: 1 % (ref 0.5–7.8)
ERYTHROCYTE [DISTWIDTH] IN BLOOD BY AUTOMATED COUNT: 17.4 % (ref 11.9–14.6)
GLUCOSE BLD STRIP.AUTO-MCNC: 172 MG/DL (ref 65–100)
GLUCOSE BLD STRIP.AUTO-MCNC: 229 MG/DL (ref 65–100)
GLUCOSE BLD STRIP.AUTO-MCNC: 244 MG/DL (ref 65–100)
GLUCOSE BLD STRIP.AUTO-MCNC: 271 MG/DL (ref 65–100)
GLUCOSE SERPL-MCNC: 194 MG/DL (ref 65–100)
HCT VFR BLD AUTO: 32.1 % (ref 41.1–50.3)
HGB BLD-MCNC: 9.9 G/DL (ref 13.6–17.2)
IMM GRANULOCYTES # BLD AUTO: 0.1 K/UL (ref 0–0.5)
IMM GRANULOCYTES NFR BLD AUTO: 1 % (ref 0–5)
LYMPHOCYTES # BLD: 2.6 K/UL (ref 0.5–4.6)
LYMPHOCYTES NFR BLD: 22 % (ref 13–44)
MCH RBC QN AUTO: 25.3 PG (ref 26.1–32.9)
MCHC RBC AUTO-ENTMCNC: 30.8 G/DL (ref 31.4–35)
MCV RBC AUTO: 81.9 FL (ref 82–102)
MONOCYTES # BLD: 0.9 K/UL (ref 0.1–1.3)
MONOCYTES NFR BLD: 7 % (ref 4–12)
NEUTS SEG # BLD: 8.2 K/UL (ref 1.7–8.2)
NEUTS SEG NFR BLD: 69 % (ref 43–78)
NRBC # BLD: 0 K/UL (ref 0–0.2)
PLATELET # BLD AUTO: 252 K/UL (ref 150–450)
PMV BLD AUTO: 9.2 FL (ref 9.4–12.3)
POTASSIUM SERPL-SCNC: 4 MMOL/L (ref 3.5–5.1)
RBC # BLD AUTO: 3.92 M/UL (ref 4.23–5.6)
SERVICE CMNT-IMP: ABNORMAL
SODIUM SERPL-SCNC: 137 MMOL/L (ref 136–146)
WBC # BLD AUTO: 11.9 K/UL (ref 4.3–11.1)

## 2023-12-31 PROCEDURE — 94640 AIRWAY INHALATION TREATMENT: CPT

## 2023-12-31 PROCEDURE — 80048 BASIC METABOLIC PNL TOTAL CA: CPT

## 2023-12-31 PROCEDURE — 6370000000 HC RX 637 (ALT 250 FOR IP): Performed by: FAMILY MEDICINE

## 2023-12-31 PROCEDURE — 97530 THERAPEUTIC ACTIVITIES: CPT

## 2023-12-31 PROCEDURE — 2580000003 HC RX 258: Performed by: FAMILY MEDICINE

## 2023-12-31 PROCEDURE — 94760 N-INVAS EAR/PLS OXIMETRY 1: CPT

## 2023-12-31 PROCEDURE — 6360000002 HC RX W HCPCS: Performed by: FAMILY MEDICINE

## 2023-12-31 PROCEDURE — 97535 SELF CARE MNGMENT TRAINING: CPT

## 2023-12-31 PROCEDURE — 36415 COLL VENOUS BLD VENIPUNCTURE: CPT

## 2023-12-31 PROCEDURE — 1100000003 HC PRIVATE W/ TELEMETRY

## 2023-12-31 PROCEDURE — 85025 COMPLETE CBC W/AUTO DIFF WBC: CPT

## 2023-12-31 PROCEDURE — 82962 GLUCOSE BLOOD TEST: CPT

## 2023-12-31 RX ADMIN — SODIUM CHLORIDE, PRESERVATIVE FREE 10 ML: 5 INJECTION INTRAVENOUS at 10:10

## 2023-12-31 RX ADMIN — CYANOCOBALAMIN TAB 1000 MCG 1000 MCG: 1000 TAB at 10:08

## 2023-12-31 RX ADMIN — INSULIN LISPRO 2 UNITS: 100 INJECTION, SOLUTION INTRAVENOUS; SUBCUTANEOUS at 12:53

## 2023-12-31 RX ADMIN — INSULIN LISPRO 2 UNITS: 100 INJECTION, SOLUTION INTRAVENOUS; SUBCUTANEOUS at 17:27

## 2023-12-31 RX ADMIN — OXYCODONE 5 MG: 5 TABLET ORAL at 21:13

## 2023-12-31 RX ADMIN — OXYCODONE 5 MG: 5 TABLET ORAL at 10:14

## 2023-12-31 RX ADMIN — ATORVASTATIN CALCIUM 20 MG: 10 TABLET, FILM COATED ORAL at 21:15

## 2023-12-31 RX ADMIN — GABAPENTIN 300 MG: 300 CAPSULE ORAL at 10:08

## 2023-12-31 RX ADMIN — CELECOXIB 200 MG: 200 CAPSULE ORAL at 10:09

## 2023-12-31 RX ADMIN — GABAPENTIN 300 MG: 300 CAPSULE ORAL at 21:16

## 2023-12-31 RX ADMIN — MONTELUKAST 10 MG: 10 TABLET, FILM COATED ORAL at 21:16

## 2023-12-31 RX ADMIN — LISINOPRIL 10 MG: 5 TABLET ORAL at 10:08

## 2023-12-31 RX ADMIN — CEFEPIME 2000 MG: 2 INJECTION, POWDER, FOR SOLUTION INTRAVENOUS at 13:05

## 2023-12-31 RX ADMIN — CEFEPIME 2000 MG: 2 INJECTION, POWDER, FOR SOLUTION INTRAVENOUS at 21:23

## 2023-12-31 RX ADMIN — PANTOPRAZOLE SODIUM 40 MG: 40 TABLET, DELAYED RELEASE ORAL at 10:08

## 2023-12-31 RX ADMIN — EMPAGLIFLOZIN 10 MG: 10 TABLET, FILM COATED ORAL at 10:09

## 2023-12-31 RX ADMIN — METOPROLOL TARTRATE 50 MG: 50 TABLET ORAL at 21:16

## 2023-12-31 RX ADMIN — FOLIC ACID 1 MG: 1 TABLET ORAL at 10:08

## 2023-12-31 RX ADMIN — MOMETASONE FUROATE AND FORMOTEROL FUMARATE DIHYDRATE 2 PUFF: 200; 5 AEROSOL RESPIRATORY (INHALATION) at 07:47

## 2023-12-31 RX ADMIN — METOPROLOL TARTRATE 50 MG: 50 TABLET ORAL at 10:09

## 2023-12-31 RX ADMIN — SODIUM CHLORIDE, PRESERVATIVE FREE 10 ML: 5 INJECTION INTRAVENOUS at 21:17

## 2023-12-31 RX ADMIN — INSULIN GLARGINE 27 UNITS: 100 INJECTION, SOLUTION SUBCUTANEOUS at 21:17

## 2023-12-31 RX ADMIN — DOXAZOSIN 2 MG: 1 TABLET ORAL at 21:15

## 2023-12-31 RX ADMIN — EZETIMIBE 10 MG: 10 TABLET ORAL at 21:16

## 2023-12-31 RX ADMIN — POLYETHYLENE GLYCOL 3350 17 G: 17 POWDER, FOR SOLUTION ORAL at 15:13

## 2023-12-31 RX ADMIN — CELECOXIB 200 MG: 200 CAPSULE ORAL at 21:16

## 2023-12-31 RX ADMIN — MOMETASONE FUROATE AND FORMOTEROL FUMARATE DIHYDRATE 2 PUFF: 200; 5 AEROSOL RESPIRATORY (INHALATION) at 21:50

## 2023-12-31 RX ADMIN — CEFEPIME 2000 MG: 2 INJECTION, POWDER, FOR SOLUTION INTRAVENOUS at 05:23

## 2023-12-31 RX ADMIN — APIXABAN 5 MG: 5 TABLET, FILM COATED ORAL at 21:16

## 2023-12-31 ASSESSMENT — PAIN DESCRIPTION - DESCRIPTORS
DESCRIPTORS: ACHING
DESCRIPTORS: SHOOTING
DESCRIPTORS: SHOOTING

## 2023-12-31 ASSESSMENT — PAIN DESCRIPTION - ORIENTATION
ORIENTATION: LEFT

## 2023-12-31 ASSESSMENT — PAIN SCALES - GENERAL
PAINLEVEL_OUTOF10: 2
PAINLEVEL_OUTOF10: 5
PAINLEVEL_OUTOF10: 4
PAINLEVEL_OUTOF10: 5

## 2023-12-31 ASSESSMENT — PAIN DESCRIPTION - LOCATION
LOCATION: FOOT
LOCATION: ANKLE
LOCATION: LEG
LOCATION: ANKLE

## 2023-12-31 NOTE — PROGRESS NOTES
ACUTE OCCUPATIONAL THERAPY GOALS: modified 12/31/23  (Developed with and agreed upon by patient and/or caregiver.)  1. Patient will perform grooming with supervision/set-up seated edge of bed.   2. Patient will perform Upper body dressing with supervision/set-up, edge of bed or supported sitting.   3. Patient will perform upper and lower body bathing with minimal assistance .  4. Patient will perform toilet transfers with contact guard assistance, bed to [facility].  5. Patient will participate in 45 + minutes of ADL/ therapeutic exercise/therapeutic activity with min rest breaks to increase activity tolerance for self care.    Goals to be achieved in 7 days.     OCCUPATIONAL THERAPY Re-evaluation and PM       OT Visit Days: 2  Acknowledge Orders  Time  OT Charge Capture  Rehab Caseload Tracker      Macho Tong is a 73 y.o. male   PRIMARY DIAGNOSIS: Septic arthritis (HCC)  Hypoxia [R09.02]  Generalized weakness [R53.1]  Acute left ankle pain [M25.572]  Leukocytosis, unspecified type [D72.829]  Inability to bear weight [R26.89]  Septic arthritis (HCC) [M00.9]       Reason for Referral: Generalized Muscle Weakness (M62.81)  Difficulty in walking, Not elsewhere classified (R26.2)  Inpatient: Payor: MEDICARE / Plan: MEDICARE PART A AND B / Product Type: *No Product type* /     ASSESSMENT:     REHAB RECOMMENDATIONS:   Recommendation to date pending progress:  Setting:  Short-term Rehab (pt asking about [facility])    Equipment:    To Be Determined     ASSESSMENT:  Mr. Tong was admitted with above diagnosis. Pt came to the ED after falling at home due to left ankle pain. Pt is seen in room with wife and daughter present. Pt was at baseline until his left ankle started to hurt yesterday. At baseline, pt sleeps in a recliner (lift chair), uses a RW and is independent with basic self care. Pt wears a brace on his right foot due to chronic charcot foot. Pt is likely at his baseline for strength and activity tolerance  I=Modified Independent, S=Supervision/Setup, SBA=Standby Assistance, CGA=Contact Guard Assistance, Min=Minimal Assistance, Mod=Moderate Assistance, Max=Maximal Assistance, Total=Total Assistance, NT=Not Tested    ACTIVITIES OF DAILY LIVING: I Mod I S SBA CGA Min Mod Max Total NT Comments   BASIC ADLs:              Upper Body Bathing  [] [] [x] [] [] [] [] [] [] []     Lower Body Bathing [] [] [] [] [] [] [x] [] [] []     Toileting [] [] [] [] [x] [] [] [] [] []    Upper Body Dressing [] [] [x] [] [] [] [] [] [] []    Lower Body Dressing [] [] [] [] [] [x] [] [] [] [] Assist donning AFO   Feeding [x] [] [] [] [] [] [] [] [] []    Grooming [] [] [] [x] [] [] [] [] [] []    Personal Device Care [] [] [] [] [] [] [] [] [] []    Functional Mobility [] [] [] [] [] [x] [] [] [] [] RW   I=Independent, Mod I=Modified Independent, S=Supervision/Setup, SBA=Standby Assistance, CGA=Contact Guard Assistance, Min=Minimal Assistance, Mod=Moderate Assistance, Max=Maximal Assistance, Total=Total Assistance, NT=Not Tested    PLAN:   FREQUENCY/DURATION     for duration of hospital stay or until stated goals are met, whichever comes first.    PROBLEM LIST:   (Skilled intervention is medically necessary to address:)  Decreased ADL/Functional Activities  Decreased Activity Tolerance  Decreased Balance  Decreased Gait Ability  Decreased Strength  Increased Pain   INTERVENTIONS PLANNED:  (Benefits and precautions of occupational therapy have been discussed with the patient.)  Self Care Training  Therapeutic Activity  Therapeutic Exercise/HEP  Education         TREATMENT:     TREATMENT:   Self Care (38 minutes): Patient participated in toileting, lower body dressing, and grooming ADLs in unsupported sitting and standing with minimal verbal cueing to increase independence, decrease assistance required, and increase activity tolerance. Patient also participated in functional mobility, functional transfer, and adaptive equipment training to

## 2023-12-31 NOTE — PROGRESS NOTES
2023         Post Op day: * No surgery found *     Admit Date: 2023  Admit Diagnosis: Hypoxia [R09.02]  Generalized weakness [R53.1]  Acute left ankle pain [M25.572]  Leukocytosis, unspecified type [D72.829]  Inability to bear weight [R26.89]  Septic arthritis (HCC) [M00.9]  No surgery found  No surgery found    Subjective: Doing well, No complaints, No SOB, No Chest Pain, No Nausea or Vomitting.   PT/OT:                             Weight Bearing Status: WBATLeft lower extremity in boot  BMP:  Recent Labs     23   BUN 32* 28*    137   K 3.8 4.0    107   CO2 28 27     Patient Vitals for the past 8 hrs:   BP Temp Temp src Pulse Resp SpO2   23 0800 131/75 98.4 °F (36.9 °C) Oral 62 16 94 %     Temp (24hrs), Av.2 °F (36.8 °C), Min:97.9 °F (36.6 °C), Max:98.4 °F (36.9 °C)    CBC:  Recent Labs     23   WBC 13.7* 11.9*   HGB 10.2* 9.9*   HCT 33.3* 32.1*    252       Microbiology:     Recent Results (from the past 72 hour(s))   POCT Glucose    Collection Time: 23 11:26 AM   Result Value Ref Range    POC Glucose 314 (H) 65 - 100 mg/dL    Performed by: Khang    POCT Glucose    Collection Time: 23  5:13 PM   Result Value Ref Range    POC Glucose 447 (H) 65 - 100 mg/dL    Performed by: Khang    POCT Glucose    Collection Time: 23  9:06 PM   Result Value Ref Range    POC Glucose 361 (H) 65 - 100 mg/dL    Performed by: Giovanny    CBC    Collection Time: 23  4:18 AM   Result Value Ref Range    WBC 13.7 (H) 4.3 - 11.1 K/uL    RBC 4.03 (L) 4.23 - 5.6 M/uL    Hemoglobin 10.2 (L) 13.6 - 17.2 g/dL    Hematocrit 33.3 (L) 41.1 - 50.3 %    MCV 82.6 82.0 - 102.0 FL    MCH 25.3 (L) 26.1 - 32.9 PG    MCHC 30.6 (L) 31.4 - 35.0 g/dL    RDW 17.4 (H) 11.9 - 14.6 %    Platelets 278 150 - 450 K/uL    MPV 9.3 (L) 9.4 - 12.3 FL    nRBC 0.00 0.0 - 0.2 K/uL   Basic Metabolic Panel     of smoking 30 or more pack years    Diverticular stricture (HCC)    Chronic back pain    Benign prostatic hyperplasia    Paroxysmal atrial fibrillation (HCC)    Generalized weakness    Fall    Charcot ankle, right    Inability to bear weight    Normocytic anemia    Acute gout    Left ankle pain    Left ankle effusion    Septic arthritis of left ankle (HCC)    Septic arthritis (HCC)     Microbiology:     Recent Results (from the past 72 hour(s))   POCT Glucose    Collection Time: 12/28/23 11:26 AM   Result Value Ref Range    POC Glucose 314 (H) 65 - 100 mg/dL    Performed by: Khang    POCT Glucose    Collection Time: 12/28/23  5:13 PM   Result Value Ref Range    POC Glucose 447 (H) 65 - 100 mg/dL    Performed by: Khang    POCT Glucose    Collection Time: 12/28/23  9:06 PM   Result Value Ref Range    POC Glucose 361 (H) 65 - 100 mg/dL    Performed by: Giovanny    CBC    Collection Time: 12/29/23  4:18 AM   Result Value Ref Range    WBC 13.7 (H) 4.3 - 11.1 K/uL    RBC 4.03 (L) 4.23 - 5.6 M/uL    Hemoglobin 10.2 (L) 13.6 - 17.2 g/dL    Hematocrit 33.3 (L) 41.1 - 50.3 %    MCV 82.6 82.0 - 102.0 FL    MCH 25.3 (L) 26.1 - 32.9 PG    MCHC 30.6 (L) 31.4 - 35.0 g/dL    RDW 17.4 (H) 11.9 - 14.6 %    Platelets 278 150 - 450 K/uL    MPV 9.3 (L) 9.4 - 12.3 FL    nRBC 0.00 0.0 - 0.2 K/uL   Basic Metabolic Panel    Collection Time: 12/29/23  4:18 AM   Result Value Ref Range    Sodium 140 136 - 146 mmol/L    Potassium 3.8 3.5 - 5.1 mmol/L    Chloride 107 103 - 113 mmol/L    CO2 28 21 - 32 mmol/L    Anion Gap 5 2 - 11 mmol/L    Glucose 230 (H) 65 - 100 mg/dL    BUN 32 (H) 8 - 23 MG/DL    Creatinine 0.67 (L) 0.8 - 1.5 MG/DL    Est, Glom Filt Rate >60 >60 ml/min/1.73m2    Calcium 9.7 8.3 - 10.4 MG/DL   POCT Glucose    Collection Time: 12/29/23  8:29 AM   Result Value Ref Range    POC Glucose 172 (H) 65 - 100 mg/dL    Performed by: Selena    POCT Glucose    Collection Time: 12/29/23

## 2023-12-31 NOTE — PROGRESS NOTES
ACUTE PHYSICAL THERAPY GOALS RE-ASSESSED & MODIFIED 12/31/23:   (Developed with and agreed upon by patient and/or caregiver.)  (1.)Mr. Tong will move from supine to sit and sit to supine  with STAND BY ASSIST(consistently)   (2.)Mr. Tong will transfer from bed to chair and chair to bed with CONTACT GUARD ASSIST (consistently) using a walker, post surgical boot left & AFO bracing right    (3.)Mr. Tong will ambulate with CONTACT GUARD ASSIST (consistently) for 45-50 feet with rolling walker, boot left , brace right   (4)Mr. Zepeda will go up one step with walker minimal assist.   (5)Mr. Zepeda will perform HEP with written guidelines & family assist.  ________________________________________________________________________________________________      PHYSICAL THERAPY Re-evaluation and PM  (Link to Caseload Tracking: PT Visit Days : 2  Acknowledge Orders  Time In/Out  PT Charge Capture  Rehab Caseload Tracker    Macho Tong is a 73 y.o. male   PRIMARY DIAGNOSIS: Septic arthritis (HCC)  Hypoxia [R09.02]  Generalized weakness [R53.1]  Acute left ankle pain [M25.572]  Leukocytosis, unspecified type [D72.829]  Inability to bear weight [R26.89]  Septic arthritis (HCC) [M00.9]       Reason for Referral: Generalized Muscle Weakness (M62.81)  Other abnormalities of gait and mobility (R26.89)  Inpatient: Payor: MEDICARE / Plan: MEDICARE PART A AND B / Product Type: *No Product type* /     ASSESSMENT:     REHAB RECOMMENDATIONS:   Recommendation to date pending progress:  Setting:  Inpatient Rehab Facility /[facility]    Equipment:    To Be Determined     ASSESSMENT:  Mr. Tong is s/p I&D left ankle 12/30/23 & presented as motivated to work with therapy. This pt is at a minimal assist level for transfers  gait currently with good potential to reach a more functional & independent level. This pt is requiring a post surgical boot on the left to WBAT & an AFO brace on the right. This pt participated well while being  IV    RESTRICTIONS/PRECAUTIONS:  Restrictions/Precautions: Weight Bearing, Fall Risk  Left Lower Extremity Weight Bearing: Weight Bearing As Tolerated (in post surgical boot)  AFO Right Foot              GROSS EVALUATION:  Intact Impaired (Comments):   AROM []  Decreased but functional, limited bilateral ankle mobility   PROM []    Strength []  Decreased but functional   Balance []  Fair- standing stat/dyn with walker & LE bracing   Posture [] Rounded Shoulders  Trunk Flexion   Sensation []  Decreased distally   Coordination []   Decreased but functional   Tone []     Edema [] Yes ankle left post op edema   Activity Tolerance []      []      COGNITION/  PERCEPTION: Intact Impaired (Comments):   Orientation [x]     Vision [x]     Hearing [x]     Cognition  [x]       MOBILITY: I Mod I S SBA CGA Min Mod Max Total  NT x2 Comments:   Bed Mobility    Rolling [] [] [] [x] [] [] [] [] [] [] []    Supine to Sit [] [] [] [x] [] [] [] [] [] [] []    Scooting [] [] [] [x] [] [] [] [] [] [] []    Sit to Supine [] [] [] [] [] [] [] [] [] [x] []    Transfers    Sit to Stand [] [] [] [] [] [x] [] [] [] [] [] With RW   Bed to Chair [] [] [] [] [] [x] [] [] [] [] []    Stand to Sit [] [] [] [] [] [] [] [] [] [] []     [] [] [] [] [] [] [] [] [] [] []    I=Independent, Mod I=Modified Independent, S=Supervision, SBA=Standby Assistance, CGA=Contact Guard Assistance,   Min=Minimal Assistance, Mod=Moderate Assistance, Max=Maximal Assistance, Total=Total Assistance, NT=Not Tested    GAIT: I Mod I S SBA CGA Min Mod Max Total  NT x2 Comments:   Level of Assistance [] [] [] [] [] [x] [] [] [] [] []    Distance 8ft & 30ft     DME Rolling Walker    Gait Quality Antalgic, Decreased eduarda , Decreased step clearance, Decreased step length, and flexed posture    Weightbearing Status Restrictions/Precautions  Restrictions/Precautions: Weight Bearing, Fall Risk  Lower Extremity Weight Bearing Restrictions  Left Lower Extremity Weight Bearing:

## 2023-12-31 NOTE — PROGRESS NOTES
Hospitalist Progress Note   Admit Date:  2023  1:13 AM   Name:  Macho Tong   Age:  73 y.o.  Sex:  male  :  1950   MRN:  [id number]   Room:  Pending sale to [facility]/    Presenting/Chief Complaint: Fall     Reason(s) for Admission: Hypoxia [R09.02]  Generalized weakness [R53.1]  Acute left ankle pain [M25.572]  Leukocytosis, unspecified type [D72.829]  Inability to bear weight [R26.89]  Septic arthritis (HCC) [M00.9]     Hospital Course:   73 y.o. male with medical history of HTN, COPD/asthma, ex-smoker, DM on insulin, GERD, CKD, chronic back pain, Right foot charcot in brace, afib, presented to the ER on  with c/o increasing L ankle pain and inability to bear weight.      Pt saw his orthopedic, Dr. Beckwith on  for continued right ankle pain and instability causing falls secondary to his Charcot syndrome, despite double upright bracing.  Now beginning to have left ankle pain as well.  Most likely from overuse.  He was offered a second opinion at [facility].  Injection done in the office including Xylocaine and Depo-Medrol. Pain management has him on gabapentin and amitriptyline for neuropathic pain.  Wife states that this is getting worse, and he fell again at home overnight.  He got up to urinate and fell in the bathroom.  She is having a hard time helping him out.  EMS brought to the ER on oxygen.   He recently had an upper respiratory infection treated by his primary care physician, as bronchitis.   He completed a Z-Jimmy and oral prednisone 1-2 days PTA.  He states that those symptoms have almost resolved.       White cell count in ER was 14.8, procalcitonin 0.13, lactic acid 1.2, creatinine 0.91 with normal LFTs.  Uric acid 5.8.  Does not have a history of gout.  Highest temperature in the emergency room 99.  CRP 16.5. Chest x-ray is negative.  Flu negative, COVID negative.  Antibiotics were given in the emergency room.       Patient was admitted with inability to bear weight with left ankle

## 2023-12-31 NOTE — CARE COORDINATION
LMSW follow up with [facility] liaison today about pt care planning for rehab.  [facility] has tentatively accepted pt pending clarification of a long term plan for pt IV antibiotic needs as pt will have to transition home from [facility] before he completes the current recommended 4 or 6 week treatment plan.  Pt/spouse today expressed concern about managing home infusion.  Also CM will need final antibiotic recs to estimate cost for home infusion with [facility] or to determine if pt can go to an infusion center for once daily dosing (current orders are for tid dosing).  Discussed care planning with rounding MD.  Per report ID MD will see pt Monday for final care recs.

## 2024-01-01 LAB
ANION GAP SERPL CALC-SCNC: 5 MMOL/L (ref 2–11)
BASOPHILS # BLD: 0.1 K/UL (ref 0–0.2)
BASOPHILS NFR BLD: 1 % (ref 0–2)
BUN SERPL-MCNC: 29 MG/DL (ref 8–23)
CALCIUM SERPL-MCNC: 9.6 MG/DL (ref 8.3–10.4)
CHLORIDE SERPL-SCNC: 106 MMOL/L (ref 103–113)
CO2 SERPL-SCNC: 26 MMOL/L (ref 21–32)
CREAT SERPL-MCNC: 0.87 MG/DL (ref 0.8–1.5)
DIFFERENTIAL METHOD BLD: ABNORMAL
EOSINOPHIL # BLD: 0.2 K/UL (ref 0–0.8)
EOSINOPHIL NFR BLD: 2 % (ref 0.5–7.8)
ERYTHROCYTE [DISTWIDTH] IN BLOOD BY AUTOMATED COUNT: 17.5 % (ref 11.9–14.6)
GLUCOSE BLD STRIP.AUTO-MCNC: 200 MG/DL (ref 65–100)
GLUCOSE BLD STRIP.AUTO-MCNC: 252 MG/DL (ref 65–100)
GLUCOSE BLD STRIP.AUTO-MCNC: 253 MG/DL (ref 65–100)
GLUCOSE BLD STRIP.AUTO-MCNC: 307 MG/DL (ref 65–100)
GLUCOSE SERPL-MCNC: 215 MG/DL (ref 65–100)
HCT VFR BLD AUTO: 32.6 % (ref 41.1–50.3)
HGB BLD-MCNC: 10 G/DL (ref 13.6–17.2)
IMM GRANULOCYTES # BLD AUTO: 0.1 K/UL (ref 0–0.5)
IMM GRANULOCYTES NFR BLD AUTO: 1 % (ref 0–5)
LYMPHOCYTES # BLD: 2.1 K/UL (ref 0.5–4.6)
LYMPHOCYTES NFR BLD: 21 % (ref 13–44)
MCH RBC QN AUTO: 25.1 PG (ref 26.1–32.9)
MCHC RBC AUTO-ENTMCNC: 30.7 G/DL (ref 31.4–35)
MCV RBC AUTO: 81.9 FL (ref 82–102)
MONOCYTES # BLD: 0.9 K/UL (ref 0.1–1.3)
MONOCYTES NFR BLD: 9 % (ref 4–12)
NEUTS SEG # BLD: 6.9 K/UL (ref 1.7–8.2)
NEUTS SEG NFR BLD: 67 % (ref 43–78)
NRBC # BLD: 0 K/UL (ref 0–0.2)
PLATELET # BLD AUTO: 255 K/UL (ref 150–450)
PMV BLD AUTO: 9.4 FL (ref 9.4–12.3)
POTASSIUM SERPL-SCNC: 4.1 MMOL/L (ref 3.5–5.1)
RBC # BLD AUTO: 3.98 M/UL (ref 4.23–5.6)
SERVICE CMNT-IMP: ABNORMAL
SODIUM SERPL-SCNC: 137 MMOL/L (ref 136–146)
WBC # BLD AUTO: 10.3 K/UL (ref 4.3–11.1)

## 2024-01-01 PROCEDURE — 80048 BASIC METABOLIC PNL TOTAL CA: CPT

## 2024-01-01 PROCEDURE — 94640 AIRWAY INHALATION TREATMENT: CPT

## 2024-01-01 PROCEDURE — 1100000000 HC RM PRIVATE

## 2024-01-01 PROCEDURE — 85025 COMPLETE CBC W/AUTO DIFF WBC: CPT

## 2024-01-01 PROCEDURE — 1100000003 HC PRIVATE W/ TELEMETRY

## 2024-01-01 PROCEDURE — 94760 N-INVAS EAR/PLS OXIMETRY 1: CPT

## 2024-01-01 PROCEDURE — 6370000000 HC RX 637 (ALT 250 FOR IP): Performed by: FAMILY MEDICINE

## 2024-01-01 PROCEDURE — 82962 GLUCOSE BLOOD TEST: CPT

## 2024-01-01 PROCEDURE — 2580000003 HC RX 258: Performed by: FAMILY MEDICINE

## 2024-01-01 PROCEDURE — 97530 THERAPEUTIC ACTIVITIES: CPT

## 2024-01-01 PROCEDURE — 6360000002 HC RX W HCPCS: Performed by: FAMILY MEDICINE

## 2024-01-01 PROCEDURE — 36415 COLL VENOUS BLD VENIPUNCTURE: CPT

## 2024-01-01 RX ADMIN — CEFEPIME 2000 MG: 2 INJECTION, POWDER, FOR SOLUTION INTRAVENOUS at 05:34

## 2024-01-01 RX ADMIN — LISINOPRIL 10 MG: 5 TABLET ORAL at 08:53

## 2024-01-01 RX ADMIN — MOMETASONE FUROATE AND FORMOTEROL FUMARATE DIHYDRATE 2 PUFF: 200; 5 AEROSOL RESPIRATORY (INHALATION) at 08:19

## 2024-01-01 RX ADMIN — INSULIN LISPRO 4 UNITS: 100 INJECTION, SOLUTION INTRAVENOUS; SUBCUTANEOUS at 12:42

## 2024-01-01 RX ADMIN — APIXABAN 5 MG: 5 TABLET, FILM COATED ORAL at 08:53

## 2024-01-01 RX ADMIN — CEFEPIME 2000 MG: 2 INJECTION, POWDER, FOR SOLUTION INTRAVENOUS at 12:48

## 2024-01-01 RX ADMIN — INSULIN LISPRO 4 UNITS: 100 INJECTION, SOLUTION INTRAVENOUS; SUBCUTANEOUS at 20:57

## 2024-01-01 RX ADMIN — CYANOCOBALAMIN TAB 1000 MCG 1000 MCG: 1000 TAB at 08:53

## 2024-01-01 RX ADMIN — APIXABAN 5 MG: 5 TABLET, FILM COATED ORAL at 20:59

## 2024-01-01 RX ADMIN — DOXAZOSIN 2 MG: 1 TABLET ORAL at 20:59

## 2024-01-01 RX ADMIN — OXYCODONE 5 MG: 5 TABLET ORAL at 21:41

## 2024-01-01 RX ADMIN — METOPROLOL TARTRATE 50 MG: 50 TABLET ORAL at 08:53

## 2024-01-01 RX ADMIN — INSULIN LISPRO 4 UNITS: 100 INJECTION, SOLUTION INTRAVENOUS; SUBCUTANEOUS at 17:02

## 2024-01-01 RX ADMIN — EZETIMIBE 10 MG: 10 TABLET ORAL at 20:59

## 2024-01-01 RX ADMIN — GABAPENTIN 300 MG: 300 CAPSULE ORAL at 20:58

## 2024-01-01 RX ADMIN — CELECOXIB 200 MG: 200 CAPSULE ORAL at 20:59

## 2024-01-01 RX ADMIN — METOPROLOL TARTRATE 50 MG: 50 TABLET ORAL at 20:59

## 2024-01-01 RX ADMIN — OXYCODONE 5 MG: 5 TABLET ORAL at 16:46

## 2024-01-01 RX ADMIN — INSULIN GLARGINE 27 UNITS: 100 INJECTION, SOLUTION SUBCUTANEOUS at 20:58

## 2024-01-01 RX ADMIN — GABAPENTIN 300 MG: 300 CAPSULE ORAL at 08:52

## 2024-01-01 RX ADMIN — CELECOXIB 200 MG: 200 CAPSULE ORAL at 08:52

## 2024-01-01 RX ADMIN — PANTOPRAZOLE SODIUM 40 MG: 40 TABLET, DELAYED RELEASE ORAL at 08:52

## 2024-01-01 RX ADMIN — FOLIC ACID 1 MG: 1 TABLET ORAL at 08:52

## 2024-01-01 RX ADMIN — OXYCODONE 5 MG: 5 TABLET ORAL at 08:52

## 2024-01-01 RX ADMIN — INSULIN LISPRO 2 UNITS: 100 INJECTION, SOLUTION INTRAVENOUS; SUBCUTANEOUS at 08:53

## 2024-01-01 RX ADMIN — MONTELUKAST 10 MG: 10 TABLET, FILM COATED ORAL at 20:59

## 2024-01-01 RX ADMIN — CEFEPIME 2000 MG: 2 INJECTION, POWDER, FOR SOLUTION INTRAVENOUS at 21:03

## 2024-01-01 RX ADMIN — MOMETASONE FUROATE AND FORMOTEROL FUMARATE DIHYDRATE 2 PUFF: 200; 5 AEROSOL RESPIRATORY (INHALATION) at 20:31

## 2024-01-01 RX ADMIN — ATORVASTATIN CALCIUM 20 MG: 10 TABLET, FILM COATED ORAL at 20:59

## 2024-01-01 RX ADMIN — EMPAGLIFLOZIN 10 MG: 10 TABLET, FILM COATED ORAL at 08:53

## 2024-01-01 RX ADMIN — SODIUM CHLORIDE, PRESERVATIVE FREE 10 ML: 5 INJECTION INTRAVENOUS at 21:00

## 2024-01-01 ASSESSMENT — PAIN DESCRIPTION - DESCRIPTORS: DESCRIPTORS: DISCOMFORT

## 2024-01-01 ASSESSMENT — PAIN SCALES - GENERAL
PAINLEVEL_OUTOF10: 6
PAINLEVEL_OUTOF10: 5
PAINLEVEL_OUTOF10: 4
PAINLEVEL_OUTOF10: 4

## 2024-01-01 ASSESSMENT — PAIN DESCRIPTION - ORIENTATION
ORIENTATION: LEFT

## 2024-01-01 ASSESSMENT — PAIN DESCRIPTION - LOCATION
LOCATION: ANKLE
LOCATION: LEG
LOCATION: ANKLE

## 2024-01-01 NOTE — PLAN OF CARE
Problem: Discharge Planning  Goal: Discharge to home or other facility with appropriate resources  1/1/2024 0150 by Epifanio Hamilton RN  Outcome: Progressing  12/31/2023 1654 by Ray Hoyt RN  Outcome: Progressing     Problem: Pain  Goal: Verbalizes/displays adequate comfort level or baseline comfort level  1/1/2024 0150 by Epifanio Hamilton RN  Outcome: Progressing  12/31/2023 1654 by Ray Hoyt RN  Outcome: Progressing     Problem: Skin/Tissue Integrity  Goal: Absence of new skin breakdown  Description: 1.  Monitor for areas of redness and/or skin breakdown  2.  Assess vascular access sites hourly  3.  Every 4-6 hours minimum:  Change oxygen saturation probe site  4.  Every 4-6 hours:  If on nasal continuous positive airway pressure, respiratory therapy assess nares and determine need for appliance change or resting period.  1/1/2024 0150 by Epifanio Hamilton RN  Outcome: Progressing  12/31/2023 1654 by Ray Hoyt RN  Outcome: Progressing     Problem: Safety - Adult  Goal: Free from fall injury  1/1/2024 0150 by Epifanio Hamilton RN  Outcome: Progressing  12/31/2023 1654 by Ray Hoty RN  Outcome: Progressing     Problem: ABCDS Injury Assessment  Goal: Absence of physical injury  1/1/2024 0150 by Epifanio Hamilton RN  Outcome: Progressing  12/31/2023 1654 by Ray Hoyt RN  Outcome: Progressing     Problem: Musculoskeletal - Adult  Goal: Return mobility to safest level of function  1/1/2024 0150 by Epifanio Hamilton RN  Outcome: Progressing  12/31/2023 1654 by Ray Hoyt RN  Outcome: Progressing     Problem: Respiratory - Adult  Goal: Achieves optimal ventilation and oxygenation  1/1/2024 0150 by Epifanio Hamilton RN  Outcome: Progressing  12/31/2023 1654 by Ray oHyt RN  Outcome: Progressing     Problem: Metabolic/Fluid and Electrolytes - Adult  Goal: Glucose maintained within prescribed range  1/1/2024 0150 by Epifanio Hamilton  RN  Outcome: Progressing  12/31/2023 1654 by Ray Hoyt RN  Outcome: Progressing     Problem: Neurosensory - Adult  Goal: Achieves maximal functionality and self care  1/1/2024 0150 by Epifanio Hamilton RN  Outcome: Progressing  12/31/2023 1654 by Ray Hoyt RN  Outcome: Progressing     Problem: Genitourinary - Adult  Goal: Absence of urinary retention  1/1/2024 0150 by Epifanio Hamilton RN  Outcome: Progressing  12/31/2023 1654 by Ray Hoyt RN  Outcome: Progressing     Problem: Infection - Adult  Goal: Absence of infection at discharge  1/1/2024 0150 by Epifanio Hamilton RN  Outcome: Progressing  12/31/2023 1654 by Ray Hoyt RN  Outcome: Progressing  Goal: Absence of infection during hospitalization  1/1/2024 0150 by Epifanio Hamilton RN  Outcome: Progressing  12/31/2023 1654 by Ray Hoyt RN  Outcome: Progressing     Problem: Gastrointestinal - Adult  Goal: Maintains adequate nutritional intake  1/1/2024 0150 by Epifanio Hamilton RN  Outcome: Progressing  12/31/2023 1654 by Ray Hoyt RN  Outcome: Progressing     Problem: Skin/Tissue Integrity - Adult  Goal: Incisions, wounds, or drain sites healing without S/S of infection  1/1/2024 0150 by Epifanio Hamilton RN  Outcome: Progressing  12/31/2023 1654 by Ray Hoyt RN  Outcome: Progressing

## 2024-01-01 NOTE — PROGRESS NOTES
Hospitalist Progress Note   Admit Date:  2023  1:13 AM   Name:  Macho Tong   Age:  73 y.o.  Sex:  male  :  1950   MRN:  [id number]   Room:  [facility]/    Presenting/Chief Complaint: Fall     Reason(s) for Admission: Hypoxia [R09.02]  Generalized weakness [R53.1]  Acute left ankle pain [M25.572]  Leukocytosis, unspecified type [D72.829]  Inability to bear weight [R26.89]  Septic arthritis (HCC) [M00.9]     Hospital Course:   73 y.o. male with medical history of HTN, COPD/asthma, ex-smoker, DM on insulin, GERD, CKD, chronic back pain, Right foot charcot in brace, afib, presented to the ER on  with c/o increasing L ankle pain and inability to bear weight.      Pt saw his orthopedic, Dr. Beckwith on  for continued right ankle pain and instability causing falls secondary to his Charcot syndrome, despite double upright bracing.  Now beginning to have left ankle pain as well.  Most likely from overuse.  He was offered a second opinion at [facility].  Injection done in the office including Xylocaine and Depo-Medrol. Pain management has him on gabapentin and amitriptyline for neuropathic pain.  Wife states that this is getting worse, and he fell again at home overnight.  He got up to urinate and fell in the bathroom.  She is having a hard time helping him out.  EMS brought to the ER on oxygen.   He recently had an upper respiratory infection treated by his primary care physician, as bronchitis.   He completed a Z-Jimmy and oral prednisone 1-2 days PTA.  He states that those symptoms have almost resolved.       White cell count in ER was 14.8, procalcitonin 0.13, lactic acid 1.2, creatinine 0.91 with normal LFTs.  Uric acid 5.8.  Does not have a history of gout.  Highest temperature in the emergency room 99.  CRP 16.5. Chest x-ray is negative.  Flu negative, COVID negative.  Antibiotics were given in the emergency room.       Patient was admitted with inability to bear weight with left ankle  Oral Daily    insulin glargine (LANTUS) injection vial 27 Units  27 Units SubCUTAneous Nightly    insulin lispro (HUMALOG) injection vial 0-8 Units  0-8 Units SubCUTAneous TID WC    insulin lispro (HUMALOG) injection vial 0-4 Units  0-4 Units SubCUTAneous Nightly    mometasone-formoterol (DULERA) 200-5 MCG/ACT inhaler 2 puff  2 puff Inhalation BID RT    vitamin B-12 (CYANOCOBALAMIN) tablet 1,000 mcg  1,000 mcg Oral Daily    oxyCODONE (ROXICODONE) immediate release tablet 5 mg  5 mg Oral Q4H PRN    morphine injection 2 mg  2 mg IntraVENous Q4H PRN    diphenhydrAMINE (BENADRYL) capsule 25 mg  25 mg Oral Q6H PRN       Signed:  Adri Conde DO    Part of this note may have been written by using a voice dictation software.  The note has been proof read but may still contain some grammatical/other typographical errors.

## 2024-01-01 NOTE — PLAN OF CARE
Problem: Respiratory - Adult  Goal: Achieves optimal ventilation and oxygenation  1/1/2024 0822 by Maci Stroud RCP  Outcome: Progressing  1/1/2024 0150 by Epifanio Hamilton RN  Outcome: Progressing

## 2024-01-01 NOTE — PROGRESS NOTES
ACUTE PHYSICAL THERAPY GOALS RE-ASSESSED & MODIFIED 12/31/23:   (Developed with and agreed upon by patient and/or caregiver.)  (1.)Mr. Tong will move from supine to sit and sit to supine  with STAND BY ASSIST(consistently)   (2.)Mr. Togn will transfer from bed to chair and chair to bed with CONTACT GUARD ASSIST (consistently) using a walker, post surgical boot left & AFO bracing right    (3.)Mr. Tong will ambulate with CONTACT GUARD ASSIST (consistently) for 45-50 feet with rolling walker, boot left , brace right   (4)Mr. Zepeda will go up one step with walker minimal assist.   (5)Mr. Zepeda will perform HEP with written guidelines & family assist.  ________________________________________________________________________________________________      PHYSICAL THERAPY Daily Note and AM  (Link to Caseload Tracking: PT Visit Days : 3  Acknowledge Orders  Time In/Out  PT Charge Capture  Rehab Caseload Tracker    Macho Tong is a 73 y.o. male   PRIMARY DIAGNOSIS: Septic arthritis (HCC)  Hypoxia [R09.02]  Generalized weakness [R53.1]  Acute left ankle pain [M25.572]  Leukocytosis, unspecified type [D72.829]  Inability to bear weight [R26.89]  Septic arthritis (HCC) [M00.9]       Reason for Referral: Generalized Muscle Weakness (M62.81)  Other abnormalities of gait and mobility (R26.89)  Inpatient: Payor: MEDICARE / Plan: MEDICARE PART A AND B / Product Type: *No Product type* /     ASSESSMENT:     REHAB RECOMMENDATIONS:   Recommendation to date pending progress:  Setting:  Inpatient Rehab Facility /[facility]    Equipment:    To Be Determined     ASSESSMENT:  Mr. Tong is s/p I&D left ankle 12/30/23 & presented as motivated to work with therapy. This pt is at a minimal assist level for transfers  gait currently with good potential to reach a more functional & independent level. This pt is requiring a post surgical boot on the left to WBAT & an AFO brace on the right. This pt participated well while being  5/10         Post Treatment:5/10 Vitals NT       Oxygen  O2 Device: None (Room air)   IV    RESTRICTIONS/PRECAUTIONS:  Restrictions/Precautions: Weight Bearing, Fall Risk  Left Lower Extremity Weight Bearing: Weight Bearing As Tolerated (in post surgical boot)  AFO Right Foot              GROSS EVALUATION:  Intact Impaired (Comments):   AROM []  Decreased but functional, limited bilateral ankle mobility   PROM []    Strength []  Decreased but functional   Balance []  Fair- standing stat/dyn with walker & LE bracing   Posture [] Rounded Shoulders  Trunk Flexion   Sensation []  Decreased distally   Coordination []   Decreased but functional   Tone []     Edema [] Yes ankle left post op edema   Activity Tolerance []      []      COGNITION/  PERCEPTION: Intact Impaired (Comments):   Orientation [x]     Vision [x]     Hearing [x]     Cognition  [x]       MOBILITY: I Mod I S SBA CGA Min Mod Max Total  NT x2 Comments:   Bed Mobility    Rolling [] [] [] [] [] [] [] [] [] [x] [] Work on getting comfortable in the chair   Supine to Sit [] [] [] [] [] [] [] [] [] [x] []    Scooting [] [] [] [] [] [] [] [] [] [x] []    Sit to Supine [] [] [] [] [] [] [] [] [] [x] []    Transfers    Sit to Stand [] [] [] [] [] [] [] [] [] [x] []    Bed to Chair [] [] [] [] [] [] [] [] [] [x] []    Stand to Sit [] [] [] [] [] [] [] [] [] [x] []     [] [] [] [] [] [] [] [] [] [] []    I=Independent, Mod I=Modified Independent, S=Supervision, SBA=Standby Assistance, CGA=Contact Guard Assistance,   Min=Minimal Assistance, Mod=Moderate Assistance, Max=Maximal Assistance, Total=Total Assistance, NT=Not Tested    GAIT: I Mod I S SBA CGA Min Mod Max Total  NT x2 Comments:   Level of Assistance [] [] [] [] [] [] [] [] [] [x] []    Distance       DME Rolling Walker    Gait Quality Antalgic, Decreased eduarda , Decreased step clearance, Decreased step length, and flexed posture    Weightbearing Status      Stairs NT     I=Independent, Mod I=Modified

## 2024-01-02 DIAGNOSIS — M19.079 PRIMARY OSTEOARTHRITIS, UNSPECIFIED ANKLE AND FOOT: Primary | ICD-10-CM

## 2024-01-02 LAB
ANION GAP SERPL CALC-SCNC: 3 MMOL/L (ref 2–11)
BASOPHILS # BLD: 0.1 K/UL (ref 0–0.2)
BASOPHILS NFR BLD: 1 % (ref 0–2)
BODY FLD TYPE: NORMAL
BUN SERPL-MCNC: 25 MG/DL (ref 8–23)
CALCIUM SERPL-MCNC: 9.4 MG/DL (ref 8.3–10.4)
CHLORIDE SERPL-SCNC: 106 MMOL/L (ref 103–113)
CO2 SERPL-SCNC: 27 MMOL/L (ref 21–32)
CREAT SERPL-MCNC: 0.92 MG/DL (ref 0.8–1.5)
CRYSTALS FLD MICRO: NORMAL
DIFFERENTIAL METHOD BLD: ABNORMAL
EOSINOPHIL # BLD: 0.2 K/UL (ref 0–0.8)
EOSINOPHIL NFR BLD: 2 % (ref 0.5–7.8)
ERYTHROCYTE [DISTWIDTH] IN BLOOD BY AUTOMATED COUNT: 17.7 % (ref 11.9–14.6)
GLUCOSE BLD STRIP.AUTO-MCNC: 244 MG/DL (ref 65–100)
GLUCOSE BLD STRIP.AUTO-MCNC: 267 MG/DL (ref 65–100)
GLUCOSE BLD STRIP.AUTO-MCNC: 288 MG/DL (ref 65–100)
GLUCOSE BLD STRIP.AUTO-MCNC: 378 MG/DL (ref 65–100)
GLUCOSE SERPL-MCNC: 258 MG/DL (ref 65–100)
HCT VFR BLD AUTO: 34.6 % (ref 41.1–50.3)
HGB BLD-MCNC: 10.3 G/DL (ref 13.6–17.2)
IMM GRANULOCYTES # BLD AUTO: 0.1 K/UL (ref 0–0.5)
IMM GRANULOCYTES NFR BLD AUTO: 1 % (ref 0–5)
LYMPHOCYTES # BLD: 2 K/UL (ref 0.5–4.6)
LYMPHOCYTES NFR BLD: 19 % (ref 13–44)
MCH RBC QN AUTO: 24.8 PG (ref 26.1–32.9)
MCHC RBC AUTO-ENTMCNC: 29.8 G/DL (ref 31.4–35)
MCV RBC AUTO: 83.4 FL (ref 82–102)
MONOCYTES # BLD: 0.8 K/UL (ref 0.1–1.3)
MONOCYTES NFR BLD: 8 % (ref 4–12)
NEUTS SEG # BLD: 7 K/UL (ref 1.7–8.2)
NEUTS SEG NFR BLD: 69 % (ref 43–78)
NRBC # BLD: 0 K/UL (ref 0–0.2)
PLATELET # BLD AUTO: 255 K/UL (ref 150–450)
PMV BLD AUTO: 9.3 FL (ref 9.4–12.3)
POTASSIUM SERPL-SCNC: 4.2 MMOL/L (ref 3.5–5.1)
RBC # BLD AUTO: 4.15 M/UL (ref 4.23–5.6)
SERVICE CMNT-IMP: ABNORMAL
SODIUM SERPL-SCNC: 136 MMOL/L (ref 136–146)
WBC # BLD AUTO: 10.1 K/UL (ref 4.3–11.1)

## 2024-01-02 PROCEDURE — 36415 COLL VENOUS BLD VENIPUNCTURE: CPT

## 2024-01-02 PROCEDURE — 6370000000 HC RX 637 (ALT 250 FOR IP): Performed by: FAMILY MEDICINE

## 2024-01-02 PROCEDURE — 97530 THERAPEUTIC ACTIVITIES: CPT

## 2024-01-02 PROCEDURE — 85025 COMPLETE CBC W/AUTO DIFF WBC: CPT

## 2024-01-02 PROCEDURE — 82962 GLUCOSE BLOOD TEST: CPT

## 2024-01-02 PROCEDURE — 80048 BASIC METABOLIC PNL TOTAL CA: CPT

## 2024-01-02 PROCEDURE — 94640 AIRWAY INHALATION TREATMENT: CPT

## 2024-01-02 PROCEDURE — 94760 N-INVAS EAR/PLS OXIMETRY 1: CPT

## 2024-01-02 PROCEDURE — 1100000003 HC PRIVATE W/ TELEMETRY

## 2024-01-02 PROCEDURE — 6360000002 HC RX W HCPCS: Performed by: FAMILY MEDICINE

## 2024-01-02 PROCEDURE — 1100000000 HC RM PRIVATE

## 2024-01-02 PROCEDURE — 2580000003 HC RX 258: Performed by: FAMILY MEDICINE

## 2024-01-02 RX ORDER — INSULIN LISPRO 100 [IU]/ML
5 INJECTION, SOLUTION INTRAVENOUS; SUBCUTANEOUS
Status: DISCONTINUED | OUTPATIENT
Start: 2024-01-02 | End: 2024-01-03

## 2024-01-02 RX ORDER — INSULIN GLARGINE 100 [IU]/ML
34 INJECTION, SOLUTION SUBCUTANEOUS NIGHTLY
Status: DISCONTINUED | OUTPATIENT
Start: 2024-01-02 | End: 2024-01-03

## 2024-01-02 RX ORDER — INSULIN GLARGINE 100 [IU]/ML
30 INJECTION, SOLUTION SUBCUTANEOUS NIGHTLY
Status: DISCONTINUED | OUTPATIENT
Start: 2024-01-02 | End: 2024-01-02

## 2024-01-02 RX ADMIN — SODIUM CHLORIDE, PRESERVATIVE FREE 10 ML: 5 INJECTION INTRAVENOUS at 22:01

## 2024-01-02 RX ADMIN — INSULIN LISPRO 2 UNITS: 100 INJECTION, SOLUTION INTRAVENOUS; SUBCUTANEOUS at 08:32

## 2024-01-02 RX ADMIN — INSULIN LISPRO 8 UNITS: 100 INJECTION, SOLUTION INTRAVENOUS; SUBCUTANEOUS at 11:44

## 2024-01-02 RX ADMIN — LISINOPRIL 10 MG: 5 TABLET ORAL at 08:30

## 2024-01-02 RX ADMIN — MOMETASONE FUROATE AND FORMOTEROL FUMARATE DIHYDRATE 2 PUFF: 200; 5 AEROSOL RESPIRATORY (INHALATION) at 08:16

## 2024-01-02 RX ADMIN — INSULIN LISPRO 5 UNITS: 100 INJECTION, SOLUTION INTRAVENOUS; SUBCUTANEOUS at 11:44

## 2024-01-02 RX ADMIN — FOLIC ACID 1 MG: 1 TABLET ORAL at 08:31

## 2024-01-02 RX ADMIN — OXYCODONE 5 MG: 5 TABLET ORAL at 22:14

## 2024-01-02 RX ADMIN — PANTOPRAZOLE SODIUM 40 MG: 40 TABLET, DELAYED RELEASE ORAL at 09:21

## 2024-01-02 RX ADMIN — ATORVASTATIN CALCIUM 20 MG: 10 TABLET, FILM COATED ORAL at 21:58

## 2024-01-02 RX ADMIN — METOPROLOL TARTRATE 50 MG: 50 TABLET ORAL at 08:31

## 2024-01-02 RX ADMIN — INSULIN LISPRO 4 UNITS: 100 INJECTION, SOLUTION INTRAVENOUS; SUBCUTANEOUS at 16:50

## 2024-01-02 RX ADMIN — CYANOCOBALAMIN TAB 1000 MCG 1000 MCG: 1000 TAB at 08:31

## 2024-01-02 RX ADMIN — APIXABAN 5 MG: 5 TABLET, FILM COATED ORAL at 21:59

## 2024-01-02 RX ADMIN — OXYCODONE 5 MG: 5 TABLET ORAL at 05:26

## 2024-01-02 RX ADMIN — DOXAZOSIN 2 MG: 1 TABLET ORAL at 21:58

## 2024-01-02 RX ADMIN — EZETIMIBE 10 MG: 10 TABLET ORAL at 21:59

## 2024-01-02 RX ADMIN — CEFEPIME 2000 MG: 2 INJECTION, POWDER, FOR SOLUTION INTRAVENOUS at 22:06

## 2024-01-02 RX ADMIN — OXYCODONE 5 MG: 5 TABLET ORAL at 01:30

## 2024-01-02 RX ADMIN — MONTELUKAST 10 MG: 10 TABLET, FILM COATED ORAL at 21:59

## 2024-01-02 RX ADMIN — GABAPENTIN 300 MG: 300 CAPSULE ORAL at 09:21

## 2024-01-02 RX ADMIN — CEFEPIME 2000 MG: 2 INJECTION, POWDER, FOR SOLUTION INTRAVENOUS at 13:18

## 2024-01-02 RX ADMIN — INSULIN LISPRO 5 UNITS: 100 INJECTION, SOLUTION INTRAVENOUS; SUBCUTANEOUS at 16:49

## 2024-01-02 RX ADMIN — OXYCODONE 5 MG: 5 TABLET ORAL at 14:45

## 2024-01-02 RX ADMIN — INSULIN LISPRO 5 UNITS: 100 INJECTION, SOLUTION INTRAVENOUS; SUBCUTANEOUS at 08:31

## 2024-01-02 RX ADMIN — EMPAGLIFLOZIN 10 MG: 10 TABLET, FILM COATED ORAL at 08:31

## 2024-01-02 RX ADMIN — APIXABAN 5 MG: 5 TABLET, FILM COATED ORAL at 08:31

## 2024-01-02 RX ADMIN — CELECOXIB 200 MG: 200 CAPSULE ORAL at 21:58

## 2024-01-02 RX ADMIN — METOPROLOL TARTRATE 50 MG: 50 TABLET ORAL at 21:59

## 2024-01-02 RX ADMIN — CEFEPIME 2000 MG: 2 INJECTION, POWDER, FOR SOLUTION INTRAVENOUS at 05:26

## 2024-01-02 RX ADMIN — INSULIN GLARGINE 34 UNITS: 100 INJECTION, SOLUTION SUBCUTANEOUS at 22:01

## 2024-01-02 RX ADMIN — GABAPENTIN 300 MG: 300 CAPSULE ORAL at 21:59

## 2024-01-02 RX ADMIN — MOMETASONE FUROATE AND FORMOTEROL FUMARATE DIHYDRATE 2 PUFF: 200; 5 AEROSOL RESPIRATORY (INHALATION) at 20:40

## 2024-01-02 RX ADMIN — CELECOXIB 200 MG: 200 CAPSULE ORAL at 08:31

## 2024-01-02 ASSESSMENT — PAIN DESCRIPTION - DESCRIPTORS: DESCRIPTORS: ACHING

## 2024-01-02 ASSESSMENT — PAIN DESCRIPTION - LOCATION
LOCATION: ANKLE
LOCATION: LEG
LOCATION: ANKLE
LOCATION: LEG
LOCATION: ANKLE

## 2024-01-02 ASSESSMENT — PAIN SCALES - GENERAL
PAINLEVEL_OUTOF10: 6
PAINLEVEL_OUTOF10: 0
PAINLEVEL_OUTOF10: 4
PAINLEVEL_OUTOF10: 6
PAINLEVEL_OUTOF10: 3
PAINLEVEL_OUTOF10: 4

## 2024-01-02 ASSESSMENT — PAIN DESCRIPTION - ORIENTATION
ORIENTATION: LEFT

## 2024-01-02 NOTE — CARE COORDINATION
CM chart review; discussed in IDT rounds. Patient pending final ID rec for IV antibiotics. CM spoke with patient/spouse at bedside. Discharge plan remains for inpatient rehab at [facility]. They are aware that pt will likely still need IV antibiotics after discharge from [facility]. CM discussed home antibiotics vs outpatient infusion center. Patient/spouse state that spouse should be able to assist him with antibiotic infusions at home. They state they also have nurse/physician friends/neighbors that can assist as well. CM spoke w/ [facility] liaison; they have beds available when patient is stable for discharge. Case management will follow.

## 2024-01-02 NOTE — WOUND CARE
Patient seen for reported buttock skin ulceration. Noted bilateral buttocks along gluteal cleft fold has moisture damage including some small open pink areas less than 0.5cm. zinc barrier cream in use and appropriate. Discussed with patient, updated on treatment plan. Answered all questions. Will sign off. Please call if needed further.

## 2024-01-02 NOTE — PROGRESS NOTES
Infectious Disease Consult    Today's Date: 2024   Admit Date: 2023    Patient YOB: 1950    Impression:   Pseudomonas L ankle septic arthritis; s/p I&D 2023. Source unclear  Recent URI  Elevated CRP  Right Charcot arthropathy  Diabetes mellitus (hgb A1c 8.8)  Sulfa allergy    Plan:   Fluoroquinolones are not an option at the current [facility]  Place PICC and plan for cefepime 2g IV q 8 hours x 4 weeks. EOT 2024  Recommend weekly CBC, creatinine, CRP, ESR  Plans noted for rehab at [facility]  I will arrange outpatient follow up prior at EOT.    Anti-infectives:   IV cefepime    Subjective:   No new complaints.  Tolerating cefepime without complication.      Allergies   Allergen Reactions    Codeine Itching    Sulfamethoxazole Itching     Other reaction(s): Itching      Sulfamethoxazole-Trimethoprim Itching    Trimethoprim Itching     Other reaction(s): Itching          Review of Systems:  A comprehensive review of systems is negative except as stated in the HPI.      Objective:     Visit Vitals  /65   Pulse 84   Temp 99 °F (37.2 °C) (Oral)   Resp 18   Ht 1.956 m (6' 5\")   Wt (!) 137 kg (302 lb)   SpO2 94%   BMI 35.81 kg/m²     Temp (24hrs), Av °F (37.2 °C), Min:99 °F (37.2 °C), Max:99 °F (37.2 °C)       Lines:  Peripheral IV:       Physical Exam:    General:  Alert, cooperative, in no acute distress, sitting in chair   Eyes:  Sclera anicteric.    Mouth/Throat: Mucous membranes normal, oral pharynx clear   Neck: Supple   Lungs:   Breathing comfortably on room air   CV:     Abdomen:   Soft, distended   Extremities: No cyanosis;moderate bilateral LE edema; L ankle edema noted   Skin: Skin color, texture, turgor normal. no acute rash or lesions   Neuro: Moves all extremities   Musculoskeletal: R Charcot arthropathy; Right leg externally rotated; L ankle bandaged and in boot   Lines/Devices:  Intact, no erythema, drainage or tenderness   Psych: Alert and oriented, normal mood

## 2024-01-02 NOTE — PROGRESS NOTES
ACUTE PHYSICAL THERAPY GOALS RE-ASSESSED & MODIFIED 12/31/23:   (Developed with and agreed upon by patient and/or caregiver.)  (1.)Mr. Tong will move from supine to sit and sit to supine  with STAND BY ASSIST(consistently)   (2.)Mr. Tong will transfer from bed to chair and chair to bed with CONTACT GUARD ASSIST (consistently) using a walker, post surgical boot left & AFO bracing right    (3.)Mr. Tong will ambulate with CONTACT GUARD ASSIST (consistently) for 45-50 feet with rolling walker, boot left , brace right   (4)Mr. Zepeda will go up one step with walker minimal assist.   (5)Mr. Zepeda will perform HEP with written guidelines & family assist.  ________________________________________________________________________________________________    PHYSICAL THERAPY Daily Note and PM  (Link to Caseload Tracking: PT Visit Days : 4  Acknowledge Orders  Time In/Out  PT Charge Capture  Rehab Caseload Tracker    aMcho Tong is a 73 y.o. male   PRIMARY DIAGNOSIS: Septic arthritis (HCC)  Hypoxia [R09.02]  Generalized weakness [R53.1]  Acute left ankle pain [M25.572]  Leukocytosis, unspecified type [D72.829]  Inability to bear weight [R26.89]  Septic arthritis (HCC) [M00.9]       Reason for Referral: Generalized Muscle Weakness (M62.81)  Other abnormalities of gait and mobility (R26.89)  Inpatient: Payor: MEDICARE / Plan: MEDICARE PART A AND B / Product Type: *No Product type* /     ASSESSMENT:     REHAB RECOMMENDATIONS:   Recommendation to date pending progress:  Setting:  Inpatient Rehab Facility /[facility]    Equipment:    To Be Determined     ASSESSMENT:  Mr. Tong is s/p I&D left ankle 12/30/23 & presented as motivated to work with therapy. This pt is at a minimal assist level for transfers  gait currently with good potential to reach a more functional & independent level. This pt is requiring a post surgical boot on the left to WBAT & an AFO brace on the right. This pt participated well while being very    Weightbearing Status      Stairs NT     I=Independent, Mod I=Modified Independent, S=Supervision, SBA=Standby Assistance, CGA=Contact Guard Assistance,   Min=Minimal Assistance, Mod=Moderate Assistance, Max=Maximal Assistance, Total=Total Assistance, NT=Not Tested    PLAN:   FREQUENCY AND DURATION: Daily for duration of hospital stay or until stated goals are met, whichever comes first.    THERAPY PROGNOSIS: Good    PROBLEM LIST:   (Skilled intervention is medically necessary to address:)  Decreased ADL/Functional Activities  Decreased Activity Tolerance  Decreased AROM/PROM  Decreased Balance  Decreased Gait Ability  Decreased Safety Awareness  Decreased Strength  Decreased Transfer Abilities  Increased Pain INTERVENTIONS PLANNED:   (Benefits and precautions of physical therapy have been discussed with the patient.)  Therapeutic Activity  Therapeutic Exercise/HEP  Gait Training  Education       TREATMENT:   TREATMENT:   Therapeutic Activity (25 Minutes): Therapeutic activity included Scooting, Transfer Training, Sitting balance , and Standing balance to improve functional Activity tolerance, Balance, Mobility, and Strength.    TREATMENT GRID:   Date:  1/1/24  Date:   Date:     Activity/Exercise Parameters Parameters Parameters   Marching 10 b     LAQ 10 b     Hip ab/ad 10 R                                AFTER TREATMENT PRECAUTIONS: Bed/Chair Locked, Call light within reach, Chair, Needs within reach, and RN notified    INTERDISCIPLINARY COLLABORATION:  RN/ PCT    EDUCATION:      TIME IN/OUT:  Time In: 1608  Time Out: 1633  Minutes: 25    NGUYỄN MANLEY PT

## 2024-01-02 NOTE — DIABETES MGMT
Patient admitted with septic arthritis. Blood glucose ranged 200-307 yesterday with patient receiving Lantus 27 units, Humalog 14 units and Jardiance 10 mg. Blood glucose this morning was 244. Creatinine 0.92. GFR > 60. Reviewed patient current regimen: Jardiance 10 mg daily, Lantus 30 units HS, Humalog 5 units with meals, and Humalog correctional insulin.  Patient would likely benefit from an increase in basal insulin as fasting glucose is not goal.  Provider updated via [facility] regarding recommendations and patient glycemic control.  New order received for Lantus 34 units HS.

## 2024-01-03 VITALS
OXYGEN SATURATION: 95 % | WEIGHT: 302 LBS | BODY MASS INDEX: 35.66 KG/M2 | DIASTOLIC BLOOD PRESSURE: 62 MMHG | HEIGHT: 77 IN | TEMPERATURE: 98.4 F | RESPIRATION RATE: 17 BRPM | HEART RATE: 79 BPM | SYSTOLIC BLOOD PRESSURE: 100 MMHG

## 2024-01-03 LAB
GLUCOSE BLD STRIP.AUTO-MCNC: 220 MG/DL (ref 65–100)
GLUCOSE BLD STRIP.AUTO-MCNC: 254 MG/DL (ref 65–100)
GLUCOSE BLD STRIP.AUTO-MCNC: 331 MG/DL (ref 65–100)
SERVICE CMNT-IMP: ABNORMAL

## 2024-01-03 PROCEDURE — 97530 THERAPEUTIC ACTIVITIES: CPT

## 2024-01-03 PROCEDURE — 94760 N-INVAS EAR/PLS OXIMETRY 1: CPT

## 2024-01-03 PROCEDURE — 6360000002 HC RX W HCPCS: Performed by: FAMILY MEDICINE

## 2024-01-03 PROCEDURE — 36573 INSJ PICC RS&I 5 YR+: CPT

## 2024-01-03 PROCEDURE — C1751 CATH, INF, PER/CENT/MIDLINE: HCPCS

## 2024-01-03 PROCEDURE — 2580000003 HC RX 258: Performed by: FAMILY MEDICINE

## 2024-01-03 PROCEDURE — 94640 AIRWAY INHALATION TREATMENT: CPT

## 2024-01-03 PROCEDURE — 6370000000 HC RX 637 (ALT 250 FOR IP): Performed by: FAMILY MEDICINE

## 2024-01-03 PROCEDURE — 6370000000 HC RX 637 (ALT 250 FOR IP): Performed by: INTERNAL MEDICINE

## 2024-01-03 PROCEDURE — 82962 GLUCOSE BLOOD TEST: CPT

## 2024-01-03 RX ORDER — INSULIN GLARGINE 100 [IU]/ML
40 INJECTION, SOLUTION SUBCUTANEOUS NIGHTLY
Status: DISCONTINUED | OUTPATIENT
Start: 2024-01-03 | End: 2024-01-03 | Stop reason: HOSPADM

## 2024-01-03 RX ORDER — DIPHENHYDRAMINE HCL 50 MG
50 CAPSULE ORAL EVERY 6 HOURS PRN
Qty: 10 CAPSULE | Refills: 0
Start: 2024-01-03

## 2024-01-03 RX ORDER — OXYCODONE HYDROCHLORIDE 5 MG/1
5 TABLET ORAL EVERY 8 HOURS PRN
Qty: 9 TABLET | Refills: 0 | Status: SHIPPED | OUTPATIENT
Start: 2024-01-03 | End: 2024-01-06

## 2024-01-03 RX ORDER — INSULIN LISPRO 100 [IU]/ML
7 INJECTION, SOLUTION INTRAVENOUS; SUBCUTANEOUS ONCE
Status: COMPLETED | OUTPATIENT
Start: 2024-01-03 | End: 2024-01-03

## 2024-01-03 RX ORDER — INSULIN LISPRO 100 [IU]/ML
7 INJECTION, SOLUTION INTRAVENOUS; SUBCUTANEOUS
Status: DISCONTINUED | OUTPATIENT
Start: 2024-01-03 | End: 2024-01-03 | Stop reason: HOSPADM

## 2024-01-03 RX ORDER — INSULIN LISPRO 100 [IU]/ML
7 INJECTION, SOLUTION INTRAVENOUS; SUBCUTANEOUS
Qty: 3 ML | Refills: 0
Start: 2024-01-03

## 2024-01-03 RX ADMIN — OXYCODONE 5 MG: 5 TABLET ORAL at 17:20

## 2024-01-03 RX ADMIN — EMPAGLIFLOZIN 10 MG: 10 TABLET, FILM COATED ORAL at 10:12

## 2024-01-03 RX ADMIN — SODIUM CHLORIDE, PRESERVATIVE FREE 10 ML: 5 INJECTION INTRAVENOUS at 10:14

## 2024-01-03 RX ADMIN — CELECOXIB 200 MG: 200 CAPSULE ORAL at 10:11

## 2024-01-03 RX ADMIN — INSULIN LISPRO 7 UNITS: 100 INJECTION, SOLUTION INTRAVENOUS; SUBCUTANEOUS at 12:12

## 2024-01-03 RX ADMIN — METOPROLOL TARTRATE 50 MG: 50 TABLET ORAL at 10:10

## 2024-01-03 RX ADMIN — CEFEPIME 2000 MG: 2 INJECTION, POWDER, FOR SOLUTION INTRAVENOUS at 12:11

## 2024-01-03 RX ADMIN — PANTOPRAZOLE SODIUM 40 MG: 40 TABLET, DELAYED RELEASE ORAL at 10:11

## 2024-01-03 RX ADMIN — OXYCODONE 5 MG: 5 TABLET ORAL at 10:53

## 2024-01-03 RX ADMIN — CEFEPIME 2000 MG: 2 INJECTION, POWDER, FOR SOLUTION INTRAVENOUS at 05:32

## 2024-01-03 RX ADMIN — INSULIN LISPRO 6 UNITS: 100 INJECTION, SOLUTION INTRAVENOUS; SUBCUTANEOUS at 12:12

## 2024-01-03 RX ADMIN — CYANOCOBALAMIN TAB 1000 MCG 1000 MCG: 1000 TAB at 10:12

## 2024-01-03 RX ADMIN — INSULIN LISPRO 2 UNITS: 100 INJECTION, SOLUTION INTRAVENOUS; SUBCUTANEOUS at 10:13

## 2024-01-03 RX ADMIN — INSULIN LISPRO 7 UNITS: 100 INJECTION, SOLUTION INTRAVENOUS; SUBCUTANEOUS at 10:14

## 2024-01-03 RX ADMIN — FOLIC ACID 1 MG: 1 TABLET ORAL at 10:11

## 2024-01-03 RX ADMIN — MOMETASONE FUROATE AND FORMOTEROL FUMARATE DIHYDRATE 2 PUFF: 200; 5 AEROSOL RESPIRATORY (INHALATION) at 07:54

## 2024-01-03 RX ADMIN — APIXABAN 5 MG: 5 TABLET, FILM COATED ORAL at 10:11

## 2024-01-03 RX ADMIN — GABAPENTIN 300 MG: 300 CAPSULE ORAL at 10:11

## 2024-01-03 RX ADMIN — INSULIN LISPRO 7 UNITS: 100 INJECTION, SOLUTION INTRAVENOUS; SUBCUTANEOUS at 16:48

## 2024-01-03 RX ADMIN — OXYCODONE 5 MG: 5 TABLET ORAL at 06:03

## 2024-01-03 RX ADMIN — LISINOPRIL 10 MG: 5 TABLET ORAL at 10:12

## 2024-01-03 RX ADMIN — INSULIN LISPRO 4 UNITS: 100 INJECTION, SOLUTION INTRAVENOUS; SUBCUTANEOUS at 16:48

## 2024-01-03 ASSESSMENT — PAIN DESCRIPTION - DESCRIPTORS: DESCRIPTORS: SHOOTING;SHARP

## 2024-01-03 ASSESSMENT — PAIN DESCRIPTION - ORIENTATION
ORIENTATION: LEFT

## 2024-01-03 ASSESSMENT — PAIN DESCRIPTION - LOCATION
LOCATION: FOOT
LOCATION: FOOT
LOCATION: ANKLE
LOCATION: FOOT

## 2024-01-03 ASSESSMENT — PAIN SCALES - GENERAL
PAINLEVEL_OUTOF10: 6
PAINLEVEL_OUTOF10: 5
PAINLEVEL_OUTOF10: 5
PAINLEVEL_OUTOF10: 6

## 2024-01-03 NOTE — PROGRESS NOTES
ACUTE PHYSICAL THERAPY GOALS RE-ASSESSED & MODIFIED 12/31/23:   (Developed with and agreed upon by patient and/or caregiver.)  (1.)Mr. Tong will move from supine to sit and sit to supine  with STAND BY ASSIST(consistently)   (2.)Mr. Tong will transfer from bed to chair and chair to bed with CONTACT GUARD ASSIST (consistently) using a walker, post surgical boot left & AFO bracing right    (3.)Mr. Tong will ambulate with CONTACT GUARD ASSIST (consistently) for 45-50 feet with rolling walker, boot left , brace right   (4)Mr. Zepeda will go up one step with walker minimal assist.   (5)Mr. Zepeda will perform HEP with written guidelines & family assist.  ________________________________________________________________________________________________    PHYSICAL THERAPY Daily Note and PM  (Link to Caseload Tracking: PT Visit Days : 5  Acknowledge Orders  Time In/Out  PT Charge Capture  Rehab Caseload Tracker    Macho Tong is a 73 y.o. male   PRIMARY DIAGNOSIS: Septic arthritis (HCC)  Hypoxia [R09.02]  Generalized weakness [R53.1]  Acute left ankle pain [M25.572]  Leukocytosis, unspecified type [D72.829]  Inability to bear weight [R26.89]  Septic arthritis (HCC) [M00.9]       Reason for Referral: Generalized Muscle Weakness (M62.81)  Other abnormalities of gait and mobility (R26.89)  Inpatient: Payor: MEDICARE / Plan: MEDICARE PART A AND B / Product Type: *No Product type* /     ASSESSMENT:     REHAB RECOMMENDATIONS:   Recommendation to date pending progress:  Setting:  Inpatient Rehab Facility /[facility]    Equipment:    To Be Determined     ASSESSMENT:  Mr. Tong is s/p I&D left ankle 12/30/23 & presented as motivated to work with therapy. This pt is at a minimal assist level for transfers  gait currently with good potential to reach a more functional & independent level. This pt is requiring a post surgical boot on the left to WBAT & an AFO brace on the right.     1/3/24: Patient is seated up in chair

## 2024-01-03 NOTE — PROGRESS NOTES
POCT Glucose    Collection Time: 01/02/24  8:18 PM   Result Value Ref Range    POC Glucose 267 (H) 65 - 100 mg/dL    Performed by: April    POCT Glucose    Collection Time: 01/03/24  8:15 AM   Result Value Ref Range    POC Glucose 220 (H) 65 - 100 mg/dL    Performed by: Laurie    POCT Glucose    Collection Time: 01/03/24 11:29 AM   Result Value Ref Range    POC Glucose 331 (H) 65 - 100 mg/dL    Performed by: Laurie        Current Meds:  Current Facility-Administered Medications   Medication Dose Route Frequency    insulin glargine (LANTUS) injection vial 40 Units  40 Units SubCUTAneous Nightly    insulin lispro (HUMALOG) injection vial 7 Units  7 Units SubCUTAneous TID WC    doxazosin (CARDURA) tablet 2 mg  2 mg Oral Nightly    albuterol (PROVENTIL) (2.5 MG/3ML) 0.083% nebulizer solution 2.5 mg  2.5 mg Nebulization Q6H PRN    apixaban (ELIQUIS) tablet 5 mg  5 mg Oral BID    atorvastatin (LIPITOR) tablet 20 mg  20 mg Oral Nightly    celecoxib (CELEBREX) capsule 200 mg  200 mg Oral BID    empagliflozin (JARDIANCE) tablet 10 mg  10 mg Oral Daily    ezetimibe (ZETIA) tablet 10 mg  10 mg Oral Nightly    fluticasone (FLONASE) 50 MCG/ACT nasal spray 2 spray  2 spray Nasal Daily PRN    gabapentin (NEURONTIN) capsule 300 mg  300 mg Oral BID    lisinopril (PRINIVIL;ZESTRIL) tablet 10 mg  10 mg Oral Daily    metoprolol tartrate (LOPRESSOR) tablet 50 mg  50 mg Oral BID    montelukast (SINGULAIR) tablet 10 mg  10 mg Oral Nightly    pantoprazole (PROTONIX) tablet 40 mg  40 mg Oral Daily    sodium chloride flush 0.9 % injection 5-40 mL  5-40 mL IntraVENous 2 times per day    sodium chloride flush 0.9 % injection 5-40 mL  5-40 mL IntraVENous PRN    0.9 % sodium chloride infusion   IntraVENous PRN    potassium chloride (KLOR-CON M) extended release tablet 40 mEq  40 mEq Oral PRN    Or    potassium bicarb-citric acid (EFFER-K) effervescent tablet 40 mEq  40 mEq Oral PRN    Or    potassium chloride 10 mEq/100

## 2024-01-03 NOTE — CARE COORDINATION
01/03/24 1630   Service Assessment   Patient Orientation Alert and Oriented   Cognition Alert   Primary Caregiver Spouse   Support Systems Spouse/Significant Other   Patient's Healthcare Decision Maker is: Named in Scanned ACP Document   Prior Functional Level Independent in ADLs/IADLs   Financial Resources Medicare   Community Resources None   Social/Functional History   Lives With Spouse   Type of Home House   Home Layout Able to Live on Main level with bedroom/bathroom   Home Access Stairs to enter without rails   Entrance Stairs - Number of Steps 1   Bathroom Shower/Tub Tub/Shower unit   Bathroom Equipment 3-in-1 commode   Home Equipment Walker, rolling;Wheelchair-manual;Lift chair   Discharge Planning   Type of Residence House   Living Arrangements Spouse/Significant Other   Current Services Prior To Admission None   Potential Assistance Needed Other (Comment)  (inpatient rehabilitation)   Potential Assistance Purchasing Medications No   Patient expects to be discharged to: Acute rehab  ([facility])   Services At/After Discharge   Transition of Care Consult (CM Consult) Discharge Planning   Condition of Participation: Discharge Planning   The Patient and/or Patient Representative was provided with a Choice of Provider? Patient   The Patient and/Or Patient Representative agree with the Discharge Plan? Yes   Freedom of Choice list was provided with basic dialogue that supports the patient's individualized plan of care/goals, treatment preferences, and shares the quality data associated with the providers?  Yes     The patient is discharging to [facility]. Medical transport was arranged and the report information was given to the primary nurse. Orders and clinicals were placed in the patient's discharge packet. The facility confirmed they do not need a COVID test prior to discharge. The patient and family verbalized understanding and agreement with the discharge plan.

## 2024-01-03 NOTE — PROGRESS NOTES
Comprehensive Nutrition Assessment    Type and Reason for Visit: Reassess  Length of Stay    Nutrition Recommendations/Plan:   Meals and Snacks:  Diet: Continue current order  Nutrition Supplement Therapy:  Medical food supplement therapy:  None      Malnutrition Assessment:  Malnutrition Status: No malnutrition    Nutrition Assessment:  Nutrition History: Denies any change in appetite, intake or weight PTA. Pt reports he eats a lot more at home-mainly protein and non starchy vegetables. Limits potatoes, bread and other starchy foods.     Do You Have Any Cultural, Confucianism, or Ethnic Food Preferences?: No   Weight History: EMR shows previous weight of 292 lbs on 1/30/22. Now current weight this admission of 302 lbs on 12/22/23 but this is a stated weight. Weight appears to be trending up over the last year.   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 Pt presents this admission for right ankle pain and urinary incontinence.  Nutrition Interval:  RD met w/pt in room. Pt reports eating 100% of his meals during admission. Reports being very content with food choices. States he is sometimes getting double protein portions to help him feel more full. No further nutrition concerns at this time. BG remains elevated but adjustments made to insulin regimen today per diabetes management and MD.     Current Nutrition Therapies:  ADULT DIET; Regular; 3 carb choices (45 gm/meal)    Current Intake:   Average Meal Intake: % Average Supplements Intake: None Ordered      Anthropometric Measures:  Height: 195.6 cm (6' 5\")  Current Body Wt: 137 kg (302 lb 0.5 oz) (12/22), Weight source: Stated  BMI: 35.8, Obese Class 2 (BMI 35.0 -39.9)  Admission Body Weight: 137 kg (302 lb 0.5 oz)  Ideal Body Weight (Kg) (Calculated): 95 kg (208 lbs),    BMI Category Obese Class 2 (BMI 35.0 -39.9)    Estimated Daily Nutrient  Needs:  Energy (kcal/day): 6777-8620 (15-20 kcal/kg) (Kcal/kg Weight used: 137 kg Current  Protein (g/day):  (1-1.2 g/kg) Weight Used: (Ideal) 94.5 kg  Fluid (ml/day):   (1 ml/kcal)    Nutrition Diagnosis:   No nutrition diagnosis at this time     Nutrition Interventions:   Food and/or Nutrient Delivery: Continue Current Diet     Coordination of Nutrition Care: Continue to monitor while inpatient    Goals:      Active Goal: Meet at least 75% of estimated needs, by next RD assessment       Nutrition Monitoring and Evaluation:      Food/Nutrient Intake Outcomes: Food and Nutrient Intake  Physical Signs/Symptoms Outcomes: Biochemical Data, Weight, Meal Time Behavior    Discharge Planning:    Continue current diet    ALMA TEJADA RD

## 2024-01-03 NOTE — PROGRESS NOTES
PICC Placement Note    PRE-PROCEDURE VERIFICATION    Correct Procedure: yes  Time out completed with assistant Kateryna Wyatt RN, [facility] and all persons present in agreement with time out.  Risks and benefits reviewed with pt and wife and informed consent obtained prior to assessment and procedure.     Correct Site: yes  Temperature: Temp: 98.4 °F (36.9 °C), Temperature Source:    Recent Labs     01/02/24  0559   BUN 25*      WBC 10.1     Allergies: Codeine, Sulfamethoxazole, Sulfamethoxazole-trimethoprim, and Trimethoprim  Education materials for PICC Care given to patient or family.    PROCEDURE DETAIL  A single lumen PICC line was started for Long term IV/antibiotic administration. The following documentation is in addition to the PICC properties in the lines/airways flowsheet:    Lot #: [id number]  Xylocaine used: Yes  Mid-Arm Circumference: 32 (cm)  Internal Catheter Length: 50 (cm)  External Catheter Length: 0 (cm)  Total Catheter Length: 50 (cm)  Vein Selection for PICC: right basilic  Central Line Insertion Bundle followed: Yes  Complication Related to Insertion: none    Both the insertion guidewire and ECG guidewire were removed intact all ports have positive blood return and were flush well with normal saline.    The location of the tip of the PICC is verified using ECG technology.  The tip is in the SVC per ECG reading.  See image below.     Line is okay to use: yes          SARAH MÉNDEZ RN

## 2024-01-03 NOTE — PLAN OF CARE
Problem: Discharge Planning  Goal: Discharge to home or other facility with appropriate resources  Outcome: Progressing     Problem: Pain  Goal: Verbalizes/displays adequate comfort level or baseline comfort level  Outcome: Progressing     Problem: Skin/Tissue Integrity  Goal: Absence of new skin breakdown  Description: 1.  Monitor for areas of redness and/or skin breakdown  2.  Assess vascular access sites hourly  3.  Every 4-6 hours minimum:  Change oxygen saturation probe site  4.  Every 4-6 hours:  If on nasal continuous positive airway pressure, respiratory therapy assess nares and determine need for appliance change or resting period.  Outcome: Progressing     Problem: Safety - Adult  Goal: Free from fall injury  Outcome: Progressing     Problem: ABCDS Injury Assessment  Goal: Absence of physical injury  Outcome: Progressing     Problem: Musculoskeletal - Adult  Goal: Return mobility to safest level of function  Outcome: Progressing     Problem: Respiratory - Adult  Goal: Achieves optimal ventilation and oxygenation  Outcome: Progressing     Problem: Metabolic/Fluid and Electrolytes - Adult  Goal: Glucose maintained within prescribed range  Outcome: Progressing     Problem: Neurosensory - Adult  Goal: Achieves maximal functionality and self care  Outcome: Progressing     Problem: Genitourinary - Adult  Goal: Absence of urinary retention  Outcome: Progressing     Problem: Infection - Adult  Goal: Absence of infection at discharge  Outcome: Progressing  Goal: Absence of infection during hospitalization  Outcome: Progressing     Problem: Gastrointestinal - Adult  Goal: Maintains adequate nutritional intake  Outcome: Progressing     Problem: Skin/Tissue Integrity - Adult  Goal: Incisions, wounds, or drain sites healing without S/S of infection  Outcome: Progressing     Problem: Chronic Conditions and Co-morbidities  Goal: Patient's chronic conditions and co-morbidity symptoms are monitored and maintained or

## 2024-01-03 NOTE — DISCHARGE SUMMARY
this admission:  IP CONSULT TO DIABETES MANAGEMENT  IP CONSULT TO CASE MANAGEMENT  IP CONSULT TO ORTHOPEDIC SURGERY  IP CONSULT TO ORTHOPEDIC SURGERY  IP CONSULT TO INFECTIOUS DISEASES  IP WOUND CARE NURSE CONSULT TO EVAL  IP CONSULT TO DIABETES MANAGEMENT  IP CONSULT TO ORTHOPEDIC SURGERY  IP CONSULT TO INFECTIOUS DISEASES    Echocardiogram results:  06/26/23    TRANSTHORACIC ECHOCARDIOGRAM (TTE) COMPLETE (CONTRAST/BUBBLE/3D PRN) 06/26/2023  5:11 PM (Final)    Interpretation Summary    Left Ventricle: Mildly reduced left ventricular systolic function with a visually estimated EF of 45 - 50%. Left ventricle size is normal. Mildly increased wall thickness. Mild global hypokinesis present. Grade I diastolic dysfunction with normal LAP.    Signed by: Fernando Langley MD on 6/26/2023  5:11 PM      Diagnostic Imaging/Tests:   Vascular duplex lower extremity venous left    Result Date: 12/27/2023  No evidence of deep venous thrombosis in the left lower extremity     XR ANKLE LEFT (MIN 3 VIEWS)    Result Date: 12/22/2023  Mild degenerative changes of the left ankle with no evidence of acute fracture..    XR CHEST PORTABLE    Result Date: 12/22/2023  No radiographic evidence of acute cardiopulmonary disease     XR CHEST (2 VW)    Result Date: 12/16/2023  Impression:   1.  No acute cardiopulmonary process. CPT code(s) 92902    XR ANKLE RIGHT (MIN 3 VIEWS)    Result Date: 12/11/2023  See Progress note in the chart.       Labs: Results:       BMP, Mg, Phos Recent Labs     01/01/24  0452 01/02/24  0559    136   K 4.1 4.2    106   CO2 26 27   ANIONGAP 5 3   BUN 29* 25*   CREATININE 0.87 0.92   LABGLOM >60 >60   CALCIUM 9.6 9.4   GLUCOSE 215* 258*      CBC Recent Labs     01/01/24  0452 01/02/24  0559   WBC 10.3 10.1   RBC 3.98* 4.15*   HGB 10.0* 10.3*   HCT 32.6* 34.6*   MCV 81.9* 83.4   MCH 25.1* 24.8*   MCHC 30.7* 29.8*   RDW 17.5* 17.7*    255   MPV 9.4 9.3*   NRBC 0.00 0.00   LYMPHOPCT 21 19   EOSRELPCT 2      Special Requests LEFT ANKLE     Gram stain MANY WBCS SEEN PER LPF         NO DEFINITE ORGANISM SEEN        Culture       LIGHT PSEUDOMONAS AERUGINOSA          Susceptibility        Pseudomonas aeruginosa      BACTERIAL SUSCEPTIBILITY PANEL LAKESHA      amikacin <=8 ug/mL Sensitive      aztreonam <=2 ug/mL Sensitive      cefepime <=1 ug/mL Sensitive      ciprofloxacin 2 ug/mL Intermediate      levofloxacin 2 ug/mL Sensitive      meropenem <=0.5 ug/mL Sensitive      piperacillin-tazobactam <=2/4 ug/mL Sensitive      tobramycin <=2 ug/mL Sensitive                           Influenza A/B, Molecular [[id number]] Collected: 12/22/23 0736    Order Status: Completed Specimen: Nasopharyngeal Updated: 12/22/23 0823     Influenza A, STEPHANY Not detected        Comment: Negative results do not preclude infection with influenza virus and should not be the sole basis of a patient treatment decision.        Influenza B, STEPHANY Not detected       Respiratory Syncytial Virus, Molecular [[id number]] Collected: 12/22/23 0736    Order Status: Completed Specimen: Blood Serum Updated: 12/22/23 0913     RSV by NAAT Not detected        Comment: NEGATIVE FOR THE PRESENCE OF RSV NUCLEIC ACID  Negative results do not preclude infection with RSV and should not be used as the sole basis for patient management decisions.  Methodology: Isothermal Nucleic Acid Amplification  Rapid Abbott ID Now  **Intended for use in patients <18 years of age and >59years of age**         COVID-19, Rapid [[id number]] Collected: 12/22/23 0735    Order Status: Completed Specimen: Nasopharyngeal Updated: 12/22/23 0823     Source NASAL SWAB        SARS-CoV-2, Rapid Not detected        Comment:    The specimen is NEGATIVE for SARS-CoV-2, the novel coronavirus associated with COVID-19.  A negative result does not rule out COVID-19.     This test has been authorized by the FDA under an Emergency Use Authorization (EUA) for use by authorized laboratories.      Fact sheet for

## 2024-01-03 NOTE — DIABETES MGMT
Patient admitted with septic arthritis. Blood glucose ranged 244-378 yesterday with patient receiving Lantus 34 units, Humalog 29 units, and Jardiance 10 mg. Blood glucose this morning was 220. Reviewed patient current regimen: Lantus 34 units HS, Jardiance 10 mg daily, Humalog 5 units with meals, and Humalog correctional insulin.  Patient would likely benefit from an increase in basal insulin as fasting glucose is not at goal.  Patient would also likely benefit from an increase in prandial insulin to help offset hyperglycemia during the day related to caloric intake.  Provider updated via Consult A Doctor regarding recommendations and patient glycemic control.  New orders received for Lantus 40 units HS and Humalog 7 units with meals.

## 2024-01-03 NOTE — CARE COORDINATION
1000: Discharge planning was discussed with the Interdisciplinary Team. The patient is pending a PICC line. He has been accepted at [facility] and is a potential discharge today.    RN CM sent to a message to the [facility] liaison and is pending a response on when they can admit the patient for services.     1032: Message received from the [facility] liaison. The facility declined the patient for services. Discharge planning was discussed with the attending MD.    1226: Phone call received from the liaison with [facility]. RN CM informed the liaison that acute rehabilitation is still recommended for this patient. She stated she will contact the patient's spouse to discuss possible home infusions once the patient has completed acute rehabilitation. The liasion stated she will call case management back later today.    1228: Phone call received from the [facility] liaison. She stated she is sending updated clinicals to the facility physician for review.    1324: Phone call received from the liaison with [facility]. She stated they can accept the patient today and he does not need to admit by a specific time today. The liaison confirmed they have the patient's IV antibiotics in stock. The primary nurse can call report to [phone], room 152. Discharge planning was discussed with the attending MD. The patient is still pending PICC placement. Per the MD, the patient is medically ready for discharge.    1344: RN CM met with the patient at the bedside and reviewed his discharge plan. He confirmed he is in agreement to discharge to [facility] today after his PICC line placement. RN CM provided him with his room number at the facility and encouraged him to ask questions. He verbalized understanding and agreement with the discharge plan.    1416: Discharge planning was discussed with the primary nurse.    1615: Discharge planning was discussed with the Charge nurse.

## 2024-01-15 ENCOUNTER — FOLLOWUP TELEPHONE ENCOUNTER (OUTPATIENT)
Dept: DIABETES SERVICES | Age: 74
End: 2024-01-15

## 2024-01-15 NOTE — TELEPHONE ENCOUNTER
Called pt regarding type 2 diabetes. Pt still in rehab. Will be discharged on 19th. Will call week of 29th.

## 2024-01-17 ENCOUNTER — OFFICE VISIT (OUTPATIENT)
Dept: ORTHOPEDIC SURGERY | Age: 74
End: 2024-01-17

## 2024-01-17 DIAGNOSIS — M00.872 ARTHRITIS OF LEFT ANKLE DUE TO OTHER BACTERIA (HCC): Primary | ICD-10-CM

## 2024-01-17 PROCEDURE — 99024 POSTOP FOLLOW-UP VISIT: CPT | Performed by: PHYSICIAN ASSISTANT

## 2024-01-17 NOTE — PROGRESS NOTES
[facility]        Office Visit: [id number]        Patient ID:  Name: Macho Tong  AGE/Gender: 73 y.o. male  MRN: [id number]  : 1950    Date of Service: 2024      ALLERGIES:   Allergies   Allergen Reactions    Codeine Itching    Sulfamethoxazole Itching     Other reaction(s): Itching      Sulfamethoxazole-Trimethoprim Itching    Trimethoprim Itching     Other reaction(s): Itching            S/P :I & D left ankle    History:  The patient is seen today for follow-up wound check.  The patient  underwent I & D by Dr. Villagran at [facility].  They have no other complaints or concerns: The patient was discharged to rehab from the hospital.  Cultures grew out light pseumdomonas aerguinosa. He is on IV antibiotics and has appt to see ID next week      Physical Exam:       General:  On exam the patient is a pleasant 73 y.o. male in no acute distress, A&O x 3.   Left ankle: This incision is healing there is swelling consistent with the postoperative time frame.           Assessment and Plan:   The left ankle incision is healing.  I remove the 2 sutures today and place Steri-Strips.  He can begin light range of motion exercises with the ankle and does not need to be in the walker boot at this time but certainly can put this back on if he starts to experience more pain or if drainage returns we need to see him back sooner otherwise will see him in 2 weeks for follow up.     Electronically signed by:   ABHIJIT Lopez  2024,  1:09 PM
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
## 2024-01-31 ENCOUNTER — OFFICE VISIT (OUTPATIENT)
Dept: ORTHOPEDIC SURGERY | Age: 74
End: 2024-01-31

## 2024-01-31 DIAGNOSIS — M00.872 ARTHRITIS OF LEFT ANKLE DUE TO OTHER BACTERIA (HCC): Primary | ICD-10-CM

## 2024-01-31 PROCEDURE — 99024 POSTOP FOLLOW-UP VISIT: CPT | Performed by: PHYSICIAN ASSISTANT

## 2024-01-31 NOTE — PROGRESS NOTES
[facility]          Patient ID:  Name: Macho Tong  AGE/Gender: 73 y.o. male  MRN: [id number]  : 1950    Date of Consultation:  2024          ALLERGIES:   Allergies   Allergen Reactions    Codeine Itching    Sulfamethoxazole Itching     Other reaction(s): Itching      Sulfamethoxazole-Trimethoprim Itching    Trimethoprim Itching     Other reaction(s): Itching        S/P :I & D left ankle     History:  The patient is seen today for follow-up. The patient  underwent I & D by Dr. Villagran at [facility].  They have no complaints or concerns: The patient is home now.  Cultures grew out light pseumdomonas aerguinosa. He has finished his antibiotics.         Physical Exam:         General:  On exam the patient is a pleasant 73 y.o. male in no acute distress, A&O x 3.   Left ankle: This incision is healed. Good dorsiflexion and plantarflexion of the left ankle.         Assessment and Plan:   The left ankle incision is healed.  He is doing well with guarded left ankle.  However resume his normal activities.  He can follow-up with us on as-needed basis.  Of note he has a Charcot foot on the right foot and has a brace on.  He says that what ever has happened with his left foot his right foot is not bothering him at all like it normally does.    Time spent in preparation, chart review, and direct patient care was 15 minutes    Electronically signed by:   ABHIJIT Lopez  2024,  1:34 PM

## 2024-03-05 ENCOUNTER — FOLLOWUP TELEPHONE ENCOUNTER (OUTPATIENT)
Dept: DIABETES SERVICES | Age: 74
End: 2024-03-05

## 2024-03-11 ENCOUNTER — TELEPHONE (OUTPATIENT)
Dept: DIABETES SERVICES | Age: 74
End: 2024-03-11

## 2024-03-11 NOTE — TELEPHONE ENCOUNTER
Called about DM education.  Patient has a stomach bug and his Kimberly went off telling them he was low.  IT was in the 60's mg/dl.  She gave him apple juice with sugar and got it up to 114 mg/dl.  Instructed to keep close eye on him and make sure can wake him up and watch blood sugars and make sure okay with stomach bug. Reports new medication too making him sleep well. She will watch him and have him call back to schedule DM education.

## 2024-03-19 ENCOUNTER — HOSPITAL ENCOUNTER (OUTPATIENT)
Dept: PHYSICAL THERAPY | Age: 74
Setting detail: RECURRING SERIES
Discharge: HOME | End: 2024-03-22
Payer: MEDICARE

## 2024-03-19 DIAGNOSIS — R26.89 LOSS OF BALANCE: ICD-10-CM

## 2024-03-19 DIAGNOSIS — M25.60 JOINT STIFFNESS OF SPINE: ICD-10-CM

## 2024-03-19 DIAGNOSIS — M62.81 MUSCLE WEAKNESS (GENERALIZED): Primary | ICD-10-CM

## 2024-03-19 PROCEDURE — 97162 PT EVAL MOD COMPLEX 30 MIN: CPT

## 2024-03-19 PROCEDURE — 97110 THERAPEUTIC EXERCISES: CPT

## 2024-03-19 NOTE — THERAPY EVALUATION
indicated for this patient functional mobility deficits for stretching to functional stance then ambulation.      Problem List: (Impacting functional limitations):   1. Decreased Strength affecting function  Decreased ADL/Functional Activities  Decreased Flexibility/joint mobility  Increased Pain affecting function  Decreased Activity tolerance   5.  Increased Fatigue affecting function  6. Edema   7. Soft Tissue Trigger Point/Tightness or guarding   8.  Debility and decreased strength.     Therapy Prognosis:   Therapy Prognosis: Good     Initial Assessment Complexity:   Decision Making: MEDIUM Complexity        PLAN   Effective Dates: 3/19/24 TO Plan of Care/Certification Expiration Date: 6/17/24  Frequency/Duration: up to 2 x's per week until goals met, reassessment or discharge      Interventions Planned (Treatment may consist of any combination of the following):         Current Treatment Recommendations: -- (. Gait Training 4. Home Exercise Program (HEP) 5. Neuromuscular Re-education/Strengthening 6. Therapeutic Activites 7. Therapeutic Exercise/Strengthening 10. Manual Therapy 11. Hot pack / Cold Pack12. Dry Needling   Goals: (Goals have been discussed and agreed upon with patient.)  Short Term Goals (45 days )  Patient will report <2/10 pain with ambulation >10 minutes and normalized gait pattern.  Patient  will be able to sit >10 minutes with <2/10 pain and no radicular symptoms in order to return to baseline.  Patient will be able to stand>10 minutes with <2/10 pain and no radicular symptoms in order to return to baseline and standing in approximate neutral extension.     Long Term Goals (90 days)  Patient will be able to walk >=25 minutes with <=1/10 pain and normalized gait pattern with a cane and neutral trunk approximate gait pattern.  Patient Will be able to negotiate stairs independently with <=1/10 pain and reciprocal patterning.  Patient will improve LEFS by 11 points to improve functional tolerance

## 2024-03-19 NOTE — PROGRESS NOTES
Macho Tong  : 1950  Primary: Medicare Part A And B (Medicare)  Secondary: [employer] MEDICARE SUPP [facility] @ [address] [place] [address]  Phone: [phone]  Fax: [phone] Plan Frequency: up to 2 x's per week until goals met, reassessment or discharge    Plan of Care/Certification Expiration Date: 24        Plan of Care/Certification Expiration Date:  Plan of Care/Certification Expiration Date: 24    Frequency/Duration:   Plan Frequency: up to 2 x's per week until goals met, reassessment or discharge      Time In/Out:   Time In: 1345  Time Out: 1430      PT Visit Info:    Total # of Visits Approved: 999  Total # of Visits to Date: 1  Progress Note Counter: 1      Visit Count:  1    OUTPATIENT PHYSICAL THERAPY:   Treatment Note 3/19/2024       Episode  (Lower Back Pain and setting of metabolic syndrome)               Treatment Diagnosis:    Muscle weakness (generalized)  Loss of balance  Joint stiffness of spine  Medical/Referring Diagnosis:    Low back pain, unspecified    Referring Physician:  Marco Michele Jr., MD MD Orders:  PT Eval and Treat   Return MD Appt:    Future Appointments   Date Time Provider Department Center   3/21/2024  1:45 PM Letha Suh, PTA [facility]   3/25/2024  1:45 PM Letha Suh PTA [facility]   3/28/2024  4:00 PM Gato Javier, PT [facility]   2024  3:45 PM Carlitos Masters MD [id number]   2024  1:45 PM Gato Javier, PT [facility]   2024  1:45 PM ConcepcionGato hsuon, PT [facility]   2024  1:45 PM ConcepcionGato hsuon, PT [facility]   2024  1:45 PM ConcepcionGato hsuon, PT [facility]   2024  1:45 PM ConcepcionGato hsu, PT [facility]   2024  1:45 PM Letha Suh, PTA [facility]   2024  1:45 PM Gato Javier, PT [facility]   2024  1:45 PM Gato Javier, PT [facility]   2024  1:45 PM

## 2024-03-21 ENCOUNTER — HOSPITAL ENCOUNTER (OUTPATIENT)
Dept: PHYSICAL THERAPY | Age: 74
Setting detail: RECURRING SERIES
Discharge: HOME | End: 2024-03-24
Payer: MEDICARE

## 2024-03-21 PROCEDURE — 97110 THERAPEUTIC EXERCISES: CPT

## 2024-03-21 NOTE — PROGRESS NOTES
Macho Tong  : 1950  Primary: Medicare Part A And B (Medicare)  Secondary: [employer] HE[employer] MEDICARE SUPP [facility] @ [address] [place] [address]  Phone: [phone]  Fax: [phone] Plan Frequency: up to 2 x's per week until goals met, reassessment or discharge    Plan of Care/Certification Expiration Date: 24        Plan of Care/Certification Expiration Date:  Plan of Care/Certification Expiration Date: 24    Frequency/Duration:   Plan Frequency: up to 2 x's per week until goals met, reassessment or discharge      Time In/Out:   Time In: 0145  Time Out: 0      PT Visit Info:    Total # of Visits Approved: 999  Total # of Visits to Date: 1  Progress Note Counter: 1      Visit Count:  2    OUTPATIENT PHYSICAL THERAPY:   Treatment Note 3/21/2024       Episode  (Lower Back Pain and setting of metabolic syndrome)               Treatment Diagnosis:    No data found  Medical/Referring Diagnosis:    Low back pain, unspecified    Referring Physician:  Marco Michele Jr., MD MD Orders:  PT Eval and Treat   Return MD Appt:    Future Appointments   Date Time Provider Department Center   3/25/2024  1:45 PM Letha Suh, PTA [facility]   3/28/2024  4:00 PM Gato Javier, PT [facility]   2024  3:45 PM Carlitos Masters MD [id number] GVL AMB   2024  1:45 PM ConcepcionGato hsu Anand, PT [facility]   2024  1:45 PM [place] h[place] Anand, PT [facility]   2024  1:45 PM ConcepcionGato Anand, PT [facility]   2024  1:45 PM ConcepcionGato Anand, PT [facility]   2024  1:45 PM ConcepcionGato hsu Anand, PT [facility]   2024  1:45 PM Letha Suh, PTA [facility]   2024  1:45 PM ConcepcionGato hsuon, PT [facility]   2024  1:45 PM Gato Javier, PT DOEOST SFO   2024  1:45 PM Gato Javier, SEAN LEAVITT SFO   2024  1:30 PM [facility] ECHO 63 UCDG GVL AMB   2024  2:00 PM

## 2024-03-25 ENCOUNTER — HOSPITAL ENCOUNTER (OUTPATIENT)
Dept: PHYSICAL THERAPY | Age: 74
Setting detail: RECURRING SERIES
End: 2024-03-25
Payer: MEDICARE

## 2024-03-25 NOTE — PROGRESS NOTES
Macho Tong  : 1950  Primary: Medicare Part A And B  Secondary: [employer] HE[employer] C[employer] MEDICARE SUPP [facility] @ [facility]  [address] [facility] [place] [address]  Phone: [phone]  Fax: [phone] Plan Frequency: up to 2 x's per week until goals met, reassessment or discharge    Plan of Care/Certification Expiration Date: 24      PT Visit Info:  Total # of Visits Approved: 999  Total # of Visits to Date: 1  Progress Note Counter: 1         OUTPATIENT PHYSICAL THERAPY 3/25/2024     Appt Desk   Episode   MyChart      Mr. Tong cancelled appointment due to schedule conflict.      Letha Suh PTA    Future Appointments   Date Time Provider Department Center   3/25/2024  1:45 PM Letha Suh PTA [facility]   3/28/2024  4:00 PM [place], Gato Anand, PT [facility]   2024  3:45 PM Carlitos Masters MD [id number] GVL AMB   2024  1:45 PM [place], Gato Anand, PT [facility]   2024  1:45 PM Con[place]pc[place], Gato Anand, PT [facility]   2024  1:45 PM Concepcion, Gato Anand, PT [facility]   2024  1:45 PM Concepcion, Gato Anand, PT [facility]   2024  1:45 PM Concepcion, Gato Anand, PT [facility]   2024  1:45 PM Letha Suh, PTA [facility]   2024  1:45 PM [place], Gato Anand, PT [facility]   2024  1:45 PM [place], Gato Anand, PT [facility]   2024  1:45 PM [place], Gato Anand, PT [facility]   2024  1:30 PM [facility] ECHO 63 UCDG GVL AMB   2024  2:00 PM Reggie Agustin MD UCDG GVL AMB

## 2024-03-28 ENCOUNTER — HOSPITAL ENCOUNTER (OUTPATIENT)
Dept: PHYSICAL THERAPY | Age: 74
Setting detail: RECURRING SERIES
Discharge: HOME | End: 2024-03-31
Payer: MEDICARE

## 2024-03-28 PROCEDURE — 97110 THERAPEUTIC EXERCISES: CPT

## 2024-03-28 NOTE — PROGRESS NOTES
Macho Tong  : 1950  Primary: Medicare Part A And B (Medicare)  Secondary: [employer] HE[employer] C[employer] MEDICARE SUPP [facility] @ [address] [place] [address]  Phone: [phone]  Fax: [phone] Plan Frequency: up to 2 x's per week until goals met, reassessment or discharge    Plan of Care/Certification Expiration Date: 24        Plan of Care/Certification Expiration Date:  Plan of Care/Certification Expiration Date: 24    Frequency/Duration:   Plan Frequency: up to 2 x's per week until goals met, reassessment or discharge      Time In/Out:   Time In: 1600  Time Out: 1655      PT Visit Info:    Total # of Visits Approved: 999  Total # of Visits to Date: 3  Progress Note Counter: 3      Visit Count:  3    OUTPATIENT PHYSICAL THERAPY:   Treatment Note 3/28/2024       Episode  (Lower Back Pain and setting of metabolic syndrome)               Treatment Diagnosis:    No data found  Medical/Referring Diagnosis:    Low back pain, unspecified    Referring Physician:  Marco Michele Jr., MD MD Orders:  PT Eval and Treat   Return MD Appt:    Future Appointments   Date Time Provider Department Center   2024  3:45 PM Carlitos Masters MD [id number] GVL AMB   2024  1:45 PM [place], Gato Anand, PT [facility]   2024  3:15 PM [place], Gato Anand, PT [facility]   2024  1:45 PM [place], Gato Anand, PT [facility]   2024  1:45 PM [place], Gato Anand, PT [facility]   2024  1:45 PM Con[place]pc[place], Gato Anand, PT [facility]   2024  1:45 PM Letha Suh, PTA [facility]   2024  1:45 PM Con[place]pc[place], Gato Anand, PT [facility]   2024  1:45 PM Concepc[place], Gato Anand, PT [facility]   2024  1:45 PM [place], Gato Anand, PT [facility] S[facility]   2024  1:30 PM [facility] ECHO 63 UCDG GVL AMB   2024  2:00 PM Reggie Agustin MD UCDG GVL AMB        Date of Onset:  Onset Date: 23 (acute on chronic)     Allergies:

## 2024-04-01 ENCOUNTER — OFFICE VISIT (OUTPATIENT)
Dept: UROLOGY | Age: 74
End: 2024-04-01
Payer: MEDICARE

## 2024-04-01 DIAGNOSIS — N40.1 BENIGN PROSTATIC HYPERPLASIA WITH URINARY FREQUENCY: ICD-10-CM

## 2024-04-01 DIAGNOSIS — N39.41 URGE URINARY INCONTINENCE: Primary | ICD-10-CM

## 2024-04-01 DIAGNOSIS — R35.0 BENIGN PROSTATIC HYPERPLASIA WITH URINARY FREQUENCY: ICD-10-CM

## 2024-04-01 LAB
BILIRUBIN, URINE, POC: NEGATIVE
BLOOD URINE, POC: NORMAL
GLUCOSE URINE, POC: >=1000
KETONES, URINE, POC: NEGATIVE
LEUKOCYTE ESTERASE, URINE, POC: NEGATIVE
NITRITE, URINE, POC: NEGATIVE
PH, URINE, POC: 5.5 (ref 4.6–8)
PROTEIN,URINE, POC: 30
PVR, POC: 0 CC
SPECIFIC GRAVITY, URINE, POC: 1.02 (ref 1–1.03)
URINALYSIS CLARITY, POC: NORMAL
URINALYSIS COLOR, POC: NORMAL
UROBILINOGEN, POC: NORMAL

## 2024-04-01 PROCEDURE — G8427 DOCREV CUR MEDS BY ELIG CLIN: HCPCS | Performed by: UROLOGY

## 2024-04-01 PROCEDURE — 1123F ACP DISCUSS/DSCN MKR DOCD: CPT | Performed by: UROLOGY

## 2024-04-01 PROCEDURE — 3017F COLORECTAL CA SCREEN DOC REV: CPT | Performed by: UROLOGY

## 2024-04-01 PROCEDURE — 81003 URINALYSIS AUTO W/O SCOPE: CPT | Performed by: UROLOGY

## 2024-04-01 PROCEDURE — 99213 OFFICE O/P EST LOW 20 MIN: CPT | Performed by: UROLOGY

## 2024-04-01 PROCEDURE — G8417 CALC BMI ABV UP PARAM F/U: HCPCS | Performed by: UROLOGY

## 2024-04-01 PROCEDURE — 1036F TOBACCO NON-USER: CPT | Performed by: UROLOGY

## 2024-04-01 PROCEDURE — 51798 US URINE CAPACITY MEASURE: CPT | Performed by: UROLOGY

## 2024-04-01 ASSESSMENT — ENCOUNTER SYMPTOMS: BACK PAIN: 0

## 2024-04-01 NOTE — PROGRESS NOTES
[facility]  [address] [address]  [phone]          Macho Tong  : 1950    Chief Complaint   Patient presents with    Follow-up          HPI     Macho Tong is a 73 y.o. male with a PMH of urinary incontinence who returns for follow up.      He has a PMH of DM (A1C 8.8) who was referred to clinic for urinary urgency, frequency, urge urinary incontinence.  He is on cardura + mirabegron 50 mg daily without improvement.  He changed to cardura + gemtesa 2023.     Today, he reports doing much better on cardura + gemtesa combination.  He would like refills.  NO side effects.  UUI happens mostly when waiting too long to void and trying to get out of his chair.  He has mobility issues.  He did have normal RBUS 2023.       He has no history of  surgeries.  No hematuria.  No urinary retention.  No Utis.       PVR: 27     IPSS: 10 down form 14    Lab Results   Component Value Date    PSA 0.2 2020    PSA 0.2 2019       Past Medical History:   Diagnosis Date    Adverse effect of anesthesia     unable to have spinal anesthesia when had TKR    Asthma dx childhood    last attack during high school; advair at hs; albuterol prn; neb prn     Atrial fibrillation (HCC)     2 epiosode of A-fib last about 6-8 months ago (noted on 17); on Eliquis     Cervical spondylosis 2013    Chronic back pain     RIGHT SIDE WITH UNCLEAR ETIOLOGY    Chronic kidney disease     Chronic pain     Colon polyps     COPD (chronic obstructive pulmonary disease) (HCC) 2016    Coronary atherosclerosis of native coronary vessel 2016: Mild non-obstructive CAD    Diabetes (HCC) dx     type 2; oral meds; no bs checks at home    Diverticulosis     Edema 2016    Former smoker     GERD (gastroesophageal reflux disease)      controlled with med    High cholesterol     History of kidney stones     Hypertension     controlled with med

## 2024-04-02 ENCOUNTER — HOSPITAL ENCOUNTER (OUTPATIENT)
Dept: PHYSICAL THERAPY | Age: 74
Setting detail: RECURRING SERIES
Discharge: HOME | End: 2024-04-05
Payer: MEDICARE

## 2024-04-02 PROCEDURE — 97110 THERAPEUTIC EXERCISES: CPT

## 2024-04-02 ASSESSMENT — ENCOUNTER SYMPTOMS
CONSTIPATION: 0
INDIGESTION: 0
ABDOMINAL PAIN: 0
EYE DISCHARGE: 0
COUGH: 0
WHEEZING: 0
BLOOD IN STOOL: 0
VOMITING: 0
HEARTBURN: 0
EYE PAIN: 0
DIARRHEA: 0
SHORTNESS OF BREATH: 0
SKIN LESIONS: 0
NAUSEA: 0

## 2024-04-02 NOTE — PROGRESS NOTES
Macho Tong  : 1950  Primary: Medicare Part A And B (Medicare)  Secondary: [employer] HE[employer] C[employer] MEDICARE SUPP [facility] @ [address] [place] [address]  Phone: [phone]  Fax: [phone] Plan Frequency: up to 2 x's per week until goals met, reassessment or discharge    Plan of Care/Certification Expiration Date: 24        Plan of Care/Certification Expiration Date:  Plan of Care/Certification Expiration Date: 24    Frequency/Duration:   Plan Frequency: up to 2 x's per week until goals met, reassessment or discharge      Time In/Out:   Time In: 1350  Time Out: 1430      PT Visit Info:    Total # of Visits Approved: 999  Total # of Visits to Date: 4  Progress Note Counter: 4      Visit Count:  4    OUTPATIENT PHYSICAL THERAPY:   Treatment Note 2024       Episode  (Lower Back Pain and setting of metabolic syndrome)               Treatment Diagnosis:    No data found  Medical/Referring Diagnosis:    Low back pain, unspecified    Referring Physician:  Marco Michele Jr., MD MD Orders:  PT Eval and Treat   Return MD Appt:    Future Appointments   Date Time Provider Department Center   2024  3:15 PM ConcepcionGato hsuon, PT [facility] S[facility]   2024  1:45 PM [place] PT [facility] S[facility]   2024  1:45 PM Gato Javieron, PT [facility] SFO   2024  1:45 PM Gato Javieron, PT [facility] SFO   2024  1:45 PM Letha Suh, PTA [facility] SFO   2024  1:45 PM ConcepcionGato Anand, PT [facility] SFO   2024  1:45 PM Gato Javieron, PT [facility]   2024  1:45 PM Letha Suh, PTA [facility]   [facility]   2024  2:00 PM Reggie Agustin MD DG GVL AMB   10/7/2024  3:15 PM Carlitos Masters MD [id number] GVL AMB        Date of Onset:  Onset Date: 23 (acute on chronic)     Allergies:   Codeine, Sulfamethoxazole, Sulfamethoxazole-trimethoprim,

## 2024-04-04 ENCOUNTER — HOSPITAL ENCOUNTER (OUTPATIENT)
Dept: PHYSICAL THERAPY | Age: 74
Setting detail: RECURRING SERIES
Discharge: HOME | End: 2024-04-07
Payer: MEDICARE

## 2024-04-04 PROCEDURE — 97110 THERAPEUTIC EXERCISES: CPT

## 2024-04-04 NOTE — PROGRESS NOTES
Macho Tong  : 1950  Primary: Medicare Part A And B (Medicare)  Secondary: [employer] HE[employer] C[employer] MEDICARE SUPP [facility] @ [address] [place] [address]  Phone: [phone]  Fax: [phone] Plan Frequency: up to 2 x's per week until goals met, reassessment or discharge    Plan of Care/Certification Expiration Date: 24        Plan of Care/Certification Expiration Date:  Plan of Care/Certification Expiration Date: 24    Frequency/Duration:   Plan Frequency: up to 2 x's per week until goals met, reassessment or discharge      Time In/Out:   Time In: 1530 (15 mins late)  Time Out: 1610      PT Visit Info:    Total # of Visits Approved: 999  Total # of Visits to Date: 5  Progress Note Counter: 5      Visit Count:  5    OUTPATIENT PHYSICAL THERAPY:   Treatment Note 2024       Episode  (Lower Back Pain and setting of metabolic syndrome)               Treatment Diagnosis:    No data found  Medical/Referring Diagnosis:    Low back pain, unspecified    Referring Physician:  Marco Michele Jr., MD MD Orders:  PT Eval and Treat   Return MD Appt:    Future Appointments   Date Time Provider Department Center   2024  1:45 PM Gato Javier, PT [facility]   2024  1:45 PM Gato Javier, PT [facility]   2024  1:45 PM Gato Javier, PT [facility]   2024  1:45 PM Letha Suh, PTA [facility]   2024  1:45 PM Gato Javier, PT [facility] S[facility]   2024  1:45 PM Gato Javier, PT [facility] SFO   2024  1:45 PM Letha Suh, PTA [facility] SFO   2024  1:30 PM [facility] ECHO 63 UCDG GVL AMB   2024  2:00 PM Reggie Agustin MD UCDG GVL AMB   10/7/2024  3:15 PM Carlitos Masters MD [id number] GVL AMB        Date of Onset:  Onset Date: 23 (acute on chronic)     Allergies:   Codeine, Sulfamethoxazole, Sulfamethoxazole-trimethoprim, and Trimethoprim  Restrictions/Precautions:

## 2024-04-09 ENCOUNTER — HOSPITAL ENCOUNTER (OUTPATIENT)
Dept: PHYSICAL THERAPY | Age: 74
Setting detail: RECURRING SERIES
Discharge: HOME | End: 2024-04-12
Payer: MEDICARE

## 2024-04-09 PROCEDURE — 97110 THERAPEUTIC EXERCISES: CPT

## 2024-04-09 NOTE — PROGRESS NOTES
Macho Tong  : 1950  Primary: Medicare Part A And B (Medicare)  Secondary: [employer]ARP HE[employer] CARE MEDICARE SUPP [facility] @ [address] [place] [address]  Phone: [phone]  Fax: [phone] Plan Frequency: up to 2 x's per week until goals met, reassessment or discharge    Plan of Care/Certification Expiration Date: 24        Plan of Care/Certification Expiration Date:  Plan of Care/Certification Expiration Date: 24    Frequency/Duration:   Plan Frequency: up to 2 x's per week until goals met, reassessment or discharge      Time In/Out:   Time In: 1345  Time Out: 1430      PT Visit Info:    Total # of Visits Approved: 999  Total # of Visits to Date: 6  Progress Note Counter: 6      Visit Count:  6    OUTPATIENT PHYSICAL THERAPY:   Treatment Note 2024       Episode  (Lower Back Pain and setting of metabolic syndrome)               Treatment Diagnosis:    No data found  Medical/Referring Diagnosis:    Low back pain, unspecified    Referring Physician:  Marco Michele Jr., MD MD Orders:  PT Eval and Treat   Return MD Appt:    Future Appointments   Date Time Provider Department Center   2024  1:45 PM Gato Javier, PT [facility]   2024  1:45 PM Gato Javier, PT [facility]   2024  1:45 PM Letha Suh, PTA [facility] SFO   2024  1:45 PM Gato Javier, PT [facility] SFO   2024  1:45 PM Gato Javier, PT [facility] SFO   2024  1:45 PM Letha Suh, PTA [facility] SFO   2024  1:30 PM [facility] ECHO 63 UCDG GVL AMB   2024  2:00 PM Reggie Agustin MD UCDG GVL AMB   10/7/2024  3:15 PM Carlitos Masters MD [id number] GVL AMB        Date of Onset:  Onset Date: 23 (acute on chronic)     Allergies:   Codeine, Sulfamethoxazole, Sulfamethoxazole-trimethoprim, and Trimethoprim  Restrictions/Precautions:   Fall Precautions: and right ankle AFO.         Interventions Planned

## 2024-04-11 ENCOUNTER — HOSPITAL ENCOUNTER (OUTPATIENT)
Dept: PHYSICAL THERAPY | Age: 74
Setting detail: RECURRING SERIES
End: 2024-04-11
Payer: MEDICARE

## 2024-04-11 NOTE — PROGRESS NOTES
Macho DAPHNE Tong  : 1950  Primary: Medicare Part A And B  Secondary: [employer]ARP HE[employer] CARE MEDICARE SUPP [facility] @ [address]  [place] [address]  Phone: [phone]  Fax: [phone] Plan Frequency: up to 2 x's per week until goals met, reassessment or discharge    Plan of Care/Certification Expiration Date: 24      PT Visit Info:  Total # of Visits Approved: 999  Total # of Visits to Date: 6  Progress Note Counter: 6         OUTPATIENT PHYSICAL THERAPY 2024     Appt Desk   Episode   MyChart      Mr. Tong was a same-day cancellation for today's appointment.  Right foot pain.      Cancellation Number:         Gato Javier, PT    Future Appointments   Date Time Provider Department Center   2024  1:45 PM Gato Javier, PT [facility] S[facility]   2024  1:45 PM Letha Suh, PTA [facility] S[facility]   2024  1:45 PM Gato Javier, PT [facility] SFO   2024  1:45 PM Gato Javier, PT [facility]ST SFO   2024  1:45 PM Letha Suh, PTA [facility] SFO   2024  1:30 PM [facility] ECHO 63 UCDG GVL AMB   2024  2:00 PM Reggie Agustin MD UCDG GVL AMB   10/7/2024  3:15 PM Carlitos Masters MD [id number] GVL AMB

## 2024-04-16 ENCOUNTER — HOSPITAL ENCOUNTER (OUTPATIENT)
Dept: PHYSICAL THERAPY | Age: 74
Setting detail: RECURRING SERIES
Discharge: HOME | End: 2024-04-19
Payer: MEDICARE

## 2024-04-16 PROCEDURE — 97110 THERAPEUTIC EXERCISES: CPT

## 2024-04-16 NOTE — PROGRESS NOTES
Note    Subjective Comments:improving mobility and standing today.   Initial Pain Level::     2 /10 lumbar region.  Post Session Pain Level:        2/10 in the lumbar region   Medications Last Reviewed:  4/16/2024  Updated Objective Findings:  None Today  Treatment   THERAPEUTIC EXERCISE: (40 minutes):    Exercises per grid below to improve mobility, strength, balance, and coordination.  Required minimal visual, verbal, and manual cues to promote proper body alignment, promote proper body posture, and promote proper body mechanics.  Progressed resistance, range, and repetitions as indicated.   Date:4/16/24    Activity/Exercise Parameters    Sit to and from stand less hands  4 x's surface raised higher     Sit to stand with erect postural training against freemotion arms  7x's longer time period count      Ambulation with the 2 wheel rolling walker  200 feet .    Erect and longer steps.        Ambulation to car and transfers with erect posture  1 x'    Lean back on the physioball with torso stretch and isometric flexion and extension of the trunk 5 mins     Seated shoulder press      Wall push ups      Seated bicep curls      Weighted ball overhead      Weighted ball push outs      Weighted ball trunk rotation     Hamstring stretch seated  5 mins left     Postural training erect trunk and knee extension  10 mins           NuStep  Skilled trunk reciprocal/rotational mobility  Neural coupling (rebuilding/encouraging neural synergy and pathways)  To address muscular imbalance with progressive resistance Unable with foot and ankle pain.           Access Code: [email or web address]  URL: [email or web address]  Date: 03/19/2024  Prepared by: Armin Ceredo    Exercises  - Standing Gluteal Set with Overhead Arm Raise and Wall Slide  - 3 x daily - 5 x weekly - 3 sets - 10 reps - 5 seconds hold  - Standing Quad Set  - 3 x daily - 5 x weekly - 3 sets - 10 reps - 5 hold  - Standing Gluteal Sets  - 3 x daily - 5 x weekly - 3

## 2024-04-18 ENCOUNTER — HOSPITAL ENCOUNTER (OUTPATIENT)
Dept: PHYSICAL THERAPY | Age: 74
Setting detail: RECURRING SERIES
Discharge: HOME | End: 2024-04-21
Payer: MEDICARE

## 2024-04-18 PROCEDURE — 97110 THERAPEUTIC EXERCISES: CPT

## 2024-04-18 NOTE — PROGRESS NOTES
Macho Tong  : 1950  Primary: Medicare Part A And B (Medicare)  Secondary: [employer]ARP HE[employer] CARE MEDICARE SUPP [facility] @ [address]  [place] [address]  Phone: [phone]  Fax: [phone] Plan Frequency: up to 2 x's per week until goals met, reassessment or discharge    Plan of Care/Certification Expiration Date: 24        Plan of Care/Certification Expiration Date:  Plan of Care/Certification Expiration Date: 24    Frequency/Duration:   Plan Frequency: up to 2 x's per week until goals met, reassessment or discharge      Time In/Out:   Time In: 145  Time Out: 225      PT Visit Info:    Total # of Visits Approved: 999  Total # of Visits to Date: 7  Progress Note Counter: 7  Canceled Appointment: 1      Visit Count:  8    OUTPATIENT PHYSICAL THERAPY:   Treatment Note 2024       Episode  (Lower Back Pain and setting of metabolic syndrome)               Treatment Diagnosis:    No data found  Medical/Referring Diagnosis:    Low back pain, unspecified    Referring Physician:  Marco Michele Jr., MD MD Orders:  PT Eval and Treat   Return MD Appt:    Future Appointments   Date Time Provider Department Center   2024  1:45 PM Gato Javier, PT [facility] SFO   2024  1:45 PM Gato Javier, PT [facility]ST SFO   2024  1:45 PM Letha Suh, MANDA SFOST SFO   2024  1:30 PM [facility] ECHO 63 UCDG GVL AMB   2024  2:00 PM Reggie Agustin MD UCDG GVL AMB   10/7/2024  3:15 PM Carlitos Masters MD [id number] GVL AMB        Date of Onset:  Onset Date: 23 (acute on chronic)     Allergies:   Codeine, Sulfamethoxazole, Sulfamethoxazole-trimethoprim, and Trimethoprim  Restrictions/Precautions:   Fall Precautions: and right ankle AFO.         Interventions Planned (Treatment may consist of any combination of the following):     See Assessment Note    Subjective Comments: Patient reports feeling about the

## 2024-04-23 ENCOUNTER — HOSPITAL ENCOUNTER (OUTPATIENT)
Dept: PHYSICAL THERAPY | Age: 74
Setting detail: RECURRING SERIES
Discharge: HOME | End: 2024-04-26
Payer: MEDICARE

## 2024-04-23 PROCEDURE — 97110 THERAPEUTIC EXERCISES: CPT

## 2024-04-23 NOTE — PROGRESS NOTES
much at home \"been busy\" has some exudate    Initial Pain Level::     2 /10 lumbar region.  Post Session Pain Level:        2/10 in the lumbar region   Medications Last Reviewed:  4/23/2024  Updated Objective Findings:  None Today  Treatment   THERAPEUTIC EXERCISE: (38 minutes):    Exercises per grid below to improve mobility, strength, balance, and coordination.  Required minimal visual, verbal, and manual cues to promote proper body alignment, promote proper body posture, and promote proper body mechanics.  Progressed resistance, range, and repetitions as indicated.   Date:4/23/24    Activity/Exercise Parameters    Sit to and from stand less hands  4 x's surface raised higher     Sit to stand with erect postural training against freemotion arms  7x's longer time period count      Ambulation with the 2 wheel rolling walker  200 feet .    Erect and longer steps.        Ambulation to car and transfers with erect posture  1 x'    Lean back on the physioball with torso stretch and isometric flexion and extension of the trunk 5 mins hip flexor stretching.       Seated shoulder press      Wall push ups      Seated bicep curls      Weighted ball overhead      Weighted ball push outs      Weighted ball trunk rotation     Hamstring stretch seated      Postural training erect trunk and knee extension  10 mins           NuStep  Skilled trunk reciprocal/rotational mobility  Neural coupling (rebuilding/encouraging neural synergy and pathways)  To address muscular imbalance with progressive resistance Unable with foot and ankle pain.           Access Code: [email or web address]  URL: [email or web address]  Date: 03/19/2024  Prepared by: Armin Concepcion    Exercises  - Standing Gluteal Set with Overhead Arm Raise and Wall Slide  - 3 x daily - 5 x weekly - 3 sets - 10 reps - 5 seconds hold  - Standing Quad Set  - 3 x daily - 5 x weekly - 3 sets - 10 reps - 5 hold  - Standing Gluteal Sets  - 3 x daily - 5 x weekly - 3 sets - 10

## 2024-04-25 ENCOUNTER — HOSPITAL ENCOUNTER (OUTPATIENT)
Dept: PHYSICAL THERAPY | Age: 74
Setting detail: RECURRING SERIES
Discharge: HOME | End: 2024-04-28
Payer: MEDICARE

## 2024-04-25 PROCEDURE — 97110 THERAPEUTIC EXERCISES: CPT

## 2024-04-25 PROCEDURE — 97164 PT RE-EVAL EST PLAN CARE: CPT

## 2024-04-29 NOTE — PROGRESS NOTES
Mcaho Tong  : 1950  Primary: Medicare Part A And B (Medicare)  Secondary: [employer] HE[employer] C[employer] MEDICARE SUPP [facility] @ [address] [place] [address]  Phone: [phone]  Fax: [phone] Plan Frequency: up to 2 x's per week until goals met, reassessment or discharge    Plan of Care/Certification Expiration Date: 24        Plan of Care/Certification Expiration Date:  Plan of Care/Certification Expiration Date: 24    Frequency/Duration: Plan Frequency: up to 2 x's per week until goals met, reassessment or discharge        Time In/Out:   Time In: 1430  Time Out: 1515      PT Visit Info:    Total # of Visits Approved: 999  Total # of Visits to Date: 10  Progress Note Counter: 1  Canceled Appointment: 1      Visit Count:  10                OUTPATIENT PHYSICAL THERAPY:             Progress Report 2024               Episode (Lower Back Pain and setting of metabolic syndrome)         Treatment Diagnosis:     Muscle weakness (generalized)  Loss of balance  Joint stiffness of spine  Medical/Referring Diagnosis:    Low back pain, unspecified    Referring Physician:  Marco Michele Jr., MD MD Orders:  PT Eval and Treat   Return MD Appt:    Future Appointments   Date Time Provider Department Center   2024  1:45 PM Letha Osullivan, PT [facility]ST SFO   2024  1:30 PM [facility] ECHO 63 UCDG GVL AMB   2024  2:00 PM Reggie Agustin MD UCDG GVL AMB   10/7/2024  3:15 PM Carlitos Masters MD [id number] GVL AMB       Date of Onset:  Onset Date: 23 (acute on chronic)   acute on chronic   Allergies:  Codeine, Sulfamethoxazole, Sulfamethoxazole-trimethoprim, and Trimethoprim  Restrictions/Precautions:    Fall Precautions: and right ankle brace for CHARCOT foot.          Medications Last Reviewed:  2024     SUBJECTIVE   History of Injury/Illness (Reason for Referral):  Multiple issues and recent hospitalization stemming

## 2024-04-29 NOTE — PROGRESS NOTES
Macho Tong  : 1950  Primary: Medicare Part A And B (Medicare)  Secondary: [employer] HE[employer] CARE MEDICARE SUPP [facility] @ [address] [place] [address]  Phone: [phone]  Fax: [phone] Plan Frequency: up to 2 x's per week until goals met, reassessment or discharge    Plan of Care/Certification Expiration Date: 24        Plan of Care/Certification Expiration Date:  Plan of Care/Certification Expiration Date: 24    Frequency/Duration:   Plan Frequency: up to 2 x's per week until goals met, reassessment or discharge      Time In/Out:   Time In: 1430  Time Out: 1515      PT Visit Info:    Total # of Visits Approved: 999  Total # of Visits to Date: 10  Progress Note Counter: 1  Canceled Appointment: 1      Visit Count:  10    OUTPATIENT PHYSICAL THERAPY:   Treatment Note 2024       Episode  (Lower Back Pain and setting of metabolic syndrome)         PROGRESS NOTE NEXT VISIT       Treatment Diagnosis:     Muscle weakness (generalized)  Loss of balance  Joint stiffness of spine  Medical/Referring Diagnosis:    Low back pain, unspecified    Referring Physician:  Marco Michele Jr., MD MD Orders:  PT Eval and Treat   Return MD Appt:    Future Appointments   Date Time Provider Department Center   2024  1:45 PM Letha Osullivan, PT [facility]ST SFO   2024  1:30 PM [facility] ECHO 63 UCDG GVL AMB   2024  2:00 PM Reggie Agustin MD UCDG GVL AMB   10/7/2024  3:15 PM Carlitos Masters MD [id number] GVL AMB        Date of Onset:  Onset Date: 23 (acute on chronic)     Allergies:   Codeine, Sulfamethoxazole, Sulfamethoxazole-trimethoprim, and Trimethoprim  Restrictions/Precautions:   Fall Precautions: and right ankle AFO.         Interventions Planned (Treatment may consist of any combination of the following):     See Assessment Note    Subjective Comments: not doing much at home \"been busy\" has some exudate    Initial Pain

## 2024-04-30 ENCOUNTER — HOSPITAL ENCOUNTER (OUTPATIENT)
Dept: PHYSICAL THERAPY | Age: 74
Setting detail: RECURRING SERIES
Discharge: HOME | End: 2024-05-03
Payer: MEDICARE

## 2024-04-30 PROCEDURE — 97110 THERAPEUTIC EXERCISES: CPT

## 2024-04-30 NOTE — PROGRESS NOTES
Macho Tong  : 1950  Primary: Medicare Part A And B (Medicare)  Secondary: [employer] HE[employer] C[employer] MEDICARE SUPP [facility] @ [address] [place]  [facility] [address]  Phone: [phone]  Fax: [phone] Plan Frequency: up to 2 x's per week until goals met, reassessment or discharge    Plan of Care/Certification Expiration Date: 24        Plan of Care/Certification Expiration Date:  Plan of Care/Certification Expiration Date: 24    Frequency/Duration:   Plan Frequency: up to 2 x's per week until goals met, reassessment or discharge      Time In/Out:   Time In: 1515  Time Out: 1555      PT Visit Info:    Total # of Visits Approved: 999  Total # of Visits to Date: 11  Progress Note Counter: 2  Canceled Appointment: 1      Visit Count:  11    OUTPATIENT PHYSICAL THERAPY:   Treatment Note 2024       Episode  (Lower Back Pain and setting of metabolic syndrome)               Treatment Diagnosis:     Muscle weakness (generalized)  Loss of balance  Joint stiffness of spine  Medical/Referring Diagnosis:    Low back pain, unspecified    Referring Physician:  Marco Michele Jr., MD MD Orders:  PT Eval and Treat   Return MD Appt:    Future Appointments   Date Time Provider Department Center   2024  1:30 PM [facility] ECHO 63 DG GVL AMB   2024  1:45 PM Gato Javier, PT SFOST SFO   2024  2:30 PM Gato Javier, PT [facility] SFO   2024  2:00 PM Reggie Agustin MD UCDG GVL AMB   2024  2:30 PM Gato Javier, PT [facility] SFO   2024  1:45 PM Gato Javier, PT [facility] S[facility]   2024  1:45 PM Letha Osullivan, PT [facility] S[facility]   2024  3:15 PM Letha Suh, PTA [facility] SFO   2024  1:45 PM Gato Javier, PT [facility] S[facility]   10/7/2024  3:15 PM Carlitos Masters MD [id number] GVL AMB        Date of Onset:  Onset Date: 23 (acute on chronic)     Allergies:   Codeine, Sulfamethoxazole,

## 2024-05-02 ENCOUNTER — HOSPITAL ENCOUNTER (OUTPATIENT)
Dept: PHYSICAL THERAPY | Age: 74
Setting detail: RECURRING SERIES
Discharge: HOME | End: 2024-05-05
Payer: MEDICARE

## 2024-05-02 PROCEDURE — 97110 THERAPEUTIC EXERCISES: CPT

## 2024-05-02 NOTE — PROGRESS NOTES
Anand Javier, SEAN         Charge Capture  [employer] Portal  Appt Desk     Future Appointments   Date Time Provider Department Center   5/7/2024  1:45 PM Gato Javier, PT [facility]ST SFO   5/14/2024  2:30 PM Gato Javier, PT [facility] SFO   5/16/2024  2:00 PM Reggie Agustin MD UCDG GVL AMB   5/16/2024  2:30 PM Gato Javier, PT [facility] SFO   5/21/2024  1:45 PM Gato Javier, PT [facility] SFO   5/23/2024  1:45 PM Letha Osullivan, PT [facility] SFO   5/28/2024  3:15 PM Letha Suh, PTA [facility] SFO   5/30/2024  1:45 PM Gato Javier, PT [facility] SFO   10/7/2024  3:15 PM Carlitos Masters MD [id number] GVL AMB

## 2024-05-07 ENCOUNTER — HOSPITAL ENCOUNTER (OUTPATIENT)
Dept: PHYSICAL THERAPY | Age: 74
Setting detail: RECURRING SERIES
End: 2024-05-07
Payer: MEDICARE

## 2024-05-07 ENCOUNTER — FOLLOWUP TELEPHONE ENCOUNTER (OUTPATIENT)
Dept: DIABETES SERVICES | Age: 74
End: 2024-05-07

## 2024-05-07 NOTE — PROGRESS NOTES
Macho Tong  : 1950  Primary: Medicare Part A And B  Secondary: [employer]ARP HE[employer] CARE MEDICARE SUPP [facility] @ [address] [place] [address]  Phone: [phone]  Fax: [phone] Plan Frequency: up to 2 x's per week until goals met, reassessment or discharge    Plan of Care/Certification Expiration Date: 24      PT Visit Info:  Total # of Visits Approved: 999  Total # of Visits to Date: 12  Progress Note Counter: 3  Canceled Appointment: 1         OUTPATIENT PHYSICAL THERAPY 2024     Appt Desk   Episode   MyChart      Mr. Tong was a same-day cancellation for today's appointment.     Cancellation Number:         Gato Javier, PT    Future Appointments   Date Time Provider Department Center   2024  2:30 PM Gato Javier, PT [facility]   2024  2:00 PM Reggie Agustin MD UCDG GVL AMB   2024  2:30 PM Gato Javier, PT [facility] SFO   2024 11:00 AM Gato Javier, PT [facility]ST SFO   2024  1:00 PM Smith Jorge, RN SFODTR SFO   2024  1:45 PM Letha Osullivan, PT [facility]ST SFO   2024  3:15 PM Letha Suh, PTA [facility] SFO   2024  1:45 PM Gato Jvaier, PT [facility]ST SFO   10/7/2024  3:15 PM Carlitos Masters MD [id number] GVL AMB

## 2024-05-14 ENCOUNTER — HOSPITAL ENCOUNTER (OUTPATIENT)
Dept: PHYSICAL THERAPY | Age: 74
Setting detail: RECURRING SERIES
Discharge: HOME | End: 2024-05-17
Payer: MEDICARE

## 2024-05-14 PROCEDURE — 97110 THERAPEUTIC EXERCISES: CPT

## 2024-05-14 NOTE — PROGRESS NOTES
Macho Tong  : 1950  Primary: Medicare Part A And B (Medicare)  Secondary: [employer] HE[employer] C[employer] MEDICARE SUPP [facility] @ [address] [place] [address]  Phone: [phone]  Fax: [phone] Plan Frequency: up to 2 x's per week until goals met, reassessment or discharge    Plan of Care/Certification Expiration Date: 24        Plan of Care/Certification Expiration Date:  Plan of Care/Certification Expiration Date: 24    Frequency/Duration:   Plan Frequency: up to 2 x's per week until goals met, reassessment or discharge      Time In/Out:   Time In: 1430  Time Out: 1515      PT Visit Info:    Total # of Visits Approved: 999  Total # of Visits to Date: 13  Progress Note Counter: 4  Canceled Appointment: 2      Visit Count:  13    OUTPATIENT PHYSICAL THERAPY:   Treatment Note 2024       Episode  (Lower Back Pain and setting of metabolic syndrome)               Treatment Diagnosis:     Muscle weakness (generalized)  Loss of balance  Joint stiffness of spine  Medical/Referring Diagnosis:    Low back pain, unspecified    Referring Physician:  Marco Michele Jr., MD MD Orders:  PT Eval and Treat   Return MD Appt:    Future Appointments   Date Time Provider Department Center   2024  2:00 PM Reggie Agustin MD UCDG GVL AMB   2024  7:00 PM Gato Javier, PT SFOST SFO   2024 11:00 AM Gato Javier, PT [facility]ST SFO   2024  1:00 PM Smith Jorge, RN SFODTR SFO   2024  1:45 PM Letha Osullivan, PT S[facility]ST SFO   2024  3:15 PM Letha Suh, PTA [facility]ST SFO   2024  1:45 PM Gato Javier, PT [facility]ST SFO   10/7/2024  3:15 PM Carlitos Masters MD [id number] GVL AMB        Date of Onset:  Onset Date: 23 (acute on chronic)     Allergies:   Codeine, Sulfamethoxazole, Sulfamethoxazole-trimethoprim, and Trimethoprim  Restrictions/Precautions:   Fall Precautions: and right ankle AFO.

## 2024-05-16 ENCOUNTER — HOSPITAL ENCOUNTER (OUTPATIENT)
Dept: PHYSICAL THERAPY | Age: 74
Setting detail: RECURRING SERIES
End: 2024-05-16
Payer: MEDICARE

## 2024-05-16 ENCOUNTER — OFFICE VISIT (OUTPATIENT)
Age: 74
End: 2024-05-16
Payer: MEDICARE

## 2024-05-16 VITALS
HEIGHT: 67 IN | DIASTOLIC BLOOD PRESSURE: 68 MMHG | BODY MASS INDEX: 47.77 KG/M2 | HEART RATE: 92 BPM | SYSTOLIC BLOOD PRESSURE: 104 MMHG

## 2024-05-16 DIAGNOSIS — I25.10 ATHEROSCLEROSIS OF NATIVE CORONARY ARTERY OF NATIVE HEART WITHOUT ANGINA PECTORIS: ICD-10-CM

## 2024-05-16 DIAGNOSIS — I50.20 HFREF (HEART FAILURE WITH REDUCED EJECTION FRACTION) (HCC): Chronic | ICD-10-CM

## 2024-05-16 DIAGNOSIS — R80.9 TYPE 2 DIABETES MELLITUS WITH MICROALBUMINURIA, WITH LONG-TERM CURRENT USE OF INSULIN (HCC): ICD-10-CM

## 2024-05-16 DIAGNOSIS — Z79.4 TYPE 2 DIABETES MELLITUS WITH MICROALBUMINURIA, WITH LONG-TERM CURRENT USE OF INSULIN (HCC): ICD-10-CM

## 2024-05-16 DIAGNOSIS — I87.8 VENOUS STASIS: ICD-10-CM

## 2024-05-16 DIAGNOSIS — I10 ESSENTIAL HYPERTENSION: ICD-10-CM

## 2024-05-16 DIAGNOSIS — I48.0 PAROXYSMAL ATRIAL FIBRILLATION (HCC): Primary | ICD-10-CM

## 2024-05-16 DIAGNOSIS — E66.01 OBESITY, MORBID (HCC): ICD-10-CM

## 2024-05-16 DIAGNOSIS — G47.33 OSA (OBSTRUCTIVE SLEEP APNEA): ICD-10-CM

## 2024-05-16 DIAGNOSIS — E78.00 PURE HYPERCHOLESTEROLEMIA: ICD-10-CM

## 2024-05-16 DIAGNOSIS — E11.29 TYPE 2 DIABETES MELLITUS WITH MICROALBUMINURIA, WITH LONG-TERM CURRENT USE OF INSULIN (HCC): ICD-10-CM

## 2024-05-16 PROCEDURE — 3074F SYST BP LT 130 MM HG: CPT | Performed by: INTERNAL MEDICINE

## 2024-05-16 PROCEDURE — 3078F DIAST BP <80 MM HG: CPT | Performed by: INTERNAL MEDICINE

## 2024-05-16 PROCEDURE — 99214 OFFICE O/P EST MOD 30 MIN: CPT | Performed by: INTERNAL MEDICINE

## 2024-05-16 PROCEDURE — 1123F ACP DISCUSS/DSCN MKR DOCD: CPT | Performed by: INTERNAL MEDICINE

## 2024-05-16 PROCEDURE — 3017F COLORECTAL CA SCREEN DOC REV: CPT | Performed by: INTERNAL MEDICINE

## 2024-05-16 PROCEDURE — 2022F DILAT RTA XM EVC RTNOPTHY: CPT | Performed by: INTERNAL MEDICINE

## 2024-05-16 PROCEDURE — 3046F HEMOGLOBIN A1C LEVEL >9.0%: CPT | Performed by: INTERNAL MEDICINE

## 2024-05-16 PROCEDURE — G8428 CUR MEDS NOT DOCUMENT: HCPCS | Performed by: INTERNAL MEDICINE

## 2024-05-16 PROCEDURE — G8417 CALC BMI ABV UP PARAM F/U: HCPCS | Performed by: INTERNAL MEDICINE

## 2024-05-16 PROCEDURE — 1036F TOBACCO NON-USER: CPT | Performed by: INTERNAL MEDICINE

## 2024-05-16 ASSESSMENT — ENCOUNTER SYMPTOMS
ABDOMINAL PAIN: 0
SHORTNESS OF BREATH: 0
COUGH: 0
BACK PAIN: 1

## 2024-05-16 NOTE — PROGRESS NOTES
Disease Brother 65        STENT HEART    Heart Disease Father         BOWEL OBSTRUCTION    Heart Attack Father 89        MI    Hypertension Father     Malig Hypertherm Neg Hx     Pseudochol. Deficiency Neg Hx     Delayed Awakening Neg Hx     Post-op Nausea/Vomiting Neg Hx     Emergence Delirium Neg Hx     Post-op Cognitive Dysfunction Neg Hx      Social History     Tobacco Use    Smoking status: Former     Current packs/day: 0.00     Average packs/day: 1.5 packs/day for 41.5 years (62.3 ttl pk-yrs)     Types: Cigarettes, Pipe, Cigars     Start date: 10/1/1970     Quit date: 2012     Years since quittin.1    Smokeless tobacco: Never   Substance Use Topics    Alcohol use: Yes     Alcohol/week: 1.0 standard drink of alcohol     Types: 1 Cans of beer per week     Comment: I drink but very seldom       Review Of Symptoms    Review of Systems   Constitutional: Negative for fever and malaise/fatigue.   HENT:  Negative for nosebleeds.    Cardiovascular:  Negative for chest pain, dyspnea on exertion and palpitations.   Respiratory:  Negative for cough and shortness of breath.    Musculoskeletal:  Positive for back pain and muscle weakness. Negative for muscle cramps and myalgias.   Gastrointestinal:  Negative for abdominal pain.   Neurological:  Negative for dizziness.        Physical Exam  Blood pressure 104/68, pulse 92, height 1.702 m (5' 7\").  Physical Exam  HENT:      Head: Normocephalic and atraumatic.   Eyes:      Extraocular Movements: Extraocular movements intact.   Cardiovascular:      Rate and Rhythm: Normal rate and regular rhythm.      Heart sounds: No murmur heard.  Pulmonary:      Effort: Pulmonary effort is normal.      Breath sounds: Normal breath sounds.   Abdominal:      Palpations: Abdomen is soft.   Musculoskeletal:         General: Normal range of motion.   Skin:     General: Skin is warm and dry.   Neurological:      General: No focal deficit present.      Mental Status: He is oriented to

## 2024-05-21 ENCOUNTER — APPOINTMENT (OUTPATIENT)
Dept: PHYSICAL THERAPY | Age: 74
End: 2024-05-21
Payer: MEDICARE

## 2024-05-21 ENCOUNTER — HOSPITAL ENCOUNTER (OUTPATIENT)
Dept: PHYSICAL THERAPY | Age: 74
Setting detail: RECURRING SERIES
Discharge: HOME | End: 2024-05-24
Payer: MEDICARE

## 2024-05-21 PROCEDURE — 97110 THERAPEUTIC EXERCISES: CPT

## 2024-05-21 NOTE — PROGRESS NOTES
Macho Tong  : 1950  Primary: Medicare Part A And B (Medicare)  Secondary: [employer]ARP HE[employer] C[employer] MEDICARE SUPP [facility] @ [address] [place] [address]  Phone: [phone]  Fax: [phone] Plan Frequency: up to 2 x's per week until goals met, reassessment or discharge    Plan of Care/Certification Expiration Date: 24        Plan of Care/Certification Expiration Date:  Plan of Care/Certification Expiration Date: 24    Frequency/Duration:   Plan Frequency: up to 2 x's per week until goals met, reassessment or discharge      Time In/Out:   Time In: 1100  Time Out: 1155      PT Visit Info:    Total # of Visits Approved: 999  Total # of Visits to Date: 14  Progress Note Counter: 5  Canceled Appointment: 2      Visit Count:  14    OUTPATIENT PHYSICAL THERAPY:   Treatment Note 2024       Episode  (Lower Back Pain and setting of metabolic syndrome)               Treatment Diagnosis:     Muscle weakness (generalized)  Loss of balance  Joint stiffness of spine  Medical/Referring Diagnosis:    Low back pain, unspecified    Referring Physician:  Marco Michele Jr., MD MD Orders:  PT Eval and Treat   Return MD Appt:    Future Appointments   Date Time Provider Department Center   2024  3:15 PM Letha Suh, PTA [facility]ST SFO   2024  1:45 PM Gato Javier, PT [facility]FOST SFO   10/7/2024  3:15 PM Carlitos Masters MD [id number] GVL AMB   2024  2:00 PM Reggie Agustin MD UCDG GVL AMB        Date of Onset:  Onset Date: 23 (acute on chronic)     Allergies:   Codeine, Sulfamethoxazole, Sulfamethoxazole-trimethoprim, and Trimethoprim  Restrictions/Precautions:   Fall Precautions: and right ankle AFO.         Interventions Planned (Treatment may consist of any combination of the following):     See Assessment Note    Subjective Comments: I am trying to stand taller and walk straighter.  Can I have some theraband for home.

## 2024-05-23 ENCOUNTER — APPOINTMENT (OUTPATIENT)
Dept: PHYSICAL THERAPY | Age: 74
End: 2024-05-23
Payer: MEDICARE

## 2024-05-28 ENCOUNTER — HOSPITAL ENCOUNTER (OUTPATIENT)
Dept: PHYSICAL THERAPY | Age: 74
Setting detail: RECURRING SERIES
End: 2024-05-28
Payer: MEDICARE

## 2024-05-28 NOTE — PROGRESS NOTES
Macho Tong  : 1950  Primary: Medicare Part A And B  Secondary: [employer]ARP HE[employer] CARE MEDICARE SUPP [facility] @ [address] [place] [address]  Phone: [phone]  Fax: [phone] Plan Frequency: up to 2 x's per week until goals met, reassessment or discharge    Plan of Care/Certification Expiration Date: 24      PT Visit Info:  Total # of Visits Approved: 999  Total # of Visits to Date: 14  Progress Note Counter: 5  Canceled Appointment: 2         OUTPATIENT PHYSICAL THERAPY 2024     Appt Desk   Episode   MyChart      Mr. Tong cancelled appointment due to a recent fall.       Letha Suh PTA    Future Appointments   Date Time Provider Department Center   2024  3:15 PM Letha Suh PTA [facility]FOST SFO   2024  1:45 PM Gato Javier, SEAN SFOST SFO   10/7/2024  3:15 PM Carlitos Masters MD [id number] GVL AMB   2024  2:00 PM Reggie Agustin MD UCDG GVL AMB

## 2024-05-30 ENCOUNTER — HOSPITAL ENCOUNTER (OUTPATIENT)
Dept: PHYSICAL THERAPY | Age: 74
Setting detail: RECURRING SERIES
End: 2024-05-30
Payer: MEDICARE

## 2024-06-24 ENCOUNTER — APPOINTMENT (RX ONLY)
Dept: URBAN - METROPOLITAN AREA CLINIC 329 | Facility: CLINIC | Age: 74
Setting detail: DERMATOLOGY
End: 2024-06-24

## 2024-06-24 DIAGNOSIS — L57.0 ACTINIC KERATOSIS: ICD-10-CM

## 2024-06-24 DIAGNOSIS — L21.8 OTHER SEBORRHEIC DERMATITIS: ICD-10-CM

## 2024-06-24 PROCEDURE — ? COUNSELING

## 2024-06-24 PROCEDURE — ? LIQUID NITROGEN

## 2024-06-24 PROCEDURE — 99214 OFFICE O/P EST MOD 30 MIN: CPT | Mod: 25

## 2024-06-24 PROCEDURE — 17003 DESTRUCT PREMALG LES 2-14: CPT

## 2024-06-24 PROCEDURE — ? PRESCRIPTION MEDICATION MANAGEMENT

## 2024-06-24 PROCEDURE — 17000 DESTRUCT PREMALG LESION: CPT

## 2024-06-24 PROCEDURE — ? PRESCRIPTION

## 2024-06-24 RX ORDER — MOMETASONE FUROATE 1 MG/G
OINTMENT TOPICAL
Qty: 45 | Refills: 6 | Status: ERX

## 2024-06-24 RX ORDER — NYSTATIN CREAM 100000 [USP'U]/G
CREAM TOPICAL
Qty: 60 | Refills: 10 | Status: ERX

## 2024-06-24 ASSESSMENT — LOCATION DETAILED DESCRIPTION DERM
LOCATION DETAILED: RIGHT CENTRAL ZYGOMA
LOCATION DETAILED: SUPERIOR MID FOREHEAD
LOCATION DETAILED: LEFT SUPERIOR LATERAL MALAR CHEEK
LOCATION DETAILED: DORSAL CORONA OF GLANS
LOCATION DETAILED: DORSAL CORONA OF GLANS
LOCATION DETAILED: RIGHT DORSAL SHAFT OF PENIS
LOCATION DETAILED: RIGHT DORSAL SHAFT OF PENIS

## 2024-06-24 ASSESSMENT — LOCATION SIMPLE DESCRIPTION DERM
LOCATION SIMPLE: LEFT CHEEK
LOCATION SIMPLE: PENIS
LOCATION SIMPLE: PENIS
LOCATION SIMPLE: RIGHT ZYGOMA
LOCATION SIMPLE: SUPERIOR FOREHEAD

## 2024-06-24 ASSESSMENT — LOCATION ZONE DERM
LOCATION ZONE: FACE
LOCATION ZONE: PENIS
LOCATION ZONE: PENIS

## 2024-06-24 NOTE — PROCEDURE: PRESCRIPTION MEDICATION MANAGEMENT
Detail Level: Zone
Plan: Bathe areas with tablespoon of vinegar to a cup of water at night three times a week and follow with cream
Render In Strict Bullet Format?: No
Continue Regimen: Mix Mometasone and nystatin together use once daily

## 2024-06-24 NOTE — HPI: EVALUATION OF SKIN LESION(S)
Endocrinology Follow up Visit     CC:   Chief Complaint   Patient presents with   • Other     Hypercalcemia    • Convey Results     Discuss lab results      This visit is being performed virtually via Video visit using CCM [employer].   Clinical Location: [facility] [place] [address]  Darby's location Home and is physically present in   the [facility] at the time of this visit.     Referring provider: Evens Becerra DO    HPI:  Darby Alexander is a 70 year old female with a history of hypertension, obesity, CKD 3A, history of non-Hodgkin lymphoma status postchemotherapy getting IVIG infusions, postherpetic neuralgia, mild persistent asthma here for follow up of hypercalcemia.     - no hx of nephrolithiasis or fracture.     - PET 4/2019 = hypermetabolic abdominal lymphadenopathy improving consistent with her treatment response from NHL  - 9/2021 - NL TSH  - 1/2022 = Ca 10.4, PTH 21, phos NL, Vit D 27.1   - mild elevation in serum Ca since 7/8/20   - US renal 1/2022 = no stones    Additional labs reviewed today   - 8/2022 = Vit D 22.6, TSH 1.5, Vit D 1,25 OHD NL, SPEP w/hypogammaglobulinemia, PTH 38, PTH-rp 2.7, elevated urine protein,     Most recent onc note reviewed - dated 9/23/22  - presented for follow up of NHL dx 2013, s/p chemo now in remission, on IVIG    - she stopped Tums and Vit D, and increased water intake. Still on hctz.     ROS: no chest pain, sob, nausea, polyuria, polydipsia.     Medications  Current Outpatient Medications   Medication Sig Dispense Refill   • lidocaine (LIDODERM) 5 % patch Place 1 patch onto the skin every 24 hours. Remove patch 12 hours after applying 90 patch 3   • allopurinol (ZYLOPRIM) 100 MG tablet Take 1 tablet by mouth daily. 90 tablet 3   • amLODIPine (NORVASC) 10 MG tablet Take 1 tablet by mouth daily. 90 tablet 3   • atorvastatin (LIPITOR) 20 MG tablet Take 1 tablet by mouth daily. 90 tablet 3   • gabapentin (NEURONTIN) 300 MG capsule Take 1 capsule by 
mouth at bedtime. 90 capsule 3   • losartan (COZAAR) 100 MG tablet Take 1 tablet by mouth nightly. 90 tablet 3   • triamterene-hydrochlorothiazide (MAXZIDE) 75-50 MG per tablet Take 1 tablet by mouth daily. 90 tablet 3   • erythromycin (ILOTYCIN) ophthalmic ointment nightly.     • cycloSPORINE (RESTASIS) 0.05 % ophthalmic emulsion      • albuterol (VENTOLIN) (2.5 MG/3ML) 0.083% nebulizer solution Take 3 mLs by nebulization every 6 hours as needed for Wheezing. 300 mL 0   • valACYclovir (VALTREX) 500 MG tablet TAKE 1 TABLET BY MOUTH DAILY 90 tablet 3   • triamcinolone (ARISTOCORT) 0.1 % ointment APPLY TO THE AFFECTED AREAS TWICE DAILY 80 g 0     No current facility-administered medications for this visit.     Histories:  PMH: as above     Past Surgical History:   Procedure Laterality Date   • ------------other-------------      EUS with biopsy in 2018 with GI   • Colonoscopy      3/2017 at [place]   • Lymph node biopsy      in neck     Family History   Problem Relation Age of Onset   • Hypertension Mother    • Myocardial Infarction Father    • Hypertension Sister         x6 sisters all with HTN   • Myocardial Infarction Brother    • Hypertension Brother    • Arrhythmia Brother    • Hypertension Brother    • Cancer, Breast Maternal Aunt    • Cancer, Breast Maternal Aunt    • Cervical cancer Neg Hx    • Cancer, Colon Neg Hx      Social History     Tobacco Use   • Smoking status: Never Smoker   • Smokeless tobacco: Never Used   Vaping Use   • Vaping Use: never used   Substance Use Topics   • Alcohol use: Never   • Drug use: Never     Physical examination  Visit Vitals  LMP  (LMP Unknown)   Exam limited due to virtual encounter  GEN: NAD, a/o x 3  HEENT: normocephalic, atraumatic   Resp: breathing comfortably without use of accessory muscles   Neuro: speech coherent  Psych: euthymic  Skin: no facial rash     Labs:   Latest Reference Range & Units 06/28/22 14:56 08/10/22 14:11 08/29/22 14:57   Creatinine 0.51 - 0.95 mg/dL 
1.46 (H) 1.95 (H) 1.27 (H)   CALCIUM 8.4 - 10.2 mg/dL 11.0 (H) 11.6 (H) 9.8   ALK PHOSPHATASE 45 - 117 Units/L 113 108 116   Albumin 3.6 - 5.1 g/dL 3.8 4.0 3.9   Vitamin D 1 25 Dihydroxy 19.9 - 79.3 pg/mL  66.1    VITAMIND, 25 HYDROXY 30.0 - 100.0 ng/mL  22.6 (L)    PARATHYROID HORMONE 19 - 88 pg/mL   38   PTH Related Peptide 0.0 - 3.4 pmol/L   2.7   TSH 0.350 - 5.000 mcUnits/mL  1.585      A/P:    Reviewed outside onc notes, labs, urine studies.     Hypercalcemia  Most recent serum Ca improved to NL, though previously it was was up to 11.6 and not clearly parathyroid dependent  PTH rp/1,25OHD NL  hypogammaglobuibnemia on SPEP and elevated urine protein on UPEP  As serum Ca has now normalized but abnormal SPEP/UPEP, advised follow up w/onc and primary care. 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    Plan:  - as serum Ca has improved to NL w/cessation of tums, recommend CTM (labs ordered by onc), if recurrent hypercalcemia, may return to endo clinic.     Thank you, Evens Becerra DO , for allowing me to participate in the care of this patient.     I spent a total of 20 minutes on the day of the visit.  This includes pre-charting, chart review, documenting and obtaining/reviewing outside records.    
Hpi Title: Evaluation of a Skin Lesion
Additional History: Pt has lesion on his R cheek that he said Dr. Weir said to come back for if it was still there in a few months. It’s not bothering him at all but he’s just following instructions. He’s noticed one on the other side too.

## 2024-08-06 ENCOUNTER — TELEPHONE (OUTPATIENT)
Dept: ORTHOPEDIC SURGERY | Age: 74
End: 2024-08-06

## 2024-08-06 NOTE — TELEPHONE ENCOUNTER
Dr. Beckwith referred him to a doctor at [facility] but he couldn't go today because he had a stomach virus. He called today to reschedule the appointment and the appointment girl at [facility] told him he needs to see a podiatrist rather than the doctor that Dr. Beckwith sent him to. Please call to discuss.

## 2024-08-20 ENCOUNTER — CLINICAL DOCUMENTATION (OUTPATIENT)
Age: 74
End: 2024-08-20

## 2024-08-27 ENCOUNTER — OFFICE VISIT (OUTPATIENT)
Dept: ORTHOPEDIC SURGERY | Age: 74
End: 2024-08-27

## 2024-08-27 DIAGNOSIS — M00.872 ARTHRITIS OF LEFT ANKLE DUE TO OTHER BACTERIA (HCC): ICD-10-CM

## 2024-08-27 DIAGNOSIS — M14.671 CHARCOT ANKLE, RIGHT: Primary | ICD-10-CM

## 2024-08-27 NOTE — PROGRESS NOTES
Name: Macho Tong  YOB: 1950  Gender: male  MRN: [id number]    Summary:   Right ankle valgus deformity with Charcot hindfoot.  At this point he does not have recurrent wounds as only had 1 wound.  I think it very wise to consider nonoperative management with his double upright AFO brace.  If he has issues he can always get read done.  If he continues to have wounds or has recurrent wound issues or intense pain he would either need a below the knee potation or an attempted limb salvage with a tibiotalar calcaneal gulshan.  At this point he is leaning towards a tibiotalar calcaneal gulshan if ever needed.    I will see him back on a as needed basis if he develops new wounds or if he has intense pain.  We can discuss alternative steroids, versus tibiotalar calcaneal gulshan     CC: Right ankle wound/deformity    HPI: Macho Tong is a 73 y.o. male who presents today with a chronic Charcot ankle and hindfoot with in a severe valgus deformity.  He has a history of a left septic ankle which I saw him for in the hospital in the past.  Regarding his right ankle, which he is here for today, he describes no pain but his deformity with severe valgus make wearing his double upright brace problematic.  He has worn a blister/wound on the inside of his ankle but he has had his brace refitted and it is getting better.  He presents to me today as a significant to see if he is a has any surgical options.      ROS/Meds/PSH/PMH/FH/SH:   Patient Denies fever/chills, headache, visual changes, chest pain, shortness of breath, and nausea/vomiting/diarrhea     Tobacco:  reports that he quit smoking about 12 years ago. His smoking use included cigarettes, pipe, and cigars. He started smoking about 53 years ago. He has a 62.3 pack-year smoking history. He has never used smokeless tobacco.  Lab Results   Component Value Date    LABA1C 8.8 (A) 10/23/2023       Physical Examination:  Gen: AAOx3 NAD    right lower:   Some neuro  changes:  . toes wwp w/ BCR <2s.  No open wounds.  No pain with calf squeeze.  Severe valgus deformity of the ankle.  Although there is no open wounds there is obvious tented skin medially.  Achilles Contracture noted on silverskoid exam: With the hindfoot in neutral and forefoot supinated ankle dorsiflexion is diminished with knee in extension and with the knee at 90deg  Severe ankle valgus  This is a fixed deformity that is not passively correctable.    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 confirmed w/ Manilla-Weinsten 5.07 monofilament    Vascular: Bilateral DP and PT: No palpable pulse    Imaging:   Interpretation of imaging and   X-Ray RIGHT Ankle 3 vw (AP/Lateral/Oblique) for ankle pain   Findings: Severe Charcot ankle and subtalar joint with significant valgus deformity   Impression: Right hindfoot valgus   Signature: Tyrell Junior MD        X-Ray RIGHT Foot 2 vw (AP/Oblique) for foot pain   Findings: Obvious midfoot collapse with midfoot and forefoot arthritis and hindfoot deformity   Impression: Midfoot arthritis   Signature: Tyrell Junior MD        Differential Diagnosis:     Severe right Charcot ankle     Treatment Plan:   I had a thorough discussion with the patient regarding the Treatment Plan    Point he has no pain he has no wounds.  I think he is best served continuing his double upright orthotic.  Is been recommended to him by another orthopedic surgeon if he ever  has surgery he needs a BKA, however I do think an appropriate to consider a tibiotalar calcaneal fusion if he continues to get recurrent wounds or if his pain is uncontrolled.  Fortunately at this point we are the ones he just Trousseau I think bracing is his best option         Past Medical History:   Diagnosis Date    Adverse effect of anesthesia     unable to have spinal anesthesia when had TKR    Asthma dx childhood    last attack during high school; advair at hs; albuterol prn; neb prn     Atrial fibrillation

## 2024-10-17 ENCOUNTER — OFFICE VISIT (OUTPATIENT)
Dept: UROLOGY | Age: 74
End: 2024-10-17
Payer: MEDICARE

## 2024-10-17 DIAGNOSIS — N39.41 URGE URINARY INCONTINENCE: Primary | ICD-10-CM

## 2024-10-17 DIAGNOSIS — N40.1 BENIGN PROSTATIC HYPERPLASIA WITH URINARY FREQUENCY: ICD-10-CM

## 2024-10-17 DIAGNOSIS — R35.0 BENIGN PROSTATIC HYPERPLASIA WITH URINARY FREQUENCY: ICD-10-CM

## 2024-10-17 LAB
BILIRUBIN, URINE, POC: NEGATIVE
BLOOD URINE, POC: NORMAL
GLUCOSE URINE, POC: >=1000 MG/DL
KETONES, URINE, POC: NEGATIVE MG/DL
LEUKOCYTE ESTERASE, URINE, POC: NEGATIVE
NITRITE, URINE, POC: NEGATIVE
PH, URINE, POC: 5.5 (ref 4.6–8)
PROTEIN,URINE, POC: NEGATIVE MG/DL
SPECIFIC GRAVITY, URINE, POC: 1.01 (ref 1–1.03)
URINALYSIS CLARITY, POC: NORMAL
URINALYSIS COLOR, POC: NORMAL
UROBILINOGEN, POC: NORMAL MG/DL

## 2024-10-17 PROCEDURE — G8417 CALC BMI ABV UP PARAM F/U: HCPCS | Performed by: UROLOGY

## 2024-10-17 PROCEDURE — 81003 URINALYSIS AUTO W/O SCOPE: CPT | Performed by: UROLOGY

## 2024-10-17 PROCEDURE — 1123F ACP DISCUSS/DSCN MKR DOCD: CPT | Performed by: UROLOGY

## 2024-10-17 PROCEDURE — 3017F COLORECTAL CA SCREEN DOC REV: CPT | Performed by: UROLOGY

## 2024-10-17 PROCEDURE — 1036F TOBACCO NON-USER: CPT | Performed by: UROLOGY

## 2024-10-17 PROCEDURE — G8427 DOCREV CUR MEDS BY ELIG CLIN: HCPCS | Performed by: UROLOGY

## 2024-10-17 PROCEDURE — 99214 OFFICE O/P EST MOD 30 MIN: CPT | Performed by: UROLOGY

## 2024-10-17 PROCEDURE — G8484 FLU IMMUNIZE NO ADMIN: HCPCS | Performed by: UROLOGY

## 2024-10-17 ASSESSMENT — ENCOUNTER SYMPTOMS: NAUSEA: 0

## 2024-10-17 NOTE — PROGRESS NOTES
[facility]  [address] [place] [address]  [phone]    Macho Tong  : 1950    Chief Complaint   Patient presents with    Follow-up     HPI     Macho Tong is a 74 y.o. male with a PMH of urinary incontinence who returns for follow up.      He has a PMH of DM (A1C 8.8) who was referred to clinic for urinary urgency, frequency, urge urinary incontinence.  He was on cardura + mirabegron 50 mg daily without improvement.  He changed to cardura + gemtesa 2023 which helped initially but now is no longer helping. .     Today, he reports WORSE urgency, frequency, UUI despite cardura + gemtesa.  NO side effects.  UUI happens mostly when waiting too long to void and trying to get out of his chair.  He has mobility issues.  He did have normal RBUS 2023.       He has no history of  surgeries.  No hematuria.  No urinary retention.  No Utis.      PSA 0.12 (2024)     Lab Results   Component Value Date    PSA 0.2 2020    PSA 0.2 2019     Past Medical History:   Diagnosis Date    Adverse effect of anesthesia     unable to have spinal anesthesia when had TKR    Asthma dx childhood    last attack during high school; advair at hs; albuterol prn; neb prn     Atrial fibrillation (HCC)     2 epiosode of A-fib last about 6-8 months ago (noted on 17); on Eliquis     Cervical spondylosis 2013    Chronic back pain     RIGHT SIDE WITH UNCLEAR ETIOLOGY    Chronic kidney disease     Chronic pain     Colon polyps     COPD (chronic obstructive pulmonary disease) ([facility]) 2016    Coronary atherosclerosis of native coronary vessel 2016: Mild non-obstructive CAD    Diabetes (HCC) dx     type 2; oral meds; no bs checks at home    Diverticulosis     Edema 2016    Former smoker     GERD (gastroesophageal reflux disease)      controlled with med    HFrEF (heart failure with reduced ejection fraction) ([facility]) 2024    High cholesterol

## 2024-10-19 ASSESSMENT — ENCOUNTER SYMPTOMS
SHORTNESS OF BREATH: 0
BLOOD IN STOOL: 0
INDIGESTION: 0
ABDOMINAL PAIN: 0
EYE DISCHARGE: 0
EYE PAIN: 0
VOMITING: 0
COUGH: 0
SKIN LESIONS: 0
CONSTIPATION: 0
WHEEZING: 0
DIARRHEA: 0
HEARTBURN: 0
BACK PAIN: 0

## 2024-11-14 ENCOUNTER — PROCEDURE VISIT (OUTPATIENT)
Dept: UROLOGY | Age: 74
End: 2024-11-14

## 2024-11-14 ENCOUNTER — TELEPHONE (OUTPATIENT)
Dept: UROLOGY | Age: 74
End: 2024-11-14

## 2024-11-14 ENCOUNTER — OFFICE VISIT (OUTPATIENT)
Dept: UROLOGY | Age: 74
End: 2024-11-14
Payer: MEDICARE

## 2024-11-14 DIAGNOSIS — N39.41 URGE URINARY INCONTINENCE: ICD-10-CM

## 2024-11-14 DIAGNOSIS — R35.0 BENIGN PROSTATIC HYPERPLASIA WITH URINARY FREQUENCY: Primary | ICD-10-CM

## 2024-11-14 DIAGNOSIS — N40.1 BENIGN PROSTATIC HYPERPLASIA WITH URINARY FREQUENCY: Primary | ICD-10-CM

## 2024-11-14 DIAGNOSIS — N32.81 OVERACTIVE BLADDER: ICD-10-CM

## 2024-11-14 DIAGNOSIS — R39.9 LOWER URINARY TRACT SYMPTOMS: ICD-10-CM

## 2024-11-14 LAB
BILIRUBIN, URINE, POC: NEGATIVE
BLOOD URINE, POC: NORMAL
GLUCOSE URINE, POC: 500 MG/DL
KETONES, URINE, POC: NEGATIVE MG/DL
LEUKOCYTE ESTERASE, URINE, POC: NEGATIVE
NITRITE, URINE, POC: NEGATIVE
PH, URINE, POC: 5.5 (ref 4.6–8)
PROTEIN,URINE, POC: 100 MG/DL
SPECIFIC GRAVITY, URINE, POC: 1.02 (ref 1–1.03)
URINALYSIS CLARITY, POC: NORMAL
URINALYSIS COLOR, POC: NORMAL
UROBILINOGEN, POC: NORMAL MG/DL

## 2024-11-14 PROCEDURE — 76872 US TRANSRECTAL: CPT | Performed by: UROLOGY

## 2024-11-14 RX ORDER — OXYBUTYNIN CHLORIDE 10 MG/1
10 TABLET, EXTENDED RELEASE ORAL DAILY
Qty: 30 TABLET | Refills: 3 | Status: SHIPPED | OUTPATIENT
Start: 2024-11-14

## 2024-11-14 NOTE — TELEPHONE ENCOUNTER
----- Message from Dr. Carlitos Masters MD sent at 11/14/2024  3:54 PM EST -----  Regarding: Surgery Scheduling  Hospital: [facility]    Surgeon: Sonam    Assist: NONE    Diagnosis: Urge Urinary Incontinence    Procedure: Stage 1 Interstim    Posting time: 1 hour    Special Instruments Needed:  NONE    Anesthesia: MAC    Labs: CBC, BMP, U/A    Tests: EKG per anesthesia    Blood: NONE    Bowel Prep: NONE    Special Instructions: needs stage 2 interstim scheduled 2 weeks after stage 1.  Schedule at surgery center if we have date     Orders/HandP:     Follow up appointment: VANESSA

## 2024-11-14 NOTE — PROGRESS NOTES
[facility]  [address]   [address]  [place] [address]  [phone]    Macho Tong  : 1950         HPI   74 y.o., male who presents for cystoscopy for evaluation of LUTS.     He has a PMH of urge urinary incontinence.      He has a PMH of DM (A1C 8.8) who was referred to clinic for urinary urgency, frequency, urge urinary incontinence.  He was on cardura + mirabegron 50 mg daily without improvement.  He changed to cardura + gemtesa 2023 which helped initially but now is no longer helping. .     Today, he reports WORSE urgency, frequency, UUI despite cardura + gemtesa.  NO side effects.  UUI happens mostly when waiting too long to void and trying to get out of his chair.  He has mobility issues.  He did have normal RBUS 2023.       He has no history of  surgeries.  No hematuria.  No urinary retention.  No Utis.    PVR: 27 cc        PSA 0.12 (2024)     Lab Results   Component Value Date    PSA 0.2 2020    PSA 0.2 2019             Past Medical History:   Diagnosis Date    Adverse effect of anesthesia     unable to have spinal anesthesia when had TKR    Asthma dx childhood    last attack during high school; advair at hs; albuterol prn; neb prn     Atrial fibrillation (HCC)     2 epiosode of A-fib last about 6-8 months ago (noted on 17); on Eliquis     Cervical spondylosis 2013    Chronic back pain 2013    RIGHT SIDE WITH UNCLEAR ETIOLOGY    Chronic kidney disease     Chronic pain     Colon polyps     COPD (chronic obstructive pulmonary disease) ([facility]) 2016    Coronary atherosclerosis of native coronary vessel 2016: Mild non-obstructive CAD    Diabetes ([facility]) dx     type 2; oral meds; no bs checks at home    Diverticulosis     Edema 2016    Former smoker     GERD (gastroesophageal reflux disease)      controlled with med    HFrEF (heart failure with reduced ejection fraction) ([facility]) 2024    High cholesterol     History

## 2024-12-04 ENCOUNTER — APPOINTMENT (OUTPATIENT)
Dept: URBAN - METROPOLITAN AREA CLINIC 329 | Facility: CLINIC | Age: 74
Setting detail: DERMATOLOGY
End: 2024-12-04

## 2024-12-04 DIAGNOSIS — I83029 VARICOSE VEINS OF LOWER EXTREMITIES WITH ULCER: ICD-10-CM

## 2024-12-04 DIAGNOSIS — L82.1 OTHER SEBORRHEIC KERATOSIS: ICD-10-CM

## 2024-12-04 DIAGNOSIS — I83009 VARICOSE VEINS OF LOWER EXTREMITIES WITH ULCER: ICD-10-CM

## 2024-12-04 DIAGNOSIS — I83019 VARICOSE VEINS OF LOWER EXTREMITIES WITH ULCER: ICD-10-CM

## 2024-12-04 DIAGNOSIS — L57.8 OTHER SKIN CHANGES DUE TO CHRONIC EXPOSURE TO NONIONIZING RADIATION: ICD-10-CM

## 2024-12-04 PROBLEM — L98.419 NON-PRESSURE CHRONIC ULCER OF BUTTOCK WITH UNSPECIFIED SEVERITY: Status: ACTIVE | Noted: 2024-12-04

## 2024-12-04 PROCEDURE — ? ADDITIONAL NOTES

## 2024-12-04 PROCEDURE — ? COUNSELING

## 2024-12-04 PROCEDURE — 99214 OFFICE O/P EST MOD 30 MIN: CPT

## 2024-12-04 PROCEDURE — ? SUNSCREEN RECOMMENDATIONS

## 2024-12-04 PROCEDURE — ? PRESCRIPTION

## 2024-12-04 RX ORDER — OSTOMY SUPPLY 1"
EACH MISCELLANEOUS
Qty: 10 | Refills: 3 | Status: CANCELLED

## 2024-12-04 ASSESSMENT — LOCATION SIMPLE DESCRIPTION DERM
LOCATION SIMPLE: RIGHT UPPER BACK
LOCATION SIMPLE: RIGHT BUTTOCK
LOCATION SIMPLE: LEFT UPPER BACK

## 2024-12-04 ASSESSMENT — LOCATION DETAILED DESCRIPTION DERM
LOCATION DETAILED: LEFT MID-UPPER BACK
LOCATION DETAILED: RIGHT INFERIOR MEDIAL UPPER BACK
LOCATION DETAILED: RIGHT BUTTOCK
LOCATION DETAILED: RIGHT SUPERIOR LATERAL UPPER BACK

## 2024-12-04 ASSESSMENT — LOCATION ZONE DERM: LOCATION ZONE: TRUNK

## 2024-12-04 NOTE — PROCEDURE: ADDITIONAL NOTES
Additional Notes: Duoderm samples were given to the patient today.
Detail Level: Zone
Render Risk Assessment In Note?: no

## 2024-12-04 NOTE — HPI: EVALUATION OF SKIN LESION(S)
What Type Of Note Output Would You Prefer (Optional)?: Bullet Format
Hpi Title: Evaluation of Skin Lesions
Additional History: The pt has a sore lesion on his bottom. He reports that it is a contact sore.

## 2024-12-18 PROBLEM — N39.41 URGE URINARY INCONTINENCE: Status: ACTIVE | Noted: 2024-11-14

## 2024-12-18 NOTE — TELEPHONE ENCOUNTER
Procedures: Procedure(s):   STAGE 1 INTERSTIM   Date: 1/21/2025   Time: 0601   Location: [facility] OR      Scheduled stage 1.

## 2024-12-19 ENCOUNTER — PREP FOR PROCEDURE (OUTPATIENT)
Dept: UROLOGY | Age: 74
End: 2024-12-19

## 2024-12-19 DIAGNOSIS — N39.41 URGE URINARY INCONTINENCE: Primary | ICD-10-CM

## 2024-12-19 RX ORDER — SODIUM CHLORIDE 0.9 % (FLUSH) 0.9 %
5-40 SYRINGE (ML) INJECTION PRN
Status: CANCELLED | OUTPATIENT
Start: 2024-12-19

## 2024-12-19 RX ORDER — SODIUM CHLORIDE 9 MG/ML
INJECTION, SOLUTION INTRAVENOUS PRN
Status: CANCELLED | OUTPATIENT
Start: 2024-12-19

## 2024-12-19 RX ORDER — SODIUM CHLORIDE 0.9 % (FLUSH) 0.9 %
5-40 SYRINGE (ML) INJECTION EVERY 12 HOURS SCHEDULED
Status: CANCELLED | OUTPATIENT
Start: 2024-12-19

## 2024-12-19 NOTE — TELEPHONE ENCOUNTER
Procedures: Procedure(s):   STAGE 2 INTESTIM   Date: 1/28/2025   Time: 0850   Location: [facility] OP OR 05     Scheduled.  Please complete orders.

## 2025-01-06 ENCOUNTER — OFFICE VISIT (OUTPATIENT)
Age: 75
End: 2025-01-06
Payer: MEDICARE

## 2025-01-06 VITALS
DIASTOLIC BLOOD PRESSURE: 68 MMHG | HEART RATE: 85 BPM | SYSTOLIC BLOOD PRESSURE: 126 MMHG | HEIGHT: 67 IN | BODY MASS INDEX: 47.77 KG/M2

## 2025-01-06 DIAGNOSIS — E78.00 PURE HYPERCHOLESTEROLEMIA: ICD-10-CM

## 2025-01-06 DIAGNOSIS — I25.10 ATHEROSCLEROSIS OF NATIVE CORONARY ARTERY OF NATIVE HEART WITHOUT ANGINA PECTORIS: ICD-10-CM

## 2025-01-06 DIAGNOSIS — I50.20 HFREF (HEART FAILURE WITH REDUCED EJECTION FRACTION) (HCC): Chronic | ICD-10-CM

## 2025-01-06 DIAGNOSIS — I10 ESSENTIAL HYPERTENSION: ICD-10-CM

## 2025-01-06 DIAGNOSIS — I48.0 PAROXYSMAL ATRIAL FIBRILLATION (HCC): Primary | ICD-10-CM

## 2025-01-06 DIAGNOSIS — I50.22 CHRONIC SYSTOLIC CONGESTIVE HEART FAILURE (HCC): ICD-10-CM

## 2025-01-06 PROCEDURE — 3074F SYST BP LT 130 MM HG: CPT | Performed by: INTERNAL MEDICINE

## 2025-01-06 PROCEDURE — G8428 CUR MEDS NOT DOCUMENT: HCPCS | Performed by: INTERNAL MEDICINE

## 2025-01-06 PROCEDURE — 3017F COLORECTAL CA SCREEN DOC REV: CPT | Performed by: INTERNAL MEDICINE

## 2025-01-06 PROCEDURE — M1308 PR FLU IMMUNIZE NO ADMIN: HCPCS | Performed by: INTERNAL MEDICINE

## 2025-01-06 PROCEDURE — 3078F DIAST BP <80 MM HG: CPT | Performed by: INTERNAL MEDICINE

## 2025-01-06 PROCEDURE — G8417 CALC BMI ABV UP PARAM F/U: HCPCS | Performed by: INTERNAL MEDICINE

## 2025-01-06 PROCEDURE — 1036F TOBACCO NON-USER: CPT | Performed by: INTERNAL MEDICINE

## 2025-01-06 PROCEDURE — 99214 OFFICE O/P EST MOD 30 MIN: CPT | Performed by: INTERNAL MEDICINE

## 2025-01-06 PROCEDURE — 1126F AMNT PAIN NOTED NONE PRSNT: CPT | Performed by: INTERNAL MEDICINE

## 2025-01-06 PROCEDURE — 1123F ACP DISCUSS/DSCN MKR DOCD: CPT | Performed by: INTERNAL MEDICINE

## 2025-01-06 PROCEDURE — 93000 ELECTROCARDIOGRAM COMPLETE: CPT | Performed by: INTERNAL MEDICINE

## 2025-01-06 RX ORDER — ATORVASTATIN CALCIUM 40 MG/1
40 TABLET, FILM COATED ORAL DAILY
COMMUNITY
Start: 2024-12-17

## 2025-01-06 RX ORDER — INSULIN DEGLUDEC 100 U/ML
70 INJECTION, SOLUTION SUBCUTANEOUS NIGHTLY
COMMUNITY
Start: 2024-04-09

## 2025-01-06 RX ORDER — SEMAGLUTIDE 0.68 MG/ML
0.5 INJECTION, SOLUTION SUBCUTANEOUS
COMMUNITY
Start: 2024-12-11 | End: 2026-02-04

## 2025-01-06 RX ORDER — AMOXICILLIN 875 MG/1
875 TABLET, COATED ORAL 2 TIMES DAILY
COMMUNITY
Start: 2024-12-26

## 2025-01-06 ASSESSMENT — ENCOUNTER SYMPTOMS
ABDOMINAL PAIN: 0
COUGH: 0
SHORTNESS OF BREATH: 0
BACK PAIN: 1

## 2025-01-06 NOTE — PROGRESS NOTES
Hyperlipemia    Relevant Medications    atorvastatin (LIPITOR) 40 MG tablet     Other Visit Diagnoses       Chronic systolic congestive heart failure (HCC)        Relevant Medications    atorvastatin (LIPITOR) 40 MG tablet    Other Relevant Orders    Echo (TTE) complete (PRN contrast/bubble/strain/3D)          Medications Discontinued During This Encounter   Medication Reason    diphenhydrAMINE (BENADRYL) 50 MG capsule LIST CLEANUP    Fluticasone furoate-vilanterol (BREO ELLIPTA) 200-25 MCG/INH AEPB inhaler LIST CLEANUP    atorvastatin (LIPITOR) 20 MG tablet LIST CLEANUP    Dulaglutide (TRULICITY) 3 MG/0.5ML SOPN LIST CLEANUP               Patient has been instructed and agrees to call our office with any issues or other concerns related to their cardiac condition(s) and/or complaint(s).      Return in about 6 months (around 7/6/2025).       PATTI ANGELO MD  1/6/2025

## 2025-01-15 DIAGNOSIS — N39.41 URGE URINARY INCONTINENCE: ICD-10-CM

## 2025-01-16 RX ORDER — OXYBUTYNIN CHLORIDE 10 MG/1
10 TABLET, EXTENDED RELEASE ORAL DAILY
Qty: 90 TABLET | Refills: 1 | Status: SHIPPED | OUTPATIENT
Start: 2025-01-16

## 2025-02-10 ENCOUNTER — TELEPHONE (OUTPATIENT)
Dept: UROLOGY | Age: 75
End: 2025-02-10

## 2025-02-10 ENCOUNTER — OFFICE VISIT (OUTPATIENT)
Dept: UROLOGY | Age: 75
End: 2025-02-10
Payer: MEDICARE

## 2025-02-10 DIAGNOSIS — N39.41 URGE URINARY INCONTINENCE: Primary | ICD-10-CM

## 2025-02-10 DIAGNOSIS — R35.0 BENIGN PROSTATIC HYPERPLASIA WITH URINARY FREQUENCY: ICD-10-CM

## 2025-02-10 DIAGNOSIS — N32.81 OVERACTIVE BLADDER: ICD-10-CM

## 2025-02-10 DIAGNOSIS — N40.1 BENIGN PROSTATIC HYPERPLASIA WITH URINARY FREQUENCY: ICD-10-CM

## 2025-02-10 LAB
BILIRUBIN, URINE, POC: NEGATIVE
BLOOD URINE, POC: NORMAL
GLUCOSE URINE, POC: >=1000 MG/DL
KETONES, URINE, POC: NEGATIVE MG/DL
LEUKOCYTE ESTERASE, URINE, POC: NEGATIVE
NITRITE, URINE, POC: NEGATIVE
PH, URINE, POC: 5.5 (ref 4.6–8)
PROTEIN,URINE, POC: 100 MG/DL
SPECIFIC GRAVITY, URINE, POC: 1.01 (ref 1–1.03)
URINALYSIS CLARITY, POC: NORMAL
URINALYSIS COLOR, POC: NORMAL
UROBILINOGEN, POC: NORMAL MG/DL

## 2025-02-10 PROCEDURE — 1159F MED LIST DOCD IN RCRD: CPT | Performed by: UROLOGY

## 2025-02-10 PROCEDURE — 99214 OFFICE O/P EST MOD 30 MIN: CPT | Performed by: UROLOGY

## 2025-02-10 PROCEDURE — 81003 URINALYSIS AUTO W/O SCOPE: CPT | Performed by: UROLOGY

## 2025-02-10 PROCEDURE — G8427 DOCREV CUR MEDS BY ELIG CLIN: HCPCS | Performed by: UROLOGY

## 2025-02-10 PROCEDURE — 1123F ACP DISCUSS/DSCN MKR DOCD: CPT | Performed by: UROLOGY

## 2025-02-10 PROCEDURE — G8417 CALC BMI ABV UP PARAM F/U: HCPCS | Performed by: UROLOGY

## 2025-02-10 PROCEDURE — 3017F COLORECTAL CA SCREEN DOC REV: CPT | Performed by: UROLOGY

## 2025-02-10 PROCEDURE — 1036F TOBACCO NON-USER: CPT | Performed by: UROLOGY

## 2025-02-10 NOTE — TELEPHONE ENCOUNTER
----- Message from Dr. Carlitos Masters MD sent at 2/10/2025  3:19 PM EST -----  Regarding: Surgery Scheduling  Hospital: [facility]    Surgeon: Sonam    Assist: NONE    Diagnosis: Urge Urinary Incontinence    Procedure: Stage 1 Interstim    Posting time: 1 hour    Special Instruments Needed:  NONE    Anesthesia: MAC    Labs: CBC, BMP, U/A    Tests: EKG per anesthesia    Blood: NONE    Bowel Prep: NONE    Special Instructions: needs stage 2 interstim scheduled 2 weeks later.  Needs clearance to hold blood thinner for procedure.     Orders/HandP:     Follow up appointment: [id number]

## 2025-02-13 ASSESSMENT — ENCOUNTER SYMPTOMS
DIARRHEA: 0
WHEEZING: 0
NAUSEA: 0
SKIN LESIONS: 0
ABDOMINAL PAIN: 0
BLOOD IN STOOL: 0
EYE PAIN: 0
BACK PAIN: 0
HEARTBURN: 0
COUGH: 0
VOMITING: 0
CONSTIPATION: 0
INDIGESTION: 0
SHORTNESS OF BREATH: 0
EYE DISCHARGE: 0

## 2025-02-13 NOTE — PROGRESS NOTES
Fear of Current or Ex-Partner: No     Emotionally Abused: No     Physically Abused: No     Sexually Abused: No   Housing Stability: Unknown (12/22/2023)    Housing Stability Vital Sign     Unable to Pay for Housing in the Last Year: No     Number of Times Moved in the Last Year: Not on file     Homeless in the Last Year: Not on file       Family History   Problem Relation Age of Onset    Inflam Bowel Dis Son     Parkinsonism Mother     Suicide Brother     Heart Disease Brother 65        STENT HEART    Heart Disease Father         BOWEL OBSTRUCTION    Heart Attack Father 89        MI    Hypertension Father     Malig Hypertherm Neg Hx     Pseudochol. Deficiency Neg Hx     Delayed Awakening Neg Hx     Post-op Nausea/Vomiting Neg Hx     Emergence Delirium Neg Hx     Post-op Cognitive Dysfunction Neg Hx        Review of Systems  Constitutional:   Negative for fever, chills, appetite change, malaise/fatigue, headaches and weight loss.  Skin:  Negative for skin lesions, rash and itching.  Eyes:  Negative for visual disturbance, eye pain and eye discharge.  ENT:  Negative for difficulty articulating words, pain swallowing, high frequency hearing loss and dry mouth.  Respiratory:  Negative for cough, blood in sputum, shortness of breath and wheezing.  Cardiovascular:  Negative for chest pain, hypertension, irregular heartbeat, leg pain, leg swelling, regular rate and rhythm and varicose veins.  GI:  Negative for nausea, vomiting, abdominal pain, blood in stool, constipation, diarrhea, indigestion and heartburn.  Genitourinary: Positive for urgency and frequent urination. Negative for urinary burning, hematuria, flank pain, recurrent UTIs, history of urolithiasis, nocturia, slower stream, straining, leakage w/ urge, incomplete emptying, erectile dysfunction, testicular pain, sexually transmitted disease, discharge and urethral stricture.  Musculoskeletal:  Negative for back pain, bone pain, arthralgias, tenderness, muscle

## 2025-02-27 ENCOUNTER — TELEPHONE (OUTPATIENT)
Dept: UROLOGY | Age: 75
End: 2025-02-27

## 2025-02-27 PROBLEM — N39.41 URINARY INCONTINENCE, URGE: Status: ACTIVE | Noted: 2025-02-10

## 2025-02-27 NOTE — TELEPHONE ENCOUNTER
SACRAL NERVE STIMULATOR IMPLANT STAGE TWO  Date: 4/29/2025  Time: 0730  Location: [facility] MAIN OR 02

## 2025-02-27 NOTE — TELEPHONE ENCOUNTER
Cardiac Pre-operative Assessment      Physician or Practice Requesting Medication Clearance or Risk Assessment:     : [facility]     Contact Phone Number: [phone]    Fax Number: [phone]    Date of Surgery/Procedure: 4/15/25 and 4/29/25    Type of Surgery or Procedure: stage 1 interstim insertion and stage 2 interstim placement      Medications to hold Eliquis    # Days pre-procedure to hold 3 days prior    Restart medication ___    Risk assessment:    Please send back to La[facility]

## 2025-02-27 NOTE — TELEPHONE ENCOUNTER
Patient Active Problem List     Diagnosis Date Noted    Urge urinary incontinence 11/14/2024       Priority: Low    Septic arthritis 12/30/2023       Priority: Low    Left ankle effusion 12/29/2023       Priority: Low    Septic arthritis of left ankle 12/29/2023       Priority: Low    Normocytic anemia 12/27/2023       Priority: Low    Acute gout 12/27/2023       Priority: Low    Left ankle pain 12/27/2023       Priority: Low    Inability to bear weight 12/23/2023       Priority: Low    Generalized weakness 12/22/2023       Priority: Low    Charcot ankle, right 12/22/2023       Priority: Low    Cervical pain (neck) 04/08/2021       Priority: Low    Status post left hip replacement 06/29/2020       Priority: Low    Chronic diarrhea 06/15/2020       Priority: Low       [facility]          Advance care planning 04/14/2020       Priority: Low       4-14-20          Obesity, morbid 08/22/2018       Priority: Low    Venous stasis 08/22/2018       Priority: Low    Tubulovillous adenoma of colon 05/21/2018       Priority: Low       Judy Lacey MD, hx of complicated colonoscopies secondary to tortuous   colon, poor preps, inflamed diverticulae. Had partial colectomy. Consult   March '19 colonoscopy completed- twotubulovillous adenomas, recc repeat   2022          History of smoking 30 or more pack years 08/08/2017       Priority: Low    Medicare annual wellness visit, subsequent 08/07/2017       Priority: Low       7-20-21          Status post total right knee replacement 07/17/2017       Priority: Low    OA (osteoarthritis) of knee 05/15/2017       Priority: Low       Dr. Fontanez for tkr.          COPD (chronic obstructive pulmonary disease) (HCC) 08/17/2016       Priority: Low    Coronary atherosclerosis of native coronary vessel 08/17/2016       Priority: Low       08/2012: Mild non-obstructive CAD          Edema 08/17/2016       Priority: Low    Paroxysmal atrial fibrillation

## 2025-03-03 ENCOUNTER — PREP FOR PROCEDURE (OUTPATIENT)
Dept: UROLOGY | Age: 75
End: 2025-03-03

## 2025-03-03 DIAGNOSIS — N39.41 URGE URINARY INCONTINENCE: Primary | ICD-10-CM

## 2025-03-03 RX ORDER — SODIUM CHLORIDE 0.9 % (FLUSH) 0.9 %
5-40 SYRINGE (ML) INJECTION PRN
Status: CANCELLED | OUTPATIENT
Start: 2025-03-03

## 2025-03-03 RX ORDER — SODIUM CHLORIDE 9 MG/ML
INJECTION, SOLUTION INTRAVENOUS PRN
Status: CANCELLED | OUTPATIENT
Start: 2025-03-03

## 2025-03-03 RX ORDER — SODIUM CHLORIDE 0.9 % (FLUSH) 0.9 %
5-40 SYRINGE (ML) INJECTION EVERY 12 HOURS SCHEDULED
Status: CANCELLED | OUTPATIENT
Start: 2025-03-03

## 2025-04-10 ENCOUNTER — HOSPITAL ENCOUNTER (OUTPATIENT)
Dept: LAB | Age: 75
Discharge: HOME | End: 2025-04-13
Payer: MEDICARE

## 2025-04-10 ENCOUNTER — TELEPHONE (OUTPATIENT)
Dept: SURGERY | Age: 75
End: 2025-04-10

## 2025-04-10 DIAGNOSIS — N39.41 URGE URINARY INCONTINENCE: ICD-10-CM

## 2025-04-10 LAB
ANION GAP SERPL CALC-SCNC: 11 MMOL/L (ref 7–16)
APPEARANCE UR: CLEAR
BACTERIA URNS QL MICRO: NEGATIVE /HPF
BILIRUB UR QL: NEGATIVE
BUN SERPL-MCNC: 17 MG/DL (ref 8–23)
CALCIUM SERPL-MCNC: 9.6 MG/DL (ref 8.8–10.2)
CHLORIDE SERPL-SCNC: 102 MMOL/L (ref 98–107)
CO2 SERPL-SCNC: 27 MMOL/L (ref 20–29)
COLOR UR: ABNORMAL
CREAT SERPL-MCNC: 0.83 MG/DL (ref 0.8–1.3)
EPI CELLS #/AREA URNS HPF: ABNORMAL /HPF
ERYTHROCYTE [DISTWIDTH] IN BLOOD BY AUTOMATED COUNT: 16.7 % (ref 11.9–14.6)
GLUCOSE SERPL-MCNC: 293 MG/DL (ref 70–99)
GLUCOSE UR STRIP.AUTO-MCNC: >1000 MG/DL
HCT VFR BLD AUTO: 45.5 % (ref 41.1–50.3)
HGB BLD-MCNC: 14.7 G/DL (ref 13.6–17.2)
HGB UR QL STRIP: NEGATIVE
HYALINE CASTS URNS QL MICRO: ABNORMAL /LPF
KETONES UR QL STRIP.AUTO: NEGATIVE MG/DL
LEUKOCYTE ESTERASE UR QL STRIP.AUTO: NEGATIVE
MCH RBC QN AUTO: 28.6 PG (ref 26.1–32.9)
MCHC RBC AUTO-ENTMCNC: 32.3 G/DL (ref 31.4–35)
MCV RBC AUTO: 88.5 FL (ref 82–102)
NITRITE UR QL STRIP.AUTO: NEGATIVE
NRBC # BLD: 0 K/UL (ref 0–0.2)
PH UR STRIP: 6 (ref 5–9)
PLATELET # BLD AUTO: 204 K/UL (ref 150–450)
PMV BLD AUTO: 9.7 FL (ref 9.4–12.3)
POTASSIUM SERPL-SCNC: 4.2 MMOL/L (ref 3.5–5.1)
PROT UR STRIP-MCNC: 100 MG/DL
RBC # BLD AUTO: 5.14 M/UL (ref 4.23–5.6)
RBC #/AREA URNS HPF: ABNORMAL /HPF
SODIUM SERPL-SCNC: 140 MMOL/L (ref 136–145)
SP GR UR REFRACTOMETRY: 1.03 (ref 1–1.02)
UROBILINOGEN UR QL STRIP.AUTO: 0.2 EU/DL (ref 0.2–1)
WBC # BLD AUTO: 6 K/UL (ref 4.3–11.1)
WBC URNS QL MICRO: ABNORMAL /HPF

## 2025-04-10 PROCEDURE — 85027 COMPLETE CBC AUTOMATED: CPT

## 2025-04-10 PROCEDURE — 80048 BASIC METABOLIC PNL TOTAL CA: CPT

## 2025-04-10 PROCEDURE — 81001 URINALYSIS AUTO W/SCOPE: CPT

## 2025-04-10 PROCEDURE — 87086 URINE CULTURE/COLONY COUNT: CPT

## 2025-04-10 PROCEDURE — 36415 COLL VENOUS BLD VENIPUNCTURE: CPT

## 2025-04-10 NOTE — TELEPHONE ENCOUNTER
Dr. Masters,      Your patient was seen in Outpatient Lab today. I am attaching the results of the labs for your to review and follow-up with if necessary. If you would like to order additional labs or tests please enter into [facility] or fax to [phone]. Please do not respond to this message as my mailbox is not monitored. You may call [phone] with questions or concerns.    Please note Glucose 293.       Latest Reference Range & Units 04/10/25 11:10   Sodium 136 - 145 mmol/L 140   Potassium 3.5 - 5.1 mmol/L 4.2   Chloride 98 - 107 mmol/L 102   CARBON DIOXIDE 20 - 29 mmol/L 27   BUN,BUNPL 8 - 23 MG/DL 17   Creatinine 0.80 - 1.30 MG/DL 0.83   Anion Gap 7 - 16 mmol/L 11   Est, Glom Filt Rate >60 ml/min/1.73m2 >90   Glucose 70 - 99 mg/dL 293 (H)   Calcium 8.8 - 10.2 MG/DL 9.6   WBC 4.3 - 11.1 K/uL 6.0   RBC 4.23 - 5.6 M/uL 5.14   Hemoglobin Quant 13.6 - 17.2 g/dL 14.7   Hematocrit 41.1 - 50.3 % 45.5   MCV 82.0 - 102.0 FL 88.5   MCH 26.1 - 32.9 PG 28.6   MCHC 31.4 - 35.0 g/dL 32.3   MPV 9.4 - 12.3 FL 9.7   RDW 11.9 - 14.6 % 16.7 (H)   Platelet Count 150 - 450 K/uL 204   Nucleated Red Blood Cells 0.0 - 0.2 K/uL 0.00   (H): Data is abnormally high

## 2025-04-12 ENCOUNTER — RESULTS FOLLOW-UP (OUTPATIENT)
Dept: UROLOGY | Age: 75
End: 2025-04-12

## 2025-04-12 LAB
BACTERIA SPEC CULT: NORMAL
SERVICE CMNT-IMP: NORMAL

## 2025-04-14 ENCOUNTER — TELEPHONE (OUTPATIENT)
Dept: UROLOGY | Age: 75
End: 2025-04-14

## 2025-04-14 NOTE — TELEPHONE ENCOUNTER
Patient has Bronchitis. He Started antibiotics on thursday. Should surgery tomorrow be rescheduled?

## 2025-04-15 ENCOUNTER — TELEPHONE (OUTPATIENT)
Dept: UROLOGY | Age: 75
End: 2025-04-15

## 2025-04-15 NOTE — TELEPHONE ENCOUNTER
SACRAL NERVE STIMULATOR IMPLANT STAGE 1   Date: 5/27/2025   Time: 1115   Location: [facility] OP OR 05     SACRAL NERVE STIMULATOR IMPLANT STAGE TWO   Date: 6/10/2025   Time: 0800   Location: [facility] OP OR 05

## 2025-04-15 NOTE — TELEPHONE ENCOUNTER
Left a detailed voicemail for patient about available times for rescheduling his stage 1 /2 interstim surgery.  I sent a request to scheduling for 5/27 and 6/10

## 2025-04-28 ENCOUNTER — TELEPHONE (OUTPATIENT)
Dept: ORTHOPEDIC SURGERY | Age: 75
End: 2025-04-28

## 2025-04-28 DIAGNOSIS — E11.610 CHARCOT FOOT DUE TO DIABETES MELLITUS (HCC): ICD-10-CM

## 2025-04-28 DIAGNOSIS — M19.071 ARTHRITIS OF RIGHT ANKLE: ICD-10-CM

## 2025-04-28 DIAGNOSIS — M14.671 CHARCOT ANKLE, RIGHT: Primary | ICD-10-CM

## 2025-04-28 NOTE — TELEPHONE ENCOUNTER
They are at . His shoe is coming apart. They need an order sent to them for a new one. If you have any questions, please give her a call.

## 2025-05-19 ENCOUNTER — TELEPHONE (OUTPATIENT)
Dept: SURGERY | Age: 75
End: 2025-05-19

## 2025-05-19 NOTE — TELEPHONE ENCOUNTER
Pt states he is on third antibiotic for upper respiratory infection. States he has 6 more days of antibiotics and is still coughing up mucous. Per anesthesia protocol, procedures with MAC must be delayed for 14 days AFTER ALL SYMPTOMS HAVE RESOLVED. Call patient after 5/26/25 to reschedule. Thanks

## 2025-05-20 ENCOUNTER — RX ONLY (RX ONLY)
Age: 75
End: 2025-05-20

## 2025-05-20 RX ORDER — MOMETASONE FUROATE 1 MG/G
OINTMENT TOPICAL
Qty: 45 | Refills: 6 | Status: ERX

## 2025-05-20 RX ORDER — NYSTATIN CREAM 100000 [USP'U]/G
CREAM TOPICAL
Qty: 60 | Refills: 10 | Status: ERX

## 2025-06-24 ENCOUNTER — TELEPHONE (OUTPATIENT)
Dept: ORTHOPEDIC SURGERY | Age: 75
End: 2025-06-24

## 2025-06-24 NOTE — TELEPHONE ENCOUNTER
They are at [facility]. His brace was rubbing so they went to have it checked out. When they removed the wrapping it was bleeding and looks infected. Can you please give her a call. They would like to come to the office to have it looked at.

## 2025-06-30 ENCOUNTER — TELEPHONE (OUTPATIENT)
Dept: ADMINISTRATIVE | Age: 75
End: 2025-06-30

## 2025-07-07 ENCOUNTER — HOSPITAL ENCOUNTER (OUTPATIENT)
Dept: PHYSICAL THERAPY | Age: 75
Setting detail: RECURRING SERIES
Discharge: HOME | End: 2025-07-10
Payer: MEDICARE

## 2025-07-07 PROCEDURE — 97116 GAIT TRAINING THERAPY: CPT

## 2025-07-07 PROCEDURE — 97162 PT EVAL MOD COMPLEX 30 MIN: CPT

## 2025-07-07 NOTE — PROGRESS NOTES
Macho Tong  : 1950  Primary: Medicare Part A And B (Medicare)  Secondary: [employer] HE[employer] C[employer] MEDICARE SUPP [facility] @ [address] [place] [address]  Phone: [phone]  Fax: [phone] Plan Frequency: up to 2 x's per week until goals are met, reassessement, or discharge      Plan of Care/Certification Expiration Date: 10/05/25          Plan of Care/Certification Expiration Date:  Plan of Care/Certification Expiration Date: 10/05/25      Frequency/Duration:   Plan Frequency: up to 2 x's per week until goals are met, reassessement, or discharge        Time In/Out:   Time In: 1451  Time Out: 1530      PT Visit Info:    Total # of Visits Approved: 999  Total # of Visits to Date: 1  Progress Note Counter: 1        Visit Count:  1    OUTPATIENT PHYSICAL THERAPY:   Treatment Note 2025       Episode  (right ankle and LE pain / Postural abnormality )               Treatment Diagnosis:     Muscle weakness (generalized)  Loss of balance  Joint stiffness of spine  Medical/Referring Diagnosis:    Pain in right leg    Referring Physician:  Marco Michele Jr., MD MD Orders:  PT Eval and Treat   Return MD Appt:    Future Appointments   Date Time Provider Department Center   7/10/2025  3:30 PM Tyrell Junior MD [facility] AMB   7/15/2025  2:30 PM Gato Javier, PT [facility]   2025  1:45 PM Letha Suh, PTA [facility]   2025  3:30 PM [facility] ECHO 36 [facility] AMB   2025  3:15 PM Gato Javier, PT [facility]   2025  2:30 PM Gato Javier, PT [facility]   2025  9:15 AM Reggie Agustin MD [facility] AMB        Date of Onset:  Onset Date: 25 (acute on chronic)     Acute on chronic   Allergies:   Codeine, Sulfamethoxazole, Sulfamethoxazole-trimethoprim, and Trimethoprim  Restrictions/Precautions:   Fall Precautions: and right ankle AFO.         Interventions Planned (Treatment may consist of

## 2025-07-07 NOTE — THERAPY EVALUATION
Macho Tong  : 1950  Primary: Medicare Part A And B (Medicare)  Secondary: [employer] HE[employer] C[employer] MEDICARE [facility]PP [facility] [address] [place] [address]  Phone: [phone]  Fax: [phone] Plan Frequency: up to 2 x's per week until goals are met, reassessement, or discharge      Plan of Care/Certification Expiration Date: 10/05/25          Plan of Care/Certification Expiration Date:  Plan of Care/Certification Expiration Date: 10/05/25      Frequency/Duration: Plan Frequency: up to 2 x's per week until goals are met, reassessement, or discharge          Time In/Out:   Time In: 251  Time Out: 330      PT Visit Info:    Total # of Visits Approved: 999  Total # of Visits to Date: 1  Progress Note Counter: 1        Visit Count:  1                OUTPATIENT PHYSICAL THERAPY:             Initial Assessment 2025               Episode (right ankle and LE pain / Postural abnormality )         Treatment Diagnosis:     Muscle weakness (generalized)  Loss of balance  Joint stiffness of spine  No diagnosis found.  M79.604 (ICD-10-CM) - Pain in right leg     Referring Physician:  Marco Mcihele Jr., MD MD Orders:  PT Eval and Treat   Return MD Appt:    Future Appointments   Date Time Provider Department Center   7/10/2025  3:30 PM Tyrell Junior MD [facility] AMB   7/15/2025  2:30 PM Gato Javier, PT [facility]   2025  1:45 PM Letha Suh, PTA [facility]   2025  3:30 PM [facility] ECHO 36 [facility] AMB   2025  3:15 PM Gato Javier, PT [facility]   2025  2:30 PM Gato Javier, PT [facility]   2025  9:15 AM Reggie Agustin MD [facility] AMB       Date of Onset:      acute on chronic   Allergies:  Codeine, Sulfamethoxazole, Sulfamethoxazole-trimethoprim, and Trimethoprim  Restrictions/Precautions:    Fall Precautions: and right ankle brace for CHARCOT foot.          Medications Last Reviewed:

## 2025-07-10 ENCOUNTER — OFFICE VISIT (OUTPATIENT)
Dept: ORTHOPEDIC SURGERY | Age: 75
End: 2025-07-10
Payer: MEDICARE

## 2025-07-10 DIAGNOSIS — M00.872 ARTHRITIS OF LEFT ANKLE DUE TO OTHER BACTERIA (HCC): Primary | ICD-10-CM

## 2025-07-10 DIAGNOSIS — M14.671 CHARCOT ANKLE, RIGHT: ICD-10-CM

## 2025-07-10 PROCEDURE — 99214 OFFICE O/P EST MOD 30 MIN: CPT | Performed by: ORTHOPAEDIC SURGERY

## 2025-07-10 PROCEDURE — 3017F COLORECTAL CA SCREEN DOC REV: CPT | Performed by: ORTHOPAEDIC SURGERY

## 2025-07-10 PROCEDURE — 1036F TOBACCO NON-USER: CPT | Performed by: ORTHOPAEDIC SURGERY

## 2025-07-10 PROCEDURE — 1123F ACP DISCUSS/DSCN MKR DOCD: CPT | Performed by: ORTHOPAEDIC SURGERY

## 2025-07-10 PROCEDURE — G8428 CUR MEDS NOT DOCUMENT: HCPCS | Performed by: ORTHOPAEDIC SURGERY

## 2025-07-10 PROCEDURE — G8417 CALC BMI ABV UP PARAM F/U: HCPCS | Performed by: ORTHOPAEDIC SURGERY

## 2025-07-10 NOTE — PROGRESS NOTES
Name: Macho Tong  YOB: 1950  Gender: male  MRN: [id number]    Summary: Right Charcot hindfoot with progressive deformity and inability to wear braces due to blistering    Crow walker boot ordered.  May eventually need Ilizarov frame to correct deformity if not successful with Crow boot.     History of Present Illness    This is a well-known patient to me with right valgus ankle Charcot deformity.  He has been treated nonoperatively in the past with bracing but his most recent brace changes resulted in blistering over his medial right ankle.  The patient presents with an ulceration on the right ankle, attributed to mechanical irritation from their brace. They consulted with Anu, who recommended an adjustment and provision of a new shoe under Medicare coverage. However, the new shoe exacerbated the irritation, prompting Anu to advise a consultation prior to further modifications. A Charcot shoe or a CROW (Charcot Restraint Orthotic Walker) boot was suggested to mitigate friction. The patient expresses a desire to postpone surgical intervention and reports no pain aside from the ulceration. Concerns are raised regarding postoperative recovery and the impact on daily activities. The patient experiences difficulty ambulating barefoot and has a propensity to fall when wearing sneakers. They inquire about the feasibility of ankle replacement surgery and express anxiety over bone protrusion. Frustration is evident, and they contemplate surgery to enhance activity levels. The patient's foot condition, compounded by a nerve issue in the lumbar region, significantly restricts their activities. They are considering radiofrequency ablation for management of back pain.    SOCIAL HISTORY  Marital Status:     FAMILY HISTORY  - Child: Rheumatoid arthritis, both hips replaced, both knees replaced, neck fused.      ROS/Meds/PSH/PMH/FH/SH: see bottom of not for full  Patient Denies fever/chills,

## 2025-07-15 ENCOUNTER — HOSPITAL ENCOUNTER (OUTPATIENT)
Dept: PHYSICAL THERAPY | Age: 75
Setting detail: RECURRING SERIES
Discharge: HOME | End: 2025-07-18
Payer: MEDICARE

## 2025-07-15 PROCEDURE — 97110 THERAPEUTIC EXERCISES: CPT

## 2025-07-15 NOTE — PROGRESS NOTES
Comments: I want to strengthen my legs and core     Initial Pain Level::     0 /10 foot region.  Post Session Pain Level:        0/10 in the foot region   Medications Last Reviewed:  7/15/2025  Updated Objective Findings:see reeval for specifics.    Treatment   THERAPEUTIC EXERCISE: (45  minutes):    Exercises per grid below to improve mobility, strength, balance, and coordination.  Required minimal visual, verbal, and manual cues to promote proper body alignment, promote proper body posture, and promote proper body mechanics.  Progressed resistance, range, and repetitions as indicated.   Date:   7/15/25   Activity/Exercise Parameters   Sit to and from stand less hands  2 x 10   Sit to stand with erect postural training against cybex e     Ambulation with the 2 wheel rolling walker     Ambulation to car and transfers with erect posture     Lean back on the physioball with torso stretch and isometric flexion and extension of the trunk with bar in hand 3 x 20    Seated shoulder press     Wall push ups     Seated bicep curls     Weighted ball overhead     Weighted ball push outs     Weighted ball trunk rotation    Hamstring stretch seated     Postural training erect trunk and knee extension     Shoulder stretch with freemotion and bar in the belts 3 x 20 sec   HSC machine    T band blue retraction seated 3 x 15   UBE FWD/BWD     Trunk rotation with pink ball 3 x 15   trunk diagonal rotation with pink ball 2 x 10   No Nu Step    GAIT TRAINING: (0 minutes):    Gait training to improve and/or restore physical functioning as related to mobility, strength, balance, and coordination.  Ambulated 200 feet with independence and minimal verbal cueing related to their stance phase and stride length to promote proper body alignment, promote proper body posture, and promote proper body mechanics..  Instruction in performance of safety and swing through.       Treatment/Session Summary:    Treatment Assessment: mobility and trunk

## 2025-07-17 ENCOUNTER — HOSPITAL ENCOUNTER (OUTPATIENT)
Dept: PHYSICAL THERAPY | Age: 75
Setting detail: RECURRING SERIES
Discharge: HOME | End: 2025-07-20
Payer: MEDICARE

## 2025-07-17 PROCEDURE — 97110 THERAPEUTIC EXERCISES: CPT

## 2025-07-17 NOTE — PROGRESS NOTES
Macho Tong  : 1950  Primary: Medicare Part A And B (Medicare)  Secondary: [employer] HE[employer] C[employer] MEDICARE SUPP [facility] @ [address] [place] [address]  Phone: [phone]  Fax: [phone] Plan Frequency: up to 2 x's per week until goals are met, reassessement, or discharge      Plan of Care/Certification Expiration Date: 10/05/25          Plan of Care/Certification Expiration Date:  Plan of Care/Certification Expiration Date: 10/05/25      Frequency/Duration:   Plan Frequency: up to 2 x's per week until goals are met, reassessement, or discharge        Time In/Out:   Time In: 0145  Time Out: 0230      PT Visit Info:    Total # of Visits Approved: 999  Total # of Visits to Date: 3  Progress Note Counter: 3        Visit Count:  3    OUTPATIENT PHYSICAL THERAPY:   Treatment Note 2025       Episode  (right ankle and LE pain / Postural abnormality )               Treatment Diagnosis:     Muscle weakness (generalized)  Loss of balance  Joint stiffness of spine  Medical/Referring Diagnosis:    Pain in right leg    Referring Physician:  Marco Michele Jr., MD MD Orders:  PT Eval and Treat   Return MD Appt:    Future Appointments   Date Time Provider Department Center   2025  3:30 PM [facility] ECHO 36 DG [facility] AMB   2025  3:15 PM Gato Javier, PT [facility]   2025  2:30 PM Gato Javier, PT [facility]   2025  9:15 AM Reggie Agustin MD DG [facility] AMB   10/16/2025  3:40 PM Tyrell Junior MD [facility] AMB        Date of Onset:  Onset Date: 25 (acute on chronic)     Acute on chronic   Allergies:   Codeine, Sulfamethoxazole, Sulfamethoxazole-trimethoprim, and Trimethoprim  Restrictions/Precautions:   Fall Precautions: and right ankle AFO.         Interventions Planned (Treatment may consist of any combination of the following):     See Assessment Note    Subjective Comments: Was sore from last session,

## 2025-07-29 ENCOUNTER — APPOINTMENT (OUTPATIENT)
Dept: PHYSICAL THERAPY | Age: 75
End: 2025-07-29
Payer: MEDICARE

## 2025-07-31 ENCOUNTER — APPOINTMENT (OUTPATIENT)
Dept: PHYSICAL THERAPY | Age: 75
End: 2025-07-31
Payer: MEDICARE

## 2025-08-01 ENCOUNTER — CLINICAL DOCUMENTATION (OUTPATIENT)
Dept: ORTHOPEDIC SURGERY | Age: 75
End: 2025-08-01

## 2025-08-01 ENCOUNTER — HOSPITAL ENCOUNTER (OUTPATIENT)
Dept: WOUND CARE | Age: 75
Discharge: HOME | End: 2025-08-01
Payer: MEDICARE

## 2025-08-01 VITALS
SYSTOLIC BLOOD PRESSURE: 130 MMHG | HEART RATE: 84 BPM | DIASTOLIC BLOOD PRESSURE: 72 MMHG | OXYGEN SATURATION: 94 % | TEMPERATURE: 98.4 F | RESPIRATION RATE: 18 BRPM

## 2025-08-01 PROCEDURE — 11042 DBRDMT SUBQ TIS 1ST 20SQCM/<: CPT

## 2025-08-01 PROCEDURE — 87176 TISSUE HOMOGENIZATION CULTR: CPT

## 2025-08-01 PROCEDURE — 87075 CULTR BACTERIA EXCEPT BLOOD: CPT

## 2025-08-01 PROCEDURE — 87070 CULTURE OTHR SPECIMN AEROBIC: CPT

## 2025-08-01 PROCEDURE — 87205 SMEAR GRAM STAIN: CPT

## 2025-08-01 PROCEDURE — 99213 OFFICE O/P EST LOW 20 MIN: CPT

## 2025-08-01 NOTE — FLOWSHEET NOTE
08/01/25 1343   Present on Original Admission: Yes  Wound Approximate Age at First Assessment (Weeks): (c)   Location: Ankle  Wound Location Orientation: Right;Medial  Wound Description (Comments): # 2 ankle   Wound Image     Wound Etiology Traumatic   Dressing Status Intact   Wound Cleansed Cleansed with saline   Dressing/Treatment Hydrofera blue   Offloading for Diabetic Foot Ulcers Offloading ordered   Wound Length (cm) 0.5 cm   Wound Width (cm) 0.6 cm   Wound Depth (cm) 0.1 cm   Wound Surface Area (cm^2) 0.3 cm^2   Wound Volume (cm^3) 0.03 cm^3   Post-Procedure Length (cm) 0.6 cm   Post-Procedure Width (cm) 0.6 cm   Post-Procedure Depth (cm) 0.1 cm   Post-Procedure Surface Area (cm^2) 0.36 cm^2   Post-Procedure Volume (cm^3) 0.036 cm^3   Wound Assessment Pink/red   Drainage Amount None (dry)   Odor None   Yuliet-wound Assessment Dry/flaky   Margins Defined edges   Wound Thickness Description not for Pressure Injury Full thickness   Wound 08/01/25 Toe (Comment  which one) Right;Plantar # 3 great toe   Date First Assessed/Time First Assessed: 08/01/25 1344   Present on Original Admission: Yes  Wound Approximate Age at First Assessment (Weeks): 1 weeks  Location: Toe (Comment  which one)  Wound Location Orientation: Right;Plantar  Wound Description (...   Wound Image     Wound Etiology Diabetic Smith 2   Dressing Status Intact   Wound Cleansed Cleansed with saline   Dressing/Treatment Hydrofera blue   Offloading for Diabetic Foot Ulcers Offloading ordered   Wound Length (cm) 3.5 cm   Wound Width (cm) 4 cm   Wound Depth (cm) 0.1 cm   Wound Surface Area (cm^2) 14 cm^2   Wound Volume (cm^3) 1.4 cm^3   Post-Procedure Length (cm) 5.5 cm   Post-Procedure Width (cm) 3.5 cm   Post-Procedure Depth (cm) 0.1 cm   Post-Procedure Surface Area (cm^2) 19.25 cm^2   Post-Procedure Volume (cm^3) 1.925 cm^3   Wound Assessment Pink/red   Drainage Amount Large (50-75% saturated)   Drainage Description Serosanguinous   Odor None

## 2025-08-01 NOTE — WOUND CARE
Discharge Instructions for  [facility]  [address] [place] [address]  [address] [place] [address]  Phone [phone]   Fax [phone]      NAME:  Macho Tong  YOB: 1950  MEDICAL RECORD NUMBER:  [id number]  DATE:  8/1/2025    Return Appointment:   1 week with Sanchez Man DO      Instructions: right great toe, left great toe, right medial, right 3rd digit:  Cleanse with normal saline  Allow Vashe Wound Solution moistened gauze to sit on wound bed for 60 seconds  Apply hydrofera blue ready. Shiny side out.  Cover with ABD/gauze.  Secure with roll gauze.  Change 3 times weekly or as needed.    Doxycycline added to your antibiotics at today's visit. Please  from your pharmacy and start taking.  Increase protein to 100 grams daily. Suggest two 30 gram shakes daily.   Darco shoe and PEG insert for both feet to offload. May purchase on [employer] and bring to next appointment and get fitted in clinic.              Culture obtained of RGT at today's visit.  May purchase cast cover from [employer] or [employer] or [employer] for showering.    Home Health for wound assessment and dressing changes 3 times weekly.                                                                                                                                                                                  Should you experience increased redness, swelling, pain, foul odor, size of wound(s), or have a temperature over 101 degrees please contact the [facility] at [phone] or if after hours contact your primary care physician or go to the hospital emergency department.    PLEASE NOTE: IF YOU ARE UNABLE TO OBTAIN WOUND SUPPLIES, CONTINUE TO USE THE SUPPLIES YOU HAVE AVAILABLE UNTIL YOU ARE ABLE TO REACH US. IT IS MOST IMPORTANT TO KEEP THE WOUND COVERED AT ALL TIMES.    Electronically signed Carlos Hartmann RN on 8/1/2025 at 2:12 PM

## 2025-08-03 LAB
BACTERIA SPEC CULT: ABNORMAL
BACTERIA SPEC CULT: ABNORMAL
GRAM STN SPEC: ABNORMAL
GRAM STN SPEC: ABNORMAL
SERVICE CMNT-IMP: ABNORMAL

## 2025-08-04 ENCOUNTER — TELEPHONE (OUTPATIENT)
Dept: WOUND CARE | Age: 75
End: 2025-08-04

## 2025-08-07 ENCOUNTER — HOSPITAL ENCOUNTER (OUTPATIENT)
Dept: WOUND CARE | Age: 75
Discharge: HOME | End: 2025-08-07
Payer: MEDICARE

## 2025-08-07 VITALS
BODY MASS INDEX: 37.67 KG/M2 | HEART RATE: 85 BPM | HEIGHT: 75 IN | DIASTOLIC BLOOD PRESSURE: 75 MMHG | OXYGEN SATURATION: 93 % | TEMPERATURE: 98.5 F | RESPIRATION RATE: 18 BRPM | SYSTOLIC BLOOD PRESSURE: 121 MMHG | WEIGHT: 303 LBS

## 2025-08-07 PROCEDURE — 11042 DBRDMT SUBQ TIS 1ST 20SQCM/<: CPT

## 2025-08-07 ASSESSMENT — PAIN SCALES - GENERAL: PAINLEVEL_OUTOF10: 7

## 2025-08-08 LAB
BACTERIA SPEC CULT: NORMAL
SERVICE CMNT-IMP: NORMAL

## 2025-08-11 PROBLEM — L97.512 DIABETIC ULCER OF TOE OF RIGHT FOOT ASSOCIATED WITH TYPE 2 DIABETES MELLITUS, WITH FAT LAYER EXPOSED (HCC): Chronic | Status: ACTIVE | Noted: 2025-08-11

## 2025-08-11 PROBLEM — L97.512 DIABETIC ULCER OF TOE OF RIGHT FOOT ASSOCIATED WITH TYPE 2 DIABETES MELLITUS, WITH FAT LAYER EXPOSED (HCC): Status: ACTIVE | Noted: 2025-08-11

## 2025-08-11 PROBLEM — E11.621 DIABETIC ULCER OF TOE OF RIGHT FOOT ASSOCIATED WITH TYPE 2 DIABETES MELLITUS, WITH FAT LAYER EXPOSED (HCC): Status: ACTIVE | Noted: 2025-08-11

## 2025-08-11 PROBLEM — L97.522 DIABETIC ULCER OF TOE OF LEFT FOOT ASSOCIATED WITH TYPE 2 DIABETES MELLITUS, WITH FAT LAYER EXPOSED (HCC): Chronic | Status: ACTIVE | Noted: 2025-08-11

## 2025-08-11 PROBLEM — L03.115 CELLULITIS OF RIGHT FOOT: Chronic | Status: ACTIVE | Noted: 2025-08-11

## 2025-08-11 PROBLEM — E11.621 DIABETIC ULCER OF TOE OF LEFT FOOT ASSOCIATED WITH TYPE 2 DIABETES MELLITUS, WITH FAT LAYER EXPOSED (HCC): Chronic | Status: ACTIVE | Noted: 2025-08-11

## 2025-08-11 PROBLEM — E11.621 DIABETIC ULCER OF TOE OF RIGHT FOOT ASSOCIATED WITH TYPE 2 DIABETES MELLITUS, WITH FAT LAYER EXPOSED (HCC): Chronic | Status: ACTIVE | Noted: 2025-08-11

## 2025-08-11 PROBLEM — L03.115 CELLULITIS OF RIGHT FOOT: Status: ACTIVE | Noted: 2025-08-11

## 2025-08-11 PROBLEM — L97.522 DIABETIC ULCER OF TOE OF LEFT FOOT ASSOCIATED WITH TYPE 2 DIABETES MELLITUS, WITH FAT LAYER EXPOSED (HCC): Status: ACTIVE | Noted: 2025-08-11

## 2025-08-11 PROBLEM — E11.621 DIABETIC ULCER OF TOE OF LEFT FOOT ASSOCIATED WITH TYPE 2 DIABETES MELLITUS, WITH FAT LAYER EXPOSED (HCC): Status: ACTIVE | Noted: 2025-08-11

## 2025-08-14 ENCOUNTER — HOSPITAL ENCOUNTER (OUTPATIENT)
Dept: WOUND CARE | Age: 75
Discharge: HOME | End: 2025-08-14
Payer: MEDICARE

## 2025-08-14 VITALS
BODY MASS INDEX: 37.67 KG/M2 | OXYGEN SATURATION: 93 % | TEMPERATURE: 98.8 F | DIASTOLIC BLOOD PRESSURE: 61 MMHG | RESPIRATION RATE: 18 BRPM | HEART RATE: 81 BPM | SYSTOLIC BLOOD PRESSURE: 109 MMHG | WEIGHT: 303 LBS | HEIGHT: 75 IN

## 2025-08-14 DIAGNOSIS — E11.621 DIABETIC ULCER OF TOE OF LEFT FOOT ASSOCIATED WITH TYPE 2 DIABETES MELLITUS, WITH FAT LAYER EXPOSED (HCC): Primary | Chronic | ICD-10-CM

## 2025-08-14 DIAGNOSIS — L97.522 DIABETIC ULCER OF TOE OF LEFT FOOT ASSOCIATED WITH TYPE 2 DIABETES MELLITUS, WITH FAT LAYER EXPOSED (HCC): Primary | Chronic | ICD-10-CM

## 2025-08-14 DIAGNOSIS — L97.512 DIABETIC ULCER OF TOE OF RIGHT FOOT ASSOCIATED WITH TYPE 2 DIABETES MELLITUS, WITH FAT LAYER EXPOSED (HCC): Chronic | ICD-10-CM

## 2025-08-14 DIAGNOSIS — E11.621 DIABETIC ULCER OF TOE OF RIGHT FOOT ASSOCIATED WITH TYPE 2 DIABETES MELLITUS, WITH FAT LAYER EXPOSED (HCC): Chronic | ICD-10-CM

## 2025-08-14 PROCEDURE — 11042 DBRDMT SUBQ TIS 1ST 20SQCM/<: CPT

## 2025-08-14 RX ORDER — LIDOCAINE 40 MG/G
CREAM TOPICAL PRN
OUTPATIENT
Start: 2025-08-14

## 2025-08-14 RX ORDER — GENTAMICIN SULFATE 1 MG/G
OINTMENT TOPICAL PRN
OUTPATIENT
Start: 2025-08-14

## 2025-08-14 RX ORDER — SODIUM CHLOR/HYPOCHLOROUS ACID 0.033 %
SOLUTION, IRRIGATION IRRIGATION PRN
Status: DISCONTINUED | OUTPATIENT
Start: 2025-08-14 | End: 2025-08-15 | Stop reason: HOSPADM

## 2025-08-14 RX ORDER — LIDOCAINE HYDROCHLORIDE 20 MG/ML
JELLY TOPICAL PRN
OUTPATIENT
Start: 2025-08-14

## 2025-08-14 RX ORDER — NEOMYCIN/BACITRACIN/POLYMYXINB 3.5-400-5K
OINTMENT (GRAM) TOPICAL PRN
OUTPATIENT
Start: 2025-08-14

## 2025-08-14 RX ORDER — BETAMETHASONE DIPROPIONATE 0.5 MG/G
CREAM TOPICAL PRN
OUTPATIENT
Start: 2025-08-14

## 2025-08-14 RX ORDER — LIDOCAINE 50 MG/G
OINTMENT TOPICAL PRN
OUTPATIENT
Start: 2025-08-14

## 2025-08-14 RX ORDER — CLOBETASOL PROPIONATE 0.5 MG/G
OINTMENT TOPICAL PRN
OUTPATIENT
Start: 2025-08-14

## 2025-08-14 RX ORDER — SODIUM CHLOR/HYPOCHLOROUS ACID 0.033 %
SOLUTION, IRRIGATION IRRIGATION PRN
OUTPATIENT
Start: 2025-08-14

## 2025-08-14 RX ORDER — TRIAMCINOLONE ACETONIDE 1 MG/G
OINTMENT TOPICAL PRN
OUTPATIENT
Start: 2025-08-14

## 2025-08-14 RX ORDER — TRAMADOL HYDROCHLORIDE 50 MG/1
50 TABLET ORAL EVERY 6 HOURS PRN
Qty: 28 TABLET | Refills: 0 | Status: SHIPPED | OUTPATIENT
Start: 2025-08-14 | End: 2025-08-21

## 2025-08-14 RX ORDER — LIDOCAINE HYDROCHLORIDE 40 MG/ML
SOLUTION TOPICAL PRN
OUTPATIENT
Start: 2025-08-14

## 2025-08-14 RX ORDER — LIDOCAINE HYDROCHLORIDE 20 MG/ML
JELLY TOPICAL PRN
Status: DISCONTINUED | OUTPATIENT
Start: 2025-08-14 | End: 2025-08-15 | Stop reason: HOSPADM

## 2025-08-14 RX ORDER — GINSENG 100 MG
CAPSULE ORAL PRN
OUTPATIENT
Start: 2025-08-14

## 2025-08-14 RX ORDER — MUPIROCIN 2 %
OINTMENT (GRAM) TOPICAL PRN
OUTPATIENT
Start: 2025-08-14

## 2025-08-14 RX ORDER — BACITRACIN ZINC AND POLYMYXIN B SULFATE 500; 1000 [USP'U]/G; [USP'U]/G
OINTMENT TOPICAL PRN
OUTPATIENT
Start: 2025-08-14

## 2025-08-14 RX ADMIN — LIDOCAINE HYDROCHLORIDE: 20 JELLY TOPICAL at 16:08

## 2025-08-14 RX ADMIN — Medication: at 16:08

## 2025-08-14 ASSESSMENT — PAIN DESCRIPTION - DESCRIPTORS: DESCRIPTORS: ACHING

## 2025-08-14 ASSESSMENT — PAIN SCALES - GENERAL: PAINLEVEL_OUTOF10: 6

## 2025-08-14 ASSESSMENT — PAIN DESCRIPTION - LOCATION: LOCATION: FOOT

## 2025-08-14 ASSESSMENT — PAIN DESCRIPTION - ORIENTATION: ORIENTATION: RIGHT

## 2025-08-22 ENCOUNTER — HOSPITAL ENCOUNTER (OUTPATIENT)
Dept: WOUND CARE | Age: 75
Discharge: HOME | End: 2025-08-22
Payer: MEDICARE

## 2025-08-22 VITALS
OXYGEN SATURATION: 90 % | DIASTOLIC BLOOD PRESSURE: 71 MMHG | HEART RATE: 75 BPM | SYSTOLIC BLOOD PRESSURE: 122 MMHG | TEMPERATURE: 97.8 F | RESPIRATION RATE: 20 BRPM

## 2025-08-22 DIAGNOSIS — L97.522 DIABETIC ULCER OF TOE OF LEFT FOOT ASSOCIATED WITH TYPE 2 DIABETES MELLITUS, WITH FAT LAYER EXPOSED (HCC): Primary | ICD-10-CM

## 2025-08-22 DIAGNOSIS — E11.621 DIABETIC ULCER OF TOE OF LEFT FOOT ASSOCIATED WITH TYPE 2 DIABETES MELLITUS, WITH FAT LAYER EXPOSED (HCC): Primary | ICD-10-CM

## 2025-08-22 DIAGNOSIS — L97.512 DIABETIC ULCER OF TOE OF RIGHT FOOT ASSOCIATED WITH TYPE 2 DIABETES MELLITUS, WITH FAT LAYER EXPOSED (HCC): ICD-10-CM

## 2025-08-22 DIAGNOSIS — E11.621 DIABETIC ULCER OF TOE OF RIGHT FOOT ASSOCIATED WITH TYPE 2 DIABETES MELLITUS, WITH FAT LAYER EXPOSED (HCC): ICD-10-CM

## 2025-08-22 PROCEDURE — 11042 DBRDMT SUBQ TIS 1ST 20SQCM/<: CPT

## 2025-08-22 RX ORDER — LIDOCAINE 50 MG/G
OINTMENT TOPICAL PRN
OUTPATIENT
Start: 2025-08-22

## 2025-08-22 RX ORDER — GINSENG 100 MG
CAPSULE ORAL PRN
OUTPATIENT
Start: 2025-08-22

## 2025-08-22 RX ORDER — BETAMETHASONE DIPROPIONATE 0.5 MG/G
CREAM TOPICAL PRN
OUTPATIENT
Start: 2025-08-22

## 2025-08-22 RX ORDER — LIDOCAINE HYDROCHLORIDE 20 MG/ML
JELLY TOPICAL PRN
OUTPATIENT
Start: 2025-08-22

## 2025-08-22 RX ORDER — SODIUM CHLOR/HYPOCHLOROUS ACID 0.033 %
SOLUTION, IRRIGATION IRRIGATION PRN
OUTPATIENT
Start: 2025-08-22

## 2025-08-22 RX ORDER — LIDOCAINE 40 MG/G
CREAM TOPICAL PRN
OUTPATIENT
Start: 2025-08-22

## 2025-08-22 RX ORDER — TRIAMCINOLONE ACETONIDE 1 MG/G
OINTMENT TOPICAL PRN
OUTPATIENT
Start: 2025-08-22

## 2025-08-22 RX ORDER — GENTAMICIN SULFATE 1 MG/G
OINTMENT TOPICAL PRN
OUTPATIENT
Start: 2025-08-22

## 2025-08-22 RX ORDER — NEOMYCIN/BACITRACIN/POLYMYXINB 3.5-400-5K
OINTMENT (GRAM) TOPICAL PRN
OUTPATIENT
Start: 2025-08-22

## 2025-08-22 RX ORDER — BACITRACIN ZINC AND POLYMYXIN B SULFATE 500; 1000 [USP'U]/G; [USP'U]/G
OINTMENT TOPICAL PRN
OUTPATIENT
Start: 2025-08-22

## 2025-08-22 RX ORDER — LIDOCAINE HYDROCHLORIDE 20 MG/ML
JELLY TOPICAL PRN
Status: DISCONTINUED | OUTPATIENT
Start: 2025-08-22 | End: 2025-08-23 | Stop reason: HOSPADM

## 2025-08-22 RX ORDER — SODIUM CHLOR/HYPOCHLOROUS ACID 0.033 %
SOLUTION, IRRIGATION IRRIGATION PRN
Status: DISCONTINUED | OUTPATIENT
Start: 2025-08-22 | End: 2025-08-23 | Stop reason: HOSPADM

## 2025-08-22 RX ORDER — CLOBETASOL PROPIONATE 0.5 MG/G
OINTMENT TOPICAL PRN
OUTPATIENT
Start: 2025-08-22

## 2025-08-22 RX ORDER — MUPIROCIN 2 %
OINTMENT (GRAM) TOPICAL PRN
OUTPATIENT
Start: 2025-08-22

## 2025-08-22 RX ORDER — LIDOCAINE HYDROCHLORIDE 40 MG/ML
SOLUTION TOPICAL PRN
OUTPATIENT
Start: 2025-08-22

## 2025-08-22 RX ADMIN — Medication 118 ML: at 12:26

## 2025-08-22 RX ADMIN — LIDOCAINE HYDROCHLORIDE: 20 JELLY TOPICAL at 12:26

## 2025-08-22 ASSESSMENT — PAIN SCALES - GENERAL: PAINLEVEL_OUTOF10: 0

## 2025-08-29 ENCOUNTER — HOSPITAL ENCOUNTER (OUTPATIENT)
Dept: WOUND CARE | Age: 75
Discharge: HOME | End: 2025-08-29
Payer: MEDICARE

## 2025-08-29 DIAGNOSIS — E11.621 DIABETIC ULCER OF TOE OF LEFT FOOT ASSOCIATED WITH TYPE 2 DIABETES MELLITUS, WITH FAT LAYER EXPOSED (HCC): Primary | Chronic | ICD-10-CM

## 2025-08-29 DIAGNOSIS — E11.621 DIABETIC ULCER OF TOE OF RIGHT FOOT ASSOCIATED WITH TYPE 2 DIABETES MELLITUS, WITH FAT LAYER EXPOSED (HCC): Chronic | ICD-10-CM

## 2025-08-29 DIAGNOSIS — L97.512 DIABETIC ULCER OF TOE OF RIGHT FOOT ASSOCIATED WITH TYPE 2 DIABETES MELLITUS, WITH FAT LAYER EXPOSED (HCC): Chronic | ICD-10-CM

## 2025-08-29 DIAGNOSIS — L97.522 DIABETIC ULCER OF TOE OF LEFT FOOT ASSOCIATED WITH TYPE 2 DIABETES MELLITUS, WITH FAT LAYER EXPOSED (HCC): Primary | Chronic | ICD-10-CM

## 2025-08-29 PROCEDURE — 11042 DBRDMT SUBQ TIS 1ST 20SQCM/<: CPT

## 2025-09-05 ENCOUNTER — HOSPITAL ENCOUNTER (OUTPATIENT)
Dept: WOUND CARE | Age: 75
Discharge: HOME | End: 2025-09-05
Payer: MEDICARE

## 2025-09-05 VITALS — DIASTOLIC BLOOD PRESSURE: 73 MMHG | HEART RATE: 76 BPM | SYSTOLIC BLOOD PRESSURE: 109 MMHG

## 2025-09-05 DIAGNOSIS — E11.621 DIABETIC ULCER OF TOE OF LEFT FOOT ASSOCIATED WITH TYPE 2 DIABETES MELLITUS, WITH FAT LAYER EXPOSED (HCC): Primary | Chronic | ICD-10-CM

## 2025-09-05 DIAGNOSIS — L97.512 DIABETIC ULCER OF TOE OF RIGHT FOOT ASSOCIATED WITH TYPE 2 DIABETES MELLITUS, WITH FAT LAYER EXPOSED (HCC): Chronic | ICD-10-CM

## 2025-09-05 DIAGNOSIS — L97.522 DIABETIC ULCER OF TOE OF LEFT FOOT ASSOCIATED WITH TYPE 2 DIABETES MELLITUS, WITH FAT LAYER EXPOSED (HCC): Primary | Chronic | ICD-10-CM

## 2025-09-05 DIAGNOSIS — E11.621 DIABETIC ULCER OF TOE OF RIGHT FOOT ASSOCIATED WITH TYPE 2 DIABETES MELLITUS, WITH FAT LAYER EXPOSED (HCC): Chronic | ICD-10-CM

## 2025-09-05 PROCEDURE — 11042 DBRDMT SUBQ TIS 1ST 20SQCM/<: CPT

## 2025-09-05 ASSESSMENT — PAIN SCALES - GENERAL: PAINLEVEL_OUTOF10: 0

## (undated) DEVICE — SYR 50ML LR LCK 1ML GRAD NSAF --

## (undated) DEVICE — BIPOLAR SEALER 23-112-1 AQM 6.0: Brand: AQUAMANTYS ®

## (undated) DEVICE — Z DISCONTINUED USE 2744636  DRESSING AQUACEL 14 IN ALG W3.5XL14IN POLYUR FLM CVR W/ HYDRCOLL

## (undated) DEVICE — Device

## (undated) DEVICE — TRAY PREP DRY W/ PREM GLV 2 APPL 6 SPNG 2 UNDPD 1 OVERWRAP

## (undated) DEVICE — FAN SPRAY KIT: Brand: PULSAVAC®

## (undated) DEVICE — T4 HOOD

## (undated) DEVICE — SYR 10ML LUER LOK 1/5ML GRAD --

## (undated) DEVICE — YANKAUER,BULB TIP,W/O VENT,RIGID,STERILE: Brand: MEDLINE

## (undated) DEVICE — DRAPE,TOP,102X53,STERILE: Brand: MEDLINE

## (undated) DEVICE — BASIC SINGLE BASIN-LF: Brand: MEDLINE INDUSTRIES, INC.

## (undated) DEVICE — GLOVE ORANGE PI 7 1/2   MSG9075

## (undated) DEVICE — STAPLER SKIN H3.9MM WIRE DIA0.58MM CRWN 6.9MM 35 STPL FIX

## (undated) DEVICE — LOWER EXTREMITY: Brand: MEDLINE INDUSTRIES, INC.

## (undated) DEVICE — 3M™ IOBAN™ 2 ANTIMICROBIAL INCISE DRAPE 6651EZ: Brand: IOBAN™ 2

## (undated) DEVICE — STOCKINETTE,IMPERVIOUS,12X48,STERILE: Brand: MEDLINE

## (undated) DEVICE — STAPLER SKIN SQ 30 ABSRB STPL DISP INSORB

## (undated) DEVICE — BUTTON SWITCH PENCIL BLADE ELECTRODE, HOLSTER: Brand: EDGE

## (undated) DEVICE — 3M™ COBAN™ NL STERILE NON-LATEX SELF-ADHERENT WRAP, 2084S, 4 IN X 5 YD (10 CM X 4,5 M), 18 ROLLS/CASE: Brand: 3M™ COBAN™

## (undated) DEVICE — SUTURE VCRL SZ 2-0 L27IN ABSRB UD L36MM CP-1 1/2 CIR REV J266H

## (undated) DEVICE — SOLUTION IRRIG 3000ML 0.9% SOD CHL USP UROMATIC PLAS CONT

## (undated) DEVICE — (D)PREP SKN CHLRAPRP APPL 26ML -- CONVERT TO ITEM 371833

## (undated) DEVICE — MEDI-VAC YANKAUER SUCTION HANDLE W/BULBOUS TIP: Brand: CARDINAL HEALTH

## (undated) DEVICE — SOLUTION IRRIG 3000ML 0.9% SOD CHL FLX CONT 0797208] ICU MEDICAL INC]

## (undated) DEVICE — SUTURE ABSRB X-1 REV CUT 1/2 CIR 22MM UD BRAID 27IN SZ 3-0 J458H

## (undated) DEVICE — DRAPE XR CASS L UNIV FIT ADH CLSR

## (undated) DEVICE — GLOVE SURG SZ 8 L12IN FNGR THK79MIL GRN LTX FREE

## (undated) DEVICE — COAXIAL HIGH FLOW TIP WITH SOFT SHIELD

## (undated) DEVICE — 3M™ STERI-DRAPE™ INCISE DRAPE, XL 1051: Brand: STERI-DRAPE™

## (undated) DEVICE — SOLUTION IV 250ML 0.9% SOD CHL CLR INJ FLX BG CONT PRT CLSR

## (undated) DEVICE — BANDAGE,GAUZE,BULKEE II,4.5"X4.1YD,STRL: Brand: MEDLINE

## (undated) DEVICE — 7 DAY SILVER-COATED ANTIMICROBIAL BARRIER DRESSING: Brand: ACTICOAT 7  4" X 5"

## (undated) DEVICE — (D)SYR 10ML 1/5ML GRAD NSAF -- PKGING CHANGE USE ITEM 338027

## (undated) DEVICE — REM POLYHESIVE ADULT PATIENT RETURN ELECTRODE: Brand: VALLEYLAB

## (undated) DEVICE — SUTURE PDS II SZ 1 L96IN ABSRB VLT TP-1 L65MM 1/2 CIR Z880G

## (undated) DEVICE — SOLUTION IV DEXTROSE/SALINE 5%-0.9% 500ML - 500ML

## (undated) DEVICE — TUBING, SUCTION, 1/4" X 10', STRAIGHT: Brand: MEDLINE

## (undated) DEVICE — STOCKINETTE TUBE 6X48 -- MEDICHOICE

## (undated) DEVICE — 3M™ STERI-DRAPE™ INSTRUMENT POUCH 1018: Brand: STERI-DRAPE™

## (undated) DEVICE — HANDPIECE SET WITH COAXIAL HIGH FLOW TIP AND SUCTION TUBE: Brand: INTERPULSE

## (undated) DEVICE — PRECISION FALCON OSCILLATING TIP SAW CARTRIDGE: Brand: PRECISION FALCON

## (undated) DEVICE — DUAL CUT SAGITTAL BLADE

## (undated) DEVICE — DRAPE TWL SURG 16X26IN BLU ORB04] ALLCARE INC]

## (undated) DEVICE — SYR LR LCK 1ML GRAD NSAF 30ML --

## (undated) DEVICE — SYRINGE CATH TIP 50ML

## (undated) DEVICE — X-LARGE COTTON GLOVE: Brand: DEROYAL

## (undated) DEVICE — SIZER SURG TIB KT DISP TRIATHLON PRECIS

## (undated) DEVICE — BLADE SAW PAT RMR PILT H 51MM --

## (undated) DEVICE — SKIN STAPLER: Brand: SIGNET

## (undated) DEVICE — SOLUTION IV 1000ML 0.9% SOD CHL

## (undated) DEVICE — PREMIUM WET SKIN PREP TRAY: Brand: MEDLINE INDUSTRIES, INC.

## (undated) DEVICE — TIP SUCTION ORAL YANKAUER TIP --

## (undated) DEVICE — SUTURE STRATAFIX SYMMETRIC PDS + SZ 2-0 L18IN ABSRB VLT SXPP1A403

## (undated) DEVICE — SUT ETHBND 2 30IN LR DA GRN --

## (undated) DEVICE — 2000CC GUARDIAN II: Brand: GUARDIAN

## (undated) DEVICE — CURETTE BNE CEM 10IN DISP --

## (undated) DEVICE — Z DISCONTINUED PER MEDLINE USE 2741944 DRESSING AQUACEL 12 IN SURG W9XL30CM SIL CVR WTRPRF VIR BACT BARR ANTIMIC

## (undated) DEVICE — 3000CC GUARDIAN II: Brand: GUARDIAN

## (undated) DEVICE — PACK PROCEDURE SURG TOT KNEE

## (undated) DEVICE — HOOD: Brand: FLYTE

## (undated) DEVICE — SUTURE VCRL SZ 1 L27IN ABSRB UD L36MM CP-1 1/2 CIR REV CUT J268H

## (undated) DEVICE — TOTAL HIP DR JENNINGS: Brand: MEDLINE INDUSTRIES, INC.

## (undated) DEVICE — TRAY CATH 16F DRN BG LTX -- CONVERT TO ITEM 363158